# Patient Record
Sex: FEMALE | Race: WHITE | NOT HISPANIC OR LATINO | Employment: UNEMPLOYED | URBAN - METROPOLITAN AREA
[De-identification: names, ages, dates, MRNs, and addresses within clinical notes are randomized per-mention and may not be internally consistent; named-entity substitution may affect disease eponyms.]

---

## 2018-03-05 ENCOUNTER — APPOINTMENT (EMERGENCY)
Dept: RADIOLOGY | Facility: HOSPITAL | Age: 58
DRG: 054 | End: 2018-03-05
Payer: MEDICARE

## 2018-03-05 ENCOUNTER — HOSPITAL ENCOUNTER (INPATIENT)
Facility: HOSPITAL | Age: 58
LOS: 1 days | Discharge: HOME | DRG: 054 | End: 2018-03-06
Attending: EMERGENCY MEDICINE | Admitting: FAMILY MEDICINE
Payer: MEDICARE

## 2018-03-05 DIAGNOSIS — E11.9 LONG-TERM INSULIN USE IN TYPE 2 DIABETES (HCC): ICD-10-CM

## 2018-03-05 DIAGNOSIS — D49.6 NEOPLASM OF BRAIN CAUSING MASS EFFECT ON ADJACENT STRUCTURES (HCC): Primary | ICD-10-CM

## 2018-03-05 DIAGNOSIS — G93.89 FRONTAL MASS OF BRAIN: ICD-10-CM

## 2018-03-05 DIAGNOSIS — Z79.4 LONG-TERM INSULIN USE IN TYPE 2 DIABETES (HCC): ICD-10-CM

## 2018-03-05 PROBLEM — R39.15 URINARY URGENCY: Status: ACTIVE | Noted: 2018-03-05

## 2018-03-05 PROBLEM — G93.5 NEOPLASM OF BRAIN CAUSING MASS EFFECT ON ADJACENT STRUCTURES (HCC): Status: ACTIVE | Noted: 2018-03-05

## 2018-03-05 PROBLEM — I10 BENIGN ESSENTIAL HTN: Status: ACTIVE | Noted: 2018-03-05

## 2018-03-05 PROBLEM — G93.6 VASOGENIC CEREBRAL EDEMA (HCC): Status: ACTIVE | Noted: 2018-03-05

## 2018-03-05 PROBLEM — R53.1 WEAKNESS OF LEFT SIDE OF BODY: Status: ACTIVE | Noted: 2018-03-05

## 2018-03-05 LAB
ALBUMIN SERPL BCP-MCNC: 3.8 G/DL (ref 3.5–5)
ALP SERPL-CCNC: 69 U/L (ref 46–116)
ALT SERPL W P-5'-P-CCNC: 33 U/L (ref 12–78)
ANION GAP SERPL CALCULATED.3IONS-SCNC: 7 MMOL/L (ref 4–13)
APTT PPP: 25 SECONDS (ref 24–33)
AST SERPL W P-5'-P-CCNC: 17 U/L (ref 5–45)
BACTERIA UR QL AUTO: ABNORMAL /HPF
BASOPHILS # BLD AUTO: 0 THOUSANDS/ΜL (ref 0–0.1)
BASOPHILS NFR BLD AUTO: 0 % (ref 0–1)
BILIRUB SERPL-MCNC: 0.3 MG/DL (ref 0.2–1)
BILIRUB UR QL STRIP: NEGATIVE
BUN SERPL-MCNC: 19 MG/DL (ref 5–25)
CALCIUM SERPL-MCNC: 9.3 MG/DL (ref 8.3–10.1)
CHLORIDE SERPL-SCNC: 103 MMOL/L (ref 100–108)
CLARITY UR: CLEAR
CO2 SERPL-SCNC: 30 MMOL/L (ref 21–32)
COLOR UR: YELLOW
CREAT SERPL-MCNC: 0.82 MG/DL (ref 0.6–1.3)
EOSINOPHIL # BLD AUTO: 0.1 THOUSAND/ΜL (ref 0–0.61)
EOSINOPHIL NFR BLD AUTO: 1 % (ref 0–6)
ERYTHROCYTE [DISTWIDTH] IN BLOOD BY AUTOMATED COUNT: 13.6 % (ref 11.6–15.1)
GFR SERPL CREATININE-BSD FRML MDRD: 80 ML/MIN/1.73SQ M
GLUCOSE SERPL-MCNC: 165 MG/DL (ref 65–140)
GLUCOSE SERPL-MCNC: 190 MG/DL (ref 65–140)
GLUCOSE SERPL-MCNC: 195 MG/DL (ref 65–140)
GLUCOSE SERPL-MCNC: 398 MG/DL (ref 65–140)
GLUCOSE UR STRIP-MCNC: ABNORMAL MG/DL
HCT VFR BLD AUTO: 35.3 % (ref 37–47)
HGB BLD-MCNC: 11.7 G/DL (ref 12–16)
HGB UR QL STRIP.AUTO: NEGATIVE
HOLD SPECIMEN: NORMAL
INR PPP: 0.96 (ref 0.86–1.16)
KETONES UR STRIP-MCNC: ABNORMAL MG/DL
LEUKOCYTE ESTERASE UR QL STRIP: ABNORMAL
LYMPHOCYTES # BLD AUTO: 2.1 THOUSANDS/ΜL (ref 0.6–4.47)
LYMPHOCYTES NFR BLD AUTO: 27 % (ref 14–44)
MCH RBC QN AUTO: 29.5 PG (ref 27–31)
MCHC RBC AUTO-ENTMCNC: 33.1 G/DL (ref 31.4–37.4)
MCV RBC AUTO: 89 FL (ref 82–98)
MONOCYTES # BLD AUTO: 0.4 THOUSAND/ΜL (ref 0.17–1.22)
MONOCYTES NFR BLD AUTO: 5 % (ref 4–12)
NEUTROPHILS # BLD AUTO: 5.4 THOUSANDS/ΜL (ref 1.85–7.62)
NEUTS SEG NFR BLD AUTO: 68 % (ref 43–75)
NITRITE UR QL STRIP: NEGATIVE
NON-SQ EPI CELLS URNS QL MICRO: ABNORMAL /HPF
NRBC BLD AUTO-RTO: 0 /100 WBCS
PH UR STRIP.AUTO: 5.5 [PH] (ref 5–9)
PLATELET # BLD AUTO: 221 THOUSANDS/UL (ref 130–400)
PMV BLD AUTO: 7.9 FL (ref 8.9–12.7)
POTASSIUM SERPL-SCNC: 4.1 MMOL/L (ref 3.5–5.3)
PROT SERPL-MCNC: 7 G/DL (ref 6.4–8.2)
PROT UR STRIP-MCNC: NEGATIVE MG/DL
PROTHROMBIN TIME: 10.1 SECONDS (ref 9.4–11.7)
RBC # BLD AUTO: 3.95 MILLION/UL (ref 4.2–5.4)
RBC #/AREA URNS AUTO: ABNORMAL /HPF
SODIUM SERPL-SCNC: 140 MMOL/L (ref 136–145)
SP GR UR STRIP.AUTO: 1.02 (ref 1–1.03)
UROBILINOGEN UR QL STRIP.AUTO: 0.2 E.U./DL
WBC # BLD AUTO: 8 THOUSAND/UL (ref 4.8–10.8)
WBC #/AREA URNS AUTO: ABNORMAL /HPF

## 2018-03-05 PROCEDURE — 85025 COMPLETE CBC W/AUTO DIFF WBC: CPT | Performed by: EMERGENCY MEDICINE

## 2018-03-05 PROCEDURE — 93005 ELECTROCARDIOGRAM TRACING: CPT | Performed by: EMERGENCY MEDICINE

## 2018-03-05 PROCEDURE — 36415 COLL VENOUS BLD VENIPUNCTURE: CPT | Performed by: EMERGENCY MEDICINE

## 2018-03-05 PROCEDURE — 85610 PROTHROMBIN TIME: CPT | Performed by: EMERGENCY MEDICINE

## 2018-03-05 PROCEDURE — 85730 THROMBOPLASTIN TIME PARTIAL: CPT | Performed by: EMERGENCY MEDICINE

## 2018-03-05 PROCEDURE — 70450 CT HEAD/BRAIN W/O DYE: CPT

## 2018-03-05 PROCEDURE — 82948 REAGENT STRIP/BLOOD GLUCOSE: CPT

## 2018-03-05 PROCEDURE — 99285 EMERGENCY DEPT VISIT HI MDM: CPT

## 2018-03-05 PROCEDURE — 99223 1ST HOSP IP/OBS HIGH 75: CPT | Performed by: FAMILY MEDICINE

## 2018-03-05 PROCEDURE — 71045 X-RAY EXAM CHEST 1 VIEW: CPT

## 2018-03-05 PROCEDURE — 80053 COMPREHEN METABOLIC PANEL: CPT | Performed by: EMERGENCY MEDICINE

## 2018-03-05 PROCEDURE — 81001 URINALYSIS AUTO W/SCOPE: CPT | Performed by: FAMILY MEDICINE

## 2018-03-05 PROCEDURE — 87081 CULTURE SCREEN ONLY: CPT | Performed by: FAMILY MEDICINE

## 2018-03-05 RX ORDER — VALACYCLOVIR HYDROCHLORIDE 500 MG/1
500 TABLET, FILM COATED ORAL 2 TIMES DAILY
COMMUNITY
End: 2018-03-12

## 2018-03-05 RX ORDER — DEXAMETHASONE SODIUM PHOSPHATE 10 MG/ML
4 INJECTION, SOLUTION INTRAMUSCULAR; INTRAVENOUS EVERY 6 HOURS SCHEDULED
Status: DISCONTINUED | OUTPATIENT
Start: 2018-03-06 | End: 2018-03-06 | Stop reason: HOSPADM

## 2018-03-05 RX ORDER — ACETAMINOPHEN 325 MG/1
650 TABLET ORAL EVERY 6 HOURS PRN
Status: DISCONTINUED | OUTPATIENT
Start: 2018-03-05 | End: 2018-03-05

## 2018-03-05 RX ORDER — SERTRALINE HYDROCHLORIDE 100 MG/1
100 TABLET, FILM COATED ORAL
Status: DISCONTINUED | OUTPATIENT
Start: 2018-03-05 | End: 2018-03-06 | Stop reason: HOSPADM

## 2018-03-05 RX ORDER — LORAZEPAM 2 MG/ML
1 INJECTION INTRAMUSCULAR EVERY 4 HOURS PRN
Status: DISCONTINUED | OUTPATIENT
Start: 2018-03-05 | End: 2018-03-06 | Stop reason: HOSPADM

## 2018-03-05 RX ORDER — ACETAMINOPHEN 325 MG/1
650 TABLET ORAL EVERY 6 HOURS PRN
Status: DISCONTINUED | OUTPATIENT
Start: 2018-03-05 | End: 2018-03-06 | Stop reason: HOSPADM

## 2018-03-05 RX ORDER — ONDANSETRON 2 MG/ML
4 INJECTION INTRAMUSCULAR; INTRAVENOUS EVERY 6 HOURS PRN
Status: DISCONTINUED | OUTPATIENT
Start: 2018-03-05 | End: 2018-03-06 | Stop reason: HOSPADM

## 2018-03-05 RX ORDER — DEXAMETHASONE SODIUM PHOSPHATE 10 MG/ML
6 INJECTION, SOLUTION INTRAMUSCULAR; INTRAVENOUS ONCE
Status: COMPLETED | OUTPATIENT
Start: 2018-03-05 | End: 2018-03-05

## 2018-03-05 RX ORDER — SERTRALINE HYDROCHLORIDE 100 MG/1
100 TABLET, FILM COATED ORAL
COMMUNITY

## 2018-03-05 RX ORDER — DEXAMETHASONE SODIUM PHOSPHATE 10 MG/ML
6 INJECTION, SOLUTION INTRAMUSCULAR; INTRAVENOUS EVERY 6 HOURS SCHEDULED
Status: DISCONTINUED | OUTPATIENT
Start: 2018-03-05 | End: 2018-03-05

## 2018-03-05 RX ORDER — LISINOPRIL 20 MG/1
20 TABLET ORAL DAILY
COMMUNITY
End: 2020-02-19

## 2018-03-05 RX ORDER — VALACYCLOVIR HYDROCHLORIDE 500 MG/1
500 TABLET, FILM COATED ORAL 2 TIMES DAILY
Status: DISCONTINUED | OUTPATIENT
Start: 2018-03-05 | End: 2018-03-05

## 2018-03-05 RX ORDER — LISINOPRIL 20 MG/1
20 TABLET ORAL DAILY
Status: DISCONTINUED | OUTPATIENT
Start: 2018-03-05 | End: 2018-03-06 | Stop reason: HOSPADM

## 2018-03-05 RX ADMIN — ACETAMINOPHEN 650 MG: 325 TABLET, FILM COATED ORAL at 18:04

## 2018-03-05 RX ADMIN — INSULIN LISPRO 1 UNITS: 100 INJECTION, SOLUTION INTRAVENOUS; SUBCUTANEOUS at 18:04

## 2018-03-05 RX ADMIN — LISINOPRIL 20 MG: 20 TABLET ORAL at 17:39

## 2018-03-05 RX ADMIN — SERTRALINE HYDROCHLORIDE 100 MG: 100 TABLET ORAL at 21:18

## 2018-03-05 RX ADMIN — INSULIN LISPRO 10 UNITS: 100 INJECTION, SOLUTION INTRAVENOUS; SUBCUTANEOUS at 18:13

## 2018-03-05 RX ADMIN — DEXAMETHASONE SODIUM PHOSPHATE 6 MG: 10 INJECTION, SOLUTION INTRAMUSCULAR; INTRAVENOUS at 13:57

## 2018-03-05 RX ADMIN — DEXAMETHASONE SODIUM PHOSPHATE 6 MG: 10 INJECTION, SOLUTION INTRAMUSCULAR; INTRAVENOUS at 18:09

## 2018-03-05 NOTE — PLAN OF CARE
DISCHARGE PLANNING     Discharge to home or other facility with appropriate resources Progressing        Knowledge Deficit     Patient/family/caregiver demonstrates understanding of disease process, treatment plan, medications, and discharge instructions Progressing        PAIN - ADULT     Verbalizes/displays adequate comfort level or baseline comfort level Progressing        Potential for Falls     Patient will remain free of falls Progressing        SAFETY ADULT     Maintain or return to baseline ADL function Progressing     Maintain or return mobility status to optimal level Progressing

## 2018-03-05 NOTE — ED PROVIDER NOTES
History  Chief Complaint   Patient presents with    Extremity Weakness     pt presents to the ed with left sided extremity weakness progressivly over 8 months  pt states that starting this morning she began to have increased left sided weakness      Hx IDDM, SEIZURES/NO MEDS  C/O LT UPPER AND LOWER EXTR WEAKNESS X 8 MONTHS, WORSENING  PT SAW PMD, REFERRED TO NEUROLOGY, NEVER SAW HIM  EXAM: LT WEAKNESS LOWER >UPPER        History provided by:  Patient and spouse  Extremity Weakness   Location:  LEFT UPPER AND LOWER  Severity:  Moderate  Onset quality:  Gradual  Duration:  8 months  Timing:  Constant  Progression:  Worsening      Prior to Admission Medications   Prescriptions Last Dose Informant Patient Reported? Taking? Insulin Aspart (NOVOLOG SC)   Yes Yes   Sig: Inject 10 Units under the skin   lisinopril (ZESTRIL) 20 mg tablet   Yes Yes   Sig: Take 20 mg by mouth daily   metFORMIN (GLUCOPHAGE) 500 mg tablet   Yes Yes   Sig: Take 500 mg by mouth 2 (two) times a day with meals   sertraline (ZOLOFT) 100 mg tablet   Yes Yes   Sig: Take 50 mg by mouth daily at bedtime   sertraline (ZOLOFT) 50 mg tablet   Yes Yes   Sig: Take 50 mg by mouth daily   valACYclovir (VALTREX) 500 mg tablet   Yes Yes   Sig: Take 500 mg by mouth 2 (two) times a day      Facility-Administered Medications: None       Past Medical History:   Diagnosis Date    [facility] [address] [facility]     Diabetes mellitus [facility] [address] )     Seizures ([facility] [address] )        Past Surgical History:   Procedure Laterality Date    HYSTERECTOMY         No family history on file  I have reviewed and agree with the history as documented  Social History   Substance Use Topics    Smoking status: Not on file    Smokeless tobacco: Not on file    Alcohol use Not on file        Review of Systems   Musculoskeletal: Positive for extremity weakness  Neurological: Positive for weakness  All other systems reviewed and are negative        Physical Exam  ED Triage Vitals   Temperature Pulse Respirations Blood Pressure SpO2   03/05/18 1226 03/05/18 1226 03/05/18 1226 03/05/18 1227 03/05/18 1226   98 6 °F (37 °C) 68 18 (!) 189/82 95 %      Temp Source Heart Rate Source Patient Position - Orthostatic VS BP Location FiO2 (%)   03/05/18 1226 03/05/18 1226 -- -- --   Tympanic Monitor         Pain Score       --                  Orthostatic Vital Signs  Vitals:    03/05/18 1226 03/05/18 1227   BP:  (!) 189/82   Pulse: 68        Physical Exam   Constitutional: She is oriented to person, place, and time  She appears well-developed and well-nourished  No distress  HENT:   Head: Normocephalic and atraumatic  Eyes: Conjunctivae and EOM are normal  Pupils are equal, round, and reactive to light  Neck: Normal range of motion  Neck supple  Cardiovascular: Normal rate and regular rhythm  Pulmonary/Chest: Effort normal and breath sounds normal    Abdominal: Soft  She exhibits no distension  There is no tenderness  Musculoskeletal: She exhibits no edema, tenderness or deformity  Neurological: She is alert and oriented to person, place, and time  No cranial nerve deficit or sensory deficit  She exhibits abnormal muscle tone  GCS eye subscore is 4  GCS verbal subscore is 5  GCS motor subscore is 6  LEFT EXTR WEAKNESS LOWER > UPPER   Skin: Skin is warm and dry  No rash noted  She is not diaphoretic  No erythema  Nursing note and vitals reviewed        ED Medications  Medications   dexamethasone (PF) (DECADRON) injection 6 mg (not administered)       Diagnostic Studies  Results Reviewed     Procedure Component Value Units Date/Time    Fingerstick Glucose (POCT) [[id number]]  (Abnormal) Collected:  03/05/18 1216    Lab Status:  Final result Updated:  03/05/18 1309     POC Glucose 190 (H) mg/dl     Comprehensive metabolic panel [[id number]]  (Abnormal) Collected:  03/05/18 1232    Lab Status:  Final result Specimen:  Blood from Arm, Right Updated:  03/05/18 1303     Sodium 140 mmol/L      Potassium 4 1 mmol/L Chloride 103 mmol/L      CO2 30 mmol/L      Anion Gap 7 mmol/L      BUN 19 mg/dL      Creatinine 0 82 mg/dL      Glucose 195 (H) mg/dL      Calcium 9 3 mg/dL      AST 17 U/L      ALT 33 U/L      Alkaline Phosphatase 69 U/L      Total Protein 7 0 g/dL      Albumin 3 8 g/dL      Total Bilirubin 0 30 mg/dL      eGFR 80 ml/min/1 73sq m     Narrative:         National Kidney Disease Education Program recommendations are as follows:  GFR calculation is accurate only with a steady state creatinine  Chronic Kidney disease less than 60 ml/min/1 73 sq  meters  Kidney failure less than 15 ml/min/1 73 sq  meters  APTT [[id number]]  (Normal) Collected:  03/05/18 1232    Lab Status:  Final result Specimen:  Blood from Arm, Right Updated:  03/05/18 1256     PTT 25 seconds     Narrative:          Therapeutic Heparin Range = 60-90 seconds    Protime-INR [[id number]]  (Normal) Collected:  03/05/18 1232    Lab Status:  Final result Specimen:  Blood from Arm, Right Updated:  03/05/18 1256     Protime 10 1 seconds      INR 0 96    CBC and differential [[id number]]  (Abnormal) Collected:  03/05/18 1232    Lab Status:  Final result Specimen:  Blood from Arm, Right Updated:  03/05/18 1248     WBC 8 00 Thousand/uL      RBC 3 95 (L) Million/uL      Hemoglobin 11 7 (L) g/dL      Hematocrit 35 3 (L) %      MCV 89 fL      MCH 29 5 pg      MCHC 33 1 g/dL      RDW 13 6 %      MPV 7 9 (L) fL      Platelets 469 Thousands/uL      nRBC 0 /100 WBCs      Neutrophils Relative 68 %      Lymphocytes Relative 27 %      Monocytes Relative 5 %      Eosinophils Relative 1 %      Basophils Relative 0 %      Neutrophils Absolute 5 40 Thousands/µL      Lymphocytes Absolute 2 10 Thousands/µL      Monocytes Absolute 0 40 Thousand/µL      Eosinophils Absolute 0 10 Thousand/µL      Basophils Absolute 0 00 Thousands/µL                  CT head wo contrast   Final Result by Leslye Hernández MD (03/05 1319)      4 2 x 3 8 x 3 5 cm right frontal lobe mass at the vertex evoking mild vasogenic edema with mass effect upon the falx with secondary midline shift, possible parafalcine meningioma  Enhanced MRI with contrast recommended  4 mm high-density nodule to the right of midline at the level of 4th ventricle likely an extra-axial lesion versus punctate hemorrhage  Further evaluation with MRI is advised               I personally discussed this study with Faey Silverman on 3/5/2018 at 1:17 PM                            Workstation performed: [id number]         XR chest 1 view    (Results Pending)              Procedures  ECG 12 Lead Documentation  Date/Time: 3/5/2018 12:47 PM  Performed by: Carla Sam  Authorized by: Carla Sam     ECG reviewed by me, the ED Provider: yes    Patient location:  ED  Interpretation:     Interpretation: abnormal    Rate:     ECG rate:  52    ECG rate assessment: bradycardic    Rhythm:     Rhythm: sinus bradycardia    QRS:     QRS axis:  Normal    QRS intervals:  Normal  ST segments:     ST segments:  Normal  T waves:     T waves: normal             Phone Contacts  ED Phone Contact    ED Course  ED Course              NIH Stroke Scale    Flowsheet Row Most Recent Value   Level of Consciousness (1a )  0 Filed at: 03/05/2018 1227   LOC Questions (1b )  0 Filed at: 03/05/2018 1227   LOC Commands (1c )  0 Filed at: 03/05/2018 1227   Best Gaze (2 )  0 Filed at: 03/05/2018 1227   Visual (3 )  0 Filed at: 03/05/2018 1227   Facial Palsy (4 )  0 Filed at: 03/05/2018 1227   Motor Arm, Left (5a )  1 Filed at: 03/05/2018 1227   Motor Arm, Right (5b )  0 Filed at: 03/05/2018 1227   Motor Leg, Left (6a )  1 Filed at: 03/05/2018 1227   Motor Leg, Right (6b )  0 Filed at: 03/05/2018 1227   Limb Ataxia (7 )  0 Filed at: 03/05/2018 1227   Sensory (8 )  0 Filed at: 03/05/2018 1227   Best Language (9 )  0 Filed at: 03/05/2018 1227   Dysarthria (10 )  0 Filed at: 03/05/2018 1227   Extinction and Inattention (11 ) (Formerly Neglect)  0 Filed at: 03/05/2018 1227   Total  2 Filed at: 03/05/2018 1227                        [facility]  Number of Diagnoses or Management Options  Diagnosis management comments: D/W DR RIVERA, WILL START DEXAMETHASONE 6 MG Q 6 HRS  CritCare Time    Disposition  Final diagnoses:   Neoplasm of brain causing mass effect on adjacent structures [facility])     Time reflects when diagnosis was documented in both MDM as applicable and the Disposition within this note     Time User Action Codes Description Comment    3/5/2018  1:25 PM Silvano Reed Add [D49 6] Neoplasm of brain causing mass effect on adjacent structures [facility])       ED Disposition     ED Disposition Condition Comment    Admit  Case was discussed with HOSPITALIST and the patient's admission status was agreed to be Admission Status: inpatient status to the service of Dr Stacy Staff   Follow-up Information    None       Patient's Medications   Discharge Prescriptions    No medications on file     No discharge procedures on file      ED Provider  Electronically Signed by           Asia Matos MD  03/05/18 1325       Asia Matos MD  03/05/18 1324

## 2018-03-05 NOTE — H&P
History and Physical - [facility]    Patient Information: Syed Blevins 62 y o  female MRN: [id number]  Unit/Bed#: [address] [address] Encounter: [id number]  Admitting Physician: Jorge Atkinson DO  PCP: No primary care provider on file  Date of Admission:  03/05/18        Hospital Problem List:     Principal Problem:    Frontal mass of brain  Active Problems:    Vasogenic cerebral edema (HCC)    Seizure disorder (HCC)    Long-term insulin use in type 2 diabetes ([facility] [address] )    Benign essential HTN    Urinary urgency      Assessment/Plan:    * Frontal mass of brain   Assessment & Plan    CT head done in the ER showed right frontal lobe mass at the vertex which is causing mild vasogenic edema with mass effect upon the falx with midline shift  This is possibly parafalcine meningioma  A 4 mm nodule was noted to the right of midline at the level of 4th ventricle which could be an extra-axial lesion versus punctate hemorrhage  Admit patient for further management  MRI ordered for for further evaluation   Neurology and Oncology consult  Neurochecks while here        Vasogenic cerebral edema (HCC)   Assessment & Plan    Vasogenic edema with mass effect upon the falx with midline shift was noted  Case was discussed with Neurology by the ER physician and patient started on IV Decadron          Seizure disorder [facility])   Assessment & Plan    Patient discontinued her medications on her own "few years" ago  Place patient on seizure precaution  Neurology consult as noted above  IV Ativan as needed for seizures        Urinary urgency   Assessment & Plan    Check a UA for further evaluation    Patient denies any dysuria, hematuria        Benign essential HTN   Assessment & Plan    Continue lisinopril as she uses at home and monitor blood pressures        Long-term insulin use in type 2 diabetes [facility])   Assessment & Plan    Patient is on NovoLog 10 units t i d  with meals which will be replaced with Humalog while here  Accu-Cheks with sliding scale insulin  Check hemoglobin A1c level in the a m  VTE Prophylaxis: Pharmacologic VTE Prophylaxis contraindicated due to punctate hemorrhage noted on CT scan  / sequential compression device   Code Status: Level 1 - Full Code    Anticipated Length of Stay:  Patient will be admitted on an Inpatient basis with an anticipated length of stay of  atleast 2 midnights  Justification for Hospital Stay: Brain mass    Total Time for Visit, including Counseling / Coordination of Care: 45 minutes  Greater than 50% of this total time spent on direct patient counseling and coordination of care  Chief Complaint:     Extremity Weakness (pt presents to the ed with left sided extremity weakness progressivly over 8 months  pt states that starting this morning she began to have increased left sided weakness )    of Present Illness:    Michele Wolfe is a 62 y o  female who presents with complaints of worsening left-sided upper and lower extremity weakness x8 months  Patient states that her weakness worsened today which prompted her to come to the ER  Patient states that she saw her primary care doctor in December for this and was referred to Neurology  Patient states that she never saw Neurology because she was busy with work  Patient has had difficulty ambulating and has been using a cane  She also has difficulty getting up out of bed  She reports intermittent right-sided headache x1 week  She also reports fevers and chills about 2 weekends ago which have since resolved  Patient does have history of seizure disorder but stopped taking her seizure medications few years ago because she felt that it did not work for her  patient states that her last grand mal seizure was in February of 2017 when she was having cataract surgery    patient states that her last petite mal seizure was today in the ER   patient reports ringing in her ears bilaterally and has had right ear infections in the past   patient reports diarrhea but states that it is intermittent in nature since 2007- 2008 since she was diagnosed with IBS  she denies any visual changes  Patient does have history of uterine cancer and had a total hysterectomy except for a stump of the cervix was left behind as per patient  Patient also states that she had left lower lung "spot" that was noted which was monitored till 2011 and since it remained stable was told that it did not require any further testing  In the ER, CT scan showed right frontal lobe mass    Review of Systems:    Review of Systems   Constitutional: Positive for chills and fever  Negative for appetite change  HENT: Positive for rhinorrhea  Negative for congestion, sore throat and trouble swallowing  Eyes: Negative for photophobia and visual disturbance  Respiratory: Negative for cough, chest tightness and shortness of breath  Cardiovascular: Negative for chest pain and leg swelling  Gastrointestinal: Positive for diarrhea (chronic)  Negative for abdominal pain, blood in stool, nausea and vomiting  Genitourinary: Positive for urgency  Negative for dysuria and hematuria  Musculoskeletal: Positive for back pain (chronic)  Skin: Negative for wound  Neurological: Positive for seizures, weakness and headaches  Negative for syncope and speech difficulty  Hematological: Does not bruise/bleed easily  Psychiatric/Behavioral: Negative for agitation  Past Medical and Surgical History:     Past Medical History:   Diagnosis Date    Cancer ([facility] )     Diabetes mellitus ([facility] )     Seizures ([facility] )        Past Surgical History:   Procedure Laterality Date    HYSTERECTOMY         Meds/Allergies:    PTA meds:   Prior to Admission Medications   Prescriptions Last Dose Informant Patient Reported? Taking?    Insulin Aspart (NOVOLOG SC) 3/4/2018 at Unknown time  Yes Yes   Sig: Inject 10 Units under the skin   lisinopril (ZESTRIL) 20 mg tablet 3/4/2018 at Unknown time Yes Yes   Sig: Take 20 mg by mouth daily   metFORMIN (GLUCOPHAGE) 500 mg tablet 3/4/2018 at Unknown time  Yes Yes   Sig: Take 500 mg by mouth 2 (two) times a day with meals   sertraline (ZOLOFT) 100 mg tablet 3/4/2018 at Unknown time  Yes Yes   Sig: Take 50 mg by mouth daily at bedtime   sertraline (ZOLOFT) 50 mg tablet 3/4/2018 at Unknown time  Yes Yes   Sig: Take 50 mg by mouth daily   valACYclovir (VALTREX) 500 mg tablet 3/4/2018 at Unknown time  Yes Yes   Sig: Take 500 mg by mouth 2 (two) times a day      Facility-Administered Medications: None       Allergies: Allergies   Allergen Reactions    Dilantin [Phenytoin]     Lidocaine     Statins      History:     Marital Status: Single     Substance Use History:   History   Alcohol Use No     History   Smoking Status    Former Smoker    Quit date: 3/5/2000   Smokeless Tobacco    Never Used     History   Drug Use No       Family History:    History reviewed  No pertinent family history  Physical Exam:     Vitals:   Blood Pressure: (!) 181/79 (03/05/18 1741)  Pulse: 87 (03/05/18 1741)  Temperature: 98 7 °F (37 1 °C) (03/05/18 1741)  Temp Source: Tympanic (03/05/18 1741)  Respirations: 18 (03/05/18 1741)  Height: 5' 5" (165 1 cm) (03/05/18 1451)  Weight - Scale: 101 kg (222 lb 14 2 oz) (03/05/18 1451)  SpO2: 95 % (03/05/18 1741)    Physical Exam   Constitutional: She is oriented to person, place, and time  She appears well-developed and well-nourished  No distress  HENT:   Head: Normocephalic and atraumatic  Mouth/Throat: Oropharynx is clear and moist    Eyes: EOM are normal  Right eye exhibits no discharge  Left eye exhibits no discharge  No scleral icterus  Neck: Neck supple  No tracheal deviation present  Cardiovascular: Normal rate and regular rhythm  Pulmonary/Chest: Effort normal and breath sounds normal  No respiratory distress  She has no wheezes  She has no rales  Abdominal: Soft  Bowel sounds are normal  She exhibits no distension  There is no tenderness  Musculoskeletal: She exhibits no edema  Neurological: She is alert and oriented to person, place, and time  No cranial nerve deficit  She exhibits abnormal muscle tone  Left sided weakness noted compared to right  Lower extremity was worse than upper extremity   Skin: Skin is dry  She is not diaphoretic  Psychiatric: She has a normal mood and affect  Lab Results: I have personally reviewed pertinent reports  Results from last 7 days  Lab Units 03/05/18  1232   WBC Thousand/uL 8 00   HEMOGLOBIN g/dL 11 7*   HEMATOCRIT % 35 3*   PLATELETS Thousands/uL 221   NEUTROS PCT % 68   LYMPHS PCT % 27   MONOS PCT % 5   EOS PCT % 1       Results from last 7 days  Lab Units 03/05/18  1232   SODIUM mmol/L 140   POTASSIUM mmol/L 4 1   CHLORIDE mmol/L 103   CO2 mmol/L 30   BUN mg/dL 19   CREATININE mg/dL 0 82   CALCIUM mg/dL 9 3   TOTAL PROTEIN g/dL 7 0   BILIRUBIN TOTAL mg/dL 0 30   ALK PHOS U/L 69   ALT U/L 33   AST U/L 17   GLUCOSE RANDOM mg/dL 195*       Results from last 7 days  Lab Units 03/05/18  1232   INR  0 96       Imaging: I have personally reviewed pertinent reports  Ct Head Wo Contrast    Result Date: 3/5/2018  Narrative: CT BRAIN - WITHOUT CONTRAST INDICATION:  55-year-old female with left-sided weakness  COMPARISON:  None  TECHNIQUE:  CT examination of the brain was performed  In addition to axial images, coronal 2D reformatted images were created and submitted for interpretation  Radiation dose length product (DLP) for this visit:  1084 16 mGy-cm   This examination, like all CT scans performed in the [facility], was performed utilizing techniques to minimize radiation dose exposure, including the use of iterative reconstruction and automated exposure control  IMAGE QUALITY:  Diagnostic   FINDINGS: PARENCHYMA:  Within the right frontal lobe at the vertex, an isodense mass measures 4 2 x 3 8 x 3 5 cm evoking minimal vasogenic edema and mild mass effect upon the falx with 6 mm of left-to-right midline shift  There is no significant mass effect upon the frontal horn of the lateral ventricle and the finding may represent a parafalcine meningioma  Enhanced MRI recommended for confirmation  A 4 mm high-density nodule along the the 4th ventricle to the right of midline may represent a 2nd extra-axial meningioma versus punctate hemorrhage  VENTRICLES AND EXTRA-AXIAL SPACES:  No secondary hydrocephalus  VISUALIZED ORBITS AND PARANASAL SINUSES:  Mucosal thickening of the left maxillary sinus noted  No acute changes  CALVARIUM AND EXTRACRANIAL SOFT TISSUES:  Normal      Impression: 4 2 x 3 8 x 3 5 cm right frontal lobe mass at the vertex evoking mild vasogenic edema with mass effect upon the falx with secondary midline shift, possible parafalcine meningioma  Enhanced MRI with contrast recommended  4 mm high-density nodule to the right of midline at the level of 4th ventricle likely an extra-axial lesion versus punctate hemorrhage  Further evaluation with MRI is advised  I personally discussed this study with WANG GARCIA on 3/5/2018 at 1:17 PM  Workstation performed: [id number]     Xr Chest 1 View    Result Date: 3/5/2018  Narrative: CHEST INDICATION:  40-year-old female with  SOB  COMPARISON:  None EXAM PERFORMED/VIEWS:  XR CHEST 1 VIEW Images: 1 FINDINGS: Cardiomediastinal silhouette appears unremarkable  The lungs are clear  No pneumothorax or pleural effusion  Osseous structures appear within normal limits for patient age  Impression: No acute cardiopulmonary disease  Workstation performed: [id number]       XR chest 1 view   Final Result      No acute cardiopulmonary disease  Workstation performed: [id number]         CT head wo contrast   Final Result      4 2 x 3 8 x 3 5 cm right frontal lobe mass at the vertex evoking mild vasogenic edema with mass effect upon the falx with secondary midline shift, possible parafalcine meningioma  Enhanced MRI with contrast recommended  4 mm high-density nodule to the right of midline at the level of 4th ventricle likely an extra-axial lesion versus punctate hemorrhage  Further evaluation with MRI is advised  I personally discussed this study with WANG GARCIA on 3/5/2018 at 1:17 PM                            Workstation performed: [id number]         MRI inpatient order    (Results Pending)       EKG, Pathology, and Other Studies Reviewed on Admission:   · EKG shows sinus rhythm with no ST elevations    [employer] Records Reviewed: N/A    ** Please Note: Dragon 360 Dictation voice to text software may have been used in the creation of this document   **

## 2018-03-06 ENCOUNTER — APPOINTMENT (INPATIENT)
Dept: RADIOLOGY | Facility: HOSPITAL | Age: 58
DRG: 054 | End: 2018-03-06
Payer: MEDICARE

## 2018-03-06 VITALS
HEIGHT: 65 IN | SYSTOLIC BLOOD PRESSURE: 139 MMHG | OXYGEN SATURATION: 94 % | TEMPERATURE: 97.5 F | WEIGHT: 222.88 LBS | BODY MASS INDEX: 37.13 KG/M2 | RESPIRATION RATE: 18 BRPM | DIASTOLIC BLOOD PRESSURE: 79 MMHG | HEART RATE: 75 BPM

## 2018-03-06 LAB
ANION GAP SERPL CALCULATED.3IONS-SCNC: 11 MMOL/L (ref 4–13)
BUN SERPL-MCNC: 22 MG/DL (ref 5–25)
CALCIUM SERPL-MCNC: 9.5 MG/DL (ref 8.3–10.1)
CHLORIDE SERPL-SCNC: 102 MMOL/L (ref 100–108)
CO2 SERPL-SCNC: 26 MMOL/L (ref 21–32)
CREAT SERPL-MCNC: 0.95 MG/DL (ref 0.6–1.3)
ERYTHROCYTE [DISTWIDTH] IN BLOOD BY AUTOMATED COUNT: 14.2 % (ref 11.6–15.1)
EST. AVERAGE GLUCOSE BLD GHB EST-MCNC: 171 MG/DL
GFR SERPL CREATININE-BSD FRML MDRD: 67 ML/MIN/1.73SQ M
GLUCOSE SERPL-MCNC: 279 MG/DL (ref 65–140)
GLUCOSE SERPL-MCNC: 293 MG/DL (ref 65–140)
GLUCOSE SERPL-MCNC: 359 MG/DL (ref 65–140)
HBA1C MFR BLD: 7.6 % (ref 4.2–6.3)
HCT VFR BLD AUTO: 35.8 % (ref 37–47)
HGB BLD-MCNC: 11.9 G/DL (ref 12–16)
MAGNESIUM SERPL-MCNC: 1.8 MG/DL (ref 1.6–2.6)
MCH RBC QN AUTO: 29.8 PG (ref 27–31)
MCHC RBC AUTO-ENTMCNC: 33.3 G/DL (ref 31.4–37.4)
MCV RBC AUTO: 90 FL (ref 82–98)
PLATELET # BLD AUTO: 226 THOUSANDS/UL (ref 130–400)
PMV BLD AUTO: 8.5 FL (ref 8.9–12.7)
POTASSIUM SERPL-SCNC: 4.4 MMOL/L (ref 3.5–5.3)
RBC # BLD AUTO: 3.99 MILLION/UL (ref 4.2–5.4)
SODIUM SERPL-SCNC: 139 MMOL/L (ref 136–145)
TSH SERPL DL<=0.05 MIU/L-ACNC: 0.57 UIU/ML (ref 0.36–3.74)
WBC # BLD AUTO: 10.2 THOUSAND/UL (ref 4.8–10.8)

## 2018-03-06 PROCEDURE — G8988 SELF CARE GOAL STATUS: HCPCS

## 2018-03-06 PROCEDURE — 99239 HOSP IP/OBS DSCHRG MGMT >30: CPT | Performed by: INTERNAL MEDICINE

## 2018-03-06 PROCEDURE — 97167 OT EVAL HIGH COMPLEX 60 MIN: CPT

## 2018-03-06 PROCEDURE — 99024 POSTOP FOLLOW-UP VISIT: CPT | Performed by: NEUROLOGICAL SURGERY

## 2018-03-06 PROCEDURE — G8979 MOBILITY GOAL STATUS: HCPCS

## 2018-03-06 PROCEDURE — 70498 CT ANGIOGRAPHY NECK: CPT

## 2018-03-06 PROCEDURE — A9585 GADOBUTROL INJECTION: HCPCS | Performed by: PSYCHIATRY & NEUROLOGY

## 2018-03-06 PROCEDURE — 85027 COMPLETE CBC AUTOMATED: CPT | Performed by: FAMILY MEDICINE

## 2018-03-06 PROCEDURE — 82948 REAGENT STRIP/BLOOD GLUCOSE: CPT

## 2018-03-06 PROCEDURE — 83036 HEMOGLOBIN GLYCOSYLATED A1C: CPT | Performed by: FAMILY MEDICINE

## 2018-03-06 PROCEDURE — 80048 BASIC METABOLIC PNL TOTAL CA: CPT | Performed by: FAMILY MEDICINE

## 2018-03-06 PROCEDURE — 97163 PT EVAL HIGH COMPLEX 45 MIN: CPT

## 2018-03-06 PROCEDURE — 99223 1ST HOSP IP/OBS HIGH 75: CPT | Performed by: INTERNAL MEDICINE

## 2018-03-06 PROCEDURE — 83735 ASSAY OF MAGNESIUM: CPT | Performed by: FAMILY MEDICINE

## 2018-03-06 PROCEDURE — G8987 SELF CARE CURRENT STATUS: HCPCS

## 2018-03-06 PROCEDURE — 70496 CT ANGIOGRAPHY HEAD: CPT

## 2018-03-06 PROCEDURE — 70553 MRI BRAIN STEM W/O & W/DYE: CPT

## 2018-03-06 PROCEDURE — 97110 THERAPEUTIC EXERCISES: CPT

## 2018-03-06 PROCEDURE — G8978 MOBILITY CURRENT STATUS: HCPCS

## 2018-03-06 PROCEDURE — 84443 ASSAY THYROID STIM HORMONE: CPT | Performed by: FAMILY MEDICINE

## 2018-03-06 RX ORDER — LEVETIRACETAM 500 MG/1
500 TABLET ORAL EVERY 12 HOURS SCHEDULED
Status: DISCONTINUED | OUTPATIENT
Start: 2018-03-06 | End: 2018-03-06 | Stop reason: HOSPADM

## 2018-03-06 RX ORDER — LEVETIRACETAM 500 MG/1
1000 TABLET, EXTENDED RELEASE ORAL
Qty: 30 TABLET | Refills: 0 | Status: SHIPPED | OUTPATIENT
Start: 2018-03-06 | End: 2018-03-06

## 2018-03-06 RX ORDER — DEXAMETHASONE 2 MG/1
2 TABLET ORAL 3 TIMES DAILY
Qty: 70 TABLET | Refills: 0 | Status: SHIPPED | OUTPATIENT
Start: 2018-03-06 | End: 2018-03-06

## 2018-03-06 RX ORDER — DEXAMETHASONE 2 MG/1
2 TABLET ORAL 3 TIMES DAILY
Qty: 70 TABLET | Refills: 0 | Status: ON HOLD | OUTPATIENT
Start: 2018-03-06 | End: 2018-03-19

## 2018-03-06 RX ORDER — LEVETIRACETAM 500 MG/1
1000 TABLET, EXTENDED RELEASE ORAL
Qty: 30 TABLET | Refills: 0 | Status: SHIPPED | OUTPATIENT
Start: 2018-03-06 | End: 2018-03-12

## 2018-03-06 RX ADMIN — DEXAMETHASONE SODIUM PHOSPHATE 4 MG: 10 INJECTION, SOLUTION INTRAMUSCULAR; INTRAVENOUS at 12:07

## 2018-03-06 RX ADMIN — IOHEXOL 85 ML: 350 INJECTION, SOLUTION INTRAVENOUS at 14:36

## 2018-03-06 RX ADMIN — GADOBUTROL 10 ML: 604.72 INJECTION INTRAVENOUS at 10:03

## 2018-03-06 RX ADMIN — INSULIN LISPRO 10 UNITS: 100 INJECTION, SOLUTION INTRAVENOUS; SUBCUTANEOUS at 13:35

## 2018-03-06 RX ADMIN — INSULIN LISPRO 2 UNITS: 100 INJECTION, SOLUTION INTRAVENOUS; SUBCUTANEOUS at 09:17

## 2018-03-06 RX ADMIN — DEXAMETHASONE SODIUM PHOSPHATE 4 MG: 10 INJECTION, SOLUTION INTRAMUSCULAR; INTRAVENOUS at 06:25

## 2018-03-06 RX ADMIN — DEXAMETHASONE SODIUM PHOSPHATE 4 MG: 10 INJECTION, SOLUTION INTRAMUSCULAR; INTRAVENOUS at 00:20

## 2018-03-06 RX ADMIN — SERTRALINE HYDROCHLORIDE 50 MG: 50 TABLET ORAL at 09:15

## 2018-03-06 RX ADMIN — INSULIN LISPRO 3 UNITS: 100 INJECTION, SOLUTION INTRAVENOUS; SUBCUTANEOUS at 13:32

## 2018-03-06 RX ADMIN — INSULIN LISPRO 10 UNITS: 100 INJECTION, SOLUTION INTRAVENOUS; SUBCUTANEOUS at 09:17

## 2018-03-06 RX ADMIN — LISINOPRIL 20 MG: 20 TABLET ORAL at 09:14

## 2018-03-06 NOTE — CONSULTS
Hematology Oncology Consult Report    Micheal Campos, 1960, [id number]  [address] [address]/[address] [address]-*    Impression and plan:    1  Right frontal lobe mass with vasogenic edema: MRI with contrast revealed a right frontal vertex parafalcine meningioma, producing minimal edema, with extension through the superior saggital sinus  Once patient is medically cleared for discharge, Miesha Bryant will schedule appointment with Neurosurgery for evaluation and management  Neurology is following  2  Hx of Uterine cancer s/p hysterectomy in 2007: Advised patient to return to gyn for surveillance as recommended  3  Preventive screening: Patient counseled in length on appropriate cancer screening including mammogram and repeat colonoscopy  4  Hypertension: Controlled, continue lisinopril  5  Type 2 DM: Continue Humalog  Management per primary team      6   Seizure disorder: Patient is noncompliant with medications secondary to side effects  Neurology will be following  Please do not hesitate to contact the Hematology service if you have any questions or concerns  Thank you for this consult  Chief complaint: Worsening left sided weakness     History of present illness: This is a 61 yo  F with history of uterine cancer s/p hysterectomy "with a stump of cervix left in place" (2007), seizure disorder, type 2 diabetes mellitus, and hypertension who [place] [facility] worsening left upper and lower extremity weakness associated with intermittent headaches and difficulty ambulating  Patient also states she has a lung lesion which she was told did not need any surveillance  Patient's last mammogram was six years ago and colonoscopy was over seven years ago  In the ED, patient was found to have a right frontal lobe mass and enhanced MRI with contrast was recommended  Patient states she feels much better today   She is found sitting in her chair eating breakfast  She denies any headaches, visual disturbances, pre-syncope or syncope  She is tolerating po steroids  Past medical history:   Past Medical History:   Diagnosis Date    Cancer ([facility] [place] [facility] )     Diabetes mellitus ([facility] [place] [facility] )     Seizures ([facility] [place] [facility] )        Past surgical history:   Past Surgical History:   Procedure Laterality Date    HYSTERECTOMY         Allergies:    Allergies   Allergen Reactions    Dilantin [Phenytoin]     Lidocaine     Statins        Home medications:   Prescriptions Prior to Admission   Medication    Insulin Aspart (NOVOLOG SC)    lisinopril (ZESTRIL) 20 mg tablet    metFORMIN (GLUCOPHAGE) 500 mg tablet    sertraline (ZOLOFT) 100 mg tablet    sertraline (ZOLOFT) 50 mg tablet    valACYclovir (VALTREX) 500 mg tablet       Hospital medications:   Current Facility-Administered Medications:     acetaminophen (TYLENOL) tablet 650 mg, 650 mg, Oral, Q6H PRN, Dianelys Revankar, DO, 650 mg at 03/05/18 1804    dexamethasone (PF) (DECADRON) injection 4 mg, 4 mg, Intravenous, Q6H [address], Cari Lyons MD, 4 mg at 03/06/18 0625    insulin lispro (HumaLOG) 100 units/mL subcutaneous injection 1-5 Units, 1-5 Units, Subcutaneous, TID AC, 1 Units at 03/05/18 1804 **AND** Fingerstick Glucose (POCT), , , TID AC, Dianelys Revankar, DO    insulin lispro (HumaLOG) 100 units/mL subcutaneous injection 10 Units, 10 Units, Subcutaneous, TID With Meals, Dianelys Revankar, DO, 10 Units at 03/05/18 1813    lisinopril (ZESTRIL) tablet 20 mg, 20 mg, Oral, Daily, Dianelys Revankar, DO, 20 mg at 03/05/18 1739    LORazepam (ATIVAN) 2 mg/mL injection 1 mg, 1 mg, Intravenous, Q4H PRN, Dianelys Revankar, DO    ondansetron (ZOFRAN) injection 4 mg, 4 mg, Intravenous, Q6H PRN, Dianelys Revankar, DO    sertraline (ZOLOFT) tablet 100 mg, 100 mg, Oral, HS, Dianelys Revankar, DO, 100 mg at 03/05/18 2118    sertraline (ZOLOFT) tablet 50 mg, 50 mg, Oral, Daily, Dianelys Revankar, DO    Social history:   Social History     Social History    Marital status: Single     Spouse name: N/A    Number of children: N/A    Years of education: N/A     Occupational History    Not on file  Social History Main Topics    Smoking status: Former Smoker     Quit date: 3/5/2000    Smokeless tobacco: Never Used    Alcohol use No    Drug use: No    Sexual activity: Yes     Partners: Male     Other Topics Concern    Not on file     Social History Narrative    No narrative on file       Family history: History reviewed  No pertinent family history  Review of systems: No blurred vision or double vision, no tinnitus or trouble hearing, no headaches, dizziness or body aches, no chest pain or pressure, no shortness of breath or dyspnea on exertion, no cough, sputum or hemoptysis, no nausea, vomiting, diarrhea or constipation, no abdominal pain, no blood in the stools, no weight loss, no urinary incontinence, frequency or dribbling, no hematuria, no yellowing of the skin, no problems with excessive bruising or bleeding from the skin, no numbness, tingling, seizures or syncopal episodes    Physical exam  Vitals:    03/05/18 2340   BP: 138/76   Pulse: 72   Resp: 18   Temp: 97 9 °F (36 6 °C)   SpO2: 94%     Constitutional:    Eyes:  PERRL, conjunctiva pink, anicteric   HENT:  Atraumatic, external ears normal, nose normal,   Oropharynx: moist, no pharyngeal exudates, no thrush, pink  Neck: No adenopathy, good range of motion  Respiratory: Clear to auscultation bilaterally    Breasts: Defefrred  Cardiovascular:  Normal rate, normal rhythm, no murmurs, no gallops, no rubs   GI:  Soft, nontender, nondistended, BS (+)  :  No costovertebral angle tenderness  Musculoskeletal: normal ROM  Integument: No petechiae or ecchymoses  Lymphatic:  No lymphadenopathy in the neck, supraclavicular region, infraclavicular region, axilla and groin bilaterally  Extremities:  No edema, pulses 1+  Neurologic:  Alert & oriented x 3, CN 2-12 normal, normal motor function, normal sensory function, no focal deficits noted  Psychiatric:  Speech and behavior appropriate, response time appropriate  Rectal: Deferred    Laboratory test results  Results for Jayden Aguirre (MRN [id number]) as of 3/6/2018 12:24   Ref  Range 3/5/2018 12:32 3/6/2018 06:36   WBC Latest Ref Range: 4 80 - 10 80 Thousand/uL 8 00 10 20   RBC Latest Ref Range: 4 20 - 5 40 Million/uL 3 95 (L) 3 99 (L)   Hemoglobin Latest Ref Range: 12 0 - 16 0 g/dL 11 7 (L) 11 9 (L)   Hematocrit Latest Ref Range: 37 0 - 47 0 % 35 3 (L) 35 8 (L)   MCV Latest Ref Range: 82 - 98 fL 89 90   MCH Latest Ref Range: 27 0 - 31 0 pg 29 5 29 8   MCHC Latest Ref Range: 31 4 - 37 4 g/dL 33 1 33 3   RDW Latest Ref Range: 11 6 - 15 1 % 13 6 14 2   Platelets Latest Ref Range: 130 - 400 Thousands/uL 221 226   MPV Latest Ref Range: 8 9 - 12 7 fL 7 9 (L) 8 5 (L)   nRBC Latest Units: /100 WBCs 0          Radiology reports    Procedure Component Value Units Date/Time   MRI brain w wo contrast [[id number]] Collected: 03/06/18 1114   Order Status: Completed Updated: 03/06/18 1141   Narrative:     MRI BRAIN WITH AND WITHOUT CONTRAST    INDICATION:  Right frontal mass    COMPARISON:  CT performed one day earlier    TECHNIQUE:  Sagittal T1, axial T2, axial FLAIR, axial T1, axial [place], axial diffusion  Sagittal, axial and coronal T1 postcontrast   Axial BRAVO post contrast       IV Contrast:  10 mL of Gadobutrol injection (SINGLE-DOSE)      IMAGE QUALITY:   Diagnostic  FINDINGS:    BRAIN PARENCHYMA:  No hemorrhage or acute ischemia      47 mm in AP oblique by 37 mm in cephalocaudad oblique, by 37 mm in transverse dimension relatively homogeneous extra-axial mass is identified along the frontal, paramedian falx   Very minimal associated edema along the posterior inferior aspect of the   tumor   Tumor displaces the falx to the left anteriorly and appears to infiltrate the superior sagittal sinus  Ena Alu is suggestion of a minimal amount of tumor extending through the falx   Broad-based enhancement is observed, particularly along the   anterolateral aspect of the tumor, extending over the frontal and parietal bones  VENTRICLES: Mild Mass effect, no hydrocephalus  SELLA AND PITUITARY GLAND:  Normal     ORBITS:  Normal     PARANASAL SINUSES:  Trace mucosal disease  VASCULATURE:  Evaluation of the major intracranial vasculature demonstrates appropriate flow voids  CALVARIUM AND SKULL BASE:  Normal     EXTRACRANIAL SOFT TISSUES:  Normal    Impression:       47 mm in greatest linear dimension right frontal vertex  parafalcine meningioma, producing minimal edema   Tumor has extended through the superior sagittal sinus  Workstation performed: [id number]   XR chest 1 view [[id number]] Collected: 03/05/18 1328   Order Status: Completed Updated: 03/05/18 1348   Narrative:     CHEST     INDICATION: 59-year-old female with  SOB  COMPARISON: Alice Kuo PERFORMED/VIEWS:  XR CHEST 1 VIEW  Images: 1    FINDINGS:    Cardiomediastinal silhouette appears unremarkable  The lungs are clear   No pneumothorax or pleural effusion  Osseous structures appear within normal limits for patient age  Impression:       No acute cardiopulmonary disease  Workstation performed: [id number]   CT head wo contrast [[id number]] Collected: 03/05/18 1306   Order Status: Completed Updated: 03/05/18 1320   Narrative:     CT BRAIN - WITHOUT CONTRAST    INDICATION: 59-year-old female with left-sided weakness  COMPARISON:  None      TECHNIQUE:  CT examination of the brain was performed   In addition to axial images, coronal 2D reformatted images were created and submitted for interpretation       Radiation dose length product (DLP) for this visit:  2023 84 mGy-cm    This examination, like all CT scans performed in the [facility], was performed utilizing techniques to minimize radiation dose exposure, including the use of   iterative reconstruction and automated exposure control       IMAGE QUALITY:  Diagnostic  FINDINGS:    PARENCHYMA:  Within the right frontal lobe at the vertex, an isodense mass measures 4 2 x 3 8 x 3 5 cm evoking minimal vasogenic edema and mild mass effect upon the falx with 6 mm of left-to-right midline shift  There is no significant mass effect upon   the frontal horn of the lateral ventricle and the finding may represent a parafalcine meningioma   Enhanced MRI recommended for confirmation   A 4 mm high-density nodule along the the 4th ventricle to the right of midline may represent a 2nd extra-axial   meningioma versus punctate hemorrhage  VENTRICLES AND EXTRA-AXIAL SPACES:  No secondary hydrocephalus  VISUALIZED ORBITS AND PARANASAL SINUSES:  Mucosal thickening of the left maxillary sinus noted   No acute changes  CALVARIUM AND EXTRACRANIAL SOFT TISSUES:  Normal    Impression:       4 2 x 3 8 x 3 5 cm right frontal lobe mass at the vertex evoking mild vasogenic edema with mass effect upon the falx with secondary midline shift, possible parafalcine meningioma   Enhanced MRI with contrast recommended      4 mm high-density nodule to the right of midline at the level of 4th ventricle likely an extra-axial lesion versus punctate hemorrhage   Further evaluation with MRI is advised          I personally discussed this study with WANG GARCIA on 3/5/2018 at 1:17 PM                   Workstation performed: [id number]

## 2018-03-06 NOTE — ASSESSMENT & PLAN NOTE
Vasogenic edema with mass effect upon the falx with midline shift was noted  Discharge on Decadron taper

## 2018-03-06 NOTE — PLAN OF CARE
DISCHARGE PLANNING     Discharge to home or other facility with appropriate resources Completed        DISCHARGE PLANNING - CARE MANAGEMENT     Discharge to post-acute care or home with appropriate resources Completed        Knowledge Deficit     Patient/family/caregiver demonstrates understanding of disease process, treatment plan, medications, and discharge instructions Completed        PAIN - ADULT     Verbalizes/displays adequate comfort level or baseline comfort level Completed        Potential for Falls     Patient will remain free of falls Completed        SAFETY ADULT     Maintain or return to baseline ADL function Completed     Maintain or return mobility status to optimal level Completed

## 2018-03-06 NOTE — ASSESSMENT & PLAN NOTE
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

## 2018-03-06 NOTE — CONSULTS
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  3  H/o seizure disorder    Patient has evidence of enlarging right frontal meningioma that would explain recent headaches and left sided weakness  It may be contributing to focal motor seizure as well  We had placed her on dexamethasone last evening and she is doing better  Will keep her on taper as outpatient  4mg tid for 1 week and then 2mg tid for 1 week  We contacted neurosurgery at [facility] [place] [facility]  I feel that patient is clinically stable to have outpatient neurosurgical evaluation with Dr Analisa Adair  Our  will arrange an appointment with him in one week  For seizure, will place her on Keppra 1000mg XR at night to avoid drowsiness during daytime  EEG won't   Agreed with CTA/CTV and ordered it to r/o venous sinus thrombosis, occlusion  It shows segmental occlusion of superior saggittal sinus and mild to moderate cervical stenosis  Patient can be discharged with neurosurgery f/u in 1 week  Follow up with me in clinic  Discussed plan with patient and primary team            HPI:  Ced Viera is a 63 yo left handed F with PMH of DM II, seizure disorder presents with cc of headache and left sided weakness  Patient has been having left sided weakness for past 8 months and it was progressively getting worse  Over past 3 weeks, she was dragging her left leg  For quite some time, she hasn't been able to type well   She was seen by her PCP but they felt she was starting to have parkinsonian symptoms  She slowly started using cane  Over past 1-2 weeks she felt her weakness was worsening  Yesterday she had left leg uncontrollable shaking movements lasting 30 sec  Yesterday she felt she had left facial palsy and though she may have a stroke which brought her to hospital  She also reports new onset headache in right frontal region, top of head since December  She was asked to see a neurologist but her own neurologist is 3 hours away and hasn't been able to make an appointment  Our CT brain in ED showed right frontal enlarging meningioma with some vasogenic edema  She was placed on dexamethasone after discussing case with ED  Today she says she's feeling a lot better  Denies headache and feels her left side is not as weak as it had been previously  Patient has h/o epilepsy and up until 3 years ago was on trileptal  She said it wasn't helping her seizures but it was making her drowsy so she decided to stop it   She is highly functional and likes to stay active with her job          Past Medical History:   Diagnosis Date    [facility]     [facility]     [facility]ures [facility]        Past Surgical History:   Procedure Laterality Date    HYSTERECTOMY         Allergies   Allergen Reactions    Dilantin [Phenytoin]     Lidocaine     Statins          Current Facility-Administered Medications:     acetaminophen (TYLENOL) tablet 650 mg, 650 mg, Oral, Q6H PRN, Dianelys Wells DO, 650 mg at 03/05/18 1804    dexamethasone (PF) (DECADRON) injection 4 mg, 4 mg, Intravenous, Q6H [facility], Deonte Ga MD, 4 mg at 03/06/18 1207    insulin lispro (HumaLOG) 100 units/mL subcutaneous injection 1-5 Units, 1-5 Units, Subcutaneous, TID AC, 3 Units at 03/06/18 1332 **AND** Fingerstick Glucose (POCT), , , TID AC, Dianelys Wells DO    insulin lispro (HumaLOG) 100 units/mL subcutaneous injection 10 Units, 10 Units, Subcutaneous, TID With Meals, Dianelys Wells DO, 10 Units at 03/06/18 1335   levETIRAcetam (KEPPRA) tablet 500 mg, 500 mg, Oral, Q12H [address], Emily Cruz MD    lisinopril (ZESTRIL) tablet 20 mg, 20 mg, Oral, Daily, Dianelys Revankar, DO, 20 mg at 03/06/18 0914    LORazepam (ATIVAN) 2 mg/mL injection 1 mg, 1 mg, Intravenous, Q4H PRN, Dianelys Revankar, DO    ondansetron (ZOFRAN) injection 4 mg, 4 mg, Intravenous, Q6H PRN, Dianelys Revankar, DO    sertraline (ZOLOFT) tablet 100 mg, 100 mg, Oral, HS, Dianelys Revankar, DO, 100 mg at 03/05/18 2118    sertraline (ZOLOFT) tablet 50 mg, 50 mg, Oral, Daily, Dianelys Revankar, DO, 50 mg at 03/06/18 0915    Social History     Social History    Marital status: Single     Spouse name: N/A    Number of children: N/A    Years of education: N/A     Occupational History    Not on file  Social History Main Topics    Smoking status: Former Smoker     Quit date: 3/5/2000    Smokeless tobacco: Never Used    Alcohol use No    Drug use: No    Sexual activity: Yes     Partners: Male     Other Topics Concern    Not on file     Social History Narrative    No narrative on file       History reviewed  No pertinent family history  Review of systems:  Please see HPI for positive symptoms  No fever, no chills, no weight change  Ocular: No drainage, no blurred vision  HEENT:  No sore throat, earache, or congestion  No neck pain  COR:  No chest pain  No palpitations  Lungs:  no sob, wheezing,  GI:  no  nausea, no vomiting, no diarrhea, no constipation, no anorexia  :  No dysuria, frequency, or urgency  No hematuria  Musculoskeletal:  No joint pain or swelling or edema  Skin:  No rash or itching  Psychiatric:  no anxiety, no depression  Endocrine:  No polyuria or polydipsia  Physical examination:  Vitals:    03/06/18 0914   BP: 139/79   Pulse: 75   Resp: 18   Temp: 97 5 °F (36 4 °C)   SpO2:        GENERAL APPEARANCE:  The patient is alert, oriented  He is in no acute distress  HEENT:  Head is normocephalic  The sinuses are otherwise nontender    Pupils are equal and reactive  NECK:  Supple without lymphadenopathy  HEART:  Regular rate and rhythm  LUNGS:  clear to auscultation  No crackles or wheezes are heard  ABDOMEN:  Soft, nontender, nondistended with good bowel sounds heard  EXTREMITIES:  Without cyanosis, clubbing or edema  Mental status: The patient is alert, attentive, and oriented  Speech is clear and fluent, good repetition, comprehension, and naming  he recalls 3/3 objects at 5 minutes  Cranial nerves:  CN II: Visual fields are full to confrontation  Fundoscopic exam is normal with sharp discs and no vascular changes    Pupils are 3 mm and  reactive to light  CN III, IV, VI: At primary gaze, there is no eye deviation  CN V: Facial sensation is intact to pinprick in all 3 divisions bilaterally  Corneal responses are intact  CN VII: Face is symmetric with normal eye closure and smile  Yesterday she felt there was left facial asymmetry  CN VIII: Hearing is normal to rubbing fingers  CN IX, X: Palate elevates symmetrically  Phonation is normal   CN XI: Head turning and shoulder shrug are intact  CN XII: Tongue is midline with normal movements and no atrophy  Motor: There is no pronator drift of out-stretched arms  Muscle bulk and tone are normal      Muscle exam  Arm Right Left Leg Right Left   Deltoid 5/5 4/5 Iliopsoas 5/5 4-/5   Biceps 5/5 4/5 Quads 5/5 4-/5   Triceps 5/5 4/5 Hamstrings 5/5 4-/5   Wrist Extension 5/5 4/5 Ankle Dorsi Flexion 5/5 4/5   Wrist Flexion 5/5 4/5 Ankle Plantar Flexion 5/5 4/5   Interossei 5/5 4/5 Ankle Eversion 5/5 4/5   APB 5/5 4/5 Ankle Inversion 5/5 4/5       Reflexes   RJ BJ TJ KJ AJ Plantars Mayer's   Right 2+ 3+ 2+ 2+ 2+ Downgoing Not present   Left 2+ 1+ 2+ 2+ 2+ Downgoing Not present     Sensory:  Light touch, pinprick, position sense, and vibration sense are intact in fingers and toes  Coordination:  Rapid alternating movements and fine finger movements are intact   There is no dysmetria on finger-to-nose and heel-knee-shin  There are no abnormal or extraneous movements  Romberg negative  Gait/Stance:  Unsteady due to left leg weakness     Lab Results   Component Value Date    WBC 10 20 03/06/2018    HGB 11 9 (L) 03/06/2018    HCT 35 8 (L) 03/06/2018    MCV 90 03/06/2018     03/06/2018     Lab Results   Component Value Date    HGBA1C 7 6 (H) 03/06/2018     Lab Results   Component Value Date    ALT 33 03/05/2018    AST 17 03/05/2018    ALKPHOS 69 03/05/2018    BILITOT 0 30 03/05/2018     Lab Results   Component Value Date    GLUCOSE 293 (H) 03/06/2018    CALCIUM 9 5 03/06/2018     03/06/2018    K 4 4 03/06/2018    CO2 26 03/06/2018     03/06/2018    BUN 22 03/06/2018    CREATININE 0 95 03/06/2018         Radiology          Review of reports and Independent Interpretation of images or specimens:  Ct Head Wo Contrast    Result Date: 3/5/2018  4 2 x 3 8 x 3 5 cm right frontal lobe mass at the vertex evoking mild vasogenic edema with mass effect upon the falx with secondary midline shift, possible parafalcine meningioma  Enhanced MRI with contrast recommended  4 mm high-density nodule to the right of midline at the level of 4th ventricle likely an extra-axial lesion versus punctate hemorrhage  Further evaluation with MRI is advised  I personally discussed this study with Kirby Barton on 3/5/2018 at 1:17 PM  Workstation performed: [id number]     Mri Brain W Wo Contrast    Result Date: 3/6/2018  47 mm in greatest linear dimension right frontal vertex parafalcine meningioma, producing minimal edema  Tumor has extended through the superior sagittal sinus  Workstation performed: [id number]     Xr Chest 1 View    Result Date: 3/5/2018  No acute cardiopulmonary disease  Workstation performed: [id number]                 Thank you for this consult  Total time of encounter: 70 min   More than 50% of time was spent in counseling and coordination of care of patient       Emely Ema M D   [facility]  [address]  [place]

## 2018-03-06 NOTE — SOCIAL WORK
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  [facility]  Boyfriend is supportive and assists with transportation  Explained role of cm, no d/c needs at this time  CM reviewed d/c planning process including the following: identifying help at home, patient preference for d/c planning needs, and availability of the treatment team to discuss questions or concerns patient and/or family may have regarding understanding of medications and recognizing signs and symptoms once discharged  CM also encouraged patient to follow up with all recommended appointments after discharge  Patient advised of importance for patient and family to participate in managing patient's medical well being

## 2018-03-06 NOTE — NURSING NOTE
Patient left via wheelchair in stable condition accompanied by significant other and PCA  AVS reviewed with patient   Patient stated understanding

## 2018-03-06 NOTE — PHYSICAL THERAPY NOTE
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)   Home Layout One level  ([address] with single steps inside btwn rooms)   [address] [place] [address] chair   Home Equipment Cane  (rollator)   Prior Function   Level of [place] Needs assistance with ADLs and functional mobility; Needs assistance with IADLs  (amb with SPC for last wk;previous without AD)   Lives With Significant other   Receives Help From (Significant other)   ADL Assistance Needs assistance   IADLs Needs assistance   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)   Restrictions/Precautions   Other Precautions Fall Risk   General   Additional Pertinent History Pt adm with weakness of left side  Pt has had left side weakness for last 8 months, but it has got worse recently  Family/Caregiver Present No   Cognition   Overall Cognitive Status WFL   Arousal/Participation Cooperative   Orientation Level Oriented X4   Following Commands Follows all commands and directions without difficulty   RLE Assessment   RLE Assessment WFL  (grossly 4/5)   LLE Assessment   LLE Assessment WFL  (hip 3- to 3/5, knee 2+ to 3-/5, ankle DF 2/5, PF 2+/5)   Bed Mobility   Supine to Sit 5  Supervision   Additional items Increased time required   Transfers   Sit to Stand 5  Supervision   Additional items Verbal cues   Stand to Sit 5  Supervision   Additional items Verbal cues   Ambulation/Elevation   Gait pattern L Foot drag;Improper Weight shift;Decreased foot clearance;Decreased L stance; Short stride; Inconsistent joseph;Narrow YEIMY  (generalized unsteadiness)   Gait Assistance 4  Minimal assist   Additional items Verbal cues; Tactile cues   Assistive Device None   Distance 50 feet   Balance   Static Sitting Fair   Static Standing Fair   Dynamic Standing Fair -   Ambulatory Fair -   Activity Tolerance   Activity Tolerance Patient limited by fatigue  (pt fatigues very easily)   Assessment   Problem List Decreased strength;Decreased range of motion;Decreased endurance; Impaired balance;Decreased mobility; Decreased coordination;Decreased safety awareness;Pain   Assessment Patient seen for Physical Therapy evaluation  Patient admitted with Frontal mass of brain  Comorbidities affecting patient's physical performance include: Seizure, DM, uterine CA s/p hysterectomy, vasogenic cerebral edema, HTN, frontal brain mass  Personal factors affecting patient at time of initial evaluation include: lives in one story house, ambulating with assistive device, stairs to enter home, inability to navigate community distances, inability to navigate level surfaces without external assistance, inability to perform dynamic tasks in community and positive fall history  Prior to admission, patient was independent with functional mobility with cane, independent with functional mobility without assistive device, requiring assist for ADLS, requiring assist for IADLS, living with significant other in a one level home with 20 steps to enter, ambulating household distance, ambulating community distances and works full time  Please find objective findings from Physical Therapy assessment regarding body systems outlined above with impairments and limitations including weakness, decreased ROM, impaired balance, decreased endurance, impaired coordination, gait deviations, pain, decreased activity tolerance, decreased functional mobility tolerance, decreased safety awareness and fall risk  The Barthel Index was used as a functional outcome tool presenting with a score of 55 today indicating marked limitations of functional mobility and ADLS    Patient's clinical presentation is currently unstable/unpredictable as seen in patient's presentation of vital sign response, changing level of pain, increased fall risk, new onset of impairment of functional mobility, decreased endurance and new onset of weakness  Pt would benefit from continued Physical Therapy treatment to address deficits as defined above and maximize level of functional mobility  As demonstrated by objective findings, the assigned level of complexity for this evaluation is high  Goals   Patient Goals Not get any weaker   STG Expiration Date (1-7 days)   Short Term Goal #1 bed mob - I; trans - I   Short Term Goal #2 amb w/wout AD x 100 feet - S; balance w/wout AD - F/F+; up/down steps - stand by assist   LTG Expiration Date (8-14 days)   Long Term Goal #1 amb w/wout AD home and community distances - I; balance - F+/G   Long Term Goal #2 up/down steps - S/I; strength LLE - increase by 1/2 MMT   Plan   Treatment/Interventions ADL retraining;Functional transfer training;LE strengthening/ROM; Elevations; Therapeutic exercise; Endurance training;Patient/family training;Bed mobility;Gait training   PT Frequency 5x/wk   Recommendation   Recommendation Home with family support  (F/U PT - home vs OP)   Equipment Recommended (Pt has a cane)   Barthel Index   Feeding 5   Bathing 0   Grooming Score 5   Dressing Score 5   Bladder Score 10   Bowels Score 10   Toilet Use Score 5   Transfers (Bed/Chair) Score 10   Mobility (Level Surface) Score 0   Stairs Score 5   Barthel Index Score 55     Time [id number]  Time [id number]  Total Time: 10 mins      S:  "I get tired so easily"  O:  Pt trans with S  Pt amb with cane x 50 feet with min A  PT adjusted pt's cane as it was too high for pt   A:  No significant difference with gait deviations with cane vs no AD  Pt may benefit from cont amb with cane with increased fatigue  Pt will cont to benefit from skilled PT services to increase pt's strength, endurance and mobility  P:  Cont per PT POC   DCP: home with family support;F/U PT home vs OP    Suzie nAgel PT

## 2018-03-06 NOTE — ASSESSMENT & PLAN NOTE
Patient can resume NovoLog with meals upon discharge  Resume metformin on March 8, 2018  Hemoglobin A1c 7 6

## 2018-03-06 NOTE — OCCUPATIONAL THERAPY NOTE
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)   Home Layout One level  ([address] with single steps inside btwn rooms)   Bathroom Shower/Tub Tub/shower unit   Bathroom Toilet Standard   Bathroom Equipment Shower chair   Bathroom Accessibility Accessible   Home Equipment (hurrycane, rollator)   Additional Comments Pt has access to all types of equipment    Prior Function   Level of [place] Needs assistance with ADLs and functional mobility; Needs assistance with IADLs  (amb with SPC for last wk;previous without AD)   Lives With Significant other   Receives Help From (Significant other)   ADL Assistance Needs assistance   IADLs Needs assistance   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)   Psychosocial   Psychosocial (WDL) WDL   Length of Time/Family Visitation 2-4 hrs  (Simeon Crow, boyfriend)   Subjective   Subjective "My left side has gotten better, but it's still not good "   ADL   Where Assessed Chair   Eating Assistance 5  Supervision/Setup   Grooming Assistance 4  Minimal Assistance   UB Bathing Assistance 3  Moderate Assistance   LB Bathing Assistance 3  Moderate Assistance   [address] [facility] 4  Minimal [address] 3  Moderate [address]  4  Minimal Assistance   Transfers   Sit to Stand 5  Supervision   Stand to Sit 5  Supervision   Stand pivot 5  Supervision   Functional Mobility   Functional Mobility 5  Supervision   Additional Comments 30 feet   Additional items Rolling walker   Balance   Static Sitting Fair +   Dynamic Sitting Fair   Static Standing Fair   Dynamic Standing Lawrence Lozoya 0635 -   Activity Tolerance   Activity Tolerance Patient limited by fatigue   RUE Assessment   RUE Assessment [facility] LUE Assessment   LUE Assessment X  (AROM shoulder 0-80, elbow to hand WFL)   LUE Strength   LUE Overall Strength (shoulder 3-/5  elbow to hand 3+/5)   Hand Function   Gross Motor Coordination Impaired  (LUE)   Fine Motor Coordination Impaired  (LUE)   Sensation   Light Touch No apparent deficits   Vision-Basic Assessment   Current Vision Wears glasses all the time   Visual History Cataracts; Corrective eye surgery   Patient Visual Report (no complaints)   Cognition   Overall Cognitive Status WFL   Arousal/Participation Alert; Cooperative   Attention Within functional limits   Orientation Level Oriented X4   Memory Within functional limits   Following Commands Follows all commands and directions without difficulty   Assessment   Limitation Decreased ADL status; Decreased UE ROM; Decreased UE strength;Decreased endurance;Decreased fine motor control;Decreased self-care trans;Decreased high-level ADLs  (decreased balance)   Prognosis Good   Assessment Patient evaluated by Occupational Therapy  Patient admitted with Frontal mass of brain  The patients occupational profile, medical and therapy history includes a extensive additional review of physical, cognitive, or psychosocial history related to current functional performance  Comorbidities affecting functional mobility and ADLS include: Seizure, DM, uterine CA s/p hysterectomy, vasogenic cerebral edema, HTN, frontal brain mass  Prior to admission, patient was independent with functional mobility without assistive device, requiring assist for ADLS and requiring assist for IADLS  The evaluation identifies the following performance deficits: weakness, decreased ROM, impaired balance, decreased endurance, decreased coordination, increased fall risk, new onset of impairment of functional mobility, decreased ADLS, decreased IADLS, decreased activity tolerance and decreased strength, that result in activity limitations and/or participation restrictions   This evaluation requires clinical decision making of high complexity, because the patient presents with comorbidites that affect occupational performance and required significant modification of tasks or assistance with consideration of multiple treatment options  The Barthel Index was used as a functional outcome tool presenting with a score of 50, indicating marked limitations of functional mobility and ADLS  Patient will benefit from skilled Occupational Therapy services to address above deficits and facilitate a safe return to prior level of function  Goals   Patient Goals get stronger   STG Time Frame 3-5   Short Term Goal #1 Patient will increase standing tolerance to 5 minutes during functional activity; Patient will increase bed mobility to independent; Patient will increase functional mobility to and from bathroom with rolling walker with supervision to increase performance with ADLS; Patient will tolerate 10 minutes of UE ROM/strengthening to increase general activity tolerance and performance in ADLS/IADLS; Patient will improve functional activity tolerance to 10 minutes of sustained functional tasks to increase participation in basic self-care and decrease assistance level  LTG Time Frame 10-14   Long Term Goal #1 Patient will increase standing tolerance to 10 minutes during functional activity; Patient will increase bed mobility to independent; Patient will increase functional mobility to and from bathroom with hurrycane independently to increase performance with ADLS; Patient will tolerate 15 minutes of UE ROM/strengthening to increase general activity tolerance and performance in ADLS/IADLS; Patient will improve functional activity tolerance to 15 minutes of sustained functional tasks to increase participation in basic self-care and decrease assistance level  Functional Transfer Goals   Pt Will Transfer To Shower With min assist;With assistive device; With good judgment/safety  (LTG - Supervision)   ADL Goals Pt Will Perform Grooming Standing at sink; With stand by assist  (LTG - Independent)   Pt Will Perform Bathing In shower/tub seat; With mod assist  (LTG - Supervision)   Pt Will Perform UE Dressing In chair; With min assist  (LTG - Independent)   Pt Will Perform LE Dressing In chair; With min assist;With adaptive equipment  (LTG - Set up only)   Pt Will Perform Toileting With stand by assist  (LTG - Independent)   Plan   Treatment Interventions ADL retraining;Functional transfer training;UE strengthening/ROM; Endurance training;Patient/family training;Equipment evaluation/education; Neuromuscular reeducation; Compensatory technique education; Energy conservation   OT Frequency 3-5x/wk   Recommendation   OT Discharge Recommendation Outpatient OT   Equipment Recommended Bedside commode  (shower hose, grab bars in bathroom)   Barthel Index   Feeding 5   Bathing 0   Grooming Score 0   Dressing Score 5   Bladder Score 10   Bowels Score 10   Toilet Use Score 5   Transfers (Bed/Chair) Score 10   Mobility (Level Surface) Score 0   Stairs Score 5   Barthel Index Score 50     Patient left OOB in chair with all needs within reach

## 2018-03-06 NOTE — DISCHARGE SUMMARY
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 without any major arterial occlusion  Patient will be discharged on Decadron taper for the next 2 weeks and Keppra for seizure prophylaxis  Follow-up with Neurosurgery as outpatient        Urinary urgency   Assessment & Plan    No evidence of UTI        Seizure disorder [facility])   Assessment & Plan    Patient will be started on Keppra XR 1000 milligram p o  Q h s  Follow-up with Neurology as outpatient        Vasogenic cerebral edema [facility])   Assessment & Plan    Vasogenic edema with mass effect upon the falx with midline shift was noted  Discharge on Decadron taper          Long-term insulin use in type 2 diabetes [facility])   Assessment & Plan    Patient can resume NovoLog with meals upon discharge  Resume metformin on March 8, 2018  Hemoglobin A1c 7 6        Benign essential HTN   Assessment & Plan    Continue lisinopril as she uses at home and monitor blood pressures                Resolved Problems  Date Reviewed: 3/6/2018    None          Consultations During Hospital Stay:  · Dr Ameya Grullon     Outpatient Tests Requested:  · Follow-up with Neurosurgery within 1 week and Neurology in 2 weeks    Complications:  None    Reason for Admission:  Left upper and lower extremity weakness    Hospital Course:      Nasrin Duncan is a 62 y o  female patient who originally presented to the hospital on 3/5/2018 due to left upper and lower extremity weakness for 8 months  Patient had difficulty ambulating and also complaining of intermittent right-sided headache  In the ED patient had CT scan of the brain which showed right frontal lobe mass with some vasogenic edema  Patient was started on IV Decadron and patient was seen in consultation with Neurology  Later patient underwent MRI of the brain which showed left frontal meningioma with minimal vasogenic edema  Patient's films were evaluated by Neurosurgery at [facility] who recommended CTA/CTV  Patient's CT of the head and neck showed 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  Patient remained stable and tolerated physical therapy well  Patient will be discharged home on Decadron taper and Keppra for seizure prophylaxis  An appointment was made for the patient for outpatient Neuro surgery follow-up  Please see above list of diagnoses and related plan for additional information  Condition at Discharge: stable     Discharge Day Visit / Exam:     Subjective:    Vitals: Blood Pressure: 139/79 (03/06/18 0914)  Pulse: 75 (03/06/18 0914)  Temperature: 97 5 °F (36 4 °C) (03/06/18 0914)  Temp Source: Oral (03/06/18 0914)  Respirations: 18 (03/06/18 0914)  Height: 5' 5" (165 1 cm) (03/05/18 1451)  Weight - Scale: 101 kg (222 lb 14 2 oz) (03/05/18 1451)  SpO2: 94 % (03/05/18 2340)  Exam:   Physical Exam   Constitutional: No distress  HENT:   Head: Normocephalic and atraumatic  Nose: Nose normal    Eyes: Conjunctivae and EOM are normal  Pupils are equal, round, and reactive to light  Neck: Normal range of motion  Neck supple  No JVD present  Cardiovascular: Normal rate, regular rhythm and normal heart sounds  Exam reveals no gallop and no friction rub  No murmur heard  Pulmonary/Chest: Effort normal and breath sounds normal  No respiratory distress  She has no wheezes  She has no rales  She exhibits no tenderness  Abdominal: Soft   Bowel sounds are normal  She exhibits no distension  There is no tenderness  There is no rebound and no guarding  Musculoskeletal: She exhibits no edema  Neurological: She is alert  No cranial nerve deficit  Skin: Skin is warm and dry  No rash noted  Psychiatric: She has a normal mood and affect  Discharge instructions/Information to patient and family:   See after visit summary for information provided to patient and family  Provisions for Follow-Up Care:  See after visit summary for information related to follow-up care and any pertinent home health orders  Disposition:     Home    For Discharges to [facility] SNF:   · Not Applicable to this Patient - Not Applicable to this Patient    Planned Readmission: No     Discharge Statement:  I spent 35 minutes discharging the patient  This time was spent on the day of discharge  I had direct contact with the patient on the day of discharge  Greater than 50% of the total time was spent examining patient, answering all patient questions, arranging and discussing plan of care with patient as well as directly providing post-discharge instructions  Additional time then spent on discharge activities  Discharge Medications:  See after visit summary for reconciled discharge medications provided to patient and family        ** Please Note: This note has been constructed using a voice recognition system **

## 2018-03-06 NOTE — PROGRESS NOTES
On-Call Telephone Note    Contacted by Wes Martinez, case management  Bensonza Seip 62 y o  female MRN: [id number]  Unit/Bed#: [address] [place] [address] Encounter: [id number]    Patient presents with right hand weakness now improving  Possibly changes in personality       Clinical exam per report, right hand weakness improving  Able to ambulate  No obvious seizure activity  Imaging personally reviewed  MRI of the brain with and without contrast dated March 6th, 2018  Right parasagittal uniformly enhancing extra-axial lesion with compression of the medial aspect of the right frontal parietal area and local mass effect and brain compression with some underlying edema  Overall most consistent with meningioma  Assessment and Plan  1  Recommend Neurology consult to rule out seizures  2   Patient can be seen this week on an outpatient basis for surgical evaluation  Would recommend CTA/CTV of the brain to check patency of superior sagittal sinus, though I suspect this is occluded  3   If clinical team feels that inpatient neurosurgical evaluation is required, then she can be transferred to [facility] for further evaluation  All his questions were answered to satisfaction    He will convey this to the clinical team

## 2018-03-06 NOTE — ASSESSMENT & PLAN NOTE
Patient will be started on Keppra XR 1000 milligram p o  Q h s    Follow-up with Neurology as outpatient

## 2018-03-07 LAB
ATRIAL RATE: 52 BPM
MRSA NOSE QL CULT: NORMAL
P AXIS: 45 DEGREES
PR INTERVAL: 164 MS
QRS AXIS: 61 DEGREES
QRSD INTERVAL: 84 MS
QT INTERVAL: 426 MS
QTC INTERVAL: 396 MS
T WAVE AXIS: 61 DEGREES
VENTRICULAR RATE: 52 BPM

## 2018-03-07 PROCEDURE — 93010 ELECTROCARDIOGRAM REPORT: CPT | Performed by: INTERNAL MEDICINE

## 2018-03-08 ENCOUNTER — DOCUMENTATION (OUTPATIENT)
Dept: NEUROLOGY | Facility: CLINIC | Age: 58
End: 2018-03-08

## 2018-03-08 NOTE — PROGRESS NOTES
As per our phone conversation I called a verbal RX into [facility] in [place] ([phone]) for Vimpat 50 mg bid for one week #14, then Vimpat 100 mg bid to continue #60 with 1 refill as patient was not tolerating Keppra well, ie   Increased seizures, "mush brain" ,etc

## 2018-03-12 ENCOUNTER — DOCUMENTATION (OUTPATIENT)
Dept: NEUROSURGERY | Facility: CLINIC | Age: 58
End: 2018-03-12

## 2018-03-12 ENCOUNTER — OFFICE VISIT (OUTPATIENT)
Dept: NEUROSURGERY | Facility: CLINIC | Age: 58
End: 2018-03-12
Payer: MEDICARE

## 2018-03-12 ENCOUNTER — TELEPHONE (OUTPATIENT)
Dept: NEUROSURGERY | Facility: CLINIC | Age: 58
End: 2018-03-12

## 2018-03-12 VITALS
WEIGHT: 222 LBS | BODY MASS INDEX: 35.68 KG/M2 | HEART RATE: 78 BPM | SYSTOLIC BLOOD PRESSURE: 133 MMHG | TEMPERATURE: 97.1 F | HEIGHT: 66 IN | DIASTOLIC BLOOD PRESSURE: 65 MMHG

## 2018-03-12 DIAGNOSIS — G93.6 CEREBRAL EDEMA (HCC): ICD-10-CM

## 2018-03-12 DIAGNOSIS — G40.909 SEIZURE DISORDER (HCC): ICD-10-CM

## 2018-03-12 DIAGNOSIS — D32.9 MENINGIOMA (HCC): Primary | ICD-10-CM

## 2018-03-12 DIAGNOSIS — G93.5 BRAIN COMPRESSION (HCC): ICD-10-CM

## 2018-03-12 DIAGNOSIS — R53.1 WEAKNESS OF LEFT SIDE OF BODY: ICD-10-CM

## 2018-03-12 PROCEDURE — 99214 OFFICE O/P EST MOD 30 MIN: CPT | Performed by: NEUROLOGICAL SURGERY

## 2018-03-12 RX ORDER — LACOSAMIDE 50 MG
50 TABLET ORAL 2 TIMES DAILY
Refills: 0 | COMMUNITY
Start: 2018-03-08 | End: 2018-03-19 | Stop reason: HOSPADM

## 2018-03-12 RX ORDER — INSULIN ASPART 100 [IU]/ML
14 INJECTION, SUSPENSION SUBCUTANEOUS 3 TIMES DAILY
Refills: 3 | COMMUNITY
Start: 2018-02-16 | End: 2018-03-19 | Stop reason: HOSPADM

## 2018-03-12 RX ORDER — MELATONIN
1000 DAILY
COMMUNITY

## 2018-03-12 RX ORDER — FERROUS SULFATE 325(65) MG
325 TABLET ORAL DAILY
COMMUNITY

## 2018-03-12 RX ORDER — SITAGLIPTIN 50 MG/1
50 TABLET, FILM COATED ORAL DAILY
Refills: 3 | COMMUNITY
Start: 2017-12-14 | End: 2018-03-19 | Stop reason: HOSPADM

## 2018-03-12 NOTE — TELEPHONE ENCOUNTER
Patient Fern Carias called the nurse today asking if she could get in sooner than her scheduled appt this evening - she said she had 2 falls this morning  Patient denies hitting head but explained she had a headache from the force of the falls  Advised that there was no earlier appt for today due to overbook already  Patient understood and appreciated callback

## 2018-03-12 NOTE — PROGRESS NOTES
Neurosurgery Office Note  Richard Bella 62 y o  female MRN: [id number]      Assessment/Plan      Diagnoses and all orders for this visit:    Meningioma [facility])  -     Case request operating room: IMAGE-GUIDED RIGHT FRONTOPARIETAL CRANIOTOMY FOR TUMOR; Standing  -     Type and screen; Future  -     Prepare RBC; Future  -     Basic metabolic panel; Future  -     CBC and differential; Future  -     APTT; Future  -     Protime-INR; Future  -     HEMOGLOBIN A1C W/ EAG ESTIMATION; Future  -     Case request operating room: IMAGE-GUIDED RIGHT FRONTOPARIETAL CRANIOTOMY FOR TUMOR  -     IR cerebral angiography; Future    Brain compression [facility])  -     Case request operating room: IMAGE-GUIDED RIGHT FRONTOPARIETAL CRANIOTOMY FOR TUMOR; Standing  -     Type and screen; Future  -     Prepare RBC; Future  -     Basic metabolic panel; Future  -     CBC and differential; Future  -     APTT; Future  -     Protime-INR; Future  -     HEMOGLOBIN A1C W/ EAG ESTIMATION; Future  -     Case request operating room: IMAGE-GUIDED RIGHT FRONTOPARIETAL CRANIOTOMY FOR TUMOR    Cerebral edema ([facility] )  -     Case request operating room: IMAGE-GUIDED RIGHT FRONTOPARIETAL CRANIOTOMY FOR TUMOR; Standing  -     Type and screen; Future  -     Prepare RBC; Future  -     Basic metabolic panel; Future  -     CBC and differential; Future  -     APTT; Future  -     Protime-INR; Future  -     HEMOGLOBIN A1C W/ EAG ESTIMATION; Future  -     Case request operating room: IMAGE-GUIDED RIGHT FRONTOPARIETAL CRANIOTOMY FOR TUMOR    Weakness of left side of body    Seizure disorder (HCC)    Other orders  -     NOVOLOG MIX 70/30 FLEXPEN (70-30) 100 UNIT/ML SUPN; Inject 14 Units under the skin 3 (three) times a day  -     VIMPAT 50 MG; Take 50 mg by mouth 2 (two) times a day Should increase to 100mg after a week  -     JANUVIA 50 MG tablet; Take 50 mg by mouth daily  -     LYSINE PO; Take 1 tablet by mouth daily  -     Probiotic Product (PROBIOTIC DAILY PO);  Take 1 capsule by mouth daily  -     cholecalciferol (VITAMIN D3) 1,000 units tablet; Take 1,000 Units by mouth daily  -     ferrous sulfate 325 (65 Fe) mg tablet; Take 325 mg by mouth daily  -     Glucosamine-Chondroitin (MOVE FREE PO); Take 1 tablet by mouth daily  -     Diet NPO; Sips with meds; Standing  -     Height and weight upon arrival; Standing  -     Void on call to OR; Standing  -     Insert peripheral IV; Standing  -     hCG, serum, qualitative; Standing  -     Electrocardiogram, 12-lead; Standing  -     XR chest pa & lateral; Standing  -     ceFAZolin (ANCEF) IVPB (premix) 2,000 mg; Infuse 2,000 mg into a venous catheter once               Discussion:    80-year-old woman with progressive left-sided weakness, to the point of being wheelchair bound presently  Was seen at [address] [place] [address] last week for some of the symptoms  Workup included MRI of the brain demonstrating 6 cm right frontoparietal mass, convexity/falx based consistent with meningioma  Extensive brain compression, and some adjacent cerebral edema  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  There does appear to be right-sided collateral vessels  Discussed with patient options for management including risks benefits alternatives  Recommendation is for resection to remove mass effect, diagnose, and hopefully allow her right-sided weakness to improve  Risks of this approach were reviewed personally in detail and include but not limited to infection, bleeding, seizure, spinal fluid leak, transient or permanent neurologic dysfunction, VTE Jake Marshall etc   Consent obtained for removal   This large mass also appears to have a considerable external carotid supply  My partner, Dr Dwayne Argueta, we will see the patient today as well, and consent and schedule the patient for preoperative embolization to minimize blood loss etc     History of substance abuse, sober for years  Prefers to avoid narcotics and around the time of surgery      Prior to left leg weakness, had no difficulty walking canal stairs, no shortness of breath  Metrics:  Todd 25/30 with visuospatial executive errors,  EQ5D5L 17499, or 0 192, VAS 30; ECOG 2-3, KPS 60      CHIEF COMPLAINT    Chief Complaint   Patient presents with    Follow-up     hospital follow up      Left sided weakness  HISTORY    History of Present Illness     62y o  year old female     25-year-old female who is had multiple months of progressive left leg more than left arm weakness  She has had difficulty walking to the point of using a cane over the last week, has fallen multiple times today  She is in a wheelchair for today's visit  She has had headaches for 3 months, mild in severity  She does have a history of epilepsy since 11years old  She has signed seizures presently  Her last grand mal seizure was about a year ago  She has been on medications for years, recently transitioned to Vimpat  Apparently underwent workup at [facility] and even a 24 hour EEG at [place], including brain scans, but the patient does not recall being told she had a brain lesion  Denies cognitive issues, memory problems  She does have a history of bipolar disorder  She denies speech issues  She denies vision changes  She denies hearing issues  She denies nausea vomiting  REVIEW OF SYSTEMS    Review of Systems   Constitutional: Positive for appetite change, chills, fatigue and fever  Negative for activity change, diaphoresis and unexpected weight change  HENT: Positive for ear pain and sinus pain  Negative for congestion, dental problem, drooling, ear discharge, facial swelling, hearing loss, mouth sores, nosebleeds, postnasal drip, rhinorrhea, sinus pressure, sneezing, sore throat, tinnitus, trouble swallowing and voice change  Eyes: Negative  Respiratory: Positive for shortness of breath  Negative for apnea, cough, choking, chest tightness, wheezing and stridor      Cardiovascular: Positive for palpitations  Negative for chest pain and leg swelling  Gastrointestinal: Positive for constipation (the past week)  Negative for abdominal distention, abdominal pain, anal bleeding, blood in stool, diarrhea, nausea, rectal pain and vomiting  Endocrine: Negative  Genitourinary: Positive for frequency  Negative for decreased urine volume, difficulty urinating, dyspareunia, dysuria, enuresis, flank pain, genital sores, hematuria, menstrual problem, pelvic pain, urgency, vaginal bleeding, vaginal discharge and vaginal pain  Musculoskeletal: Positive for arthralgias (hip pain), gait problem (left side dragging), myalgias, neck pain and neck stiffness  Negative for back pain and joint swelling  Skin: Negative  Allergic/Immunologic: Negative  Neurological: Positive for dizziness, seizures (last night), weakness, light-headedness, numbness and headaches  Negative for tremors, syncope, facial asymmetry and speech difficulty  Hematological: Negative  Psychiatric/Behavioral: Negative            Meds/Allergies     Current Outpatient Prescriptions   Medication Sig Dispense Refill    cholecalciferol (VITAMIN D3) 1,000 units tablet Take 1,000 Units by mouth daily      dexamethasone (DECADRON) 2 mg tablet Take 1 tablet (2 mg total) by mouth 3 (three) times a day 70 tablet 0    ferrous sulfate 325 (65 Fe) mg tablet Take 325 mg by mouth daily      Glucosamine-Chondroitin (MOVE FREE PO) Take 1 tablet by mouth daily      JANUVIA 50 MG tablet Take 50 mg by mouth daily  3    lisinopril (ZESTRIL) 20 mg tablet Take 20 mg by mouth daily      LYSINE PO Take 1 tablet by mouth daily      metFORMIN (GLUCOPHAGE) 500 mg tablet Take 1 tablet (500 mg total) by mouth 2 (two) times a day with meals  0    NOVOLOG MIX 70/30 FLEXPEN (70-30) 100 UNIT/ML SUPN Inject 14 Units under the skin 3 (three) times a day  3    Probiotic Product (PROBIOTIC DAILY PO) Take 1 capsule by mouth daily      sertraline (ZOLOFT) 100 mg tablet Take 50 mg by mouth daily at bedtime        sertraline (ZOLOFT) 50 mg tablet Take 50 mg by mouth daily      VIMPAT 50 MG Take 50 mg by mouth 2 (two) times a day Should increase to 100mg after a week  0    Insulin Aspart (NOVOLOG SC) Inject 10 Units under the skin       No current facility-administered medications for this visit  Allergies   Allergen Reactions    Dilantin [Phenytoin]     Lidocaine     Statins        PAST HISTORY    Past Medical History:   Diagnosis Date    Cancer ([facility] )     Diabetes mellitus ([facility] )     Seizures ([facility] )        Past Surgical History:   Procedure Laterality Date    HYSTERECTOMY         Social History   Substance Use Topics    Smoking status: Former Smoker     Quit date: 3/5/2000    Smokeless tobacco: Never Used    Alcohol use No     History of substance abuse, sober for years  Prefers to avoid narcotics and around the time of surgery  Family History   Problem Relation Age of Onset    Adopted: Yes         Above history personally reviewed  EXAM    Vitals:Blood pressure 133/65, pulse 78, temperature (!) 97 1 °F (36 2 °C), temperature source Tympanic, height 5' 6" (1 676 m), weight 101 kg (222 lb)  ,Body mass index is 35 83 kg/m²  Physical Exam   Constitutional: She is oriented to person, place, and time  She appears well-developed and well-nourished  HENT:   Head: Normocephalic and atraumatic  Eyes: EOM are normal    Neck: Neck supple  Cardiovascular: Normal rate  Pulmonary/Chest: Effort normal    Abdominal: Soft  Neurological: She is alert and oriented to person, place, and time  Skin: Skin is warm and dry  Psychiatric: She has a normal mood and affect  Her speech is normal and behavior is normal        Neurologic Exam     Mental Status   Oriented to person, place, and time     Attention: normal    Speech: speech is normal   Level of consciousness: alert    Cranial Nerves     CN III, IV, VI   Extraocular motions are normal      CN VII   Facial expression full, symmetric  Motor Exam     Strength   Left biceps: 4/5  Left triceps: 4/5  Left iliopsoas: 2/5  Left quadriceps: 4/5    Sensory Exam   Light touch normal      Gait, Coordination, and Reflexes In wheelchair for today's visit  MEDICAL DECISION MAKING    Imaging Studies:     Cta Head And Neck W Wo Contrast    Result Date: 3/6/2018  Narrative: CTA NECK AND BRAIN WITH AND WITHOUT CONTRAST INDICATION: Sudden onset headache query dissection COMPARISON:   CT he and CTA performed one day earlier TECHNIQUE:  Routine CT imaging of the Brain without contrast   Post contrast imaging was performed after administration of iodinated contrast through the neck and brain  Post contrast axial 0 625 mm images timed to opacify the arterial system  3D rendering was performed on an independent workstation  MIP reconstructions performed  Coronal reconstructions were performed of the noncontrast portion of the brain  Radiation dose length product (DLP) for this visit:  1342 13 mGy-cm   This examination, like all CT scans performed in the [facility], was performed utilizing techniques to minimize radiation dose exposure, including the use of iterative reconstruction and automated exposure control  IV Contrast:  85 mL of iohexol (OMNIPAQUE)  IMAGE QUALITY:   Diagnostic FINDINGS: NONCONTRAST BRAIN PARENCHYMA:  No hemorrhage, no acute ischemia  No change in the CT appearance of the brain in the short interval since prior study  Previously detected near 5 cm right parafalcine frontal meningioma unchanged  Tiny hypodense focus next to the foramen evolution to on the right, series 2 image 10 likely represents asymmetric choroid calcification  VENTRICLES AND EXTRA-AXIAL SPACES:  Normal for patient's age  VISUALIZED ORBITS AND PARANASAL SINUSES:  Unremarkable   CALVARIUM AND EXTRACRANIAL SOFT TISSUES:   Normal  CERVICAL VASCULATURE AORTIC ARCH AND GREAT VESSELS:  Normal aortic arch and great vessel origins  Normal visualized subclavian vessels  RIGHT VERTEBRAL ARTERY CERVICAL SEGMENT:  Normal origin  The vessel is normal in caliber throughout the neck  LEFT VERTEBRAL ARTERY CERVICAL SEGMENT:  Arises independently from the arch, normal origin, nondominant vessel  The vessel is normal in caliber throughout the neck  RIGHT EXTRACRANIAL CAROTID SEGMENT:  Normal caliber common carotid artery  Normal bifurcation and cervical internal carotid artery  No stenosis or dissection  LEFT EXTRACRANIAL CAROTID SEGMENT:  Normal caliber common carotid artery  Normal bifurcation and cervical internal carotid artery  No stenosis or dissection  NASCET criteria was used to determine the degree of internal carotid artery diameter stenosis  INTRACRANIAL VASCULATURE INTERNAL CAROTID ARTERIES:  Normal enhancement of the intracranial portions of the internal carotid arteries  Normal ophthalmic artery origins  Normal ICA terminus  ANTERIOR CIRCULATION:  Symmetric A1 segments and anterior cerebral arteries with normal enhancement  Normal anterior communicating artery  MIDDLE CEREBRAL ARTERY CIRCULATION:  M1 segment and middle cerebral artery branches demonstrate normal enhancement bilaterally  DISTAL VERTEBRAL ARTERIES:  Normal distal vertebral arteries  Right AICA/PICA and left Posterior inferior cerebellar artery origins are normal  Normal vertebral basilar junction  BASILAR ARTERY:  Basilar artery is normal in caliber  Normal superior cerebellar arteries  POSTERIOR CEREBRAL ARTERIES: Normal    Normal posterior communicating arteries  DURAL VENOUS SINUSES:  Superior sagittal sinus does appear occluded at the interface with the large meningioma  NON VASCULAR ANATOMY BONY STRUCTURES:  No acute pathology  Advanced generative changes of the cervical spine with prominent anterior osteophytes C3, C4 and C5  Posterior osteophytes within and narrowing the canal to the right at C4    AP dimension of the canal approximately  6 mm   Left posterolateral osteophyte at C5 narrowing the canal to approximately 7 mm  There is cysts segmental ossification of the posterior longitudinal ligament C C6-C7, also producing moderate stenosis of the canal  SOFT TISSUES OF THE NECK:  Unremarkable  THORACIC INLET:  Unremarkable  Impression: No acute intracranial disease  Could patient's headache be related to 5 cm right frontal vertex meningioma? Relatively minimal reactive edema is stable in the 26 hour interval since prior study  No indication of arterial dissection  No large arterial flow restrictive disease within the head or neck  Segmental occlusion of the superior sagittal sinus, at interface with meningioma, this was suspected on recent MR  Marked degenerative change of the cervical spine with at least moderate mid cervical stenosis  Segmental OPLL C6-C7  Workstation performed: [id number]     Ct Head Wo Contrast    Result Date: 3/5/2018  Narrative: CT BRAIN - WITHOUT CONTRAST INDICATION:  40-year-old female with left-sided weakness  COMPARISON:  None  TECHNIQUE:  CT examination of the brain was performed  In addition to axial images, coronal 2D reformatted images were created and submitted for interpretation  Radiation dose length product (DLP) for this visit:  1084 16 mGy-cm   This examination, like all CT scans performed in the [facility], was performed utilizing techniques to minimize radiation dose exposure, including the use of iterative reconstruction and automated exposure control  IMAGE QUALITY:  Diagnostic  FINDINGS: PARENCHYMA:  Within the right frontal lobe at the vertex, an isodense mass measures 4 2 x 3 8 x 3 5 cm evoking minimal vasogenic edema and mild mass effect upon the falx with 6 mm of left-to-right midline shift  There is no significant mass effect upon the frontal horn of the lateral ventricle and the finding may represent a parafalcine meningioma  Enhanced MRI recommended for confirmation    A 4 mm high-density nodule along the the 4th ventricle to the right of midline may represent a 2nd extra-axial meningioma versus punctate hemorrhage  VENTRICLES AND EXTRA-AXIAL SPACES:  No secondary hydrocephalus  VISUALIZED ORBITS AND PARANASAL SINUSES:  Mucosal thickening of the left maxillary sinus noted  No acute changes  CALVARIUM AND EXTRACRANIAL SOFT TISSUES:  Normal      Impression: 4 2 x 3 8 x 3 5 cm right frontal lobe mass at the vertex evoking mild vasogenic edema with mass effect upon the falx with secondary midline shift, possible parafalcine meningioma  Enhanced MRI with contrast recommended  4 mm high-density nodule to the right of midline at the level of 4th ventricle likely an extra-axial lesion versus punctate hemorrhage  Further evaluation with MRI is advised  I personally discussed this study with WANG GARCIA on 3/5/2018 at 1:17 PM  Workstation performed: [id number]     Mri Brain W Wo Contrast    Result Date: 3/6/2018  Narrative: MRI BRAIN WITH AND WITHOUT CONTRAST INDICATION:  Right frontal mass COMPARISON:  CT performed one day earlier TECHNIQUE: Sagittal T1, axial T2, axial FLAIR, axial T1, axial [place], axial diffusion  Sagittal, axial and coronal T1 postcontrast   Axial BRAVO post contrast   IV Contrast:  10 mL of Gadobutrol injection (SINGLE-DOSE)  IMAGE QUALITY:   Diagnostic  FINDINGS: BRAIN PARENCHYMA:  No hemorrhage or acute ischemia  47 mm in AP oblique by 37 mm in cephalocaudad oblique, by 37 mm in transverse dimension relatively homogeneous extra-axial mass is identified along the frontal, paramedian falx  Very minimal associated edema along the posterior inferior aspect of the tumor  Tumor displaces the falx to the left anteriorly and appears to infiltrate the superior sagittal sinus  There is suggestion of a minimal amount of tumor extending through the falx    Broad-based enhancement is observed, particularly along the anterolateral aspect of the tumor, extending over the frontal and parietal bones  VENTRICLES: Mild Mass effect, no hydrocephalus  SELLA AND PITUITARY GLAND:  Normal  ORBITS:  Normal  PARANASAL SINUSES:  Trace mucosal disease  VASCULATURE:  Evaluation of the major intracranial vasculature demonstrates appropriate flow voids  CALVARIUM AND SKULL BASE:  Normal  EXTRACRANIAL SOFT TISSUES:  Normal      Impression: 47 mm in greatest linear dimension right frontal vertex parafalcine meningioma, producing minimal edema  Tumor has extended through the superior sagittal sinus  Workstation performed: [id number]     Xr Chest 1 View    Result Date: 3/5/2018  Narrative: CHEST INDICATION:  80-year-old female with  SOB  COMPARISON:  None EXAM PERFORMED/VIEWS:  XR CHEST 1 VIEW Images: 1 FINDINGS: Cardiomediastinal silhouette appears unremarkable  The lungs are clear  No pneumothorax or pleural effusion  Osseous structures appear within normal limits for patient age  Impression: No acute cardiopulmonary disease  Workstation performed: [id number]       I have personally reviewed pertinent reports     and I have personally reviewed pertinent films in PACS

## 2018-03-12 NOTE — PROGRESS NOTES
Patient seen in clinic today in conjunction with Dr Faibola Keenan  She has a large right parasagittal meningioma with brain compression  She presents with significant leg weakness  Given her progressive symptoms and radiographic findings the plan will be for surgical resection this coming Thursday  However given its vascular nature and dural based lesion we discussed the risks and benefits of possible preoperative embolization  Specifically, we discussed the risks of bleeding, stroke, vessel injury, renal injury, paralysis, seizure, and death  Given her progressive symptoms we will attempt to schedule this for tomorrow  She has signed a consent form  Full history and physical documented on 3/12 by Dr Fabiola Keenan

## 2018-03-13 ENCOUNTER — ANESTHESIA EVENT (OUTPATIENT)
Dept: SURGERY | Facility: HOSPITAL | Age: 58
DRG: 025 | End: 2018-03-13
Payer: MEDICARE

## 2018-03-13 ENCOUNTER — ANESTHESIA (OUTPATIENT)
Dept: SURGERY | Facility: HOSPITAL | Age: 58
DRG: 025 | End: 2018-03-13
Payer: MEDICARE

## 2018-03-13 ENCOUNTER — HOSPITAL ENCOUNTER (INPATIENT)
Dept: RADIOLOGY | Facility: HOSPITAL | Age: 58
LOS: 6 days | Discharge: HOME HEALTH | DRG: 025 | End: 2018-03-19
Attending: NEUROLOGICAL SURGERY | Admitting: NEUROLOGICAL SURGERY
Payer: MEDICARE

## 2018-03-13 DIAGNOSIS — G93.5 BRAIN COMPRESSION (HCC): ICD-10-CM

## 2018-03-13 DIAGNOSIS — R39.15 URINARY URGENCY: ICD-10-CM

## 2018-03-13 DIAGNOSIS — G40.909 SEIZURE DISORDER (HCC): Primary | ICD-10-CM

## 2018-03-13 DIAGNOSIS — G93.6 CEREBRAL EDEMA (HCC): ICD-10-CM

## 2018-03-13 DIAGNOSIS — Z79.4 TYPE 2 DIABETES MELLITUS WITH COMPLICATION, WITH LONG-TERM CURRENT USE OF INSULIN (HCC): ICD-10-CM

## 2018-03-13 DIAGNOSIS — R53.1 WEAKNESS OF LEFT SIDE OF BODY: ICD-10-CM

## 2018-03-13 DIAGNOSIS — D32.9 MENINGIOMA (HCC): ICD-10-CM

## 2018-03-13 DIAGNOSIS — G93.89 FRONTAL MASS OF BRAIN: ICD-10-CM

## 2018-03-13 DIAGNOSIS — G93.6 VASOGENIC CEREBRAL EDEMA (HCC): ICD-10-CM

## 2018-03-13 DIAGNOSIS — E11.8 TYPE 2 DIABETES MELLITUS WITH COMPLICATION, WITH LONG-TERM CURRENT USE OF INSULIN (HCC): ICD-10-CM

## 2018-03-13 DIAGNOSIS — I10 BENIGN ESSENTIAL HTN: ICD-10-CM

## 2018-03-13 LAB
GLUCOSE SERPL-MCNC: 174 MG/DL (ref 65–140)
GLUCOSE SERPL-MCNC: 220 MG/DL (ref 65–140)
GLUCOSE SERPL-MCNC: 307 MG/DL (ref 65–140)
PLATELET # BLD AUTO: 223 THOUSANDS/UL (ref 149–390)
PMV BLD AUTO: 10.2 FL (ref 8.9–12.7)

## 2018-03-13 PROCEDURE — C1769 GUIDE WIRE: HCPCS

## 2018-03-13 PROCEDURE — C1887 CATHETER, GUIDING: HCPCS

## 2018-03-13 PROCEDURE — B319YZZ FLUOROSCOPY OF RIGHT EXTERNAL CAROTID ARTERY USING OTHER CONTRAST: ICD-10-PCS | Performed by: NEUROLOGICAL SURGERY

## 2018-03-13 PROCEDURE — 36217 PLACE CATHETER IN ARTERY: CPT | Performed by: NEUROLOGICAL SURGERY

## 2018-03-13 PROCEDURE — 85347 COAGULATION TIME ACTIVATED: CPT

## 2018-03-13 PROCEDURE — C1760 CLOSURE DEV, VASC: HCPCS

## 2018-03-13 PROCEDURE — 85049 AUTOMATED PLATELET COUNT: CPT | Performed by: NEUROLOGICAL SURGERY

## 2018-03-13 PROCEDURE — 61650 EVASC PRLNG ADMN RX AGNT 1ST: CPT

## 2018-03-13 PROCEDURE — 75894 X-RAYS TRANSCATH THERAPY: CPT

## 2018-03-13 PROCEDURE — 61650 EVASC PRLNG ADMN RX AGNT 1ST: CPT | Performed by: NEUROLOGICAL SURGERY

## 2018-03-13 PROCEDURE — B31NYZZ FLUOROSCOPY OF OTHER UPPER ARTERIES USING OTHER CONTRAST: ICD-10-PCS | Performed by: NEUROLOGICAL SURGERY

## 2018-03-13 PROCEDURE — 82948 REAGENT STRIP/BLOOD GLUCOSE: CPT

## 2018-03-13 PROCEDURE — C1894 INTRO/SHEATH, NON-LASER: HCPCS

## 2018-03-13 PROCEDURE — 75894 X-RAYS TRANSCATH THERAPY: CPT | Performed by: NEUROLOGICAL SURGERY

## 2018-03-13 PROCEDURE — 61624 TCAT PERM OCCLS/EMBOLJ CNS: CPT | Performed by: NEUROLOGICAL SURGERY

## 2018-03-13 PROCEDURE — B313YZZ FLUOROSCOPY OF RIGHT COMMON CAROTID ARTERY USING OTHER CONTRAST: ICD-10-PCS | Performed by: NEUROLOGICAL SURGERY

## 2018-03-13 PROCEDURE — 36224 PLACE CATH CAROTD ART: CPT | Performed by: NEUROLOGICAL SURGERY

## 2018-03-13 PROCEDURE — 36222 PLACE CATH CAROTID/INOM ART: CPT

## 2018-03-13 PROCEDURE — 36223 PLACE CATH CAROTID/INOM ART: CPT

## 2018-03-13 PROCEDURE — 03VG3DZ RESTRICTION OF INTRACRANIAL ARTERY WITH INTRALUMINAL DEVICE, PERCUTANEOUS APPROACH: ICD-10-PCS | Performed by: NEUROLOGICAL SURGERY

## 2018-03-13 PROCEDURE — 75898 FOLLOW-UP ANGIOGRAPHY: CPT | Performed by: NEUROLOGICAL SURGERY

## 2018-03-13 PROCEDURE — B316YZZ FLUOROSCOPY OF RIGHT INTERNAL CAROTID ARTERY USING OTHER CONTRAST: ICD-10-PCS | Performed by: NEUROLOGICAL SURGERY

## 2018-03-13 RX ORDER — LACOSAMIDE 50 MG/1
50 TABLET ORAL EVERY 12 HOURS SCHEDULED
Status: DISCONTINUED | OUTPATIENT
Start: 2018-03-13 | End: 2018-03-17

## 2018-03-13 RX ORDER — HEPARIN SODIUM 5000 [USP'U]/ML
5000 INJECTION, SOLUTION INTRAVENOUS; SUBCUTANEOUS EVERY 8 HOURS SCHEDULED
Status: COMPLETED | OUTPATIENT
Start: 2018-03-14 | End: 2018-03-14

## 2018-03-13 RX ORDER — ROCURONIUM BROMIDE 10 MG/ML
INJECTION, SOLUTION INTRAVENOUS AS NEEDED
Status: DISCONTINUED | OUTPATIENT
Start: 2018-03-13 | End: 2018-03-13 | Stop reason: SURG

## 2018-03-13 RX ORDER — ONDANSETRON 2 MG/ML
4 INJECTION INTRAMUSCULAR; INTRAVENOUS ONCE AS NEEDED
Status: DISCONTINUED | OUTPATIENT
Start: 2018-03-13 | End: 2018-03-13 | Stop reason: HOSPADM

## 2018-03-13 RX ORDER — GLYCOPYRROLATE 0.2 MG/ML
INJECTION INTRAMUSCULAR; INTRAVENOUS AS NEEDED
Status: DISCONTINUED | OUTPATIENT
Start: 2018-03-13 | End: 2018-03-13 | Stop reason: SURG

## 2018-03-13 RX ORDER — HEPARIN SODIUM 1000 [USP'U]/ML
INJECTION, SOLUTION INTRAVENOUS; SUBCUTANEOUS AS NEEDED
Status: DISCONTINUED | OUTPATIENT
Start: 2018-03-13 | End: 2018-03-13 | Stop reason: SURG

## 2018-03-13 RX ORDER — ALBUTEROL SULFATE 2.5 MG/3ML
2.5 SOLUTION RESPIRATORY (INHALATION) ONCE AS NEEDED
Status: DISCONTINUED | OUTPATIENT
Start: 2018-03-13 | End: 2018-03-13 | Stop reason: HOSPADM

## 2018-03-13 RX ORDER — MIDAZOLAM HYDROCHLORIDE 1 MG/ML
INJECTION INTRAMUSCULAR; INTRAVENOUS AS NEEDED
Status: DISCONTINUED | OUTPATIENT
Start: 2018-03-13 | End: 2018-03-13 | Stop reason: SURG

## 2018-03-13 RX ORDER — SODIUM CHLORIDE 9 MG/ML
75 INJECTION, SOLUTION INTRAVENOUS CONTINUOUS
Status: DISCONTINUED | OUTPATIENT
Start: 2018-03-13 | End: 2018-03-17

## 2018-03-13 RX ORDER — MEPERIDINE HYDROCHLORIDE 25 MG/ML
12.5 INJECTION INTRAMUSCULAR; INTRAVENOUS; SUBCUTANEOUS AS NEEDED
Status: DISCONTINUED | OUTPATIENT
Start: 2018-03-13 | End: 2018-03-13 | Stop reason: HOSPADM

## 2018-03-13 RX ORDER — EPHEDRINE SULFATE 50 MG/ML
INJECTION, SOLUTION INTRAVENOUS AS NEEDED
Status: DISCONTINUED | OUTPATIENT
Start: 2018-03-13 | End: 2018-03-13 | Stop reason: SURG

## 2018-03-13 RX ORDER — FENTANYL CITRATE 50 UG/ML
INJECTION, SOLUTION INTRAMUSCULAR; INTRAVENOUS AS NEEDED
Status: DISCONTINUED | OUTPATIENT
Start: 2018-03-13 | End: 2018-03-13 | Stop reason: SURG

## 2018-03-13 RX ORDER — ONDANSETRON 2 MG/ML
4 INJECTION INTRAMUSCULAR; INTRAVENOUS EVERY 6 HOURS PRN
Status: DISCONTINUED | OUTPATIENT
Start: 2018-03-13 | End: 2018-03-19 | Stop reason: HOSPADM

## 2018-03-13 RX ORDER — FENTANYL CITRATE/PF 50 MCG/ML
25 SYRINGE (ML) INJECTION
Status: DISCONTINUED | OUTPATIENT
Start: 2018-03-13 | End: 2018-03-13 | Stop reason: HOSPADM

## 2018-03-13 RX ORDER — PROPOFOL 10 MG/ML
INJECTION, EMULSION INTRAVENOUS AS NEEDED
Status: DISCONTINUED | OUTPATIENT
Start: 2018-03-13 | End: 2018-03-13 | Stop reason: SURG

## 2018-03-13 RX ORDER — SODIUM CHLORIDE AND POTASSIUM CHLORIDE .9; .15 G/100ML; G/100ML
75 SOLUTION INTRAVENOUS CONTINUOUS
Status: DISCONTINUED | OUTPATIENT
Start: 2018-03-13 | End: 2018-03-15

## 2018-03-13 RX ORDER — LABETALOL HYDROCHLORIDE 5 MG/ML
5 INJECTION, SOLUTION INTRAVENOUS
Status: DISCONTINUED | OUTPATIENT
Start: 2018-03-13 | End: 2018-03-13 | Stop reason: HOSPADM

## 2018-03-13 RX ORDER — DEXAMETHASONE SODIUM PHOSPHATE 4 MG/ML
4 INJECTION, SOLUTION INTRA-ARTICULAR; INTRALESIONAL; INTRAMUSCULAR; INTRAVENOUS; SOFT TISSUE EVERY 6 HOURS SCHEDULED
Status: DISCONTINUED | OUTPATIENT
Start: 2018-03-13 | End: 2018-03-19 | Stop reason: HOSPADM

## 2018-03-13 RX ADMIN — SODIUM CHLORIDE AND POTASSIUM CHLORIDE 75 ML/HR: .9; .15 SOLUTION INTRAVENOUS at 17:53

## 2018-03-13 RX ADMIN — LABETALOL 20 MG/4 ML (5 MG/ML) INTRAVENOUS SYRINGE 5 MG: at 16:04

## 2018-03-13 RX ADMIN — ROCURONIUM BROMIDE 50 MG: 10 INJECTION INTRAVENOUS at 13:24

## 2018-03-13 RX ADMIN — DEXAMETHASONE SODIUM PHOSPHATE 4 MG: 4 INJECTION, SOLUTION INTRAMUSCULAR; INTRAVENOUS at 17:53

## 2018-03-13 RX ADMIN — GLYCOPYRROLATE 0.6 MG: 0.2 INJECTION, SOLUTION INTRAMUSCULAR; INTRAVENOUS at 15:27

## 2018-03-13 RX ADMIN — SODIUM CHLORIDE: 0.9 INJECTION, SOLUTION INTRAVENOUS at 12:51

## 2018-03-13 RX ADMIN — INSULIN LISPRO 4 UNITS: 100 INJECTION, SOLUTION INTRAVENOUS; SUBCUTANEOUS at 21:47

## 2018-03-13 RX ADMIN — NEOSTIGMINE METHYLSULFATE 3 MG: 1 INJECTION, SOLUTION INTRAMUSCULAR; INTRAVENOUS; SUBCUTANEOUS at 15:29

## 2018-03-13 RX ADMIN — LABETALOL 20 MG/4 ML (5 MG/ML) INTRAVENOUS SYRINGE 5 MG: at 16:17

## 2018-03-13 RX ADMIN — LACOSAMIDE 50 MG: 50 TABLET, FILM COATED ORAL at 22:00

## 2018-03-13 RX ADMIN — ROCURONIUM BROMIDE 20 MG: 10 INJECTION INTRAVENOUS at 15:07

## 2018-03-13 RX ADMIN — HEPARIN SODIUM 2000 UNITS: 1000 INJECTION INTRAVENOUS; SUBCUTANEOUS at 14:30

## 2018-03-13 RX ADMIN — HEPARIN SODIUM 5000 UNITS: 1000 INJECTION INTRAVENOUS; SUBCUTANEOUS at 13:56

## 2018-03-13 RX ADMIN — FENTANYL CITRATE 100 MCG: 50 INJECTION, SOLUTION INTRAMUSCULAR; INTRAVENOUS at 13:23

## 2018-03-13 RX ADMIN — LABETALOL 20 MG/4 ML (5 MG/ML) INTRAVENOUS SYRINGE 5 MG: at 16:48

## 2018-03-13 RX ADMIN — DEXAMETHASONE SODIUM PHOSPHATE 4 MG: 4 INJECTION, SOLUTION INTRAMUSCULAR; INTRAVENOUS at 23:53

## 2018-03-13 RX ADMIN — MIDAZOLAM HYDROCHLORIDE 2 MG: 1 INJECTION, SOLUTION INTRAMUSCULAR; INTRAVENOUS at 13:14

## 2018-03-13 RX ADMIN — IODIXANOL 114 ML: 320 INJECTION, SOLUTION INTRAVASCULAR at 16:42

## 2018-03-13 RX ADMIN — EPHEDRINE SULFATE 5 MG: 50 INJECTION, SOLUTION INTRAMUSCULAR; INTRAVENOUS; SUBCUTANEOUS at 14:57

## 2018-03-13 RX ADMIN — PROPOFOL 200 MG: 10 INJECTION, EMULSION INTRAVENOUS at 13:23

## 2018-03-13 NOTE — ANESTHESIA PREPROCEDURE EVALUATION
Review of Systems/Medical History  Patient summary reviewed        Cardiovascular  Negative cardio ROS Hypertension ,    Pulmonary  Negative pulmonary ROS        GI/Hepatic  Negative GI/hepatic ROS          Negative  ROS        Endo/Other  Negative endo/other ROS Diabetes well controlled type 2 Insulin,      GYN  Negative gynecology ROS          Hematology  Negative hematology ROS      Musculoskeletal  Negative musculoskeletal ROS        Neurology  Negative neurology ROS Seizures ,     Psychology   Negative psychology ROS              Physical Exam    Airway    Mallampati score: II  TM Distance: >3 FB  Neck ROM: full     Dental   No notable dental hx     Cardiovascular  Comment: Negative ROS,     Pulmonary      Other Findings        Anesthesia Plan  ASA Score- 3     Anesthesia Type- general with ASA Monitors  Additional Monitors: arterial line  Airway Plan: ETT  Comment: Patient seen and examined  History reviewed  Patient to be done under general anesthesia with ETT and routine monitors  Risks discussed with the patient  Consent obtained        Plan Factors-    Induction- intravenous  Postoperative Plan-     Informed Consent- Anesthetic plan and risks discussed with patient  I personally reviewed this patient with the CRNA  Discussed and agreed on the Anesthesia Plan with the CRNA  Mago Jolly

## 2018-03-13 NOTE — PERIOPERATIVE NURSING NOTE
Report received from Guilherme Bowen, [facility]  Assessment wnl  Will continue to monitor

## 2018-03-13 NOTE — ANESTHESIA POSTPROCEDURE EVALUATION
Post-Op Assessment Note      CV Status:  Stable    Mental Status:  Alert and awake    Hydration Status:  Euvolemic    PONV Controlled:  Controlled    Airway Patency:  Patent    Post Op Vitals Reviewed: Yes          Staff: CRNA           BP   156/76   Temp   97 5   Pulse  65   Resp   17   SpO2   100%

## 2018-03-13 NOTE — OP NOTE
OPERATIVE REPORT  PATIENT NAME: Elke Palomares     :  1960  MRN: [id number]   Pt Location: Interventional radiology    SURGERY DATE: 18     Preop Diagnosis:  1  Right Frontal Meningioma  2  Seizures     Postop Diagnosis  1  Right Frontal Meningioma  2  Seizures     Procedure:  Right Common Carotid Arteriogram  Right Internal Carotid Arteriogram  Right External Carotid Arteriogram  Microcatheter angiogram of right middle meningeal artery  Left Common Carotid Arteriogram  Left External Carotid Arteriogram  Microcatheter angiogram of right middle meningeal artery  Injection of spasmolytic agent, verapamil, over ten minutes  PVA embolization of intracranial right frontal meningioma from L and R Middle Meningeal Arteries  Limited Right Femoral Arteriogram     Surgeon:   Albaro Ramírez MD     Specimen(s):  None     Estimated Blood Loss:   None     Drains:  None     Anesthesia Type:   General Anesthesia     Complications:  None     Operative Indications:  Elke Palomares  is a very pleasant 62 y o  female who had a known meningioma and presented with left leg weakness  Imaging showed a large right frontal meninigioma  Given its location and need for surgical resection we were asked to evaluate for possible embolization  After discussing the risks and benefits of the procedure including bleeding, stroke, groin hematoma, and death she elected to go ahead and proceed       Procedure Details:  After obtaining written informed consent, the patient was brought into the operating room and moved to the OR table in supine fashion  The groin was prepped and draped in the usual sterile fashion  General anesthesia was induced  An arterial line and smith catheter were placed  10 cc of intradermal lidocaine was infused into the right femoral area and percutaneous access with a 5-Trinidadian micropuncture kit was obtained into the right femoral artery   A 5-Trinidadian introducer sheath was placed and a 4-Trinidadian glide catheter was then advanced through an 058 navien over a Glidewire  The patient was heparinized with 7000 of IV heparin       The right common carotid artery was then catheterized  AP lateral and oblique images of the right cervical carotid were obtained  The catheter was then advanced and the right internal carotid artery was then catheterized  AP lateral and oblique images of the right intracranial carotid circulation were obtained  The right external carotid artery was catheterized AP and lateral images were then obtained the extracranial carotid circulation      10mg of verapamil was slowly injected over ten minutes for the prophylactic  treatment of catheter induced vasospasm   An Eschalon 10 microcatheter was then advanced into the right middle meningeal artery  AP and lateral microcatheter arteriogram was preformed  Ultimately this catheter was removed and exchanged for a Upson microcatheter, which was readvanced into the right MMA over a Mirage microwire  From here I began embolization of the meningioma from the right middle meningeal using  micron PVA particles  Intra-procedural and post-procedural arteriograms were preformed  Once completed the Marathon catheter was removed  A post-procedural Right common carotid arteriogram was preformed       The catheter was then advanced and the  left external carotid artery was then catheterized  AP lateral and oblique images of the left extracranial carotid circulation were obtained  The Eschalon 10 microcatheter was then advanced into the left middle meningeal artery over a Synchro2 soft wire  A microcatheter angiogram was preformed  Next,  I began embolization of the meningioma from the left middle meningeal using  micron PVA particles  Intra-procedural and post-procedural arteriograms were preformed  Once completed the Eschalon catheter was removed   The Navien was withdrawn to the common carotid and a post-procedural left common carotid arteriogram was preformed       The catheter was then withdrawn from the body and a limited right femoral arteriogram was run  Puncture site was found to be compatible with a Mynx Closure device and this was done successfully  There was appropriate hemostasis  The patient was then awoken from his monitored anesthesia care and found to be in his neurologic baseline  All sponge and needle counts were correct          INTERPRETATION OF ANGIOGRAPHIC FINDINGS:   1  The Right common carotid circulation reveals antegrade flow into the internal and external carotid arteries  There is no hemodynamically significant carotid stenosis as defined by NASCET criteria        2  The Right internal carotid artery circulation reveals antegrade flow into the middle cerebral and anterior cerebral arteries  There is no evidence of cortical supply to the right frontal meningioma  There is no evidence of aneurysm, AVM, or vascular malformation  The capillary and venous phases are unremarkable       3  The right external carotid artery has antegrade flow  There is filling into the normal branches  The right middle meningeal artery has significant tumor blush with a prominent lacramimal branch  4  Preprocedural microcatheter angiogram reveal filling to the distal MMA with tumor blush  The catheter is distal to the lacrimal branch  5  Intraprocedural angiogram reveals progressive embolization of the tumor  Post-procedural angiogram reveals flow into the distal MMA without any residual tumor blush       6  The Left internal carotid artery circulation reveals antegrade flow into the middle cerebral and anterior cerebral arteries  There is no evidence of cortical supply to the right frontal meningioma  There is no evidence of aneurysm, AVM, or vascular malformation  The capillary and venous phases are unremarkable      7  The left external carotid artery has antegrade flow  There is filling into the normal branches   The left middle meningeal artery has significant tumor blush  8   Selective microcatheter arteriogram of the left middle meningeal artery shows significant feeding of the brain tumor  Once again no intracranial connections are visualized      9  Intraprocedural and postprocedural microcatheter arteriogram and guide catheter arteriograms revealed selective embolization of the left middle meningeal artery as well as no further tumor blush      10  Postprocedural common carotid arteriogram reveals antegrade flow into the external intracranial vessels  There is no evidence of stroke or large vessel cut off       11   Limited Right femoral arteriogram reveals normal puncture site anatomy      Impression:  Successful bilateral middle meningeal embolization of a right parasagittal frontal meningioma      Patient Disposition:  PACU      SIGNATURE: Albaro Ramírez MD  DATE: 03/13/18

## 2018-03-14 ENCOUNTER — APPOINTMENT (INPATIENT)
Dept: RADIOLOGY | Facility: HOSPITAL | Age: 58
DRG: 025 | End: 2018-03-14
Attending: NEUROLOGICAL SURGERY
Payer: MEDICARE

## 2018-03-14 ENCOUNTER — ANESTHESIA EVENT (INPATIENT)
Dept: PERIOP | Facility: HOSPITAL | Age: 58
DRG: 025 | End: 2018-03-14
Payer: MEDICARE

## 2018-03-14 PROBLEM — G93.5 BRAIN COMPRESSION (HCC): Status: ACTIVE | Noted: 2018-03-13

## 2018-03-14 PROBLEM — G93.6 CEREBRAL EDEMA (HCC): Status: ACTIVE | Noted: 2018-03-13

## 2018-03-14 PROBLEM — D32.9 MENINGIOMA (HCC): Status: ACTIVE | Noted: 2018-03-13

## 2018-03-14 LAB
ATRIAL RATE: 49 BPM
ERYTHROCYTE [DISTWIDTH] IN BLOOD BY AUTOMATED COUNT: 13.2 % (ref 11.6–15.1)
GLUCOSE SERPL-MCNC: 192 MG/DL (ref 65–140)
GLUCOSE SERPL-MCNC: 213 MG/DL (ref 65–140)
GLUCOSE SERPL-MCNC: 263 MG/DL (ref 65–140)
GLUCOSE SERPL-MCNC: 301 MG/DL (ref 65–140)
GLUCOSE SERPL-MCNC: 314 MG/DL (ref 65–140)
GLUCOSE SERPL-MCNC: 324 MG/DL (ref 65–140)
HCG SERPL QL: ABNORMAL
HCT VFR BLD AUTO: 34.1 % (ref 34.8–46.1)
HGB BLD-MCNC: 11.8 G/DL (ref 11.5–15.4)
KCT BLD-ACNC: 183 SEC (ref 89–137)
MCH RBC QN AUTO: 30 PG (ref 26.8–34.3)
MCHC RBC AUTO-ENTMCNC: 34.6 G/DL (ref 31.4–37.4)
MCV RBC AUTO: 87 FL (ref 82–98)
P AXIS: 67 DEGREES
PLATELET # BLD AUTO: 200 THOUSANDS/UL (ref 149–390)
PMV BLD AUTO: 9.9 FL (ref 8.9–12.7)
PR INTERVAL: 150 MS
QRS AXIS: 16 DEGREES
QRSD INTERVAL: 92 MS
QT INTERVAL: 467 MS
QTC INTERVAL: 422 MS
RBC # BLD AUTO: 3.93 MILLION/UL (ref 3.81–5.12)
SPECIMEN SOURCE: ABNORMAL
T WAVE AXIS: 74 DEGREES
VENTRICULAR RATE: 49 BPM
WBC # BLD AUTO: 13.35 THOUSAND/UL (ref 4.31–10.16)

## 2018-03-14 PROCEDURE — 84703 CHORIONIC GONADOTROPIN ASSAY: CPT | Performed by: NEUROLOGICAL SURGERY

## 2018-03-14 PROCEDURE — 93005 ELECTROCARDIOGRAM TRACING: CPT

## 2018-03-14 PROCEDURE — 93010 ELECTROCARDIOGRAM REPORT: CPT | Performed by: INTERNAL MEDICINE

## 2018-03-14 PROCEDURE — 85027 COMPLETE CBC AUTOMATED: CPT | Performed by: NEUROLOGICAL SURGERY

## 2018-03-14 PROCEDURE — 71046 X-RAY EXAM CHEST 2 VIEWS: CPT

## 2018-03-14 PROCEDURE — 99233 SBSQ HOSP IP/OBS HIGH 50: CPT | Performed by: NEUROLOGICAL SURGERY

## 2018-03-14 PROCEDURE — 82948 REAGENT STRIP/BLOOD GLUCOSE: CPT

## 2018-03-14 PROCEDURE — 99222 1ST HOSP IP/OBS MODERATE 55: CPT | Performed by: INTERNAL MEDICINE

## 2018-03-14 RX ORDER — SERTRALINE HYDROCHLORIDE 100 MG/1
100 TABLET, FILM COATED ORAL
COMMUNITY
End: 2018-03-19 | Stop reason: HOSPADM

## 2018-03-14 RX ORDER — FERROUS SULFATE 325(65) MG
325 TABLET ORAL DAILY
Status: DISCONTINUED | OUTPATIENT
Start: 2018-03-14 | End: 2018-03-19 | Stop reason: HOSPADM

## 2018-03-14 RX ORDER — LISINOPRIL 20 MG/1
20 TABLET ORAL DAILY
Status: DISCONTINUED | OUTPATIENT
Start: 2018-03-14 | End: 2018-03-19 | Stop reason: HOSPADM

## 2018-03-14 RX ADMIN — HEPARIN SODIUM 5000 UNITS: 5000 INJECTION, SOLUTION INTRAVENOUS; SUBCUTANEOUS at 05:37

## 2018-03-14 RX ADMIN — INSULIN LISPRO 5 UNITS: 100 INJECTION, SOLUTION INTRAVENOUS; SUBCUTANEOUS at 11:37

## 2018-03-14 RX ADMIN — SERTRALINE HYDROCHLORIDE 50 MG: 50 TABLET ORAL at 09:26

## 2018-03-14 RX ADMIN — DEXAMETHASONE SODIUM PHOSPHATE 4 MG: 4 INJECTION, SOLUTION INTRAMUSCULAR; INTRAVENOUS at 11:37

## 2018-03-14 RX ADMIN — INSULIN LISPRO 3 UNITS: 100 INJECTION, SOLUTION INTRAVENOUS; SUBCUTANEOUS at 07:57

## 2018-03-14 RX ADMIN — SODIUM CHLORIDE AND POTASSIUM CHLORIDE 75 ML/HR: .9; .15 SOLUTION INTRAVENOUS at 22:17

## 2018-03-14 RX ADMIN — DEXAMETHASONE SODIUM PHOSPHATE 4 MG: 4 INJECTION, SOLUTION INTRAMUSCULAR; INTRAVENOUS at 05:37

## 2018-03-14 RX ADMIN — HEPARIN SODIUM 5000 UNITS: 5000 INJECTION, SOLUTION INTRAVENOUS; SUBCUTANEOUS at 21:15

## 2018-03-14 RX ADMIN — LACOSAMIDE 50 MG: 50 TABLET, FILM COATED ORAL at 09:25

## 2018-03-14 RX ADMIN — LISINOPRIL 20 MG: 20 TABLET ORAL at 13:51

## 2018-03-14 RX ADMIN — SODIUM CHLORIDE AND POTASSIUM CHLORIDE 75 ML/HR: .9; .15 SOLUTION INTRAVENOUS at 06:04

## 2018-03-14 RX ADMIN — SODIUM CHLORIDE 6 UNITS/HR: 9 INJECTION, SOLUTION INTRAVENOUS at 16:07

## 2018-03-14 RX ADMIN — HEPARIN SODIUM 5000 UNITS: 5000 INJECTION, SOLUTION INTRAVENOUS; SUBCUTANEOUS at 13:51

## 2018-03-14 RX ADMIN — FERROUS SULFATE TAB 325 MG (65 MG ELEMENTAL FE) 325 MG: 325 (65 FE) TAB at 13:51

## 2018-03-14 RX ADMIN — DEXAMETHASONE SODIUM PHOSPHATE 4 MG: 4 INJECTION, SOLUTION INTRAMUSCULAR; INTRAVENOUS at 17:55

## 2018-03-14 RX ADMIN — INSULIN LISPRO 14 UNITS: 100 INJECTION, SOLUTION INTRAVENOUS; SUBCUTANEOUS at 13:51

## 2018-03-14 RX ADMIN — LACOSAMIDE 50 MG: 50 TABLET, FILM COATED ORAL at 21:15

## 2018-03-14 NOTE — CONSULTS
Consultation - Ja White 62 y o  female MRN: [id number]    Unit/Bed#: [facility] 516-01 Encounter: [id number]      Assessment/Plan     Assessment:  DM 2 with hyperglycemia, steroid induced hyperglycemia, long term insulin use: For the OR tomorrow for resection  Suggest starting insulin drip for glucose control now since sugars too high and patient NPO  Will continue to follow perioperatively and transition to subQ regimen when appropriate  CC: Diabetes Consult    History of Present Illness     HPI: Ja White is a 62y o  year old female with type 2 diabetes for 10 years  She is on oral agents and insulin at home (metformin, Januvia, Novolog 70/30 10 TID ac)   She denies any polyuria, polydipsia, nocturia and blurry vision  She denies neuropathy , cad, nephropathy, retinaopthy  She denies any hypoglycemia  Being treated and evaluated for meningion S/P embolization with plan for surgery for tomorrow for resection  Inpatient consult to Endocrinology  Consult performed by: David Smith ordered by: Natacha Patino          Review of Systems   Constitutional: Negative for appetite change, chills and fever  HENT: Negative for congestion and trouble swallowing  Eyes: Negative for visual disturbance  Respiratory: Negative for shortness of breath  Cardiovascular: Negative for chest pain, palpitations and leg swelling  Gastrointestinal: Negative for abdominal pain, constipation, diarrhea, nausea and vomiting  Endocrine: Negative for polydipsia and polyuria  Genitourinary: Negative for difficulty urinating and frequency  Musculoskeletal: Negative for arthralgias  Skin: Negative for rash  Neurological: Positive for weakness  Psychiatric/Behavioral: Negative for agitation and confusion         Historical Information   Past Medical History:   Diagnosis Date    Cancer ([facility] )     Diabetes mellitus ([facility] )     Seizures ([facility] )      Past Surgical History:   Procedure Laterality Date    HYSTERECTOMY       Social History   History   Alcohol Use No     History   Drug Use No     History   Smoking Status    Former Smoker    Quit date: 3/5/2000   Smokeless Tobacco    Never Used     Family History:   Family History   Problem Relation Age of Onset    Adopted: Yes       Meds/Allergies   Current Facility-Administered Medications   Medication Dose Route Frequency Provider Last Rate Last Dose    [START ON 3/15/2018] ceFAZolin (ANCEF) IVPB (premix) 2,000 mg  2,000 mg Intravenous Once Kenji Mckeon MD        dexamethasone (DECADRON) injection 4 mg  4 mg Intravenous Q6H Eva Chaudhry MD   4 mg at 03/14/18 1137    ferrous sulfate tablet 325 mg  325 mg Oral Daily Argelia Rodriguez PA-C   325 mg at 03/14/18 1351    heparin (porcine) subcutaneous injection 5,000 Units  5,000 Units Subcutaneous Q8H [facility] Quiana Perales MD   5,000 Units at 03/14/18 1351    insulin lispro (HumaLOG) 100 units/mL subcutaneous injection 14 Units  14 Units Subcutaneous TID With Meals Argelia Rodriguez PA-C   14 Units at 03/14/18 1351    insulin lispro (HumaLOG) 100 units/mL subcutaneous injection 2-12 Units  2-12 Units Subcutaneous TID AC Argelia Rodriguez PA-C        insulin lispro (HumaLOG) 100 units/mL subcutaneous injection 2-12 Units  2-12 Units Subcutaneous HS Argelia Rodriguez PA-C        lacosamide (VIMPAT) tablet 50 mg  50 mg Oral Q12H [facility] Quiana Perales MD   50 mg at 03/14/18 0925    lisinopril (ZESTRIL) tablet 20 mg  20 mg Oral Daily Argelia Rodriguez PA-C   20 mg at 03/14/18 1351    ondansetron (ZOFRAN) injection 4 mg  4 mg Intravenous Q6H PRN Quiana Perales MD        sertraline (ZOLOFT) tablet 50 mg  50 mg Oral Daily Quiana Perales MD   50 mg at 03/14/18 0926    sodium chloride 0 9 % infusion  75 mL/hr Intravenous Continuous Quiana Perales MD        sodium chloride 0 9 % with KCl 20 mEq/L infusion (premix)  75 mL/hr Intravenous Continuous Quiana Perales MD 75 mL/hr at 03/14/18 0800 75 mL/hr at 03/14/18 0800     Allergies Allergen Reactions    Dilantin [Phenytoin]     Lidocaine     Statins        Objective   Vitals: Blood pressure 143/68, pulse 62, temperature 97 7 °F (36 5 °C), temperature source Oral, resp  rate 20, height 5' 6" (1 676 m), weight 101 kg (222 lb), SpO2 97 %  Intake/Output Summary (Last 24 hours) at 03/14/18 1441  Last data filed at 03/14/18 1400   Gross per 24 hour   Intake           4087 5 ml   Output             2985 ml   Net           1102 5 ml     Invasive Devices     Peripheral Intravenous Line            Peripheral IV 03/13/18 Right Hand 1 day    Peripheral IV 03/14/18 Left Antecubital less than 1 day          Arterial Line            Arterial Line 03/13/18 Left Radial 1 day          Drain            Urethral Catheter Temperature probe 1 day                Physical Exam    The history was obtained from the review of the chart, patient  Lab Results:       Lab Results   Component Value Date    WBC 13 35 (H) 03/14/2018    HGB 11 8 03/14/2018    HCT 34 1 (L) 03/14/2018    MCV 87 03/14/2018     03/14/2018     Lab Results   Component Value Date/Time    BUN 22 03/06/2018 06:36 AM     03/06/2018 06:36 AM    K 4 4 03/06/2018 06:36 AM     03/06/2018 06:36 AM    CO2 26 03/06/2018 06:36 AM    CREATININE 0 95 03/06/2018 06:36 AM    AST 17 03/05/2018 12:32 PM    ALT 33 03/05/2018 12:32 PM    ALB 3 8 03/05/2018 12:32 PM     No results for input(s): CHOL, HDL, LDL, TRIG, VLDL in the last 72 hours  No results found for: Jovita Segovia  POC Glucose (mg/dl)   Date Value   03/14/2018 314 (H)   03/14/2018 263 (H)   03/13/2018 307 (H)   03/13/2018 174 (H)   03/13/2018 220 (H)   03/06/2018 359 (H)   03/06/2018 279 (H)   03/05/2018 398 (H)   03/05/2018 165 (H)   03/05/2018 190 (H)       Imaging Studies: I have personally reviewed pertinent reports      XR chest pa & lateral [[id number]] Collected: 03/14/18 1116   Order Status: Completed Updated: 03/14/18 1120   Narrative:     CHEST     INDICATION:   Preoperative exam   3/5/2018  COMPARISON: Trinna Moritz PERFORMED/VIEWS: Shady Garcia CHEST PA & LATERAL      FINDINGS:    Cardiomediastinal silhouette appears unremarkable  The lungs are clear   No pneumothorax or pleural effusion  Osseous structures appear within normal limits for patient age  Impression:       No acute cardiopulmonary disease  Workstation performed: [id number]   IR cerebral angiography / intervention [[id number]] Resulted: 03/13/18 1201   Order Status: Sent Updated: 03/13/18 1653   CTA head and neck w wo contrast [[id number]] Collected: 03/06/18 1524   Order Status: Completed Updated: 03/06/18 1542   Narrative:     CTA NECK AND BRAIN WITH AND WITHOUT CONTRAST    INDICATION: Sudden onset headache query dissection    COMPARISON:   CT he and CTA performed one day earlier    TECHNIQUE:  Routine CT imaging of the Brain without contrast   Post contrast imaging was performed after administration of iodinated contrast through the neck and brain  Post contrast axial 0 625 mm images timed to opacify the arterial system       3D rendering was performed on an independent workstation    MIP reconstructions performed   Coronal reconstructions were performed of the noncontrast portion of the brain       Radiation dose length product (DLP) for this visit:  5181 74 mGy-cm    This examination, like all CT scans performed in [facility], was performed utilizing techniques to minimize radiation dose exposure, including the use of   iterative reconstruction and automated exposure control        IV Contrast:  85 mL of iohexol (OMNIPAQUE)     IMAGE QUALITY:   Diagnostic    FINDINGS:  NONCONTRAST BRAIN    PARENCHYMA:  No hemorrhage, no acute ischemia   No change in the CT appearance of the brain in the short interval since prior study   Previously detected near 5 cm right parafalcine frontal meningioma unchanged   Tiny hypodense focus next to the foramen   evolution to on the right, series 2 image 10 likely represents asymmetric choroid calcification  VENTRICLES AND EXTRA-AXIAL SPACES:  Normal for patient's age  VISUALIZED ORBITS AND PARANASAL SINUSES:  Unremarkable  CALVARIUM AND EXTRACRANIAL SOFT TISSUES:   Normal       CERVICAL VASCULATURE    AORTIC ARCH AND GREAT VESSELS:  Normal aortic arch and great vessel origins  Normal visualized subclavian vessels  RIGHT VERTEBRAL ARTERY CERVICAL SEGMENT:  Normal origin  The vessel is normal in caliber throughout the neck  LEFT VERTEBRAL ARTERY CERVICAL SEGMENT:  Arises independently from the arch, normal origin, nondominant vessel  The vessel is normal in caliber throughout the neck  RIGHT EXTRACRANIAL CAROTID SEGMENT:  Normal caliber common carotid artery   Normal bifurcation and cervical internal carotid artery   No stenosis or dissection  LEFT EXTRACRANIAL CAROTID SEGMENT:  Normal caliber common carotid artery   Normal bifurcation and cervical internal carotid artery   No stenosis or dissection  NASCET criteria was used to determine the degree of internal carotid artery diameter stenosis  INTRACRANIAL VASCULATURE     INTERNAL CAROTID ARTERIES:  Normal enhancement of the intracranial portions of the internal carotid arteries   Normal ophthalmic artery origins   Normal ICA terminus  ANTERIOR CIRCULATION:  Symmetric A1 segments and anterior cerebral arteries with normal enhancement   Normal anterior communicating artery  MIDDLE CEREBRAL ARTERY CIRCULATION:  M1 segment and middle cerebral artery branches demonstrate normal enhancement bilaterally  DISTAL VERTEBRAL ARTERIES:  Normal distal vertebral arteries   Right AICA/PICA and left Posterior inferior cerebellar artery origins are normal  Normal vertebral basilar junction  BASILAR ARTERY:  Basilar artery is normal in caliber   Normal superior cerebellar arteries  POSTERIOR CEREBRAL ARTERIES: Normal    Normal posterior communicating arteries      DURAL VENOUS SINUSES:  Superior sagittal sinus does appear occluded at the interface with the large meningioma  NON VASCULAR ANATOMY    BONY STRUCTURES:  No acute pathology   Advanced generative changes of the cervical spine with prominent anterior osteophytes C3, C4 and C5   Posterior osteophytes within and narrowing the canal to the right at C4   AP dimension of the canal approximately   6 mm   Left posterolateral osteophyte at C5 narrowing the canal to approximately 7 mm   There is cysts segmental ossification of the posterior longitudinal ligament C C6-C7, also producing moderate stenosis of the canal     SOFT TISSUES OF THE NECK:  Unremarkable  THORACIC INLET:  Unremarkable  Impression:       No acute intracranial disease   Could patient's headache be related to 5 cm right frontal vertex meningioma?  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, at interface with meningioma, this was suspected on recent MR  Marked degenerative change of the cervical spine with at least moderate mid cervical stenosis   Segmental OPLL C6-C7  Workstation performed: [id number]   MRI brain w wo contrast [[id number]] Collected: 03/06/18 1114   Order Status: Completed Updated: 03/06/18 1141   Narrative:     MRI BRAIN WITH AND WITHOUT CONTRAST    INDICATION:  Right frontal mass    COMPARISON:  CT performed one day earlier    TECHNIQUE:  Sagittal T1, axial T2, axial FLAIR, axial T1, axial [place], axial diffusion  Sagittal, axial and coronal T1 postcontrast   Axial BRAVO post contrast       IV Contrast:  10 mL of Gadobutrol injection (SINGLE-DOSE)      IMAGE QUALITY:   Diagnostic  FINDINGS:    BRAIN PARENCHYMA:  No hemorrhage or acute ischemia      47 mm in AP oblique by 37 mm in cephalocaudad oblique, by 37 mm in transverse dimension relatively homogeneous extra-axial mass is identified along the frontal, paramedian falx   Very minimal associated edema along the posterior inferior aspect of the   tumor   Tumor displaces the falx to the left anteriorly and appears to infiltrate the superior sagittal sinus  Shelly Marine is suggestion of a minimal amount of tumor extending through the falx   Broad-based enhancement is observed, particularly along the   anterolateral aspect of the tumor, extending over the frontal and parietal bones  VENTRICLES: Mild Mass effect, no hydrocephalus  SELLA AND PITUITARY GLAND:  Normal     ORBITS:  Normal     PARANASAL SINUSES:  Trace mucosal disease  VASCULATURE:  Evaluation of the major intracranial vasculature demonstrates appropriate flow voids  CALVARIUM AND SKULL BASE:  Normal     EXTRACRANIAL SOFT TISSUES:  Normal    Impression:       47 mm in greatest linear dimension right frontal vertex  parafalcine meningioma, producing minimal edema   Tumor has extended through the superior sagittal sinus

## 2018-03-14 NOTE — MEDICAL STUDENT
MEDICAL STUDENT  Inpatient H&P for TRAINING ONLY   Not Part of Legal Medical Record       H&P Exam - Karolina Rock 62 y o  female MRN: [id number]    Unit/Bed#: [facility] 871-72 Encounter: [id number]    Assessment:  - Meningioma   - T2DM    Plan:  The patient is currently NPO  Her sugars have ranged from 174 - 314  Start insulin gtt prior to surgery  History of Present Illness   Karolina Rock is a 62 y o  Female who presents with a known 6cm right frontoparietal mass, convexity/falx based consistent with meningioma  She is scheduled to have surgery tomorrow to remove the mass  Yesterday, she had underwent embolization due to considerable external carotid supply to the meningioma  She is currently being consulted with Endocrinology due to her history of Type 2 Diabetes  She has had diabetes since 2008  Her current home treatment regimen consists of Januvia 50 mg, Metformin 500 mg, and Novolog 10 units before meals TID  Her Januvia and Metformin have been put on hold prior to embolization  In addition, her Novolog had been increased to 14 units after starting Decadron 4mg injection for swelling management of meningioma  Currently, she is being managed inpatient on sliding scale insulin  Her most recent HbA1c is 7 6 (3/6/18)  The patient has had a history of cataracts and astigmatism, both of which were managed with opthalmology  She denies numbness/tingling of her upper and lower extremities  She had noted having increased thirst and urination in the past, but this had improved over time with blood sugar management  She is not aware of her family history because she was adopted  She denies any history of CAD      ROS: General ROS: negative for - chills, fatigue or fever  Ophthalmic ROS: negative for - blurry vision, decreased vision, double vision or loss of vision  Respiratory ROS: no cough, shortness of breath, or wheezing  Cardiovascular ROS: negative for - chest pain, dyspnea on exertion, edema, irregular heartbeat, palpitations or rapid heart rate  Gastrointestinal ROS: no abdominal pain, change in bowel habits, or black or bloody stools  positive for - constipation  Musculoskeletal ROS: negative for - muscle pain or muscular weakness    Historical Information   Past Medical History:   Diagnosis Date    Cancer ([facility] )     Diabetes mellitus ([facility] )     Seizures ([facility] )      Past Surgical History:   Procedure Laterality Date    HYSTERECTOMY       Social History   History   Alcohol Use No     History   Drug Use No     History   Smoking Status    Former Smoker    Quit date: 3/5/2000   Smokeless Tobacco    Never Used     Family History:   Family History   Problem Relation Age of Onset    Adopted: Yes       Meds/Allergies   all medications and allergies reviewed  Allergies   Allergen Reactions    Dilantin [Phenytoin]     Lidocaine     Statins        Objective   First Vitals:   Blood Pressure: 156/77 (03/13/18 1100)  Pulse: 60 (03/13/18 1100)  Temperature: 97 7 °F (36 5 °C) (03/13/18 1100)  Temp Source: Oral (03/13/18 1100)  Respirations: 18 (03/13/18 1100)  Height: 5' 6" (167 6 cm) (03/13/18 1100)  Weight - Scale: 101 kg (222 lb) (03/13/18 1100)  SpO2: 97 % (03/13/18 1100)    Current Vitals:   Blood Pressure: 119/62 (03/14/18 1000)  Pulse: 64 (03/14/18 1300)  Temperature: 97 7 °F (36 5 °C) (03/14/18 0700)  Temp Source: Oral (03/14/18 0700)  Respirations: (!) 26 (03/14/18 1300)  Height: 5' 6" (167 6 cm) (03/13/18 1100)  Weight - Scale: 101 kg (222 lb) (03/13/18 1100)  SpO2: 97 % (03/14/18 1300)      Intake/Output Summary (Last 24 hours) at 03/14/18 1340  Last data filed at 03/14/18 1200   Gross per 24 hour   Intake           3457 5 ml   Output             2660 ml   Net            797 5 ml       Invasive Devices     Peripheral Intravenous Line            Peripheral IV 03/13/18 Right Hand 1 day    Peripheral IV 03/14/18 Left Antecubital less than 1 day          Arterial Line            Arterial Line 03/13/18 Left Radial 1 day Drain            Urethral Catheter Temperature probe 1 day                Physical Exam   Constitutional: She is oriented to person, place, and time  She appears well-developed and well-nourished  No distress  HENT:   Head: Normocephalic and atraumatic  Eyes: Pupils are equal, round, and reactive to light  Neck: Normal range of motion  Neck supple  Cardiovascular: Normal rate, regular rhythm, normal heart sounds and intact distal pulses  Exam reveals no gallop and no friction rub  No murmur heard  Pulmonary/Chest: Effort normal and breath sounds normal  No respiratory distress  She has no wheezes  She has no rales  She exhibits no tenderness  Abdominal: Soft  Bowel sounds are normal  She exhibits no distension and no mass  There is no tenderness  There is no rebound and no guarding  Neurological: She is alert and oriented to person, place, and time  Skin: Skin is warm and dry  She is not diaphoretic  Psychiatric: She has a normal mood and affect  Her behavior is normal  Judgment and thought content normal        Counseling / Coordination of Care: Total floor / unit time spent today 20 minutes

## 2018-03-14 NOTE — PHYSICAL THERAPY NOTE
Physical Therapy Cancellation Note- neurosurgery reports tp will be having surgery tomorrow and can hold off today, pt is OOB in chair    Izabel Vanessa PT

## 2018-03-14 NOTE — PHYSICIAN ADVISOR
This patient is a 62 y o  y/o female who is admitted to the hospital  on 3/13/2018 1034  History of Present Illness includes: The patient had a prior admission from March 5th to March 6 secondary to frontal mass of the brain  The patient was discharged with a Decadron taper as well as Keppra for seizure prophylaxis  The patient presented on this admission for right frontal meningioma  The patient underwent an arteriogram and embolization of an angioma  This occurred on March 13th  Postoperative plan of care includes endocrinology consult, NPO, DVT prophylaxis, intravenous Decadron, intravenous fluids  This patient is appropriate for inpatient admission as her length of stay is expected to be least 2 midnights  Continued hospitalization is necessary to ensure stabilization of her clinical status  This admission is unrelated to the patient's prior admission  This is an elective admission for embolization of meningioma as noted above

## 2018-03-14 NOTE — PROGRESS NOTES
Patient reports fine shaking of upper extremities, which she noticed while holding fork to eat dinner  Otherwise neuro assessment unchanged  Hector Driver PA-C covering for neurosurgery made aware  No new orders at this time  To notify him if symptoms get worse

## 2018-03-14 NOTE — SOCIAL WORK
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 [employer]  She has no C and denies D&A and MH treatment  The patient just purchased AARP as secondary insurance  She has prescription coverage with [employer] and uses [employer], [employer] and [employer], [place]  PCP is Dr Monae Allen [place], [place]    CM reviewed d/c planning process including the following: identifying help at home, patient preference for d/c planning needs, Discharge Lounge, [facility] Meds to Bed program, availability of treatment team to discuss questions or concerns patient and/or family may have regarding understanding medications and recognizing signs and symptoms once discharged  CM also encouraged patient to follow up with all recommended appointments after discharge  Patient advised of importance for patient and family to participate in managing patients medical well being  Discharge checklist discussed with patient and family

## 2018-03-14 NOTE — OCCUPATIONAL THERAPY NOTE
Occupational Therapy         Patient Name: Herminio Higgins  Today's Date: 3/14/2018      OT consult received and chart review completed  Pt  cx'd this am  Spoke with Ruben Roldan from neurosurgery  Pt  Scheduled for OR tomorrow  Will cont  To follow       LUZ Malone, OTR/L

## 2018-03-14 NOTE — CASE MANAGEMENT
THIS IS A MEDICARE READMISSION  [facility] ADMISSION 3/5/18 - 3/6/18  *CASE TASKED TO PA FOR MEDICARE READMISSION REVIEW          Initial Clinical Review  SCHEDULED INPATIENT SURGERY 3/13/18    Age/Sex: 62 y o  female    REPORT  PATIENT NAME:   Mingo Garcia     :  1960  MRN: [id number]   Pt Location: Interventional radiology     SURGERY DATE: 18      Preop Diagnosis:  1  Right Frontal Meningioma  2  Seizures     Postop Diagnosis  1  Right Frontal Meningioma  2  Seizures     Procedure:  Right Common Carotid Arteriogram  Right Internal Carotid Arteriogram  Right External Carotid Arteriogram  Microcatheter angiogram of right middle meningeal artery  Left Common Carotid Arteriogram  Left External Carotid Arteriogram  Microcatheter angiogram of right middle meningeal artery  Injection of spasmolytic agent, verapamil, over ten minutes  PVA embolization of intracranial right frontal meningioma from L and R Middle Meningeal Arteries  Limited Right Femoral Arteriogram        Anesthesia Type: General Anesthesia     Operative Indications:  Yuliet Fortune a very pleasant 62 y  o  female who had a known meningioma and presented with left leg weakness  Imaging showed a large right frontal Dario Corolla its location and need for surgical resection we were asked to evaluate for possible embolization   After discussing the risks and benefits of the procedure including bleeding, stroke, groin hematoma, and death she elected to go ahead and proceed             Admission Orders: Date/Time/Statement:   3/13/18 AT 1559     Orders Placed This Encounter   Procedures    Inpatient Admission     Standing Status:   Standing     Number of Occurrences:   1     Order Specific Question:   Admitting Physician     Answer:   Niles Rivas [[id number]]     Order Specific Question:   Level of Care     Answer:   Level 1 Stepdown [13]     Order Specific Question:   Estimated length of stay     Answer:   More than 2 Midnights Order Specific Question:   Certification     Answer:   I certify that inpatient services are medically necessary for this patient for a duration of greater than two midnights  See H&P and MD Progress Notes for additional information about the patient's course of treatment  Vital Signs: /62   Pulse 64   Temp 97 7 °F (36 5 °C) (Oral)   Resp (!) 26   Ht 5' 6" (1 676 m)   Wt 101 kg (222 lb)   SpO2 97%   BMI 35 83 kg/m²         03/13 0701 03/14 0700 03/14 0701  03/14 1348  Most Recent    Temperature (°F) 96 999  1    97 7 (36 5)    Pulse 5486 6472  64    Respirations 1126 1926  26    Blood Pressure 114/65162/82 119/62138/73  119/62    Arterial Line /50178/72 124/50158/76  140/66    SpO2 (%) 95100 9698  97        Diet:        Diet Orders            Start     Ordered    03/15/18 0000  Diet NPO; Sips with meds  Diet effective midnight     Question Answer Comment   Diet Type NPO    NPO Except: Sips with meds        03/14/18 0709    03/13/18 1727  Diet Ken/CHO Controlled; Consistent Carbohydrate Diet Level 2 (5 carb servings/75 grams CHO/meal)  Diet effective now     Question Answer Comment   Diet Type Ken/CHO Controlled    Ken/CHO Controlled Consistent Carbohydrate Diet Level 2 (5 carb servings/75 grams CHO/meal)    RD to adjust diet per protocol?  Yes        03/13/18 1726          Continuous IV Infusions:  sodium chloride 75 mL/hr    sodium chloride 0 9 % with KCl 20 mEq/L 75 mL/hr Last Rate: 75 mL/hr (03/14/18 0800)       Mobility:  Post Procedure Restrictions    DVT Prophylaxis:  Heparin SQ ; SCD    Scheduled Meds:  Current Facility-Administered Medications:  [START ON 3/15/2018] cefazolin 2,000 mg Intravenous Once Isela Mckeon MD    dexamethasone 4 mg Intravenous Q6H [address] Phylicia Hernandez MD    ferrous sulfate 325 mg Oral Daily Argelia Rodriguez PA-C    heparin (porcine) 5,000 Units Subcutaneous Q8H [address] Phylicia Hernandez MD    insulin lispro 14 Units Subcutaneous TID With Meals Argelia Rodriguez JOSÉ MIGUEL    insulin lispro 2-12 Units Subcutaneous TID AC Argelia Rodriguez PA-C    insulin lispro 2-12 Units Subcutaneous HS Argelia Rodriguez PA-C    lacosamide 50 mg Oral Q12H Kerri Nagy MD    lisinopril 20 mg Oral Daily Argelia Rodriguez PA-C    ondansetron 4 mg Intravenous Q6H PRN Lily Steinberg MD    sertraline 50 mg Oral Daily Lily Steinberg MD    sodium chloride 75 mL/hr Intravenous Continuous Lily Steinberg MD    sodium chloride 0 9 % with KCl 20 mEq/L 75 mL/hr Intravenous Continuous Lily Steinberg MD Last Rate: 75 mL/hr (03/14/18 0800)       PRN Meds:       ondansetron

## 2018-03-14 NOTE — PLAN OF CARE
DISCHARGE PLANNING     Discharge to home or other facility with appropriate resources Progressing        INFECTION - ADULT     Absence or prevention of progression during hospitalization Progressing     Absence of fever/infection during neutropenic period Progressing        Knowledge Deficit     Patient/family/caregiver demonstrates understanding of disease process, treatment plan, medications, and discharge instructions Progressing        NEUROSENSORY - ADULT     Achieves stable or improved neurological status Progressing     Absence of seizures Progressing     Remains free of injury related to seizures activity Progressing     Achieves maximal functionality and self care Progressing        PAIN - ADULT     Verbalizes/displays adequate comfort level or baseline comfort level Progressing        Potential for Falls     Patient will remain free of falls Progressing        Prexisting or High Potential for Compromised Skin Integrity     Skin integrity is maintained or improved Progressing        SAFETY ADULT     Maintain or return to baseline ADL function Progressing     Maintain or return mobility status to optimal level Progressing     Patient will remain free of falls Progressing

## 2018-03-15 ENCOUNTER — ANESTHESIA (INPATIENT)
Dept: PERIOP | Facility: HOSPITAL | Age: 58
DRG: 025 | End: 2018-03-15
Payer: MEDICARE

## 2018-03-15 LAB
ABO GROUP BLD: NORMAL
BASE EXCESS BLDA CALC-SCNC: -2 MMOL/L (ref -2–3)
BASE EXCESS BLDA CALC-SCNC: -3 MMOL/L (ref -2–3)
BLD GP AB SCN SERPL QL: NEGATIVE
CA-I BLD-SCNC: 1.09 MMOL/L (ref 1.12–1.32)
CA-I BLD-SCNC: 1.11 MMOL/L (ref 1.12–1.32)
GLUCOSE SERPL-MCNC: 138 MG/DL (ref 65–140)
GLUCOSE SERPL-MCNC: 139 MG/DL (ref 65–140)
GLUCOSE SERPL-MCNC: 142 MG/DL (ref 65–140)
GLUCOSE SERPL-MCNC: 143 MG/DL (ref 65–140)
GLUCOSE SERPL-MCNC: 145 MG/DL (ref 65–140)
GLUCOSE SERPL-MCNC: 150 MG/DL (ref 65–140)
GLUCOSE SERPL-MCNC: 166 MG/DL (ref 65–140)
GLUCOSE SERPL-MCNC: 178 MG/DL (ref 65–140)
GLUCOSE SERPL-MCNC: 204 MG/DL (ref 65–140)
GLUCOSE SERPL-MCNC: 211 MG/DL (ref 65–140)
HCO3 BLDA-SCNC: 21.1 MMOL/L (ref 24–30)
HCO3 BLDA-SCNC: 21.3 MMOL/L (ref 24–30)
HCT VFR BLD CALC: 31 % (ref 34.8–46.1)
HCT VFR BLD CALC: 31 % (ref 34.8–46.1)
HGB BLDA-MCNC: 10.5 G/DL (ref 11.5–15.4)
HGB BLDA-MCNC: 10.5 G/DL (ref 11.5–15.4)
KCT BLD-ACNC: 195 SEC (ref 89–137)
PCO2 BLD: 22 MMOL/L (ref 21–32)
PCO2 BLD: 22 MMOL/L (ref 21–32)
PCO2 BLD: 30.3 MM HG (ref 42–50)
PCO2 BLD: 35.6 MM HG (ref 42–50)
PH BLD: 7.38 [PH] (ref 7.3–7.4)
PH BLD: 7.46 [PH] (ref 7.3–7.4)
PO2 BLD: 100 MM HG (ref 35–45)
PO2 BLD: 108 MM HG (ref 35–45)
POTASSIUM BLD-SCNC: 3.8 MMOL/L (ref 3.5–5.3)
POTASSIUM BLD-SCNC: 4.2 MMOL/L (ref 3.5–5.3)
RH BLD: NEGATIVE
SAO2 % BLD FROM PO2: 98 % (ref 95–98)
SAO2 % BLD FROM PO2: 99 % (ref 95–98)
SODIUM BLD-SCNC: 135 MMOL/L (ref 136–145)
SODIUM BLD-SCNC: 139 MMOL/L (ref 136–145)
SPECIMEN EXPIRATION DATE: NORMAL
SPECIMEN SOURCE: ABNORMAL

## 2018-03-15 PROCEDURE — 88331 PATH CONSLTJ SURG 1 BLK 1SPC: CPT | Performed by: PATHOLOGY

## 2018-03-15 PROCEDURE — 86901 BLOOD TYPING SEROLOGIC RH(D): CPT | Performed by: PHYSICIAN ASSISTANT

## 2018-03-15 PROCEDURE — 88307 TISSUE EXAM BY PATHOLOGIST: CPT | Performed by: PATHOLOGY

## 2018-03-15 PROCEDURE — 82947 ASSAY GLUCOSE BLOOD QUANT: CPT

## 2018-03-15 PROCEDURE — 84295 ASSAY OF SERUM SODIUM: CPT

## 2018-03-15 PROCEDURE — 00B70ZZ EXCISION OF CEREBRAL HEMISPHERE, OPEN APPROACH: ICD-10-PCS | Performed by: NEUROLOGICAL SURGERY

## 2018-03-15 PROCEDURE — 82803 BLOOD GASES ANY COMBINATION: CPT

## 2018-03-15 PROCEDURE — 82330 ASSAY OF CALCIUM: CPT

## 2018-03-15 PROCEDURE — 86900 BLOOD TYPING SEROLOGIC ABO: CPT | Performed by: PHYSICIAN ASSISTANT

## 2018-03-15 PROCEDURE — C1713 ANCHOR/SCREW BN/BN,TIS/BN: HCPCS | Performed by: NEUROLOGICAL SURGERY

## 2018-03-15 PROCEDURE — 88342 IMHCHEM/IMCYTCHM 1ST ANTB: CPT

## 2018-03-15 PROCEDURE — 88333 PATH CONSLTJ SURG CYTO XM 1: CPT | Performed by: PATHOLOGY

## 2018-03-15 PROCEDURE — 82948 REAGENT STRIP/BLOOD GLUCOSE: CPT

## 2018-03-15 PROCEDURE — 86920 COMPATIBILITY TEST SPIN: CPT

## 2018-03-15 PROCEDURE — 61512 CRNEC TREPH EXC MNGIOMA STTL: CPT | Performed by: NEUROLOGICAL SURGERY

## 2018-03-15 PROCEDURE — 85014 HEMATOCRIT: CPT

## 2018-03-15 PROCEDURE — 84132 ASSAY OF SERUM POTASSIUM: CPT

## 2018-03-15 PROCEDURE — C1781 MESH (IMPLANTABLE): HCPCS | Performed by: NEUROLOGICAL SURGERY

## 2018-03-15 PROCEDURE — 95940 IONM IN OPERATNG ROOM 15 MIN: CPT | Performed by: NEUROLOGICAL SURGERY

## 2018-03-15 PROCEDURE — 61781 SCAN PROC CRANIAL INTRA: CPT | Performed by: NEUROLOGICAL SURGERY

## 2018-03-15 PROCEDURE — 86850 RBC ANTIBODY SCREEN: CPT | Performed by: PHYSICIAN ASSISTANT

## 2018-03-15 PROCEDURE — 99223 1ST HOSP IP/OBS HIGH 75: CPT | Performed by: ANESTHESIOLOGY

## 2018-03-15 DEVICE — IMPLANTABLE DEVICE
Type: IMPLANTABLE DEVICE | Site: CRANIAL | Status: FUNCTIONAL
Brand: THINFLAP SYSTEM

## 2018-03-15 DEVICE — DURA 62106 SUBSTITUTE DUREPAIR 1X3IN NCE
Type: IMPLANTABLE DEVICE | Site: BRAIN | Status: FUNCTIONAL
Brand: DUREPAIR®

## 2018-03-15 RX ORDER — SODIUM CHLORIDE 9 MG/ML
INJECTION, SOLUTION INTRAVENOUS CONTINUOUS PRN
Status: DISCONTINUED | OUTPATIENT
Start: 2018-03-15 | End: 2018-03-15 | Stop reason: SURG

## 2018-03-15 RX ORDER — FENTANYL CITRATE/PF 50 MCG/ML
25 SYRINGE (ML) INJECTION
Status: DISCONTINUED | OUTPATIENT
Start: 2018-03-15 | End: 2018-03-15 | Stop reason: HOSPADM

## 2018-03-15 RX ORDER — PROPOFOL 10 MG/ML
INJECTION, EMULSION INTRAVENOUS CONTINUOUS PRN
Status: DISCONTINUED | OUTPATIENT
Start: 2018-03-15 | End: 2018-03-15 | Stop reason: SURG

## 2018-03-15 RX ORDER — PROPOFOL 10 MG/ML
INJECTION, EMULSION INTRAVENOUS AS NEEDED
Status: DISCONTINUED | OUTPATIENT
Start: 2018-03-15 | End: 2018-03-15 | Stop reason: SURG

## 2018-03-15 RX ORDER — DOCUSATE SODIUM 100 MG/1
100 CAPSULE, LIQUID FILLED ORAL 2 TIMES DAILY
Status: DISCONTINUED | OUTPATIENT
Start: 2018-03-15 | End: 2018-03-19 | Stop reason: HOSPADM

## 2018-03-15 RX ORDER — BISACODYL 10 MG
10 SUPPOSITORY, RECTAL RECTAL DAILY PRN
Status: DISCONTINUED | OUTPATIENT
Start: 2018-03-15 | End: 2018-03-19 | Stop reason: HOSPADM

## 2018-03-15 RX ORDER — ONDANSETRON 2 MG/ML
INJECTION INTRAMUSCULAR; INTRAVENOUS AS NEEDED
Status: DISCONTINUED | OUTPATIENT
Start: 2018-03-15 | End: 2018-03-15 | Stop reason: SURG

## 2018-03-15 RX ORDER — CHLORHEXIDINE GLUCONATE 0.12 MG/ML
15 RINSE ORAL ONCE
Status: COMPLETED | OUTPATIENT
Start: 2018-03-15 | End: 2018-03-15

## 2018-03-15 RX ORDER — OXYCODONE HYDROCHLORIDE 5 MG/1
5 TABLET ORAL EVERY 4 HOURS PRN
Status: DISCONTINUED | OUTPATIENT
Start: 2018-03-15 | End: 2018-03-16

## 2018-03-15 RX ORDER — OXYCODONE HYDROCHLORIDE 10 MG/1
10 TABLET ORAL EVERY 4 HOURS PRN
Status: DISCONTINUED | OUTPATIENT
Start: 2018-03-15 | End: 2018-03-16

## 2018-03-15 RX ORDER — MIDAZOLAM HYDROCHLORIDE 1 MG/ML
INJECTION INTRAMUSCULAR; INTRAVENOUS AS NEEDED
Status: DISCONTINUED | OUTPATIENT
Start: 2018-03-15 | End: 2018-03-15 | Stop reason: SURG

## 2018-03-15 RX ORDER — ACETAMINOPHEN 325 MG/1
650 TABLET ORAL EVERY 4 HOURS PRN
Status: DISCONTINUED | OUTPATIENT
Start: 2018-03-15 | End: 2018-03-19 | Stop reason: HOSPADM

## 2018-03-15 RX ORDER — ALBUMIN, HUMAN INJ 5% 5 %
SOLUTION INTRAVENOUS CONTINUOUS PRN
Status: DISCONTINUED | OUTPATIENT
Start: 2018-03-15 | End: 2018-03-15 | Stop reason: SURG

## 2018-03-15 RX ORDER — HYDRALAZINE HYDROCHLORIDE 20 MG/ML
10 INJECTION INTRAMUSCULAR; INTRAVENOUS ONCE
Status: COMPLETED | OUTPATIENT
Start: 2018-03-15 | End: 2018-03-15

## 2018-03-15 RX ORDER — LABETALOL HYDROCHLORIDE 5 MG/ML
10 INJECTION, SOLUTION INTRAVENOUS EVERY 4 HOURS PRN
Status: DISCONTINUED | OUTPATIENT
Start: 2018-03-15 | End: 2018-03-19 | Stop reason: HOSPADM

## 2018-03-15 RX ORDER — BUPIVACAINE HYDROCHLORIDE AND EPINEPHRINE 5; 5 MG/ML; UG/ML
INJECTION, SOLUTION EPIDURAL; INTRACAUDAL; PERINEURAL AS NEEDED
Status: DISCONTINUED | OUTPATIENT
Start: 2018-03-15 | End: 2018-03-15 | Stop reason: HOSPADM

## 2018-03-15 RX ORDER — FAMOTIDINE 20 MG/1
20 TABLET, FILM COATED ORAL EVERY 12 HOURS SCHEDULED
Status: DISCONTINUED | OUTPATIENT
Start: 2018-03-15 | End: 2018-03-19 | Stop reason: HOSPADM

## 2018-03-15 RX ORDER — FENTANYL CITRATE 50 UG/ML
INJECTION, SOLUTION INTRAMUSCULAR; INTRAVENOUS AS NEEDED
Status: DISCONTINUED | OUTPATIENT
Start: 2018-03-15 | End: 2018-03-15 | Stop reason: SURG

## 2018-03-15 RX ORDER — MAGNESIUM HYDROXIDE 1200 MG/15ML
LIQUID ORAL AS NEEDED
Status: DISCONTINUED | OUTPATIENT
Start: 2018-03-15 | End: 2018-03-15 | Stop reason: HOSPADM

## 2018-03-15 RX ORDER — SUCCINYLCHOLINE/SOD CL,ISO/PF 100 MG/5ML
SYRINGE (ML) INTRAVENOUS AS NEEDED
Status: DISCONTINUED | OUTPATIENT
Start: 2018-03-15 | End: 2018-03-15 | Stop reason: SURG

## 2018-03-15 RX ORDER — SODIUM CHLORIDE 9 MG/ML
50 INJECTION, SOLUTION INTRAVENOUS CONTINUOUS
Status: DISCONTINUED | OUTPATIENT
Start: 2018-03-15 | End: 2018-03-15

## 2018-03-15 RX ORDER — ONDANSETRON 2 MG/ML
4 INJECTION INTRAMUSCULAR; INTRAVENOUS ONCE AS NEEDED
Status: DISCONTINUED | OUTPATIENT
Start: 2018-03-15 | End: 2018-03-15 | Stop reason: HOSPADM

## 2018-03-15 RX ORDER — ACETAMINOPHEN 325 MG/1
975 TABLET ORAL EVERY 8 HOURS SCHEDULED
Status: DISCONTINUED | OUTPATIENT
Start: 2018-03-15 | End: 2018-03-19 | Stop reason: HOSPADM

## 2018-03-15 RX ORDER — FUROSEMIDE 10 MG/ML
INJECTION INTRAMUSCULAR; INTRAVENOUS AS NEEDED
Status: DISCONTINUED | OUTPATIENT
Start: 2018-03-15 | End: 2018-03-15 | Stop reason: SURG

## 2018-03-15 RX ORDER — MANNITOL 250 MG/ML
INJECTION, SOLUTION INTRAVENOUS AS NEEDED
Status: DISCONTINUED | OUTPATIENT
Start: 2018-03-15 | End: 2018-03-15 | Stop reason: SURG

## 2018-03-15 RX ORDER — EPHEDRINE SULFATE 50 MG/ML
INJECTION, SOLUTION INTRAVENOUS AS NEEDED
Status: DISCONTINUED | OUTPATIENT
Start: 2018-03-15 | End: 2018-03-15 | Stop reason: SURG

## 2018-03-15 RX ADMIN — PROPOFOL 100 MG: 10 INJECTION, EMULSION INTRAVENOUS at 07:44

## 2018-03-15 RX ADMIN — SODIUM CHLORIDE: 0.9 INJECTION, SOLUTION INTRAVENOUS at 13:08

## 2018-03-15 RX ADMIN — DEXAMETHASONE SODIUM PHOSPHATE 4 MG: 4 INJECTION, SOLUTION INTRAMUSCULAR; INTRAVENOUS at 17:12

## 2018-03-15 RX ADMIN — PROPOFOL 150 MG: 10 INJECTION, EMULSION INTRAVENOUS at 08:28

## 2018-03-15 RX ADMIN — Medication 0.2 MCG/KG/MIN: at 07:50

## 2018-03-15 RX ADMIN — Medication 0.2 MCG/KG/HR: at 07:50

## 2018-03-15 RX ADMIN — PROPOFOL 100 MG: 10 INJECTION, EMULSION INTRAVENOUS at 08:29

## 2018-03-15 RX ADMIN — HYDRALAZINE HYDROCHLORIDE 10 MG: 20 INJECTION INTRAMUSCULAR; INTRAVENOUS at 14:15

## 2018-03-15 RX ADMIN — FENTANYL CITRATE 25 MCG: 50 INJECTION, SOLUTION INTRAMUSCULAR; INTRAVENOUS at 13:51

## 2018-03-15 RX ADMIN — CEFAZOLIN SODIUM 2000 MG: 2 SOLUTION INTRAVENOUS at 13:46

## 2018-03-15 RX ADMIN — DEXAMETHASONE SODIUM PHOSPHATE 4 MG: 4 INJECTION, SOLUTION INTRAMUSCULAR; INTRAVENOUS at 00:00

## 2018-03-15 RX ADMIN — MIDAZOLAM HYDROCHLORIDE 2 MG: 1 INJECTION INTRAMUSCULAR; INTRAVENOUS at 07:42

## 2018-03-15 RX ADMIN — PROPOFOL 100 MCG/KG/MIN: 10 INJECTION, EMULSION INTRAVENOUS at 07:50

## 2018-03-15 RX ADMIN — SODIUM CHLORIDE: 0.9 INJECTION, SOLUTION INTRAVENOUS at 10:08

## 2018-03-15 RX ADMIN — CEFAZOLIN SODIUM 2000 MG: 2 SOLUTION INTRAVENOUS at 08:40

## 2018-03-15 RX ADMIN — SODIUM CHLORIDE 50 ML/HR: 0.9 INJECTION, SOLUTION INTRAVENOUS at 16:42

## 2018-03-15 RX ADMIN — LACOSAMIDE 50 MG: 50 TABLET, FILM COATED ORAL at 21:37

## 2018-03-15 RX ADMIN — SODIUM CHLORIDE: 0.9 INJECTION, SOLUTION INTRAVENOUS at 07:28

## 2018-03-15 RX ADMIN — ALBUMIN (HUMAN): 12.5 INJECTION, SOLUTION INTRAVENOUS at 10:09

## 2018-03-15 RX ADMIN — LABETALOL 20 MG/4 ML (5 MG/ML) INTRAVENOUS SYRINGE 10 MG: at 17:13

## 2018-03-15 RX ADMIN — ACETAMINOPHEN 650 MG: 325 TABLET, FILM COATED ORAL at 16:35

## 2018-03-15 RX ADMIN — PROPOFOL 50 MG: 10 INJECTION, EMULSION INTRAVENOUS at 07:47

## 2018-03-15 RX ADMIN — SODIUM CHLORIDE: 0.9 INJECTION, SOLUTION INTRAVENOUS at 11:49

## 2018-03-15 RX ADMIN — CHLORHEXIDINE GLUCONATE 15 ML: 1.2 RINSE ORAL at 07:03

## 2018-03-15 RX ADMIN — SODIUM CHLORIDE 1.5 UNITS/HR: 9 INJECTION, SOLUTION INTRAVENOUS at 07:28

## 2018-03-15 RX ADMIN — SODIUM CHLORIDE 200 MG: 9 INJECTION, SOLUTION INTRAVENOUS at 09:52

## 2018-03-15 RX ADMIN — CEFAZOLIN SODIUM 1000 MG: 1 SOLUTION INTRAVENOUS at 21:46

## 2018-03-15 RX ADMIN — FUROSEMIDE 20 MG: 10 INJECTION, SOLUTION INTRAMUSCULAR; INTRAVENOUS at 09:18

## 2018-03-15 RX ADMIN — ONDANSETRON 4 MG: 2 INJECTION INTRAMUSCULAR; INTRAVENOUS at 11:59

## 2018-03-15 RX ADMIN — DEXAMETHASONE SODIUM PHOSPHATE 4 MG: 4 INJECTION, SOLUTION INTRAMUSCULAR; INTRAVENOUS at 06:06

## 2018-03-15 RX ADMIN — DOCUSATE SODIUM 100 MG: 100 CAPSULE, LIQUID FILLED ORAL at 17:13

## 2018-03-15 RX ADMIN — FENTANYL CITRATE 100 MCG: 50 INJECTION, SOLUTION INTRAMUSCULAR; INTRAVENOUS at 07:50

## 2018-03-15 RX ADMIN — Medication 100 MG: at 07:45

## 2018-03-15 RX ADMIN — Medication 10 MG: at 08:35

## 2018-03-15 RX ADMIN — PROPOFOL 100 MG: 10 INJECTION, EMULSION INTRAVENOUS at 13:00

## 2018-03-15 RX ADMIN — ACETAMINOPHEN 975 MG: 325 TABLET, FILM COATED ORAL at 21:36

## 2018-03-15 RX ADMIN — MANNITOL 25 G: 12.5 INJECTION, SOLUTION INTRAVENOUS at 09:21

## 2018-03-15 RX ADMIN — MANNITOL 25 G: 12.5 INJECTION, SOLUTION INTRAVENOUS at 09:20

## 2018-03-15 RX ADMIN — SODIUM CHLORIDE: 0.9 INJECTION, SOLUTION INTRAVENOUS at 07:55

## 2018-03-15 RX ADMIN — PROPOFOL 100 MG: 10 INJECTION, EMULSION INTRAVENOUS at 07:45

## 2018-03-15 RX ADMIN — FAMOTIDINE 20 MG: 20 TABLET, FILM COATED ORAL at 21:37

## 2018-03-15 RX ADMIN — EPHEDRINE SULFATE 5 MG: 50 INJECTION, SOLUTION INTRAVENOUS at 07:59

## 2018-03-15 NOTE — OP NOTE
Operative Note     Pre-op Diagnosis:   Cerebral edema (HCC) [G93 6]  Meningioma ([address] ) [D32 9]  Brain compression ([address] ) [G93 5]    Post-op Dignosis:     Post-Op Diagnosis Codes:     * Cerebral edema ([address] ) [G93 6]     * Meningioma ([address] ) [D32 9]     * Brain compression ([address] ) [G93 5]    Procedure:  Procedure(s):  IMAGE-GUIDED RIGHT FRONTOPARIETAL CRANIOTOMY FOR TUMOR    Surgeon: Surgeon(s) and Role:     * Yue Fernandez MD - Primary     Anesthesia: General    Staff:   Circulator: Geraldo Jacobs RN  Relief Circulator: Sherrian Paget, RN; Brooke Marie RN  Relief Scrub: Brooke Marie RN  Scrub Person: Momo Gomez; Kelton Fritz RN  No qualified Resident was available  Estimated Blood Loss: 150 mL    Specimens:                  Order Name Source Comment Collection Info Order Time   TISSUE EXAM Brain  Collected By: Yue Fernandez MD 3/15/2018 10:24 AM       Drains:   Closed/Suction Drain Right Head Bulb (Active)   Dressing Status Open to air 3/15/2018  1:29 PM   Drainage Appearance Bloody 3/15/2018  1:29 PM   Status To bulb suction 3/15/2018  1:29 PM   Output (mL) 65 mL 3/15/2018  1:29 PM       Urethral Catheter Temperature probe (Active)   Amt returned on insertion(mL) 100 mL 3/13/2018  1:30 PM   Site Assessment Clean;Skin intact 3/15/2018  1:29 PM   Collection Container Standard drainage bag 3/15/2018  1:29 PM   Securement Method Securing device (Describe) 3/15/2018  1:29 PM   Output (mL) 750 mL 3/15/2018  6:09 AM                Findings:    frozen path c/w meningioma  Mass had eroded through convexity dura, and minimally into inner table    Complications:  None    Anesthesia: General Endotracheal Anesthesia/Total IV Anesthetic with stable Neuro monitoring      INDICATIONS    24-year-old woman with progressive left-sided weakness, to the point of being wheelchair bound recently  Was seen at [address] [place] [address] last week for some of the symptoms    Workup included MRI of the brain demonstrating 6 cm right frontoparietal mass, convexity/falx based consistent with meningioma  Extensive brain compression, and some adjacent cerebral edema  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      Patient was seen in the office earlier this week, and options for management including risks benefits alternatives were personally discussed with patient and her   Recommendation is for resection to remove mass effect, diagnose, and hopefully allow her right-sided weakness to improve  Risks of this approach were reviewed personally in detail and include but not limited to infection, bleeding, seizure, spinal fluid leak, transient or permanent neurologic dysfunction, VTE [place] etc   Consent obtained for removal   This large mass also appears to have a considerable external carotid supply  My partner, Dr Ramon Billingsley, we will see the patient today as well, and consent and schedule the patient for preoperative embolization to minimize blood loss etc      History of substance abuse, sober for years  Prefers to avoid narcotics and around the time of surgery      Prior to left leg weakness, had no difficulty walking up stairs, no shortness of breath      Metrics:  [place] 25/30 with visuospatial executive errors,  [id number] [id number], or 0 192, VAS 30; ECOG 2-3, KPS 60    DETAILS OF PROCEDURE    The patient was brought to the OR and successfully induced with general endotracheal anesthesia, and a radial A-line as well as sufficient IV access placed  The patient was supine on the OR table, placed in Joya pins  The head was flexed but kept neutral     Preoperatively, the patient's stereotactic imaging study was uploaded to the Stealth Image guidance workstation  The workstation was used to  identify the superior, inferior, anterior and posterior margins of the delineated target, and thus design the desired craniotomy bone flap as well as the skin incision     The skin incision was on was horseshoe shaped, open face towards the right ear, with the  Left-sided limb  Just left of midline  The Stealth Navigation patient tracker was attached to the [place] head rodríguez and the Stealth Navigation Unit brought to the operative field  The patient's Stealth preoperative imaging was registered to the patient's scalp  A successful registration with acceptable error was completed  The planned incision was marked  The area was minimally clipped  The area was prepped and draped in standard fashion  A timeout was performed  The patient received antibiotics per protocol, as well as 10 mg of dexamethasone, and was dosed as appropriate with anti-seizure medication , specifically IV Vimpat  She was also given 50 g of mannitol and 20 mg of Lasix       The skin was infiltrated with 1% lidocaine with epinephrine  Skin incision was made with a skin scalpel, and dissection carried down with the Bovie cautery  Vivienne clips were applied  Skin flap was reflected  Laterally to the right, and   Held in place with  rubber bands over a rolled Ray-Ingrid  Bur holes were made using the match stick bit on the [employer] [place] drill  These were located Superior posterior flanking the superior sagittal sinus and 1 more lateral on the right    Epidural dissection was performed,   In particular over the superior sagittal sinus,and then the craniotome bit on the [employer] Hamilton drill was used to generate a craniotomy flap  The bone flap was elevated with the Adson dissector without difficulty  There was a small breach in the dura created by the tumor, near the middle of the bone flap, just flanking the superior sagittal sinus  There was minimal bone involvement, and this was drilled out prior to bone flap replacement  Epidural hemostasis was achieved with FloSeal, thrombin soaked Gelfoam, tamponade with cottonoid patties, etc , with the exception over the superior sagittal sinus were Surgicel and dry Gel-Foam was used to tamponade        The dura was opened in a C-shaped fashion, opened based towards the   Sagittal sinus  The mass came into view and was  Extra-axial   Bipolar cautery was used to divide the attachments to the convexity and eventually sinus dura  The dura itself was treated with bipolar cautery  The mass was sampled for frozen pathology and reviewed with tele pathology, was consistent with meningioma  The mass was debulked with the CUSA  Once the mass was debulked, I could then developed the plane between the mass and cerebral cortex, which was held with chondroid patties  Small vessels did bridge the plane but there was no profound obvious brain invasion  These vessels were bipolar coagulated and divided  Was able to come around the mass in its entirety, toward the falx as well as on the underside  Eventually I was able to remove majority of the mass  There was invasion into the superior sagittal sinus which was debulked  A small portion of theanterior and posterior aspects of the mass  That involved the superior sagittal sinus were left in place  The cerebral cortex was lined with Surgicel  There was no active bleeding  The nascent dura was closed with a 4-0 Nurolon suture in an   Running fashion  There was a small breach adjacent to the superior sagittal sinus, and this was closed with an onlay patch of dura repair  Another small piece of dura repair was placed more laterally over a gap in the suture line  The dural closure was sealed with DuraSeal, achieving a water tight closure  The area was copiously irrigated with antibiotic containing irrigation  The bone flap was re affixed with BioMet thin flap titanium plates and screws  Central tack-up was placed  The area was again copiously irrigated with antibiotic containing irrigation  A subgaleal MARY drain was placed and tunneled out through a separate stab incision  The galea was closed with inverted, interrupted 2-0 Vicryl Plus suture   The skin was closed with a running 3-0 Monocryl suture  All instrument counts, sponge counts, and needle counts were correct prior to closure of the skin  The incision was cleansed, and then sealed and dressed with HistoAcryl  The area was blocked with 0 5% Marcaine with epinephrine  The drapes were withdrawn and the area cleansed  The patient was taken out of the Lizella head rodríguez, and there was no bleeding from the pin sites  They were transferred to their ICU bed, awoken from general endotracheal anesthesia, extubated, and taken to the postoperative anesthesia unit in cardio vascularly stable condition        SIGNATURE: Eleonora Barksdale MD, PhD  DATE: 3/15/2018   TIME: 2:43 PM

## 2018-03-15 NOTE — PERIOPERATIVE NURSING NOTE
MD Jeronimo informed that patient states she just experienced " 3 back to back seizures" Seizures unwitnessed by RN at the bedside  Per patient, she knows she is having seizures because the " the right side of her face scrunches up " MD Jeronimo informed that VSS and neuro exam remains the same  No new orders  Patient already received her anti seizure medication today in the Or

## 2018-03-15 NOTE — OCCUPATIONAL THERAPY NOTE
Occupational Therapy         Patient Name: Rosa Acuña  Today's Date: 3/15/2018    OT consult received and chart review completed  Pt  cx'd this pm as off floor in Or  Will cont  To follow       LUZ Schwab, OTR/L

## 2018-03-15 NOTE — INTERIM OP NOTE
IMAGE-GUIDED RIGHT FRONTOPARIETAL CRANIOTOMY FOR TUMOR  Postoperative Note  PATIENT NAME: Cristiano Barrett  : 1960  MRN: [id number]  BE OR ROOM 17    Surgery Date: 3/15/2018    Preop Diagnosis:  Cerebral edema (HCC) [G93 6]  Meningioma ([address] ) [D32 9]  Brain compression ([address] ) [G93 5]    Post-Op Diagnosis Codes:     * Cerebral edema ([address] ) [G93 6]     * Meningioma ([address] ) [D32 9]     * Brain compression ([address] ) [G93 5]    Procedure(s) (LRB):  IMAGE-GUIDED RIGHT FRONTOPARIETAL CRANIOTOMY FOR TUMOR (Right)    Surgeon(s) and Role:     * Kenji Mckeon MD - Primary    Specimens:  ID Type Source Tests Collected by Time Destination   1 : Right fronto-parietal mass Tissue Brain TISSUE EXAM Kenji Mckeon MD 3/15/2018 1024    2 : Right fronto-parietal mass Tissue Brain TISSUE EXAM Kenji Mckeon MD 3/15/2018 1141        Estimated Blood Loss:   150 mL    Anesthesia Type:   General     Findings:    frozen path c/w meningioma   Mass had eroded through convexity dura, and minimally into inner table    Complications:   None    SIGNATURE: Kenji Mckeon MD   DATE: March 15, 2018   TIME: 1:13 PM

## 2018-03-15 NOTE — PERIOPERATIVE NURSING NOTE
MD Jeronimo at bedside assessing patient  Patient c/o right arm pain located between her elbow and shoulder that is alleviated by positioning   Will continue to monitor

## 2018-03-15 NOTE — PHYSICAL THERAPY NOTE
Physical Therapy Cancellation Note        Orders noted and chart reviewed  Pt off unit in OR at this time  Will continue to follow and evaluate as appropriate     Libby Ledbetter, PT,DPT

## 2018-03-15 NOTE — PROGRESS NOTES
Consult- [address] [facility] Saleem 62 y o  female MRN: [id number]  Unit/Bed#: ICU 03 Encounter: [id number]    Reason for Admission / Chief Complaint: Elective Meningioma Resection    History of Present Illness:  Catalina Escobedo is a 62 y o  female with a history of substance abuse, DM 2, cataract, astigmatism who initially presented to [facility] on 03/05 with left upper and lower extremity weakness for 8 months (impairing her ambulation status to the point of using wheelchair) and intermittent right-sided headaches  CT scan of the brain at that time showed a 6cm right frontoparietal mass consistent with meningioma, causing extensive brain compression some adjacent cerebral edema  The large mass also appeared to have considerable external carotid supply  Patient was started on Decadron with outpatient neurology follow-up  Patient was electively schedule for mass resection by Neurosurgery, and a craniotomy with mass resection was performed at [facility] on 03/15   Patient tolerated the procedure well and was transferred to the ICU for perioperative management       Past Medical History:  Past Medical History:   Diagnosis Date    Cancer ([facility] )     Diabetes mellitus ([facility] )     Seizures ([facility] )        Past Surgical History:  Past Surgical History:   Procedure Laterality Date    HYSTERECTOMY         Past Family History:  Family History   Problem Relation Age of Onset    Adopted: Yes       Social History:  History   Smoking Status    Former Smoker    Quit date: 3/5/2000   Smokeless Tobacco    Never Used     History   Alcohol Use No     History   Drug Use No     Marital Status: Single    Medications:  Current Facility-Administered Medications   Medication Dose Route Frequency    [MAR Hold] dexamethasone (DECADRON) injection 4 mg  4 mg Intravenous Q6H [address]    ferrous sulfate tablet 325 mg  325 mg Oral Daily    insulin regular (HumuLIN R,NovoLIN R) 1 Units/mL in sodium chloride 0 9 % 100 mL infusion  0 3-21 Units/hr Intravenous Titrated    [MAR Hold] lacosamide (VIMPAT) tablet 50 mg  50 mg Oral Q12H [address]    lisinopril (ZESTRIL) tablet 20 mg  20 mg Oral Daily    ondansetron (ZOFRAN) injection 4 mg  4 mg Intravenous Q6H PRN    [MAR Hold] sertraline (ZOLOFT) tablet 50 mg  50 mg Oral Daily    sodium chloride 0 9 % infusion  75 mL/hr Intravenous Continuous    sodium chloride 0 9 % infusion  50 mL/hr Intravenous Continuous    sodium chloride 0 9 % with KCl 20 mEq/L infusion (premix)  75 mL/hr Intravenous Continuous     Home medications:  Prior to Admission medications    Medication Sig Start Date End Date Taking?  Authorizing Provider   cholecalciferol (VITAMIN D3) 1,000 units tablet Take 1,000 Units by mouth daily   Yes Historical Provider, MD   dexamethasone (DECADRON) 2 mg tablet Take 1 tablet (2 mg total) by mouth 3 (three) times a day 3/6/18  Yes Ruth Moe MD   ferrous sulfate 325 (65 Fe) mg tablet Take 325 mg by mouth daily   Yes Historical Provider, MD   Insulin Aspart (NOVOLOG SC) Inject 10 Units under the skin   Yes Historical Provider, MD   JANUVIA 50 MG tablet Take 50 mg by mouth daily 12/14/17  Yes Historical Provider, MD   lisinopril (ZESTRIL) 20 mg tablet Take 20 mg by mouth daily   Yes Historical Provider, MD   LYSINE PO Take 1 tablet by mouth daily   Yes Historical Provider, MD   Probiotic Product (PROBIOTIC DAILY PO) Take 1 capsule by mouth daily   Yes Historical Provider, MD   sertraline (ZOLOFT) 100 mg tablet Take 50 mg by mouth daily at bedtime     Yes Historical Provider, MD   sertraline (ZOLOFT) 100 mg tablet Take 100 mg by mouth daily at bedtime   Yes Historical Provider, MD   sertraline (ZOLOFT) 50 mg tablet Take 50 mg by mouth daily   Yes Historical Provider, MD   VIMPAT 50 MG Take 50 mg by mouth 2 (two) times a day Should increase to 100mg after a week 3/8/18  Yes Historical Provider, MD   Glucosamine-Chondroitin (MOVE FREE PO) Take 1 tablet by mouth daily    Historical Provider, MD   metFORMIN (GLUCOPHAGE) 500 mg tablet Take 1 tablet (500 mg total) by mouth 2 (two) times a day with meals 3/6/18   Vitor Hendrix MD   NOVOLOG MIX 70/30 FLEXPEN (70-30) 100 UNIT/ML SUPN Inject 14 Units under the skin 3 (three) times a day 18   Historical Provider, MD     Allergies: Allergies   Allergen Reactions    Dilantin [Phenytoin]     Lidocaine     Statins        ROS:   Review of Systems   Constitutional: Negative for appetite change, chills, diaphoresis, fever and unexpected weight change  HENT: Negative for congestion, ear discharge, ear pain, hearing loss, rhinorrhea, sinus pain, sore throat and trouble swallowing  Eyes: Negative for pain, discharge, redness and visual disturbance  Respiratory: Negative for cough, chest tightness, shortness of breath and wheezing  Cardiovascular: Negative for chest pain, palpitations and leg swelling  Gastrointestinal: Positive for constipation  Negative for abdominal distention, abdominal pain, diarrhea, nausea and vomiting  Genitourinary: Negative for difficulty urinating, dysuria, hematuria and menstrual problem  Musculoskeletal: Negative for myalgias, neck pain and neck stiffness  Skin: Negative for color change, pallor, rash and wound  Neurological: Positive for weakness  Negative for dizziness, light-headedness and headaches          Left sided weakness in LE       Vitals:  Vitals:    03/15/18 1430 03/15/18 1445 03/15/18 1500 03/15/18 1515   BP: 134/70 129/62 132/63 137/64   BP Location:       Pulse: 56 66 70 68   Resp: 21 20 22 20   Temp:  98 °F (36 7 °C)     TempSrc:       SpO2: 99% 99% 98% 98%   Weight:       Height:         Temp:  [97 3 °F (36 3 °C)-98 2 °F (36 8 °C)] 98 °F (36 7 °C)  HR:  [52-72] 68  Resp:  [15-22] 20  BP: ()/(58-83) 137/64  Arterial Line BP: ()/(54-76) 152/58    Temperature:   Temp (24hrs), Av 8 °F (36 6 °C), Min:97 3 °F (36 3 °C), Max:98 2 °F (36 8 °C)    Current Temperature: 98 °F (36 7 °C)    Weights: IBW: 59 3 kg  Body mass index is 35 83 kg/m²  Non-Invasive/Invasive Ventilation Settings:  Respiratory    Lab Data (Last 4 hours)    None         O2/Vent Data (Last 4 hours)    None              Physical Exam   Constitutional: She is oriented to person, place, and time  No distress  HENT:   Head: Normocephalic  Right Ear: External ear normal    Left Ear: External ear normal    Nose: Nose normal    Mouth/Throat: Oropharynx is clear and moist  No oropharyngeal exudate  Craniotomy incision dry intact   Eyes: Conjunctivae and EOM are normal  Pupils are equal, round, and reactive to light  Right eye exhibits no discharge  Left eye exhibits no discharge  No scleral icterus  Neck: Normal range of motion  Neck supple  No JVD present  No thyromegaly present  Cardiovascular: Normal rate, regular rhythm and normal heart sounds  Exam reveals no gallop and no friction rub  No murmur heard  Pulmonary/Chest: Effort normal and breath sounds normal  No respiratory distress  She has no wheezes  She has no rales  Abdominal: Soft  Bowel sounds are normal  She exhibits no distension  There is no tenderness  There is no rebound and no guarding  Musculoskeletal: Normal range of motion  She exhibits no tenderness  No pitting edema noted   Lymphadenopathy:     She has no cervical adenopathy  Neurological: She is alert and oriented to person, place, and time  No cranial nerve deficit  Coordination abnormal    Skin: Skin is warm and dry  She is not diaphoretic  Psychiatric: She has a normal mood and affect   Thought content normal    GCS 15  PERRL 2mm  CN 2-12 intact  Strength:  : 5/5 bilaterally  Ue: 5/5  RLE: 5/5  LLE: 2/5  Sensation grossly intact in all extremities    Labs:    Results from last 7 days  Lab Units 03/14/18  0425 03/13/18  1856   WBC Thousand/uL 13 35*  --    HEMOGLOBIN g/dL 11 8  --    HEMATOCRIT % 34 1*  --    PLATELETS Thousands/uL 200 223      Imaging:   CT head and neck 03/06  IMPRESSION:     No acute intracranial disease  Could patient's headache be related to 5 cm right frontal vertex meningioma? 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, at interface with meningioma, this was suspected on recent MR      Marked degenerative change of the cervical spine with at least moderate mid cervical stenosis  Segmental OPLL C6-C7  EKG:  ECG 12 lead   Order: [id number]   Status:  Final result     Ref Range & Units 3/14/18 0731   Ventricular Rate BPM 49    Atrial Rate BPM 49    CO Interval ms 150    QRSD Interval ms 92    QT Interval ms 467    QTC Interval ms 422    P [place] degrees 67    QRS Axis degrees 16    T Wave Axis degrees 74    Narrative     Marked sinus bradycardia  Abnormal ECG  When compared with ECG of 05-MAR-2018 12:36,  T wave amplitude has increased in Inferior leads  Confirmed by [facility] James KAUR ([id number]) on 3/14/2018 7:54:39 AM                Micro:  MRSA Cx negative as of 03/05/2018  ______________________________________________________________________    Assessment:    Nasrin Duncan 62 y o  Female s/p craniotomy for right frontoparietal lobe mass resection for meningioma  Principal Problem:    Frontal mass of brain  Active Problems:    Cerebral edema (HCC)    Meningioma (HCC)    Brain compression (HCC)  Resolved Problems:    * No resolved hospital problems   *      Plan:    Neuro:   Right frontoparietal meningioma s/p craniotomy and mass resection on 3/15, POD 0  -Neuro checks q2hrs  -Decadron 4mg VI q6h  -sedation/analgesia:  None  -continue Vimpat 50 mg p o  B i d , seizure precautions  -Management per primary team, neurosurgery    Pain control:  -Scheduled tylenol 650 q 4 hours p r n  for mild pain, Dilaudid 0 5 mg IV Q 1 hour for breakthrough pain, oxycodone 10 mg p o  PRN for severe pain, oxycodone 5 mg p o  q 4 hours p r n  for moderate pain  -Narcan PRN for opioid reversal in case of respiratory depression    Depression:  -adequate controlled:  -continue PTA med Zoloft 100 mg q day    CV:   -HTN: Adequately controlled  -Continue PTA lisinopril (ZESTRIL) 20 mg tablet daily  -p r n  meds:  Labetalol 10 mg IV q 4 hours p r n  for systolic blood pressure > 160   Arterial Line BP: 172/66  Arterial Line MAP (mmHg): 98 mmHg  HR:  [52-76] 76  BP: ()/(58-83) 159/77  Arterial Line BP: ()/(54-74) 172/66    Pulm:   -Saturating 98% on 3L oxygen via nasal cannula    GI:   History of IBS:  -continue p o  probiotics supplied by patient, daily  -tolerating p o  Well    Constipation:  -continue Colace 100 mg p o  b i d     :   -no acute concerns    F/E/N:  -IV fluids, normal saline 75 mL/hr  -no acute electrolyte abnormality  -regular diet, p o   -continue vitamin D3 1000 units p o  daily    ID:   -afebrile, no leukocytosis  -no acute concerns    Heme:   History of ERNESTO:  -H&H stable most recent: 11 8 & 34 1  -continue ferrous sulfate 325 mg q day  -no pharmacological DVT prophylaxis at this time due to recent history of craniotomy  -no other acute concerns    Endo:    Insulin dependent diabetes type 2, uncomplicated  -last hemoglobin A1c on 3/6/2018 was 7 6%  -currently on insulin drip, Accu-Cheks q 2 hours, due to hyperglycemia perioperatively, likely secondary (in part) to steroids  -continue current regimen at this time (continue to hold PTA Januvia 50 mg p o  daily, metformin 500 mg b i d  with meals)    Msk/Skin:   -routine repositioning and skin care per unit protocol  -PT/OT consult, out of bed with assistance as tolerated    Disposition:   -require ICU level of care at this time  ______________________________________________________________________    VTE Pharmacologic Prophylaxis :None  VTE Mechanical Prophylaxis: sequential compression device    Invasive lines and devices: Invasive Devices     Peripheral Intravenous Line            Peripheral IV 03/13/18 Right Hand 2 days    Peripheral IV 03/15/18 Right Antecubital less than 1 day          Arterial Line            Arterial Line 03/15/18 Right Radial less than 1 day          Drain            Urethral Catheter Temperature probe 2 days    Closed/Suction Drain Right Head Bulb less than 1 day                Code Status: Prior  POA:    POLST:      Given critical illness, patient length of stay will require greater than two midnights  Portions of the record may have been created with voice recognition software  Occasional wrong word or "sound a like" substitutions may have occurred due to the inherent limitations of voice recognition software  Read the chart carefully and recognize, using context, where substitutions have occurred        Aquilino Parks MD  [facility] PGY-1

## 2018-03-16 ENCOUNTER — APPOINTMENT (INPATIENT)
Dept: RADIOLOGY | Facility: HOSPITAL | Age: 58
DRG: 025 | End: 2018-03-16
Payer: MEDICARE

## 2018-03-16 LAB
ANION GAP SERPL CALCULATED.3IONS-SCNC: 10 MMOL/L (ref 4–13)
APTT PPP: 23 SECONDS (ref 23–35)
BUN SERPL-MCNC: 26 MG/DL (ref 5–25)
CALCIUM SERPL-MCNC: 7.9 MG/DL (ref 8.3–10.1)
CHLORIDE SERPL-SCNC: 106 MMOL/L (ref 100–108)
CO2 SERPL-SCNC: 23 MMOL/L (ref 21–32)
CREAT SERPL-MCNC: 0.98 MG/DL (ref 0.6–1.3)
ERYTHROCYTE [DISTWIDTH] IN BLOOD BY AUTOMATED COUNT: 13.3 % (ref 11.6–15.1)
GFR SERPL CREATININE-BSD FRML MDRD: 64 ML/MIN/1.73SQ M
GLUCOSE SERPL-MCNC: 163 MG/DL (ref 65–140)
GLUCOSE SERPL-MCNC: 174 MG/DL (ref 65–140)
GLUCOSE SERPL-MCNC: 175 MG/DL (ref 65–140)
GLUCOSE SERPL-MCNC: 183 MG/DL (ref 65–140)
GLUCOSE SERPL-MCNC: 186 MG/DL (ref 65–140)
GLUCOSE SERPL-MCNC: 197 MG/DL (ref 65–140)
GLUCOSE SERPL-MCNC: 259 MG/DL (ref 65–140)
GLUCOSE SERPL-MCNC: 284 MG/DL (ref 65–140)
GLUCOSE SERPL-MCNC: 287 MG/DL (ref 65–140)
GLUCOSE SERPL-MCNC: 299 MG/DL (ref 65–140)
GLUCOSE SERPL-MCNC: 333 MG/DL (ref 65–140)
GLUCOSE SERPL-MCNC: 345 MG/DL (ref 65–140)
HCT VFR BLD AUTO: 32.6 % (ref 34.8–46.1)
HGB BLD-MCNC: 11.1 G/DL (ref 11.5–15.4)
INR PPP: 1.06 (ref 0.86–1.16)
MCH RBC QN AUTO: 29.5 PG (ref 26.8–34.3)
MCHC RBC AUTO-ENTMCNC: 34 G/DL (ref 31.4–37.4)
MCV RBC AUTO: 87 FL (ref 82–98)
PLATELET # BLD AUTO: 183 THOUSANDS/UL (ref 149–390)
PMV BLD AUTO: 10.2 FL (ref 8.9–12.7)
POTASSIUM SERPL-SCNC: 3.6 MMOL/L (ref 3.5–5.3)
PROTHROMBIN TIME: 13.8 SECONDS (ref 12.1–14.4)
RBC # BLD AUTO: 3.76 MILLION/UL (ref 3.81–5.12)
SODIUM SERPL-SCNC: 139 MMOL/L (ref 136–145)
WBC # BLD AUTO: 16.06 THOUSAND/UL (ref 4.31–10.16)

## 2018-03-16 PROCEDURE — 80048 BASIC METABOLIC PNL TOTAL CA: CPT | Performed by: NEUROLOGICAL SURGERY

## 2018-03-16 PROCEDURE — G8987 SELF CARE CURRENT STATUS: HCPCS

## 2018-03-16 PROCEDURE — 99233 SBSQ HOSP IP/OBS HIGH 50: CPT | Performed by: ANESTHESIOLOGY

## 2018-03-16 PROCEDURE — G8988 SELF CARE GOAL STATUS: HCPCS

## 2018-03-16 PROCEDURE — 85730 THROMBOPLASTIN TIME PARTIAL: CPT | Performed by: NEUROLOGICAL SURGERY

## 2018-03-16 PROCEDURE — 85610 PROTHROMBIN TIME: CPT | Performed by: NEUROLOGICAL SURGERY

## 2018-03-16 PROCEDURE — 82948 REAGENT STRIP/BLOOD GLUCOSE: CPT

## 2018-03-16 PROCEDURE — 70450 CT HEAD/BRAIN W/O DYE: CPT

## 2018-03-16 PROCEDURE — 97163 PT EVAL HIGH COMPLEX 45 MIN: CPT

## 2018-03-16 PROCEDURE — G8978 MOBILITY CURRENT STATUS: HCPCS

## 2018-03-16 PROCEDURE — 85027 COMPLETE CBC AUTOMATED: CPT | Performed by: NEUROLOGICAL SURGERY

## 2018-03-16 PROCEDURE — 97167 OT EVAL HIGH COMPLEX 60 MIN: CPT

## 2018-03-16 PROCEDURE — G8979 MOBILITY GOAL STATUS: HCPCS

## 2018-03-16 PROCEDURE — 99232 SBSQ HOSP IP/OBS MODERATE 35: CPT | Performed by: INTERNAL MEDICINE

## 2018-03-16 RX ORDER — HYDRALAZINE HYDROCHLORIDE 20 MG/ML
5 INJECTION INTRAMUSCULAR; INTRAVENOUS ONCE
Status: COMPLETED | OUTPATIENT
Start: 2018-03-16 | End: 2018-03-16

## 2018-03-16 RX ORDER — OXYCODONE HYDROCHLORIDE 5 MG/1
2.5 TABLET ORAL EVERY 4 HOURS PRN
Status: DISCONTINUED | OUTPATIENT
Start: 2018-03-16 | End: 2018-03-19 | Stop reason: HOSPADM

## 2018-03-16 RX ORDER — OXYCODONE HYDROCHLORIDE 5 MG/1
5 TABLET ORAL EVERY 4 HOURS PRN
Status: DISCONTINUED | OUTPATIENT
Start: 2018-03-16 | End: 2018-03-19 | Stop reason: HOSPADM

## 2018-03-16 RX ORDER — INSULIN GLARGINE 100 [IU]/ML
40 INJECTION, SOLUTION SUBCUTANEOUS ONCE
Status: COMPLETED | OUTPATIENT
Start: 2018-03-16 | End: 2018-03-16

## 2018-03-16 RX ORDER — INSULIN GLARGINE 100 [IU]/ML
40 INJECTION, SOLUTION SUBCUTANEOUS EVERY MORNING
Status: DISCONTINUED | OUTPATIENT
Start: 2018-03-17 | End: 2018-03-18

## 2018-03-16 RX ADMIN — INSULIN LISPRO 8 UNITS: 100 INJECTION, SOLUTION INTRAVENOUS; SUBCUTANEOUS at 17:47

## 2018-03-16 RX ADMIN — FAMOTIDINE 20 MG: 20 TABLET, FILM COATED ORAL at 21:26

## 2018-03-16 RX ADMIN — FAMOTIDINE 20 MG: 20 TABLET, FILM COATED ORAL at 10:00

## 2018-03-16 RX ADMIN — LABETALOL 20 MG/4 ML (5 MG/ML) INTRAVENOUS SYRINGE 10 MG: at 02:35

## 2018-03-16 RX ADMIN — ACETAMINOPHEN 975 MG: 325 TABLET, FILM COATED ORAL at 13:34

## 2018-03-16 RX ADMIN — INSULIN LISPRO 6 UNITS: 100 INJECTION, SOLUTION INTRAVENOUS; SUBCUTANEOUS at 21:27

## 2018-03-16 RX ADMIN — SERTRALINE HYDROCHLORIDE 50 MG: 50 TABLET ORAL at 10:00

## 2018-03-16 RX ADMIN — INSULIN GLARGINE 40 UNITS: 100 INJECTION, SOLUTION SUBCUTANEOUS at 10:52

## 2018-03-16 RX ADMIN — ACETAMINOPHEN 975 MG: 325 TABLET, FILM COATED ORAL at 05:31

## 2018-03-16 RX ADMIN — LISINOPRIL 20 MG: 20 TABLET ORAL at 10:00

## 2018-03-16 RX ADMIN — HYDRALAZINE HYDROCHLORIDE 5 MG: 20 INJECTION INTRAMUSCULAR; INTRAVENOUS at 05:20

## 2018-03-16 RX ADMIN — DEXAMETHASONE SODIUM PHOSPHATE 4 MG: 4 INJECTION, SOLUTION INTRAMUSCULAR; INTRAVENOUS at 17:50

## 2018-03-16 RX ADMIN — LACOSAMIDE 50 MG: 50 TABLET, FILM COATED ORAL at 21:26

## 2018-03-16 RX ADMIN — DEXAMETHASONE SODIUM PHOSPHATE 4 MG: 4 INJECTION, SOLUTION INTRAMUSCULAR; INTRAVENOUS at 12:15

## 2018-03-16 RX ADMIN — DEXAMETHASONE SODIUM PHOSPHATE 4 MG: 4 INJECTION, SOLUTION INTRAMUSCULAR; INTRAVENOUS at 00:12

## 2018-03-16 RX ADMIN — CEFAZOLIN SODIUM 1000 MG: 1 SOLUTION INTRAVENOUS at 06:22

## 2018-03-16 RX ADMIN — INSULIN LISPRO 14 UNITS: 100 INJECTION, SOLUTION INTRAVENOUS; SUBCUTANEOUS at 17:46

## 2018-03-16 RX ADMIN — DEXAMETHASONE SODIUM PHOSPHATE 4 MG: 4 INJECTION, SOLUTION INTRAMUSCULAR; INTRAVENOUS at 23:57

## 2018-03-16 RX ADMIN — LACOSAMIDE 50 MG: 50 TABLET, FILM COATED ORAL at 10:00

## 2018-03-16 RX ADMIN — INSULIN LISPRO 5 UNITS: 100 INJECTION, SOLUTION INTRAVENOUS; SUBCUTANEOUS at 12:15

## 2018-03-16 RX ADMIN — ACETAMINOPHEN 650 MG: 325 TABLET, FILM COATED ORAL at 19:07

## 2018-03-16 RX ADMIN — FERROUS SULFATE TAB 325 MG (65 MG ELEMENTAL FE) 325 MG: 325 (65 FE) TAB at 12:16

## 2018-03-16 RX ADMIN — ACETAMINOPHEN 975 MG: 325 TABLET, FILM COATED ORAL at 23:56

## 2018-03-16 RX ADMIN — HYDRALAZINE HYDROCHLORIDE 5 MG: 20 INJECTION INTRAMUSCULAR; INTRAVENOUS at 05:46

## 2018-03-16 RX ADMIN — ACETAMINOPHEN 650 MG: 325 TABLET, FILM COATED ORAL at 03:01

## 2018-03-16 RX ADMIN — DEXAMETHASONE SODIUM PHOSPHATE 4 MG: 4 INJECTION, SOLUTION INTRAMUSCULAR; INTRAVENOUS at 05:27

## 2018-03-16 NOTE — PLAN OF CARE
Problem: OCCUPATIONAL THERAPY ADULT  Goal: Performs self-care activities at highest level of function for planned discharge setting  See evaluation for individualized goals  Treatment Interventions: ADL retraining, Functional transfer training, Endurance training, Patient/family training, Equipment evaluation/education, Compensatory technique education, Activityengagement          See flowsheet documentation for full assessment, interventions and recommendations  Limitation: Decreased ADL status, Decreased endurance, Decreased Safe judgement during ADL, Decreased self-care trans, Decreased high-level ADLs  Prognosis: Good  Assessment: Pt is a 61 yo F [facility] [place] w/ frontal mass of brain  Pt underwent resection of R frontal meningioma on 3/13/18  Pt's PMH's significant for HTN, Ca, Diabetes, and seizures  Pt resides in two story apt w/  w/ [address]  Pta pt reports recently requiring assistance for ADLs/IADLs 2* L sided weakness  Currently pt requires Min A for functional mobility, LB dressing/bathing, and toileting  Pt given MOCA and scored 28/30 indicating normal cognition  Pt is limited 2* decreased activity tolerance, decreased insight into deficits, medical status, and decreased ADL status  From an OT standpoint, recommend home w/ family support pending medical stability  OT will continue to see to address the below goals        OT Discharge Recommendation: Home with family support

## 2018-03-16 NOTE — POST OP PROGRESS NOTES
Progress Note - Neurosurgery   Santiago Busch 62 y o  female MRN: [id number]  Unit/Bed#: ICU 03 Encounter: [id number]    Assessment:  1  POD1 s/p right frontoparietal craniotomy for resection of mass, preliminary pathology consistent with meningioma  2  POD2 s/p pre-operative embolization of brain mass  3  History of DM2 with hyperglycemia  4  Seizure disorder  5  Leukocytosis-likely reactive      Plan:  · Neuro exam: GCS15, AAOx3, full strength in all extremities  CN2-12 intact  · Imaging reviewed personally and with attending:  · Post-operative CT head 3/16:  Interval meningioma resection with small amount of blood products in the operative bed likely Surgicel  Subgaleal MARY drain in place  · Postoperative management  · Anticipate and MARY drain discontinuation later today  · Headaches well controlled with Tylenol and oxycodone  · Await physical therapy occupational therapy input  No contraindication to being out of bed with from a neurosurgical standpoint  · SCDs mobilization for DVT prophylaxis  Recommend holding subcutaneous heparin until tomorrow a m  Karrie Nissen · Continue Decadron 4 q 6h, anticipate 48 hour wean if patient tolerates well  · Seizure disorder  · Continue Vimpat, home antiepileptic- patient had 1 episode of facial twitching overnight, this is a typical occurrence for her  No generalized tonic-clonic seizure or postictal state  · Diabetes mellitus 2 with hyperglycemia  · Previously on insulin drip, medical critical care consulted while admitted to ICU  · Recommended transitioning to home insulin regimen with sliding scale coverage  · Continue to hold Januvia and metformin currently  · Appreciate Endocrinology recommendations upon transfer out of ICU  · Weaned Decadron as able as this is likely contributing to hyperglycemia  · POCT 160-345 over 24H  · Neurosurgery to continue to follow his primary team  Clear to transfer to medical surgical unit from a neurosurgery standpoint      Nurse provider rounds completed with Jesus Alberto Hay RN    Subjective/Objective   Chief Complaint: " I feel good! "    Subjective:  Patient reports having 1 episode of right-sided facial twitching consistent with her focal motor seizures last evening  Denies postictal state or generalized tonic-clonic seizure  States she has minimal headache well controlled with oxycodone and Tylenol  Patient denies vision changes, weakness, numbness  States her left-sided upper and lower extremity weakness is significantly improved postop  She denies any dysphagia  Voiding without difficulty, passing flatus  No bowel movement yet  Denies chest pain or shortness of breath  Objective:  Lying in bed, no obvious distress    I/O       03/14 0701 - 03/15 0700 03/15 0701 - 03/16 0700 03/16 0701 - 03/17 0700    P  O  2220 200     I V  (mL/kg) 1873 7 (18 6) 4330 8 (42 9)     IV Piggyback  300     Total Intake(mL/kg) 4093 7 (40 5) 4830 8 (47 8)     Urine (mL/kg/hr) 4050 (1 7) 7400 (3 1)     Drains  230 (0 1)     Stool 0 (0)      Blood  150 (0 1)     Total Output 4050 7780      Net +43 7 -2949  2             Unmeasured Stool Occurrence 1 x            Invasive Devices     Peripheral Intravenous Line            Peripheral IV 03/13/18 Right Hand 2 days    Peripheral IV 03/15/18 Right Antecubital less than 1 day          Arterial Line            Arterial Line 03/15/18 Right Radial less than 1 day          Drain            Urethral Catheter Temperature probe 2 days    Closed/Suction Drain Right Head Bulb less than 1 day                Physical Exam:  Vitals: Blood pressure 162/78, pulse 62, temperature 98 2 °F (36 8 °C), temperature source Probe, resp  rate 20, height 5' 6" (1 676 m), weight 101 kg (222 lb), SpO2 96 %  ,Body mass index is 35 83 kg/m²  General appearance: alert, appears stated age, cooperative and no distress  Head:  Right-sided craniotomy incision well approximated  MARY drain in place with serosanguineous output    Eyes: EOMI, PERRL, acuity intact in all quadrants  Lungs: non labored breathing  Heart: regular heart rate  Neurologic:   Mental status: Alert, oriented x3, thought content appropriate  Cranial nerves: grossly intact (Cranial nerves II-XII)  Sensory: normal to light touch in bilateral upper and lower extremities  No sensory neglect  Motor: moving all extremities without focal weakness  5/5 bilateral upper and lower extremities  Reflexes: 2+ and symmetric   Coordination: finger to nose normal bilaterally, no drift bilaterally      Lab Results:    Results from last 7 days  Lab Units 03/16/18  0524 03/15/18  1108 03/15/18  0946 03/14/18  0425 03/13/18  1856   WBC Thousand/uL 16 06*  --   --  13 35*  --    HEMOGLOBIN g/dL 11 1*  --   --  11 8  --    I STAT HEMOGLOBIN g/dl  --  10 5* 10 5*  --   --    HEMATOCRIT % 32 6*  --   --  34 1*  --    PLATELETS Thousands/uL 183  --   --  200 223       Results from last 7 days  Lab Units 03/16/18  0019 03/15/18  1108 03/15/18  0946   SODIUM mmol/L 139  --   --    POTASSIUM mmol/L 3 6  --   --    CHLORIDE mmol/L 106  --   --    CO2 mmol/L 23  --   --    BUN mg/dL 26*  --   --    CREATININE mg/dL 0 98  --   --    CALCIUM mg/dL 7 9*  --   --    GLUCOSE RANDOM mg/dL 183*  --   --    GLUCOSE, ISTAT mg/dl  --  211* 204*               Results from last 7 days  Lab Units 03/16/18  0524   INR  1 06   PTT seconds 23     Imaging Studies: I have personally reviewed pertinent reports  and I have personally reviewed pertinent films in PACS    EKG, Pathology, and Other Studies: I have personally reviewed pertinent reports        VTE Pharmacologic Prophylaxis: Reason for no pharmacologic prophylaxis POD1 s/p craniotomy    VTE Mechanical Prophylaxis: sequential compression device and foot pump applied

## 2018-03-16 NOTE — WOUND OSTOMY CARE
Progress Note - Wound   Raquel Ch 62 y o  female MRN: [id number]  Unit/Bed#: [facility] 918-01 Encounter: [id number]      Assessment:   Patient seen this morning per nursing request for quick sacro-buttocks skin check  On assessment noted R sacrum with a partial thickness wound likely abrasion with partial skin loss secondary to fall  Per patient she fell at home and landed on a garbage can which then broke  Area has mild blanching erythema measures 1cm x 0 7cm x <0 1cm, 100% pink  Wound will likely resolve without any complication, patient is continent and bowel and bladder with fair mobility  Plan: We will place prophylactic skin care and offloading orders  Vitals: Blood pressure 117/77, pulse 69, temperature 98 2 °F (36 8 °C), temperature source Tympanic, resp  rate 18, height 5' 6" (1 676 m), weight 101 kg (222 lb 0 1 oz), SpO2 97 %  ,Body mass index is 35 83 kg/m²  Wound 03/15/18 Abrasion(s) Sacrum Lower (Active)   Wound Description Clean;Dry; Intact 3/16/2018 12:00 PM   Lian-wound Assessment Clean;Dry; Intact 3/16/2018 12:00 PM   Drainage Amount None 3/16/2018 12:00 PM   Dressing Open to air;Protective barrier 3/16/2018 12:00 PM   Patient Tolerance Tolerated well 3/16/2018 12:00 AM   Dressing Status Open to air 3/16/2018 12:00 PM       Incision 03/13/18 Groin Right (Active)   Incision Description LEIGH 3/16/2018 12:00 PM   Lian-wound Assessment Clean;Dry; Intact 3/16/2018 12:00 PM   Closure MYNX 3/16/2018 12:00 PM   Drainage Amount None 3/16/2018 12:00 PM   Dressing Dry dressing;Gauze 3/16/2018 12:00 PM   Dressing Status Clean;Dry; Intact 3/16/2018 12:00 PM       Incision 03/15/18 Head Right (Active)   Incision Description Clean;Dry; Intact 3/16/2018 12:00 PM   Lian-wound Assessment Clean;Dry; Intact 3/16/2018 12:00 PM   Closure Hystocryl;Sutures 3/16/2018 12:00 PM   Drainage Amount None 3/16/2018 12:00 PM   Dressing Open to air 3/16/2018 12:00 PM   Dressing Status Other (Comment) 3/16/2018  4:00 AM Incision 03/15/18 Head (Active)   Incision Description Clean;Dry; Intact 3/16/2018 12:00 PM   Lian-wound Assessment Clean;Dry; Intact 3/16/2018 12:00 PM   Closure Open to air 3/16/2018 12:00 PM   Drainage Amount None 3/16/2018 12:00 PM   Dressing Open to air 3/16/2018 12:00 PM   Dressing Status Other (Comment) 3/16/2018  4:00 AM       Please call wound care to ext [phone] with questions or concerns      Minor José Miguel RN, BSN, [facility]

## 2018-03-16 NOTE — PLAN OF CARE
Problem: Prexisting or High Potential for Compromised Skin Integrity  Goal: Skin integrity is maintained or improved  INTERVENTIONS:  - Identify patients at risk for skin breakdown  - Assess and monitor skin integrity  - Assess and monitor nutrition and hydration status  - Monitor labs (i e  albumin)  - Assess for incontinence   - Turn and reposition patient  - Assist with mobility/ambulation  - Relieve pressure over bony prominences  - Avoid friction and shearing  - Provide appropriate hygiene as needed including keeping skin clean and dry  - Evaluate need for skin moisturizer/barrier cream  - Collaborate with interdisciplinary team (i e  Nutrition, Rehabilitation, etc )   - Patient/family teaching   Outcome: Progressing      Problem: Potential for Falls  Goal: Patient will remain free of falls  INTERVENTIONS:  - Assess patient frequently for physical needs  -  Identify cognitive and physical deficits and behaviors that affect risk of falls    -  [place] fall precautions as indicated by assessment   - Educate patient/family on patient safety including physical limitations  - Instruct patient to call for assistance with activity based on assessment  - Modify environment to reduce risk of injury  - Consider OT/PT consult to assist with strengthening/mobility    Outcome: Progressing      Problem: PAIN - ADULT  Goal: Verbalizes/displays adequate comfort level or baseline comfort level  Interventions:  - Encourage patient to monitor pain and request assistance  - Assess pain using appropriate pain scale  - Administer analgesics based on type and severity of pain and evaluate response  - Implement non-pharmacological measures as appropriate and evaluate response  - Consider cultural and social influences on pain and pain management  - Notify physician/advanced practitioner if interventions unsuccessful or patient reports new pain   Outcome: Progressing      Problem: INFECTION - ADULT  Goal: Absence or prevention of progression during hospitalization  INTERVENTIONS:  - Assess and monitor for signs and symptoms of infection  - Monitor lab/diagnostic results  - Monitor all insertion sites, i e  indwelling lines, tubes, and drains  - Monitor endotracheal (as able) and nasal secretions for changes in amount and color  - [place] appropriate cooling/warming therapies per order  - Administer medications as ordered  - Instruct and encourage patient and family to use good hand hygiene technique  - Identify and instruct in appropriate isolation precautions for identified infection/condition   Outcome: Progressing    Goal: Absence of fever/infection during neutropenic period  INTERVENTIONS:  - Monitor WBC  - Implement neutropenic guidelines   Outcome: Progressing      Problem: SAFETY ADULT  Goal: Maintain or return to baseline ADL function  INTERVENTIONS:  -  Assess patient's ability to carry out ADLs; assess patient's baseline for ADL function and identify physical deficits which impact ability to perform ADLs (bathing, care of mouth/teeth, toileting, grooming, dressing, etc )  - Assess/evaluate cause of self-care deficits   - Assess range of motion  - Assess patient's mobility; develop plan if impaired  - Assess patient's need for assistive devices and provide as appropriate  - Encourage maximum independence but intervene and supervise when necessary  ¯ Involve family in performance of ADLs  ¯ Assess for home care needs following discharge   ¯ Request OT consult to assist with ADL evaluation and planning for discharge  ¯ Provide patient education as appropriate   Outcome: Progressing    Goal: Maintain or return mobility status to optimal level  INTERVENTIONS:  - Assess patient's baseline mobility status (ambulation, transfers, stairs, etc )    - Identify cognitive and physical deficits and behaviors that affect mobility  - Identify mobility aids required to assist with transfers and/or ambulation (gait belt, sit-to-stand, lift, walker, cane, etc )  - [place] fall precautions as indicated by assessment  - Record patient progress and toleration of activity level on Mobility SBAR; progress patient to next Phase/Stage  - Instruct patient to call for assistance with activity based on assessment  - Request Rehabilitation consult to assist with strengthening/weightbearing, etc    Outcome: Progressing    Goal: Patient will remain free of falls  INTERVENTIONS:  - Assess patient frequently for physical needs  -  Identify cognitive and physical deficits and behaviors that affect risk of falls  -  [place] fall precautions as indicated by assessment   - Educate patient/family on patient safety including physical limitations  - Instruct patient to call for assistance with activity based on assessment  - Modify environment to reduce risk of injury  - Consider OT/PT consult to assist with strengthening/mobility    Outcome: Progressing      Problem: DISCHARGE PLANNING  Goal: Discharge to home or other facility with appropriate resources  INTERVENTIONS:  - Identify barriers to discharge w/patient and caregiver  - Arrange for needed discharge resources and transportation as appropriate  - Identify discharge learning needs (meds, wound care, etc )  - Arrange for interpretive services to assist at discharge as needed  - Refer to Case Management Department for coordinating discharge planning if the patient needs post-hospital services based on physician/advanced practitioner order or complex needs related to functional status, cognitive ability, or social support system   Outcome: Progressing      Problem: Knowledge Deficit  Goal: Patient/family/caregiver demonstrates understanding of disease process, treatment plan, medications, and discharge instructions  Complete learning assessment and assess knowledge base    Interventions:  - Provide teaching at level of understanding  - Provide teaching via preferred learning methods   Outcome: Progressing      Problem: NEUROSENSORY - ADULT  Goal: Achieves stable or improved neurological status  INTERVENTIONS  - Monitor and report changes in neurological status  - Initiate measures to prevent increased intracranial pressure  - Maintain blood pressure and fluid volume within ordered parameters to optimize cerebral perfusion  - Monitor temperature, glucose, and sodium or any other associated labs   Initiate appropriate interventions as ordered  - Monitor for seizure activity   - Administer anti-seizure medications as ordered   Outcome: Progressing    Goal: Absence of seizures  INTERVENTIONS  - Monitor for seizure activity  - Administer anti-seizure medications as ordered  - Monitor neurological status   Outcome: Progressing    Goal: Remains free of injury related to seizures activity  INTERVENTIONS  - Maintain airway, patient safety  and administer oxygen as ordered  - Monitor patient for seizure activity, document and report duration and description of seizure to physician/advanced practitioner  - If seizure occurs,  ensure patient safety during seizure  - Reorient patient post seizure  - Seizure pads on all 4 side rails  - Instruct patient/family to notify RN of any seizure activity including if an aura is experienced  - Instruct patient/family to call for assistance with activity based on nursing assessment  - Administer anti-seizure medications as ordered  - Monitor fetal well being   Outcome: Progressing    Goal: Achieves maximal functionality and self care  INTERVENTIONS  - Monitor swallowing and airway patency with patient fatigue and changes in neurological status  - Encourage and assist patient to increase activity and self care with guidance from rehab services  - Encourage visually impaired, hearing impaired and aphasic patients to use assistive/communication devices   Outcome: Progressing      Problem: Nutrition/Hydration-ADULT  Goal: Nutrient/Hydration intake appropriate for improving, restoring or maintaining nutritional needs  Monitor and assess patient's nutrition/hydration status for malnutrition (ex- brittle hair, bruises, dry skin, pale skin and conjunctiva, muscle wasting, smooth red tongue, and disorientation)  Collaborate with interdisciplinary team and initiate plan and interventions as ordered  Monitor patient's weight and dietary intake as ordered or per policy  Utilize nutrition screening tool and intervene per policy  Determine patient's food preferences and provide high-protein, high-caloric foods as appropriate       INTERVENTIONS:  - Monitor oral intake, urinary output, labs, and treatment plans  - Assess nutrition and hydration status and recommend course of action  - Evaluate amount of meals eaten  - Assist patient with eating if necessary   - Allow adequate time for meals  - Recommend/ encourage appropriate diets, oral nutritional supplements, and vitamin/mineral supplements  - Order, calculate, and assess calorie counts as needed  - Recommend, monitor, and adjust tube feedings and TPN/PPN based on assessed needs  - Assess need for intravenous fluids  - Provide specific nutrition/hydration education as appropriate  - Include patient/family/caregiver in decisions related to nutrition   Outcome: Progressing      Problem: DISCHARGE PLANNING - CARE MANAGEMENT  Goal: Discharge to post-acute care or home with appropriate resources  INTERVENTIONS:  - Conduct assessment to determine patient/family and health care team treatment goals, and need for post-acute services based on payer coverage, community resources, and patient preferences, and barriers to discharge  - Address psychosocial, clinical, and financial barriers to discharge as identified in assessment in conjunction with the patient/family and health care team  - Arrange appropriate level of post-acute services according to patient's   needs and preference and payer coverage in collaboration with the physician and health care team  - Communicate with and update the patient/family, physician, and health care team regarding progress on the discharge plan  - Arrange appropriate transportation to post-acute venues   Outcome: Progressing

## 2018-03-16 NOTE — SOCIAL WORK
CM met pt, introduced self and made aware fo role at d/c  CM discussed with pt d/c plan  CM informed pt that PT and OT's recommendation is home PT and OT  Pt is agreeable for CM to send referral to [facility], referral in [facility]

## 2018-03-16 NOTE — PLAN OF CARE
Problem: PHYSICAL THERAPY ADULT  Goal: Performs mobility at highest level of function for planned discharge setting  See evaluation for individualized goals  Treatment/Interventions: Functional transfer training, LE strengthening/ROM, Elevations, Therapeutic exercise, Endurance training, Equipment eval/education, Bed mobility, Gait training, OT, Spoke to nursing  Equipment Recommended:  (R/O LEAST RESTRICTIVE AD )       See flowsheet documentation for full assessment, interventions and recommendations  Prognosis: Good  Problem List: Decreased strength, Decreased endurance, Impaired balance, Decreased mobility, Decreased coordination  Assessment: PT COMPLETED EVALUATION OF 62YEAR OLD FEMALE AD[facility]ITTED [facility] [place] [facility]18 FOR A PLANNED NEUROSURGERY  CURRENT DIAGNOSES INCLUDE R-FRONTOPARIETAL MENINGIOMA, CEREBRAL EDEMA, AND BRAIN COMPRESSION  UNDERWENT PVA EMBOLIZATION OF INTRACRANIAL R-FRONTAL MENINGIOMA FROM L & R MIDDLE MENINGEAL ARTERIES ON 3/13 AND FRONTOPARIETAL CRANIOTOMY FOR RESECETION OF TUMOR ON 3/15  CURRENT MEDICAL AND PHYSICAL INSTABILITIES INCLUDE MULTIPLE LINES, TELEMETRY MONITORING, FALLS RISK, AND A REGRESSION IN FUNCTIONAL STATUS FROM BASELINE  PMH IS SIGNIFICANT FOR HTN, DIABETES, AND SEIZURES  PRIOR TO THIS ADMISSION, PATIENT RESIDED IN A 2ND FLOOR APARTMENT (21 STEPS W/ RAILING) WITH HER , AND PREVIOUSLY REQUIRED ASSISTANCE WITH ADLS, IADLS, AND MOBILITY (PATIENT REPORTS SHE STARTED REQUIRING ASSISTANCE WHEN THE L-SIDED HEMIPARESIS CAUSED BY THE MENINGIOMA BECAME MORE PRONOUNCED)  CURRENT IMPAIRMENTS INCLUDE L-LE STRENGTH, DECREASED BALANCE, DECREASED ACTIVITY TOLERANCE, AND FALLS RISK  DURING PT EVALUATION, PATIENT REQUIRED SUPERVISION FOR SIT<-->STAND TRANSFERS AND CG TO AMBULATE 60 FT W/ RW  NEXT SESSION, PLAN TO INCREASE AMBULATION DISTANCE   AT THIS POINT IN TIME, PATIENT WOULD BENEFIT FROM D/C HOME W/ FAMILY SUPPORT AND HOME PT ONCE HER ACTIVITY TOLERANCE IS INCREASED AND SHE HAS BEEN CLEARED ON THE STAIRS  PATIENT WOULD ALSO BENEFIT FROM CONTINUED SKILLED INPT PT THIS ADMISSION TO ACHIEVE MAXIMAL FUNCTION AND SAFETY  Barriers to Discharge: None     Recommendation: (S) Home with family support, Home PT     PT - OK to Discharge: (S) No (NEED TO IMPROVE ACTIVITY TOLERANCE & CLEAR ON STAIRS)    See flowsheet documentation for full assessment     Manju Noble, PT

## 2018-03-16 NOTE — PROGRESS NOTES
Progress Note - Critical Care   Tasha Counter 62 y o  female MRN: [id number]  Unit/Bed#: ICU 03 Encounter: [id number]    Assessment:   Principal Problem:    Frontal mass of brain  Active Problems:    Cerebral edema (HCC)    Meningioma (HCC)    Brain compression (HCC)  Resolved Problems:    * No resolved hospital problems  *      Plan:    Neuro:   Right frontoparietal meningioma s/p craniotomy and mass resection on 3/15, POD #1  -continue neuro checks q 2 hours  -continue iv Decadron 4 mg q 6h  -sedation/analgesia:  None  -continue Vimpat 50 mg p  o  b i d , seizure precautions  -management per primary team, Neurosurgery    Pain control:  -continue all:  Scheduled tylenol 650 q 4 hours p r n  for mild pain, Dilaudid 0 5 mg IV Q 1 hour for breakthrough pain, oxycodone 10 mg p o   PRN for severe pain, oxycodone 5 mg p o  q 4 hours p r n  for moderate pain  -continue Narcan PRN for opioid reversal in case of respiratory depression    Depression:  -adequate controlled:  -continue PTA med Zoloft 100 mg q day    CV:   -HTN:  Adequately controlled  -continue PTA lisinopril (ZESTRIL) 20 mg tablet daily  -continue p r n  meds:  Labetalol 10 mg IV q 4 hours p r n  for systolic blood pressure > 160   Arterial Line BP: 154/60  Arterial Line MAP (mmHg): 94 mmHg  HR:  [52-78] 62  BP: (116-166)/(50-79) 162/78  Arterial Line BP: (120-182)/(42-74) 154/60    Pulm:   -saturating % on 3 L O2 via nasal cannula    GI:   History of IBS:  -continue p o  probiotics supplied by patient, daily  -A tolerating p o  well    Constipation:  -continue Colace 100 mg p o  b i d     :   -no acute concerns    F/E/N:  -continue normal saline at 75 mL/hr  -no acute electrolyte concerns  -regular diet, p o   -continue vitamin D3 of 1000 units p o  daily    ID:   -afebrile, leukocytosis WBC 16 06 likely reactive due to steroids  -will continue to monitor    Heme:   History of ERNESTO:  -H&H stable most recent:  11 1 and 32 6  -continue ferrous sulfate 325 mg q day  -no DVT prophylaxis at this time due to recent history of craniotomy    Endo: Insulin dependent diabetes type 2, uncomplicated  -patient currently on 4u/hr insulin drip, blood sugar ranging from 140-300 (did not require correction insulin)  -last hemoglobin A1c on 3/6/2018 was 7 6%  -will consider transitioning to PTA home insulin regimen with sliding scale coverage, continue Accu-Cheks q 2 hours, toes episodes of hyperglycemia likely related to steroids  -continue to hold PTA Januvia 50 mg p o  daily, metformin 500 mg p o  b i d  with meals    Msk/Skin:   Continue:  -routine repositioning and skin care per unit protocol  -PT/OT consult, out of bed with assistance as tolerated    Disposition:   -as per primary team, Neurosurgery      HPI/24hr events:  Michele Wolfe is a 62 y o  female with a history of substance abuse, DM 2, cataract, astigmatism who initially presented to [facility] on 03/05 with left upper and lower extremity weakness for 8 months (impairing her ambulation status to the point of using wheelchair) and intermittent right-sided headaches  CT scan of the brain at that time showed a 6cm right frontoparietal mass consistent with meningioma, causing extensive brain compression some adjacent cerebral edema  The large mass also appeared to have considerable external carotid supply  Patient was started on Decadron with outpatient neurology follow-up  Patient was electively schedule for mass resection by Neurosurgery, and a craniotomy with mass resection was performed at [facility] on 03/15  Patient tolerated the procedure well and was transferred to the ICU for perioperative management  Received hydralazine PRN x2 for elevated SBP    Otherwise no acute overnight events   ______________________________________________________________________  Vitals:    03/16/18 0200 03/16/18 0300 03/16/18 0350 03/16/18 0500   BP: 143/73 154/78  162/78   BP Location:  Right arm     Pulse: 56 56 66 62   Resp: 18 15 21 20   Temp: 98 2 °F (36 8 °C) 98 2 °F (36 8 °C) 98 2 °F (36 8 °C)    TempSrc:   Probe    SpO2: 97% 98% 98% 96%   Weight:       Height:         Arterial Line BP: 154/60  Arterial Line MAP (mmHg): 94 mmHg  Temp:  [97 7 °F (36 5 °C)-99 3 °F (37 4 °C)] 98 2 °F (36 8 °C)  HR:  [52-78] 62  Resp:  [15-23] 20  BP: (116-166)/(50-79) 162/78  Arterial Line BP: (120-182)/(42-74) 154/60     Temperature:   Temp (24hrs), Av 7 °F (37 1 °C), Min:97 7 °F (36 5 °C), Max:99 3 °F (37 4 °C)    Current Temperature: 98 2 °F (36 8 °C)    Physical Exam:  Constitutional:  Laying in bed comfortable, no acute distress   HEENT:  head neuro surgical incision dry intact, ears nose throat benign exam   Eyes:  Anicteric sclera no discharge extraocular movements intact   Neck:  Full range of motion, supple, no JVD   Cardiovascular: RRR, no MRG  Pulmonary/Chest: CTA b/l, mild rales resolved after coughing  Abdominal:  Soft, nontender, positive bowel sounds   Musculoskeletal:  No pitting edema, peripheral pulses intact  Neurological:   GCS 15  PERRL 2 mm  CN 2-12 intact  Strength:    5/5 bilateral  Ue:  5/5 bilateral  RLE:  5/5  LLE:  4/5 on dorsiflexion, 3/5 on plantar flexion  Sensation grossly intact in all extremities    Weights:   IBW: 59 3 kg    Body mass index is 35 83 kg/m²  Weight (last 2 days)     Date/Time    03/15/18 0711    Comment rows:    OBSERV: Pt brought [facility] via stretcher for neuro surgery  AAO x 3  S/O with pt in PHA  SBAR from transfer nurse  Relates she is nervous for surgery  Reinforcement given  Pitt draining clear yellow urine  at 03/15/18 0711               Non-Invasive/Invasive Ventilation Settings:  Respiratory    Lab Data (Last 4 hours)    None         O2/Vent Data (Last 4 hours)    None              Intake and Outputs:  I/O        07 - 03/15 0700 03/15 07 -  0700  07 -  0700    P  O  2220 200     I V  (mL/kg) 1873 7 (18 6) 4330 8 (42 9)     IV Piggyback  300     Total Intake(mL/kg) 4093 7 (40 5) 4830 8 (47 8)     Urine (mL/kg/hr) 4050 (1 7) 7400 (3 1)     Drains  230 (0 1)     Stool 0 (0)      Blood  150 (0 1)     Total Output 4050 7780      Net +43 7 -2949  2             Unmeasured Stool Occurrence 1 x          UOP: 3 1mL/kg/hr  Net minus: ~3L x 24hrs    Nutrition:        Diet Orders            Start     Ordered    03/15/18 1617  Diet Ken/CHO Controlled; Consistent Carbohydrate Diet Level 2 (5 carb servings/75 grams CHO/meal)  Diet effective now     Question Answer Comment   Diet Type Ken/CHO Controlled    Ken/CHO Controlled Consistent Carbohydrate Diet Level 2 (5 carb servings/75 grams CHO/meal)    RD to adjust diet per protocol? Yes        03/15/18 1616        Labs:     Results from last 7 days  Lab Units 03/16/18  0524 03/15/18  1108 03/15/18  0946 03/14/18  0425 03/13/18  1856   WBC Thousand/uL 16 06*  --   --  13 35*  --    HEMOGLOBIN g/dL 11 1*  --   --  11 8  --    I STAT HEMOGLOBIN g/dl  --  10 5* 10 5*  --   --    HEMATOCRIT % 32 6*  --   --  34 1*  --    PLATELETS Thousands/uL 183  --   --  200 223      Results from last 7 days  Lab Units 03/16/18  0019 03/15/18  1108 03/15/18  0946   SODIUM mmol/L 139  --   --    POTASSIUM mmol/L 3 6  --   --    CHLORIDE mmol/L 106  --   --    CO2 mmol/L 23  --   --    BUN mg/dL 26*  --   --    CREATININE mg/dL 0 98  --   --    CALCIUM mg/dL 7 9*  --   --    GLUCOSE RANDOM mg/dL 183*  --   --    GLUCOSE, ISTAT mg/dl  --  211* 204*                Results from last 7 days  Lab Units 03/16/18  0524   INR  1 06   PTT seconds 23         No results found for: TROPONINI  Imaging:   F/u CT head without contrast on 3/16    Micro:  No results found for: Elva Denton, Keo Sampson, SPUTUMCULTGIOVANNA  Allergies:    Allergies   Allergen Reactions    Dilantin [Phenytoin]     Lidocaine     Statins      Medications:   Scheduled Meds:  Current Facility-Administered Medications:  acetaminophen 650 mg Oral Q4H PRN Leotha Annas, MD    acetaminophen 975 mg Oral [facility] Martha Weller MD    bisacodyl 10 mg Rectal Daily PRN Martha Weller MD    dexamethasone 4 mg Intravenous Q6H [address] Megan Keys MD    docusate sodium 100 mg Oral BID Martha Weller MD    famotidine 20 mg Oral Q12H [address] Martha Weller MD    ferrous sulfate 325 mg Oral Daily Argelia Rodriguez PA-C    HYDROmorphone 0 5 mg Intravenous Q1H PRN Martha Weller MD    insulin regular (HumuLIN R,NovoLIN R) infusion 0 3-21 Units/hr Intravenous Titrated Alley Awad DO Last Rate: 4 Units/hr (03/16/18 0445)   labetalol 10 mg Intravenous Q4H PRN Miguel Angel Ruiz PA-C    lacosamide 50 mg Oral Q12H [address] Megan Keys MD    lisinopril 20 mg Oral Daily Argelia Rodriguez PA-C    naloxone 0 04 mg Intravenous Q1MIN PRN Martha Weller MD    ondansetron 4 mg Intravenous Q6H PRN Megan Keys MD    oxyCODONE 10 mg Oral Q4H PRN Martha Weller MD    oxyCODONE 5 mg Oral Q4H PRN Martha Weller MD    sertraline 50 mg Oral Daily Megan Keys MD    sodium chloride 75 mL/hr Intravenous Continuous Megan Keys MD Last Rate: Stopped (03/14/18 1951)     Continuous Infusions:  insulin regular (HumuLIN R,NovoLIN R) infusion 0 3-21 Units/hr Last Rate: 4 Units/hr (03/16/18 0445)   sodium chloride 75 mL/hr Last Rate: Stopped (03/14/18 1951)     PRN Meds:    acetaminophen 650 mg Q4H PRN   bisacodyl 10 mg Daily PRN   HYDROmorphone 0 5 mg Q1H PRN   labetalol 10 mg Q4H PRN   naloxone 0 04 mg Q1MIN PRN   ondansetron 4 mg Q6H PRN   oxyCODONE 10 mg Q4H PRN   oxyCODONE 5 mg Q4H PRN     VTE Pharmacologic Prophylaxis: None  VTE Mechanical Prophylaxis: sequential compression device  Invasive lines and devices:   Invasive Devices     Peripheral Intravenous Line            Peripheral IV 03/13/18 Right Hand 2 days    Peripheral IV 03/15/18 Right Antecubital less than 1 day          Arterial Line            Arterial Line 03/15/18 Right Radial less than 1 day          Drain            Urethral Catheter Temperature probe 2 days    Closed/Suction Drain Right Head Bulb less than 1 day                   Code Status: Prior    Portions of the record may have been created with voice recognition software  Occasional wrong word or "sound a like" substitutions may have occurred due to the inherent limitations of voice recognition software  Read the chart carefully and recognize, using context, where substitutions have occurred      Shannon Maria MD    [facility] PGY-1

## 2018-03-16 NOTE — PROGRESS NOTES
Progress Note - Elke Palomares 62 y o  female MRN: [id number]    Unit/Bed#: [facility] 918-01 Encounter: [id number]      CC: diabetes f/u    Subjective: Elke Palomares is a 62y o  year old female with type 2 diabetes  Feels well  No complaints  No hypoglycemia  Good appetite-was switched from IV insulin infusion to basal and correctional insulin earlier today    Objective:     Vitals: Blood pressure 116/62, pulse 67, temperature 98 4 °F (36 9 °C), temperature source Oral, resp  rate 18, height 5' 6" (1 676 m), weight 101 kg (222 lb 0 1 oz), SpO2 97 %  ,Body mass index is 35 83 kg/m²  Intake/Output Summary (Last 24 hours) at 03/16/18 1540  Last data filed at 03/16/18 1424   Gross per 24 hour   Intake          1192 04 ml   Output             2670 ml   Net         -1477 96 ml       Physical Exam:  General Appearance: awake, appears stated age and cooperative  Head: Normocephalic, without obvious abnormality, atraumatic  Extremities: moves all extremities  Skin: Skin color and temperature normal    Pulm: no labored breathing    Lab, Imaging and other studies: I have personally reviewed pertinent reports  Results from last 7 days  Lab Units 03/16/18  0019   SODIUM mmol/L 139   POTASSIUM mmol/L 3 6   CHLORIDE mmol/L 106   CO2 mmol/L 23   BUN mg/dL 26*   CREATININE mg/dL 0 98   GLUCOSE RANDOM mg/dL 183*   CALCIUM mg/dL 7 9*       POC Glucose (mg/dl)   Date Value   03/16/2018 345 (H)   03/16/2018 299 (H)   03/16/2018 163 (H)   03/16/2018 186 (H)   03/16/2018 197 (H)   03/16/2018 174 (H)   03/16/2018 175 (H)   03/15/2018 259 (H)   03/15/2018 284 (H)   03/15/2018 143 (H)       Assessment:  Type 2 diabetes with hyperglycemia on long-term insulin use  Steroid induced hyperglycemia  Frontal mass of brain/meningioma      Plan:  Sugars are secondary to steroids-for now start Humalog 14 units before meals  Increase the correctional scale    Continue Lantus 40 units in the morning  As her steroid doses decreased she will need a reduction in her insulin  Monitor blood sugars before meals and bedtime adjust accordingly    Frontal mass of brain/meningioma-status post surgical resection-patient currently on IV Decadron-management as per primary team      Portions of the record may have been created with voice recognition software

## 2018-03-16 NOTE — PHYSICAL THERAPY NOTE
PT EVALUATION     03/16/18 1020   Note Type   Note type Eval only   Pain Assessment   Pain Assessment No/denies pain   Pain Score No Pain   Home Living   Type of Home Apartment   Home Layout Two level;Stairs to enter with rails  (20 STEPS TO ENTER W/ RAILING)   Home Equipment Walker;Cane  (REPORTS USING RW & HURRI-CANE PTA WHEN LE-STRENGTH WAS POOR)   Additional Comments PATIENT REPORTS HER & HER  ARE "WORKING ON" GETTING HER A 1ST FLOOR SET-UP AT D/C  Prior Function   Level of [place] Needs assistance with ADLs and functional mobility; Needs assistance with IADLs   Lives With Spouse   Receives Help From Family   ADL Assistance Needs assistance   IADLs Needs assistance   Falls in the last 6 months 1 to 4   Comments PATIENT REPORTS ONLY REQUIRING ASSISTANCE WITH ADLS & IADLS RECENTLY WHEN HER L-SDIED DEFICITS BECAME MORE PRONOUNCED  Restrictions/Precautions   Weight Bearing Precautions Per Order No   Braces or Orthoses (NONE)   Other Precautions Multiple lines;Telemetry; Fall Risk   General   Family/Caregiver Present No   Cognition   Overall Cognitive Status WFL   Arousal/Participation Cooperative   Orientation Level Oriented X4   Memory Within functional limits   Following Commands Follows all commands and directions without difficulty   RUE Assessment   RUE Assessment WFL   LUE Assessment   LUE Assessment WFL   RLE Assessment   RLE Assessment WFL   LLE Assessment   LLE Assessment X   Strength LLE   LLE Overall Strength 4/5   Light Touch   RLE Light Touch Grossly intact   LLE Light Touch Grossly intact   Bed Mobility   Rolling R Unable to assess   Rolling L Unable to assess   Supine to Sit Unable to assess   Sit to Supine Unable to assess   Additional Comments PATIENT OOB IN CHAIR UPON ARRIVAL AND SEATED IN CHAIR AT END OF PT-EVAL    Transfers   Sit to Stand 5  Supervision   Additional items Increased time required;Armrests   Stand to Sit 5  Supervision   Additional items Increased time required;Armrests   Ambulation/Elevation   Gait pattern Excessively slow; Short stride   Gait Assistance 4  Minimal assist  (CG)   Additional items Assist x 1   Assistive Device Rolling walker   Distance 60 FT    Stair Management Assistance Not tested   Balance   Static Sitting Fair +   Static Standing Fair   Ambulatory Fair -   Endurance Deficit   Endurance Deficit Yes   Endurance Deficit Description DECREASED ACTIVITY TOLERANCE    Activity Tolerance   Activity Tolerance Patient limited by fatigue   Medical Staff Made Aware OT JOSE LUIS, [id number]    Nurse Made Aware VERONIQUE STEPHENSON, VERONIQUE DANGELO    Assessment   Prognosis Good   Problem List Decreased strength;Decreased endurance; Impaired balance;Decreased mobility; Decreased coordination   Assessment PT COMPLETED EVALUATION OF 62YEAR OLD FEMALE ADMITTED TO [facility] ON 3/13/18 FOR A PLANNED NEUROSURGERY  CURRENT DIAGNOSES INCLUDE R-FRONTOPARIETAL MENINGIOMA, CEREBRAL EDEMA, AND BRAIN COMPRESSION  UNDERWENT PVA EMBOLIZATION OF INTRACRANIAL R-FRONTAL MENINGIOMA FROM L & R MIDDLE MENINGEAL ARTERIES ON 3/13 AND FRONTOPARIETAL CRANIOTOMY FOR RESECETION OF TUMOR ON 3/15  CURRENT MEDICAL AND PHYSICAL INSTABILITIES INCLUDE MULTIPLE LINES, TELEMETRY MONITORING, FALLS RISK, AND A REGRESSION IN FUNCTIONAL STATUS FROM BASELINE  PMH IS SIGNIFICANT FOR HTN, DIABETES, AND SEIZURES  PRIOR TO THIS ADMISSION, PATIENT RESIDED IN A 2ND FLOOR APARTMENT (21 STEPS W/ RAILING) WITH HER , AND PREVIOUSLY REQUIRED ASSISTANCE WITH ADLS, IADLS, AND MOBILITY (PATIENT REPORTS SHE STARTED REQUIRING ASSISTANCE WHEN THE L-SIDED HEMIPARESIS CAUSED BY THE MENINGIOMA BECAME MORE PRONOUNCED)  CURRENT IMPAIRMENTS INCLUDE L-LE STRENGTH, DECREASED BALANCE, DECREASED ACTIVITY TOLERANCE, AND FALLS RISK  DURING PT EVALUATION, PATIENT REQUIRED SUPERVISION FOR SIT<-->STAND TRANSFERS AND CG TO AMBULATE 60 FT W/ RW  NEXT SESSION, PLAN TO INCREASE AMBULATION DISTANCE   AT THIS POINT IN TIME, PATIENT WOULD BENEFIT FROM D/C HOME W/ FAMILY SUPPORT AND HOME PT ONCE HER ACTIVITY TOLERANCE IS INCREASED AND SHE HAS BEEN CLEARED ON THE STAIRS  PATIENT WOULD ALSO BENEFIT FROM CONTINUED SKILLED INPT PT THIS ADMISSION TO ACHIEVE MAXIMAL FUNCTION AND SAFETY  Barriers to Discharge None   Goals   Patient Goals TO WALK    LTG Expiration Date 03/30/18   Long Term Goal #1 10-14 DAYS: 1) MOD-I FOR BED MOBILITY; 2) MOD-I FOR SIT<-->STAND TRANSFERS; 3) MOD-I TO AMBULATE 300 FT W/ LEAST RESTRICTIVE AD; 4) INCREASE L-LE STRENGTH BY 1/2 GRADE; 5) IMPROVE DYNAMIC BALANCE BY 1/2 GRADE; 6) INCREASE ACTIVITY TOLERANCE TO 1 HOUR; 7) COMPELTE 20 STEPS W/ SUPERVISION    Treatment Day 0   Plan   Treatment/Interventions Functional transfer training;LE strengthening/ROM; Elevations; Therapeutic exercise; Endurance training;Equipment eval/education; Bed mobility;Gait training;OT;Spoke to nursing   PT Frequency 5x/wk; Weekend  (1X/WEEKEND )   Recommendation   Recommendation Home with family support;Home PT   Equipment Recommended (R/O LEAST RESTRICTIVE AD )   PT - OK to Discharge No  (NEED TO IMPROVE ACTIVITY TOLERANCE & CLEAR ON STAIRS)   Barthel Index   Feeding 10   Bathing 0   Grooming Score 5   Dressing Score 5   Bladder Score 10   Bowels Score 10   Toilet Use Score 5   Transfers (Bed/Chair) Score 15   Mobility (Level Surface) Score 0   Stairs Score 0   Barthel Index Score 60     Svitlana Varela, SPT

## 2018-03-16 NOTE — OCCUPATIONAL THERAPY NOTE
[address] Evaluation     Patient Name: Bridgette Chaney  Today's Date: 3/16/2018  Problem List  Patient Active Problem List   Diagnosis    [facility]    Long-term insulin use in type 2 diabetes ([facility] )    Frontal mass of brain    Weakness of left side of body    Vasogenic cerebral edema (HCC)    Benign essential HTN    Urinary urgency    Cerebral edema (HCC)    Meningioma (HCC)    Brain compression [facility])     Past Medical History  Past Medical History:   Diagnosis Date    Cancer [facility]     Diabetes mellitus ([facility] )     Seizures ([facility] [facility] )      Past Surgical History  Past Surgical History:   Procedure Laterality Date    HYSTERECTOMY      AK EXCIS SUPRATENT BRAIN TUMOR Right 3/15/2018    Procedure: IMAGE-GUIDED RIGHT FRONTOPARIETAL CRANIOTOMY FOR TUMOR;  Surgeon: Shantelle Rucker MD;  [facility];  Service: Neurosurgery      03/16/18 1030   Note Type   Note type Eval/Treat   Restrictions/Precautions   Weight Bearing Precautions Per Order No   Other Precautions Multiple lines;Telemetry; Fall Risk;Pain   Pain Assessment   Pain Assessment No/denies pain   Pain Score No Pain   Home Living   Type of Home Apartment   Home Layout Two level  (20 BRAYDEN, may be able to stay on first floor)   Bathroom Shower/Tub Tub/shower unit   Bathroom Toilet Standard   Home Equipment Walker;Cane  (hurri-cane)   Prior Function   Level of [place] Needs assistance with ADLs and functional mobility; Needs assistance with IADLs   Lives With Spouse   Receives Help From Family   ADL Assistance Needs assistance   IADLs Needs assistance   Falls in the last 6 months 1 to 4   Vocational Retired   Comments pt reports she only needs help when LE weakness becomes worse   Lifestyle   Autonomy pta pt reports she has been needing A for ADLs/IADLS 2* L sided weakness   Reciprocal Relationships supportive    Service to Others no longer works   [address] enjoys being active   Psychosocial   Psychosocial ([address]) WDL   Subjective   Subjective "I feel great"   ADL   Where Assessed Chair   Eating Assistance 5  Supervision/Setup   Grooming Assistance 5  Supervision/Setup   UB Bathing Assistance 5  Supervision/Setup   LB Bathing Assistance 4  Minimal Assistance   UB Dressing Assistance 5  Supervision/Setup   LB Dressing Assistance 4  [address]  4  Minimal Assistance   Bed Mobility   Additional Comments pt oob in chair upon arrival   Transfers   Sit to Stand 4  Minimal assistance   Additional items Assist x 1; Increased time required   Stand to Sit 4  Minimal assistance   Additional items Assist x 1   Additional Comments CG   Functional Mobility   Functional Mobility 4  Minimal assistance   Additional Comments CG   Additional items Rolling walker   Balance   Static Sitting Fair +   Dynamic Sitting Fair   Static Standing Fair   Dynamic Standing Fair -   Ambulatory Fair -   Activity Tolerance   Activity Tolerance Patient limited by fatigue   Nurse Made Aware okay to see per rn   RUE Assessment   RUE Assessment WFL   LUE Assessment   LUE Assessment X   LUE Strength   LUE Overall Strength (4/5)   Hand Function   Gross Motor Coordination Functional   Fine Motor Coordination Functional   Vision-Basic Assessment   Current Vision Wears glasses only for reading   Visual History (has "lenses in her eyes")   Cognition   Overall Cognitive Status [facility]   Arousal/Participation Alert; Responsive; Cooperative   Attention Within functional limits   Orientation Level Oriented X4   Memory Within functional limits   Following Commands Follows all commands and directions without difficulty   Cognition Assessment Tools MOCA   Score 28   Assessment   Limitation Decreased ADL status; Decreased endurance;Decreased Safe judgement during ADL;Decreased self-care trans;Decreased high-level ADLs   Prognosis Good   Assessment Pt is a 63 yo F admitted to [facility] w/ frontal mass of brain   Pt underwent resection of R frontal meningioma on 3/13/18  Pt's PMH's significant for HTN, Ca, Diabetes, and seizures  Pt resides in two story apt w/  w/ 20 BRAYDEN  Pta pt reports recently requiring assistance for ADLs/IADLs 2* L sided weakness  Currently pt requires Min A for functional mobility, LB dressing/bathing, and toileting  Pt given MOCA and scored 28/30 indicating normal cognition  Pt is limited 2* decreased activity tolerance, decreased insight into deficits, medical status, and decreased ADL status  From an OT standpoint, recommend home w/ family support pending medical stability  OT will continue to see to address the below goals  Goals   LTG Time Frame 7-10   Plan   Treatment Interventions ADL retraining;Functional transfer training; Endurance training;Patient/family training;Equipment evaluation/education; Compensatory technique education; Activityengagement   Goal Expiration Date 03/26/18   OT Frequency 3-5x/wk   Recommendation   OT Discharge Recommendation Home with family support   Barthel Index   Feeding 10   Bathing 0   Grooming Score 5   Dressing Score 5   Bladder Score 10   Bowels Score 10   Toilet Use Score 5   Transfers (Bed/Chair) Score 10   Mobility (Level Surface) Score 0   Stairs Score 0   Barthel Index Score 55   Modified [place] Scale   Modified [place] Scale 3     Goals:    1 ) Pt will participate in pt education w/ G attn and carryover during functional activities  2 ) Pt will be Mod I for functional mobility w/ use of AD    3 ) Pt will increase activity tolerance to 40 minutes to participate in full ADLs  4 ) Pt will be I for dressing, bathing, grooming, and toileting   5 ) Pt will I identify and utilize 2 coping strategies to promote positive affect and overall well-being      Johnna Gómez, OTS

## 2018-03-17 ENCOUNTER — APPOINTMENT (INPATIENT)
Dept: RADIOLOGY | Facility: HOSPITAL | Age: 58
DRG: 025 | End: 2018-03-17
Payer: MEDICARE

## 2018-03-17 LAB
ALBUMIN SERPL BCP-MCNC: 3.8 G/DL (ref 3.5–5)
ALP SERPL-CCNC: 71 U/L (ref 46–116)
ALT SERPL W P-5'-P-CCNC: 55 U/L (ref 12–78)
ANION GAP SERPL CALCULATED.3IONS-SCNC: 16 MMOL/L (ref 4–13)
ANION GAP SERPL CALCULATED.3IONS-SCNC: 9 MMOL/L (ref 4–13)
APTT PPP: 18 SECONDS (ref 23–35)
AST SERPL W P-5'-P-CCNC: 23 U/L (ref 5–45)
BILIRUB SERPL-MCNC: 0.29 MG/DL (ref 0.2–1)
BUN SERPL-MCNC: 30 MG/DL (ref 5–25)
BUN SERPL-MCNC: 35 MG/DL (ref 5–25)
CALCIUM SERPL-MCNC: 8.8 MG/DL (ref 8.3–10.1)
CALCIUM SERPL-MCNC: 9.1 MG/DL (ref 8.3–10.1)
CHLORIDE SERPL-SCNC: 100 MMOL/L (ref 100–108)
CHLORIDE SERPL-SCNC: 101 MMOL/L (ref 100–108)
CO2 SERPL-SCNC: 18 MMOL/L (ref 21–32)
CO2 SERPL-SCNC: 26 MMOL/L (ref 21–32)
CREAT SERPL-MCNC: 1.01 MG/DL (ref 0.6–1.3)
CREAT SERPL-MCNC: 1.4 MG/DL (ref 0.6–1.3)
ERYTHROCYTE [DISTWIDTH] IN BLOOD BY AUTOMATED COUNT: 13.3 % (ref 11.6–15.1)
ERYTHROCYTE [DISTWIDTH] IN BLOOD BY AUTOMATED COUNT: 13.5 % (ref 11.6–15.1)
GFR SERPL CREATININE-BSD FRML MDRD: 42 ML/MIN/1.73SQ M
GFR SERPL CREATININE-BSD FRML MDRD: 62 ML/MIN/1.73SQ M
GLUCOSE SERPL-MCNC: 133 MG/DL (ref 65–140)
GLUCOSE SERPL-MCNC: 165 MG/DL (ref 65–140)
GLUCOSE SERPL-MCNC: 185 MG/DL (ref 65–140)
GLUCOSE SERPL-MCNC: 226 MG/DL (ref 65–140)
GLUCOSE SERPL-MCNC: 227 MG/DL (ref 65–140)
GLUCOSE SERPL-MCNC: 269 MG/DL (ref 65–140)
HCT VFR BLD AUTO: 36.7 % (ref 34.8–46.1)
HCT VFR BLD AUTO: 37.6 % (ref 34.8–46.1)
HGB BLD-MCNC: 12.4 G/DL (ref 11.5–15.4)
HGB BLD-MCNC: 12.5 G/DL (ref 11.5–15.4)
INR PPP: 0.95 (ref 0.86–1.16)
MAGNESIUM SERPL-MCNC: 2.5 MG/DL (ref 1.6–2.6)
MCH RBC QN AUTO: 29.5 PG (ref 26.8–34.3)
MCH RBC QN AUTO: 30.2 PG (ref 26.8–34.3)
MCHC RBC AUTO-ENTMCNC: 33.2 G/DL (ref 31.4–37.4)
MCHC RBC AUTO-ENTMCNC: 33.8 G/DL (ref 31.4–37.4)
MCV RBC AUTO: 89 FL (ref 82–98)
MCV RBC AUTO: 89 FL (ref 82–98)
PHOSPHATE SERPL-MCNC: 3.8 MG/DL (ref 2.7–4.5)
PLATELET # BLD AUTO: 255 THOUSANDS/UL (ref 149–390)
PLATELET # BLD AUTO: 281 THOUSANDS/UL (ref 149–390)
PMV BLD AUTO: 10.5 FL (ref 8.9–12.7)
PMV BLD AUTO: 10.7 FL (ref 8.9–12.7)
POTASSIUM SERPL-SCNC: 4 MMOL/L (ref 3.5–5.3)
POTASSIUM SERPL-SCNC: 4.1 MMOL/L (ref 3.5–5.3)
PROLACTIN SERPL-MCNC: 10.4 NG/ML
PROT SERPL-MCNC: 7.7 G/DL (ref 6.4–8.2)
PROTHROMBIN TIME: 12.7 SECONDS (ref 12.1–14.4)
RBC # BLD AUTO: 4.11 MILLION/UL (ref 3.81–5.12)
RBC # BLD AUTO: 4.24 MILLION/UL (ref 3.81–5.12)
SODIUM SERPL-SCNC: 134 MMOL/L (ref 136–145)
SODIUM SERPL-SCNC: 136 MMOL/L (ref 136–145)
WBC # BLD AUTO: 19.55 THOUSAND/UL (ref 4.31–10.16)
WBC # BLD AUTO: 23.73 THOUSAND/UL (ref 4.31–10.16)

## 2018-03-17 PROCEDURE — 99291 CRITICAL CARE FIRST HOUR: CPT | Performed by: ANESTHESIOLOGY

## 2018-03-17 PROCEDURE — 70450 CT HEAD/BRAIN W/O DYE: CPT

## 2018-03-17 PROCEDURE — 82948 REAGENT STRIP/BLOOD GLUCOSE: CPT

## 2018-03-17 PROCEDURE — 84146 ASSAY OF PROLACTIN: CPT | Performed by: INTERNAL MEDICINE

## 2018-03-17 PROCEDURE — 85027 COMPLETE CBC AUTOMATED: CPT | Performed by: PHYSICIAN ASSISTANT

## 2018-03-17 PROCEDURE — 80053 COMPREHEN METABOLIC PANEL: CPT | Performed by: INTERNAL MEDICINE

## 2018-03-17 PROCEDURE — 85730 THROMBOPLASTIN TIME PARTIAL: CPT | Performed by: NEUROLOGICAL SURGERY

## 2018-03-17 PROCEDURE — 97110 THERAPEUTIC EXERCISES: CPT

## 2018-03-17 PROCEDURE — 83735 ASSAY OF MAGNESIUM: CPT | Performed by: PHYSICIAN ASSISTANT

## 2018-03-17 PROCEDURE — 85027 COMPLETE CBC AUTOMATED: CPT | Performed by: INTERNAL MEDICINE

## 2018-03-17 PROCEDURE — 99232 SBSQ HOSP IP/OBS MODERATE 35: CPT | Performed by: INTERNAL MEDICINE

## 2018-03-17 PROCEDURE — 97535 SELF CARE MNGMENT TRAINING: CPT | Performed by: STUDENT IN AN ORGANIZED HEALTH CARE EDUCATION/TRAINING PROGRAM

## 2018-03-17 PROCEDURE — 80048 BASIC METABOLIC PNL TOTAL CA: CPT | Performed by: PHYSICIAN ASSISTANT

## 2018-03-17 PROCEDURE — 97116 GAIT TRAINING THERAPY: CPT

## 2018-03-17 PROCEDURE — 85610 PROTHROMBIN TIME: CPT | Performed by: INTERNAL MEDICINE

## 2018-03-17 PROCEDURE — 84100 ASSAY OF PHOSPHORUS: CPT | Performed by: PHYSICIAN ASSISTANT

## 2018-03-17 PROCEDURE — 99223 1ST HOSP IP/OBS HIGH 75: CPT | Performed by: PSYCHIATRY & NEUROLOGY

## 2018-03-17 PROCEDURE — 97530 THERAPEUTIC ACTIVITIES: CPT | Performed by: STUDENT IN AN ORGANIZED HEALTH CARE EDUCATION/TRAINING PROGRAM

## 2018-03-17 RX ORDER — LEVETIRACETAM 750 MG/1
750 TABLET ORAL EVERY 12 HOURS SCHEDULED
Status: DISCONTINUED | OUTPATIENT
Start: 2018-03-17 | End: 2018-03-18

## 2018-03-17 RX ORDER — HEPARIN SODIUM 5000 [USP'U]/ML
5000 INJECTION, SOLUTION INTRAVENOUS; SUBCUTANEOUS EVERY 8 HOURS SCHEDULED
Status: DISCONTINUED | OUTPATIENT
Start: 2018-03-17 | End: 2018-03-19 | Stop reason: HOSPADM

## 2018-03-17 RX ORDER — LORAZEPAM 2 MG/ML
1 INJECTION INTRAMUSCULAR EVERY 6 HOURS PRN
Status: DISCONTINUED | OUTPATIENT
Start: 2018-03-17 | End: 2018-03-19 | Stop reason: HOSPADM

## 2018-03-17 RX ORDER — LACOSAMIDE 50 MG/1
100 TABLET ORAL EVERY 12 HOURS SCHEDULED
Status: DISCONTINUED | OUTPATIENT
Start: 2018-03-17 | End: 2018-03-19 | Stop reason: HOSPADM

## 2018-03-17 RX ORDER — LEVETIRACETAM 750 MG/1
750 TABLET ORAL EVERY 12 HOURS SCHEDULED
Status: DISCONTINUED | OUTPATIENT
Start: 2018-03-17 | End: 2018-03-17

## 2018-03-17 RX ADMIN — FAMOTIDINE 20 MG: 20 TABLET, FILM COATED ORAL at 21:22

## 2018-03-17 RX ADMIN — INSULIN LISPRO 2 UNITS: 100 INJECTION, SOLUTION INTRAVENOUS; SUBCUTANEOUS at 18:01

## 2018-03-17 RX ADMIN — LISINOPRIL 20 MG: 20 TABLET ORAL at 08:57

## 2018-03-17 RX ADMIN — INSULIN GLARGINE 40 UNITS: 100 INJECTION, SOLUTION SUBCUTANEOUS at 08:58

## 2018-03-17 RX ADMIN — DEXAMETHASONE SODIUM PHOSPHATE 4 MG: 4 INJECTION, SOLUTION INTRAMUSCULAR; INTRAVENOUS at 23:49

## 2018-03-17 RX ADMIN — ACETAMINOPHEN 650 MG: 325 TABLET, FILM COATED ORAL at 12:40

## 2018-03-17 RX ADMIN — DEXAMETHASONE SODIUM PHOSPHATE 4 MG: 4 INJECTION, SOLUTION INTRAMUSCULAR; INTRAVENOUS at 12:41

## 2018-03-17 RX ADMIN — LEVETIRACETAM 750 MG: 750 TABLET ORAL at 21:22

## 2018-03-17 RX ADMIN — INSULIN LISPRO 6 UNITS: 100 INJECTION, SOLUTION INTRAVENOUS; SUBCUTANEOUS at 12:43

## 2018-03-17 RX ADMIN — ACETAMINOPHEN 975 MG: 325 TABLET, FILM COATED ORAL at 06:05

## 2018-03-17 RX ADMIN — DEXAMETHASONE SODIUM PHOSPHATE 4 MG: 4 INJECTION, SOLUTION INTRAMUSCULAR; INTRAVENOUS at 18:00

## 2018-03-17 RX ADMIN — LACOSAMIDE 100 MG: 50 TABLET, FILM COATED ORAL at 21:22

## 2018-03-17 RX ADMIN — FERROUS SULFATE TAB 325 MG (65 MG ELEMENTAL FE) 325 MG: 325 (65 FE) TAB at 08:57

## 2018-03-17 RX ADMIN — INSULIN LISPRO 4 UNITS: 100 INJECTION, SOLUTION INTRAVENOUS; SUBCUTANEOUS at 08:58

## 2018-03-17 RX ADMIN — HEPARIN SODIUM 5000 UNITS: 5000 INJECTION, SOLUTION INTRAVENOUS; SUBCUTANEOUS at 09:17

## 2018-03-17 RX ADMIN — LACOSAMIDE 50 MG: 50 TABLET, FILM COATED ORAL at 08:57

## 2018-03-17 RX ADMIN — HEPARIN SODIUM 5000 UNITS: 5000 INJECTION, SOLUTION INTRAVENOUS; SUBCUTANEOUS at 21:23

## 2018-03-17 RX ADMIN — ACETAMINOPHEN 650 MG: 325 TABLET, FILM COATED ORAL at 18:00

## 2018-03-17 RX ADMIN — INSULIN LISPRO 14 UNITS: 100 INJECTION, SOLUTION INTRAVENOUS; SUBCUTANEOUS at 08:59

## 2018-03-17 RX ADMIN — DEXAMETHASONE SODIUM PHOSPHATE 4 MG: 4 INJECTION, SOLUTION INTRAMUSCULAR; INTRAVENOUS at 06:05

## 2018-03-17 RX ADMIN — SERTRALINE HYDROCHLORIDE 50 MG: 50 TABLET ORAL at 08:57

## 2018-03-17 RX ADMIN — FAMOTIDINE 20 MG: 20 TABLET, FILM COATED ORAL at 08:58

## 2018-03-17 RX ADMIN — ACETAMINOPHEN 975 MG: 325 TABLET, FILM COATED ORAL at 21:22

## 2018-03-17 NOTE — RAPID RESPONSE
Progress Note - Rapid Response   Syed Blevins 62 y o  female MRN: [id number]    Time Called ( Time): 15 53  Room#: 918  Arrival Time ( Time): 15 59  Event End Time ( Time): 16 30  MEWS score at time of Rapid Response:unknown  Primary reason for call: Other seizure  Interventions:  Airway/Breathing:  No Intervention  Circulation: N/A  Other Treatments: N/A       Assessment:   1  Patient is 49-year-old female with POD 3 for right frontoparietal craniotomy for resection of the brain mass most likely meningioma and POD 4 for preoperative embolization of the brain mass, diabetes mellitus and seizure disorder  Rapid response was called for a seizure  Patient suffered a seizure while she was in the bathroom, fortunately PCA present and helped the patient to lay down on the floor  Seizure lasted approximately 3-4 minutes  After the seizure patient alert, awake without any postictal confusion  On physical exam patient noted to have very mild weakness in her left upper and lower extremity  Otherwise physical exam normal  Per documentation patient has a minimal left peripheral neuropathy which is patient's baseline  Plan:   · Prolactin, CMP, INR, CBC ordered  · - stat CT without contrast of head - ordered  · Spoke personally to Neurology - they will follow up on CT head and see patient today  · Spoke personally to on call PA from neurosurgery / primary team - Riki Maldonado  · Family - significant other informed       HPI/Chief Complaint (Background/Situation): Syed Blevins is a 62y o  year old female who presents with right frontoparietal brain mass  Patient is 49-year-old female with postop day 3 for right frontoparietal craniotomy for resection of the brain mass most likely meningioma and postop day 4 for preoperative embolization of the brain mass, diabetes mellitus, seizure disorder  Rapid response was called for a seizure    Patient suffered a seizure while she was in the bathroom, fortunately PCA present and helped the patient to lay down on the floor  Seizure lasted approximately 3-4 minutes  After the seizure pressure patient alert, awake  On physical exam patient noted to have mild weakness in her left upper and lower extremity    Otherwise physical exam normal       Historical Information   Past Medical History:   Diagnosis Date    Cancer [facility] [address] )     Diabetes mellitus ([facility] [address] )     [facility]      Past Surgical History:   Procedure Laterality Date    HYSTERECTOMY      DC EXCIS SUPRATENT BRAIN TUMOR Right 3/15/2018    Procedure: IMAGE-GUIDED RIGHT FRONTOPARIETAL CRANIOTOMY FOR TUMOR;  Surgeon: Carina East MD;  [facility];  Service: Neurosurgery     Social History   History   Alcohol Use No     History   Drug Use No     History   Smoking Status    Former Smoker    Quit date: 3/5/2000   Smokeless Tobacco    Never Used     Family History: non-contributory    Meds/Allergies     Current Facility-Administered Medications:  acetaminophen 650 mg Oral Q4H PRN Carina East MD   acetaminophen 975 mg Oral [facility] Carina East MD   bisacodyl 10 mg Rectal Daily PRN Carina East MD   dexamethasone 4 mg Intravenous Q6H [address] Yassine Jefferson MD   docusate sodium 100 mg Oral BID Carina East MD   famotidine 20 mg Oral Q12H [address] Carina East MD   ferrous sulfate 325 mg Oral Daily Argelia Rodriguez PA-C   heparin (porcine) 5,000 Units Subcutaneous Q8H [address] Argelia Rodriguez PA-C   insulin glargine 40 Units Subcutaneous QAM Kimber Broderick PA-C   insulin lispro 16 Units Subcutaneous TID With Meals Gaetano Klinefelter, MD   insulin lispro 2-12 Units Subcutaneous TID [facility] Gaetano Klinefelter, MD   insulin lispro 2-12 Units Subcutaneous HS Gaetano Klinefelter, MD   labetalol 10 mg Intravenous Q4H PRN Adolfo Morillo PA-C   lacosamide 50 mg Oral Q12H [address] Yassine Jefferson MD   lisinopril 20 mg Oral Daily Argelia Rodriguez PA-C   LORazepam 1 mg Intravenous Q6H PRN Marjorie Hatfield MD   ondansetron 4 mg Intravenous Q6H PRN Rome Garces Senia Oh MD   oxyCODONE 2 5 mg Oral Q4H PRN Adolfo Morillo, JOSÉ MIGUEL   oxyCODONE 5 mg Oral Q4H PRN Lino Broderick PA-C   sertraline 50 mg Oral Daily Yassine Jefferson MD            Allergies   Allergen Reactions    Dilantin [Phenytoin]     Lidocaine     Statins        ROS: negative, patient alert, oriented, denies any pain  Intake/Output Summary (Last 24 hours) at 03/17/18 1711  Last data filed at 03/17/18 1315   Gross per 24 hour   Intake              940 ml   Output              382 ml   Net              558 ml       Respiratory    Lab Data (Last 4 hours)    None         O2/Vent Data (Last 4 hours)    None              Invasive Devices     Peripheral Intravenous Line            Peripheral IV 03/15/18 Right Antecubital 2 days    Peripheral IV 03/17/18 Left Antecubital less than 1 day                DIAGNOSTIC DATA:    Lab: I have personally reviewed pertinent lab results  CBC:     Results from last 7 days  Lab Units 03/17/18  0510   WBC Thousand/uL 19 55*   HEMOGLOBIN g/dL 12 5   HEMATOCRIT % 37 6   PLATELETS Thousands/uL 255     CMP:     Results from last 7 days  Lab Units 03/17/18  0510 03/16/18  0019 03/15/18  1108   SODIUM mmol/L 136 139  --    POTASSIUM mmol/L 4 0 3 6  --    CHLORIDE mmol/L 101 106  --    CO2 mmol/L 26 23  --    BUN mg/dL 30* 26*  --    CREATININE mg/dL 1 01 0 98  --    CALCIUM mg/dL 9 1 7 9*  --    GLUCOSE RANDOM mg/dL 227* 183*  --    GLUCOSE, ISTAT mg/dl  --   --  211*     PT/INR:   No results found for: PT, INR,   Magnesium: No results found for: MAG,   Phosphorous:   Lab Results   Component Value Date    PHOS 3 8 03/17/2018       Microbiology:  No results found for: Kirsty Regan, SPUTUMCULTUR      OUTCOME:   Stayed in room   Family notified of transfer: yes  Family member contacted: significant other  Code Status: Level 1 - Full Code  Critical Care Time: Total Critical Care time spent 35 minutes excluding procedures, teaching and family updates

## 2018-03-17 NOTE — PROGRESS NOTES
Progress Note - Princess Hernandez 62 y o  female MRN: [id number]    Unit/Bed#: PPHP 918-01 Encounter: [id number]      CC: diabetes f/u    Subjective: Princess Hernandez is a 62y o  year old female with type 2 diabetes  Feels well  No complaints  No hypoglycemia  Good appetite , no nausea vomiting    Objective:     Vitals: Blood pressure (!) 172/82, pulse 65, temperature 98 1 °F (36 7 °C), resp  rate 20, height 5' 6" (1 676 m), weight 101 kg (222 lb 10 6 oz), SpO2 98 %  ,Body mass index is 35 94 kg/m²  Intake/Output Summary (Last 24 hours) at 03/17/18 1148  Last data filed at 03/17/18 0900   Gross per 24 hour   Intake              820 ml   Output              752 ml   Net               68 ml       Physical Exam:  General Appearance: awake, appears stated age and cooperative  Head: Normocephalic, without obvious abnormality, atraumatic  Extremities: moves all extremities  Skin: Skin color and temperature normal    Pulm: no labored breathing    Lab, Imaging and other studies: I have personally reviewed pertinent reports  Results from last 7 days  Lab Units 03/17/18  0510   SODIUM mmol/L 136   POTASSIUM mmol/L 4 0   CHLORIDE mmol/L 101   CO2 mmol/L 26   BUN mg/dL 30*   CREATININE mg/dL 1 01   GLUCOSE RANDOM mg/dL 227*   CALCIUM mg/dL 9 1       POC Glucose (mg/dl)   Date Value   03/17/2018 226 (H)   03/16/2018 287 (H)   03/16/2018 333 (H)   03/16/2018 345 (H)   03/16/2018 299 (H)   03/16/2018 163 (H)   03/16/2018 186 (H)   03/16/2018 197 (H)   03/16/2018 174 (H)   03/16/2018 175 (H)       Assessment:  Type 2 diabetes with hyperglycemia on long-term stay insulin use  Steroid induced hyperglycemia  Frontal mass of brain/meningioma    Plan:  Sugars above goal-increase Humalog to 16 units before meals    Decadron will be decreased tomorrow will reassess in the next 24 hours to see if she needs any reduction in her insulin dose  Continue to monitor blood sugars before meals and bedtime adjust accordingly    Frontal mass of brain/meningioma-status post surgical resection-management as per primary team      Portions of the record may have been created with voice recognition software

## 2018-03-17 NOTE — RAPID RESPONSE
Progress Note - Rapid Response Airway Note  Bridgette Chaney 62 y o  female MRN: [id number]      Arrived to find patient on the ground in care of nursing staff  Had witnessed seizure while standing  Focal seizure on L side  Lowered to ground  O2 via NC  Currently awake speaking in full sentences in NAD  Recent Neurosurgery, on NSG service  Patient currently has LUE and LLE weakness, no bulbar symptoms on exam  No midline C-spine tenderness  She did not fall, there for CSpine is cleared  Airway stable  Will obtain stat CTH  Patient left in care of SOD and Critical Care, rapid response team      No acute airway intervention  Marty Brown MD  PGY-3 EM Resident

## 2018-03-17 NOTE — OCCUPATIONAL THERAPY NOTE
03/17/18 2343   Restrictions/Precautions   Weight Bearing Precautions Per Order No   Other Precautions Fall Risk   Pain Assessment   Pain Assessment No/denies pain   Pain Score No Pain   ADL   Where Assessed Chair   UB Dressing Assistance 7  Independent   UB Dressing Comments don dress   LB Dressing Assistance 7  Independent   LB Dressing Comments pants and socks   Transfers   Sit to Stand 7  Independent   Stand to Sit 7  Independent   Stand pivot 7  Independent   Functional Mobility   Functional Mobility 6  Modified independent   Additional items Rolling walker   Cognition   Overall Cognitive Status WFL   Arousal/Participation Alert   Attention Within functional limits   Orientation Level Oriented X4   Memory Within functional limits   Following Commands Follows all commands and directions without difficulty   Activity Tolerance   Activity Tolerance Patient tolerated treatment well   Assessment   Assessment Pt participates in OT session with focus on dressing, functional mobility, and activity tolerance to increase I for d/c  Pt I dressing to don dress, pants, and socks while seated/standing from bedside chair  Pt states she already finished her grooming/bathing prior to session and she completed it on her own  Pt engaged in functional mobility with RW into hallway with no need for rest break  Pt able to ambulate around her room without RW and grab clothes and objects out of her closet I  Pt to be cleared from her OT goals after conferring with OT Celina Richey  Pt will continue to benefit from activity tolerance  Plan   Treatment Interventions ADL retraining;Functional transfer training; Endurance training; Activityengagement   Goal Expiration Date 03/26/18   Treatment Day 1   OT Frequency 3-5x/wk   Recommendation   OT Discharge Recommendation Home with family support

## 2018-03-17 NOTE — CONSULTS
Consultation - Neurology   Kaylie Vazquez 62 y o  female MRN: [id number]  Unit/Bed#: [facility] 918-01 Encounter: [id number]      Assessment/Plan   Assessment/Plan:  28-year-old female status post resection of right frontal/parasagittal meningioma  1) focal motor seizure today likely due to post surgical blood products and subsequent cortical irritation  - will increase Vimpat from 50 mg to 100 mg b i d   - will also start Keppra 750 mg b i d   - no clear need for EEG as is unlikely to alter management  - can titrate above-mentioned AEDs based on any recurrent clinical seizure activity  - will follow-up in a marion [facility] 2) epilepsy  - patient follows with Dr Jorge Alberto Lomeli, who was planning to increase patient's Vimpat next week  History of Present Illness     Reason for Consult / Principal Problem:  Seizure  Hx and PE limited by:  Not applicable  HPI: Kaylie Vazquez is a 62 y o  right handed female who seen at [facility] early in the month progressive left-sided weakness  MRI brain demonstrated 6 cm right frontoparietal mass, likely meningioma, with associated mass effect and vasogenic edema  On 03/13 2018 patient underwent successful  P [facility] embolization of middle meningeal arteries supplying above-mentioned mass  On 03/15/2018 patient underwent right frontal craniotomy and mass resection, intraoperative pathology consistent with meningioma  On 03/17/2018 patient was rapid response following focal motor seizure involving the left arm and leg  Seizure did not generalize  Patient retained consciousness during event  The event lasted between 2 and 3 minutes as per staff  Followed by a flaccid paralysis of the left arm and leg which slowly improved over the next 20 minutes  Currently patient has subtle drift on the left arm and leg, sensation is intact, DTRs are diminished in the left versus right arm and leg  There is no clonus, and toes are mute bilaterally            Inpatient consult to Neurology  Consult performed by: Tessa Laboy  Consult ordered by: Patrice Reyes          Review of Systems   Constitutional: Negative for chills and fever  HENT: Negative for facial swelling and hearing loss  Eyes: Negative for pain and discharge  Respiratory: Negative for cough, choking and shortness of breath  Cardiovascular: Negative for chest pain  Gastrointestinal: Negative for constipation, diarrhea, nausea and vomiting  Endocrine: Negative for cold intolerance and heat intolerance  Genitourinary: Negative for difficulty urinating, frequency and urgency  Skin: Negative for color change and rash  Neurological: Positive for weakness and headaches  Negative for dizziness, facial asymmetry, light-headedness and numbness  All other systems reviewed and are negative        Historical Information   Past Medical History:   Diagnosis Date    Cancer [facility] [address] [facility]     Diabetes mellitus [facility] [address] [facility]     Seizures ([facility] [address] )      Past Surgical History:   Procedure Laterality Date    HYSTERECTOMY      WI EXCIS SUPRATENT BRAIN TUMOR Right 3/15/2018    Procedure: IMAGE-GUIDED RIGHT FRONTOPARIETAL CRANIOTOMY FOR TUMOR;  Surgeon: Diane Guillen MD;  Location: [facility];  Service: Neurosurgery     Social History   History   Alcohol Use No     History   Drug Use No     History   Smoking Status    Former Smoker    Quit date: 3/5/2000   Smokeless Tobacco    Never Used     Family History: non-contributory    Meds/Allergies   current meds:   Current Facility-Administered Medications   Medication Dose Route Frequency    acetaminophen (TYLENOL) tablet 650 mg  650 mg Oral Q4H PRN    acetaminophen (TYLENOL) tablet 975 mg  975 mg Oral Q8H [address]    bisacodyl (DULCOLAX) rectal suppository 10 mg  10 mg Rectal Daily PRN    dexamethasone (DECADRON) injection 4 mg  4 mg Intravenous Q6H [address]    docusate sodium (COLACE) capsule 100 mg  100 mg Oral BID    famotidine (PEPCID) tablet 20 mg  20 mg Oral Q12H [address]    ferrous sulfate tablet 325 mg  325 mg Oral Daily    heparin (porcine) subcutaneous injection 5,000 Units  5,000 Units Subcutaneous Q8H [facility]    insulin glargine (LANTUS) subcutaneous injection 40 Units  40 Units Subcutaneous QAM    insulin lispro (HumaLOG) 100 units/mL subcutaneous injection 16 Units  16 Units Subcutaneous TID With Meals    insulin lispro (HumaLOG) 100 units/mL subcutaneous injection 2-12 Units  2-12 Units Subcutaneous TID AC    insulin lispro (HumaLOG) 100 units/mL subcutaneous injection 2-12 Units  2-12 Units Subcutaneous HS    labetalol (NORMODYNE) injection 10 mg  10 mg Intravenous Q4H PRN    lacosamide (VIMPAT) tablet 50 mg  50 mg Oral Q12H [facility]    lisinopril (ZESTRIL) tablet 20 mg  20 mg Oral Daily    LORazepam (ATIVAN) 2 mg/mL injection 1 mg  1 mg Intravenous Q6H PRN    ondansetron (ZOFRAN) injection 4 mg  4 mg Intravenous Q6H PRN    oxyCODONE (ROXICODONE) IR tablet 2 5 mg  2 5 mg Oral Q4H PRN    oxyCODONE (ROXICODONE) IR tablet 5 mg  5 mg Oral Q4H PRN    sertraline (ZOLOFT) tablet 50 mg  50 mg Oral Daily       Allergies   Allergen Reactions    Dilantin [Phenytoin]     Lidocaine     Statins        Objective   Vitals:Blood pressure 166/78, pulse 83, temperature (!) 97 4 °F (36 3 °C), temperature source Oral, resp  rate 17, height 5' 6" (1 676 m), weight 101 kg (222 lb 10 6 oz), SpO2 100 %  ,Body mass index is 35 94 kg/m²  Intake/Output Summary (Last 24 hours) at 03/17/18 6669  Last data filed at 03/17/18 1315   Gross per 24 hour   Intake              940 ml   Output              382 ml   Net              558 ml       Invasive Devices: Invasive Devices     Peripheral Intravenous Line            Peripheral IV 03/15/18 Right Antecubital 2 days    Peripheral IV 03/17/18 Left Antecubital less than 1 day                Physical Exam   Constitutional: Vital signs are normal  She appears well-developed and well-nourished  Afebrile  HENT:   Head: Normocephalic and atraumatic     Cardiovascular: Normal rate, regular rhythm and normal heart sounds  No murmur heard  Pulmonary/Chest: Effort normal and breath sounds normal      Neurologic Exam     Mental Status   - Oriented to person, place, and date  - level of consciousness alert  - follows commands appropriately  - Attention normal  - Fund of knowledge appropriate    - No evidence of Aphasia  Cranial Nerves   -Visual Fields full, PERRLA, EOMI  -Fundoscopic exam attempted, unable to visualize disks  -Face is symmetric with respect to motor and sensory   -Audition intact and symmetric    -Tongue, palate, uvula midline   -Shoulder shrug intact and symmetric  Motor Exam - 5/5 strength in the right arm and leg  - very mild drift in the left arm and leg  - normal bulk throughout  - normal tone throughout     Sensory Exam   Sensation intact to touch, pin, temp, vib, in all four extremities  Gait, Coordination, and Reflexes - DTR's 1+ in the left arm and leg, 2+ in the right arm and leg   - Toes neutral bilaterally  - No Gross ataxia per finger to nose    - gait defered  Lab Results: I have personally reviewed pertinent reports  Imaging Studies: I have personally reviewed pertinent films in PACS  EKG, Pathology, and Other Studies: I have personally reviewed pertinent reports      VTE Prophylaxis: Sequential compression device (Venodyne)

## 2018-03-17 NOTE — PLAN OF CARE
Problem: OCCUPATIONAL THERAPY ADULT  Goal: Performs self-care activities at highest level of function for planned discharge setting  See evaluation for individualized goals  Treatment Interventions: ADL retraining, Functional transfer training, Endurance training, Patient/family training, Equipment evaluation/education, Compensatory technique education, Activityengagement          See flowsheet documentation for full assessment, interventions and recommendations  Outcome: Progressing  Limitation: Decreased ADL status, Decreased endurance, Decreased Safe judgement during ADL, Decreased self-care trans, Decreased high-level ADLs  Prognosis: Good  Assessment: Pt participates in OT session with focus on dressing, functional mobility, and activity tolerance to increase I for d/c  Pt I dressing to don dress, pants, and socks while seated/standing from bedside chair  Pt states she already finished her grooming/bathing prior to session and she completed it on her own  Pt engaged in functional mobility with RW into hallway with no need for rest break  Pt able to ambulate around her room without RW and grab clothes and objects out of her closet I  Pt to be cleared from her OT goals after conferring with OT Obdulia Benitez  Pt will continue to benefit from activity tolerance       OT Discharge Recommendation: Home with family support

## 2018-03-17 NOTE — PHYSICAL THERAPY NOTE
PT Progress Note     03/17/18 1225   Pain Assessment   Pain Assessment No/denies pain   Restrictions/Precautions   Weight Bearing Precautions Per Order No   Braces or Orthoses (NONE)   General   Chart Reviewed Yes   Family/Caregiver Present No   Cognition   Overall Cognitive Status WFL   Attention Within functional limits   Orientation Level Oriented X4   Memory Within functional limits   Following Commands Follows all commands and directions without difficulty   Subjective   Subjective pt in chair reports amb and feeling better   Transfers   Sit to Stand 6  Modified independent   Stand to Sit 6  Modified independent   Stand pivot 6  Modified independent   Additional items (RW)   Ambulation/Elevation   Gait pattern WNL   Gait Assistance 6  Modified independent   Assistive Device Rolling walker   Distance 400 ft   Stair Management Assistance 6  Modified independent   Additional items Verbal cues   Stair Management Technique Alternating pattern; Two rails   Number of Stairs 20   Balance   Dynamic Sitting Normal  (forward reach)   Static Standing Good  (RW)   Dynamic Standing Fair +  (RW)   Endurance Deficit   Endurance Deficit Yes   Endurance Deficit Description fatigue   Activity Tolerance   Activity Tolerance Patient limited by fatigue   Nurse Made Aware yes   Exercises   Balance Training (alternate high step marches w RW, chair sit-stand, heel rais)   Assessment   Prognosis Good   Problem List Decreased strength;Decreased endurance; Impaired balance;Decreased mobility; Decreased coordination   Assessment pt progressed w significantly w gait stability able to amb short room dist indep wo AD, mod indep w  ft - gait stable no LOB noted on level surface, on turns and around environ obstacles; also mod indep up/down 20 steps w rail support step to pattern; appropriate for d/c home w fam support f/u outpt PT to address bal dysfunction and amb wo AD; no further acute skilled PT services indicated d/c PT   Goals Patient Goals go home   Recommendation   Recommendation Outpatient PT; Home with family support   Equipment Recommended (pt has walker and cane)   PT - OK to Discharge Yes

## 2018-03-17 NOTE — POST OP PROGRESS NOTES
Progress Note - Neurosurgery   Darrell Rogers 62 y o  female MRN: [id number]  Unit/Bed#: [facility] 918-01 Encounter: [id number]    Assessment:  1  POD2 s/p right frontoparietal craniotomy for resection of mass, preliminary pathology consistent with meningioma  2  POD3 s/p pre-operative embolization of brain mass  3  History of DM2 with hyperglycemia  4  Seizure disorder  5  Leukocytosis-likely reactive    Plan:  · Neuro exam: GCS15, AAOx3, full strength in all extremities  CN2-12 intact  · Imaging reviewed personally and with attending:  · Post-operative CT head 3/16:  Interval meningioma resection with small amount of blood products in the operative bed likely Surgicel  Subgaleal MARY drain in place  · Postoperative management  · MARY drain discontinued today  · Headaches well controlled with Tylenol and oxycodone  · PT and OT recommending discharge home with family support and home PT   · Subcutaneous heparin, SCDs, and mobilization for DVT prophylaxis  · Continue Decadron 4 q 6h, anticipate 48 hour as follows  Decadron 4 Q 8 starting tomorrow x48 hours, then 2 Q 6, then 2 Q 8, then 2 Q 12, then 2 Q 24  · Seizure disorder  · Continue Vimpat, home antiepileptic- patient had 1 episode of facial twitching overnight 3/16, this is a typical occurrence for her  No generalized tonic-clonic seizure or postictal state  · Diabetes mellitus 2 with hyperglycemia- Endocrinology following  · Previously on insulin drip  · transitioning to home insulin regimen with sliding scale coverage  · Continue to hold Januvia and metformin currently  · Weaned Decadron as able as this is likely contributing to hyperglycemia and reactive leukocytosis  · POCT 163-333 over 24H  · Neurosurgery to continue to follow his primary team  Clear to transfer to medical surgical unit from a neurosurgery standpoint      Nurse provider rounds completed with Demarcus Jean RN    Subjective/Objective   Chief Complaint: " I just went for a long walk! "    Subjective: Patient reports feeling well  Just went for a long walk with outpatient therapy  States that minimal frontal headaches are well controlled with Tylenol  Not requiring oxycodone  Denies vision changes, dizziness, nausea  Tolerating diet well  Voiding without difficulty  Had a bowel movement this morning  States she had 1 episode of right facial twitching last evening, this is a normal occurrence for her  No generalized tonic/clonic seizure  Patient is excited that she did not have episodes facial twitching/focal motor seizure exacerbated by sneezing which was an occurrence for in the past   Patient looks forward to being discharged home following control of blood glucose  Objective:  Sitting up in chair, no obvious distress  I/O       03/15 0701 - 03/16 0700 03/16 0701 - 03/17 0700 03/17 0701 - 03/18 0700    P  O  200 740     I V  (mL/kg) 4338 1 (43) 14 (0 1)     IV Piggyback 350      Total Intake(mL/kg) 4888 1 (48 4) 754 (7 5)     Urine (mL/kg/hr) 7700 (3 2) 900 (0 4)     Drains 245 (0 1) 52 (0)     Stool  0 (0)     Blood 150 (0 1)      Total Output 8095 952      Net -3206 9 -198             Unmeasured Urine Occurrence  5 x     Unmeasured Stool Occurrence  1 x           Invasive Devices     Peripheral Intravenous Line            Peripheral IV 03/15/18 Right Antecubital 2 days          Drain            Closed/Suction Drain Right Head Bulb 1 day                Physical Exam:  Vitals: Blood pressure (!) 172/82, pulse 65, temperature 98 1 °F (36 7 °C), resp  rate 20, height 5' 6" (1 676 m), weight 101 kg (222 lb 10 6 oz), SpO2 98 %  ,Body mass index is 35 94 kg/m²  General appearance: alert, appears stated age, cooperative and no distress  Head:  Right frontoparietal incision well approximated with Monocryl in histocryl  Right parietal MARY drain discontinued  Monocryl suture tied down    Eyes: EOMI, PERRL, acuity intact in all quadrants  Abdomen:  Nondistended nontender  Lungs: non labored breathing  Heart: regular heart rate  Neurologic:   Mental status: Alert, oriented, thought content appropriate, speech without dysarthria, repetition intact  Cranial nerves: grossly intact (Cranial nerves II-XII)  Sensory: normal to light touch bilateral upper and lower extremities with exception to minimal left peripheral neuropathy which is patient's baseline  Motor: moving all extremities without focal weakness 5/5 bilateral upper and lower extremities throughout  Reflexes: 2+ and symmetric bilateral upper and lower extremities without clonus  Coordination: finger to nose normal bilaterally, no drift bilaterally      Lab Results:    Results from last 7 days  Lab Units 03/17/18  0510 03/16/18  0524 03/15/18  1108  03/14/18  0425   WBC Thousand/uL 19 55* 16 06*  --   --  13 35*   HEMOGLOBIN g/dL 12 5 11 1*  --   --  11 8   I STAT HEMOGLOBIN g/dl  --   --  10 5*  < >  --    HEMATOCRIT % 37 6 32 6*  --   --  34 1*   PLATELETS Thousands/uL 255 183  --   --  200   < > = values in this interval not displayed  Results from last 7 days  Lab Units 03/17/18  0510 03/16/18  0019 03/15/18  1108   SODIUM mmol/L 136 139  --    POTASSIUM mmol/L 4 0 3 6  --    CHLORIDE mmol/L 101 106  --    CO2 mmol/L 26 23  --    BUN mg/dL 30* 26*  --    CREATININE mg/dL 1 01 0 98  --    CALCIUM mg/dL 9 1 7 9*  --    GLUCOSE RANDOM mg/dL 227* 183*  --    GLUCOSE, ISTAT mg/dl  --   --  211*       Results from last 7 days  Lab Units 03/17/18  0510   MAGNESIUM mg/dL 2 5       Results from last 7 days  Lab Units 03/17/18  0510   PHOSPHORUS mg/dL 3 8       Results from last 7 days  Lab Units 03/16/18  0524   INR  1 06   PTT seconds 23     Imaging Studies: I have personally reviewed pertinent reports  and I have personally reviewed pertinent films in PACS    EKG, Pathology, and Other Studies: I have personally reviewed pertinent reports        VTE Pharmacologic Prophylaxis: Heparin    VTE Mechanical Prophylaxis: sequential compression device and foot pump applied

## 2018-03-17 NOTE — PLAN OF CARE
DISCHARGE PLANNING     Discharge to home or other facility with appropriate resources Progressing        DISCHARGE PLANNING - CARE MANAGEMENT     Discharge to post-acute care or home with appropriate resources Progressing        INFECTION - ADULT     Absence or prevention of progression during hospitalization Progressing     Absence of fever/infection during neutropenic period Progressing        Knowledge Deficit     Patient/family/caregiver demonstrates understanding of disease process, treatment plan, medications, and discharge instructions Progressing        NEUROSENSORY - ADULT     Achieves stable or improved neurological status Progressing     Absence of seizures Progressing     Remains free of injury related to seizures activity Progressing     Achieves maximal functionality and self care Progressing        Nutrition/Hydration-ADULT     Nutrient/Hydration intake appropriate for improving, restoring or maintaining nutritional needs Progressing        PAIN - ADULT     Verbalizes/displays adequate comfort level or baseline comfort level Progressing        Potential for Falls     Patient will remain free of falls Progressing        Prexisting or High Potential for Compromised Skin Integrity     Skin integrity is maintained or improved Progressing        SAFETY ADULT     Maintain or return to baseline ADL function Progressing     Maintain or return mobility status to optimal level Progressing     Patient will remain free of falls Progressing

## 2018-03-18 LAB
ABO GROUP BLD BPU: NORMAL
ABO GROUP BLD BPU: NORMAL
ANION GAP SERPL CALCULATED.3IONS-SCNC: 11 MMOL/L (ref 4–13)
BASOPHILS # BLD AUTO: 0.01 THOUSANDS/ΜL (ref 0–0.1)
BASOPHILS NFR BLD AUTO: 0 % (ref 0–1)
BPU ID: NORMAL
BPU ID: NORMAL
BUN SERPL-MCNC: 35 MG/DL (ref 5–25)
CALCIUM SERPL-MCNC: 8.9 MG/DL (ref 8.3–10.1)
CHLORIDE SERPL-SCNC: 102 MMOL/L (ref 100–108)
CO2 SERPL-SCNC: 22 MMOL/L (ref 21–32)
CREAT SERPL-MCNC: 1.08 MG/DL (ref 0.6–1.3)
CROSSMATCH: NORMAL
CROSSMATCH: NORMAL
EOSINOPHIL # BLD AUTO: 0 THOUSAND/ΜL (ref 0–0.61)
EOSINOPHIL NFR BLD AUTO: 0 % (ref 0–6)
ERYTHROCYTE [DISTWIDTH] IN BLOOD BY AUTOMATED COUNT: 13.3 % (ref 11.6–15.1)
GFR SERPL CREATININE-BSD FRML MDRD: 57 ML/MIN/1.73SQ M
GLUCOSE SERPL-MCNC: 127 MG/DL (ref 65–140)
GLUCOSE SERPL-MCNC: 174 MG/DL (ref 65–140)
GLUCOSE SERPL-MCNC: 211 MG/DL (ref 65–140)
GLUCOSE SERPL-MCNC: 215 MG/DL (ref 65–140)
GLUCOSE SERPL-MCNC: 336 MG/DL (ref 65–140)
HCT VFR BLD AUTO: 33.9 % (ref 34.8–46.1)
HGB BLD-MCNC: 11.7 G/DL (ref 11.5–15.4)
LYMPHOCYTES # BLD AUTO: 2.16 THOUSANDS/ΜL (ref 0.6–4.47)
LYMPHOCYTES NFR BLD AUTO: 11 % (ref 14–44)
MCH RBC QN AUTO: 30.1 PG (ref 26.8–34.3)
MCHC RBC AUTO-ENTMCNC: 34.5 G/DL (ref 31.4–37.4)
MCV RBC AUTO: 87 FL (ref 82–98)
MONOCYTES # BLD AUTO: 0.72 THOUSAND/ΜL (ref 0.17–1.22)
MONOCYTES NFR BLD AUTO: 4 % (ref 4–12)
NEUTROPHILS # BLD AUTO: 15.89 THOUSANDS/ΜL (ref 1.85–7.62)
NEUTS SEG NFR BLD AUTO: 85 % (ref 43–75)
NRBC BLD AUTO-RTO: 0 /100 WBCS
PLATELET # BLD AUTO: 251 THOUSANDS/UL (ref 149–390)
PMV BLD AUTO: 10.8 FL (ref 8.9–12.7)
POTASSIUM SERPL-SCNC: 4.2 MMOL/L (ref 3.5–5.3)
RBC # BLD AUTO: 3.89 MILLION/UL (ref 3.81–5.12)
SODIUM SERPL-SCNC: 135 MMOL/L (ref 136–145)
UNIT DISPENSE STATUS: NORMAL
UNIT DISPENSE STATUS: NORMAL
UNIT PRODUCT CODE: NORMAL
UNIT PRODUCT CODE: NORMAL
UNIT RH: NORMAL
UNIT RH: NORMAL
WBC # BLD AUTO: 18.89 THOUSAND/UL (ref 4.31–10.16)

## 2018-03-18 PROCEDURE — 85025 COMPLETE CBC W/AUTO DIFF WBC: CPT | Performed by: PHYSICIAN ASSISTANT

## 2018-03-18 PROCEDURE — 80048 BASIC METABOLIC PNL TOTAL CA: CPT | Performed by: PHYSICIAN ASSISTANT

## 2018-03-18 PROCEDURE — 99233 SBSQ HOSP IP/OBS HIGH 50: CPT | Performed by: PSYCHIATRY & NEUROLOGY

## 2018-03-18 PROCEDURE — 82948 REAGENT STRIP/BLOOD GLUCOSE: CPT

## 2018-03-18 RX ORDER — INSULIN GLARGINE 100 [IU]/ML
35 INJECTION, SOLUTION SUBCUTANEOUS EVERY MORNING
Status: DISCONTINUED | OUTPATIENT
Start: 2018-03-19 | End: 2018-03-18

## 2018-03-18 RX ORDER — OXCARBAZEPINE 300 MG/1
300 TABLET, FILM COATED ORAL EVERY 12 HOURS SCHEDULED
Status: DISCONTINUED | OUTPATIENT
Start: 2018-03-18 | End: 2018-03-19 | Stop reason: HOSPADM

## 2018-03-18 RX ORDER — INSULIN GLARGINE 100 [IU]/ML
30 INJECTION, SOLUTION SUBCUTANEOUS EVERY MORNING
Status: DISCONTINUED | OUTPATIENT
Start: 2018-03-19 | End: 2018-03-19 | Stop reason: HOSPADM

## 2018-03-18 RX ADMIN — OXCARBAZEPINE 300 MG: 300 TABLET ORAL at 20:34

## 2018-03-18 RX ADMIN — ACETAMINOPHEN 650 MG: 325 TABLET, FILM COATED ORAL at 05:27

## 2018-03-18 RX ADMIN — INSULIN LISPRO 2 UNITS: 100 INJECTION, SOLUTION INTRAVENOUS; SUBCUTANEOUS at 08:15

## 2018-03-18 RX ADMIN — HEPARIN SODIUM 5000 UNITS: 5000 INJECTION, SOLUTION INTRAVENOUS; SUBCUTANEOUS at 21:59

## 2018-03-18 RX ADMIN — OXCARBAZEPINE 300 MG: 300 TABLET ORAL at 13:29

## 2018-03-18 RX ADMIN — LEVETIRACETAM 750 MG: 750 TABLET ORAL at 08:13

## 2018-03-18 RX ADMIN — LISINOPRIL 20 MG: 20 TABLET ORAL at 08:13

## 2018-03-18 RX ADMIN — SERTRALINE HYDROCHLORIDE 50 MG: 50 TABLET ORAL at 08:13

## 2018-03-18 RX ADMIN — HEPARIN SODIUM 5000 UNITS: 5000 INJECTION, SOLUTION INTRAVENOUS; SUBCUTANEOUS at 13:29

## 2018-03-18 RX ADMIN — HEPARIN SODIUM 5000 UNITS: 5000 INJECTION, SOLUTION INTRAVENOUS; SUBCUTANEOUS at 05:27

## 2018-03-18 RX ADMIN — DEXAMETHASONE SODIUM PHOSPHATE 4 MG: 4 INJECTION, SOLUTION INTRAMUSCULAR; INTRAVENOUS at 11:52

## 2018-03-18 RX ADMIN — DEXAMETHASONE SODIUM PHOSPHATE 4 MG: 4 INJECTION, SOLUTION INTRAMUSCULAR; INTRAVENOUS at 05:27

## 2018-03-18 RX ADMIN — INSULIN GLARGINE 40 UNITS: 100 INJECTION, SOLUTION SUBCUTANEOUS at 08:20

## 2018-03-18 RX ADMIN — INSULIN LISPRO 8 UNITS: 100 INJECTION, SOLUTION INTRAVENOUS; SUBCUTANEOUS at 11:53

## 2018-03-18 RX ADMIN — FAMOTIDINE 20 MG: 20 TABLET, FILM COATED ORAL at 08:13

## 2018-03-18 RX ADMIN — FERROUS SULFATE TAB 325 MG (65 MG ELEMENTAL FE) 325 MG: 325 (65 FE) TAB at 08:13

## 2018-03-18 RX ADMIN — LACOSAMIDE 100 MG: 50 TABLET, FILM COATED ORAL at 08:14

## 2018-03-18 RX ADMIN — INSULIN LISPRO 4 UNITS: 100 INJECTION, SOLUTION INTRAVENOUS; SUBCUTANEOUS at 22:00

## 2018-03-18 RX ADMIN — DEXAMETHASONE SODIUM PHOSPHATE 4 MG: 4 INJECTION, SOLUTION INTRAMUSCULAR; INTRAVENOUS at 17:42

## 2018-03-18 RX ADMIN — LACOSAMIDE 100 MG: 50 TABLET, FILM COATED ORAL at 20:34

## 2018-03-18 RX ADMIN — FAMOTIDINE 20 MG: 20 TABLET, FILM COATED ORAL at 20:34

## 2018-03-18 NOTE — POST OP PROGRESS NOTES
Progress Note - Neurosurgery   Марина Moser 62 y o  female MRN: [id number]  Unit/Bed#: [facility] 918-01 Encounter: [id number]    Assessment:  1  POD3 s/p right frontoparietal craniotomy for resection of mass, preliminary pathology consistent with meningioma  2  POD3 s/p pre-operative embolization of brain mass  3  History of DM2 with hyperglycemia  4  Seizure disorder  5  Leukocytosis-likely reactive  6  YING    Plan:  · Neuro exam: GCS15, AAOx3, full strength in all extremities  CN2-12 intact  · Imaging reviewed personally and with attending:  · Post-operative CT head 3/16:  Interval meningioma resection with small amount of blood products in the operative bed likely Surgicel  Subgaleal MARY drain in place  · CT head 3/17 following rapid response:  Stability of postoperative changes associated with craniotomy for mass resection  Stable residual blood products resection bed  · Postoperative management  · MARY drain discontinued   · Headaches well controlled with Tylenol and oxycodone  · PT and OT recommending discharge home with family support and home PT   · Subcutaneous heparin, SCDs, and mobilization for DVT prophylaxis  · Continue Decadron 4 q 6h, anticipate 48 hour as follows  Decadron 4 Q 8 starting tomorrow x48 hours, then 2 Q 6, then 2 Q 8, then 2 Q 12, then 2 Q 24  · Seizure disorder  · Rapid response called on patient last evening secondary to focal motor seizure causing patient to require gentle lowering to the 4  CT head was obtained thereafter and should stability  Neurology consulted and recommended increasing Vimpat from 50 mg b i d  to 100 mg b i d  in addition to 300mg trileptal BID  Previous adverse effects to keppra including hyperglycemia, diarrhea, and altered mental status  Continue to monitor  · Diabetes mellitus 2 with hyperglycemia- Endocrinology following  · Previously on insulin drip  · transitioning to home insulin regimen with sliding scale coverage    · Continue to hold Januvia and metformin currently  · Wean Decadron as able as this is likely contributing to hyperglycemia and reactive leukocytosis  · POCT 133-336 over 24H (improved)  · YING  · Creatinine increased from 1 01-1 4, BUN increased from 30-35  Repeat BMP today  Consider gentle hydration if persistently elevated  · Neurosurgery to continue to follow his primary team    · Discharge pending control of blood glucose and stability on anti-epileptic regimen     Subjective/Objective   Chief Complaint: " I feel better now "    Subjective:  Patient describes event of focal motor seizure last evening  States that she went into the room, she was trying her hands had a "thunderbolt travel down her left arm, subsequently she developed uncontrollable shaking activity of her left arm  She quickly used the call bell for assistance  She was lowered to the ground by an assistant  This shaking activity lasted 3-4 minutes, she remained conscious duration of event  She denies hitting her head, was protected by staff  After  event she was taken back to the chair  She subsequently felt very weak in her left arm which resolved over time  Patient slept well last evening without recurrence of focal motor seizure  Currently, patient feels back to her baseline, denies weakness in the left side  Tolerated diet well  Requests to be ambulate to the bathroom without assistance  Denies headache, vision changes, weakness, numbness, nausea, vomiting  Continues to void without difficulty    Objective:  Sitting up in chair, no obvious distress    I/O       03/16 0701 - 03/17 0700 03/17 0701 - 03/18 0700 03/18 0701 - 03/19 0700    P  O  740 1200     I V  (mL/kg) 14 (0 1)      IV Piggyback       Total Intake(mL/kg) 754 (7 5) 1200 (11 9)     Urine (mL/kg/hr) 900 (0 4) 250 (0 1)     Drains 52 (0)      Stool 0 (0)      Blood       Total Output 952 250      Net -198 +950             Unmeasured Urine Occurrence 5 x 5 x     Unmeasured Stool Occurrence 1 x Invasive Devices     Peripheral Intravenous Line            Peripheral IV 03/15/18 Right Antecubital 3 days    Peripheral IV 03/17/18 Left Antecubital less than 1 day                Physical Exam:  Vitals: Blood pressure 123/71, pulse 56, temperature 97 6 °F (36 4 °C), temperature source Oral, resp  rate 18, height 5' 6" (1 676 m), weight 101 kg (222 lb 10 6 oz), SpO2 95 %  ,Body mass index is 35 94 kg/m²  General appearance: alert, appears stated age, cooperative and no distress  Head:  Incision clean dry and intact  Eyes: EOMI, PERRL, acuity intact in all fields  Neck:  Full range of motion  Lungs: non labored breathing  Heart: regular heart rate  Neurologic:   Mental status: Alert, oriented x3, thought content appropriate  Cranial nerves: grossly intact (Cranial nerves II-XII)  Sensory: normal to light touch in bilateral upper and lower extremities  Motor: moving all extremities without focal weakness    5/5 bilateral upper and lower extremities  Reflexes: 2+ and symmetric  Coordination: finger to nose normal bilaterally, no drift bilaterally      Lab Results:    Results from last 7 days  Lab Units 03/17/18  1656 03/17/18  0510 03/16/18  0524   WBC Thousand/uL 23 73* 19 55* 16 06*   HEMOGLOBIN g/dL 12 4 12 5 11 1*   HEMATOCRIT % 36 7 37 6 32 6*   PLATELETS Thousands/uL 281 255 183       Results from last 7 days  Lab Units 03/17/18  1656 03/17/18  0510 03/16/18  0019   SODIUM mmol/L 134* 136 139   POTASSIUM mmol/L 4 1 4 0 3 6   CHLORIDE mmol/L 100 101 106   CO2 mmol/L 18* 26 23   BUN mg/dL 35* 30* 26*   CREATININE mg/dL 1 40* 1 01 0 98   CALCIUM mg/dL 8 8 9 1 7 9*   TOTAL PROTEIN g/dL 7 7  --   --    BILIRUBIN TOTAL mg/dL 0 29  --   --    ALK PHOS U/L 71  --   --    ALT U/L 55  --   --    AST U/L 23  --   --    GLUCOSE RANDOM mg/dL 165* 227* 183*       Results from last 7 days  Lab Units 03/17/18  0510   MAGNESIUM mg/dL 2 5       Results from last 7 days  Lab Units 03/17/18  0510   PHOSPHORUS mg/dL 3 8 Results from last 7 days  Lab Units 03/17/18  1656 03/16/18  0524   INR  0 95 1 06   PTT seconds 18* 23       Imaging Studies: I have personally reviewed pertinent reports  and I have personally reviewed pertinent films in PACS    EKG, Pathology, and Other Studies: I have personally reviewed pertinent reports        VTE Pharmacologic Prophylaxis: Heparin    VTE Mechanical Prophylaxis: sequential compression device and foot pump applied

## 2018-03-18 NOTE — PLAN OF CARE
Problem: Prexisting or High Potential for Compromised Skin Integrity  Goal: Skin integrity is maintained or improved  INTERVENTIONS:  - Identify patients at risk for skin breakdown  - Assess and monitor skin integrity  - Assess and monitor nutrition and hydration status  - Monitor labs (i e  albumin)  - Assess for incontinence   - Turn and reposition patient  - Assist with mobility/ambulation  - Relieve pressure over bony prominences  - Avoid friction and shearing  - Provide appropriate hygiene as needed including keeping skin clean and dry  - Evaluate need for skin moisturizer/barrier cream  - Collaborate with interdisciplinary team (i e  Nutrition, Rehabilitation, etc )   - Patient/family teaching   Outcome: Progressing      Problem: Potential for Falls  Goal: Patient will remain free of falls  INTERVENTIONS:  - Assess patient frequently for physical needs  -  Identify cognitive and physical deficits and behaviors that affect risk of falls    -  [place] fall precautions as indicated by assessment   - Educate patient/family on patient safety including physical limitations  - Instruct patient to call for assistance with activity based on assessment  - Modify environment to reduce risk of injury  - Consider OT/PT consult to assist with strengthening/mobility    Outcome: Progressing      Problem: PAIN - ADULT  Goal: Verbalizes/displays adequate comfort level or baseline comfort level  Interventions:  - Encourage patient to monitor pain and request assistance  - Assess pain using appropriate pain scale  - Administer analgesics based on type and severity of pain and evaluate response  - Implement non-pharmacological measures as appropriate and evaluate response  - Consider cultural and social influences on pain and pain management  - Notify physician/advanced practitioner if interventions unsuccessful or patient reports new pain   Outcome: Progressing      Problem: INFECTION - ADULT  Goal: Absence or prevention of progression during hospitalization  INTERVENTIONS:  - Assess and monitor for signs and symptoms of infection  - Monitor lab/diagnostic results  - Monitor all insertion sites, i e  indwelling lines, tubes, and drains  - Monitor endotracheal (as able) and nasal secretions for changes in amount and color  - [place] appropriate cooling/warming therapies per order  - Administer medications as ordered  - Instruct and encourage patient and family to use good hand hygiene technique  - Identify and instruct in appropriate isolation precautions for identified infection/condition   Outcome: Progressing    Goal: Absence of fever/infection during neutropenic period  INTERVENTIONS:  - Monitor WBC  - Implement neutropenic guidelines   Outcome: Progressing      Problem: SAFETY ADULT  Goal: Maintain or return to baseline ADL function  INTERVENTIONS:  -  Assess patient's ability to carry out ADLs; assess patient's baseline for ADL function and identify physical deficits which impact ability to perform ADLs (bathing, care of mouth/teeth, toileting, grooming, dressing, etc )  - Assess/evaluate cause of self-care deficits   - Assess range of motion  - Assess patient's mobility; develop plan if impaired  - Assess patient's need for assistive devices and provide as appropriate  - Encourage maximum independence but intervene and supervise when necessary  ¯ Involve family in performance of ADLs  ¯ Assess for home care needs following discharge   ¯ Request OT consult to assist with ADL evaluation and planning for discharge  ¯ Provide patient education as appropriate   Outcome: Progressing    Goal: Maintain or return mobility status to optimal level  INTERVENTIONS:  - Assess patient's baseline mobility status (ambulation, transfers, stairs, etc )    - Identify cognitive and physical deficits and behaviors that affect mobility  - Identify mobility aids required to assist with transfers and/or ambulation (gait belt, sit-to-stand, lift, walker, cane, etc )  - [place] fall precautions as indicated by assessment  - Record patient progress and toleration of activity level on Mobility SBAR; progress patient to next Phase/Stage  - Instruct patient to call for assistance with activity based on assessment  - Request Rehabilitation consult to assist with strengthening/weightbearing, etc    Outcome: Progressing    Goal: Patient will remain free of falls  INTERVENTIONS:  - Assess patient frequently for physical needs  -  Identify cognitive and physical deficits and behaviors that affect risk of falls  -  [place] fall precautions as indicated by assessment   - Educate patient/family on patient safety including physical limitations  - Instruct patient to call for assistance with activity based on assessment  - Modify environment to reduce risk of injury  - Consider OT/PT consult to assist with strengthening/mobility    Outcome: Progressing      Problem: DISCHARGE PLANNING  Goal: Discharge to home or other facility with appropriate resources  INTERVENTIONS:  - Identify barriers to discharge w/patient and caregiver  - Arrange for needed discharge resources and transportation as appropriate  - Identify discharge learning needs (meds, wound care, etc )  - Arrange for interpretive services to assist at discharge as needed  - Refer to Case Management Department for coordinating discharge planning if the patient needs post-hospital services based on physician/advanced practitioner order or complex needs related to functional status, cognitive ability, or social support system   Outcome: Progressing      Problem: Knowledge Deficit  Goal: Patient/family/caregiver demonstrates understanding of disease process, treatment plan, medications, and discharge instructions  Complete learning assessment and assess knowledge base    Interventions:  - Provide teaching at level of understanding  - Provide teaching via preferred learning methods   Outcome: Progressing      Problem: NEUROSENSORY - ADULT  Goal: Achieves stable or improved neurological status  INTERVENTIONS  - Monitor and report changes in neurological status  - Initiate measures to prevent increased intracranial pressure  - Maintain blood pressure and fluid volume within ordered parameters to optimize cerebral perfusion  - Monitor temperature, glucose, and sodium or any other associated labs   Initiate appropriate interventions as ordered  - Monitor for seizure activity   - Administer anti-seizure medications as ordered   Outcome: Progressing    Goal: Absence of seizures  INTERVENTIONS  - Monitor for seizure activity  - Administer anti-seizure medications as ordered  - Monitor neurological status   Outcome: Progressing    Goal: Remains free of injury related to seizures activity  INTERVENTIONS  - Maintain airway, patient safety  and administer oxygen as ordered  - Monitor patient for seizure activity, document and report duration and description of seizure to physician/advanced practitioner  - If seizure occurs,  ensure patient safety during seizure  - Reorient patient post seizure  - Seizure pads on all 4 side rails  - Instruct patient/family to notify RN of any seizure activity including if an aura is experienced  - Instruct patient/family to call for assistance with activity based on nursing assessment  - Administer anti-seizure medications as ordered  - Monitor fetal well being   Outcome: Progressing    Goal: Achieves maximal functionality and self care  INTERVENTIONS  - Monitor swallowing and airway patency with patient fatigue and changes in neurological status  - Encourage and assist patient to increase activity and self care with guidance from rehab services  - Encourage visually impaired, hearing impaired and aphasic patients to use assistive/communication devices   Outcome: Progressing      Problem: Nutrition/Hydration-ADULT  Goal: Nutrient/Hydration intake appropriate for improving, restoring or maintaining nutritional needs  Monitor and assess patient's nutrition/hydration status for malnutrition (ex- brittle hair, bruises, dry skin, pale skin and conjunctiva, muscle wasting, smooth red tongue, and disorientation)  Collaborate with interdisciplinary team and initiate plan and interventions as ordered  Monitor patient's weight and dietary intake as ordered or per policy  Utilize nutrition screening tool and intervene per policy  Determine patient's food preferences and provide high-protein, high-caloric foods as appropriate       INTERVENTIONS:  - Monitor oral intake, urinary output, labs, and treatment plans  - Assess nutrition and hydration status and recommend course of action  - Evaluate amount of meals eaten  - Assist patient with eating if necessary   - Allow adequate time for meals  - Recommend/ encourage appropriate diets, oral nutritional supplements, and vitamin/mineral supplements  - Order, calculate, and assess calorie counts as needed  - Recommend, monitor, and adjust tube feedings and TPN/PPN based on assessed needs  - Assess need for intravenous fluids  - Provide specific nutrition/hydration education as appropriate  - Include patient/family/caregiver in decisions related to nutrition   Outcome: Progressing      Problem: DISCHARGE PLANNING - CARE MANAGEMENT  Goal: Discharge to post-acute care or home with appropriate resources  INTERVENTIONS:  - Conduct assessment to determine patient/family and health care team treatment goals, and need for post-acute services based on payer coverage, community resources, and patient preferences, and barriers to discharge  - Address psychosocial, clinical, and financial barriers to discharge as identified in assessment in conjunction with the patient/family and health care team  - Arrange appropriate level of post-acute services according to patient's   needs and preference and payer coverage in collaboration with the physician and health care team  - Communicate with and update the patient/family, physician, and health care team regarding progress on the discharge plan  - Arrange appropriate transportation to post-acute venues   Outcome: Progressing

## 2018-03-18 NOTE — PROGRESS NOTES
Spoke with karla with neuro sx, plan is for pt to stay over night and possible d/c tomorrow, continue to monitor pt

## 2018-03-19 VITALS
DIASTOLIC BLOOD PRESSURE: 67 MMHG | HEART RATE: 58 BPM | WEIGHT: 222.66 LBS | HEIGHT: 66 IN | SYSTOLIC BLOOD PRESSURE: 137 MMHG | BODY MASS INDEX: 35.79 KG/M2 | TEMPERATURE: 97.4 F | OXYGEN SATURATION: 100 % | RESPIRATION RATE: 18 BRPM

## 2018-03-19 DIAGNOSIS — G93.6 CEREBRAL EDEMA (HCC): ICD-10-CM

## 2018-03-19 LAB
ANION GAP SERPL CALCULATED.3IONS-SCNC: 8 MMOL/L (ref 4–13)
BASOPHILS # BLD AUTO: 0.01 THOUSANDS/ΜL (ref 0–0.1)
BASOPHILS NFR BLD AUTO: 0 % (ref 0–1)
BUN SERPL-MCNC: 42 MG/DL (ref 5–25)
CALCIUM SERPL-MCNC: 9.2 MG/DL (ref 8.3–10.1)
CHLORIDE SERPL-SCNC: 99 MMOL/L (ref 100–108)
CO2 SERPL-SCNC: 26 MMOL/L (ref 21–32)
CREAT SERPL-MCNC: 1.04 MG/DL (ref 0.6–1.3)
EOSINOPHIL # BLD AUTO: 0 THOUSAND/ΜL (ref 0–0.61)
EOSINOPHIL NFR BLD AUTO: 0 % (ref 0–6)
ERYTHROCYTE [DISTWIDTH] IN BLOOD BY AUTOMATED COUNT: 13.3 % (ref 11.6–15.1)
GFR SERPL CREATININE-BSD FRML MDRD: 60 ML/MIN/1.73SQ M
GLUCOSE SERPL-MCNC: 171 MG/DL (ref 65–140)
GLUCOSE SERPL-MCNC: 209 MG/DL (ref 65–140)
GLUCOSE SERPL-MCNC: 265 MG/DL (ref 65–140)
HCT VFR BLD AUTO: 36.7 % (ref 34.8–46.1)
HGB BLD-MCNC: 12.3 G/DL (ref 11.5–15.4)
LYMPHOCYTES # BLD AUTO: 2.94 THOUSANDS/ΜL (ref 0.6–4.47)
LYMPHOCYTES NFR BLD AUTO: 15 % (ref 14–44)
MCH RBC QN AUTO: 29.4 PG (ref 26.8–34.3)
MCHC RBC AUTO-ENTMCNC: 33.5 G/DL (ref 31.4–37.4)
MCV RBC AUTO: 88 FL (ref 82–98)
MONOCYTES # BLD AUTO: 1.07 THOUSAND/ΜL (ref 0.17–1.22)
MONOCYTES NFR BLD AUTO: 5 % (ref 4–12)
NEUTROPHILS # BLD AUTO: 15.83 THOUSANDS/ΜL (ref 1.85–7.62)
NEUTS SEG NFR BLD AUTO: 80 % (ref 43–75)
NRBC BLD AUTO-RTO: 0 /100 WBCS
PLATELET # BLD AUTO: 298 THOUSANDS/UL (ref 149–390)
PMV BLD AUTO: 10.8 FL (ref 8.9–12.7)
POTASSIUM SERPL-SCNC: 4.2 MMOL/L (ref 3.5–5.3)
RBC # BLD AUTO: 4.18 MILLION/UL (ref 3.81–5.12)
SODIUM SERPL-SCNC: 133 MMOL/L (ref 136–145)
WBC # BLD AUTO: 20.05 THOUSAND/UL (ref 4.31–10.16)

## 2018-03-19 PROCEDURE — 85025 COMPLETE CBC W/AUTO DIFF WBC: CPT | Performed by: PHYSICIAN ASSISTANT

## 2018-03-19 PROCEDURE — 99024 POSTOP FOLLOW-UP VISIT: CPT | Performed by: PHYSICIAN ASSISTANT

## 2018-03-19 PROCEDURE — 80048 BASIC METABOLIC PNL TOTAL CA: CPT | Performed by: PHYSICIAN ASSISTANT

## 2018-03-19 PROCEDURE — 82948 REAGENT STRIP/BLOOD GLUCOSE: CPT

## 2018-03-19 RX ORDER — OXCARBAZEPINE 300 MG/1
300 TABLET, FILM COATED ORAL EVERY 12 HOURS SCHEDULED
Qty: 30 TABLET | Refills: 0 | Status: SHIPPED | OUTPATIENT
Start: 2018-03-19 | End: 2018-03-28 | Stop reason: ALTCHOICE

## 2018-03-19 RX ORDER — LACOSAMIDE 100 MG/1
100 TABLET ORAL EVERY 12 HOURS SCHEDULED
Qty: 14 TABLET | Refills: 0 | Status: SHIPPED | OUTPATIENT
Start: 2018-03-19 | End: 2018-03-28

## 2018-03-19 RX ORDER — LACOSAMIDE 150 MG/1
150 TABLET ORAL EVERY 12 HOURS SCHEDULED
Qty: 30 TABLET | Refills: 0 | Status: SHIPPED | OUTPATIENT
Start: 2018-03-19 | End: 2018-03-19

## 2018-03-19 RX ORDER — DOCUSATE SODIUM 100 MG/1
100 CAPSULE, LIQUID FILLED ORAL 2 TIMES DAILY
Qty: 10 CAPSULE | Refills: 0 | Status: SHIPPED | OUTPATIENT
Start: 2018-03-19 | End: 2018-04-25

## 2018-03-19 RX ORDER — INSULIN GLARGINE 100 [IU]/ML
30 INJECTION, SOLUTION SUBCUTANEOUS EVERY MORNING
Qty: 10 ML | Refills: 0 | Status: SHIPPED | OUTPATIENT
Start: 2018-03-20 | End: 2018-03-19 | Stop reason: HOSPADM

## 2018-03-19 RX ORDER — FAMOTIDINE 20 MG/1
TABLET, FILM COATED ORAL
Qty: 184 TABLET | Refills: 0 | OUTPATIENT
Start: 2018-03-19

## 2018-03-19 RX ORDER — LACOSAMIDE 150 MG/1
150 TABLET ORAL EVERY 12 HOURS SCHEDULED
Qty: 30 TABLET | Refills: 0 | Status: SHIPPED | OUTPATIENT
Start: 2018-03-19 | End: 2018-03-29 | Stop reason: SDUPTHER

## 2018-03-19 RX ORDER — FAMOTIDINE 20 MG/1
TABLET, FILM COATED ORAL
Qty: 45 TABLET | Refills: 0 | Status: SHIPPED | OUTPATIENT
Start: 2018-03-19 | End: 2018-04-25

## 2018-03-19 RX ORDER — ACETAMINOPHEN 325 MG/1
650 TABLET ORAL EVERY 6 HOURS PRN
Qty: 30 TABLET | Refills: 0 | Status: SHIPPED | OUTPATIENT
Start: 2018-03-19 | End: 2018-04-25

## 2018-03-19 RX ORDER — DEXAMETHASONE 2 MG/1
TABLET ORAL
Qty: 40 TABLET | Refills: 0 | Status: SHIPPED | OUTPATIENT
Start: 2018-03-19 | End: 2018-04-25 | Stop reason: ALTCHOICE

## 2018-03-19 RX ORDER — FAMOTIDINE 20 MG/1
TABLET, FILM COATED ORAL
Qty: 45 TABLET | Refills: 0 | Status: SHIPPED | OUTPATIENT
Start: 2018-03-19 | End: 2018-03-19

## 2018-03-19 RX ADMIN — INSULIN LISPRO 6 UNITS: 100 INJECTION, SOLUTION INTRAVENOUS; SUBCUTANEOUS at 12:37

## 2018-03-19 RX ADMIN — HEPARIN SODIUM 5000 UNITS: 5000 INJECTION, SOLUTION INTRAVENOUS; SUBCUTANEOUS at 06:20

## 2018-03-19 RX ADMIN — LISINOPRIL 20 MG: 20 TABLET ORAL at 08:47

## 2018-03-19 RX ADMIN — DEXAMETHASONE SODIUM PHOSPHATE 4 MG: 4 INJECTION, SOLUTION INTRAMUSCULAR; INTRAVENOUS at 12:39

## 2018-03-19 RX ADMIN — LACOSAMIDE 100 MG: 50 TABLET, FILM COATED ORAL at 08:59

## 2018-03-19 RX ADMIN — INSULIN GLARGINE 30 UNITS: 100 INJECTION, SOLUTION SUBCUTANEOUS at 08:48

## 2018-03-19 RX ADMIN — OXCARBAZEPINE 300 MG: 300 TABLET ORAL at 08:48

## 2018-03-19 RX ADMIN — SERTRALINE HYDROCHLORIDE 50 MG: 50 TABLET ORAL at 08:48

## 2018-03-19 RX ADMIN — FERROUS SULFATE TAB 325 MG (65 MG ELEMENTAL FE) 325 MG: 325 (65 FE) TAB at 08:47

## 2018-03-19 RX ADMIN — DEXAMETHASONE SODIUM PHOSPHATE 4 MG: 4 INJECTION, SOLUTION INTRAMUSCULAR; INTRAVENOUS at 06:20

## 2018-03-19 RX ADMIN — INSULIN LISPRO 2 UNITS: 100 INJECTION, SOLUTION INTRAVENOUS; SUBCUTANEOUS at 08:51

## 2018-03-19 RX ADMIN — DEXAMETHASONE SODIUM PHOSPHATE 4 MG: 4 INJECTION, SOLUTION INTRAMUSCULAR; INTRAVENOUS at 01:00

## 2018-03-19 NOTE — DISCHARGE INSTRUCTIONS
Neurosurgery Discharge Recommendations   Monitor incision daily  Keep incision clean and dry   May shower and wash hair 72 hours after surgery   Avoid rubbing the incision, but gently massage hair   Do not use a hair dryer, and avoid hair products such as mousse, oils, and gels  Do not brush your hair away from the incision since this will put strain on the suture line   Do not dye or perm hair until cleared by MD Forrest Sutures/Staples will be removed 10 days after surgery   Please remove dressing within 1-2 days after surgery   May walk as tolerated- a few short walks daily   Avoid heavy lifting, pushing or pulling over 10lbs for several weeks   No bending  No running  No athletic activities during this period   Do not drive until cleared by MD/PA   Coumadin, ASA, Plavix may be restarted once cleared by MD Forrest Recovery takes time  Please allow yourself time to heal    Follow-up as scheduled for a 2 week post-operative visit for an incision check and final pathology  ** Please notify the office if incision becomes red, swollen, tender, or has increased drainage, and temp>[id number]  Return to the ER if you experience increased headache, drowsiness, weakness, nausea/vomiting, or seizures  **    Decadron taper  Take 2 tablets by mouth every 8 hours for 2 days, then take 1 tablet by mouth every 6 hours for 2 days, then take 1 tablet by mouth every 8 hours for 2 days, then take 1 tablet by mouth every 12 hours for 2 days, then take 1 tablet by mouth once a day for 2 days  Call if symptoms change while stepping down dose or frequency  Seizure medications  Take trileptal 300mg twice daily for 1 week (until 3/26)  Take Vimpat 100mg twice daily for 1 week, after one week (on 3/26) increase dose to 150mg twice daily  On 3/27 increase vimpat dose to 150mg twice daily    Please call your neurologist to follow-up on this plan     Angiogram  you underwent a diagnostic cerebral angiogram under the care of Dr Jason Parks for embolization of mass  ? The following instructions will help you care for yourself, or be cared for upon your return home today  These are guidelines for your care right after your surgery only  ? Notify Your Doctor or Nurse if you have any of the following:  ? SYMPTOMS OF WOUND INFECTION--   Increased pain in or around the incision   Swelling around the incision  Any drainage from the incision  Incision separates or opens up  Warmth in the tissues around the incision  Redness or tenderness on the skin near the incision   Fever (temperature greater than 101 degrees F)   ? NEUROLOGICAL CHANGES--  Change in alertness  Increased sleepiness   Nausea and vomiting   New onset of numbness or weakness in arms or legs   New problems with your bowels or bladder  New or worse problems with balance or walking  Seizures, new or worsening  ? UNRELIEVED HEADACHE PAIN--  New or increased pain unrelieved with pain medications   Pain associated with nausea and vomiting   Pain associated with other symptoms  ? QUESTIONS OR PROBLEMS--  Any questions or problems that you are unsure about  Wound Care:  Keep Incision Clean and Dry   You may shower daily, but do not soak incision  Pat dry after showering  No tub baths, soaking, swimming for 1 week after angiogram    You do not need to cover the incision  Mild to moderate bruising and tenderness to the site is expected and may last up to 1-2 weeks after your procedure  ?  A closure device was placed at the catheter insertion site  This is MRI compatible  Remove the dressing 24 hours after your procedure  If your groin site is bleeding, apply firm pressure for 10 minutes  Reinforce dressing rather than removing and checking frequently  If continues to bleed through the dressing after 1 hour, contact your neurosurgeon's office  Anticipatory Education:  ?   PAIN MED W/ Acetaminophen (Tylenol)  --IF a prescription for pain medicine has been sent home with you:  --Narcotic pain medication may cause constipation  Be sure to take stool softeners or laxatives while you are on narcotic pain medication  --Do not drive after taking prescription pain medicine  ?  If this medicine is too strong, or no longer necessary, or we did NOT recommend/prescribe oral narcotics, you may take:   - Tylenol Extra-strength/Acetaminophen, 2 tablets every 4-6 hours as needed for mild pain  DO NOT TAKE MORE THAN 4000MG PER DAY from combined sources  NOTE: Remember to eat when taking pain medicines in order to avoid nausea  Watch for constipation  Eat plenty of fruits, vegetables, juices, and drink 6-8 glasses of water each day  Constipation: Stay active and drink at least 6-8 cups of fluid each day to prevent constipation  If you need a laxative or stool softener follow the package directions or consult with your local pharmacists if you have questions  ? After anesthesia, rest for 24 hours  Do not drive, drink alcohol beverages or make any important decisions during this time  General anesthesia may cause sore throat, jaw discomfort or muscle aches  These symptoms can last for one or two days  Activity: Please follow these instructions:  Advance your activity as you can tolerate  You may do light house work; nothing strenuous   You may walk all you want  You may go up and down the steps  Use the railing for support  Do not do excessive bending, straining or heavy lifting for 48 hours after your procedure  Do not drive or return to work until you are instructed   It is normal for your energy level and sleep patterns to change after surgery  Get extra sleep at night and take naps during the day to help you feel less tired  Take rest periods during the day  Complete recovery may take several weeks  ?  You may resume driving after 41-96 hours recovery  You may return to work after 48 hours of recovery     ?  Diet:  Your doctor has recommended that you follow these diet instructions at home  Refer to the patient education materials you received during your hospital stay  If you would like more nutrition counseling, ask your doctor about making an appointment with an outpatient dietitian  Resume your home diet  ? Medications:  Please resume your home medications as instructed  ? Home Supplies and Equipment:  none  Additional Contacts:  ? CONTACTS FOR NEUROSURGERY: You may call your neurosurgeons office if you have questions between 8:30 am and 4:30 pm  You may request to speak to the nurse practitioner who is available Monday through Friday  ?  For off hours or the weekend you may call your neurosurgeon's office to leave a message  Lacosamide (By mouth)   Lacosamide (da-LQR-ln-mide)  Treats partial seizures  Brand Name(s): Vimpat   There may be other brand names for this medicine  When This Medicine Should Not Be Used: This medicine is not right for everyone  Do not use it if you had an allergic reaction to lacosamide  How to Use This Medicine:   Liquid, Tablet  · Take your medicine as directed  Your dose may need to be changed several times to find what works best for you  · Oral liquid: Measure the oral liquid medicine with a marked measuring spoon, oral syringe, or medicine cup  · This medicine should come with a Medication Guide  Ask your pharmacist for a copy if you do not have one  · Missed dose: Take a dose as soon as you remember  If it is almost time for your next dose, wait until then and take a regular dose  Do not take extra medicine to make up for a missed dose  · Store the medicine in a closed container at room temperature, away from heat, moisture, and direct light  Do not freeze the oral liquid  Throw away any unused medicine 7 weeks after you open the bottle    Drugs and Foods to Avoid:   Ask your doctor or pharmacist before using any other medicine, including over-the-counter medicines, vitamins, and herbal products  · Some medicines can affect how lacosamide works  Tell your doctor if you are using blood pressure medicine  Warnings While Using This Medicine:   · Tell your doctor if you are pregnant or breastfeeding, or if you have kidney disease, liver disease, diabetic neuropathy, or any heart problems  Tell your doctor if you have phenylketonuria (PKU) or a history of depression or mental health problems  · This medicine may cause the following problems:   ¨ Changes in mood or behavior  ¨ Changes in heart rhythm (such as [place] prolongation)  ¨ Serious allergic reactions that may involve multiple organs, such as your liver or kidneys  · This medicine may make you dizzy or drowsy, or cause double vision  Do not drive or do anything else that could be dangerous until you know how this medicine affects you  · This medicine can be habit-forming  Do not use more than your prescribed dose  Call your doctor if you think your medicine is not working  · Do not stop using this medicine suddenly  Your doctor will need to slowly decrease your dose before you stop it completely  Your seizures may return or occur more often if you stop this medicine suddenly  · Your doctor will check your progress and the effects of this medicine at regular visits  Keep all appointments  · Keep all medicine out of the reach of children  Never share your medicine with anyone    Possible Side Effects While Using This Medicine:   Call your doctor right away if you notice any of these side effects:  · Allergic reaction: Itching or hives, swelling in your face or hands, swelling or tingling in your mouth or throat, chest tightness, trouble breathing  · Bloody or cloudy urine, rapid weight gain, swelling of your face, feet, or lower legs  · Dark urine or pale stools, nausea, vomiting, loss of appetite, stomach pain, yellow skin or eyes  · Depression, unusual changes in mood or behavior, thoughts of hurting yourself  · Fast, slow, pounding, or uneven heartbeat  · Fever, rash, swollen, painful, or tender lymph glands in your neck, armpit, or groin  · Lightheadedness or dizziness  · Problems with walking or balance, sleepiness, blurred or double vision  · Trouble breathing, fainting  If you notice these less serious side effects, talk with your doctor:   · Diarrhea  · Headache  · Tiredness or weakness  If you notice other side effects that you think are caused by this medicine, tell your doctor  Call your doctor for medical advice about side effects  You may report side effects to FDA at 3-190-FDA-3993  © 2017 [address] [employer] Information is for End User's use only and may not be sold, redistributed or otherwise used for commercial purposes  The above information is an  only  It is not intended as medical advice for individual conditions or treatments  Talk to your doctor, nurse or pharmacist before following any medical regimen to see if it is safe and effective for you  Oxcarbazepine (By mouth)   Oxcarbazepine (ze-rji-MMD-e-peen)  Treats seizures  Brand Name(s): Oxtellar XR, Trileptal   There may be other brand names for this medicine  When This Medicine Should Not Be Used: This medicine is not right for everyone  Do not use it if you had an allergic reaction to oxcarbazepine  How to Use This Medicine:   Liquid, Tablet, Long Acting Tablet  · Take your medicine as directed  Your dose may need to be changed several times to find what works best for you  · This medicine should come with a Medication Guide  Ask your pharmacist for a copy if you do not have one  · Swallow the extended-release tablet whole  Do not crush, break, or chew it  · Oral liquid: Measure the oral liquid medicine with a marked measuring spoon, oral syringe, or medicine cup  Shake well just before you measure a dose   Swallow the medicine directly, or mix it in a glass with a small amount of water and drink all of the mixture right away   · Food:   ¨ Take the extended-release tablet on an empty stomach at least 1 hour before or 2 hours after a meal   ¨ You may take the oral liquid or regular tablet with or without food  · Missed dose: Take a dose as soon as you remember  If it is almost time for your next dose, wait until then and take a regular dose  Do not take extra medicine to make up for a missed dose  · Store the medicine in a closed container at room temperature, away from heat, moisture, and direct light  Store the oral liquid in the original container and use within 7 weeks after you first open the bottle  Throw away any unused liquid  Drugs and Foods to Avoid:   Ask your doctor or pharmacist before using any other medicine, including over-the-counter medicines, vitamins, and herbal products  · Some medicines and foods can affect how this drug works  Tell your doctor if you are also using any of the following:  ¨ Calcium channel blocker medicine, including felodipine or verapamil  ¨ Other medicine to control seizures  · Tell your doctor if you use anything else that makes you sleepy  Some examples are allergy medicine, narcotic pain medicine, and alcohol  Warnings While Using This Medicine:   · Tell your doctor if you are pregnant or breastfeeding, or if you have kidney disease or liver disease  Also tell your doctor if you are allergic to carbamazepine or tartrazine  · This medicine may cause the following problems:  ¨ Drug reaction with eosinophilia and systemic symptoms (DRESS), which may damage organs such as the liver, kidney, or heart  ¨ Low sodium levels in the blood  ¨ Serious skin or allergic reactions  ¨ Decreased levels of blood cells, which may cause bleeding problems or increase your risk for infection  · This medicine may cause depression, thoughts of suicide, or changes in mood or behavior  Tell your doctor if you have a history of depression or mental health problems  · Do not stop using this medicine suddenly  Your doctor will need to slowly decrease your dose before you stop it completely  The seizures might become more frequent if you suddenly stop using this medicine  · Birth control that uses hormones may not work as well while you are using this medicine  Use a different type of birth control if you need to  Talk with your doctor if you have questions  · This medicine may make you dizzy or drowsy  Do not drive or do anything else that could be dangerous until you know how this medicine affects you  · Your doctor will do lab tests at regular visits to check on the effects of this medicine  Keep all appointments  · Keep all medicine out of the reach of children  Never share your medicine with anyone  Possible Side Effects While Using This Medicine:   Call your doctor right away if you notice any of these side effects:  · Allergic reaction: Itching or hives, swelling in your face or hands, swelling or tingling in your mouth or throat, chest tightness, trouble breathing  · Blistering, peeling, red skin rash  · Confusion, weakness, muscle twitching  · Fever, skin rash, or swollen glands in your armpits, neck, or groin  · Unusual thoughts or behaviors, thoughts of hurting yourself, or feeling depressed, irritable, nervous, restless  · Unusual bleeding, bruising, or weakness  If you notice these less serious side effects, talk with your doctor:   · Dizziness, headache  · Nausea, vomiting, stomach pain  · Trouble concentrating or speaking, tiredness, clumsiness, trouble walking  · Vision changes, double vision  If you notice other side effects that you think are caused by this medicine, tell your doctor  Call your doctor for medical advice about side effects  You may report side effects to FDA at 0-199-FDA-6729  © 2017 [address] [employer] Information is for End User's use only and may not be sold, redistributed or otherwise used for commercial purposes  The above information is an  only   It is not intended as medical advice for individual conditions or treatments  Talk to your doctor, nurse or pharmacist before following any medical regimen to see if it is safe and effective for you  Laxative, Stool Softeners (By mouth)   Treats constipation by helping you have a bowel movement  Brand Name(s): Col-Rite, Colace, Colace Clear, DSS, Diocto, Diocto Liquid, Doc-Q-Lace, Docuprene, Docusil, Dok, Dulcolax, Fleet Sof-Lax, [facility] Docusate Calcium, [facility] Stool Softener, [facility] Stool Softner   There may be other brand names for this medicine  When This Medicine Should Not Be Used: You should not use this medicine if you have severe stomach pain, nausea, or vomiting  Stool softeners should not be used if you have severe stomach pain and do not know the cause  How to Use This Medicine:   Capsule, Tablet, Liquid, Liquid Filled Capsule  · Your doctor will tell you how much medicine to use  Do not use more than directed  · Follow the instructions on the medicine label if you are using this medicine without a prescription  · Drink 6 to 8 glasses of water daily while using any laxative  · To make the oral liquid taste better, you may mix it with one-half glass of milk or fruit juice  · Measure the oral liquid medicine with a marked measuring spoon, oral syringe, medicine cup, or medicine dropper  If a dose is missed:   · Use the missed dose as soon as possible  · If you do not remember the missed dose until the next day, skip the missed dose and go back to your regular dosing schedule  · You should not use two doses at the same time  How to Store and Dispose of This Medicine:   · Store the medicine in a tightly closed container at room temperature, away from heat and moisture  Do not store liquid-filled capsules in the refrigerator  · Keep all medicine out of the reach of children    Drugs and Foods to Avoid:   Ask your doctor or pharmacist before using any other medicine, including over-the-counter medicines, vitamins, and herbal products  · You should not use mineral oil while you are using a stool softener  · You should not use a stool softener within 2 hours before or after taking any other medicines  Laxatives can keep other medicines from working correctly  Warnings While Using This Medicine:   · If you are pregnant or breastfeeding, talk to your doctor before taking this medicine  · Do not give laxatives to children under 10years old unless you talk to your doctor  · You should not use this laxative for longer than 1 week unless approved by your doctor  Laxatives may be habit-forming and can harm your bowels if you use them too long  · Stool softeners usually work in 1 to 2 days, but for some people, results can take as long as 3 to 5 days  Possible Side Effects While Using This Medicine: If you notice these less serious side effects, talk with your doctor:  · Nausea  · Sore throat  · Skin rash  If you notice other side effects that you think are caused by this medicine, tell your doctor  Call your doctor for medical advice about side effects  You may report side effects to FDA at 0-965-FDA-7123  © 2017 [facility] Information is for End User's use only and may not be sold, redistributed or otherwise used for commercial purposes  The above information is an  only  It is not intended as medical advice for individual conditions or treatments  Talk to your doctor, nurse or pharmacist before following any medical regimen to see if it is safe and effective for you  Insulin Lispro (By injection)   Insulin Lispro, Recombinant (IN-orellana-campbell LIS-pro, ree-KOM-bi-anetat)  Treats diabetes  Brand Name(s): HumaLOG, HumaLOG KwikPen, HumaLOG Pen, Lispro-PFC   There may be other brand names for this medicine  When This Medicine Should Not Be Used: This medicine is not right for everyone   Do not use it if you had an allergic none reaction to insulin lispro  How to Use This Medicine:   Injectable  · IV: A nurse or other health professional may give you this medicine into a vein if you are in the hospital   · Your healthcare provider will work with you to personalize your dose and treatment based on your insulin needs and lifestyle  You will be taught how to give yourself the injections  Make sure you understand all instructions  Ask the doctor, nurse, or pharmacist if you have questions  · Always double-check both the concentration (strength) of your insulin and your dose  Concentration and dose are not the same  The dose is how many units of insulin you will use  The concentration tells how many units of insulin are in each milliliter (mL), such as 100 units/mL (U-100), but this does not mean you will use 100 units at a time  · Read and follow the patient instructions that come with this medicine  Talk to your doctor or pharmacist if you have any questions  · You will be shown the body areas where this shot can be given  Use a different body area each time you give yourself a shot  Keep track of where you give each shot to make sure you rotate body areas  · Always check the label before use, to make sure you have the correct type of insulin  Do not change the brand, type, or concentration unless your doctor tells you to  If you use a pump or other device, make sure the insulin is made for that device  · Use this medicine 15 minutes before a meal or right after you eat  · Vial: Use only syringes that are made for insulin injections  Use a new syringe and needle each time you give yourself an injection  · Cartridge or KwikPen: Use a new needle each time you give yourself an injection  · Insulin pump:   ¨ Keep the pump and pump equipment away from heat and direct light  Heat may increase the temperature of the insulin, and prevent it from working as it should  ¨ Use insulin lispro by itself   Do not mix it with other insulins  ¨ Change the insulin solution in the pump reservoir at least every 7 days  Change the infusion set and infusion site at least every 3 days  ¨ Tell your doctor right away if your insulin pump breaks or leaks  Your blood sugar levels may change rapidly  You may need to give yourself injections until your pump is fixed  · Do not mix different types of insulin, unless your doctor tells you to  If you are told to mix lispro with a longer-acting insulin, draw up insulin lispro into the syringe first  Then draw up the longer-acting insulin and inject it right away  · Do not transfer Humalog® U-200 from the KwikPen to a syringe for use  · The insulin solution should look clear and colorless  Do not use it if it is cloudy or clumpy  · Keep all medicine away from heat and direct light  · Unopened medicine: Store unused vials, pens, or cartridges in the refrigerator  Do not freeze  This medicine may be stored at room temperature for up to 28 days  Throw the medicine away after the expiration date has passed  · Opened medicine:   ¨ Vials: Keep in the refrigerator or at room temperature for up to 28 days  ¨ Cartridge or KwikPen: Do not refrigerate the cartridge or pen that you are currently using  Store it at room temperature in a cool place for up to 28 days  · Throw away used needles in a hard, closed container that the needles cannot poke through  Keep this container away from children and pets  Drugs and Foods to Avoid:   Ask your doctor or pharmacist before using any other medicine, including over-the-counter medicines, vitamins, and herbal products  · Some medicines can change the amount of insulin you need to use and make it harder for you to control your diabetes  Make sure your doctor knows about all other medicines that you are using  · Limit how much alcohol you drink    Warnings While Using This Medicine:   · Tell your doctor if you are pregnant or breastfeeding, or if you have kidney disease, liver disease, heart disease, or heart failure  · This medicine may cause the following problems:  ¨ Low blood sugar or low potassium levels in the blood  ¨ Fluid retention or heart failure (when used together with a thiazolidinedione [TZD] medicine)  · Never share insulin pens, cartridges, or needles with anyone  Sharing these can pass hepatitis viruses, HIV, and other illnesses from one person to another  · Keep all medicine out of the reach of children  Never share your medicine with anyone  Possible Side Effects While Using This Medicine:   Call your doctor right away if you notice any of these side effects:  · Allergic reaction: Itching or hives, swelling in your face or hands, swelling or tingling in your mouth or throat, chest tightness, trouble breathing  · Dry mouth, increased thirst, muscle cramps, nausea or vomiting, uneven heartbeat  · Rapid weight gain, swelling in your hands, ankles, or feet, trouble breathing, tiredness  · Shaking, trembling, sweating, fast or pounding heartbeat, lightheadedness, hunger, confusion  If you notice these less serious side effects, talk with your doctor:   · Redness, itching, swelling, or any skin changes where the shot is given  If you notice other side effects that you think are caused by this medicine, tell your doctor  Call your doctor for medical advice about side effects  You may report side effects to FDA at 6-511-FDA-3837  © 2017 [facility] Information is for End User's use only and may not be sold, redistributed or otherwise used for commercial purposes  The above information is an  only  It is not intended as medical advice for individual conditions or treatments  Talk to your doctor, nurse or pharmacist before following any medical regimen to see if it is safe and effective for you  Insulin Glargine (By injection)   Insulin Glargine, Recombinant (IN-orellana-campbell GLAR-jeen, ree-KOM-joe-emile)  Treats diabetes     Brand Name(s): Basaglar, Lantus, Lantus SoloStar, Toujeo   There may be other brand names for this medicine  When This Medicine Should Not Be Used: This medicine is not right for everyone  Do not use it if you had an allergic reaction to insulin glargine  How to Use This Medicine:   Injectable  · Your healthcare provider will work with you to personalize your dose and treatment based on your insulin needs and lifestyle  You will be taught how to give yourself the injections  Make sure you understand all instructions  Ask the doctor, nurse, or pharmacist if you have questions  · If you use insulin once a day, it is best to use it at about the same time every day  · Always double-check both the concentration (strength) of your insulin and your dose  Concentration and dose are not the same  The dose is how many units of insulin you will use  The concentration tells how many units of insulin are in each milliliter (mL), such as 100 units/mL (U-100), but this does not mean you will use 100 units at a time  · Read and follow the patient instructions that come with this medicine  Talk to your doctor or pharmacist if you have any questions  · This medicine should look clear before you use it  Do not shake the vial  Do not mix this medicine with any other insulin or with water  · You will be shown the body areas where this shot can be given  Use a different body area each time you give yourself a shot  Keep track of where you give each shot to make sure you rotate body areas  · Use a new needle and syringe each time you inject your medicine  If you use a syringe, use only the kind that is made for insulin injections  Some insulin must be given with a specific type of syringe or needle  Ask your pharmacist if you are not sure which one to use  · Always check the label before use, to make sure you have the correct type of insulin  Do not change the brand, type, or concentration unless your doctor tells you to   If you use a pump or other device, make sure the insulin is made for that device  · Unopened medicine: Store the vials, cartridges, and SoloStar® pens in the refrigerator  Protect from light  Do not freeze  · Opened medicine:   ¨ Vials: Store in the refrigerator or at room temperature in a cool place, away from sunlight and heat  Use within 28 days  ¨ Cartridge or [employer]: Store at room temperature, away from direct heat and light  Do not refrigerate  Throw away any opened cartridge or Lantus® SoloStar® pen after 28 days  Throw away any opened [employer] after 42 days  · Throw away used needles in a hard, closed container that the needles cannot poke through  Keep this container away from children and pets  Drugs and Foods to Avoid:   Ask your doctor or pharmacist before using any other medicine, including over-the-counter medicines, vitamins, and herbal products  · Some medicines can change the amount of insulin you need to use and make it harder for you to control your diabetes  Tell your doctor about all other medicines that you are using  · Do not drink alcohol while you are using this medicine  Warnings While Using This Medicine:   · Tell your doctor if you are pregnant or breastfeeding, or if you have kidney disease, liver disease, heart disease, or heart failure  · This medicine may cause the following problems:  ¨ Low blood sugar or low potassium levels in the blood  ¨ Fluid retention or heart failure (when used with thiazolidinedione [TZD] medicine)  · Never share insulin pens, needles, or cartridges with anyone  Sharing these can pass hepatitis viruses, HIV, or other illnesses from one person to another  · Keep all medicine out of the reach of children  Never share your medicine with anyone    Possible Side Effects While Using This Medicine:   Call your doctor right away if you notice any of these side effects:  · Allergic reaction: Itching or hives, swelling in your face or hands, swelling or tingling in your mouth or throat, chest tightness, trouble breathing  · Dry mouth, increased thirst, muscle cramps, nausea or vomiting, uneven heartbeat  · Rapid weight gain, swelling in your hands, ankles, or feet, trouble breathing, tiredness  · Shaking, trembling, sweating, fast or pounding heartbeat, lightheadedness, hunger, confusion  If you notice these less serious side effects, talk with your doctor:   · Redness, pain, itching, swelling, or any skin changes where the shot was given  If you notice other side effects that you think are caused by this medicine, tell your doctor  Call your doctor for medical advice about side effects  You may report side effects to FDA at 9-018-FDA-7413  © 2017 [address] Information is for End User's use only and may not be sold, redistributed or otherwise used for commercial purposes  The above information is an  only  It is not intended as medical advice for individual conditions or treatments  Talk to your doctor, nurse or pharmacist before following any medical regimen to see if it is safe and effective for you  Acetaminophen (By mouth)   Acetaminophen (a-cnjd-j-MIN-oh-fen)  Treats minor aches and pain and reduces fever  Brand Name(s): 8 Hour Pain Relief, Acetaminophen Children's, Acetaminophen Infant's, Arthritis Pain Formula, Arthritis Pain Relief, Cetafen, Cetafen Extra, Children's Acetaminophen, Children's Dye Free Pain and Fever, Children's Mapap, Children's Pain & Fever, Children's Pain Relief, Children's Pain Reliever, Children's Q-PAP, Children's Tylenol   There may be other brand names for this medicine  When This Medicine Should Not Be Used: This medicine is not right for everyone  Do not use it if you had an allergic reaction to acetaminophen    How to Use This Medicine:   Capsule, Liquid Filled Capsule, Liquid, Powder, Tablet, Chewable Tablet, Dissolving Tablet, Fizzy Tablet, Long Acting Tablet  · Your doctor will tell you how much medicine to use  Do not use more than directed  · If you are taking this medicine without the advice of your doctor, carefully read and follow the Drug Facts label and dosing instructions on the medicine package  Ask your doctor or pharmacist if you are not sure how to use this medicine  · Do not take this medicine for more than 10 days in a row, unless directed by your doctor  · The chewable tablet should be chewed or crushed before you swallow it  · Oral liquids: Measure the oral liquid medicine with a marked measuring spoon, oral syringe, or medicine cup  Do not use a spoon, syringe, or cup that came with a different medicine  · Oral liquid (with syringe): ¨ Shake the bottle well before each use  ¨ Remove the cap  Attach the syringe to the flow restrictor  Turn the bottle upside down  ¨ Pull back the syringe plunger until it is filled with the correct dose  Ask your doctor or pharmacist if you are not sure how much medicine to use  ¨ Slowly give the medicine into your child's mouth  Point the syringe so the medicine goes toward the inner cheek  · Oral liquid (with dropper): ¨ Shake the bottle well before each use  ¨ Remove the cap  Insert the dropper into the bottle  Withdraw the correct dose  Ask your doctor or pharmacist if you are not sure how much medicine to use  ¨ Slowly give the medicine into your child's mouth  Point the dropper so the medicine goes toward the inner cheek  · Extended-release tablet: Swallow the extended-release tablet whole  Do not crush, break, or chew it  Take this medicine with a full glass of water  · Use only the brand of medicine your doctor prescribed  Other brands may not work the same way  · Missed dose: Take a dose as soon as you remember  If it is almost time for your next dose, wait until then and take a regular dose  Do not take extra medicine to make up for a missed dose    · Store the medicine in a closed container at room temperature, away from heat, moisture, and direct light  Drugs and Foods to Avoid:   Ask your doctor or pharmacist before using any other medicine, including over-the-counter medicines, vitamins, and herbal products  · Some medicines and foods can affect how acetaminophen works  Tell your doctor if you are taking a blood thinner, such as warfarin  · Do not drink alcohol while you are using this medicine  Acetaminophen can damage your liver, and alcohol can increase this risk  Do not take acetaminophen without asking your doctor if you have 3 or more drinks of alcohol every day  Warnings While Using This Medicine:   · Tell your doctor if you are pregnant or breastfeeding, or if you have kidney or liver disease  Tell your doctor if you have phenylketonuria (PKU)  Some brands of acetaminophen contain aspartame, which can make PKU worse  · This medicine contains acetaminophen  Read the labels of all other medicines you are using to see if they also contain acetaminophen, or ask your doctor or pharmacist  Jerilyn Garza not use more than 4 grams (4,000 milligrams) total of acetaminophen in one day  For Tylenol® Extra Strength, it is not safe to take more than 3 grams (3,000 milligrams) in 1 day  · Call your doctor if your symptoms do not improve or if they get worse  · Tell any doctor or dentist who treats you that you are using this medicine  This medicine may affect certain medical test results  · Keep all medicine out of the reach of children  Never share your medicine with anyone    Possible Side Effects While Using This Medicine:   Call your doctor right away if you notice any of these side effects:  · Allergic reaction: Itching or hives, swelling in your face or hands, swelling or tingling in your mouth or throat, chest tightness, trouble breathing  · Bloody or black, tarry stools  · Dark urine or pale stools, nausea, vomiting, loss of appetite, severe stomach pain, yellow skin or eyes  · Fever or a sore throat that lasts longer than 3 days, or pain that lasts longer than 5 days  · Lightheadedness, fainting, sweating, or weakness  · Unusual bleeding or bruising  · Vomiting blood or material that looks like coffee grounds  If you notice other side effects that you think are caused by this medicine, tell your doctor  Call your doctor for medical advice about side effects  You may report side effects to FDA at 3-614-FDA-1256  © 2017 [address] Information is for End User's use only and may not be sold, redistributed or otherwise used for commercial purposes  The above information is an  only  It is not intended as medical advice for individual conditions or treatments  Talk to your doctor, nurse or pharmacist before following any medical regimen to see if it is safe and effective for you  Dexamethasone (By mouth)   Dexamethasone (dex-a-METH-a-sone)  Treats symptoms of several diseases and conditions  This medicine is a corticosteroid  Brand Name(s): Cushings Syndrome Diagnostic, DexPak, DexPak 10 Day TaperPak, DexPak 13 Day TaperPak, DexPak 6 Day TaperPak, Dexamethasone Intensol, ZonaCort   There may be other brand names for this medicine  When This Medicine Should Not Be Used: This medicine is not right for everyone  Do not use it if you had an allergic reaction to dexamethasone, or if you have a fungal infection  How to Use This Medicine:   Liquid, Tablet  · Take your medicine as directed  Your dose may need to be changed several times to find what works best for you  · It is best to take this medicine with food or milk  · Oral liquid: Measure the oral liquid medicine with a marked measuring spoon, oral syringe, or medicine cup  · Missed dose:   ¨ If your schedule is one dose every other day and you cannot use the missed dose until late in the day, wait until the next morning to use your medicine  Then skip a day and go back to your regular schedule    ¨ If your schedule is one dose every day, use the missed dose as soon as you can  Then go back to your regular schedule  ¨ If your schedule is more than one dose every day, use the missed dose as soon as you can  If it is almost time for your next dose, use two doses at that time  Then go back to your regular schedule  · Store the medicine in a closed container at room temperature, away from heat, moisture, and direct light  Drugs and Foods to Avoid:   Ask your doctor or pharmacist before using any other medicine, including over-the-counter medicines, vitamins, and herbal products  · Some foods and medicines can affect how dexamethasone works  Tell your doctor if you are using any of the following:  ¨ Aminoglutethimide, amphotericin B, carbamazepine, cholestyramine, cyclosporine, digoxin, indinavir, indomethacin, ketoconazole, phenobarbital, phenytoin, rifampin, or thalidomide  ¨ Antibiotic (including azithromycin, clarithromycin, erythromycin)  ¨ Birth control pills  ¨ Blood thinner (including warfarin)  ¨ Diuretic (water pill)  ¨ Insulin and other diabetes medicine  ¨ NSAID pain or arthritis medicine (including aspirin, celecoxib, diclofenac, naproxen)  ¨ Potassium supplement  · Do not drink alcohol while you are using this medicine  · This medicine may interfere with vaccines  Ask your doctor before you get a flu shot or any other vaccines  Warnings While Using This Medicine:   · Tell your doctor if you are pregnant or breastfeeding, or if you have kidney problems, liver disease, diabetes, glaucoma, herpes infection of the eye, allergies, myasthenia gravis, osteoporosis, stomach or bowel problems, or thyroid problems  Tell your doctor if you have congestive heart failure, high blood pressure, or a recent heart attack  Tell your doctor if you have any type of infection or a history of depression or mental problems    · This medicine may cause the following problems:  ¨ High blood pressure, retaining water, changes in salt or potassium levels in your body  ¨ Cataracts or glaucoma (with long-term use)  ¨ Bone loss (with long-term use)  ¨ Mood or behavior changes  ¨ Slow growth in children (with long-term use)  · Do not stop using this medicine suddenly  Your doctor will need to slowly decrease your dose before you stop it completely  · This medicine could cause you to get infections more easily  Tell your doctor right away if you are exposed to chicken pox, measles, or any other serious infection  Tell your doctor if you had a serious infection in the past, such as tuberculosis  · Tell your doctor about any extra stress or anxiety in your life  Your dose might need to be changed for a short time  · Make sure any doctor or dentist who treats you knows that you are using this medicine  · Your doctor will check your progress and the effects of this medicine at regular visits  Keep all appointments  · Keep all medicine out of the reach of children  Never share your medicine with anyone    Possible Side Effects While Using This Medicine:   Call your doctor right away if you notice any of these side effects:  · Allergic reaction: Itching or hives, swelling in your face or hands, swelling or tingling in your mouth or throat, chest tightness, trouble breathing  · Changes in vision, trouble seeing, eye pain  · Dark freckles, skin changes, coldness, weakness, tiredness, weight loss  · Depression, unusual thoughts, feelings, or behaviors, trouble sleeping  · Fast or slow, pounding heartbeat  · Fever, chills, cough, sore throat, body aches  · Rapid weight gain, swelling in your hands, ankles, or feet  · Severe stomach pain, nausea, vomiting, or red or black stools  · Trouble breathing  · Trouble urinating  · Worsened joint pain, swelling, or stiffness  If you notice these less serious side effects, talk with your doctor:   · Round, puffy face  · Weight gain around your neck, upper back, breast, face, or waist  If you notice other side effects that you think are caused by this medicine, tell your doctor  Call your doctor for medical advice about side effects  You may report side effects to FDA at 5-070-FDA-2797  © 2017 [address] Information is for End User's use only and may not be sold, redistributed or otherwise used for commercial purposes  The above information is an  only  It is not intended as medical advice for individual conditions or treatments  Talk to your doctor, nurse or pharmacist before following any medical regimen to see if it is safe and effective for you  Famotidine (By mouth)   Famotidine (byk-SY-kh-kristine)  Treats ulcers, gastroesophageal reflux disease (GERD), and conditions that cause the stomach to produce too much stomach acid  Also treats heartburn caused by acid indigestion  Brand Name(s): Acid Controller, Acid Reducer, [facility] Acid Reducer, Good Sense Acid Reducer, Heartburn Relief, Leader Acid Reducer, Pepcid, Pepcid AC, Quality Choice Acid Controller, [employer] Acid Reducer, [employer] Famotidine Acid Reducer, TopCare Acid Reducer   There may be other brand names for this medicine  When This Medicine Should Not Be Used: You should not use this medicine if you have had an allergic reaction to famotidine or to similar medicines such as ranitidine (Zantac®), cimetidine (Tagamet®), or nizatidine (Axid®)  How to Use This Medicine:   Tablet, Chewable Tablet, Dissolving Tablet, Liquid  · Your doctor will tell you how much medicine to use  Do not use more than directed  · Follow the instructions on the medicine label if you are using this medicine without a prescription  · The chewable tablet must be chewed completely before you swallow it  · If you are using the disintegrating tablet, make sure your hands are dry before you handle the tablet  Do not open the blister pack that contains the tablet until you are ready to take it   Remove the tablet from the blister pack by peeling back the foil, then taking the tablet out  Do not push the tablet through the foil  Place the tablet in your mouth  It should melt within 2 minutes  Swallow after the tablet has melted  · Shake the oral liquid medicine for 5 to 10 seconds before each use  Measure the medicine with a marked measuring spoon or medicine cup  If a dose is missed:   · Take a dose as soon as you remember  If it is almost time for your next dose, wait until then and take a regular dose  Do not take extra medicine to make up for a missed dose  How to Store and Dispose of This Medicine:   · Store the medicine in a closed container at room temperature, away from heat, moisture, and direct light  Do not freeze the oral liquid  · Ask your pharmacist, doctor, or health caregiver about the best way to dispose of any outdated medicine or medicine no longer needed  Throw away any unused oral liquid that is more than 3month old  · Keep all medicine out of the reach of children  Never share your medicine with anyone  Drugs and Foods to Avoid:      Ask your doctor or pharmacist before using any other medicine, including over-the-counter medicines, vitamins, and herbal products  Warnings While Using This Medicine:   · Make sure your doctor knows if you are pregnant or breastfeeding, or if you have kidney disease or liver disease  · This medicine might contain phenylalanine (aspartame)  This is only a concern if you have a disorder called phenylketonuria (a problem with amino acids)  If you have this condition, talk to your doctor before using this medicine  · Call your doctor if your symptoms do not improve or if they get worse  Possible Side Effects While Using This Medicine:   Call your doctor right away if you notice any of these side effects:  · Allergic reaction: Itching or hives, swelling in your face or hands, swelling or tingling in your mouth or throat, chest tightness, trouble breathing  · Blistering, peeling, or red skin rash    · Dark-colored urine or pale stools  · Fast, pounding, or uneven heartbeat  · Fever, chills, cough, sore throat, and body aches  · Seizures  · Unusual bleeding, bruising, or weakness  · Yellowing of your skin or the whites of your eyes  If you notice these less serious side effects, talk with your doctor:   · Constipation, diarrhea, or upset stomach  · Headache or dizziness  · Nausea or vomiting  If you notice other side effects that you think are caused by this medicine, tell your doctor  Call your doctor for medical advice about side effects  You may report side effects to FDA at 4-833-FDA-0206  © 2017 [address] Information is for End User's use only and may not be sold, redistributed or otherwise used for commercial purposes  The above information is an  only  It is not intended as medical advice for individual conditions or treatments  Talk to your doctor, nurse or pharmacist before following any medical regimen to see if it is safe and effective for you

## 2018-03-19 NOTE — POST OP PROGRESS NOTES
Progress Note - Neurosurgery   Karely Reis 62 y o  female MRN: [id number]  Unit/Bed#: [facility] 918-01 Encounter: [id number]    Assessment:  1  POD5 s/p right frontoparietal craniotomy for resection of mass, preliminary pathology consistent with meningioma  2  POD6 s/p pre-operative embolization of brain mass  3  History of DM2 with hyperglycemia  4  Seizure disorder  5  Leukocytosis-likely reactive  6  YING    Plan:  · Neuro exam: GCS15, AAOx3, full strength in all extremities  CN2-12 intact  · Imaging reviewed personally and with attending:  · Post-operative CT head 3/16:  Interval meningioma resection with small amount of blood products in the operative bed likely Surgicel  Subgaleal MARY drain in place  · CT head 3/17 following rapid response:  Stability of postoperative changes associated with craniotomy for mass resection  Stable residual blood products resection bed  · Postoperative management  · MARY drain discontinued   · Headaches well controlled with Tylenol  · PT and OT recommending discharge home with family support and home PT   · Subcutaneous heparin, SCDs, and mobilization for DVT prophylaxis  · Continue Decadron 4 q 6h, anticipate 48 hour as follows  Decadron 4 Q 8 starting tomorrow x48 hours, then 2 Q 6, then 2 Q 8, then 2 Q 12, then 2 Q 24  · Seizure disorder  · Rapid response called on patient last evening secondary to focal motor seizure causing patient to require gentle lowering to the 4  CT head was obtained thereafter and should stability  Neurology consulted and recommended increasing Vimpat from 50 mg b i d  to 100 mg b i d  in addition to 300mg trileptal BID  Previous adverse effects to keppra including hyperglycemia, diarrhea, and altered mental status  Continue to monitor    · Should follow-up with Dr Andreina North- neurologist  Per inpatient neurology recommendations increase vimpat to 150mg BID in 1 week at that time d/c trileptal  · Diabetes mellitus 2 with hyperglycemia- Endocrinology following  · Discharged home on lantus 30units Qam, novolog 10units TID with meals  · Hold metformin and januvia at current  · Follow-up with endocrinologist within 1 week  · Wean Decadron as able as this is likely contributing to hyperglycemia and reactive leukocytosis  · Neurosurgery to continue to follow as primary team    · Discharge home today with family support and close follow-up with neurosurgery, endocrinology, and neurology    Subjective/Objective   Chief Complaint: " I am ready to go "    Subjective: patient continues to deny headache, vision changes, dizziness, nausea, difficulty tolerating diet or voiding  She has been having normal BM  Denies focal motor seizures overnight  She is anxious to go home because she feels well  States that left sided weakness has completely resolved  Objective: lying in bed, in NAD    I/O       03/17 0701 - 03/18 0700 03/18 0701 - 03/19 0700 03/19 0701 - 03/20 0700    P  O  1200 1360 360    I V  (mL/kg)       Total Intake(mL/kg) 1200 (11 9) 1360 (13 5) 360 (3 6)    Urine (mL/kg/hr) 250 (0 1)      Drains       Stool       Total Output 250        Net +950 +1360 +360           Unmeasured Urine Occurrence 5 x 5 x 1 x          Invasive Devices     Peripheral Intravenous Line            Peripheral IV 03/15/18 Right Antecubital 4 days    Peripheral IV 03/17/18 Left Antecubital 1 day                Physical Exam:  Vitals: Blood pressure 137/67, pulse 58, temperature (!) 97 4 °F (36 3 °C), temperature source Oral, resp  rate 18, height 5' 6" (1 676 m), weight 101 kg (222 lb 10 6 oz), SpO2 100 %  ,Body mass index is 35 94 kg/m²       General appearance: alert, appears stated age, cooperative and no distress  Head: incision CDI, no drainage  Eyes: EOMI, PERRL, acuity intact in all fields  Lungs: non labored breathing  Heart: regular heart rate  Neurologic:   Mental status: Alert, oriented, thought content appropriate  Cranial nerves: grossly intact (Cranial nerves II-XII)  Sensory: normal to LT in UE and LE without sensory neglect  Motor: moving all extremities without focal weakness  5/5 in b/l UE and LE  Reflexes: 2+ and symmetric in b/l patellars  No clonus  Coordination: finger to nose normal bilaterally, no drift bilaterally      Lab Results:    Results from last 7 days  Lab Units 03/18/18  1355 03/17/18  1656 03/17/18  0510   WBC Thousand/uL 18 89* 23 73* 19 55*   HEMOGLOBIN g/dL 11 7 12 4 12 5   HEMATOCRIT % 33 9* 36 7 37 6   PLATELETS Thousands/uL 251 281 255   NEUTROS PCT % 85*  --   --    MONOS PCT % 4  --   --        Results from last 7 days  Lab Units 03/18/18  1355 03/17/18  1656 03/17/18  0510   SODIUM mmol/L 135* 134* 136   POTASSIUM mmol/L 4 2 4 1 4 0   CHLORIDE mmol/L 102 100 101   CO2 mmol/L 22 18* 26   BUN mg/dL 35* 35* 30*   CREATININE mg/dL 1 08 1 40* 1 01   CALCIUM mg/dL 8 9 8 8 9 1   TOTAL PROTEIN g/dL  --  7 7  --    BILIRUBIN TOTAL mg/dL  --  0 29  --    ALK PHOS U/L  --  71  --    ALT U/L  --  55  --    AST U/L  --  23  --    GLUCOSE RANDOM mg/dL 211* 165* 227*       Results from last 7 days  Lab Units 03/17/18  0510   MAGNESIUM mg/dL 2 5       Results from last 7 days  Lab Units 03/17/18  0510   PHOSPHORUS mg/dL 3 8       Results from last 7 days  Lab Units 03/17/18  1656 03/16/18  0524   INR  0 95 1 06   PTT seconds 18* 23     Imaging Studies: I have personally reviewed pertinent reports  and I have personally reviewed pertinent films in PACS    EKG, Pathology, and Other Studies: I have personally reviewed pertinent reports        VTE Pharmacologic Prophylaxis: Heparin    VTE Mechanical Prophylaxis: sequential compression device and foot pump applied

## 2018-03-19 NOTE — MEDICAL STUDENT
MEDICAL STUDENT  Inpatient Progress Note for TRAINING ONLY  Not Part of Legal Medical Record       Progress Note - Alonza Seip 62 y o  female MRN: [id number]    [facility] Encounter: [id number]      Assessment:  Diabetes follow up  Plan:  Patient is expected to be discharged today  Her blood sugars ranged from 171 to 265 in the past 24 hours  She had received her basal lantus this morning but did not receive 16 units of mealtime humalog, only receiving 4 units of insulin this morning  Continue to monitor blood glucose outpatient as she is weaning off from Decadron  Patient may resume outpatient home medications Rhett Siad, Metformin, Novolog TID) once discharged if appropriate  Follow with endocrinology outpatient  Subjective: Alonza Seip is a 62 y o  Female with type 2 diabetes mellitus  She currently feels well this morning  She has no current complaints at this time  She has a good appetite and is eating consistently  On Saturday she experienced a seizure that lasted for 4 - 5 minutes  The patient has not experienced any neurological issues since then  She denies headaches, fevers, chills, nausea, and vomiting  She wishes to be put on an insulin basal bolus regimen outpatient  Objective:     Vitals: Blood pressure 137/67, pulse 58, temperature (!) 97 4 °F (36 3 °C), temperature source Oral, resp  rate 18, height 5' 6" (1 676 m), weight 101 kg (222 lb 10 6 oz), SpO2 100 %  ,Body mass index is 35 94 kg/m²  Intake/Output Summary (Last 24 hours) at 03/19/18 1226  Last data filed at 03/19/18 0900   Gross per 24 hour   Intake             1720 ml   Output                0 ml   Net             1720 ml       Physical Exam   Constitutional: She is oriented to person, place, and time  She appears well-developed and well-nourished  No distress  Cardiovascular: Normal rate, regular rhythm, normal heart sounds and intact distal pulses  Exam reveals no gallop and no friction rub      No murmur heard   Pulmonary/Chest: Effort normal and breath sounds normal  No respiratory distress  She has no wheezes  She has no rales  She exhibits no tenderness  Neurological: She is alert and oriented to person, place, and time  Skin: She is not diaphoretic  Psychiatric: She has a normal mood and affect   Her behavior is normal  Judgment and thought content normal        Invasive Devices     Peripheral Intravenous Line            Peripheral IV 03/15/18 Right Antecubital 4 days    Peripheral IV 03/17/18 Left Antecubital 1 day

## 2018-03-19 NOTE — PLAN OF CARE
DISCHARGE PLANNING     Discharge to home or other facility with appropriate resources Adequate for Discharge        DISCHARGE PLANNING - CARE MANAGEMENT     Discharge to post-acute care or home with appropriate resources Adequate for Discharge        INFECTION - ADULT     Absence or prevention of progression during hospitalization Adequate for Discharge     Absence of fever/infection during neutropenic period Adequate for Discharge        Knowledge Deficit     Patient/family/caregiver demonstrates understanding of disease process, treatment plan, medications, and discharge instructions Adequate for Discharge        NEUROSENSORY - ADULT     Achieves stable or improved neurological status Adequate for Discharge     Absence of seizures Adequate for Discharge     Remains free of injury related to seizures activity Adequate for Discharge     Achieves maximal functionality and self care Adequate for Discharge        Nutrition/Hydration-ADULT     Nutrient/Hydration intake appropriate for improving, restoring or maintaining nutritional needs Adequate for Discharge        PAIN - ADULT     Verbalizes/displays adequate comfort level or baseline comfort level Adequate for Discharge        Potential for Falls     Patient will remain free of falls Adequate for Discharge        Prexisting or High Potential for Compromised Skin Integrity     Skin integrity is maintained or improved Adequate for Discharge        SAFETY ADULT     Maintain or return to baseline ADL function Adequate for Discharge     Maintain or return mobility status to optimal level Adequate for Discharge     Patient will remain free of falls Adequate for Discharge

## 2018-03-20 ENCOUNTER — TELEPHONE (OUTPATIENT)
Dept: NEUROSURGERY | Facility: CLINIC | Age: 58
End: 2018-03-20

## 2018-03-20 ENCOUNTER — DOCUMENTATION (OUTPATIENT)
Dept: NEUROLOGY | Facility: CLINIC | Age: 58
End: 2018-03-20

## 2018-03-20 NOTE — PROGRESS NOTES
[facility] called and stated she had her brain tumor removed (status post frontal craniotomy for meningioma removal), however, she had a seizure Saturday and again yesterday  Dr Yo Moreno added Trileptal 300 mg once a day to her regimen besides the Vimpat 100 mg bid  I have her on a cancellation list for Thursday  I offered her a 3:00 pm appointment but she is not feeling up to coming in  Was there anything she should do in the meantime? PHONE#[phone]

## 2018-03-20 NOTE — PROGRESS NOTES
Called her  Discussed her recent seizures  Will increase trileptal 300mg bid and vimpat to 150mg bid

## 2018-03-20 NOTE — TELEPHONE ENCOUNTER
Spoke with pt on telephone after hospital d/c 3/19/2018 s/p IMAGE-GUIDED RIGHT FRONTOPARIETAL CRANIOTOMY FOR TUMOR (Right Head) - (moulding) and bilateral middle meningeal embolization of a right parasagittal frontal meningioma (marlin)  She reports that she had what she believes is a seizure last night when she was getting in to the shower  She says that her hands began to shake uncontrollably as they had done before when in the hospital and she was told it was a focal seizure, when she noticed this she laid down and it seemed to pass  Directed the patient to contact her neurologist to evaluate seizure activity which she stated she was in the process of doing  In regards to her surgical sites she is doing well overall and denies any incisional issues at either site or fevers  She denies any incisional pain at this time  Angio/embolization site is well healed and has no drainage, bruising, redness or swelling  Went over incision care with patient advised showering is ok however no creams or ointments to the site, she had no further questions at this time  Verified date/time/location of her upcoming POV 3/28/2018 and advised her to call the office with any further questions or concerns, or if any incisional issues or fevers would arise

## 2018-03-21 NOTE — TELEPHONE ENCOUNTER
Thanks for the update  This patient's 'seizures' are a little odd - both hands shaking cannot be a seizure, so I expect it's something else

## 2018-03-22 NOTE — DISCHARGE SUMMARY
Discharge Summary - Neurosurgery   Santiago Busch 62 y o  female MRN: [id number]  Unit/Bed#: [facility] 918-01 Encounter: [id number]    Admission Date:   Admission Orders     Ordered        03/13/18 1559  Inpatient Admission  Once                Discharge Date: 3/19/2018    Admitting Diagnosis: Meningioma ([facility] [address] ) [D32 9]  Seizure disorder ([facility] [address] ) [S60 685]    Discharge Diagnosis: Meningioma ([facility] [address] ) [D32 9]  Seizure disorder ([facility] [address] ) [N52 891]    Resolved Problems  Date Reviewed: 3/19/2018    None          Attending: Dr Dru Novak MD    Consulting Physician(s): Dr Karley Pryor DO (neurology)  Dr Vasquez Thompson MD (endocrinology)  Dr José Miguel Parker MD (critical care)    Procedures Performed:   3/13 Cerebral arteriogram and embolization of right frontal meningioma       Pathology: pending brain tissue    Hospital Course: Santiago Busch presented to [facility] for planned pre-operative embolization of right frontal meningioma on 3/12  Patient previously presented to outpatient neurosurgery clinic for evaluation of progressive weakness and seizures  Previously patient underwent work-up including MRI of the brain which revealed large frontal brain mass most suspicious for meningioma  Patient was consented to pre-operative embolization followed by resection and was admitted as planned for the following day  On 3/13 Nathalia underwent the cerebral arteriogram and embolization without complications  Post-operative groin checks and distal pulses remained within normal limits  She was monitored in a stepdown bed overnight  Patient had one focal motor seizure on the right side of her face which was less severe than previous seizures  This did not generalize  Due to elevated blood glucose the endocrinology team was consulted for assistance  On 3/15 patient underwent craniotomy for right frontal mass resection without complications  She was admitted to the surgical ICU for close neurologic monitoring   Patient had another focal motor seizure overnight, but no generalized seizures  Otherwise, neurologic exam remained stable  Previous left sided weakness had significantly improved  patient was maintained on her pre-operative anti-epileptic  Patient was evaluated by PT/OT who cleared for d/c home with home PT when medically stable  On POD1 post-operative CT head was completed and revealed expected post-operative changes  Patient was transferred to a [facility] bed  On POD2 patient remained neurologically stable  She was started on prophylactic subcutaneous heparin  She remained inpatient to continue to correct hyperglycemia which was likely reactive secondary to procedure and decadron  On evening of POD2- patent underwent focal motor seizure of her left arm causing her to require assistance being lowered to the ground  The seizure did not generalize  A rapid response was called for which the emergency medicine, internal medicine, and critical care team responded to  Patient returned to neurologic baseline  She did not lose consciousness  Neurology consultation was requested to assist with adjustment of anti-epileptic regimen  Post-seizure CT head showed stability  On POD3 patient was at neurologic baseline  No further seizures were noted  She continued to require adjustment of insulin regimen for persistent hyperglycemia  On POD4 patient was discharged home with family support and home PT  Red flag symptoms, post-op expectations, restrictions, and follow-up was reviewed with patient prior to discharge  Patient was cleared by neurology and endocrinology team prior to discharge  Patient was instructed to call for neurology and endocrinology for appointments within 1-2 weeks  She has a follow-up with neurosurgery 2 weeks post-operatively for pathology review  Condition at Discharge: good     Discharge instructions/Information to patient and family:   See after visit summary for information provided to patient and family        Provisions for Follow-Up Care:  See after visit summary for information related to follow-up care and any pertinent home health orders  Disposition: Home    Planned Readmission: No    Discharge Statement   I spent 30 minutes discharging the patient  This time was spent on the day of discharge  I had direct contact with the patient on the day of discharge  Additional documentation is required if more than 30 minutes were spent on discharge  Discharge Medications:  See after visit summary for reconciled discharge medications provided to patient and family

## 2018-03-23 ENCOUNTER — DOCUMENTATION (OUTPATIENT)
Dept: NEUROLOGY | Facility: CLINIC | Age: 58
End: 2018-03-23

## 2018-03-23 DIAGNOSIS — G40.909 SEIZURE DISORDER (HCC): Primary | ICD-10-CM

## 2018-03-23 RX ORDER — CLONAZEPAM 0.5 MG/1
0.5 TABLET, ORALLY DISINTEGRATING ORAL 2 TIMES DAILY
Qty: 60 TABLET | Refills: 0 | Status: SHIPPED | OUTPATIENT
Start: 2018-03-23 | End: 2018-03-28

## 2018-03-23 NOTE — PROGRESS NOTES
As discussed trileptal was started by Dr Analisa Adair after surgery  If she is having more seizures, she needs to go to [facility] for extended EEG and management  I will call her when I'm done with EMGs

## 2018-03-23 NOTE — PROGRESS NOTES
Verbal RX for Klonopin   5 mg bid #60 0 refills called to [facility] [place] ([phone])

## 2018-03-23 NOTE — PROGRESS NOTES
The patient called and stated that she is having increased seizures, "mush brain", and falling episodes since increasing Trileptal per your instructions earlier in the week  She states that she told the doctors over in [place] that she jha not tolerate Trileptal well  She will reduce back down to 150 mg bid and taper further per your instructions next week  I offered her a prescription for Klonopin but once I advised the side effects, she did not prefer to take the medication  I was able to schedule her for an appointment to be seen on Thursday, 3/29 and will call her with any earlier openings

## 2018-03-23 NOTE — PROGRESS NOTES
Spoke to patient in detail  She feels her petit mal seizures have increased since she started taking trileptal and says it was like that when she was tried on it years ago  I have asked her to stop trileptal  Ideally we taper it but it hasn't been that long she's on it so will d/c it and add klonopin for the time being  Please call it in to her pharmacy  Will place order  Thanks

## 2018-03-28 ENCOUNTER — OFFICE VISIT (OUTPATIENT)
Dept: NEUROSURGERY | Facility: CLINIC | Age: 58
End: 2018-03-28

## 2018-03-28 VITALS
RESPIRATION RATE: 16 BRPM | HEART RATE: 79 BPM | HEIGHT: 66 IN | BODY MASS INDEX: 34.23 KG/M2 | SYSTOLIC BLOOD PRESSURE: 125 MMHG | DIASTOLIC BLOOD PRESSURE: 64 MMHG | WEIGHT: 213 LBS | TEMPERATURE: 98.8 F

## 2018-03-28 DIAGNOSIS — G93.6 CEREBRAL EDEMA (HCC): ICD-10-CM

## 2018-03-28 DIAGNOSIS — G93.5 BRAIN COMPRESSION (HCC): ICD-10-CM

## 2018-03-28 DIAGNOSIS — E11.8 TYPE 2 DIABETES MELLITUS WITH COMPLICATION, WITH LONG-TERM CURRENT USE OF INSULIN (HCC): ICD-10-CM

## 2018-03-28 DIAGNOSIS — D32.9 MENINGIOMA (HCC): Primary | ICD-10-CM

## 2018-03-28 DIAGNOSIS — G93.6 VASOGENIC CEREBRAL EDEMA (HCC): ICD-10-CM

## 2018-03-28 DIAGNOSIS — G40.909 SEIZURE DISORDER (HCC): ICD-10-CM

## 2018-03-28 DIAGNOSIS — Z79.4 TYPE 2 DIABETES MELLITUS WITH COMPLICATION, WITH LONG-TERM CURRENT USE OF INSULIN (HCC): ICD-10-CM

## 2018-03-28 DIAGNOSIS — I10 BENIGN ESSENTIAL HTN: ICD-10-CM

## 2018-03-28 PROBLEM — R53.1 WEAKNESS OF LEFT SIDE OF BODY: Status: RESOLVED | Noted: 2018-03-05 | Resolved: 2018-03-28

## 2018-03-28 PROBLEM — G93.89 FRONTAL MASS OF BRAIN: Status: RESOLVED | Noted: 2018-03-05 | Resolved: 2018-03-28

## 2018-03-28 PROCEDURE — 99024 POSTOP FOLLOW-UP VISIT: CPT | Performed by: NEUROLOGICAL SURGERY

## 2018-03-28 NOTE — PROGRESS NOTES
Neurosurgery Office Note  Elke Lobe 62 y o  female MRN: [id number]      Assessment/Plan      Diagnoses and all orders for this visit:    Meningioma [facility])    Cerebral edema ([facility] )    Brain compression (HCC)    Seizure disorder ([facility] )    Vasogenic cerebral edema (HCC)    Type 2 diabetes mellitus with complication, with long-term current use of insulin ([facility] )  -     Ambulatory referral to [facility]; Future    Benign essential HTN  -     Ambulatory referral to [facility]; Future    Other orders  -     metFORMIN (GLUCOPHAGE) 500 mg tablet; Take 500 mg by mouth 2 (two) times a day with meals  -     sitaGLIPtin (JANUVIA) 50 mg tablet; Take 50 mg by mouth daily        Discussion:    2 week POV for resection of right parafalcine craniotomy for meningioma  WHO grade I meningioma  Although I question whether this may be more in line with grade 2  There is likely some slight residual of the sagittal sinus  Follows with Dr Evelyne Schirmer (has an appt for tomorrow) who is adjusting her AEDs  Blood sugars have been better as she has weaned on her steroids  She will complete her taper day after tomorrow  She has yet to connect with endocrinology (referred on discharge, but apparently they have not returned her calls), but as she has weaned, this may not be urgent  In fact, the patient would like to connect with a PCP in the [place] area, so I will make a referral      Will plan for surveillance imaging at 3 months postop  Will see her back at that point at [facility] to discuss potential adjuvant IMRT, SRT, SRS, etc, as well as for MOCA/metrics  Will see her back at 6 weeks postop to assess her progress, as well as order the 3 month postop MRI  Case was discussed at [facility] today with Dr Hina Quiles  Metrics: [id number] [id number] or 0 816, VAS 67  KPS 70, ECOG 1-2       CHIEF COMPLAINT    Chief Complaint   Patient presents with    Post-op     2 week POV and Path Review       HISTORY    History of Present Illness     62 y o  year old female     HPI    See discussion    REVIEW OF SYSTEMS    Review of Systems   Constitutional: Positive for appetite change (steroids increased appetite, but its resolving, watching blood sugar) and fatigue (mild)  HENT: Positive for tinnitus (not new, bilateral, constant) and voice change (with steroids, resolving)  Eyes: Negative  Lens implants   Respiratory: Positive for shortness of breath (on exertion)  Cardiovascular: Negative  Gastrointestinal: Positive for diarrhea (occasional)  Endocrine: Negative  Genitourinary: Negative  Musculoskeletal: Negative  Skin: Positive for wound (surgical incision and injection bruises, wound on right buttocks (prior to hosp adm) healing)  Allergic/Immunologic: Negative  Neurological: Positive for seizures, weakness (only with exertion), numbness (top of head) and headaches (very mild less than 2/10, frontal parietal, occasional, tylenol relieves, pressure or discomfort)  Whole left side greatly improved since surgery, balance is improved, no longer drifting or falling   Hematological: Negative  Psychiatric/Behavioral: Positive for agitation (when fatigued) and decreased concentration (when fatigued)  The patient is nervous/anxious (when fatigued)            Meds/Allergies     Current Outpatient Prescriptions   Medication Sig Dispense Refill    acetaminophen (TYLENOL) 325 mg tablet Take 2 tablets (650 mg total) by mouth every 6 (six) hours as needed for mild pain 30 tablet 0    cholecalciferol (VITAMIN D3) 1,000 units tablet Take 1,000 Units by mouth daily      dexamethasone (DECADRON) 2 mg tablet Take 2tabs PO Q8H x48H, then 1 tab PO Q6H x48H, then 1 tab PO Q8H x48H, then 1 tab PO Q12H x48H, then 1 tab PO QD x48H 40 tablet 0    docusate sodium (COLACE) 100 mg capsule Take 1 capsule (100 mg total) by mouth 2 (two) times a day (Patient taking differently: Take 100 mg by mouth as needed  ) 10 capsule 0    famotidine (PEPCID) 20 mg tablet Take 1 tablet PO Q12H while on steroids (Patient taking differently: daily Take 1 tablet PO Q12H while on steroids ) 45 tablet 0    ferrous sulfate 325 (65 Fe) mg tablet Take 325 mg by mouth daily      Glucosamine-Chondroitin (MOVE FREE PO) Take 1 tablet by mouth daily      insulin aspart protamine-insulin aspart (NovoLOG 70/30) 100 Units/mL SUPN Inject 10 Units under the skin 3 (three) times a day with meals 5 pen 0    Insulin Pen Needle 29G X 12 7MM MISC Use as directed with novolog flexpen to administer insulin with meals and use with lantus solostar pen to administer insulin in the morning 100 each 0    lacosamide (VIMPAT) 150 mg tablet Take 1 tablet (150 mg total) by mouth every 12 (twelve) hours for 30 doses 30 tablet 0    lisinopril (ZESTRIL) 20 mg tablet Take 20 mg by mouth daily      LYSINE PO Take 1 tablet by mouth daily      metFORMIN (GLUCOPHAGE) 500 mg tablet Take 500 mg by mouth 2 (two) times a day with meals      Probiotic Product (PROBIOTIC DAILY PO) Take 1 capsule by mouth daily      sertraline (ZOLOFT) 100 mg tablet Take 50 mg by mouth daily at bedtime        sertraline (ZOLOFT) 50 mg tablet Take 50 mg by mouth daily      sitaGLIPtin (JANUVIA) 50 mg tablet Take 50 mg by mouth daily       No current facility-administered medications for this visit          Allergies   Allergen Reactions    Dilantin [Phenytoin]     Lidocaine     Statins        PAST HISTORY    Past Medical History:   Diagnosis Date    B[facility]     Cancer ([facility]     Depression     Diabetes mellitus ([facility] )     Seizures ([facility] )        Past Surgical History:   Procedure Laterality Date    CHOLECYSTECTOMY  2004    HYSTERECTOMY      MO EXCIS [address] TUMOR Right 3/15/2018    Procedure: IMAGE-GUIDED RIGHT FRONTOPARIETAL CRANIOTOMY FOR TUMOR;  Surgeon: Diane Guillen MD;  [facility];  Service: Neurosurgery       Social History   Substance Use Topics    Smoking status: Former Smoker Quit date: 3/5/2000    Smokeless tobacco: Never Used    Alcohol use No       Family History   Problem Relation Age of Onset    Adopted: Yes         Above history personally reviewed  EXAM    Vitals:Blood pressure 125/64, pulse 79, temperature 98 8 °F (37 1 °C), temperature source Tympanic, resp  rate 16, height 5' 6" (1 676 m), weight 96 6 kg (213 lb)  ,Body mass index is 34 38 kg/m²  Physical Exam   HENT:   Scalp incision healing well  HIstoacryl and suture in place  Neurologic Exam     Mental Status   Level of consciousness: alert    Cranial Nerves     CN VII   Facial expression full, symmetric  Motor Exam   Right arm pronator drift: absent  Left arm pronator drift: absent    Sensory Exam   Left arm proprioception: normal (FTN WNL)    Gait, Coordination, and Reflexes     Gait  Gait: (HurriCane)           MEDICAL DECISION MAKING    Imaging Studies:     Final pathology report faxed from [facility] reviewed, "WHO grade I meningioma, with Ki-67 maximally 6%, slightly higher than typical for grade I " Typically >4% would be grade II, so we will keep this in mind

## 2018-03-29 ENCOUNTER — OFFICE VISIT (OUTPATIENT)
Dept: NEUROLOGY | Facility: CLINIC | Age: 58
End: 2018-03-29
Payer: MEDICARE

## 2018-03-29 VITALS
WEIGHT: 210 LBS | HEIGHT: 66 IN | SYSTOLIC BLOOD PRESSURE: 108 MMHG | HEART RATE: 80 BPM | BODY MASS INDEX: 33.75 KG/M2 | DIASTOLIC BLOOD PRESSURE: 64 MMHG

## 2018-03-29 DIAGNOSIS — D32.9 MENINGIOMA (HCC): Primary | ICD-10-CM

## 2018-03-29 DIAGNOSIS — G40.909 SEIZURE DISORDER (HCC): ICD-10-CM

## 2018-03-29 DIAGNOSIS — Z98.890 STATUS POST SURGERY: ICD-10-CM

## 2018-03-29 PROCEDURE — 99214 OFFICE O/P EST MOD 30 MIN: CPT | Performed by: PSYCHIATRY & NEUROLOGY

## 2018-03-29 RX ORDER — LACOSAMIDE 150 MG/1
TABLET ORAL
Qty: 60 TABLET | Refills: 3 | Status: SHIPPED | OUTPATIENT
Start: 2018-03-29 | End: 2018-09-28 | Stop reason: SDUPTHER

## 2018-03-29 NOTE — PROGRESS NOTES
Return NeuroOutpatient Note        Karely Reis  [id number]  62 y o   1960       Neurologic Problem (Tumor removed-Increased Seizure Activity )        History obtained from: Patient     HPI/Subjective: Karely Reis is a 61 yo left handed F with PMH of DM II, seizure disorder, headaches, right parafalcine meningioma presents as follow up  She was first evaluated on 3/6/18 with cc of  left sided weakness for past 8 months and it was progressively getting worse  3 weeks prior to hospital admission, she was dragging her left foot  She was getting partial focal motor seizures as well  Her CT/MRI brain showed enlarging right frontal lobe meningioma  She was started on Vimpat  She was referred to see Dr Amie Morgan within a week and had surgery on 3/15  She tolerated well however a day later she did have a seizure  She was placed on trileptal by neurosurgery  During phone conversations, she had mentioned her staring spells and partial seizures were more frequent  She was feeling lethargic, couldn't function, was incoherent  We tapered her off of trileptal and increased Vimpat to 150mg bid  Today she says she's doing very well  She doesn't have any side effects of medications or partial seizures  She feels very cognitively intact  She can now walk independently but uses cane for steps  She denies headaches anymore  Her tumor pathology reveal Who grade I to II lesion  She will have MRI brain in 10 weeks and then it will be decided whether she will need radiation       Past Medical History:   Diagnosis Date    [facility]     Cancer ([facility] )     Depression     Diabetes mellitus ([facility] )     Seizures ([facility] )      Social History     Social History    Marital status: Significant Other     Spouse name: Lieutenant Lynn Number of children: 4    Years of education: Some college     Occupational History    disabled      [employer]     Social History Main Topics    Smoking status: Former Smoker     Quit date: 3/5/2000    Smokeless tobacco: Never Used    Alcohol use No    Drug use: No    Sexual activity: Yes     Partners: Male     Other Topics Concern    Not on file     Social History Narrative    Lives at home with significant other, oMnika Onofre     Family History   Problem Relation Age of Onset    Adopted:  Yes     Allergies   Allergen Reactions    Dilantin [Phenytoin]     Lidocaine     Statins      Current Outpatient Prescriptions on File Prior to Visit   Medication Sig Dispense Refill    acetaminophen (TYLENOL) 325 mg tablet Take 2 tablets (650 mg total) by mouth every 6 (six) hours as needed for mild pain 30 tablet 0    cholecalciferol (VITAMIN D3) 1,000 units tablet Take 1,000 Units by mouth daily      dexamethasone (DECADRON) 2 mg tablet Take 2tabs PO Q8H x48H, then 1 tab PO Q6H x48H, then 1 tab PO Q8H x48H, then 1 tab PO Q12H x48H, then 1 tab PO QD x48H 40 tablet 0    docusate sodium (COLACE) 100 mg capsule Take 1 capsule (100 mg total) by mouth 2 (two) times a day (Patient taking differently: Take 100 mg by mouth as needed  ) 10 capsule 0    famotidine (PEPCID) 20 mg tablet Take 1 tablet PO Q12H while on steroids (Patient taking differently: daily Take 1 tablet PO Q12H while on steroids ) 45 tablet 0    ferrous sulfate 325 (65 Fe) mg tablet Take 325 mg by mouth daily      Glucosamine-Chondroitin (MOVE FREE PO) Take 1 tablet by mouth daily      insulin aspart protamine-insulin aspart (NovoLOG 70/30) 100 Units/mL SUPN Inject 10 Units under the skin 3 (three) times a day with meals 5 pen 0    Insulin Pen Needle 29G X 12 7MM MISC Use as directed with novolog flexpen to administer insulin with meals and use with lantus solostar pen to administer insulin in the morning 100 each 0    lisinopril (ZESTRIL) 20 mg tablet Take 20 mg by mouth daily      Lysine 1000 MG TABS Take 1 tablet by mouth daily      metFORMIN (GLUCOPHAGE) 500 mg tablet Take 500 mg by mouth 2 (two) times a day with meals      Probiotic Product (PROBIOTIC DAILY PO) Take 1 capsule by mouth daily      sertraline (ZOLOFT) 100 mg tablet Take 100 mg by mouth daily at bedtime        sertraline (ZOLOFT) 50 mg tablet Take 50 mg by mouth daily      sitaGLIPtin (JANUVIA) 50 mg tablet Take 50 mg by mouth daily      [DISCONTINUED] lacosamide (VIMPAT) 150 mg tablet Take 1 tablet (150 mg total) by mouth every 12 (twelve) hours for 30 doses 30 tablet 0     No current facility-administered medications on file prior to visit  Review of Systems   Refer to positive review of systems in HPI     Constitutional- No fever  Eyes- No visual change  ENT- Hearing normal  CV- No chest pain  Resp- No Shortness of breath  GI- No diarrhea  - Bladder normal  MS- No Arthritis   Skin- No rash  Psych- No depression  Endo- No DM  Heme- No nodes    Vitals:    03/29/18 1513   BP: 108/64   BP Location: Left arm   Patient Position: Sitting   Pulse: 80   Weight: 95 3 kg (210 lb)   Height: 5' 6" (1 676 m)       PHYSICAL EXAM:  Appearance: No Acute Distress  Ophthalmoscopic: Disc Flat, Normal fundus  Mental status:  Orientation: Awake, Alert, and Orientedx3  Memory: Registation 3/3 Recall 3/3  Attention: normal  Knowledge: good  Language: No aphasia  Speech: No dysarthria  Cranial Nerves:  2 No Visual Defect on Confrontation, Pupils round, equal, reactive to light  3,4,6 Extraocular Movements Intact, no nystagmus  5 Facial Sensation Intact  7 No facial asymmetry  8 Intact hearing  9,10 Palate symmetric, normal gag  11 Good shoulder shrug  12 Tongue Midline  Gait: Stable without cane   Coordination: No ataxia with finger to nose testing, and heel to shin  Sensory: Intact, Symmetric to pinprick, light touch, vibration, and joint position  Muscle Tone: Normal              Muscle exam:  Arm Right Left Leg Right Left   Deltoid 5/5 5/5 Iliopsoas 5/5 4++/5   Biceps 5/5 5/5 Quads 5/5 5/5   Triceps 5/5 5/5 Hamstrings 5/5 5/5   Wrist Extension 5/5 5/5 Ankle Dorsi Flexion 5/5 4+/5   Wrist Flexion 5/5 5/5 Ankle Plantar Flexion 5/5 5/5   Interossei 5/5 5/5 Ankle Eversion 5/5 5/5   APB 5/5 5/5 Ankle Inversion 5/5 5/5       Reflexes   RJ BJ TJ KJ AJ Plantars Mayer's   Right 2+ 2+ 2+ 2+ 2+ Downgoing Not present   Left 2+ 2+ 2+ 2+ 2+ Downgoing Not present     Personal review of  Labs:                  Diagnoses and all orders for this visit:          1  Meningioma ([address] )     2  Status post surgery     3  Seizure disorder (HCC)  lacosamide (VIMPAT) 150 mg tablet         Clinically, patient is doing very well  Her strength has returned to almost her normal baseline  She doesn't have headaches anymore  She's tolerating vimpat well  Will resume 150mg bid  Discussed importance of being compliant with AED  Follow up with neurosurgery   Will reevaluate in 3 months  Counseling Documentation:  The patient and/or patient's family were  counseled regarding diagnostic results  Instructions for management,risk factor reductions,prognosis of disease were discussed  Patient and family were educated regarding impressions,risks and benefits of treatment options,importance of compliance with treatment        Total time of encounter:  30 min  More than 50% of the time was used in counseling and/or coordination of care  Extent of counseling and/or coordination of care        MD Felicitas Dyson [facility]  [facility] [address]  [phone]

## 2018-03-30 ENCOUNTER — TELEPHONE (OUTPATIENT)
Dept: NEUROSURGERY | Facility: CLINIC | Age: 58
End: 2018-03-30

## 2018-03-30 NOTE — TELEPHONE ENCOUNTER
----- Message from Kenji Mckeon MD sent at 3/29/2018  6:56 PM EDT -----  Umm Connolly,    This Patient was asking for a PCP in the [place] area  I placed a generic order to [facility], and Joanna Miguel gave her info on PCPs  I have since heard back from a few people who suggested either Dr Eli Johnson, or Dr Yen Suazo  Can you relay that to the patient please, along with contact info for those offices if she didn't get it with what Joanna Part gave her      Thanks

## 2018-03-30 NOTE — TELEPHONE ENCOUNTER
Spoke with Easton Burden in regards to establishing a PCP  Provided Dr Andrez Hernandez and Dr Archana Ortega office contact information  Per patient's request emailed the information via secure work email  Encouraged patient to contact the office if she may have any troubles establishing with a PCP  Patient was appreciative for the call and assistance

## 2018-04-25 ENCOUNTER — OFFICE VISIT (OUTPATIENT)
Dept: NEUROSURGERY | Facility: CLINIC | Age: 58
End: 2018-04-25

## 2018-04-25 VITALS
BODY MASS INDEX: 35.23 KG/M2 | WEIGHT: 219.2 LBS | RESPIRATION RATE: 18 BRPM | SYSTOLIC BLOOD PRESSURE: 122 MMHG | HEIGHT: 66 IN | DIASTOLIC BLOOD PRESSURE: 68 MMHG | TEMPERATURE: 98.2 F | HEART RATE: 85 BPM

## 2018-04-25 DIAGNOSIS — G93.5 BRAIN COMPRESSION (HCC): ICD-10-CM

## 2018-04-25 DIAGNOSIS — G93.6 CEREBRAL EDEMA (HCC): ICD-10-CM

## 2018-04-25 DIAGNOSIS — D32.9 MENINGIOMA (HCC): Primary | ICD-10-CM

## 2018-04-25 PROCEDURE — 99024 POSTOP FOLLOW-UP VISIT: CPT | Performed by: NEUROLOGICAL SURGERY

## 2018-04-25 NOTE — PROGRESS NOTES
Neurosurgery Office Note  Gladys Roland 62 y o  female MRN: [id number]      Assessment/Plan      Diagnoses and all orders for this visit:    Meningioma [facility])  -     MRI brain w wo contrast; Future    Cerebral edema ([address] )  -     MRI brain w wo contrast; Future    Brain compression ([address] )  -     MRI brain w wo contrast; Future        Discussion:    6 week POV for resection of right parafalcine craniotomy for meningioma      WHO grade I meningioma  Although I question whether this may be more in line with grade 2  There is likely some slight residual of the sagittal sinus      Follows with Dr Rex Hickey who is adjusting her AEDs  On Vimpat     Will plan for surveillance imaging at 3 months postop - I have ordered that MRI  Mentions bilateral hand tremors that last for a few hours each morning  Will reach out to Dr Rex Hickey for her thoughts on that  Familial tremor vs early PD vs ?     Metrics: EQ5D5L today 00795 or 0 846, VAS 50; KPS 70-80, ECOG 1  At 2 week postop: 24701 or 0 816, VAS 67  KPS 70, ECOG 1-2  CHIEF COMPLAINT    Chief Complaint   Patient presents with    Post-op     6 week POV       HISTORY    History of Present Illness     62y o  year old female     HPI    See discussion    REVIEW OF SYSTEMS    Review of Systems   Constitutional: Positive for fatigue (moderate, improving)  HENT: Positive for rhinorrhea (green mucous, resolving) and tinnitus (bilateral, high pitch, constant, not new)  Eyes: Negative  Respiratory: Negative  Cardiovascular: Negative  Gastrointestinal: Negative  Endocrine: Negative  Genitourinary: Negative  Musculoskeletal: Positive for arthralgias (right hand)  Skin: Negative  Surgical incision healing well   Allergic/Immunologic: Negative  Neurological: Positive for tremors (on the am, bilateral arms and hands), seizures (petit mal, mild, not new, at night) and headaches (rarely and mild, much improved)          Balance still unsteady, not using cane or walker but uses objects to steady herself when her eyes are closed and while walking down steps as precaution  Worried about hitting her head (seizures)   Hematological: Negative  Psychiatric/Behavioral: The patient is nervous/anxious (mild)  Overall mood is really improved         Meds/Allergies     Current Outpatient Prescriptions   Medication Sig Dispense Refill    cholecalciferol (VITAMIN D3) 1,000 units tablet Take 1,000 Units by mouth daily      ferrous sulfate 325 (65 Fe) mg tablet Take 325 mg by mouth daily      Glucosamine-Chondroitin (MOVE FREE PO) Take 1 tablet by mouth daily      insulin aspart protamine-insulin aspart (NovoLOG 70/30) 100 Units/mL SUPN Inject 10 Units under the skin 3 (three) times a day with meals 5 pen 0    Insulin Pen Needle 29G X 12 7MM MISC Use as directed with novolog flexpen to administer insulin with meals and use with lantus solostar pen to administer insulin in the morning 100 each 0    lacosamide (VIMPAT) 150 mg tablet Take 1 tab twice daily 60 tablet 3    lisinopril (ZESTRIL) 20 mg tablet Take 20 mg by mouth daily      metFORMIN (GLUCOPHAGE) 1000 MG tablet Take 1,000 mg by mouth 2 (two) times a day        Probiotic Product (PROBIOTIC DAILY PO) Take 1 capsule by mouth daily      sertraline (ZOLOFT) 100 mg tablet Take 100 mg by mouth daily at bedtime        sertraline (ZOLOFT) 50 mg tablet Take 50 mg by mouth daily      sitaGLIPtin (JANUVIA) 50 mg tablet Take 50 mg by mouth daily       No current facility-administered medications for this visit          Allergies   Allergen Reactions    Dilantin [Phenytoin]     Lidocaine     Statins        PAST HISTORY    Past Medical History:   Diagnosis Date    [facility]     Cancer ([facility] )     Depression     Diabetes mellitus ([facility] )     Seizures ([facility] )        Past Surgical History:   Procedure Laterality Date    CHOLECYSTECTOMY  2004    HYSTERECTOMY      IMPLANTABLE CONTACT LENS IMPLANTATION      ME EXCIS SUPRATENT BRAIN TUMOR Right 3/15/2018    Procedure: IMAGE-GUIDED RIGHT FRONTOPARIETAL CRANIOTOMY FOR TUMOR;  Surgeon: Radames Cushing, MD;  Location: [facility];  Service: Neurosurgery       Social History   Substance Use Topics    Smoking status: Former Smoker     Quit date: 3/5/2000    Smokeless tobacco: Never Used    Alcohol use No       Family History   Problem Relation Age of Onset    Adopted: Yes         Above history personally reviewed  EXAM    Vitals:Blood pressure 122/68, pulse 85, temperature 98 2 °F (36 8 °C), temperature source Tympanic, resp  rate 18, height 5' 6" (1 676 m), weight 99 4 kg (219 lb 3 2 oz)  ,Body mass index is 35 38 kg/m²  Physical Exam   Constitutional: She is oriented to person, place, and time  She appears well-developed and well-nourished  HENT:   Head: Normocephalic  Incision well healed  Histoarcyl has been removed   Eyes: No scleral icterus  Neck: Neck supple  Cardiovascular: Normal rate  Pulmonary/Chest: Effort normal    Abdominal: Soft  Neurological: She is alert and oriented to person, place, and time  She has normal strength  GCS eye subscore is 4  GCS verbal subscore is 5  GCS motor subscore is 6  Skin: Skin is warm and dry  Psychiatric: She has a normal mood and affect  Her speech is normal and behavior is normal        Neurologic Exam     Mental Status   Oriented to person, place, and time  Attention: normal    Speech: speech is normal   Level of consciousness: alert    Cranial Nerves     CN VII   Facial expression full, symmetric  Motor Exam   Muscle bulk: normal  Overall muscle tone: normal  Right arm pronator drift: absent  Left arm pronator drift: absent    Strength   Strength 5/5 throughout   Moves all extremities, grossly normal     Gait, Coordination, and Reflexes     Gait  Gait: (no aids)    Tremor   Resting tremor: absent  Intention tremor: absent  Action tremor: absent

## 2018-05-15 ENCOUNTER — TELEPHONE (OUTPATIENT)
Dept: NEUROSURGERY | Facility: CLINIC | Age: 58
End: 2018-05-15

## 2018-05-15 NOTE — TELEPHONE ENCOUNTER
Patient called reporting pressure like sensations that started last week  When these events occur, the patient reports hearing loss bilaterally  The patient reports a total of 3 episodes  She denies any strenuous movements prior to the events and she denies any incisional issues  Discussed this with Dr Shelley Guerrier and Dr Shelley Guerrier recommended for the patient to follow up via telephone call with the office in one to two days to report how she is doing and if she experienced any more events  Dr Shelley Guerrier said no acute scans/imaging is required at this moment  The patient said she will follow up with the office  Provided my direct line  Patient was appreciative for the help

## 2018-05-21 ENCOUNTER — TELEPHONE (OUTPATIENT)
Dept: NEUROSURGERY | Facility: CLINIC | Age: 58
End: 2018-05-21

## 2018-05-21 NOTE — TELEPHONE ENCOUNTER
Patient called reporting sharp pains on the right side of her head and it is warm to touch per patient  Patient reports the pain is intermittent  Patient denies any incisional issues such as drainage, swelling, redness, or dehiscence  Patient reports she feels like she has a fever  Inquired if patient had a thermometer, patient declined and said she will get one later today  Advised the patient to do so and to check her temperature daily  Discussed this with Dr Shara Spatz and he recommended for the patient to come to the office for a nurse visit to check vital signs and to assess incision  Offered the patient an appointment for today on 5/21/18, however patient declined  Appointment made for tomorrow on 5/22/18  Dr Shara Spatz is aware

## 2018-05-22 ENCOUNTER — CLINICAL SUPPORT (OUTPATIENT)
Dept: NEUROSURGERY | Facility: CLINIC | Age: 58
End: 2018-05-22

## 2018-05-22 VITALS
BODY MASS INDEX: 35.02 KG/M2 | TEMPERATURE: 98.2 F | WEIGHT: 217 LBS | DIASTOLIC BLOOD PRESSURE: 72 MMHG | SYSTOLIC BLOOD PRESSURE: 126 MMHG

## 2018-05-22 DIAGNOSIS — Z98.890 POST-OPERATIVE STATE: Primary | ICD-10-CM

## 2018-05-22 PROCEDURE — 99024 POSTOP FOLLOW-UP VISIT: CPT

## 2018-05-22 NOTE — PROGRESS NOTES
Post-Op Visit-Neurosurgery    Vianney Benavides 62 y o  female MRN: [id number]    Chief Complaint  Patient presents post: IMAGE-GUIDED RIGHT FRONTOPARIETAL CRANIOTOMY FOR TUMOR ON 3/15/18    History of Present Illness  Patient presents for incision recheck  Patient called the office yesterday on 5/21/18 reporting sharp pain on the right side of her head and how it is warm to touch  The sharp pain is intermittent and she rates it as moderate to severe pain  Patient denies any swelling, drainage, or dehiscence of incision  The patient has been checking her temperature at home and she has been afebrile  Patient also reports pressure like sensations that occur in her head with hearing loss  The hearing loss only occurred during these episodes  Patient reports the episodes only lasted for a few seconds and they were intermittent  She has not experienced one since last week on 5/15/18  Assessment  Wound Exam: Mild erythema  Incision is well approximated  No drainage, swelling, or dehiscence  Discussion/Summary  Ed Bridgette VALDEZ created the plan of care and evaluated the patient  Ed determined no immediate testing or intervention is needed at this time  Ed did offer the patient pain medication, however patient declined  She reports she is in recovery for drug abuse and does not want to risk it  Advised patient to continue taking tylenol  Continue with the follow-up MRI on 6/6/18  Patient is to call the office if her signs and symptoms may worsen  Patient agreed and understood

## 2018-06-04 ENCOUNTER — TRANSCRIBE ORDERS (OUTPATIENT)
Dept: LAB | Facility: CLINIC | Age: 58
End: 2018-06-04

## 2018-06-04 ENCOUNTER — APPOINTMENT (OUTPATIENT)
Dept: LAB | Facility: CLINIC | Age: 58
End: 2018-06-04
Payer: MEDICARE

## 2018-06-04 DIAGNOSIS — D32.9 MENINGIOMA (HCC): ICD-10-CM

## 2018-06-04 DIAGNOSIS — G93.6 CEREBRAL EDEMA (HCC): ICD-10-CM

## 2018-06-04 DIAGNOSIS — G93.5 BRAIN COMPRESSION (HCC): ICD-10-CM

## 2018-06-04 LAB
CREAT SERPL-MCNC: 0.92 MG/DL (ref 0.6–1.3)
GFR SERPL CREATININE-BSD FRML MDRD: 69 ML/MIN/1.73SQ M

## 2018-06-04 PROCEDURE — 36415 COLL VENOUS BLD VENIPUNCTURE: CPT

## 2018-06-04 PROCEDURE — 82565 ASSAY OF CREATININE: CPT

## 2018-06-06 ENCOUNTER — HOSPITAL ENCOUNTER (OUTPATIENT)
Dept: MRI IMAGING | Facility: HOSPITAL | Age: 58
Discharge: HOME | End: 2018-06-06
Attending: NEUROLOGICAL SURGERY
Payer: MEDICARE

## 2018-06-06 DIAGNOSIS — G93.5 BRAIN COMPRESSION (HCC): ICD-10-CM

## 2018-06-06 DIAGNOSIS — G93.6 CEREBRAL EDEMA (HCC): ICD-10-CM

## 2018-06-06 DIAGNOSIS — D32.9 MENINGIOMA (HCC): ICD-10-CM

## 2018-06-06 PROCEDURE — A9585 GADOBUTROL INJECTION: HCPCS | Performed by: NEUROLOGICAL SURGERY

## 2018-06-06 PROCEDURE — 70553 MRI BRAIN STEM W/O & W/DYE: CPT

## 2018-06-06 RX ADMIN — GADOBUTROL 9 ML: 604.72 INJECTION INTRAVENOUS at 14:59

## 2018-06-13 ENCOUNTER — OFFICE VISIT (OUTPATIENT)
Dept: NEUROSURGERY | Facility: CLINIC | Age: 58
End: 2018-06-13

## 2018-06-13 VITALS
RESPIRATION RATE: 16 BRPM | BODY MASS INDEX: 35.45 KG/M2 | WEIGHT: 220.6 LBS | DIASTOLIC BLOOD PRESSURE: 76 MMHG | SYSTOLIC BLOOD PRESSURE: 148 MMHG | TEMPERATURE: 98 F | HEART RATE: 60 BPM | HEIGHT: 66 IN

## 2018-06-13 DIAGNOSIS — D32.9 MENINGIOMA (HCC): Primary | ICD-10-CM

## 2018-06-13 DIAGNOSIS — G93.6 CEREBRAL EDEMA (HCC): ICD-10-CM

## 2018-06-13 PROBLEM — G93.5 BRAIN COMPRESSION (HCC): Status: RESOLVED | Noted: 2018-03-13 | Resolved: 2018-06-13

## 2018-06-13 PROCEDURE — 99024 POSTOP FOLLOW-UP VISIT: CPT | Performed by: NEUROLOGICAL SURGERY

## 2018-08-29 ENCOUNTER — TRANSCRIBE ORDERS (OUTPATIENT)
Dept: LAB | Facility: CLINIC | Age: 58
End: 2018-08-29

## 2018-08-29 ENCOUNTER — APPOINTMENT (OUTPATIENT)
Dept: LAB | Facility: CLINIC | Age: 58
End: 2018-08-29
Payer: MEDICARE

## 2018-08-29 DIAGNOSIS — G93.6 CEREBRAL EDEMA (HCC): ICD-10-CM

## 2018-08-29 DIAGNOSIS — D32.9 MENINGIOMA (HCC): ICD-10-CM

## 2018-08-29 LAB
CREAT SERPL-MCNC: 1.02 MG/DL (ref 0.6–1.3)
GFR SERPL CREATININE-BSD FRML MDRD: 61 ML/MIN/1.73SQ M

## 2018-08-29 PROCEDURE — 36415 COLL VENOUS BLD VENIPUNCTURE: CPT

## 2018-08-29 PROCEDURE — 82565 ASSAY OF CREATININE: CPT

## 2018-09-04 ENCOUNTER — HOSPITAL ENCOUNTER (OUTPATIENT)
Dept: MRI IMAGING | Facility: HOSPITAL | Age: 58
Discharge: HOME | End: 2018-09-04
Attending: NEUROLOGICAL SURGERY
Payer: MEDICARE

## 2018-09-04 DIAGNOSIS — D32.9 MENINGIOMA (HCC): ICD-10-CM

## 2018-09-04 DIAGNOSIS — G93.6 CEREBRAL EDEMA (HCC): ICD-10-CM

## 2018-09-04 PROCEDURE — A9585 GADOBUTROL INJECTION: HCPCS | Performed by: NEUROLOGICAL SURGERY

## 2018-09-04 PROCEDURE — 70553 MRI BRAIN STEM W/O & W/DYE: CPT

## 2018-09-04 RX ADMIN — GADOBUTROL 9 ML: 604.72 INJECTION INTRAVENOUS at 17:10

## 2018-09-12 ENCOUNTER — OFFICE VISIT (OUTPATIENT)
Dept: NEUROSURGERY | Facility: CLINIC | Age: 58
End: 2018-09-12
Payer: MEDICARE

## 2018-09-12 ENCOUNTER — TRANSCRIBE ORDERS (OUTPATIENT)
Dept: NEUROSURGERY | Facility: CLINIC | Age: 58
End: 2018-09-12

## 2018-09-12 VITALS
DIASTOLIC BLOOD PRESSURE: 58 MMHG | WEIGHT: 216.4 LBS | RESPIRATION RATE: 16 BRPM | SYSTOLIC BLOOD PRESSURE: 114 MMHG | TEMPERATURE: 97.9 F | BODY MASS INDEX: 34.78 KG/M2 | HEART RATE: 62 BPM | HEIGHT: 66 IN

## 2018-09-12 DIAGNOSIS — D32.9 MENINGIOMA (HCC): Primary | ICD-10-CM

## 2018-09-12 DIAGNOSIS — D32.9 BENIGN NEOPLASM OF MENINGES (HCC): Primary | ICD-10-CM

## 2018-09-12 PROCEDURE — 99214 OFFICE O/P EST MOD 30 MIN: CPT | Performed by: NEUROLOGICAL SURGERY

## 2018-09-12 NOTE — LETTER
September 12, 2018     Vero Mckeon, [address] [facility]    Patient: Gladys Roland   YOB: 1960   Date of Visit: 9/12/2018       José Miguel Can:    Saw this mutual patient follow-up today, thigh to be interested  If you have questions, please do not hesitate to call me  I look forward to following your patient along with you  Sincerely,        Leno Knapp MD        CC: No Recipients  Leno Knapp MD  9/12/2018  4:41 PM  Sign at close encounter  Neurosurgery Office Note  Gladys Roland 62 y o  female MRN: [id number]      Assessment/Plan      Diagnoses and all orders for this visit:    Meningioma [facility])  -     MRI brain with and without contrast; Future          Discussion:    51-year-old now 6 months status post resection of her 6 cm right parafalcine mass  She was treated with embolization and resection in 03/2018  She presented initially to [address] [place] [address] with left upper and left lower extremity weakness of the months duration  She was seen there by Dr Rex Hickey of Neurology, who initiated her workup  She was discharged in seen in the office  She underwent embolization on 3/13/18 with Dr Brant Mora, and surgery on 03/15  She tolerated that well  She has had an excellent neurologic recovery  Her left-sided strength is essentially full  She is using no ambulatory aids  She does have some headache at the surgical site, uses p r n  Advil  She mentions right hand swelling, which I cannot equate with her pathology  She also mentions seizures,  and has followed with Dr Rex Hickey for that issue  Her new brain imaging shows no clear recurrence or residual although there is extensive postoperative change  I suggest repeat scan in 6 months, and she has agreed  I ordered that study  We will also call to Dr Manjit Roberts office to help reestablish followup to manage her AED, headaches, and right hand sensory changes      [place] 27/30, unchanged from 3 months postop, improved from 25/30 preop  [id number] 76998 or 0 861, improved from last visit  ECOG 1, KPS 80, also improved  CHIEF COMPLAINT    Chief Complaint   Patient presents with    Follow-up     3 month f/u with new MRI brain       HISTORY    History of Present Illness     62y o  year old female     HPI    See Discussion    REVIEW OF SYSTEMS    Review of Systems   Constitutional: Positive for activity change (increased) and fatigue (unchanged)  HENT: Positive for tinnitus (BL, not new, constant)  Eyes: Negative  Respiratory: Negative  Cardiovascular: Negative  Gastrointestinal: Negative  Endocrine: Negative  Genitourinary: Negative  Musculoskeletal: Positive for back pain, joint swelling (right hand fingers), neck pain and neck stiffness  Skin: Negative  Allergic/Immunologic: Negative  Neurological: Positive for seizures (right side facial), speech difficulty (trouble word finding), numbness (right hand and thumb N/T) and headaches (increasing to every other day or so, left frontal, robert, achy, reieved by [facility])  Pain at edge of incision site, comes and goes   Hematological: Negative  Psychiatric/Behavioral: Positive for agitation (occasional) and dysphoric mood (occasional)           Meds/Allergies     Current Outpatient Prescriptions   Medication Sig Dispense Refill    BD PEN NEEDLE BON U/F 32G X 4 MM MISC TEST TID UTD  2    cholecalciferol (VITAMIN D3) 1,000 units tablet Take 1,000 Units by mouth daily      ferrous sulfate 325 (65 Fe) mg tablet Take 325 mg by mouth daily      Glucosamine-Chondroitin (MOVE FREE PO) Take 1 tablet by mouth daily      insulin aspart protamine-insulin aspart (NovoLOG 70/30) 100 Units/mL SUPN Inject 10 Units under the skin 3 (three) times a day with meals 5 pen 0    Insulin Pen Needle 29G X 12 7MM MISC Use as directed with novolog flexpen to administer insulin with meals and use with lantus solostar pen to administer insulin in the morning 100 each 0    lacosamide (VIMPAT) 150 mg tablet Take 1 tab twice daily 60 tablet 3    lisinopril (ZESTRIL) 20 mg tablet Take 20 mg by mouth daily      lisinopril-hydrochlorothiazide (PRINZIDE,ZESTORETIC) 20-12 5 MG per tablet TK 1 T PO QD  3    metFORMIN (GLUCOPHAGE) 1000 MG tablet Take 1,000 mg by mouth 2 (two) times a day        sertraline (ZOLOFT) 100 mg tablet Take 100 mg by mouth daily at bedtime        sitaGLIPtin (JANUVIA) 50 mg tablet Take 50 mg by mouth daily      Probiotic Product (PROBIOTIC DAILY PO) Take 1 capsule by mouth daily       No current facility-administered medications for this visit  Allergies   Allergen Reactions    Dilantin [Phenytoin]     Lidocaine     Statins        PAST HISTORY    Past Medical History:   Diagnosis Date    Bipolar depression ([facility] )     Cancer ([place] [address] )     Depression     Diabetes mellitus ([place] [address] )     PTSD (post-traumatic stress disorder) 2002    Seizures ([place] [address] )        Past Surgical History:   Procedure Laterality Date    CHOLECYSTECTOMY  2004    HYSTERECTOMY      IMPLANTABLE CONTACT LENS IMPLANTATION      [place] EXCIS [address] TUMOR Right 3/15/2018    Procedure: IMAGE-GUIDED RIGHT FRONTOPARIETAL CRANIOTOMY FOR TUMOR;  Surgeon: Leno Knapp MD;  [facility];  Service: Neurosurgery       Social History   Substance Use Topics    Smoking status: Former Smoker     Quit date: 3/5/2000    Smokeless tobacco: Never Used    Alcohol use No       Family History   Problem Relation Age of Onset    Adopted: Yes         Above history personally reviewed  EXAM    Vitals:Blood pressure 114/58, pulse 62, temperature 97 9 °F (36 6 °C), temperature source Tympanic, resp  rate 16, height 5' 5 5" (1 664 m), weight 98 2 kg (216 lb 6 4 oz)  ,Body mass index is 35 46 kg/m²  Physical Exam   Constitutional: She is oriented to person, place, and time  She appears well-developed and well-nourished  HENT:   Head: Normocephalic     Craniotomy incision well healed, no alopecia   Eyes: No scleral icterus  Neck: Neck supple  Cardiovascular: Normal rate  Pulmonary/Chest: Effort normal    Abdominal: Soft  Musculoskeletal:   Mild bilateral hand swelling, symmetric  Neurological: She is alert and oriented to person, place, and time  She has normal strength  GCS eye subscore is 4  GCS verbal subscore is 5  GCS motor subscore is 6  Skin: Skin is warm and dry  Psychiatric: She has a normal mood and affect  Her speech is normal and behavior is normal        Neurologic Exam     Mental Status   Oriented to person, place, and time  Attention: normal    Speech: speech is normal   Level of consciousness: alert    Cranial Nerves     CN VII   Facial expression full, symmetric  Motor Exam   Muscle bulk: normal  Overall muscle tone: normal    Strength   Strength 5/5 throughout  Moves all extremities, grossly normal     Gait, Coordination, and Reflexes     Tremor   Resting tremor: absent  Intention tremor: absent  Action tremor: absent  No aids  MEDICAL DECISION MAKING    Imaging Studies:     Mri Brain W Wo Contrast    Result Date: 9/5/2018  Narrative: MRI BRAIN WITH AND WITHOUT CONTRAST INDICATION: D32 9: Benign neoplasm of meninges, unspecified G93 6: Cerebral edema  Status post meningioma resection in March 2018  COMPARISON:  None  TECHNIQUE: Sagittal T1, axial T2, axial FLAIR, axial T1, axial [place], axial diffusion  Sagittal, axial and coronal T1 postcontrast   Axial BRAVO post contrast   IV Contrast:  9 mL of gadobutrol injection (MULTI-DOSE)  IMAGE QUALITY:   Diagnostic  FINDINGS: BRAIN PARENCHYMA: There is no restricted diffusion  Decreasing T2 prolongation is noted on FLAIR imaging in the right parasagittal posterior frontal lobe at the site of the prior resection cavity  There is minimal gradient susceptibility artifact within the surgical cavity consistent with postoperative blood products    The original surgical cavity is slightly smaller on postcontrast imaging  Much of the resection bed is fluid-filled  There is a small amount of residual enhancement within the original surgical cavity which likely represents scar tissue  There is however focal additional nodular enhancement above the superior sagittal sinus, immediately subjacent to the craniotomy defect, within the epidural space which is indenting the superior margin of the superior sagittal sinus  This 1 cm region of enhancement is best appreciated on image 38 of the coronal postcontrast T1 series  The amount of fluid seen in the epidural space has decreased  This residual enhancement may be postoperative however, short-term follow-up is recommended to exclude recurrence  VENTRICLES:  Normal  SELLA AND PITUITARY GLAND:  Normal  ORBITS:  There is a somewhat flattened appearance to the posterior scleral margin bilaterally  No focal outpouching of the sclerae identified  PARANASAL SINUSES:  Mild mucosal thickening in the left maxillary sinus  VASCULATURE:  Evaluation of the major intracranial vasculature demonstrates appropriate flow voids  CALVARIUM AND SKULL BASE:  Right parietal craniotomy EXTRACRANIAL SOFT TISSUES:  Normal      Impression: Evolving postoperative changes of prior right posterior frontal parafalcine meningioma resection  The original surgical cavity is smaller  There is a small amount of enhancing tissue within the epidural space subjacent to the craniotomy, and superior to the sagittal sinus may represent additional postoperative enhancement however, short-term follow-up is recommended to assess for recurrent disease  Please note that the dural venous sinuses not well defined on this examination  MR venography could be utilized if there is a clinical indication to assess the dural venous sinuses  Workstation performed: [id number]       I have personally reviewed pertinent reports  and I have personally reviewed pertinent films in PACS with a Radiologist  at [facility]

## 2018-09-12 NOTE — PROGRESS NOTES
Neurosurgery Office Note  Catherine Cartagena 62 y o  female MRN: [id number]      Assessment/Plan      Diagnoses and all orders for this visit:    Meningioma [facility])  -     MRI brain with and without contrast; Future          Discussion:    59-year-old now 6 months status post resection of her 6 cm right parafalcine mass  She was treated with embolization and resection in 03/2018  She presented initially to [facility] with left upper and left lower extremity weakness of the months duration  She was seen there by Dr Claudia Farnsworth of Neurology, who initiated her workup  She was discharged in seen in the office  She underwent embolization on 3/13/18 with Dr Candy Mendoza, and surgery on 03/15  She tolerated that well  She has had an excellent neurologic recovery  Her left-sided strength is essentially full  She is using no ambulatory aids  She does have some headache at the surgical site, uses p r n  Advil  She mentions right hand swelling, which I cannot equate with her pathology  She also mentions seizures,  and has followed with Dr Claudia Farnsworth for that issue  Her new brain imaging shows no clear recurrence or residual although there is extensive postoperative change  I suggest repeat scan in 6 months, and she has agreed  I ordered that study  We will also call to Dr Jaiden Antunez office to help reestablish followup to manage her AED, headaches, and right hand sensory changes  [place] 27/30, unchanged from 3 months postop, improved from 25/30 preop  EQ5D5L 95079 or 0 861, improved from last visit  ECOG 1, KPS 80, also improved  CHIEF COMPLAINT    Chief Complaint   Patient presents with    Follow-up     3 month f/u with new MRI brain       HISTORY    History of Present Illness     62y o  year old female     HPI    See Discussion    REVIEW OF SYSTEMS    Review of Systems   Constitutional: Positive for activity change (increased) and fatigue (unchanged)  HENT: Positive for tinnitus (BL, not new, constant)  Eyes: Negative  Respiratory: Negative  Cardiovascular: Negative  Gastrointestinal: Negative  Endocrine: Negative  Genitourinary: Negative  Musculoskeletal: Positive for back pain, joint swelling (right hand fingers), neck pain and neck stiffness  Skin: Negative  Allergic/Immunologic: Negative  Neurological: Positive for seizures (right side facial), speech difficulty (trouble word finding), numbness (right hand and thumb N/T) and headaches (increasing to every other day or so, left frontal, miild, achy, reieved by [employer])  Pain at edge of incision site, comes and goes   Hematological: Negative  Psychiatric/Behavioral: Positive for agitation (occasional) and dysphoric mood (occasional)           Meds/Allergies     Current Outpatient Prescriptions   Medication Sig Dispense Refill    BD PEN NEEDLE BON U/F 32G X 4 MM MISC TEST TID UTD  2    cholecalciferol (VITAMIN D3) 1,000 units tablet Take 1,000 Units by mouth daily      ferrous sulfate 325 (65 Fe) mg tablet Take 325 mg by mouth daily      Glucosamine-Chondroitin (MOVE FREE PO) Take 1 tablet by mouth daily      insulin aspart protamine-insulin aspart (NovoLOG 70/30) 100 Units/mL SUPN Inject 10 Units under the skin 3 (three) times a day with meals 5 pen 0    Insulin Pen Needle 29G X 12 7MM MISC Use as directed with novolog flexpen to administer insulin with meals and use with lantus solostar pen to administer insulin in the morning 100 each 0    lacosamide (VIMPAT) 150 mg tablet Take 1 tab twice daily 60 tablet 3    lisinopril (ZESTRIL) 20 mg tablet Take 20 mg by mouth daily      lisinopril-hydrochlorothiazide (PRINZIDE,ZESTORETIC) 20-12 5 MG per tablet TK 1 T PO QD  3    metFORMIN (GLUCOPHAGE) 1000 MG tablet Take 1,000 mg by mouth 2 (two) times a day        sertraline (ZOLOFT) 100 mg tablet Take 100 mg by mouth daily at bedtime        sitaGLIPtin (JANUVIA) 50 mg tablet Take 50 mg by mouth daily      Probiotic Product (PROBIOTIC DAILY PO) Take 1 capsule by mouth daily       No current facility-administered medications for this visit  Allergies   Allergen Reactions    Dilantin [Phenytoin]     Lidocaine     Statins        PAST HISTORY    Past Medical History:   Diagnosis Date    Bipolar depression ([facility] )     Cancer ([place] [address] )     Depression     Diabetes mellitus ([facility] )     PTSD (post-traumatic stress disorder) 2002    Seizures ([place] [address] )        Past Surgical History:   Procedure Laterality Date    CHOLECYSTECTOMY  2004    HYSTERECTOMY      IMPLANTABLE CONTACT LENS IMPLANTATION      [place] EXCIS [address] TUMOR Right 3/15/2018    Procedure: IMAGE-GUIDED RIGHT FRONTOPARIETAL CRANIOTOMY FOR TUMOR;  Surgeon: Olinda Wood MD;  [facility];  Service: Neurosurgery       Social History   Substance Use Topics    Smoking status: Former Smoker     Quit date: 3/5/2000    Smokeless tobacco: Never Used    Alcohol use No       Family History   Problem Relation Age of Onset    Adopted: Yes         Above history personally reviewed  EXAM    Vitals:Blood pressure 114/58, pulse 62, temperature 97 9 °F (36 6 °C), temperature source Tympanic, resp  rate 16, height 5' 5 5" (1 664 m), weight 98 2 kg (216 lb 6 4 oz)  ,Body mass index is 35 46 kg/m²  Physical Exam   Constitutional: She is oriented to person, place, and time  She appears well-developed and well-nourished  HENT:   Head: Normocephalic  Craniotomy incision well healed, no alopecia   Eyes: No scleral icterus  Neck: Neck supple  Cardiovascular: Normal rate  Pulmonary/Chest: Effort normal    Abdominal: Soft  Musculoskeletal:   Mild bilateral hand swelling, symmetric  Neurological: She is alert and oriented to person, place, and time  She has normal strength  GCS eye subscore is 4  GCS verbal subscore is 5  GCS motor subscore is 6  Skin: Skin is warm and dry  Psychiatric: She has a normal mood and affect   Her speech is normal and behavior is normal        Neurologic Exam     Mental Status   Oriented to person, place, and time  Attention: normal    Speech: speech is normal   Level of consciousness: alert    Cranial Nerves     CN VII   Facial expression full, symmetric  Motor Exam   Muscle bulk: normal  Overall muscle tone: normal    Strength   Strength 5/5 throughout  Moves all extremities, grossly normal     Gait, Coordination, and Reflexes     Tremor   Resting tremor: absent  Intention tremor: absent  Action tremor: absent  No aids  MEDICAL DECISION MAKING    Imaging Studies:     Mri Brain W Wo Contrast    Result Date: 9/5/2018  Narrative: MRI BRAIN WITH AND WITHOUT CONTRAST INDICATION: D32 9: Benign neoplasm of meninges, unspecified G93 6: Cerebral edema  Status post meningioma resection in March 2018  COMPARISON:  None  TECHNIQUE: Sagittal T1, axial T2, axial FLAIR, axial T1, axial [place], axial diffusion  Sagittal, axial and coronal T1 postcontrast   Axial BRAVO post contrast   IV Contrast:  9 mL of gadobutrol injection (MULTI-DOSE)  IMAGE QUALITY:   Diagnostic  FINDINGS: BRAIN PARENCHYMA: There is no restricted diffusion  Decreasing T2 prolongation is noted on FLAIR imaging in the right parasagittal posterior frontal lobe at the site of the prior resection cavity  There is minimal gradient susceptibility artifact within the surgical cavity consistent with postoperative blood products  The original surgical cavity is slightly smaller on postcontrast imaging  Much of the resection bed is fluid-filled  There is a small amount of residual enhancement within the original surgical cavity which likely represents scar tissue  There is however focal additional nodular enhancement above the superior sagittal sinus, immediately subjacent to the craniotomy defect, within the epidural space which is indenting the superior margin of the superior sagittal sinus   This 1 cm region of enhancement is best appreciated on image 38 of the coronal postcontrast T1 series  The amount of fluid seen in the epidural space has decreased  This residual enhancement may be postoperative however, short-term follow-up is recommended to exclude recurrence  VENTRICLES:  Normal  SELLA AND PITUITARY GLAND:  Normal  ORBITS:  There is a somewhat flattened appearance to the posterior scleral margin bilaterally  No focal outpouching of the sclerae identified  PARANASAL SINUSES:  Mild mucosal thickening in the left maxillary sinus  VASCULATURE:  Evaluation of the major intracranial vasculature demonstrates appropriate flow voids  CALVARIUM AND SKULL BASE:  Right parietal craniotomy EXTRACRANIAL SOFT TISSUES:  Normal      Impression: Evolving postoperative changes of prior right posterior frontal parafalcine meningioma resection  The original surgical cavity is smaller  There is a small amount of enhancing tissue within the epidural space subjacent to the craniotomy, and superior to the sagittal sinus may represent additional postoperative enhancement however, short-term follow-up is recommended to assess for recurrent disease  Please note that the dural venous sinuses not well defined on this examination  MR venography could be utilized if there is a clinical indication to assess the dural venous sinuses  Workstation performed: [id number]       I have personally reviewed pertinent reports  and I have personally reviewed pertinent films in PACS with a Radiologist  at [facility]

## 2018-09-28 DIAGNOSIS — G40.909 SEIZURE DISORDER (HCC): ICD-10-CM

## 2018-09-28 RX ORDER — LACOSAMIDE 150 MG/1
TABLET, FILM COATED ORAL
Qty: 60 TABLET | Refills: 0 | Status: SHIPPED | OUTPATIENT
Start: 2018-09-28 | End: 2018-11-07 | Stop reason: SDUPTHER

## 2018-10-01 ENCOUNTER — OFFICE VISIT (OUTPATIENT)
Dept: NEUROLOGY | Facility: CLINIC | Age: 58
End: 2018-10-01
Payer: MEDICARE

## 2018-10-01 VITALS
HEIGHT: 66 IN | BODY MASS INDEX: 34.07 KG/M2 | DIASTOLIC BLOOD PRESSURE: 78 MMHG | WEIGHT: 212 LBS | HEART RATE: 63 BPM | SYSTOLIC BLOOD PRESSURE: 128 MMHG

## 2018-10-01 DIAGNOSIS — Z98.890 HISTORY OF RESECTION OF MENINGIOMA: ICD-10-CM

## 2018-10-01 DIAGNOSIS — G40.919 INTRACTABLE SEIZURE DISORDER (HCC): Primary | ICD-10-CM

## 2018-10-01 DIAGNOSIS — Z86.03 HISTORY OF RESECTION OF MENINGIOMA: ICD-10-CM

## 2018-10-01 PROCEDURE — 99214 OFFICE O/P EST MOD 30 MIN: CPT | Performed by: PSYCHIATRY & NEUROLOGY

## 2018-10-01 RX ORDER — VALACYCLOVIR HYDROCHLORIDE 500 MG/1
TABLET, FILM COATED ORAL
Refills: 2 | COMMUNITY
Start: 2018-08-09

## 2018-10-01 NOTE — PROGRESS NOTES
Return NeuroOutpatient Note        Raquel Ch  [id number]  62 y o   1960       Meningioma        History obtained from: Patient     HPI/Subjective: Raquel Ch is a 61 yo left handed F with PMH of DM II, seizure disorder, headaches, right parafalcine meningioma presents as follow up  She was first evaluated on 3/6/18 with cc of  left sided weakness for past 8 months and it was progressively getting worse  3 weeks prior to hospital admission, she was dragging her left foot  She was getting partial focal motor seizures as well  Her CT/MRI brain showed enlarging right frontal lobe meningioma  She was started on Vimpat  She was referred to see Dr Shiv Gutierrez within a week from evaluation and had surgery on 3/15  She was treated with embolization and resection  After surgery she had one breakthrough seizure and was placed on trileptal by NS  We later tapered her off of trileptal and switched to vimpat  [address] had also made her somnolent  She says she still gets one episode of her right side of face grimacing for 5-10 seconds and it resolves  It doesn't evolve into any other part of body  She doesn't loose consciousness  She was getting surveillance MRI brain every 3 months  There is still small area of enhancement likely post op  Patient has been suffering from seizures for over 50 years  She has history of intractable seizures  On vimpat, she has felt the best  Now they are not as long or intense  She doesn't get auras  Her seizures do not generalize anymore  She's now independent with walking  Her tumor pathology reveal Who grade I to II lesion       Past Medical History:   Diagnosis Date    Bipolar depression ([facility] )     Cancer ([place] [address] )     Depression     Diabetes mellitus ([facility] )     PTSD (post-traumatic stress disorder) 2002    Seizures ([facility] )      Social History     Social History    Marital status: Significant Other     Spouse name: Coreen Hall Number of children: 4    Years of education: Some college Occupational History    disabled      [employer]     Social History Main Topics    Smoking status: Former Smoker     Quit date: 3/5/2000    Smokeless tobacco: Never Used    Alcohol use No    Drug use: No    Sexual activity: Yes     Partners: Male     Other Topics Concern    Not on file     Social History Narrative    Lives at home with significant other, Kayleen Mihaela     Family History   Problem Relation Age of Onset    Adopted: Yes     Allergies   Allergen Reactions    Dilantin [Phenytoin]     Lidocaine     Statins      Current Outpatient Prescriptions on File Prior to Visit   Medication Sig Dispense Refill    BD PEN NEEDLE BON U/F 32G X 4 MM MISC TEST TID UTD  2    cholecalciferol (VITAMIN D3) 1,000 units tablet Take 1,000 Units by mouth daily      ferrous sulfate 325 (65 Fe) mg tablet Take 325 mg by mouth daily      Glucosamine-Chondroitin (MOVE FREE PO) Take 1 tablet by mouth daily      insulin aspart protamine-insulin aspart (NovoLOG 70/30) 100 Units/mL SUPN Inject 10 Units under the skin 3 (three) times a day with meals 5 pen 0    Insulin Pen Needle 29G X 12 7MM MISC Use as directed with novolog flexpen to administer insulin with meals and use with lantus solostar pen to administer insulin in the morning 100 each 0    lisinopril-hydrochlorothiazide (PRINZIDE,ZESTORETIC) 20-12 5 MG per tablet TK 1 T PO QD  3    metFORMIN (GLUCOPHAGE) 1000 MG tablet Take 1,000 mg by mouth 2 (two) times a day        Probiotic Product (PROBIOTIC DAILY PO) Take 1 capsule by mouth daily      sertraline (ZOLOFT) 100 mg tablet Take 100 mg by mouth daily at bedtime        sitaGLIPtin (JANUVIA) 50 mg tablet Take 50 mg by mouth daily      VIMPAT 150 MG tablet TAKE 1 TABLET BY MOUTH TWICE DAILY 60 tablet 0    lisinopril (ZESTRIL) 20 mg tablet Take 20 mg by mouth daily       No current facility-administered medications on file prior to visit            Review of Systems   Refer to positive review of systems in HPI  Constitutional- No fever  Eyes- No visual change  ENT- Hearing normal  CV- No chest pain  Resp- No Shortness of breath  GI- No diarrhea  - Bladder normal  MS- No Arthritis   Skin- No rash  Psych- No depression  Endo- No DM  Heme- No nodes    Vitals:    10/01/18 1321   BP: 128/78   BP Location: Right arm   Patient Position: Sitting   Pulse: 63   Weight: 96 2 kg (212 lb)   Height: 5' 5 5" (1 664 m)       PHYSICAL EXAM:  Appearance: No Acute Distress  Ophthalmoscopic: Disc Flat, Normal fundus  Mental status:  Orientation: Awake, Alert, and Orientedx3  Memory: Registation 3/3 Recall 3/3  Attention: normal  Knowledge: good  Language: No aphasia  Speech: No dysarthria  Cranial Nerves:  2 No Visual Defect on Confrontation, Pupils round, equal, reactive to light  3,4,6 Extraocular Movements Intact, no nystagmus  5 Facial Sensation Intact  7 No facial asymmetry  8 Intact hearing  9,10 Palate symmetric, normal gag  11 Good shoulder shrug  12 Tongue Midline  Gait: Stable without cane   Coordination: No ataxia with finger to nose testing, and heel to shin  Sensory: Intact, Symmetric to pinprick, light touch, vibration, and joint position  Muscle Tone: Normal              Muscle exam:  Arm Right Left Leg Right Left   Deltoid 5/5 5/5 Iliopsoas 5/5 4++/5   Biceps 5/5 5/5 Quads 5/5 5/5   Triceps 5/5 5/5 Hamstrings 5/5 5/5   Wrist Extension 5/5 5/5 Ankle Dorsi Flexion 5/5 4+/5   Wrist Flexion 5/5 5/5 Ankle Plantar Flexion 5/5 5/5   Interossei 5/5 5/5 Ankle Eversion 5/5 5/5   APB 5/5 5/5 Ankle Inversion 5/5 5/5       Reflexes   RJ BJ TJ KJ AJ Plantars Mayer's   Right 2+ 2+ 2+ 2+ 2+ Downgoing Not present   Left 2+ 2+ 2+ 2+ 2+ Downgoing Not present     Personal review of  Labs:                  Diagnoses and all orders for this visit:          1  Intractable seizure disorder ([address] )     2  History of resection of meningioma           Clinically, patient is doing very well   Her strength has returned to almost her normal baseline  She doesn't get headaches anymore  She's tolerating vimpat well  Will resume 150mg bid  EEG is considered but is unlikely to  and since she is doing better compared to how she had been in 40+ years so will hold off  Discussed importance of being compliant with AED  Resume follow up with neurosurgery  Counseling Documentation:  The patient and/or patient's family were  counseled regarding diagnostic results  Instructions for management,risk factor reductions,prognosis of disease were discussed  Patient and family were educated regarding impressions,risks and benefits of treatment options,importance of compliance with treatment        Total time of encounter:  35 min  More than 50% of the time was used in counseling and/or coordination of care  Extent of counseling and/or coordination of care        Sheryl Meyer MD  [facility]  [address]  [address]  [phone]

## 2018-10-24 NOTE — CASE MANAGEMENT
Initial Clinical Review    Admission: Date/Time/Statement: 3/5/18 @ 1326     Orders Placed This Encounter   Procedures    Inpatient Admission (expected length of stay for this patient is greater than two midnights)     Standing Status:   Standing     Number of Occurrences:   1     Order Specific Question:   Admitting Physician     Answer:   Veronique Pritchard [[id number]]     Order Specific Question:   Level of Care     Answer:   Med Surg [[id number]]     Order Specific Question:   Estimated length of stay     Answer:   Not Applicable   ED: Date/Time/Mode of Arrival:   ED Arrival Information     Expected Arrival Acuity Means of Arrival Escorted By Service Admission Type    - 3/5/2018 12:16 Urgent [facility] Urgent    Arrival Complaint    stroke like symptoms      Chief Complaint:   Chief Complaint   Patient presents with    Extremity Weakness     pt presents to the ed with left sided extremity weakness progressivly over 8 months  pt states that starting this morning she began to have increased left sided weakness    History of Illness:   Hx IDDM, SEIZURES/NO MEDS  C/O LT UPPER AND LOWER EXTR WEAKNESS X 8 MONTHS, WORSENING  PT SAW PMD, REFERRED TO NEUROLOGY, NEVER SAW HIM  EXAM: LT WEAKNESS LOWER >UPPER  ED Vital Signs:   ED Triage Vitals   Temperature Pulse Respirations Blood Pressure SpO2   03/05/18 1226 03/05/18 1226 03/05/18 1226 03/05/18 1227 03/05/18 1226   98 6 °F (37 °C) 68 18 (!) 189/82 95 %      Temp Source Heart Rate Source Patient Position - Orthostatic VS BP Location FiO2 (%)   03/05/18 1226 03/05/18 1226 03/05/18 1413 03/05/18 1413 --   Tympanic Monitor Lying Left arm       Pain Score       03/05/18 1544       5        Wt Readings from Last 1 Encounters:   03/05/18 101 kg (222 lb 14 2 oz)   Vital Signs (abnormal):    Abnormal Labs/Diagnostic Test Results:   HGB 11 7 GLUC 195  U/A+GLUC, KETONES, LEUK   CT HEAD=4 2 x 3 8 x 3 5 cm right frontal lobe mass at the vertex evoking mild vasogenic edema with mass effect upon the falx with secondary midline shift, possible parafalcine meningioma  Enhanced MRI with contrast recommended  4 mm high-density nodule to the right of midline at the level of 4th ventricle likely an extra-axial lesion versus punctate hemorrhage  Further evaluation with MRI is advised  CXR= No acute cardiopulmonary disease  MRI BRAIN= 47 mm in greatest linear dimension right frontal vertex  parafalcine meningioma, producing minimal edema  Tumor has extended through the superior sagittal sinus  ED Treatment:   Medication Administration from 03/05/2018 1216 to 03/05/2018 1439       Date/Time Order Dose Route Action Action by Comments     03/05/2018 3247 dexamethasone (PF) (DECADRON) injection 6 mg 6 mg Intravenous Given Marly Walsh RN       Past Medical/Surgical History: Active Ambulatory Problems     Diagnosis Date Noted    Weakness of left side of body 03/05/2018     Resolved Ambulatory Problems     Diagnosis Date Noted    No Resolved Ambulatory Problems     Past Medical History:   Diagnosis Date    Cancer ([address] )     Diabetes mellitus ([address] )     Seizures ([address] )    Admitting Diagnosis: Neoplasm of brain causing mass effect on adjacent structures ([address] ) [D49 6]  Lower extremity weakness [R29 898]  Age/Sex: 62 y o  female  Assessment/Plan:   Frontal mass of brain   Assessment & Plan     CT head done in the ER showed right frontal lobe mass at the vertex which is causing mild vasogenic edema with mass effect upon the falx with midline shift    This is possibly parafalcine meningioma    A 4 mm nodule was noted to the right of midline at the level of 4th ventricle which could be an extra-axial lesion versus punctate hemorrhage  Admit patient for further management  MRI ordered for for further evaluation   Neurology and Oncology consult  Neurochecks while here       Vasogenic cerebral edema ([address] )   Assessment & Plan     Vasogenic edema with mass effect upon the falx with midline shift was noted  Case was discussed with Neurology by the ER physician and patient started on IV Decadron       Seizure disorder [facility])   Assessment & Plan     Patient discontinued her medications on her own "few years" ago  Place patient on seizure precaution  Neurology consult as noted above  IV Ativan as needed for seizures       Urinary urgency   Assessment & Plan     Check a UA for further evaluation  Patient denies any dysuria, hematuria       Benign essential HTN   Assessment & Plan     Continue lisinopril as she uses at home and monitor blood pressures       Long-term insulin use in type 2 diabetes [facility])   Assessment & Plan     Patient is on NovoLog 10 units t i d  with meals which will be replaced with Humalog while here  Accu-Cheks with sliding scale insulin  Check hemoglobin A1c level in the a m     Admission Orders:  MED SURG  PT/OT EVAL & TX  SEIZURE PRECAUTIONS  ACCUCHECKS WITH COVERAGE SCALE  VENODYNES  NEURO CHECKS Q4H  CONSULT NEUROLOGY  CONSULT HEMATOLOGY  Scheduled Meds:   Current Facility-Administered Medications:  acetaminophen 650 mg Oral Q6H PRN Dianelys Revankar, DO   dexamethasone 4 mg Intravenous Q6H [address] Bret Jane MD   insulin lispro 1-5 Units Subcutaneous TID AC Dianelys Revankar, DO   insulin lispro 10 Units Subcutaneous TID With Meals Dianelys Revankar, DO   lisinopril 20 mg Oral Daily Dianelys Revankar, DO   LORazepam 1 mg Intravenous Q4H PRN Dianelys Revankar, DO   ondansetron 4 mg Intravenous Q6H PRN Dianelys Revankar, DO   sertraline 100 mg Oral HS Dianelys Revankar, DO   sertraline 50 mg Oral Daily Dianelys Revankar, DO     Continuous Infusions:    PRN Meds:   acetaminophen    LORazepam    ondansetron Statement Selected

## 2018-11-07 DIAGNOSIS — G40.909 SEIZURE DISORDER (HCC): ICD-10-CM

## 2018-11-07 RX ORDER — LACOSAMIDE 150 MG/1
TABLET, FILM COATED ORAL
Qty: 60 TABLET | Refills: 4 | Status: SHIPPED | OUTPATIENT
Start: 2018-11-07 | End: 2019-01-03 | Stop reason: SDUPTHER

## 2019-01-03 ENCOUNTER — TELEPHONE (OUTPATIENT)
Dept: NEUROLOGY | Facility: CLINIC | Age: 59
End: 2019-01-03

## 2019-01-03 DIAGNOSIS — G40.909 SEIZURE DISORDER (HCC): ICD-10-CM

## 2019-01-03 RX ORDER — LACOSAMIDE 200 MG/1
200 TABLET ORAL 2 TIMES DAILY
Qty: 60 TABLET | Refills: 1 | Status: SHIPPED | OUTPATIENT
Start: 2019-01-03 | End: 2020-10-21 | Stop reason: ALTCHOICE

## 2019-01-03 NOTE — TELEPHONE ENCOUNTER
THE PATIENT STOPPED IN AND IS HAVING RECURRENT SEIZURES FOR THE PAST ONE WEEK  SHE HAD A MENINGIOMA REMOVED IN [facility] 2018 AND HAD BEEN STABLE UNTIL NOW  SHE HAS AN APPOINTMENT TO SEE YOU ON 1/31  IS THERE ANYTHING THAT CAN BE DONE IN THE MEANTIME  I HAVE PLACED HER ON A CANCELLATION LIST TO GET IN ASAP

## 2019-01-07 ENCOUNTER — OFFICE VISIT (OUTPATIENT)
Dept: NEUROLOGY | Facility: CLINIC | Age: 59
End: 2019-01-07
Payer: MEDICARE

## 2019-01-07 VITALS
WEIGHT: 220 LBS | DIASTOLIC BLOOD PRESSURE: 60 MMHG | SYSTOLIC BLOOD PRESSURE: 124 MMHG | HEIGHT: 66 IN | RESPIRATION RATE: 18 BRPM | BODY MASS INDEX: 35.36 KG/M2 | HEART RATE: 72 BPM

## 2019-01-07 DIAGNOSIS — G51.4 FACIAL TWITCHING: ICD-10-CM

## 2019-01-07 DIAGNOSIS — D32.9 MENINGIOMA (HCC): ICD-10-CM

## 2019-01-07 DIAGNOSIS — G40.019 PARTIAL IDIOPATHIC EPILEPSY WITH SEIZURES OF LOCALIZED ONSET, INTRACTABLE, WITHOUT STATUS EPILEPTICUS (HCC): Primary | ICD-10-CM

## 2019-01-07 PROCEDURE — 99215 OFFICE O/P EST HI 40 MIN: CPT | Performed by: PSYCHIATRY & NEUROLOGY

## 2019-01-07 RX ORDER — EZETIMIBE 10 MG/1
TABLET ORAL
Refills: 3 | COMMUNITY
Start: 2018-12-12

## 2019-01-07 NOTE — PATIENT INSTRUCTIONS
1  Continue vimpat 200 mg twice daily  2  Let us know if seizures worsen  3  Have lab work done (prior to morning Vimpat dose or in the late afternoon)  4  Schedule EEGs  5  Keep a log of your seizures and bring it to your next visit
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
## 2019-01-07 NOTE — PROGRESS NOTES
Assessment/Plan:      There are no diagnoses linked to this encounter  Subjective:     Patient ID: Migdalia Abbasi is a 62 y o  female  HPI    Review of Systems   Constitutional: Negative  HENT: Negative  Eyes: Negative  Respiratory: Negative  Cardiovascular: Negative  Gastrointestinal: Negative  Endocrine: Negative  Genitourinary: Positive for frequency and urgency  Skin: Negative  Allergic/Immunologic: Negative  Neurological: Positive for seizures and headaches  Hematological: Negative  Psychiatric/Behavioral: Positive for agitation and confusion           Objective:     Physical Exam

## 2019-01-09 ENCOUNTER — DOCUMENTATION (OUTPATIENT)
Dept: NEUROLOGY | Facility: CLINIC | Age: 59
End: 2019-01-09

## 2019-01-09 NOTE — PROGRESS NOTES
I called the patient today as she was experiencing side effects from increasing her Vimpat to 200 mg bid (mental fog, can't type, gait instability,etc)  Advised to decrease dosage to 150-200 mg for the next 1-2 weeks until her body adjusts the medication as per your instructions  She has an appointment to see you on January 31

## 2019-02-20 ENCOUNTER — TRANSCRIBE ORDERS (OUTPATIENT)
Dept: LAB | Facility: CLINIC | Age: 59
End: 2019-02-20

## 2019-02-20 ENCOUNTER — APPOINTMENT (OUTPATIENT)
Dept: LAB | Facility: CLINIC | Age: 59
End: 2019-02-20
Payer: MEDICARE

## 2019-02-20 DIAGNOSIS — D32.9 BENIGN NEOPLASM OF MENINGES (HCC): ICD-10-CM

## 2019-02-20 LAB
BUN SERPL-MCNC: 22 MG/DL (ref 5–25)
CREAT SERPL-MCNC: 1 MG/DL (ref 0.6–1.3)
GFR SERPL CREATININE-BSD FRML MDRD: 62 ML/MIN/1.73SQ M

## 2019-02-20 PROCEDURE — 82565 ASSAY OF CREATININE: CPT

## 2019-02-20 PROCEDURE — 84520 ASSAY OF UREA NITROGEN: CPT | Performed by: NEUROLOGICAL SURGERY

## 2019-02-20 PROCEDURE — 36415 COLL VENOUS BLD VENIPUNCTURE: CPT | Performed by: NEUROLOGICAL SURGERY

## 2019-02-26 ENCOUNTER — TELEPHONE (OUTPATIENT)
Dept: NEUROLOGY | Facility: CLINIC | Age: 59
End: 2019-02-26

## 2019-02-26 NOTE — TELEPHONE ENCOUNTER
I called and spoke to pt and she states she has cancelled all EEGs and follow ups because she states she cannot afford any of it  She states her medical bills are mounting and she is going to be homeless soon if she keeps paying for all of these visits and tests  I offered to see if social work can look into resources to help her and she declined stating we did this before and there is nothing  She states she needs to be able to pay her bills and eat and cannot if she continues to see us and having testing  She declined to schedule and thanked me for calling her and hung up before I could ask about sz frequency

## 2019-03-05 ENCOUNTER — HOSPITAL ENCOUNTER (OUTPATIENT)
Dept: MRI IMAGING | Facility: HOSPITAL | Age: 59
Discharge: HOME | End: 2019-03-05
Attending: NEUROLOGICAL SURGERY
Payer: MEDICARE

## 2019-03-05 DIAGNOSIS — D32.9 MENINGIOMA (HCC): ICD-10-CM

## 2019-03-05 PROCEDURE — 70553 MRI BRAIN STEM W/O & W/DYE: CPT

## 2019-03-05 PROCEDURE — A9585 GADOBUTROL INJECTION: HCPCS | Performed by: NEUROLOGICAL SURGERY

## 2019-03-05 RX ADMIN — GADOBUTROL 9 ML: 604.72 INJECTION INTRAVENOUS at 15:53

## 2019-03-13 ENCOUNTER — OFFICE VISIT (OUTPATIENT)
Dept: NEUROSURGERY | Facility: CLINIC | Age: 59
End: 2019-03-13
Payer: MEDICARE

## 2019-03-13 VITALS
TEMPERATURE: 97.7 F | HEIGHT: 66 IN | SYSTOLIC BLOOD PRESSURE: 140 MMHG | BODY MASS INDEX: 34.49 KG/M2 | HEART RATE: 67 BPM | RESPIRATION RATE: 18 BRPM | DIASTOLIC BLOOD PRESSURE: 75 MMHG | WEIGHT: 214.6 LBS

## 2019-03-13 DIAGNOSIS — D32.9 MENINGIOMA (HCC): Primary | ICD-10-CM

## 2019-03-13 PROBLEM — G93.6 VASOGENIC CEREBRAL EDEMA (HCC): Status: RESOLVED | Noted: 2018-03-05 | Resolved: 2019-03-13

## 2019-03-13 PROBLEM — G93.6 CEREBRAL EDEMA (HCC): Status: RESOLVED | Noted: 2018-03-13 | Resolved: 2019-03-13

## 2019-03-13 PROCEDURE — 99214 OFFICE O/P EST MOD 30 MIN: CPT | Performed by: NEUROLOGICAL SURGERY

## 2019-03-13 RX ORDER — MULTIVIT WITH MINERALS/LUTEIN
1000 TABLET ORAL DAILY
COMMUNITY

## 2019-03-13 NOTE — PROGRESS NOTES
Neurosurgery Office Note  Alonza Seip 62 y o  female MRN: [id number]      Assessment/Plan      Diagnoses and all orders for this visit:    Meningioma [facility])  -     MRI brain w wo contrast; Future         Discussion:    51-year-old now 12 months status post resection of her 6 cm right parafalcine meningioma, WHO grade 1, although Ki 67 index was 6% in places  She was treated with embolization and resection in 03/2018      She presented initially to [address] [place] [address] with left upper and left lower extremity weakness of the months duration  She was seen there by Dr Joanna Thomas of Neurology, who initiated her workup  She was discharged in seen in the office  She underwent embolization on 3/13/18 with Dr Rachel Vera, and surgery on 03/15  She tolerated that well  She has had an excellent neurologic recovery  Her left-sided strength is essentially full  She is using no ambulatory aids  she states she is back to using her left hand to snap, can whistle, even was able to run recently  She does have some pain and warmth over the incision site every other day, mild, 1/10  incisions well healed  She in the past has mentioned seizures,  and had followed with Dr Joanna Thomas for that issue  Today she mentioned with *bilateral* leg shaking last week, similar to his she had prior to her initial presentation, self-limited, did not repeat      Her new brain imaging shows no clear recurrence or residual although there is extensive postoperative change  I suggest repeat scan in 6-12 months, and she has agreed  I ordered that study      Ritchie 29/30, was 27/30 at 6 months postop, unchanged from 3 months postop, improved from 25/30 preop  [id number] or 0 861, VA S almost  80  ECOG 1, KPS 80           CHIEF COMPLAINT    Chief Complaint   Patient presents with    Follow-up     6 month follow up with new MRI Brain       HISTORY    History of Present Illness     62y o  year old female     HPI    See Discussion    REVIEW OF SYSTEMS    Review of Systems   Constitutional: Positive for activity change (increased) and fatigue (unchanged)  HENT: Positive for nosebleeds and tinnitus (BL, not new, constant)  Eyes: Negative  Respiratory: Negative  Cardiovascular: Negative  Gastrointestinal: Negative  Endocrine: Negative  Genitourinary: Negative  Musculoskeletal: Positive for arthralgias (hands feet ankles), back pain, joint swelling (right hand fingers), neck pain and neck stiffness  Skin: Negative  Allergic/Immunologic: Negative  Neurological: Positive for seizures (right side facial unchanged, 1 episode B/L feet shaking), numbness (left arm and hand, right hand  N/T) and headaches (every other day or so, more on right side, mild, uses aleve)  Negative for speech difficulty (trouble word finding, resolved)  Pain at edge of incision site, comes and goes   Hematological: Negative  Psychiatric/Behavioral: Positive for agitation (occasional)           Meds/Allergies     Current Outpatient Medications   Medication Sig Dispense Refill    Ascorbic Acid (VITAMIN C) 1000 MG tablet Take 1,000 mg by mouth daily      cholecalciferol (VITAMIN D3) 1,000 units tablet Take 1,000 Units by mouth daily      ezetimibe (ZETIA) 10 mg tablet TK 1 T PO QD  3    ferrous sulfate 325 (65 Fe) mg tablet Take 325 mg by mouth 2 (two) times a day with meals       insulin aspart protamine-insulin aspart (NovoLOG 70/30) 100 Units/mL SUPN Inject 10 Units under the skin 3 (three) times a day with meals 5 pen 0    lisinopril (ZESTRIL) 20 mg tablet Take 20 mg by mouth daily      lisinopril-hydrochlorothiazide (PRINZIDE,ZESTORETIC) 20-12 5 MG per tablet TK 1 T PO QD  3    metFORMIN (GLUCOPHAGE) 1000 MG tablet Take 1,000 mg by mouth 2 (two) times a day        sertraline (ZOLOFT) 100 mg tablet Take 100 mg by mouth daily at bedtime        sitaGLIPtin (JANUVIA) 50 mg tablet Take 50 mg by mouth daily      valACYclovir (VALTREX) 500 mg tablet TK 1 T PO QD  2    BD PEN NEEDLE BON U/F 32G X 4 MM MISC TEST TID UTD  2    Glucosamine-Chondroitin (MOVE FREE PO) Take 1 tablet by mouth daily      Insulin Pen Needle 29G X 12 7MM MISC Use as directed with novolog flexpen to administer insulin with meals and use with lantus solostar pen to administer insulin in the morning 100 each 0    lacosamide (VIMPAT) 200 mg tablet Take 1 tablet (200 mg total) by mouth 2 (two) times a day (Patient not taking: Reported on 3/13/2019) 60 tablet 1    Probiotic Product (PROBIOTIC DAILY PO) Take 1 capsule by mouth daily       No current facility-administered medications for this visit  Allergies   Allergen Reactions    Dilantin [Phenytoin]     Lidocaine     Statins        PAST HISTORY    Past Medical History:   Diagnosis Date    Bipolar depression ([facility] )     Cancer ([facility] )     Depression     Diabetes mellitus ([facility] )     PTSD (post-traumatic stress disorder)     Seizures ([facility] )        Past Surgical History:   Procedure Laterality Date    CHOLECYSTECTOMY      HYSTERECTOMY      IMPLANTABLE CONTACT LENS IMPLANTATION      MA EXCIS [address] TUMOR Right 3/15/2018    Procedure: IMAGE-GUIDED RIGHT FRONTOPARIETAL CRANIOTOMY FOR TUMOR;  Surgeon: Kisha Winchester MD;  [facility];  Service: Neurosurgery       Social History     Tobacco Use    Smoking status: Former Smoker     Last attempt to quit: 3/5/2000     Years since quittin 0    Smokeless tobacco: Never Used   Substance Use Topics    Alcohol use: No    Drug use: No       Family History   Adopted: Yes         Above history personally reviewed  EXAM    Vitals:Blood pressure 140/75, pulse 67, temperature 97 7 °F (36 5 °C), temperature source Tympanic, resp  rate 18, height 5' 5 5" (1 664 m), weight 97 3 kg (214 lb 9 6 oz)  ,Body mass index is 35 17 kg/m²  Physical Exam   Constitutional: She is oriented to person, place, and time   She appears well-developed and well-nourished  HENT:   Head: Normocephalic  Right U shaped craniotomy incision well healed   Eyes: No scleral icterus  Neck: Neck supple  Cardiovascular: Normal rate  Pulmonary/Chest: Effort normal    Abdominal: Soft  Neurological: She is alert and oriented to person, place, and time  GCS eye subscore is 4  GCS verbal subscore is 5  GCS motor subscore is 6  Skin: Skin is warm and dry  Psychiatric: She has a normal mood and affect  Her speech is normal and behavior is normal    Vitals reviewed  Neurologic Exam     Mental Status   Oriented to person, place, and time  Attention: normal    Speech: speech is normal   Level of consciousness: alert    Cranial Nerves     CN VII   Facial expression full, symmetric  Motor Exam   Muscle bulk: normal  Overall muscle tone: normal  Moves all extremities, grossly normal     Gait, Coordination, and Reflexes     Tremor   Resting tremor: absent  Intention tremor: absent  Action tremor: absent  No aids  MEDICAL DECISION MAKING    Imaging Studies:     Mri Brain With And Without Contrast    Result Date: 3/6/2019  Narrative: MRI BRAIN WITH AND WITHOUT CONTRAST INDICATION: D32 9: Benign neoplasm of meninges, unspecified  COMPARISON:  9/4/2018, postoperative  Preop exam dated 3/6/2018  TECHNIQUE: Sagittal T1, axial T2, axial FLAIR, axial T1, axial [place], axial diffusion  Sagittal, axial T1 postcontrast   Axial bravo postcontrast with coronal reconstructions  IV Contrast:  9 mL of gadobutrol injection (MULTI-DOSE)  IMAGE QUALITY:   Diagnostic  FINDINGS: BRAIN PARENCHYMA:  Stable encephalomalacia within the right posterior frontal vertex, series 9 image 20 and 21 corresponding to site of meningioma resection  Linear enhancement best seen on sagittal T1 postcontrast, series 10 image 14 appears thinner than the most recent examination suggesting resolving scar  Stable appearance of the dura underlying the craniotomy   The remainder of the cerebral hemispheres, brainstem and cerebellum are unchanged  Normal corpus callosum and hypothalamus  Diffusion imaging is unremarkable  VENTRICLES:  Normal  SELLA AND PITUITARY GLAND:  Normal  ORBITS:  Normal  PARANASAL SINUSES:  Mild mucosal thickening of the inferior maxillary sinuses  VASCULATURE:  Evaluation of the major intracranial vasculature demonstrates appropriate flow voids  CALVARIUM AND SKULL BASE:  Stable craniotomy site  EXTRACRANIAL SOFT TISSUES:  Normal      Impression: The linear enhancement seen within the surgical bed in the right frontal vertex is improving compared to the prior examination  Otherwise stable encephalomalacia and postoperative change  The improvement suggests sequela of scarring  Continued follow-up is recommended to ensure stability  Workstation performed: [id number]       I have personally reviewed pertinent reports     and I have personally reviewed pertinent films in PACS

## 2020-01-21 ENCOUNTER — DOCUMENTATION (OUTPATIENT)
Dept: NEUROSURGERY | Facility: CLINIC | Age: 60
End: 2020-01-21

## 2020-01-21 DIAGNOSIS — Z01.812 ENCOUNTER FOR PREPROCEDURAL LABORATORY EXAMINATION: Primary | ICD-10-CM

## 2020-01-21 NOTE — PROGRESS NOTES
Received a call from Jacobo Caban requesting pre contrast study labs to be placed  She has upcoming MRI  Placed orders as needed  Contacted patient to advise

## 2020-01-28 ENCOUNTER — APPOINTMENT (OUTPATIENT)
Dept: LAB | Facility: CLINIC | Age: 60
End: 2020-01-28
Payer: MEDICARE

## 2020-01-28 ENCOUNTER — TRANSCRIBE ORDERS (OUTPATIENT)
Dept: LAB | Facility: CLINIC | Age: 60
End: 2020-01-28

## 2020-01-28 DIAGNOSIS — Z01.812 ENCOUNTER FOR PREPROCEDURAL LABORATORY EXAMINATION: ICD-10-CM

## 2020-01-28 LAB
BUN SERPL-MCNC: 25 MG/DL (ref 5–25)
CREAT SERPL-MCNC: 1.09 MG/DL (ref 0.6–1.3)
GFR SERPL CREATININE-BSD FRML MDRD: 56 ML/MIN/1.73SQ M

## 2020-01-28 PROCEDURE — 84520 ASSAY OF UREA NITROGEN: CPT

## 2020-01-28 PROCEDURE — 82565 ASSAY OF CREATININE: CPT

## 2020-01-28 PROCEDURE — 36415 COLL VENOUS BLD VENIPUNCTURE: CPT

## 2020-02-10 ENCOUNTER — HOSPITAL ENCOUNTER (OUTPATIENT)
Dept: MRI IMAGING | Facility: HOSPITAL | Age: 60
Discharge: HOME | End: 2020-02-10
Attending: NEUROLOGICAL SURGERY
Payer: MEDICARE

## 2020-02-10 DIAGNOSIS — D32.9 MENINGIOMA (HCC): ICD-10-CM

## 2020-02-10 PROCEDURE — A9585 GADOBUTROL INJECTION: HCPCS | Performed by: NEUROLOGICAL SURGERY

## 2020-02-10 PROCEDURE — 70553 MRI BRAIN STEM W/O & W/DYE: CPT

## 2020-02-10 RX ADMIN — GADOBUTROL 9 ML: 604.72 INJECTION INTRAVENOUS at 14:34

## 2020-02-19 ENCOUNTER — OFFICE VISIT (OUTPATIENT)
Dept: NEUROSURGERY | Facility: CLINIC | Age: 60
End: 2020-02-19
Payer: MEDICARE

## 2020-02-19 VITALS
SYSTOLIC BLOOD PRESSURE: 131 MMHG | RESPIRATION RATE: 18 BRPM | TEMPERATURE: 98.1 F | WEIGHT: 197.2 LBS | HEART RATE: 64 BPM | DIASTOLIC BLOOD PRESSURE: 90 MMHG | BODY MASS INDEX: 31.69 KG/M2 | HEIGHT: 66 IN

## 2020-02-19 DIAGNOSIS — Z98.890 POST-OPERATIVE STATE: ICD-10-CM

## 2020-02-19 DIAGNOSIS — D32.9 MENINGIOMA (HCC): Primary | ICD-10-CM

## 2020-02-19 PROCEDURE — 99214 OFFICE O/P EST MOD 30 MIN: CPT | Performed by: NEUROLOGICAL SURGERY

## 2020-02-19 NOTE — PROGRESS NOTES
Neurosurgery Office Note  Marvis Boast 61 y o  female MRN: [id number]      Assessment/Plan      Diagnoses and all orders for this visit:    Meningioma [facility])  -     MRI brain with and without contrast; Future        Discussion:    75-year-old now 2 years status post resection of her 6 cm right parafalcine meningioma, WHO grade 1, although Ki 67 index was 6% in places  She was treated with embolization and resection in 03/2018      She presented initially to [address] [place] [address] with left upper and left lower extremity weakness of the months duration  She was seen there by Dr Irene Glover of Neurology, who initiated her workup  She was discharged & seen in the office  She underwent embolization on 3/13/18 with Dr Mars Jordan, and surgery on 03/15  She tolerated that well  She has had an excellent neurologic recovery  She is full strength on her left-side  She is using no ambulatory aids  She does have some "warmth" over the incision site every other day, mild, 1/10  Incision well healed  She in the past has mentioned seizures,  and had followed with Dr Irene Glover for that issue  This involved *bilateral* leg shaking despite remaining conscious  Interestingly, the patient states she has weaned herself off her Vimpat, and seems to have had no ill effects       Her new brain imaging shows no clear recurrence or residual although there is extensive postoperative change  I suggest repeat scan in 6-12 months (per [facility] guidelines), and she has agreed  I ordered that study        CHIEF COMPLAINT    Chief Complaint   Patient presents with    Follow-up     11 month follow up with new MRI        HISTORY    History of Present Illness     61y o  year old female     HPI    See Discussion    REVIEW OF SYSTEMS    Review of Systems   Constitutional: Positive for activity change (increased) and fatigue (unchanged  )  HENT: Positive for tinnitus (BL, not new, constant)  Eyes: Negative  Respiratory: Negative      Cardiovascular: Negative  Gastrointestinal: Negative  Endocrine: Negative  Genitourinary: Negative  Musculoskeletal: Positive for back pain (occasional)  Skin: Negative  Allergic/Immunologic: Negative  Neurological: Positive for seizures (right side face), speech difficulty (harder to speak as articulately as previous to surgery, some trouble word finding) and numbness (B/L hands  N/T, occasional)  Negative for headaches  Discomfort top of head that comes and goes and that area feels warmer than everything else  Occasional    Hematological: Negative  Psychiatric/Behavioral: Positive for agitation (patience is improving)          Some forgetfulness         Meds/Allergies     Current Outpatient Medications   Medication Sig Dispense Refill    Ascorbic Acid (VITAMIN C) 1000 MG tablet Take 1,000 mg by mouth daily      cholecalciferol (VITAMIN D3) 1,000 units tablet Take 1,000 Units by mouth daily      CHROMIUM PO Take by mouth 2 (two) times a day      ezetimibe (ZETIA) 10 mg tablet TK 1 T PO QD  3    ferrous sulfate 325 (65 Fe) mg tablet Take 325 mg by mouth daily       insulin aspart protamine-insulin aspart (NovoLOG 70/30) 100 Units/mL SUPN Inject 10 Units under the skin 3 (three) times a day with meals 5 pen 0    lisinopril-hydrochlorothiazide (PRINZIDE,ZESTORETIC) 20-12 5 MG per tablet TK 1 T PO QD  3    metFORMIN (GLUCOPHAGE) 1000 MG tablet Take 1,000 mg by mouth 2 (two) times a day        Probiotic Product (PROBIOTIC DAILY PO) Take 1 capsule by mouth daily      sertraline (ZOLOFT) 100 mg tablet Take 100 mg by mouth daily at bedtime        sitaGLIPtin (JANUVIA) 50 mg tablet Take 50 mg by mouth daily      valACYclovir (VALTREX) 500 mg tablet TK 1 T PO QD  2    BD PEN NEEDLE BON U/F 32G X 4 MM MISC TEST TID UTD  2    Insulin Pen Needle 29G X 12 7MM MISC Use as directed with novolog flexpen to administer insulin with meals and use with lantus solostar pen to administer insulin in the morning 100 each 0    lacosamide (VIMPAT) 200 mg tablet Take 1 tablet (200 mg total) by mouth 2 (two) times a day (Patient not taking: Reported on 3/13/2019) 60 tablet 1     No current facility-administered medications for this visit  Allergies   Allergen Reactions    Dilantin [Phenytoin]     Lidocaine     Statins        PAST HISTORY    Past Medical History:   Diagnosis Date    [facility] )     Cancer ([facility] )     Depression     Diabetes mellitus ([facility] )     PTSD (post-traumatic stress disorder) 2002    Seizures ([place] [address] )        Past Surgical History:   Procedure Laterality Date    CHOLECYSTECTOMY      HYSTERECTOMY      IMPLANTABLE CONTACT LENS IMPLANTATION      MA EXCIS [address] TUMOR Right 3/15/2018    Procedure: IMAGE-GUIDED RIGHT FRONTOPARIETAL CRANIOTOMY FOR TUMOR;  Surgeon: Jayy Weiner MD;  [facility];  Service: Neurosurgery       Social History     Tobacco Use    Smoking status: Former Smoker     Last attempt to quit: 3/5/2000     Years since quittin 9    Smokeless tobacco: Never Used   Substance Use Topics    Alcohol use: No    Drug use: No       Family History   Adopted: Yes         The following portions of the patient's history were reviewed in this encounter and updated as appropriate: Past medical, surgical, family, and social history, as well as medications, allergies, and review of systems  EXAM    Vitals:Blood pressure 131/90, pulse 64, temperature 98 1 °F (36 7 °C), temperature source Tympanic, resp  rate 18, height 5' 5 5" (1 664 m), weight 89 4 kg (197 lb 3 2 oz)  ,Body mass index is 32 32 kg/m²  Physical Exam   Constitutional: She is oriented to person, place, and time  She appears well-developed and well-nourished  HENT:   Head: Normocephalic  Eyes: No scleral icterus  Neck: Neck supple  Cardiovascular: Normal rate  Pulmonary/Chest: Effort normal    Abdominal: Soft     Neurological: She is alert and oriented to person, place, and time  GCS eye subscore is 4  GCS verbal subscore is 5  GCS motor subscore is 6  Skin: Skin is warm and dry  Psychiatric: She has a normal mood and affect  Her speech is normal and behavior is normal    Vitals reviewed  Neurologic Exam     Mental Status   Oriented to person, place, and time  Attention: normal    Speech: speech is normal   Level of consciousness: alert    Cranial Nerves     CN VII   Facial expression full, symmetric  Motor Exam   Muscle bulk: normal  Overall muscle tone: normal  Moves all extremities, grossly normal     Gait, Coordination, and Reflexes     Tremor   Resting tremor: absent  Intention tremor: absent  Action tremor: absent  No aids  MEDICAL DECISION MAKING    Imaging Studies:     Mri Brain W Wo Contrast    Result Date: 2/11/2020  Narrative: MRI BRAIN WITH AND WITHOUT CONTRAST INDICATION: D32 9: Benign neoplasm of meninges, unspecified  COMPARISON:  MRI of the brain from March 5, 2019  TECHNIQUE: Sagittal T1, axial T2, axial FLAIR, axial T1, axial [place], axial diffusion  Sagittal, axial T1 postcontrast   Axial bravo postcontrast with coronal reconstructions  IV Contrast:  9 mL of gadobutrol injection (MULTI-DOSE)  IMAGE QUALITY:   Diagnostic  FINDINGS: BRAIN PARENCHYMA:  Focal gliosis surrounding the right parasagittal frontal surgical cavity status post resection of the right parafalcine meningioma  There are no white matter changes in the cerebral hemispheres  No nodular enhancement in the operative bed to suggest recurrent neoplastic disease  VENTRICLES:  Normal for the patient's age  SELLA AND PITUITARY GLAND:  Normal  ORBITS:  Normal  PARANASAL SINUSES:  Normal  VASCULATURE:  Focal severe stenosis of the mid superior sagittal sinus adjacent to the surgical bed  The superior sagittal sinus is of normal caliber proximal and distal to this severely narrowed segment  Findings are unchanged when compared to the prior study   CALVARIUM AND SKULL BASE:  Stable appearance of the parietal craniotomy  EXTRACRANIAL SOFT TISSUES:  Scarring within the right frontal parietal scalp  Impression: Stable postoperative changes of right parafalcine meningioma resection with residual gliosis in the adjacent right parasagittal frontal lobe  Stable focal narrowing of the adjacent superior sagittal sinus adjacent to the surgical cavity  Workstation performed: [id number]       I have personally reviewed pertinent reports     and I have personally reviewed pertinent films in PACS

## 2020-09-28 ENCOUNTER — TELEPHONE (OUTPATIENT)
Dept: NEUROSURGERY | Facility: CLINIC | Age: 60
End: 2020-09-28

## 2020-09-28 NOTE — TELEPHONE ENCOUNTER
Received a call from Srinivasan Vazquez with some concerns about her recently developing symptoms  She states that over the last month she has noticed a representation of her presurgical symptoms, however all symptoms are occurring on the right side rather than the previous laterality of the left  Right sided weakness and numbness in fingers, right arm shaking when mousing, also stumbling and drifting to the right increasingly for the last week  Denies facial droop, headaches, visual changes, confusion/disoriented  Review of OV progress note indicate fup in 6-12 months with repeat MRI  She was originally scheduled for 2/2021 for imaging and [facility] visit  Assisted her to reschedule to sooner date  MRI 10/2/2020 2:00pm and [facility] visit 10/21/2020 1:00pm      She was appreciative of the assistance

## 2020-10-01 ENCOUNTER — APPOINTMENT (OUTPATIENT)
Dept: LAB | Facility: HOSPITAL | Age: 60
End: 2020-10-01
Attending: NEUROLOGICAL SURGERY
Payer: MEDICARE

## 2020-10-01 ENCOUNTER — TRANSCRIBE ORDERS (OUTPATIENT)
Dept: ADMINISTRATIVE | Facility: HOSPITAL | Age: 60
End: 2020-10-01

## 2020-10-01 DIAGNOSIS — D32.9 MENINGIOMA (HCC): ICD-10-CM

## 2020-10-01 DIAGNOSIS — E78.2 MIXED HYPERLIPIDEMIA: ICD-10-CM

## 2020-10-01 DIAGNOSIS — I11.9 MALIGNANT HYPERTENSIVE HEART DISEASE WITHOUT HEART FAILURE: ICD-10-CM

## 2020-10-01 DIAGNOSIS — E50.9 AVITAMINOSIS A: ICD-10-CM

## 2020-10-01 DIAGNOSIS — G40.109 KOJEVNIKOV'S EPILEPSY (HCC): ICD-10-CM

## 2020-10-01 DIAGNOSIS — E08.00 DIABETES MELLITUS DUE TO UNDERLYING CONDITION WITH HYPEROSMOLARITY WITHOUT COMA, WITHOUT LONG-TERM CURRENT USE OF INSULIN (HCC): Primary | ICD-10-CM

## 2020-10-01 DIAGNOSIS — D32.9 BENIGN NEOPLASM OF MENINGES (HCC): Primary | ICD-10-CM

## 2020-10-01 DIAGNOSIS — E08.00 DIABETES MELLITUS DUE TO UNDERLYING CONDITION WITH HYPEROSMOLARITY WITHOUT COMA, WITHOUT LONG-TERM CURRENT USE OF INSULIN (HCC): ICD-10-CM

## 2020-10-01 DIAGNOSIS — D32.9 BENIGN NEOPLASM OF MENINGES (HCC): ICD-10-CM

## 2020-10-01 LAB
ALBUMIN SERPL BCP-MCNC: 4 G/DL (ref 3.5–5)
ALP SERPL-CCNC: 62 U/L (ref 46–116)
ALT SERPL W P-5'-P-CCNC: 23 U/L (ref 12–78)
ANION GAP SERPL CALCULATED.3IONS-SCNC: 7 MMOL/L (ref 4–13)
AST SERPL W P-5'-P-CCNC: 17 U/L (ref 5–45)
BACTERIA UR QL AUTO: ABNORMAL /HPF
BASOPHILS # BLD AUTO: 0.02 THOUSANDS/ΜL (ref 0–0.1)
BASOPHILS NFR BLD AUTO: 0 % (ref 0–1)
BILIRUB SERPL-MCNC: 0.5 MG/DL (ref 0.2–1)
BILIRUB UR QL STRIP: NEGATIVE
BUN SERPL-MCNC: 23 MG/DL (ref 5–25)
CALCIUM SERPL-MCNC: 9 MG/DL (ref 8.3–10.1)
CHLORIDE SERPL-SCNC: 103 MMOL/L (ref 100–108)
CHOLEST SERPL-MCNC: 147 MG/DL (ref 50–200)
CLARITY UR: CLEAR
CO2 SERPL-SCNC: 28 MMOL/L (ref 21–32)
COLOR UR: YELLOW
CREAT SERPL-MCNC: 1.23 MG/DL (ref 0.6–1.3)
EOSINOPHIL # BLD AUTO: 0.11 THOUSAND/ΜL (ref 0–0.61)
EOSINOPHIL NFR BLD AUTO: 2 % (ref 0–6)
ERYTHROCYTE [DISTWIDTH] IN BLOOD BY AUTOMATED COUNT: 12.4 % (ref 11.6–15.1)
EST. AVERAGE GLUCOSE BLD GHB EST-MCNC: 117 MG/DL
GFR SERPL CREATININE-BSD FRML MDRD: 48 ML/MIN/1.73SQ M
GLUCOSE P FAST SERPL-MCNC: 82 MG/DL (ref 65–99)
GLUCOSE UR STRIP-MCNC: NEGATIVE MG/DL
HBA1C MFR BLD: 5.7 %
HCT VFR BLD AUTO: 33.3 % (ref 34.8–46.1)
HDLC SERPL-MCNC: 48 MG/DL
HGB BLD-MCNC: 10.8 G/DL (ref 11.5–15.4)
HGB UR QL STRIP.AUTO: NEGATIVE
IMM GRANULOCYTES # BLD AUTO: 0.01 THOUSAND/UL (ref 0–0.2)
IMM GRANULOCYTES NFR BLD AUTO: 0 % (ref 0–2)
KETONES UR STRIP-MCNC: NEGATIVE MG/DL
LDLC SERPL CALC-MCNC: 82 MG/DL (ref 0–100)
LEUKOCYTE ESTERASE UR QL STRIP: ABNORMAL
LYMPHOCYTES # BLD AUTO: 1.6 THOUSANDS/ΜL (ref 0.6–4.47)
LYMPHOCYTES NFR BLD AUTO: 22 % (ref 14–44)
MCH RBC QN AUTO: 30.9 PG (ref 26.8–34.3)
MCHC RBC AUTO-ENTMCNC: 32.4 G/DL (ref 31.4–37.4)
MCV RBC AUTO: 95 FL (ref 82–98)
MONOCYTES # BLD AUTO: 0.41 THOUSAND/ΜL (ref 0.17–1.22)
MONOCYTES NFR BLD AUTO: 6 % (ref 4–12)
NEUTROPHILS # BLD AUTO: 5.15 THOUSANDS/ΜL (ref 1.85–7.62)
NEUTS SEG NFR BLD AUTO: 70 % (ref 43–75)
NITRITE UR QL STRIP: NEGATIVE
NON-SQ EPI CELLS URNS QL MICRO: ABNORMAL /HPF
NONHDLC SERPL-MCNC: 99 MG/DL
NRBC BLD AUTO-RTO: 0 /100 WBCS
PH UR STRIP.AUTO: 5.5 [PH]
PLATELET # BLD AUTO: 202 THOUSANDS/UL (ref 149–390)
PMV BLD AUTO: 10.8 FL (ref 8.9–12.7)
POTASSIUM SERPL-SCNC: 4.8 MMOL/L (ref 3.5–5.3)
PROT SERPL-MCNC: 6.8 G/DL (ref 6.4–8.2)
PROT UR STRIP-MCNC: NEGATIVE MG/DL
RBC # BLD AUTO: 3.5 MILLION/UL (ref 3.81–5.12)
RBC #/AREA URNS AUTO: ABNORMAL /HPF
SODIUM SERPL-SCNC: 138 MMOL/L (ref 136–145)
SP GR UR STRIP.AUTO: 1.02 (ref 1–1.03)
TRIGL SERPL-MCNC: 83 MG/DL
UROBILINOGEN UR QL STRIP.AUTO: 0.2 E.U./DL
WBC # BLD AUTO: 7.3 THOUSAND/UL (ref 4.31–10.16)
WBC #/AREA URNS AUTO: ABNORMAL /HPF

## 2020-10-01 PROCEDURE — 80061 LIPID PANEL: CPT

## 2020-10-01 PROCEDURE — 81001 URINALYSIS AUTO W/SCOPE: CPT | Performed by: INTERNAL MEDICINE

## 2020-10-01 PROCEDURE — 85025 COMPLETE CBC W/AUTO DIFF WBC: CPT

## 2020-10-01 PROCEDURE — 80053 COMPREHEN METABOLIC PANEL: CPT

## 2020-10-01 PROCEDURE — 83036 HEMOGLOBIN GLYCOSYLATED A1C: CPT | Performed by: INTERNAL MEDICINE

## 2020-10-01 PROCEDURE — 36415 COLL VENOUS BLD VENIPUNCTURE: CPT

## 2020-10-02 ENCOUNTER — HOSPITAL ENCOUNTER (OUTPATIENT)
Dept: RADIOLOGY | Facility: HOSPITAL | Age: 60
Discharge: HOME | End: 2020-10-02
Attending: NEUROLOGICAL SURGERY
Payer: MEDICARE

## 2020-10-02 DIAGNOSIS — D32.9 MENINGIOMA (HCC): ICD-10-CM

## 2020-10-02 DIAGNOSIS — Z98.890 POST-OPERATIVE STATE: ICD-10-CM

## 2020-10-02 PROCEDURE — 70553 MRI BRAIN STEM W/O & W/DYE: CPT

## 2020-10-02 PROCEDURE — A9585 GADOBUTROL INJECTION: HCPCS | Performed by: NEUROLOGICAL SURGERY

## 2020-10-02 RX ADMIN — GADOBUTROL 9 ML: 604.72 INJECTION INTRAVENOUS at 14:40

## 2020-10-21 ENCOUNTER — OFFICE VISIT (OUTPATIENT)
Dept: NEUROSURGERY | Facility: CLINIC | Age: 60
End: 2020-10-21
Payer: MEDICARE

## 2020-10-21 VITALS
BODY MASS INDEX: 33.01 KG/M2 | DIASTOLIC BLOOD PRESSURE: 78 MMHG | HEART RATE: 66 BPM | HEIGHT: 66 IN | WEIGHT: 205.4 LBS | RESPIRATION RATE: 16 BRPM | SYSTOLIC BLOOD PRESSURE: 123 MMHG | TEMPERATURE: 97.6 F

## 2020-10-21 DIAGNOSIS — D32.9 MENINGIOMA (HCC): Primary | ICD-10-CM

## 2020-10-21 DIAGNOSIS — Z98.890 S/P CRANIOTOMY: ICD-10-CM

## 2020-10-21 PROCEDURE — 99214 OFFICE O/P EST MOD 30 MIN: CPT | Performed by: NEUROLOGICAL SURGERY

## 2021-07-23 NOTE — PROGRESS NOTES
Neurosurgery Office Note  Christina Pineda 62 y o  female MRN: [id number]      Assessment/Plan      Diagnoses and all orders for this visit:    Meningioma ([address] )  -     BUN  -     Creatinine, serum; Future  -     MRI brain w wo contrast; Future    Cerebral edema (HCC)  -     BUN  -     Creatinine, serum; Future  -     MRI brain w wo contrast; Future          Discussion:    61 yo s/p embolization and then resection of her 6 cm right parafalcine mass  Presented with left hemiparesis and cognition and personality change  Presented to [facility] with LUE and LLE weakness of 8 months duration  Seen by Dr Saadia Jesus, diagnosing the above  Was using a walker preop  Patient was discharged and seen  seen at in clinic by myself and Dr Carolina Sanchez on 3/12/18, and patient was admitted 3/13/18 for embolization by Dr Carolina Sanchez, and underwent surgery on 3/15  She tolerated that well, and has had a good neurologic recovery since  No longer using any ambulatory aids  Mentions 2/10 discomfort over surgical site, but no collection on follow-up MRI  No definitive recurrence on MRI - areas mentioned by radiology are the surgical cavity as well as the extradural space created by the bone removal done 2/2 invasion/hyperostosis  There is likely some residual in the remnant of the sagittal sinus, but none obvious  Pathology WHO I  I have suggested surveillance, with a repeat MRI brain in 3 months, and she agrees  I have ordered the study  Edwards 27/30, improved from 25/30 preop  [id number] or 0 778, VAS 80, ECOG 1, KPS 70-80  The patient will follow-up with Dr Saadia Jesus for her AED management  She did wean off of steroids without problem           CHIEF COMPLAINT    Chief Complaint   Patient presents with    Post-op     3 month POV;  6 week f/u with new MRI Brain       HISTORY    History of Present Illness     62y o  year old female     HPI    See Discussion    REVIEW OF SYSTEMS    Review of Systems   Constitutional: Positive for appetite change (decreased since it is hot out) and fever (occasional)  HENT: Positive for congestion (seasonal) and tinnitus (constant , not new, unchanged)  Warmth on top right head at incision site   Eyes: Negative  Cardiovascular: Positive for palpitations  Gastrointestinal: Positive for diarrhea (occasional)  Improving   Endocrine: Negative  Genitourinary: Negative  Dribbles with seizures   Musculoskeletal: Positive for arthralgias (right hand aches since surgery)  Right hand difficulties since surgery   Skin: Negative  Allergic/Immunologic: Positive for environmental allergies  Neurological: Positive for tremors (mostly gone), seizures (focal, daily), numbness (right thumb) and headaches (daily, comes and goes, location varies, 3-4/10, relieved by ASA)  Hematological: Negative  Psychiatric/Behavioral: Positive for decreased concentration (takes longer to answer questions) and sleep disturbance          Slower to think and speak         Meds/Allergies     Current Outpatient Prescriptions   Medication Sig Dispense Refill    cholecalciferol (VITAMIN D3) 1,000 units tablet Take 1,000 Units by mouth daily      ferrous sulfate 325 (65 Fe) mg tablet Take 325 mg by mouth daily      Glucosamine-Chondroitin (MOVE FREE PO) Take 1 tablet by mouth daily      insulin aspart protamine-insulin aspart (NovoLOG 70/30) 100 Units/mL SUPN Inject 10 Units under the skin 3 (three) times a day with meals 5 pen 0    Insulin Pen Needle 29G X 12 7MM MISC Use as directed with novolog flexpen to administer insulin with meals and use with lantus solostar pen to administer insulin in the morning 100 each 0    lacosamide (VIMPAT) 150 mg tablet Take 1 tab twice daily 60 tablet 3    lisinopril (ZESTRIL) 20 mg tablet Take 20 mg by mouth daily      metFORMIN (GLUCOPHAGE) 1000 MG tablet Take 1,000 mg by mouth 2 (two) times a day        sertraline (ZOLOFT) 100 mg tablet Take 100 mg by mouth daily at bedtime        sitaGLIPtin (JANUVIA) 50 mg tablet Take 50 mg by mouth daily      Probiotic Product (PROBIOTIC DAILY PO) Take 1 capsule by mouth daily       No current facility-administered medications for this visit  Allergies   Allergen Reactions    Dilantin [Phenytoin]     Lidocaine     Statins        PAST HISTORY    Past Medical History:   Diagnosis Date    [facility]     Cancer [facility]     Depression     Diabetes mellitus ([facility] )     Seizures ([facility] )        Past Surgical History:   Procedure Laterality Date    CHOLECYSTECTOMY  2004    HYSTERECTOMY      IMPLANTABLE CONTACT LENS IMPLANTATION      [place] EXCIS [address] TUMOR Right 3/15/2018    Procedure: IMAGE-GUIDED RIGHT FRONTOPARIETAL CRANIOTOMY FOR TUMOR;  Surgeon: Ila Espinosa MD;  [facility];  Service: Neurosurgery       Social History   Substance Use Topics    Smoking status: Former Smoker     Quit date: 3/5/2000    Smokeless tobacco: Never Used    Alcohol use No       Family History   Problem Relation Age of Onset    Adopted: Yes         Above history personally reviewed  EXAM    Vitals:Blood pressure 148/76, pulse 60, temperature 98 °F (36 7 °C), temperature source Tympanic, resp  rate 16, height 5' 5 5" (1 664 m), weight 100 kg (220 lb 9 6 oz)  ,Body mass index is 36 15 kg/m²  Physical Exam   Constitutional: She is oriented to person, place, and time  She appears well-developed and well-nourished  HENT:   Head: Normocephalic  Eyes: No scleral icterus  Neck: Neck supple  Cardiovascular: Normal rate  Pulmonary/Chest: Effort normal    Abdominal: Soft  Neurological: She is alert and oriented to person, place, and time  She has normal strength  GCS eye subscore is 4  GCS verbal subscore is 5  GCS motor subscore is 6  Skin: Skin is warm and dry  Psychiatric: She has a normal mood and affect   Her speech is normal and behavior is normal        Neurologic Exam     Mental Status   Oriented to person, place, and time  Attention: normal    Speech: speech is normal   Level of consciousness: alert    Cranial Nerves     CN VII   Facial expression full, symmetric  Motor Exam   Muscle bulk: normal  Overall muscle tone: normal    Strength   Strength 5/5 throughout  Moves all extremities, grossly normal     Gait, Coordination, and Reflexes     Tremor   Resting tremor: absent  Intention tremor: absent  Action tremor: absent        MEDICAL DECISION MAKING    Imaging Studies:     Mri Brain W Wo Contrast    Result Date: 6/7/2018  Narrative: MRI BRAIN WITH AND WITHOUT CONTRAST INDICATION: Follow-up meningioma status post resection  COMPARISON:  Prior MRI March 6, 2018 TECHNIQUE: Sagittal T1, axial T2, axial FLAIR, axial T1, axial [place], axial diffusion  Sagittal, axial T1 postcontrast   Axial bravo postcontrast with coronal reconstructions  IV Contrast:  9 mL of gadobutrol injection (MULTI-DOSE)  IMAGE QUALITY:   Diagnostic  FINDINGS: BRAIN PARENCHYMA:  Patient status post right frontoparietal vertex craniotomy and resection of meningioma  There is dural thickening identified on the right frontoparietal dura extending along the anterior falx eccentric to the right  CSF intensity noted potentially splitting the dura or superficial to the dura measuring 2 8 x 1 0 x 1 3 cm  Deep to the dura, there is a peripherally enhancing mass measuring approximately 1 8 x 1 3 x 1 2 cm, potentially residual meningioma  Near resolution of vasogenic edema  Overall, significant debulking of the meningioma has been noted  No additional abnormalities are noted  VENTRICLES:  Normal  SELLA AND PITUITARY GLAND:  Normal  ORBITS:  Normal  PARANASAL SINUSES:  Normal  VASCULATURE:  Evaluation of the major intracranial vasculature demonstrates appropriate flow voids  CALVARIUM AND SKULL BASE:  Normal  EXTRACRANIAL SOFT TISSUES:  Normal      Impression: Postoperative changes of right frontal vertex craniotomy and debulking of meningioma    Possible small residual meningioma measuring 1 8 x 1 3 x 1 2 cm with minimal surrounding FLAIR abnormality  Dural thickening again noted deep to the craniotomy site  Continued follow-up recommended    Workstation performed: [id number]       I have personally reviewed pertinent reports     and I have personally reviewed pertinent films in PACS Yes

## 2021-10-13 DIAGNOSIS — Z01.818 PRE-PROCEDURAL EXAMINATION: Primary | ICD-10-CM

## 2021-10-18 ENCOUNTER — APPOINTMENT (OUTPATIENT)
Dept: LAB | Facility: HOSPITAL | Age: 61
End: 2021-10-18
Attending: NEUROLOGICAL SURGERY
Payer: MEDICARE

## 2021-10-18 DIAGNOSIS — Z01.818 PRE-PROCEDURAL EXAMINATION: ICD-10-CM

## 2021-10-18 LAB
BUN SERPL-MCNC: 27 MG/DL (ref 5–25)
CREAT SERPL-MCNC: 1.09 MG/DL (ref 0.6–1.3)
GFR SERPL CREATININE-BSD FRML MDRD: 55 ML/MIN/1.73SQ M

## 2021-10-18 PROCEDURE — 36415 COLL VENOUS BLD VENIPUNCTURE: CPT

## 2021-10-18 PROCEDURE — 84520 ASSAY OF UREA NITROGEN: CPT

## 2021-10-18 PROCEDURE — 82565 ASSAY OF CREATININE: CPT

## 2021-10-21 ENCOUNTER — HOSPITAL ENCOUNTER (OUTPATIENT)
Dept: MRI IMAGING | Facility: HOSPITAL | Age: 61
Discharge: HOME | End: 2021-10-21
Attending: NEUROLOGICAL SURGERY
Payer: MEDICARE

## 2021-10-21 DIAGNOSIS — Z98.890 S/P CRANIOTOMY: ICD-10-CM

## 2021-10-21 DIAGNOSIS — D32.9 MENINGIOMA (HCC): ICD-10-CM

## 2021-10-21 PROCEDURE — G1004 CDSM NDSC: HCPCS

## 2021-10-21 PROCEDURE — A9585 GADOBUTROL INJECTION: HCPCS | Performed by: NEUROLOGICAL SURGERY

## 2021-10-21 PROCEDURE — 70553 MRI BRAIN STEM W/O & W/DYE: CPT

## 2021-10-21 RX ADMIN — GADOBUTROL 9 ML: 604.72 INJECTION INTRAVENOUS at 14:34

## 2021-10-27 ENCOUNTER — TELEMEDICINE (OUTPATIENT)
Dept: NEUROSURGERY | Facility: CLINIC | Age: 61
End: 2021-10-27
Payer: MEDICARE

## 2021-10-27 DIAGNOSIS — Z98.890 S/P CRANIOTOMY: ICD-10-CM

## 2021-10-27 DIAGNOSIS — D32.9 MENINGIOMA (HCC): Primary | ICD-10-CM

## 2021-10-27 PROCEDURE — 99213 OFFICE O/P EST LOW 20 MIN: CPT | Performed by: NEUROLOGICAL SURGERY

## 2021-10-27 RX ORDER — ZINC GLUCONATE 50 MG
TABLET ORAL DAILY
COMMUNITY

## 2021-10-27 RX ORDER — MULTIVITAMIN WITH IRON
TABLET ORAL DAILY
COMMUNITY

## 2021-11-01 ENCOUNTER — TELEPHONE (OUTPATIENT)
Dept: NEUROSURGERY | Facility: CLINIC | Age: 61
End: 2021-11-01

## 2021-11-04 ENCOUNTER — TELEPHONE (OUTPATIENT)
Dept: RADIATION ONCOLOGY | Facility: CLINIC | Age: 61
End: 2021-11-04

## 2023-01-04 ENCOUNTER — TELEPHONE (OUTPATIENT)
Dept: NEUROSURGERY | Facility: CLINIC | Age: 63
End: 2023-01-04

## 2023-01-04 DIAGNOSIS — Z98.890 S/P CRANIOTOMY: ICD-10-CM

## 2023-01-04 DIAGNOSIS — Z01.818 PRE-PROCEDURAL EXAMINATION: Primary | ICD-10-CM

## 2023-01-04 DIAGNOSIS — D32.9 MENINGIOMA (HCC): ICD-10-CM

## 2023-01-04 NOTE — TELEPHONE ENCOUNTER
Noted MRI already rescheduled to 2/7/2023  No additional assistance is perceived to be needed  No callback attempted at this time

## 2023-01-04 NOTE — TELEPHONE ENCOUNTER
Received a call from [facility] [place] [employer] [facility] requesting assistance with scheduling her follow-up with Dr Shavonne Weinberg for 18 month fup with MRI  Noted this was the plan documented in her chart during their last encounter  Her previous follow-up has always been with Dr Shavonne Weinberg solo and she would like to keep this trend  Attempted to assist with scheduling the MRI however when I provided the date she stated she could not do this day  Suggested she try to call central scheduling on her own so she can discuss with them the dates that work for her  She stated frustration with this suggestion and began yelling  Requested that we speak later today regarding this as she seemed very upset  Nathalia disconnected the call  Will attempt to reach her later today

## 2023-02-24 ENCOUNTER — NURSE TRIAGE (OUTPATIENT)
Dept: NEUROSURGERY | Facility: CLINIC | Age: 63
End: 2023-02-24

## 2023-02-24 NOTE — TELEPHONE ENCOUNTER
Patient called requesting a call back  Patient did not have a good experience during her last phone call with our office  Because of this patient cancelled her MRI and her follow up appt  Patient reports she "had a fall / blacked out, but does not recall hitting my [her] head" approximately 2 weeks ago  I asked her if she went to an ER, she did not she went home, reports she still has bruises from her fall  I offered to assist the patient with rescheduling her MRI and follow up appt, patient was appreciative  I placed the patient on a conference call with central scheduling, they were able to schedule her an MRI at [address] [place] [address] for Monday, 2/27/2023 at 1245 pm, with an arrival time of 1230 pm   Patient is aware to have her pre-MRI labs completed tomorrow morning, she will go to [address] [place] [address] early tomorrow morning  Then scheduled her a follow with Kathia Costello and Dr Adelina Murrieta for 3/10/2023 at 80 AM (unable to make the earlier appt offer on 3/1/2023 at [phone])  I apologized for the prior experience she had with our office, she appreciated my apology and my assistance in rescheduling her appointments

## 2023-02-25 ENCOUNTER — APPOINTMENT (OUTPATIENT)
Dept: LAB | Facility: CLINIC | Age: 63
End: 2023-02-25

## 2023-02-25 DIAGNOSIS — D32.9 MENINGIOMA (HCC): ICD-10-CM

## 2023-02-25 DIAGNOSIS — Z01.818 PRE-PROCEDURAL EXAMINATION: ICD-10-CM

## 2023-02-25 LAB
BUN SERPL-MCNC: 28 MG/DL (ref 5–25)
CREAT SERPL-MCNC: 1.08 MG/DL (ref 0.6–1.3)
GFR SERPL CREATININE-BSD FRML MDRD: 55 ML/MIN/1.73SQ M

## 2023-02-27 ENCOUNTER — HOSPITAL ENCOUNTER (OUTPATIENT)
Dept: RADIOLOGY | Facility: HOSPITAL | Age: 63
Discharge: HOME | End: 2023-02-27
Attending: NEUROLOGICAL SURGERY

## 2023-02-27 DIAGNOSIS — D32.9 MENINGIOMA (HCC): ICD-10-CM

## 2023-02-27 DIAGNOSIS — Z98.890 S/P CRANIOTOMY: ICD-10-CM

## 2023-02-27 RX ADMIN — GADOBUTROL 9 ML: 604.72 INJECTION INTRAVENOUS at 13:09

## 2023-03-10 ENCOUNTER — OFFICE VISIT (OUTPATIENT)
Dept: NEUROSURGERY | Facility: CLINIC | Age: 63
End: 2023-03-10

## 2023-03-10 VITALS
HEIGHT: 65 IN | TEMPERATURE: 98.2 F | BODY MASS INDEX: 31.99 KG/M2 | DIASTOLIC BLOOD PRESSURE: 78 MMHG | SYSTOLIC BLOOD PRESSURE: 152 MMHG | WEIGHT: 192 LBS | HEART RATE: 85 BPM | OXYGEN SATURATION: 98 % | RESPIRATION RATE: 18 BRPM

## 2023-03-10 DIAGNOSIS — R26.81 GAIT INSTABILITY: Primary | ICD-10-CM

## 2023-03-10 DIAGNOSIS — Z98.890 STATUS POST RESECTION OF MENINGIOMA: ICD-10-CM

## 2023-03-10 DIAGNOSIS — Z86.018 STATUS POST RESECTION OF MENINGIOMA: ICD-10-CM

## 2023-03-10 NOTE — PROGRESS NOTES
Neurosurgery Office Note  Mat Juan 58 y o  female MRN: [id number]      Assessment/Plan      Patient is a 58 yrs old pleasant woman with PMHx of diabetes mellitus type 2, hypertension, and status post right parafalcine meningioma resection on 3/2018  She is here today for annual follow-up with brain MRI with and without contrast   Images demonstrates stable postop changes without  sign of recurrence , acute or chronic intracranial hemorrhage  Patient reports  doing fine until she fell down about a month ago, and since then  She started experiencing continuous diffuse headache, headache is exacerbated with sound and light  No seizures, weakness in the extremities  She also developed nasal congestion and postnasal drip-patient thinks it is sinusitis  This occurred a month ago following fall  No fever, chills, or  Rigors  She did not see her PCP, takes  Excedrin which helped in relieving her headache temporarily  Patient also c/o gait issues  Exam-A&OX3  PERRL, EOMI 3 mm conj bilat  SF Izabela  Finger-to-nose test normal and without drift bilaterally  Strength is 5/5 and sensation to light intact throughout   DTR 2+ without clonus bilaterally  Gait instability on heel-to-toe walking noted  Craniotomy wound unremarkable  Hx, PEx, and images reviewed with the patient and her   Mx plan discussed  Dr Vicki Sharma reviewed the images  Given stability of previous 5 yrs  scans, recommend follow up in 2 yrs with Surveillance MRI brain, Ambulatory referral to PT  All questions and concerns were answered to patient's satisfaction  Patient  and  expressed her understandings and agreed with the plan  Plan:  1  Ambulatory referral to PT  2  F/U with NSx in 2 yrs with MRI brain to survey any tumor recurrence  3  Call with questions or concerns      I have spent a total time of 45  minutes on 03/10/23 in caring for this patient including Diagnostic results, Prognosis, Risks and benefits of tx options, Instructions for management, Patient and family education, Importance of tx compliance, Risk factor reductions, Impressions, Counseling / Coordination of care, Documenting in the medical record, Reviewing / ordering tests, medicine, procedures   and Obtaining or reviewing history    C/C: " Annual follow up for meningioma resection"    History of Present Illness     All patient's medical histories were reviewed and updated as appropriate: Allergies, current medication list, past medical history, past surgical history, family history, social history, and current medical lists    Patient is here today for annual follow-up status post meningioma resection 5 years ago  She reports that she was doing fine until a month ago when she fell down and started having severe headache  Patient reports history of hard to fall but denies hitting her head  She started experiencing nasal congestion and postnasal drip  Patient also reported gait issues, speech issues with word finding sometimes confusion  Denies history of seizures, nausea, vomiting, blurry vision or diplopia  Denies bowel/bladder dysfunction  No fever, chills, rigors, cough or chest pain  REVIEW OF SYSTEMS  Review of systems reviewed and updated  Review of Systems   Constitutional: Negative  HENT: Positive for ear pain (right ear pain)  Eyes: Negative  Respiratory: Negative  Cardiovascular: Negative  Gastrointestinal: Negative  Endocrine: Negative  Genitourinary: Negative  Musculoskeletal: Negative  Skin: Negative  Allergic/Immunologic: Negative  Neurological: Positive for speech difficulty (trouble finding words she want to say) and headaches ( s/p trip/fall over box 4wks ago)  1 YR F/U  Meningioma- Brain MRI done 2/27/23   Hematological: Negative  Psychiatric/Behavioral: Positive for confusion and decreased concentration  All other systems reviewed and are negative  ROS obtained by MA  Reviewed   See HPI      Meds/Allergies     Current Outpatient Medications   Medication Sig Dispense Refill   • Ascorbic Acid (VITAMIN C) 1000 MG tablet Take 1,000 mg by mouth daily     • BD PEN NEEDLE BON U/F 32G X 4 MM MISC TEST TID UTD  2   • cholecalciferol (VITAMIN D3) 1,000 units tablet Take 1,000 Units by mouth daily     • CHROMIUM PO Take by mouth 2 (two) times a day     • ezetimibe (ZETIA) 10 mg tablet TK 1 T PO QD (Patient not taking: Reported on 10/27/2021)  3   • ferrous sulfate 325 (65 Fe) mg tablet Take 325 mg by mouth daily      • insulin aspart protamine-insulin aspart (NovoLOG 70/30) 100 Units/mL SUPN Inject 10 Units under the skin 3 (three) times a day with meals (Patient taking differently: Inject 10 Units under the skin 2 (two) times a day with meals ) 5 pen 0   • Insulin Pen Needle 29G X 12 7MM MISC Use as directed with novolog flexpen to administer insulin with meals and use with lantus solostar pen to administer insulin in the morning 100 each 0   • L-LYSINE PO Take 500 mg by mouth daily     • lisinopril-hydrochlorothiazide (PRINZIDE,ZESTORETIC) 20-12 5 MG per tablet TK 1 T PO QD (Patient not taking: Reported on 10/27/2021)  3   • Magnesium 250 MG TABS Take by mouth daily     • metFORMIN (GLUCOPHAGE) 1000 MG tablet Take 1,000 mg by mouth 2 (two) times a day       • NON FORMULARY 3 (three) times a day Bon Silver - 3 tablespoons a day     • Probiotic Product (PROBIOTIC DAILY PO) Take 1 capsule by mouth daily     • sertraline (ZOLOFT) 100 mg tablet Take 100 mg by mouth daily at bedtime       • sitaGLIPtin (JANUVIA) 50 mg tablet Take 50 mg by mouth daily (Patient not taking: Reported on 10/27/2021)     • valACYclovir (VALTREX) 500 mg tablet TK 1 T PO QD  2   • Zinc 50 MG TABS Take by mouth daily       No current facility-administered medications for this visit         Allergies   Allergen Reactions   • Dilantin [Phenytoin]    • Lidocaine    • Statins        PAST HISTORY    Past Medical History:   Diagnosis Date • Bipolar depression ([facility] )    • Cancer ([facility] )    • Depression    • Diabetes mellitus ([place] [address] )    • PTSD (post-traumatic stress disorder)    • Seizures ([facility] )        Past Surgical History:   Procedure Laterality Date   • CHOLECYSTECTOMY     • HYSTERECTOMY     • IMPLANTABLE CONTACT LENS IMPLANTATION     • [place] EXCIS [address] TUMOR Right 3/15/2018    Procedure: IMAGE-GUIDED RIGHT FRONTOPARIETAL CRANIOTOMY FOR TUMOR;  Surgeon: Carlos Powell MD;  [facility];  Service: Neurosurgery       Social History     Tobacco Use   • Smoking status: Former     Types: Cigarettes     Quit date: 3/5/2000     Years since quittin 0   • Smokeless tobacco: Never   Vaping Use   • Vaping Use: Never used   Substance Use Topics   • Alcohol use: No   • Drug use: No       Family History   Adopted: Yes         Above history personally reviewed  EXAM    Vitals: There were no vitals taken for this visit  ,There is no height or weight on file to calculate BMI  Physical Exam  Constitutional:       Appearance: She is obese  HENT:      Head: Normocephalic and atraumatic  Eyes:      Extraocular Movements: Extraocular movements intact  Pupils: Pupils are equal, round, and reactive to light  Cardiovascular:      Rate and Rhythm: Normal rate  Pulses: Normal pulses  Pulmonary:      Effort: Pulmonary effort is normal    Musculoskeletal:      Cervical back: Normal range of motion  Neurological:      General: No focal deficit present  Mental Status: She is alert and oriented to person, place, and time  GCS: GCS eye subscore is 4  GCS verbal subscore is 5  GCS motor subscore is 6  Cranial Nerves: Cranial nerves 2-12 are intact  Sensory: Sensation is intact  Motor: Motor function is intact  Coordination: Finger-Nose-Finger Test normal       Gait: Gait abnormal       Deep Tendon Reflexes: Reflexes are normal and symmetric        Reflex Scores:       Tricep reflexes are 2+ on the right side and 2+ on the left side  Bicep reflexes are 2+ on the right side and 2+ on the left side  Brachioradialis reflexes are 2+ on the right side and 2+ on the left side  Patellar reflexes are 2+ on the right side and 2+ on the left side  Achilles reflexes are 2+ on the right side and 2+ on the left side  Psychiatric:         Speech: Speech normal          Neurologic Exam     Mental Status   Oriented to person, place, and time  Speech: speech is normal   Level of consciousness: alert    Cranial Nerves   Cranial nerves II through XII intact  CN III, IV, VI   Pupils are equal, round, and reactive to light  Right pupil: Size: 2 mm  Shape: regular  Reactivity: brisk  Left pupil: Size: 2 mm  Shape: regular  Reactivity: brisk  Nystagmus: none     CN XI   CN XI normal      Motor Exam   Muscle bulk: normal  Overall muscle tone: normal  Right arm tone: normal  Left arm tone: normal  Right arm pronator drift: absent  Left arm pronator drift: absent  Right leg tone: normal  Left leg tone: normal    Sensory Exam   Light touch normal      Gait, Coordination, and Reflexes     Coordination   Finger to nose coordination: normal    Reflexes   Right brachioradialis: 2+  Left brachioradialis: 2+  Right biceps: 2+  Left biceps: 2+  Right triceps: 2+  Left triceps: 2+  Right patellar: 2+  Left patellar: 2+  Right achilles: 2+  Left achilles: 2+  Right : 2+  Left : 2+  Right ankle clonus: absent  Left pendular knee jerk: absent        MEDICAL DECISION MAKING    Imaging Studies:     MRI brain w wo contrast    Result Date: 3/1/2023  Narrative: MRI BRAIN WITH AND WITHOUT CONTRAST INDICATION: D32 9: Benign neoplasm of meninges, unspecified Z98 890: Other specified postprocedural states  COMPARISON:  10/21/2021 is the most recent of multiple prior examinations  TECHNIQUE: Multiplanar, multisequence imaging of the brain was performed before and after gadolinium administration   IV Contrast:  9 mL of Gadobutrol injection (SINGLE-DOSE)  IMAGE QUALITY:   Diagnostic  FINDINGS: BRAIN PARENCHYMA:  Patient is status post frontal parietal vertex craniotomy at and to the right of midline  There is a resection cavity present within the parafalcine posterior frontal vertex corresponding to site of previous meningioma resection  The  resection cavity is grossly unchanged in size within internal septation seen superiorly  No nodular enhancement is seen to suggest residual or recurrent disease  Stable mild enhancement of the dura underlying the craniotomy site  Stable mild FLAIR signal change seen within the brain peripheral to the resection site  The remainder of the brain parenchyma is stable and unchanged  No other white matter abnormalities are identified  No acute or chronic hemorrhage identified  Stable appearance of the brainstem and cerebellum  Postcontrast imaging within the remainder of the brain parenchyma is unremarkable  VENTRICLES:  Normal for the patient's age  SELLA AND PITUITARY GLAND:  Normal  ORBITS:  Normal  PARANASAL SINUSES:  Normal  VASCULATURE:  Evaluation of the major intracranial vasculature demonstrates appropriate flow voids  CALVARIUM AND SKULL BASE:  Normal  EXTRACRANIAL SOFT TISSUES:  Normal      Impression: Stable examination status post parafalcine vertex meningioma resection  No residual or recurrent tumor identified   Workstation performed: [id number]       I have personally reviewed pertinent reports   , I have personally reviewed pertinent films in PACS and I have personally reviewed pertinent films in PACS with a Radiologist

## 2023-07-16 ENCOUNTER — HOSPITAL ENCOUNTER (EMERGENCY)
Facility: HOSPITAL | Age: 63
Discharge: HOME | End: 2023-07-16
Attending: EMERGENCY MEDICINE
Payer: MEDICARE

## 2023-07-16 VITALS
TEMPERATURE: 98 F | DIASTOLIC BLOOD PRESSURE: 78 MMHG | BODY MASS INDEX: 31.28 KG/M2 | OXYGEN SATURATION: 97 % | HEART RATE: 79 BPM | RESPIRATION RATE: 18 BRPM | WEIGHT: 188 LBS | SYSTOLIC BLOOD PRESSURE: 184 MMHG

## 2023-07-16 DIAGNOSIS — L03.011 PARONYCHIA OF FINGER OF RIGHT HAND: Primary | ICD-10-CM

## 2023-07-16 PROCEDURE — 87147 CULTURE TYPE IMMUNOLOGIC: CPT | Performed by: EMERGENCY MEDICINE

## 2023-07-16 PROCEDURE — 87205 SMEAR GRAM STAIN: CPT | Performed by: EMERGENCY MEDICINE

## 2023-07-16 PROCEDURE — 87186 SC STD MICRODIL/AGAR DIL: CPT | Performed by: EMERGENCY MEDICINE

## 2023-07-16 PROCEDURE — 99283 EMERGENCY DEPT VISIT LOW MDM: CPT

## 2023-07-16 PROCEDURE — 87070 CULTURE OTHR SPECIMN AEROBIC: CPT | Performed by: EMERGENCY MEDICINE

## 2023-07-16 PROCEDURE — 87077 CULTURE AEROBIC IDENTIFY: CPT | Performed by: EMERGENCY MEDICINE

## 2023-07-16 RX ORDER — CEPHALEXIN 500 MG/1
500 CAPSULE ORAL EVERY 6 HOURS SCHEDULED
Qty: 20 CAPSULE | Refills: 0 | Status: SHIPPED | OUTPATIENT
Start: 2023-07-16 | End: 2023-07-21

## 2023-07-16 RX ORDER — CEPHALEXIN 250 MG/1
500 CAPSULE ORAL ONCE
Status: COMPLETED | OUTPATIENT
Start: 2023-07-16 | End: 2023-07-16

## 2023-07-16 RX ADMIN — CEPHALEXIN 500 MG: 250 CAPSULE ORAL at 18:10

## 2023-07-16 NOTE — ED PROVIDER NOTES
History  Chief Complaint   Patient presents with   • Hand Swelling     Patient complaint of right hand , 4th digit swelling since Friday " must be spider bite"     57 yo F felt sharp stab of pain on right ring finger after sticking her hand into a pile of clothes. Since then the area around the nail became swollen and tender. Now the tip of the finger is also swollen and tender. Drained a little fluid from the area with a pin. No red streaks or other signs of infection. Prior to Admission Medications   Prescriptions Last Dose Informant Patient Reported? Taking?    Ascorbic Acid (VITAMIN C) 1000 MG tablet  Self Yes No   Sig: Take 1,000 mg by mouth daily   BD PEN NEEDLE BON U/F 32G X 4 MM MISC  Self Yes No   Sig: TEST TID UTD   Patient not taking: Reported on 3/10/2023   CHROMIUM PO  Self Yes No   Sig: Take by mouth 2 (two) times a day   Insulin Pen Needle 29G X 12.7MM MISC  Self No No   Sig: Use as directed with novolog flexpen to administer insulin with meals and use with lantus solostar pen to administer insulin in the morning   Patient not taking: Reported on 3/10/2023   L-LYSINE PO  Self Yes No   Sig: Take 500 mg by mouth daily   Magnesium 250 MG TABS  Self Yes No   Sig: Take by mouth daily   NON FORMULARY  Self Yes No   Sig: 3 (three) times a day Bon Silver - 3 tablespoons a day   Probiotic Product (PROBIOTIC DAILY PO)  Self Yes No   Sig: Take 1 capsule by mouth daily   Zinc 50 MG TABS  Self Yes No   Sig: Take by mouth daily   cholecalciferol (VITAMIN D3) 1,000 units tablet  Self Yes No   Sig: Take 1,000 Units by mouth daily   ezetimibe (ZETIA) 10 mg tablet  Self Yes No   Sig: TK 1 T PO QD   Patient not taking: Reported on 3/10/2023   ferrous sulfate 325 (65 Fe) mg tablet  Self Yes No   Sig: Take 325 mg by mouth daily    insulin aspart protamine-insulin aspart (NovoLOG 70/30) 100 Units/mL SUPN  Self No No   Sig: Inject 10 Units under the skin 3 (three) times a day with meals   Patient not taking: Reported on 3/10/2023   lisinopril-hydrochlorothiazide (PRINZIDE,ZESTORETIC) 20-12.5 MG per tablet  Self Yes No   Sig: TK 1 T PO QD   Patient not taking: Reported on 3/10/2023   metFORMIN (GLUCOPHAGE) 1000 MG tablet  Self Yes No   Sig: Take 1,000 mg by mouth 2 (two) times a day     Patient not taking: Reported on 3/10/2023   sertraline (ZOLOFT) 100 mg tablet  Self Yes No   Sig: Take 100 mg by mouth daily at bedtime     sitaGLIPtin (JANUVIA) 50 mg tablet  Self Yes No   Sig: Take 50 mg by mouth daily   Patient not taking: Reported on 3/10/2023   valACYclovir (VALTREX) 500 mg tablet  Self Yes No   Sig: TK 1 T PO QD   Patient not taking: Reported on 3/10/2023      Facility-Administered Medications: None       Past Medical History:   Diagnosis Date   • Bipolar depression ([address])    • Cancer ([address])    • Depression    • Diabetes mellitus ([address])    • PTSD (post-traumatic stress disorder)    • Seizures ([address])        Past Surgical History:   Procedure Laterality Date   • CHOLECYSTECTOMY     • HYSTERECTOMY     • IMPLANTABLE CONTACT LENS IMPLANTATION     • [place] CRANIEC TREPHINE BONE FLP BRAIN TUMOR SUPRTENTOR Right 3/15/2018    Procedure: IMAGE-GUIDED RIGHT FRONTOPARIETAL CRANIOTOMY FOR TUMOR;  Surgeon: Nora Rivers MD;  Location: [facility];  Service: Neurosurgery       Family History   Adopted: Yes     I have reviewed and agree with the history as documented. E-Cigarette/Vaping   • E-Cigarette Use Never User      E-Cigarette/Vaping Substances   • Nicotine No    • THC No    • CBD No    • Flavoring No    • Other No    • Unknown No      Social History     Tobacco Use   • Smoking status: Former     Types: Cigarettes     Quit date: 3/5/2000     Years since quittin.3   • Smokeless tobacco: Never   Vaping Use   • Vaping Use: Never used   Substance Use Topics   • Alcohol use: No   • Drug use: No       Review of Systems   Constitutional: Negative for fever. Gastrointestinal: Negative for nausea.    Neurological: Negative for weakness and numbness. Physical Exam  Physical Exam  Vitals and nursing note reviewed. Constitutional:       General: She is not in acute distress. Appearance: She is well-developed. She is not ill-appearing or diaphoretic. HENT:      Head: Normocephalic and atraumatic. Eyes:      Conjunctiva/sclera: Conjunctivae normal.      Pupils: Pupils are equal, round, and reactive to light. Cardiovascular:      Rate and Rhythm: Normal rate and regular rhythm. Pulses: Normal pulses. Heart sounds: No murmur heard. Pulmonary:      Effort: Pulmonary effort is normal. No respiratory distress. Abdominal:      Palpations: Abdomen is soft. Musculoskeletal:         General: Swelling present. Normal range of motion. Cervical back: Normal range of motion and neck supple. Comments: Paronychia with fluctuance of the radial aspect right ring finger. Mild edema, erythema of radial aspect of finger pad. Full ROM. No FB. No lymphangitis or edema. Skin:     General: Skin is warm and dry. Capillary Refill: Capillary refill takes less than 2 seconds. Findings: Erythema present. No rash. Neurological:      General: No focal deficit present. Mental Status: She is alert and oriented to person, place, and time. Mental status is at baseline. Cranial Nerves: No cranial nerve deficit. Coordination: Coordination normal.      Deep Tendon Reflexes: Reflexes are normal and symmetric.    Psychiatric:         Mood and Affect: Mood normal.         Behavior: Behavior normal.         Vital Signs  ED Triage Vitals [07/16/23 1727]   Temperature Pulse Respirations Blood Pressure SpO2   98 °F (36.7 °C) 79 18 (!) 184/78 97 %      Temp src Heart Rate Source Patient Position - Orthostatic VS BP Location FiO2 (%)   -- -- -- -- --      Pain Score       --           Vitals:    07/16/23 1727   BP: (!) 184/78   Pulse: 79         Visual Acuity      ED Medications  Medications   cephalexin (KEFLEX) capsule 500 mg (500 mg Oral Given 7/16/23 1810)       Diagnostic Studies  Results Reviewed     Procedure Component Value Units Date/Time    Wound culture and Gram stain [[id number]]  (Abnormal) Collected: 07/16/23 1815    Lab Status: Preliminary result Specimen: Wound from Finger, Right Updated: 07/17/23 1403     Gram Stain Result No polys seen      1+ Gram positive cocci in pairs and chains                 No orders to display              Procedures  Incision and drain    Date/Time: 7/16/2023 5:53 PM    Performed by: Judie Trivedi DO  Authorized by: Judie Trivedi DO  Universal Protocol:  Consent: Verbal consent obtained. Risks and benefits: risks, benefits and alternatives were discussed  Consent given by: patient  Time out: Immediately prior to procedure a "time out" was called to verify the correct patient, procedure, equipment, support staff and site/side marked as required. Patient understanding: patient states understanding of the procedure being performed  Patient consent: the patient's understanding of the procedure matches consent given  Procedure consent: procedure consent matches procedure scheduled  Required items: required blood products, implants, devices, and special equipment available  Patient identity confirmed: verbally with patient      Patient location:  ED  Location:     Indications for incision and drainage: Paronychia. Size:  0.5    Location:  Upper extremity    Upper extremity location:  R ring finger  Pre-procedure details:     Skin preparation:  Chloraprep  Anesthesia (see MAR for exact dosages): Anesthesia method:  Topical application  Procedure details:     Complexity:  Simple    Needle aspiration: no      Incision types:  Single straight    Scalpel blade:  15    Approach:  Open    Incision depth:  Subcutaneous    Drainage:  Purulent    Drainage amount: Moderate    Wound treatment:  Wound left open  Post-procedure details:     Patient tolerance of procedure:   Tolerated well, no immediate complications             ED Course                               SBIRT 22yo+    Flowsheet Row Most Recent Value   Initial Alcohol Screen: US AUDIT-C     1. How often do you have a drink containing alcohol? 0 Filed at: 07/16/2023 1729   2. How many drinks containing alcohol do you have on a typical day you are drinking? 0 Filed at: 07/16/2023 1729   3a. Male UNDER 65: How often do you have five or more drinks on one occasion? 0 Filed at: 07/16/2023 1729   3b. FEMALE Any Age, or MALE 65+: How often do you have 4 or more drinks on one occassion? 0 Filed at: 07/16/2023 1729   Audit-C Score 0 Filed at: 07/16/2023 1729   SELENE: How many times in the past year have you. .. Used an illegal drug or used a prescription medication for non-medical reasons? Never Filed at: 07/16/2023 1729                    Medical Decision Making  Paronychia of right ring finger incised and drained of moderate amount of purulent debris. Culture and GS sent to lab. Keflex started. Paronychia of finger of right hand: acute illness or injury  Amount and/or Complexity of Data Reviewed  Labs: ordered. Risk  Prescription drug management. Disposition  Final diagnoses:   Paronychia of finger of right hand     Time reflects when diagnosis was documented in both MDM as applicable and the Disposition within this note     Time User Action Codes Description Comment    7/16/2023  6:07 PM Terri Powells Add [L03.011] Paronychia of finger of right hand       ED Disposition     ED Disposition   Discharge    Condition   Stable    Date/Time   Sun Jul 16, 2023  6:07 PM    Comment   Jose Eduardo Cottrell discharge to home/self care.                Follow-up Information     Follow up With Specialties Details Why Contact Info    [facility]  Call  As needed, to find a local -[phone]            Discharge Medication List as of 7/16/2023  6:10 PM      START taking these medications    Details   cephalexin (KEFLEX) 500 mg capsule Take 1 capsule (500 mg total) by mouth every 6 (six) hours for 5 days, Starting Sun 7/16/2023, Until Fri 7/21/2023, Normal         CONTINUE these medications which have NOT CHANGED    Details   Ascorbic Acid (VITAMIN C) 1000 MG tablet Take 1,000 mg by mouth daily, Historical Med      !! BD PEN NEEDLE BON U/F 32G X 4 MM MISC TEST TID UTD, Historical Med      cholecalciferol (VITAMIN D3) 1,000 units tablet Take 1,000 Units by mouth daily, Historical Med      CHROMIUM PO Take by mouth 2 (two) times a day, Historical Med      ezetimibe (ZETIA) 10 mg tablet TK 1 T PO QD, Historical Med      ferrous sulfate 325 (65 Fe) mg tablet Take 325 mg by mouth daily , Historical Med      insulin aspart protamine-insulin aspart (NovoLOG 70/30) 100 Units/mL SUPN Inject 10 Units under the skin 3 (three) times a day with meals, Starting Mon 3/19/2018, Print      !! Insulin Pen Needle 29G X 12.7MM MISC Use as directed with novolog flexpen to administer insulin with meals and use with lantus solostar pen to administer insulin in the morning, Print      L-LYSINE PO Take 500 mg by mouth daily, Historical Med      lisinopril-hydrochlorothiazide (PRINZIDE,ZESTORETIC) 20-12.5 MG per tablet TK 1 T PO QD, Historical Med      Magnesium 250 MG TABS Take by mouth daily, Historical Med      metFORMIN (GLUCOPHAGE) 1000 MG tablet Take 1,000 mg by mouth 2 (two) times a day  , Historical Med      NON FORMULARY 3 (three) times a day Bon Silver - 3 tablespoons a day, Historical Med      Probiotic Product (PROBIOTIC DAILY PO) Take 1 capsule by mouth daily, Historical Med      sertraline (ZOLOFT) 100 mg tablet Take 100 mg by mouth daily at bedtime  , Historical Med      sitaGLIPtin (JANUVIA) 50 mg tablet Take 50 mg by mouth daily, Historical Med      valACYclovir (VALTREX) 500 mg tablet TK 1 T PO QD, Historical Med      Zinc 50 MG TABS Take by mouth daily, Historical Med       !! - Potential duplicate medications found. Please discuss with provider.           No discharge procedures on file.     PDMP Review     None          ED Provider  Electronically Signed by           Amanda Prince DO  07/17/23 4913

## 2023-07-16 NOTE — DISCHARGE INSTRUCTIONS
Take the antibiotic as prescribed. You can start this tomorrow. Wash finger with soap and water at least daily. You can soak in warm water for 15 minutes several times a day to help the drainage to continue. We will call you if antibiotic needs to be changed to cover the organism that we culture. Patient follow-up with your doctor or return if problem arises.

## 2023-07-20 LAB
BACTERIA WND AEROBE CULT: ABNORMAL
BACTERIA WND AEROBE CULT: ABNORMAL
GRAM STN SPEC: ABNORMAL
GRAM STN SPEC: ABNORMAL

## 2024-01-02 ENCOUNTER — HOSPITAL ENCOUNTER (INPATIENT)
Facility: HOSPITAL | Age: 64
LOS: 1 days | Discharge: HOME | DRG: 101 | End: 2024-01-04
Attending: EMERGENCY MEDICINE | Admitting: INTERNAL MEDICINE
Payer: MEDICARE

## 2024-01-02 ENCOUNTER — APPOINTMENT (EMERGENCY)
Dept: CT IMAGING | Facility: HOSPITAL | Age: 64
DRG: 101 | End: 2024-01-02
Payer: MEDICARE

## 2024-01-02 DIAGNOSIS — Z79.4 TYPE 2 DIABETES MELLITUS WITH COMPLICATION, WITH LONG-TERM CURRENT USE OF INSULIN (HCC): ICD-10-CM

## 2024-01-02 DIAGNOSIS — G40.909 SEIZURE DISORDER (HCC): Primary | ICD-10-CM

## 2024-01-02 DIAGNOSIS — E11.8 TYPE 2 DIABETES MELLITUS WITH COMPLICATION, WITH LONG-TERM CURRENT USE OF INSULIN (HCC): ICD-10-CM

## 2024-01-02 DIAGNOSIS — I10 BENIGN ESSENTIAL HTN: ICD-10-CM

## 2024-01-02 LAB
ALBUMIN SERPL BCP-MCNC: 3.5 G/DL (ref 3.5–5)
ALP SERPL-CCNC: 135 U/L (ref 34–104)
ALT SERPL W P-5'-P-CCNC: 23 U/L (ref 7–52)
ANION GAP SERPL CALCULATED.3IONS-SCNC: 10 MMOL/L
AST SERPL W P-5'-P-CCNC: 30 U/L (ref 13–39)
BASE EX.OXY STD BLDV CALC-SCNC: 92.7 % (ref 60–80)
BASE EXCESS BLDV CALC-SCNC: -1.5 MMOL/L
BASOPHILS # BLD AUTO: 0.01 THOUSANDS/ÂΜL (ref 0–0.1)
BASOPHILS NFR BLD AUTO: 0 % (ref 0–1)
BETA-HYDROXYBUTYRATE: 0.2 MMOL/L
BILIRUB SERPL-MCNC: 0.57 MG/DL (ref 0.2–1)
BUN SERPL-MCNC: 39 MG/DL (ref 5–25)
CALCIUM SERPL-MCNC: 8.9 MG/DL (ref 8.4–10.2)
CHLORIDE SERPL-SCNC: 91 MMOL/L (ref 96–108)
CO2 SERPL-SCNC: 23 MMOL/L (ref 21–32)
CREAT SERPL-MCNC: 1.54 MG/DL (ref 0.6–1.3)
EOSINOPHIL # BLD AUTO: 0.06 THOUSAND/ÂΜL (ref 0–0.61)
EOSINOPHIL NFR BLD AUTO: 1 % (ref 0–6)
ERYTHROCYTE [DISTWIDTH] IN BLOOD BY AUTOMATED COUNT: 12 % (ref 11.6–15.1)
GFR SERPL CREATININE-BSD FRML MDRD: 35 ML/MIN/1.73SQ M
GLUCOSE SERPL-MCNC: 707 MG/DL (ref 65–140)
GLUCOSE SERPL-MCNC: >500 MG/DL (ref 65–140)
HCO3 BLDV-SCNC: 21.5 MMOL/L (ref 24–30)
HCT VFR BLD AUTO: 35 % (ref 34.8–46.1)
HGB BLD-MCNC: 12.5 G/DL (ref 11.5–15.4)
IMM GRANULOCYTES # BLD AUTO: 0.03 THOUSAND/UL (ref 0–0.2)
IMM GRANULOCYTES NFR BLD AUTO: 1 % (ref 0–2)
LACTATE SERPL-SCNC: 1.8 MMOL/L (ref 0.5–2)
LYMPHOCYTES # BLD AUTO: 1.1 THOUSANDS/ÂΜL (ref 0.6–4.47)
LYMPHOCYTES NFR BLD AUTO: 18 % (ref 14–44)
MCH RBC QN AUTO: 29.3 PG (ref 26.8–34.3)
MCHC RBC AUTO-ENTMCNC: 35.7 G/DL (ref 31.4–37.4)
MCV RBC AUTO: 82 FL (ref 82–98)
MONOCYTES # BLD AUTO: 0.74 THOUSAND/ÂΜL (ref 0.17–1.22)
MONOCYTES NFR BLD AUTO: 12 % (ref 4–12)
NEUTROPHILS # BLD AUTO: 4.36 THOUSANDS/ÂΜL (ref 1.85–7.62)
NEUTS SEG NFR BLD AUTO: 68 % (ref 43–75)
NRBC BLD AUTO-RTO: 0 /100 WBCS
O2 CT BLDV-SCNC: 18.1 ML/DL
PCO2 BLDV: 31.6 MM HG (ref 42–50)
PH BLDV: 7.45 [PH] (ref 7.3–7.4)
PLATELET # BLD AUTO: 117 THOUSANDS/UL (ref 149–390)
PMV BLD AUTO: 12.3 FL (ref 8.9–12.7)
PO2 BLDV: 67.7 MM HG (ref 35–45)
POTASSIUM SERPL-SCNC: 4 MMOL/L (ref 3.5–5.3)
PROT SERPL-MCNC: 6.7 G/DL (ref 6.4–8.4)
RBC # BLD AUTO: 4.26 MILLION/UL (ref 3.81–5.12)
SODIUM SERPL-SCNC: 124 MMOL/L (ref 135–147)
WBC # BLD AUTO: 6.3 THOUSAND/UL (ref 4.31–10.16)

## 2024-01-02 PROCEDURE — 36415 COLL VENOUS BLD VENIPUNCTURE: CPT | Performed by: EMERGENCY MEDICINE

## 2024-01-02 PROCEDURE — 96361 HYDRATE IV INFUSION ADD-ON: CPT

## 2024-01-02 PROCEDURE — 85025 COMPLETE CBC W/AUTO DIFF WBC: CPT | Performed by: EMERGENCY MEDICINE

## 2024-01-02 PROCEDURE — 82948 REAGENT STRIP/BLOOD GLUCOSE: CPT

## 2024-01-02 PROCEDURE — 93005 ELECTROCARDIOGRAM TRACING: CPT

## 2024-01-02 PROCEDURE — 70450 CT HEAD/BRAIN W/O DYE: CPT

## 2024-01-02 PROCEDURE — 96375 TX/PRO/DX INJ NEW DRUG ADDON: CPT

## 2024-01-02 PROCEDURE — 83605 ASSAY OF LACTIC ACID: CPT | Performed by: EMERGENCY MEDICINE

## 2024-01-02 PROCEDURE — 99285 EMERGENCY DEPT VISIT HI MDM: CPT

## 2024-01-02 PROCEDURE — 82010 KETONE BODYS QUAN: CPT | Performed by: EMERGENCY MEDICINE

## 2024-01-02 PROCEDURE — 80053 COMPREHEN METABOLIC PANEL: CPT | Performed by: EMERGENCY MEDICINE

## 2024-01-02 PROCEDURE — 82805 BLOOD GASES W/O2 SATURATION: CPT | Performed by: EMERGENCY MEDICINE

## 2024-01-02 PROCEDURE — 96374 THER/PROPH/DIAG INJ IV PUSH: CPT

## 2024-01-02 RX ORDER — LORAZEPAM 2 MG/ML
2 INJECTION INTRAMUSCULAR ONCE
Status: COMPLETED | OUTPATIENT
Start: 2024-01-02 | End: 2024-01-02

## 2024-01-02 RX ORDER — LORAZEPAM 2 MG/ML
INJECTION INTRAMUSCULAR
Status: COMPLETED
Start: 2024-01-02 | End: 2024-01-02

## 2024-01-02 RX ADMIN — SODIUM CHLORIDE 1000 ML: 0.9 INJECTION, SOLUTION INTRAVENOUS at 23:42

## 2024-01-02 RX ADMIN — INSULIN HUMAN 5 UNITS: 100 INJECTION, SOLUTION PARENTERAL at 23:41

## 2024-01-02 RX ADMIN — LORAZEPAM 2 MG: 2 INJECTION INTRAMUSCULAR; INTRAVENOUS at 22:33

## 2024-01-02 RX ADMIN — LORAZEPAM 2 MG: 2 INJECTION INTRAMUSCULAR at 22:33

## 2024-01-03 PROBLEM — E87.1 HYPONATREMIA: Status: ACTIVE | Noted: 2024-01-03

## 2024-01-03 LAB
ANION GAP SERPL CALCULATED.3IONS-SCNC: 7 MMOL/L
BASOPHILS # BLD AUTO: 0.01 THOUSANDS/ÂΜL (ref 0–0.1)
BASOPHILS NFR BLD AUTO: 0 % (ref 0–1)
BUN SERPL-MCNC: 32 MG/DL (ref 5–25)
CALCIUM SERPL-MCNC: 8.5 MG/DL (ref 8.4–10.2)
CHLORIDE SERPL-SCNC: 101 MMOL/L (ref 96–108)
CO2 SERPL-SCNC: 25 MMOL/L (ref 21–32)
CREAT SERPL-MCNC: 1.24 MG/DL (ref 0.6–1.3)
EOSINOPHIL # BLD AUTO: 0.06 THOUSAND/ÂΜL (ref 0–0.61)
EOSINOPHIL NFR BLD AUTO: 1 % (ref 0–6)
ERYTHROCYTE [DISTWIDTH] IN BLOOD BY AUTOMATED COUNT: 11.9 % (ref 11.6–15.1)
EST. AVERAGE GLUCOSE BLD GHB EST-MCNC: 338 MG/DL
GFR SERPL CREATININE-BSD FRML MDRD: 46 ML/MIN/1.73SQ M
GLUCOSE P FAST SERPL-MCNC: 406 MG/DL (ref 65–99)
GLUCOSE SERPL-MCNC: 292 MG/DL (ref 65–140)
GLUCOSE SERPL-MCNC: 350 MG/DL (ref 65–140)
GLUCOSE SERPL-MCNC: 364 MG/DL (ref 65–140)
GLUCOSE SERPL-MCNC: 400 MG/DL (ref 65–140)
GLUCOSE SERPL-MCNC: 406 MG/DL (ref 65–140)
GLUCOSE SERPL-MCNC: 476 MG/DL (ref 65–140)
HBA1C MFR BLD: 13.4 %
HCT VFR BLD AUTO: 32.1 % (ref 34.8–46.1)
HGB BLD-MCNC: 11.3 G/DL (ref 11.5–15.4)
IMM GRANULOCYTES # BLD AUTO: 0.05 THOUSAND/UL (ref 0–0.2)
IMM GRANULOCYTES NFR BLD AUTO: 1 % (ref 0–2)
LYMPHOCYTES # BLD AUTO: 1.1 THOUSANDS/ÂΜL (ref 0.6–4.47)
LYMPHOCYTES NFR BLD AUTO: 17 % (ref 14–44)
MCH RBC QN AUTO: 29.5 PG (ref 26.8–34.3)
MCHC RBC AUTO-ENTMCNC: 35.2 G/DL (ref 31.4–37.4)
MCV RBC AUTO: 84 FL (ref 82–98)
MONOCYTES # BLD AUTO: 0.7 THOUSAND/ÂΜL (ref 0.17–1.22)
MONOCYTES NFR BLD AUTO: 11 % (ref 4–12)
NEUTROPHILS # BLD AUTO: 4.53 THOUSANDS/ÂΜL (ref 1.85–7.62)
NEUTS SEG NFR BLD AUTO: 70 % (ref 43–75)
NRBC BLD AUTO-RTO: 0 /100 WBCS
PLATELET # BLD AUTO: 112 THOUSANDS/UL (ref 149–390)
PMV BLD AUTO: 11.7 FL (ref 8.9–12.7)
POTASSIUM SERPL-SCNC: 3.5 MMOL/L (ref 3.5–5.3)
RBC # BLD AUTO: 3.83 MILLION/UL (ref 3.81–5.12)
SODIUM SERPL-SCNC: 133 MMOL/L (ref 135–147)
TSH SERPL DL<=0.05 MIU/L-ACNC: 2.08 UIU/ML (ref 0.45–4.5)
WBC # BLD AUTO: 6.45 THOUSAND/UL (ref 4.31–10.16)

## 2024-01-03 PROCEDURE — 82948 REAGENT STRIP/BLOOD GLUCOSE: CPT

## 2024-01-03 PROCEDURE — 99285 EMERGENCY DEPT VISIT HI MDM: CPT | Performed by: STUDENT IN AN ORGANIZED HEALTH CARE EDUCATION/TRAINING PROGRAM

## 2024-01-03 PROCEDURE — 99223 1ST HOSP IP/OBS HIGH 75: CPT | Performed by: INTERNAL MEDICINE

## 2024-01-03 PROCEDURE — 83036 HEMOGLOBIN GLYCOSYLATED A1C: CPT | Performed by: INTERNAL MEDICINE

## 2024-01-03 PROCEDURE — 85025 COMPLETE CBC W/AUTO DIFF WBC: CPT

## 2024-01-03 PROCEDURE — C9254 INJECTION, LACOSAMIDE: HCPCS

## 2024-01-03 PROCEDURE — 84443 ASSAY THYROID STIM HORMONE: CPT

## 2024-01-03 PROCEDURE — 36415 COLL VENOUS BLD VENIPUNCTURE: CPT

## 2024-01-03 PROCEDURE — 80048 BASIC METABOLIC PNL TOTAL CA: CPT

## 2024-01-03 RX ORDER — LORAZEPAM 2 MG/ML
2 INJECTION INTRAMUSCULAR ONCE AS NEEDED
Status: DISCONTINUED | OUTPATIENT
Start: 2024-01-03 | End: 2024-01-04 | Stop reason: HOSPADM

## 2024-01-03 RX ORDER — INSULIN GLARGINE 100 [IU]/ML
10 INJECTION, SOLUTION SUBCUTANEOUS EVERY MORNING
Status: DISCONTINUED | OUTPATIENT
Start: 2024-01-03 | End: 2024-01-03

## 2024-01-03 RX ORDER — LACOSAMIDE 100 MG/1
200 TABLET ORAL EVERY 12 HOURS SCHEDULED
Status: DISCONTINUED | OUTPATIENT
Start: 2024-01-03 | End: 2024-01-03

## 2024-01-03 RX ORDER — INSULIN ASPART 100 [IU]/ML
10 INJECTION, SUSPENSION SUBCUTANEOUS
Status: DISCONTINUED | OUTPATIENT
Start: 2024-01-03 | End: 2024-01-04

## 2024-01-03 RX ORDER — INSULIN LISPRO 100 [IU]/ML
1-6 INJECTION, SOLUTION INTRAVENOUS; SUBCUTANEOUS
Status: DISCONTINUED | OUTPATIENT
Start: 2024-01-03 | End: 2024-01-04 | Stop reason: HOSPADM

## 2024-01-03 RX ORDER — SERTRALINE HYDROCHLORIDE 100 MG/1
100 TABLET, FILM COATED ORAL
Status: DISCONTINUED | OUTPATIENT
Start: 2024-01-03 | End: 2024-01-04 | Stop reason: HOSPADM

## 2024-01-03 RX ORDER — FERROUS SULFATE 325(65) MG
325 TABLET ORAL DAILY
Status: DISCONTINUED | OUTPATIENT
Start: 2024-01-03 | End: 2024-01-04 | Stop reason: HOSPADM

## 2024-01-03 RX ORDER — ACETAMINOPHEN 325 MG/1
650 TABLET ORAL EVERY 6 HOURS PRN
Status: DISCONTINUED | OUTPATIENT
Start: 2024-01-03 | End: 2024-01-04 | Stop reason: HOSPADM

## 2024-01-03 RX ORDER — ENOXAPARIN SODIUM 100 MG/ML
40 INJECTION SUBCUTANEOUS DAILY
Status: DISCONTINUED | OUTPATIENT
Start: 2024-01-03 | End: 2024-01-04 | Stop reason: HOSPADM

## 2024-01-03 RX ORDER — LISINOPRIL 10 MG/1
10 TABLET ORAL DAILY
Status: DISCONTINUED | OUTPATIENT
Start: 2024-01-03 | End: 2024-01-04 | Stop reason: HOSPADM

## 2024-01-03 RX ADMIN — INSULIN GLARGINE 10 UNITS: 100 INJECTION, SOLUTION SUBCUTANEOUS at 08:55

## 2024-01-03 RX ADMIN — LISINOPRIL 10 MG: 10 TABLET ORAL at 13:11

## 2024-01-03 RX ADMIN — INSULIN LISPRO 6 UNITS: 100 INJECTION, SOLUTION INTRAVENOUS; SUBCUTANEOUS at 01:56

## 2024-01-03 RX ADMIN — INSULIN LISPRO 4 UNITS: 100 INJECTION, SOLUTION INTRAVENOUS; SUBCUTANEOUS at 11:46

## 2024-01-03 RX ADMIN — FERROUS SULFATE TAB 325 MG (65 MG ELEMENTAL FE) 325 MG: 325 (65 FE) TAB at 08:55

## 2024-01-03 RX ADMIN — LACOSAMIDE 300 MG: 10 INJECTION INTRAVENOUS at 00:49

## 2024-01-03 RX ADMIN — INSULIN LISPRO 6 UNITS: 100 INJECTION, SOLUTION INTRAVENOUS; SUBCUTANEOUS at 06:16

## 2024-01-03 RX ADMIN — INSULIN LISPRO 6 UNITS: 100 INJECTION, SOLUTION INTRAVENOUS; SUBCUTANEOUS at 21:14

## 2024-01-03 RX ADMIN — INSULIN LISPRO 6 UNITS: 100 INJECTION, SOLUTION INTRAVENOUS; SUBCUTANEOUS at 18:09

## 2024-01-03 RX ADMIN — INSULIN ASPART 10 UNITS: 100 INJECTION, SUSPENSION SUBCUTANEOUS at 18:09

## 2024-01-03 RX ADMIN — LACOSAMIDE 150 MG: 100 TABLET, FILM COATED ORAL at 21:14

## 2024-01-03 RX ADMIN — ENOXAPARIN SODIUM 40 MG: 40 INJECTION SUBCUTANEOUS at 08:55

## 2024-01-03 NOTE — ASSESSMENT & PLAN NOTE
BP stable on admission  Per chart review has not been taking outpatient lisinopril-HCTZ  Monitor BP per unit protocol

## 2024-01-03 NOTE — H&P
[facility]  H&P  Name: Nathalia Monge 63 y.o. female I MRN: [id number]  Unit/Bed#: FT 02 I Date of Admission: 1/2/2024   Date of Service: 1/3/2024 I Hospital Day: 0      Assessment/Plan   * Partial idiopathic epilepsy with seizures of localized onset, intractable, without status epilepticus (HCC)  Assessment & Plan  Patient presents to the ED for four separate tonic-clonic seizures today. Patient has a known history of partial complex seizures and reports that she typically has one seizure per day. Per chart review, patient was seen by a Neurologist for anti-epileptic medications but never followed through and started on the medication. Per ED provider she had one additional tonic clonic seizure in the ED and was given IV ativan for this. She currently denies any fevers/chills, dizziness, weakness, numbness/tingling, or other acute symptom/complaint at this time.    /76 (BP Location: Right arm)   Pulse 83   Temp 98.5 °F (36.9 °C) (Oral)   Resp 22   SpO2 95%     CT head: No acute intracranial hemorrhage, midline shift, or mass effect. Encephalomalacia at the right frontal vertex at the location of prior meningioma resection   Non focal neuro exam  AAO x 4  Spoke to ED, Neurology recommending to start on Vimpat 200mg BID  Seizure precautions  PRN IV ativan 2mg for seizure >1 minute  Check TSH  Elevated glucose, see treatment below  No evidence of infection at this time  Telemetry monitoring  Consult to Neurology, recommendations are appreciated    Type 2 diabetes mellitus with hyperglycemia, with long-term current use of insulin (HCC)  Assessment & Plan    Lab Results   Component Value Date    HGBA1C 5.7 (H) 10/01/2020     Glucose 707 on ED presentation  Not currently in DKA  Given 5U Humalog and 1L IVF bolus in the ED  Repeat   To be given additional SSI dose and 1L IVF bolus on admission  Diabetic diet  Hypoglycemia protocol    Hyponatremia  Assessment & Plan  Sodium 124 on  admission  Glucose corrected to 134  Treat hyperglycemia as above  Daily BMP    Benign essential HTN  Assessment & Plan  BP stable on admission  Per chart review has not been taking outpatient lisinopril-HCTZ  Monitor BP per unit protocol         VTE Pharmacologic Prophylaxis: VTE Score: 3 Moderate Risk (Score 3-4) - Pharmacological DVT Prophylaxis Ordered: enoxaparin (Lovenox).  Code Status: Level 1 - Full Code   Discussion with family: Updated  () at bedside.    Anticipated Length of Stay: Patient will be admitted on an observation basis with an anticipated length of stay of less than 2 midnights secondary to seizure.      Chief Complaint: Seizure    History of Present Illness:  Nathalia Monge is a 63 y.o. female with a PMH of hypertension, seizures, diabetes. Patient presents to the ED for four separate tonic-clonic seizures today. Patient has a known history of partial complex seizures and reports that she typically has one seizure per day. Per chart review, patient was seen by a Neurologist for anti-epileptic medications but never followed through and started on the medication. She denies any fevers/chills, dizziness, weakness, numbness/tingling. Patient requiring medical admission for multiple breakthrough seizures and Neurology consult. All patient questions answered to the best of my ability.     Review of Systems:  Review of Systems   Constitutional:  Negative for chills and fever.   HENT:  Negative for ear pain and sore throat.    Eyes:  Negative for pain and visual disturbance.   Respiratory:  Negative for cough and shortness of breath.    Cardiovascular:  Negative for chest pain and palpitations.   Gastrointestinal:  Negative for abdominal pain and vomiting.   Genitourinary:  Negative for dysuria and hematuria.   Musculoskeletal:  Negative for arthralgias and back pain.   Skin:  Negative for color change and rash.   Neurological:  Positive for seizures. Negative for syncope.   All other  systems reviewed and are negative.      Past Medical and Surgical History:   Past Medical History:   Diagnosis Date    Bipolar depression (HCC)     Cancer (HCC)     Depression     Diabetes mellitus (HCC)     PTSD (post-traumatic stress disorder) 2002    Seizures (HCC)        Past Surgical History:   Procedure Laterality Date    CHOLECYSTECTOMY  2004    HYSTERECTOMY      IMPLANTABLE CONTACT LENS IMPLANTATION      [place] CRANIEC TREPHINE BONE FLP BRAIN TUMOR SUPRTENTOR Right 3/15/2018    Procedure: IMAGE-GUIDED RIGHT FRONTOPARIETAL CRANIOTOMY FOR TUMOR;  Surgeon: Arsh Jeronimo MD;  Location: [facility];  Service: Neurosurgery       Meds/Allergies:  Prior to Admission medications    Medication Sig Start Date End Date Taking? Authorizing Provider   Ascorbic Acid (VITAMIN C) 1000 MG tablet Take 1,000 mg by mouth daily    Historical Provider, MD   BD PEN NEEDLE BON U/F 32G X 4 MM MISC TEST TID UTD  Patient not taking: Reported on 3/10/2023 5/29/18   Historical Provider, MD   cholecalciferol (VITAMIN D3) 1,000 units tablet Take 1,000 Units by mouth daily    Historical Provider, MD   CHROMIUM PO Take by mouth 2 (two) times a day    Historical Provider, MD   ezetimibe (ZETIA) 10 mg tablet TK 1 T PO QD  Patient not taking: Reported on 3/10/2023 12/12/18   Historical Provider, MD   ferrous sulfate 325 (65 Fe) mg tablet Take 325 mg by mouth daily     Historical Provider, MD   insulin aspart protamine-insulin aspart (NovoLOG 70/30) 100 Units/mL SUPN Inject 10 Units under the skin 3 (three) times a day with meals  Patient not taking: Reported on 3/10/2023 3/19/18   Argelia Veronica PA-C   Insulin Pen Needle 29G X 12.7MM MISC Use as directed with novolog flexpen to administer insulin with meals and use with lantus solostar pen to administer insulin in the morning  Patient not taking: Reported on 3/10/2023 3/19/18   Argelia Veronica PA-C   L-LYSINE PO Take 500 mg by mouth daily    Historical Provider, MD    lisinopril-hydrochlorothiazide (PRINZIDE,ZESTORETIC) 20-12.5 MG per tablet TK 1 T PO QD  Patient not taking: Reported on 3/10/2023 6/1/18   Historical Provider, MD   Magnesium 250 MG TABS Take by mouth daily    Historical Provider, MD   metFORMIN (GLUCOPHAGE) 1000 MG tablet Take 1,000 mg by mouth 2 (two) times a day    Patient not taking: Reported on 3/10/2023    Historical Provider, MD   NON FORMULARY 3 (three) times a day Yolis Silver - 3 tablespoons a day    Historical Provider, MD   Probiotic Product (PROBIOTIC DAILY PO) Take 1 capsule by mouth daily    Historical Provider, MD   sertraline (ZOLOFT) 100 mg tablet Take 100 mg by mouth daily at bedtime      Historical Provider, MD   sitaGLIPtin (JANUVIA) 50 mg tablet Take 50 mg by mouth daily  Patient not taking: Reported on 3/10/2023    Historical Provider, MD   valACYclovir (VALTREX) 500 mg tablet TK 1 T PO QD  Patient not taking: Reported on 3/10/2023 8/9/18   Historical Provider, MD   Zinc 50 MG TABS Take by mouth daily    Historical Provider, MD     I have reviewed home medications using recent Epic encounter.    Allergies:   Allergies   Allergen Reactions    Dilantin [Phenytoin]     Lidocaine     Statins        Social History:  Marital Status: Significant Other   Occupation: NA  Patient Pre-hospital Living Situation: Home  Patient Pre-hospital Level of Mobility: walks  Patient Pre-hospital Diet Restrictions: Diabetic  Substance Use History:   Social History     Substance and Sexual Activity   Alcohol Use No     Social History     Tobacco Use   Smoking Status Former    Current packs/day: 0.00    Types: Cigarettes    Quit date: 3/5/2000    Years since quittin.8   Smokeless Tobacco Never     Social History     Substance and Sexual Activity   Drug Use No       Family History:  Family History   Adopted: Yes       Physical Exam:     Vitals:   Blood Pressure: 123/66 (24 0130)  Pulse: 79 (24 0130)  Temperature: 98.5 °F (36.9 °C) (24  2222)  Temp Source: Oral (01/03/24 0000)  Respirations: 18 (01/03/24 0130)  SpO2: 96 % (01/03/24 0130)    Physical Exam  Vitals and nursing note reviewed.   Constitutional:       General: She is not in acute distress.     Appearance: She is well-developed.   HENT:      Head: Normocephalic and atraumatic.   Eyes:      Conjunctiva/sclera: Conjunctivae normal.   Cardiovascular:      Rate and Rhythm: Normal rate and regular rhythm.      Heart sounds: No murmur heard.  Pulmonary:      Effort: Pulmonary effort is normal. No respiratory distress.      Breath sounds: Normal breath sounds.   Abdominal:      Palpations: Abdomen is soft.      Tenderness: There is no abdominal tenderness.   Musculoskeletal:         General: No swelling.      Cervical back: Neck supple.   Skin:     General: Skin is warm and dry.      Capillary Refill: Capillary refill takes less than 2 seconds.   Neurological:      Mental Status: She is alert. Mental status is at baseline.   Psychiatric:         Mood and Affect: Mood normal.          Additional Data:     Lab Results:  Results from last 7 days   Lab Units 01/02/24  2225   WBC Thousand/uL 6.30   HEMOGLOBIN g/dL 12.5   HEMATOCRIT % 35.0   PLATELETS Thousands/uL 117*   NEUTROS PCT % 68   LYMPHS PCT % 18   MONOS PCT % 12   EOS PCT % 1     Results from last 7 days   Lab Units 01/02/24  2225   SODIUM mmol/L 124*   POTASSIUM mmol/L 4.0   CHLORIDE mmol/L 91*   CO2 mmol/L 23   BUN mg/dL 39*   CREATININE mg/dL 1.54*   ANION GAP mmol/L 10   CALCIUM mg/dL 8.9   ALBUMIN g/dL 3.5   TOTAL BILIRUBIN mg/dL 0.57   ALK PHOS U/L 135*   ALT U/L 23   AST U/L 30   GLUCOSE RANDOM mg/dL 707*         Results from last 7 days   Lab Units 01/03/24  0134 01/02/24  2221   POC GLUCOSE mg/dl 476* >500*         Results from last 7 days   Lab Units 01/02/24  2225   LACTIC ACID mmol/L 1.8       Lines/Drains:  Invasive Devices       Peripheral Intravenous Line  Duration             Peripheral IV 01/02/24 Left Antecubital <1 day               Drain  Duration             External Urinary Catheter <1 day                        Imaging: Reviewed radiology reports from this admission including: CT head  CT head without contrast   Final Result by Praful Monk MD (01/03 0016)      No acute intracranial hemorrhage, midline shift, or mass effect. Encephalomalacia at the right frontal vertex at the location of prior meningioma resection.                  Workstation performed: [id number]             EKG and Other Studies Reviewed on Admission:   EKG: NSR. HR 82.    ** Please Note: This note has been constructed using a voice recognition system. **

## 2024-01-03 NOTE — CONSULTS
"Consultation - Neurology   Nathalia Monge 63 y.o. female MRN: [id number]  Unit/Bed#: FT 02 Encounter: [id number]      Assessment/Plan     * Partial idiopathic epilepsy with seizures of localized onset, intractable, without status epilepticus (HCC)  Assessment & Plan  63 y.o. female with 50+ year history of intractable focal epilepsy due to unknown cause manifest as focal aware motor seizures involving the right face as well as distant GTC seizures (self discontinued AEDs in 2019), right frontal parafalcine meningioma s/p embolization/resection in 2018, bipolar depression, PTSD, and DM2 (not compliant with insulin) who presented to [facility] on 1/2/2023 due to 4 reported seizures.    First seizure-like event on 12/31 described as full body shaking, but no loss of consciousness.  The next 2 seizures (1/1 and 1/2) described as left hand clenching, left arm becoming rigid, and then left arm shaking x1 minute.  No other limbs were shaking during those events and no LOC.  She had a 4th \"tonic clonic\" seizure in the ED s/p ativan 2 mg and Vimpat 300 mg.    Upon arrival to the ED, vitals stable.    CT head noted encephalomalacia at the right frontal vertex at the location of prior meningioma resection, but no acute intracranial abnormalities.    Labs revealed glucose 707, sodium 124, and respiratory alkalosis.     Etiology for left arm shaking possibly breakthrough focal motor seizure in the setting of medication non-compliance, R frontal encephalomalacia, hyperglycemia, and hyponatremia.  The full body shaking may have been due to metabolic disturbances.  However, cannot fully rule out non-epileptic event. (Patient states she never loses consciousness with her GTC seizures.  Of note, patient has been under increased stress.)    Plan:  - Will defer MRI brain at this time due to normal neuro exam and prior MRI brain w/wo contrast on 2/27/2023 which was stable.  - Routine EEG pending  If patient has further seizure like activity, " low threshold to transfer to [facility] for vEEG monitoring  - Patient received Vimpat 300 mg once  - Start Vimpat 150 mg BID  Patient stopped taking AEDs back in 2019  - Administer Ativan prn seizure-like activity    - Continue telemetry  - Continue seizure precautions   - Patient does not have an active 's license  - Discussed seizure precautions  - Medical management and supportive care per primary team.  Correction of any metabolic or infectious disturbances.  Glucose >700 on arrival; management per primary team  Slowly correct sodium  - Frequent neuro checks.  Continue to monitor and notify Neurology with any changes.    Type 2 diabetes mellitus with hyperglycemia, with long-term current use of insulin ([facility], located within [facility])  Assessment & Plan  - Glucose 707 on arrival  - Patient has not been compliant with insulin or other diabetic medications  - Insulin management per primary team          Nathalia Monge will need follow up in 6-8 weeks with epilepsy attending. She will not require outpatient neurological testing.    History of Present Illness     Reason for Consult / Principal Problem: seizure activity   Hx and PE limited by: N/A  HPI: Nathalia Monge is a 63 y.o. 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    History obtained per patient, patient's , and chart review.  On Sunday, 12/31, patient woke up at 1 AM to use the restroom.  She felt cold and then her whole body began shaking.  No loss of consciousness during the shaking episode and she was able to speak to her , however speech was stuttering.  Episode lasted for 1 minute.  She slept the whole day.    On Monday, 1/1, she was in the  "bathroom around 9-10 pm washing up when her whole body froze, her left hand clenched, the left arm became rigid, and then the left arm began shaking.  No loss of consciousness.  Shaking lasted for 1 minute.  No tongue bite or urinary incontinence, however she was fatigued.    On Tuesday, , at 9:30 pm, patient was getting ready for bed in the bathroom when her  heard her yell out for help.  She was sitting on the bathtub edge with left arm shaking.  No other shaking noted.  Patient presented to the ED after this event.    Upon arrival to the ED, vitals stable.  CT head noted encephalomalacia at the right frontal vertex at the location of prior meningioma resection, but no acute intracranial abnormalities.  Labs revealed glucose 707, sodium 124, and respiratory alkalosis.  Patient reportedly had another tonic-clonic seizure in the ED, so was given Ativan 2 mg and started on Vimpat 300 mg BID.    On exam, patient is lying comfortably in bed in no acute distress.  She states that she has \" seizures per day\" described as right facial twitching.  She began having seizures in childhood that consisted of right facial twitching and rare GTC.  However after the meningioma was discovered in 2018, she began having left arm rigidity followed by shaking.  She never loses consciousness.  She stopped taking her AEDs in 2019, stating \"they were not helping.\"  She has tried several AEDs without improvement in seizures including Keppra, Dilantin, Depakote, and Vimpat.    She has been under increased stress recently.  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  Recently, her cat was sick and almost .  Denies any alcohol or illicit drug use.  She does have a history of narcotic abuse, but has been clean for several years.  She is not compliant with her insulin/other medications, only taking vitamins.    Prior neuro history:  Patient previously saw Dr. Villaseñor on 2019 in the " outpatient epilepsy clinic.  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  At that time, she had recently developed left facial twitching which prompted an increase in Vimpat dose to 200 mg BID.  These new events had not been confirmed as seizure.  Typically, she has right facial motor seizures, improved after the meningioma surgery.  It was recommended that patient get EEG, but it does not appear that this was ever completed.  She did not follow-up with a neurologist after this visit in 2019.  She is no longer taking her AEDs.    Inpatient consult to Neurology  Consult performed by: Faye Tompkins PA-C  Consult ordered by: Kaiden Syed PA-C          Review of Systems   Neurological:  Positive for seizures.   All other systems reviewed and are negative.      Historical Information   Past Medical History:   Diagnosis Date    Bipolar depression (HCC)     Cancer (HCC)     Depression     Diabetes mellitus (HCC)     PTSD (post-traumatic stress disorder) 2002    Seizures (HCC)      Past Surgical History:   Procedure Laterality Date    CHOLECYSTECTOMY  2004    HYSTERECTOMY      IMPLANTABLE CONTACT LENS IMPLANTATION      ME CRANIEC TREPHINE BONE FLP BRAIN TUMOR SUPRTENTOR Right 3/15/2018    Procedure: IMAGE-GUIDED RIGHT FRONTOPARIETAL CRANIOTOMY FOR TUMOR;  Surgeon: Arsh Jeronimo MD;  Location: [facility];  Service: Neurosurgery     Social History   Social History     Substance and Sexual Activity   Alcohol Use No     Social History     Substance and Sexual Activity   Drug Use No     E-Cigarette/Vaping    E-Cigarette Use Never User      E-Cigarette/Vaping Substances    Nicotine No     THC No     CBD No     Flavoring No     Other No     Unknown No      Social History     Tobacco Use   Smoking Status Former    Current packs/day: 0.00    Types: Cigarettes    Quit  date: 3/5/2000    Years since quittin.8   Smokeless Tobacco Never     Family History:   Family History   Adopted: Yes       Review of previous medical records was completed. Reviewed prior notes, labs, CT head, prior MRI brain    Meds/Allergies   all current active meds have been reviewed, current meds:   Current Facility-Administered Medications   Medication Dose Route Frequency    acetaminophen (TYLENOL) tablet 650 mg  650 mg Oral Q6H PRN    enoxaparin (LOVENOX) subcutaneous injection 40 mg  40 mg Subcutaneous Daily    ferrous sulfate tablet 325 mg  325 mg Oral Daily    insulin glargine (LANTUS) subcutaneous injection 10 Units 0.1 mL  10 Units Subcutaneous QAM    insulin lispro (HumaLOG) 100 units/mL subcutaneous injection 1-6 Units  1-6 Units Subcutaneous TID AC    insulin lispro (HumaLOG) 100 units/mL subcutaneous injection 1-6 Units  1-6 Units Subcutaneous HS    lacosamide (VIMPAT) tablet 200 mg  200 mg Oral Q12H DANTE    LORazepam (ATIVAN) injection 2 mg  2 mg Intravenous Once PRN    sertraline (ZOLOFT) tablet 100 mg  100 mg Oral HS   , and PTA meds:   Prior to Admission Medications   Prescriptions Last Dose Informant Patient Reported? Taking?   Ascorbic Acid (VITAMIN C) 1000 MG tablet  Self Yes No   Sig: Take 1,000 mg by mouth daily   BD PEN NEEDLE BON U/F 32G X 4 MM MISC  Self Yes No   Sig: TEST TID UTD   Patient not taking: Reported on 3/10/2023   CHROMIUM PO  Self Yes No   Sig: Take by mouth 2 (two) times a day   Insulin Pen Needle 29G X 12.7MM MISC  Self No No   Sig: Use as directed with novolog flexpen to administer insulin with meals and use with lantus solostar pen to administer insulin in the morning   Patient not taking: Reported on 3/10/2023   L-LYSINE PO  Self Yes No   Sig: Take 500 mg by mouth daily   Magnesium 250 MG TABS  Self Yes No   Sig: Take by mouth daily   NON FORMULARY  Self Yes No   Sig: 3 (three) times a day Bon Silver - 3 tablespoons a day   Probiotic Product (PROBIOTIC DAILY PO)   Self Yes No   Sig: Take 1 capsule by mouth daily   Zinc 50 MG TABS  Self Yes No   Sig: Take by mouth daily   cholecalciferol (VITAMIN D3) 1,000 units tablet  Self Yes No   Sig: Take 1,000 Units by mouth daily   ezetimibe (ZETIA) 10 mg tablet  Self Yes No   Sig: TK 1 T PO QD   Patient not taking: Reported on 3/10/2023   ferrous sulfate 325 (65 Fe) mg tablet  Self Yes No   Sig: Take 325 mg by mouth daily    insulin aspart protamine-insulin aspart (NovoLOG 70/30) 100 Units/mL SUPN  Self No No   Sig: Inject 10 Units under the skin 3 (three) times a day with meals   Patient not taking: Reported on 3/10/2023   lisinopril-hydrochlorothiazide (PRINZIDE,ZESTORETIC) 20-12.5 MG per tablet  Self Yes No   Sig: TK 1 T PO QD   Patient not taking: Reported on 3/10/2023   metFORMIN (GLUCOPHAGE) 1000 MG tablet  Self Yes No   Sig: Take 1,000 mg by mouth 2 (two) times a day     Patient not taking: Reported on 3/10/2023   sertraline (ZOLOFT) 100 mg tablet  Self Yes No   Sig: Take 100 mg by mouth daily at bedtime     sitaGLIPtin (JANUVIA) 50 mg tablet  Self Yes No   Sig: Take 50 mg by mouth daily   Patient not taking: Reported on 3/10/2023   valACYclovir (VALTREX) 500 mg tablet  Self Yes No   Sig: TK 1 T PO QD   Patient not taking: Reported on 3/10/2023      Facility-Administered Medications: None       Allergies   Allergen Reactions    Dilantin [Phenytoin]     Lidocaine     Statins        Objective   Vitals:Blood pressure 161/81, pulse 79, temperature 98.5 °F (36.9 °C), temperature source Oral, resp. rate 16, SpO2 99%.,There is no height or weight on file to calculate BMI.    Intake/Output Summary (Last 24 hours) at 1/3/2024 0852  Last data filed at 1/3/2024 0158  Gross per 24 hour   Intake 100 ml   Output --   Net 100 ml       Invasive Devices:   Invasive Devices       Peripheral Intravenous Line  Duration             Peripheral IV 01/02/24 Left Antecubital <1 day              Drain  Duration             External Urinary Catheter <1  day                    Physical Exam  Vitals and nursing note reviewed.   Constitutional:       Appearance: Normal appearance.      Comments: Patient lying comfortably in bed in no acute distress   HENT:      Head: Normocephalic and atraumatic.      Mouth/Throat:      Mouth: Mucous membranes are moist.      Pharynx: Oropharynx is clear.      Comments: No tongue trauma  Eyes:      Extraocular Movements: Extraocular movements intact.      Conjunctiva/sclera: Conjunctivae normal.      Pupils: Pupils are equal, round, and reactive to light.   Pulmonary:      Effort: Pulmonary effort is normal.   Musculoskeletal:         General: Normal range of motion.   Skin:     General: Skin is warm and dry.   Neurological:      General: No focal deficit present.      Mental Status: She is alert and oriented to person, place, and time.      Deep Tendon Reflexes:      Reflex Scores:       Bicep reflexes are 2+ on the right side and 2+ on the left side.       Brachioradialis reflexes are 2+ on the right side and 2+ on the left side.       Patellar reflexes are 2+ on the right side and 3+ (cross adductor reflex) on the left side.     Comments: See full neuro exam below       Neurologic Exam     Mental Status   Oriented to person, place, and time.   Patient awake and alert.  Oriented to person, place, month, and year.  No dysarthria or aphasia.  Able to follow simple midline and appendicular commands.     Cranial Nerves     CN III, IV, VI   Pupils are equal, round, and reactive to light.  Pupils 3 mm, round, reactive to light bilaterally.  EOMs intact without nystagmus.  No visual field deficits.  Facial sensation to light touch intact throughout.  Facial expressions full and symmetric.  Tongue midline.  Hearing grossly intact.     Motor Exam   Muscle bulk: normal  Overall muscle tone: normal  Right arm pronator drift: absent  Left arm pronator drift: absent  5/5 strength in bilateral upper and lower extremities.     Sensory Exam  "  Sensation to light touch, temperature, and vibration intact throughout.     Gait, Coordination, and Reflexes     Gait  Gait: (deferred for patient's safety)    Reflexes   Right brachioradialis: 2+  Left brachioradialis: 2+  Right biceps: 2+  Left biceps: 2+  Right patellar: 2+  Left patellar: 3+ (cross adductor reflex)  No ataxia with finger to nose or heel to shin bilaterally  No resting or action tremor  No ankle clonus bilaterally       Lab Results: I have personally reviewed pertinent reports.  , CBC:   Results from last 7 days   Lab Units 01/03/24  0420 01/02/24  2225   WBC Thousand/uL 6.45 6.30   RBC Million/uL 3.83 4.26   HEMOGLOBIN g/dL 11.3* 12.5   HEMATOCRIT % 32.1* 35.0   MCV fL 84 82   PLATELETS Thousands/uL 112* 117*   , BMP/CMP:   Results from last 7 days   Lab Units 01/03/24  0420 01/02/24  2225   SODIUM mmol/L 133* 124*   POTASSIUM mmol/L 3.5 4.0   CHLORIDE mmol/L 101 91*   CO2 mmol/L 25 23   BUN mg/dL 32* 39*   CREATININE mg/dL 1.24 1.54*   CALCIUM mg/dL 8.5 8.9   AST U/L  --  30   ALT U/L  --  23   ALK PHOS U/L  --  135*   EGFR ml/min/1.73sq m 46 35   , Vitamin B12:   , HgBA1C:   , TSH:   Results from last 7 days   Lab Units 01/03/24  0420   TSH 3RD GENERATON uIU/mL 2.083   , Coagulation:   , Lipid Profile:   , Ammonia:   , Urinalysis:       Invalid input(s): \"URIBILINOGEN\", Drug Screen:   , Medication Drug Levels:       Invalid input(s): \"CARBAMAZEPINE\", \"LACOSAMIDE\", \"OXCARBAZEPINE\"  Imaging Studies: I have personally reviewed pertinent reports.   and I have personally reviewed pertinent films in PACS  EKG, Pathology, and Other Studies: I have personally reviewed pertinent reports.   and I have personally reviewed pertinent films in PACS  VTE Prophylaxis: Sequential compression device (Venodyne)  and Enoxaparin (Lovenox)    Code Status: Level 1 - Full Code  Advance Directive and Living Will:      Power of :    POLST:          "

## 2024-01-03 NOTE — ASSESSMENT & PLAN NOTE
Patient presents to the ED for four separate tonic-clonic seizures today. Patient has a known history of partial complex seizures and reports that she typically has one seizure per day. Per chart review, patient was seen by a Neurologist for anti-epileptic medications but never followed through and started on the medication. Per ED provider she had one additional tonic clonic seizure in the ED and was given IV ativan for this. She currently denies any fevers/chills, dizziness, weakness, numbness/tingling, or other acute symptom/complaint at this time.    /76 (BP Location: Right arm)   Pulse 83   Temp 98.5 °F (36.9 °C) (Oral)   Resp 22   SpO2 95%     CT head: No acute intracranial hemorrhage, midline shift, or mass effect. Encephalomalacia at the right frontal vertex at the location of prior meningioma resection   Non focal neuro exam  AAO x 4  Spoke to ED, Neurology recommending to start on Vimpat 200mg BID  Seizure precautions  PRN IV ativan 2mg for seizure >1 minute  Check TSH  Elevated glucose, see treatment below  No evidence of infection at this time  Telemetry monitoring  Consult to Neurology, recommendations are appreciated

## 2024-01-03 NOTE — ASSESSMENT & PLAN NOTE
- Glucose 707 on arrival  - Patient has not been compliant with insulin or other diabetic medications  - Insulin management per primary team

## 2024-01-03 NOTE — ASSESSMENT & PLAN NOTE
"63 y.o. female with 50+ year history of intractable focal epilepsy due to unknown cause manifest as focal aware motor seizures involving the right face as well as distant GTC seizures (self discontinued AEDs in 2019), right frontal parafalcine meningioma s/p embolization/resection in 2018, bipolar depression, PTSD, and DM2 (not compliant with insulin) who presented to [facility] on 1/2/2023 due to 4 reported seizures.    First seizure-like event on 12/31 described as full body shaking, but no loss of consciousness.  The next 2 seizures (1/1 and 1/2) described as left hand clenching, left arm becoming rigid, and then left arm shaking x1 minute.  No other limbs were shaking during those events and no LOC.  She had a 4th \"tonic clonic\" seizure in the ED s/p ativan 2 mg and Vimpat 300 mg.    Upon arrival to the ED, vitals stable.    CT head noted encephalomalacia at the right frontal vertex at the location of prior meningioma resection, but no acute intracranial abnormalities.    Labs revealed glucose 707, sodium 124, and respiratory alkalosis.     Etiology for left arm shaking possibly breakthrough focal motor seizure in the setting of medication non-compliance, R frontal encephalomalacia, hyperglycemia, and hyponatremia.  The full body shaking may have been due to metabolic disturbances.  However, cannot fully rule out non-epileptic event. (Patient states she never loses consciousness with her GTC seizures.  Of note, patient has been under increased stress.)    Plan:  - Will defer MRI brain at this time due to normal neuro exam and prior MRI brain w/wo contrast on 2/27/2023 which was stable.  - Routine EEG pending  If patient has further seizure like activity, low threshold to transfer to [facility] for vEEG monitoring  - Patient received Vimpat 300 mg once  - Start Vimpat 150 mg BID  Patient stopped taking AEDs back in 2019  - Administer Ativan prn seizure-like activity    - Continue telemetry  - Continue seizure precautions "   - Patient does not have an active 's license  - Discussed seizure precautions  - Medical management and supportive care per primary team.  Correction of any metabolic or infectious disturbances.  Glucose >700 on arrival; management per primary team  Slowly correct sodium  - Frequent neuro checks.  Continue to monitor and notify Neurology with any changes.

## 2024-01-03 NOTE — ASSESSMENT & PLAN NOTE
Lab Results   Component Value Date    HGBA1C 5.7 (H) 10/01/2020     Glucose 707 on ED presentation  Not currently in DKA  Given 5U Humalog and 1L IVF bolus in the ED  Repeat   To be given additional SSI dose and 1L IVF bolus on admission  Diabetic diet  Hypoglycemia protocol

## 2024-01-04 ENCOUNTER — APPOINTMENT (INPATIENT)
Dept: NEUROLOGY | Facility: HOSPITAL | Age: 64
DRG: 101 | End: 2024-01-04
Attending: INTERNAL MEDICINE
Payer: MEDICARE

## 2024-01-04 VITALS
TEMPERATURE: 98.3 F | DIASTOLIC BLOOD PRESSURE: 87 MMHG | WEIGHT: 181.44 LBS | RESPIRATION RATE: 17 BRPM | BODY MASS INDEX: 30.19 KG/M2 | OXYGEN SATURATION: 97 % | SYSTOLIC BLOOD PRESSURE: 163 MMHG | HEART RATE: 88 BPM

## 2024-01-04 PROBLEM — G40.909 SEIZURE DISORDER (HCC): Status: ACTIVE | Noted: 2024-01-04

## 2024-01-04 LAB
ANION GAP SERPL CALCULATED.3IONS-SCNC: 8 MMOL/L
BUN SERPL-MCNC: 20 MG/DL (ref 5–25)
CALCIUM SERPL-MCNC: 9.2 MG/DL (ref 8.4–10.2)
CHLORIDE SERPL-SCNC: 102 MMOL/L (ref 96–108)
CO2 SERPL-SCNC: 25 MMOL/L (ref 21–32)
CREAT SERPL-MCNC: 1.01 MG/DL (ref 0.6–1.3)
ERYTHROCYTE [DISTWIDTH] IN BLOOD BY AUTOMATED COUNT: 12 % (ref 11.6–15.1)
GFR SERPL CREATININE-BSD FRML MDRD: 59 ML/MIN/1.73SQ M
GLUCOSE SERPL-MCNC: 211 MG/DL (ref 65–140)
GLUCOSE SERPL-MCNC: 218 MG/DL (ref 65–140)
GLUCOSE SERPL-MCNC: 293 MG/DL (ref 65–140)
GLUCOSE SERPL-MCNC: 306 MG/DL (ref 65–140)
HCT VFR BLD AUTO: 32.3 % (ref 34.8–46.1)
HGB BLD-MCNC: 11.5 G/DL (ref 11.5–15.4)
MAGNESIUM SERPL-MCNC: 1.7 MG/DL (ref 1.9–2.7)
MCH RBC QN AUTO: 29.7 PG (ref 26.8–34.3)
MCHC RBC AUTO-ENTMCNC: 35.6 G/DL (ref 31.4–37.4)
MCV RBC AUTO: 84 FL (ref 82–98)
PLATELET # BLD AUTO: 161 THOUSANDS/UL (ref 149–390)
PMV BLD AUTO: 10.8 FL (ref 8.9–12.7)
POTASSIUM SERPL-SCNC: 3.2 MMOL/L (ref 3.5–5.3)
RBC # BLD AUTO: 3.87 MILLION/UL (ref 3.81–5.12)
SODIUM SERPL-SCNC: 135 MMOL/L (ref 135–147)
WBC # BLD AUTO: 9.94 THOUSAND/UL (ref 4.31–10.16)

## 2024-01-04 PROCEDURE — 99239 HOSP IP/OBS DSCHRG MGMT >30: CPT | Performed by: INTERNAL MEDICINE

## 2024-01-04 PROCEDURE — 85027 COMPLETE CBC AUTOMATED: CPT | Performed by: INTERNAL MEDICINE

## 2024-01-04 PROCEDURE — 80048 BASIC METABOLIC PNL TOTAL CA: CPT | Performed by: INTERNAL MEDICINE

## 2024-01-04 PROCEDURE — 95816 EEG AWAKE AND DROWSY: CPT

## 2024-01-04 PROCEDURE — 83735 ASSAY OF MAGNESIUM: CPT | Performed by: INTERNAL MEDICINE

## 2024-01-04 PROCEDURE — 82948 REAGENT STRIP/BLOOD GLUCOSE: CPT

## 2024-01-04 PROCEDURE — 95816 EEG AWAKE AND DROWSY: CPT | Performed by: PSYCHIATRY & NEUROLOGY

## 2024-01-04 RX ORDER — GLUCOSAMINE HCL/CHONDROITIN SU 500-400 MG
CAPSULE ORAL
Qty: 200 EACH | Refills: 0 | Status: SHIPPED | OUTPATIENT
Start: 2024-01-04

## 2024-01-04 RX ORDER — LISINOPRIL 20 MG/1
20 TABLET ORAL DAILY
Qty: 30 TABLET | Refills: 0 | Status: SHIPPED | OUTPATIENT
Start: 2024-01-05

## 2024-01-04 RX ORDER — BLOOD-GLUCOSE METER
KIT MISCELLANEOUS
Qty: 1 KIT | Refills: 0 | Status: SHIPPED | OUTPATIENT
Start: 2024-01-04

## 2024-01-04 RX ORDER — INSULIN ASPART 100 [IU]/ML
15 INJECTION, SUSPENSION SUBCUTANEOUS
Status: DISCONTINUED | OUTPATIENT
Start: 2024-01-04 | End: 2024-01-04 | Stop reason: HOSPADM

## 2024-01-04 RX ORDER — LANCETS 33 GAUGE
EACH MISCELLANEOUS
Qty: 200 EACH | Refills: 0 | Status: SHIPPED | OUTPATIENT
Start: 2024-01-04

## 2024-01-04 RX ORDER — BLOOD SUGAR DIAGNOSTIC
STRIP MISCELLANEOUS
Qty: 200 EACH | Refills: 0 | Status: SHIPPED | OUTPATIENT
Start: 2024-01-04

## 2024-01-04 RX ORDER — LACOSAMIDE 150 MG/1
150 TABLET ORAL EVERY 12 HOURS SCHEDULED
Qty: 30 TABLET | Refills: 0 | Status: SHIPPED | OUTPATIENT
Start: 2024-01-04 | End: 2024-01-05 | Stop reason: SDUPTHER

## 2024-01-04 RX ORDER — PEN NEEDLE, DIABETIC 32GX 5/32"
NEEDLE, DISPOSABLE MISCELLANEOUS
Qty: 100 EACH | Refills: 0 | Status: SHIPPED | OUTPATIENT
Start: 2024-01-04

## 2024-01-04 RX ADMIN — LISINOPRIL 10 MG: 10 TABLET ORAL at 08:56

## 2024-01-04 RX ADMIN — INSULIN ASPART 10 UNITS: 100 INJECTION, SUSPENSION SUBCUTANEOUS at 08:56

## 2024-01-04 RX ADMIN — INSULIN LISPRO 2 UNITS: 100 INJECTION, SOLUTION INTRAVENOUS; SUBCUTANEOUS at 08:56

## 2024-01-04 RX ADMIN — INSULIN LISPRO 4 UNITS: 100 INJECTION, SOLUTION INTRAVENOUS; SUBCUTANEOUS at 12:26

## 2024-01-04 RX ADMIN — FERROUS SULFATE TAB 325 MG (65 MG ELEMENTAL FE) 325 MG: 325 (65 FE) TAB at 08:56

## 2024-01-04 RX ADMIN — LACOSAMIDE 150 MG: 100 TABLET, FILM COATED ORAL at 08:56

## 2024-01-04 RX ADMIN — ENOXAPARIN SODIUM 40 MG: 40 INJECTION SUBCUTANEOUS at 08:56

## 2024-01-04 NOTE — ASSESSMENT & PLAN NOTE
Known history of partial seizures; noncompliant with any antiepileptics as an outpatient  Presents after multiple seizures  EEG negative  Seen by neurology: Recommend continuing Vimpat 150 mg twice daily on discharge.  As far as follow-up, patient states she will be following up with her previous neurologist after discharge.

## 2024-01-04 NOTE — DISCHARGE SUMMARY
[facility]  Discharge- Nathalia Monge 1960, 63 y.o. female MRN: [id number]  Unit/Bed#: -02 Encounter: [id number]  Primary Care Provider: No primary care provider on file.   Date and time admitted to hospital: 1/2/2024 10:15 PM    * Partial idiopathic epilepsy with seizures of localized onset, intractable, without status epilepticus (HCC)  Assessment & Plan  Known history of partial seizures; noncompliant with any antiepileptics as an outpatient  Presents after multiple seizures  EEG negative  Seen by neurology: Recommend continuing Vimpat 150 mg twice daily on discharge.  As far as follow-up, patient states she will be following up with her previous neurologist after discharge.    Type 2 diabetes mellitus with hyperglycemia, with long-term current use of insulin (HCC)  Assessment & Plan  Patient with known history of diabetes, noncompliant with meds as an outpatient   Presented with blood glucose greater than 700, and hemoglobin A1c of 13.4%    Patient was restarted on NovoLog 70/30 dosing; 15 units twice daily  Also provided prescriptions for pen and diabetic testing supplies on discharge  Will need close outpatient follow-up and further titration of meds  Patient does not have a PCP though stated that she will be following up with local clinic near her house.    Hyponatremia  Assessment & Plan  Pseudohyponatremia in the setting of hyperglycemia; resolved    Benign essential HTN  Assessment & Plan  Noncompliant with prior lisinopril/HCTZ  Restarted on lisinopril 20 mg daily; prescription provided on discharge      Medical Problems       Resolved Problems  Date Reviewed: 1/4/2024   None       Discharging Physician / Practitioner: Steven Bray DO  PCP: No primary care provider on file.  Admission Date:   Admission Orders (From admission, onward)       Ordered        01/03/24 1311  Inpatient Admission  Once            01/03/24 0028  Place in Observation  Once                  Spouse, Joey, picked up: prescription.   Identity was verified: Yes.             Discharge Date: 01/04/24    Consultations During Hospital Stay:  Neurology    Procedures Performed:   Routine EEG    Significant Findings / Test Results:   Negative EEG  Hemoglobin A1c 13.4%    Incidental Findings:   none     Test Results Pending at Discharge (will require follow up):   none     Outpatient Tests Requested:  none    Complications:  none    Reason for Admission: seizure    Hospital Course:   Nathalia Monge is a 63 y.o. female patient who originally presented to the hospital on 1/2/2024 for experiencing multiple seizures at home.  She does have a known history of partial complex seizures but has not been on any antiepileptic medications.  Is consulted in the emergency room and she was started on Vimpat a CT of the head was obtained which showed no acute abnormalities but did show encephalomalacia of the right frontal vertex at the location of prior meningioma resection.  She was seen by neurology inpatient and had no further episodes of seizures while hospitalized.  A routine EEG was negative.  Per neurology recommendation she will continue on Vimpat 150 mg twice daily on discharge.  Patient has elected to contact her prior neurosurgeon for neurology recommendations as an outpatient.  Deferred referral to our neurology group.  Otherwise she was treated for uncontrolled hyperglycemia.  Again secondary to noncompliance.  Hemoglobin A1c was found to be 13.4%.  She was restarted on NovoLog 70/30 for which she had been on prior.  She will continue on 15 units twice daily on discharge, prescription for her insulin and diabetic test supplies were provided on discharge.  It was explained multiple times that her medication will need to be titrated as an outpatient for optimization.  She does not have a PCP and stated that she would like to follow-up with the walk-in clinic near her house.  Otherwise patient was feeling well at the time of discharge.  All questions and concerns were addressed.  She was eager  to return home.    Please see above list of diagnoses and related plan for additional information.     Condition at Discharge: good    Discharge Day Visit / Exam:   Subjective: Patient seen and examined on the day of discharge.  She is feeling well today.  No new complaints.  No events overnight.  Vitals: Blood Pressure: 163/87 (01/04/24 1114)  Pulse: 88 (01/04/24 1114)  Temperature: 98.3 °F (36.8 °C) (01/04/24 1114)  Temp Source: Oral (01/04/24 0256)  Respirations: 17 (01/03/24 1946)  Weight - Scale: 82.3 kg (181 lb 7 oz) (01/03/24 1702)  SpO2: 97 % (01/04/24 1114)    PHYSICAL EXAM:    Vitals signs reviewed  Constitutional   Awake and cooperative. NAD.   Head/Neck   Normocephalic. Atraumatic.   HEENT   No scleral icterus. EOMI.   Heart   Regular rate and rhythm. No murmers.   Lungs   Clear to auscultation bilaterally. Respirations unlaboured.   Abdomen   Soft. Nontender. Nondistended.    Skin   Skin color normal. No rashes.   Extremities   No deformities. No peripheral edema.   Neuro   Alert and oriented. No new deficits.   Psych   Mood stable. Affect normal.       Discussion with Family: Updated  () at bedside.    Discharge instructions/Information to patient and family:   See after visit summary for information provided to patient and family.      Provisions for Follow-Up Care:  See after visit summary for information related to follow-up care and any pertinent home health orders.      Mobility at time of Discharge:   Basic Mobility Inpatient Raw Score: 23  JH-HLM Goal: 7: Walk 25 feet or more  JH-HLM Achieved: 7: Walk 25 feet or more  HLM Goal achieved. Continue to encourage appropriate mobility.     Disposition:   Home    Planned Readmission: no     Discharge Statement:  I spent 40 minutes discharging the patient. This time was spent on the day of discharge. I had direct contact with the patient on the day of discharge. Greater than 50% of the total time was spent examining patient,  answering all patient questions, arranging and discussing plan of care with patient as well as directly providing post-discharge instructions.  Additional time then spent on discharge activities.    Discharge Medications:  See after visit summary for reconciled discharge medications provided to patient and/or family.      **Please Note: This note may have been constructed using a voice recognition system**

## 2024-01-04 NOTE — ASSESSMENT & PLAN NOTE
Noncompliant with prior lisinopril/HCTZ  Restarted on lisinopril 20 mg daily; prescription provided on discharge

## 2024-01-04 NOTE — ASSESSMENT & PLAN NOTE
Patient with known history of diabetes, noncompliant with meds as an outpatient   Presented with blood glucose greater than 700, and hemoglobin A1c of 13.4%    Patient was restarted on NovoLog 70/30 dosing; 15 units twice daily  Also provided prescriptions for pen and diabetic testing supplies on discharge  Will need close outpatient follow-up and further titration of meds  Patient does not have a PCP though stated that she will be following up with local clinic near her house.

## 2024-01-04 NOTE — PLAN OF CARE
Problem: PAIN - ADULT  Goal: Verbalizes/displays adequate comfort level or baseline comfort level  Description: Interventions:  - Encourage patient to monitor pain and request assistance  - Assess pain using appropriate pain scale  - Administer analgesics based on type and severity of pain and evaluate response  - Implement non-pharmacological measures as appropriate and evaluate response  - Consider cultural and social influences on pain and pain management  - Notify physician/advanced practitioner if interventions unsuccessful or patient reports new pain  1/4/2024 1548 by Boris Aguilar RN  Outcome: Adequate for Discharge  1/4/2024 0750 by Boris Aguilar RN  Outcome: Progressing     Problem: INFECTION - ADULT  Goal: Absence or prevention of progression during hospitalization  Description: INTERVENTIONS:  - Assess and monitor for signs and symptoms of infection  - Monitor lab/diagnostic results  - Monitor all insertion sites, i.e. indwelling lines, tubes, and drains  - Monitor endotracheal if appropriate and nasal secretions for changes in amount and color  - Percy appropriate cooling/warming therapies per order  - Administer medications as ordered  - Instruct and encourage patient and family to use good hand hygiene technique  - Identify and instruct in appropriate isolation precautions for identified infection/condition  1/4/2024 1548 by Boris Aguilar RN  Outcome: Adequate for Discharge  1/4/2024 0750 by Boris Aguilar RN  Outcome: Progressing     Problem: SAFETY ADULT  Goal: Patient will remain free of falls  Description: INTERVENTIONS:  - Educate patient/family on patient safety including physical limitations  - Instruct patient to call for assistance with activity   - Consult OT/PT to assist with strengthening/mobility   - Keep Call bell within reach  - Keep bed low and locked with side rails adjusted as appropriate  - Keep care items and personal belongings within reach  - Initiate and maintain comfort  rounds  - Make Fall Risk Sign visible to staff  - Apply yellow socks and bracelet for high fall risk patients  - Consider moving patient to room near nurses station  1/4/2024 1548 by Boris Aguilar RN  Outcome: Adequate for Discharge  1/4/2024 0750 by Boris Aguilar RN  Outcome: Progressing  Goal: Maintain or return to baseline ADL function  Description: INTERVENTIONS:  -  Assess patient's ability to carry out ADLs; assess patient's baseline for ADL function and identify physical deficits which impact ability to perform ADLs (bathing, care of mouth/teeth, toileting, grooming, dressing, etc.)  - Assess/evaluate cause of self-care deficits   - Assess range of motion  - Assess patient's mobility; develop plan if impaired  - Assess patient's need for assistive devices and provide as appropriate  - Encourage maximum independence but intervene and supervise when necessary  - Involve family in performance of ADLs  - Assess for home care needs following discharge   - Consider OT consult to assist with ADL evaluation and planning for discharge  - Provide patient education as appropriate  1/4/2024 1548 by Boris Aguilar RN  Outcome: Adequate for Discharge  1/4/2024 0750 by Boris Aguilar RN  Outcome: Progressing  Goal: Maintains/Returns to pre admission functional level  Description: INTERVENTIONS:  - Perform AM-PAC 6 Click Basic Mobility/ Daily Activity assessment daily.  - Set and communicate daily mobility goal to care team and patient/family/caregiver.   - Collaborate with rehabilitation services on mobility goals if consulted  - Out of bed for toileting  - Record patient progress and toleration of activity level   1/4/2024 1548 by Boris Aguilar RN  Outcome: Adequate for Discharge  1/4/2024 0750 by Boris Aguilar RN  Outcome: Progressing     Problem: DISCHARGE PLANNING  Goal: Discharge to home or other facility with appropriate resources  Description: INTERVENTIONS:  - Identify barriers to discharge w/patient and  caregiver  - Arrange for needed discharge resources and transportation as appropriate  - Identify discharge learning needs (meds, wound care, etc.)  - Arrange for interpretive services to assist at discharge as needed  - Refer to Case Management Department for coordinating discharge planning if the patient needs post-hospital services based on physician/advanced practitioner order or complex needs related to functional status, cognitive ability, or social support system  1/4/2024 1548 by Boris Aguilar RN  Outcome: Adequate for Discharge  1/4/2024 0750 by Boris Aguilar RN  Outcome: Progressing     Problem: Knowledge Deficit  Goal: Patient/family/caregiver demonstrates understanding of disease process, treatment plan, medications, and discharge instructions  Description: Complete learning assessment and assess knowledge base.  Interventions:  - Provide teaching at level of understanding  - Provide teaching via preferred learning methods  1/4/2024 1548 by Boris Aguilar RN  Outcome: Adequate for Discharge  1/4/2024 0750 by Boris Aguilar RN  Outcome: Progressing

## 2024-01-04 NOTE — PLAN OF CARE
Problem: PAIN - ADULT  Goal: Verbalizes/displays adequate comfort level or baseline comfort level  Description: Interventions:  - Encourage patient to monitor pain and request assistance  - Assess pain using appropriate pain scale  - Administer analgesics based on type and severity of pain and evaluate response  - Implement non-pharmacological measures as appropriate and evaluate response  - Consider cultural and social influences on pain and pain management  - Notify physician/advanced practitioner if interventions unsuccessful or patient reports new pain  1/4/2024 0750 by Boris Aguilar RN  Outcome: Progressing  1/3/2024 1841 by Boris Aguilar RN  Outcome: Progressing

## 2024-01-04 NOTE — CASE MANAGEMENT
Case Management Assessment & Discharge Planning Note    Patient name Nathalia Delgado /-02 MRN [id number]  : 1960 Date 2024       Current Admission Date: 2024  Current Admission Diagnosis:Partial idiopathic epilepsy with seizures of localized onset, intractable, without status epilepticus (HCC)   Patient Active Problem List    Diagnosis Date Noted    Seizure disorder (HCC) 2024    Hyponatremia 2024    S/P craniotomy 10/21/2020    Meningioma (HCC) 2018    Benign essential HTN 2018    Urinary urgency 2018    Partial idiopathic epilepsy with seizures of localized onset, intractable, without status epilepticus (HCC)     Type 2 diabetes mellitus with hyperglycemia, with long-term current use of insulin (HCC)       LOS (days): 1  Geometric Mean LOS (GMLOS) (days): 2.7  Days to GMLOS:1.7     OBJECTIVE:    Risk of Unplanned Readmission Score: 12.93         Current admission status: Inpatient       Preferred Pharmacy:   [facility] [address] [place], [place] - [address] [facility] [address]  Phone: [phone] Fax: [phone]    Primary Care Provider: No primary care provider on file.    Primary Insurance: MEDICARE  Secondary Insurance:     ASSESSMENT:  Active Health Care Proxies    There are no active Health Care Proxies on file.                 Readmission Root Cause  30 Day Readmission: No    Patient Information  Admitted from:: Home  Mental Status: Alert  During Assessment patient was accompanied by: Spouse  Assessment information provided by:: Patient, Spouse  Primary Caregiver: Self  Support Systems: Spouse/significant other  County of Residence: [facility] do you live in?: [place], but they live in [place] and move around region frequently  Home entry access options. Select all that apply.: Stairs  Number of steps to enter home.: 3  Do the steps have railings?: Yes  Type of Current  Residence: [facility]  Living Arrangements: Lives w/ Spouse/significant other  Is patient a ?: No    Activities of Daily Living Prior to Admission  Functional Status: Independent  Completes ADLs independently?: Yes  Ambulates independently?: Yes  Does patient use assisted devices?: No  Does patient currently own DME?: No  Does patient have a history of Outpatient Therapy (PT/OT)?: No  Does the patient have a history of Short-Term Rehab?: No  Does patient have a history of HHC?: No  Does patient currently have HHC?: No         Patient Information Continued  Income Source: SSI/SSD  Does patient have prescription coverage?: Yes  Does patient receive dialysis treatments?: No  Does patient have a history of substance abuse?: No  Does patient have a history of Mental Health Diagnosis?: No    PHQ 2/9 Screening   Reviewed PHQ 2/9 Depression Screening Score?: No    Means of Transportation  Means of Transport to Appts:: Family transport      Housing Stability: Low Risk  (1/4/2024)    Housing Stability Vital Sign     Unable to Pay for Housing in the Last Year: No     Number of Places Lived in the Last Year: 1     Unstable Housing in the Last Year: No   Food Insecurity: No Food Insecurity (1/4/2024)    Hunger Vital Sign     Worried About Running Out of Food in the Last Year: Never true     Ran Out of Food in the Last Year: Never true   Transportation Needs: No Transportation Needs (1/4/2024)    PRAPARE - Transportation     Lack of Transportation (Medical): No     Lack of Transportation (Non-Medical): No   Utilities: Not At Risk (1/4/2024)    AHC Utilities     Threatened with loss of utilities: No       DISCHARGE DETAILS:    Discharge planning discussed with:: Patient and spouse at the bedside  Freedom of Choice: Yes  Comments - Freedom of Choice: FOC maintained in discussion regarding discharge planning. Patient is alert oriented and competent to make decisions. Introduced self and role, explained role of CM in  arranging services such as DME, STR, HHC, and providing community resource information. Discussed current living situation and needs at discharge. 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 CM offered HHC, STR, DME, PCP, OP CM, community resources for food or housing. Patient declined community resources- states run the non profit that provides those resources and is ok. Accepted the GoodRX and [facility] discount cards, and the  PCP list to afford her medications and establish with a primary care provider. Does not use DME.  CM contacted family/caregiver?: Yes  Were Treatment Team discharge recommendations reviewed with patient/caregiver?: Yes  Did patient/caregiver verbalize understanding of patient care needs?: Yes  Were patient/caregiver advised of the risks associated with not following Treatment Team discharge recommendations?: Yes    Contacts  Patient Contacts: Marty Bowden  Relationship to Patient:: Family  Contact Method: In Person  Phone Number: [phone]  Reason/Outcome: Continuity of Care, Emergency Contact, Discharge Planning    Requested Home Health Care         Is the patient interested in HHC at discharge?: No    DME Referral Provided  Referral made for DME?: No    Other Referral/Resources/Interventions Provided:  Financial Resources Provided: Prescription Discount Card  Interventions: PCP  Referral Comments: Gave [facility] discount card and GoodRx discount card, and  [facility]. Patient to be discharged today. CM will continue to follow for needs.    Would you like to participate in our [facility] service program?  : No - Declined    Treatment Team Recommendation: Home  Discharge Destination Plan:: Home  Transport at Discharge : Family                       Accompanied by:  Family member

## 2024-01-05 ENCOUNTER — TELEPHONE (OUTPATIENT)
Dept: NEUROLOGY | Facility: CLINIC | Age: 64
End: 2024-01-05

## 2024-01-05 ENCOUNTER — TELEPHONE (OUTPATIENT)
Dept: NEUROSURGERY | Facility: CLINIC | Age: 64
End: 2024-01-05

## 2024-01-05 ENCOUNTER — APPOINTMENT (INPATIENT)
Dept: NEUROLOGY | Facility: HOSPITAL | Age: 64
DRG: 101 | End: 2024-01-05
Attending: INTERNAL MEDICINE
Payer: MEDICARE

## 2024-01-05 DIAGNOSIS — G40.909 SEIZURE DISORDER (HCC): ICD-10-CM

## 2024-01-05 RX ORDER — LACOSAMIDE 150 MG/1
150 TABLET ORAL EVERY 12 HOURS SCHEDULED
Qty: 60 TABLET | Refills: 2 | Status: SHIPPED | OUTPATIENT
Start: 2024-01-05 | End: 2024-01-06 | Stop reason: SDUPTHER

## 2024-01-05 NOTE — TELEPHONE ENCOUNTER
HFU/SLMO/Partial idiopathic epilepsy with seizures of localized onset, intractable, without status epilepticus (HCC) / DC date 1/4/23 to home         Neurology Consult Note:Nathalia Monge will need follow up in 6-8 weeks with epilepsy attending. She will not require outpatient neurological testing.          HFU/2-21-24/Kasi/[place]/9 am

## 2024-01-05 NOTE — TELEPHONE ENCOUNTER
Goodrx cost for lacosamide is $22-35, not $300. $300 is likely the goodrx price at [facility]. There are some older medicines that might be more likely to cause side effects that could cost a bit less ($10-20), but not that much less.

## 2024-01-05 NOTE — TELEPHONE ENCOUNTER
MSW phoned patient at [phone]. MSW sent patient Good Rx discount card for [facility] for Lacosamide 150mg 60 tablets (estimated cost is $32-39). Patient is agreeable to this cost. Patient confirmed receipt of the card. Patient requesting script for Lacosamide to be sent to [facility] in [place]. Dr. Villaseñor, can you please send script?

## 2024-01-05 NOTE — TELEPHONE ENCOUNTER
Patient called to report they were seen in the ED on 1/2/23 due to multiple seizures and released on 1/4/24     Patient was scheduled for HFU in 6 weeks per neurology note    Patient stating that they fear more seizures because they do not have medicare part D to cover medications    Patient was a former patient with neurology over 3 years ago    Patient asking if their are samples they can get util their upcoming appointment or if there is a program that can help them pay for the seizure medication     Please assist     Christopher MCCORMICK # [phone]       Thank you!

## 2024-01-05 NOTE — CASE MANAGEMENT
Case Management Progress Note    Patient name Cl Monge  Location /-02 MRN [id number]  : 1960 Date 2024       LOS (days): 1  Geometric Mean LOS (GMLOS) (days): 2.7  Days to GMLOS:1.6        OBJECTIVE:        Current admission status: Inpatient  Preferred Pharmacy:   [id number] [place], [place] - [address] [facility]  [address]  [facility] [place] [address]  Phone: [phone] Fax: [phone]    [id number] [place], [place] - [address]   [facility] [address]  Phone: [phone] Fax: [phone]    Primary Care Provider: No primary care provider on file.    Primary Insurance: MEDICARE  Secondary Insurance:     PROGRESS NOTE:  Received phone call from patient, very upset about the cost of her medication ($300 with GoodRX) and that her neurology appointment isn't until February and they won't help her in the interim. Charge RN came to CM to state she got in touch with Neuro NP and are working on a solution currently. Confirmed patient's phone number and stated will call back with updates.   Belem RO and CM found discount card online indicating that [facility] in [place] was the least expensive at $34.50. [facility] who stated they needed the script electronically sent to them in order to run the savings card and determine a cost.   Called back [facility] after script was sent, and after entering in script and savings card information confirmed the cost for lacosamide 150mg PO BID x 60 tabs would be $34.50.     Called patient's listed number and left voicemail explaining information. Sent email to patient at [email or web address] with copies of SingleCare card and a note about her upcoming neurology appointment.

## 2024-01-05 NOTE — TELEPHONE ENCOUNTER
"MSW phoned patient at [phone]. MSW advised that our office does not offer samples as we do not have a license to dispense. MSW advised that the [employer] PAP unfortunately is only available for patient who have been grandfathered into the program (as there is now a generic available), but that we could give her a Good Rx discount card to get the medications cheaper. Patient stated that the hospital already did that and that it will still cost her $300 which she cannot afford.    MSW inquired why patient does not have a Medicare Part D plan. Patient stated that she had a change of address and her [employer] plan did not get converted. Patient stated that she is not eligible for a Medicare Part D plan until October.     MSW advised that this writer would want the outpatient epileptologist to review her hospital notes to confirm if she should remain on the Vimpat/Lacosamide or if he recommended something else.     Patient stated that she is so worked up from having been in the hospital for a few days, that she doesn't have any more vein access to go donate blood. Patient began shouting at this writer that she \"has no food in the house\" and that she \"pays my f*cking bills on time\" that all she can go do \"is stand on a street corner\". MSW asking patient to stop shouting and patient screamed \"I cannot, I cannot\" and hung up on this writer.     MSW discussed with Chelsea, [facility] , who agrees with this writer's recommendation to have Dr. Villaseñor confirm if Vimpat/Lacosamide is appropriate or what medication patient should be taking.       "

## 2024-01-06 RX ORDER — LACOSAMIDE 150 MG/1
150 TABLET ORAL EVERY 12 HOURS SCHEDULED
Qty: 60 TABLET | Refills: 2 | Status: SHIPPED | OUTPATIENT
Start: 2024-01-06 | End: 2024-04-05

## 2024-01-07 LAB
ATRIAL RATE: 82 BPM
P AXIS: 73 DEGREES
PR INTERVAL: 156 MS
QRS AXIS: 76 DEGREES
QRSD INTERVAL: 96 MS
QT INTERVAL: 374 MS
QTC INTERVAL: 436 MS
T WAVE AXIS: 69 DEGREES
VENTRICULAR RATE: 82 BPM

## 2024-01-08 ENCOUNTER — APPOINTMENT (EMERGENCY)
Dept: CT IMAGING | Facility: HOSPITAL | Age: 64
DRG: 871 | End: 2024-01-08
Payer: MEDICARE

## 2024-01-08 ENCOUNTER — HOSPITAL ENCOUNTER (INPATIENT)
Facility: HOSPITAL | Age: 64
LOS: 3 days | Discharge: HOME | DRG: 871 | End: 2024-01-11
Attending: EMERGENCY MEDICINE | Admitting: INTERNAL MEDICINE
Payer: MEDICARE

## 2024-01-08 ENCOUNTER — APPOINTMENT (EMERGENCY)
Dept: RADIOLOGY | Facility: HOSPITAL | Age: 64
DRG: 871 | End: 2024-01-08
Payer: MEDICARE

## 2024-01-08 DIAGNOSIS — R56.9 SEIZURE (HCC): ICD-10-CM
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
## 2024-01-08 DIAGNOSIS — E11.65 HYPERGLYCEMIA DUE TO DIABETES MELLITUS (HCC): ICD-10-CM

## 2024-01-08 DIAGNOSIS — J18.9 HAP (HOSPITAL-ACQUIRED PNEUMONIA): ICD-10-CM

## 2024-01-08 DIAGNOSIS — Z79.4 TYPE 2 DIABETES MELLITUS WITH HYPERGLYCEMIA, WITH LONG-TERM CURRENT USE OF INSULIN (HCC): ICD-10-CM

## 2024-01-08 DIAGNOSIS — E87.6 HYPOKALEMIA: ICD-10-CM

## 2024-01-08 DIAGNOSIS — A41.9 SEPSIS (HCC): ICD-10-CM

## 2024-01-08 DIAGNOSIS — R50.9 FEVER: ICD-10-CM

## 2024-01-08 DIAGNOSIS — N15.1 RENAL ABSCESS, RIGHT: ICD-10-CM

## 2024-01-08 DIAGNOSIS — R91.1 RIGHT UPPER LOBE PULMONARY NODULE: ICD-10-CM

## 2024-01-08 DIAGNOSIS — D32.9 MENINGIOMA (HCC): ICD-10-CM

## 2024-01-08 DIAGNOSIS — R82.81 PYURIA: ICD-10-CM

## 2024-01-08 DIAGNOSIS — G40.019 PARTIAL IDIOPATHIC EPILEPSY WITH SEIZURES OF LOCALIZED ONSET, INTRACTABLE, WITHOUT STATUS EPILEPTICUS (HCC): Primary | ICD-10-CM

## 2024-01-08 DIAGNOSIS — N12 PYELONEPHRITIS OF RIGHT KIDNEY: ICD-10-CM

## 2024-01-08 DIAGNOSIS — Z98.890 S/P CRANIOTOMY: ICD-10-CM

## 2024-01-08 DIAGNOSIS — E11.65 TYPE 2 DIABETES MELLITUS WITH HYPERGLYCEMIA, WITH LONG-TERM CURRENT USE OF INSULIN (HCC): ICD-10-CM

## 2024-01-08 DIAGNOSIS — E83.42 HYPOMAGNESEMIA: ICD-10-CM

## 2024-01-08 PROBLEM — I16.0 HYPERTENSIVE URGENCY: Status: ACTIVE | Noted: 2018-03-05

## 2024-01-08 LAB
2HR DELTA HS TROPONIN: 7 NG/L
4HR DELTA HS TROPONIN: 13 NG/L
ALBUMIN SERPL BCP-MCNC: 3.8 G/DL (ref 3.5–5)
ALP SERPL-CCNC: 124 U/L (ref 34–104)
ALT SERPL W P-5'-P-CCNC: 27 U/L (ref 7–52)
ANION GAP SERPL CALCULATED.3IONS-SCNC: 11 MMOL/L
APTT PPP: 30 SECONDS (ref 23–37)
AST SERPL W P-5'-P-CCNC: 23 U/L (ref 13–39)
BACTERIA UR QL AUTO: ABNORMAL /HPF
BASE EX.OXY STD BLDV CALC-SCNC: 66.5 % (ref 60–80)
BASE EXCESS BLDV CALC-SCNC: 1.6 MMOL/L
BASOPHILS # BLD AUTO: 0.04 THOUSANDS/ÂΜL (ref 0–0.1)
BASOPHILS NFR BLD AUTO: 0 % (ref 0–1)
BETA-HYDROXYBUTYRATE: 0.1 MMOL/L
BILIRUB SERPL-MCNC: 0.48 MG/DL (ref 0.2–1)
BILIRUB UR QL STRIP: NEGATIVE
BUN SERPL-MCNC: 21 MG/DL (ref 5–25)
CALCIUM SERPL-MCNC: 9 MG/DL (ref 8.4–10.2)
CARDIAC TROPONIN I PNL SERPL HS: 18 NG/L
CARDIAC TROPONIN I PNL SERPL HS: 25 NG/L
CARDIAC TROPONIN I PNL SERPL HS: 31 NG/L
CHLORIDE SERPL-SCNC: 99 MMOL/L (ref 96–108)
CK SERPL-CCNC: 25 U/L (ref 26–192)
CLARITY UR: CLEAR
CO2 SERPL-SCNC: 26 MMOL/L (ref 21–32)
COLOR UR: COLORLESS
CREAT SERPL-MCNC: 1 MG/DL (ref 0.6–1.3)
EOSINOPHIL # BLD AUTO: 0.08 THOUSAND/ÂΜL (ref 0–0.61)
EOSINOPHIL NFR BLD AUTO: 1 % (ref 0–6)
ERYTHROCYTE [DISTWIDTH] IN BLOOD BY AUTOMATED COUNT: 12.2 % (ref 11.6–15.1)
GFR SERPL CREATININE-BSD FRML MDRD: 60 ML/MIN/1.73SQ M
GLUCOSE SERPL-MCNC: 305 MG/DL (ref 65–140)
GLUCOSE SERPL-MCNC: 317 MG/DL (ref 65–140)
GLUCOSE SERPL-MCNC: 380 MG/DL (ref 65–140)
GLUCOSE SERPL-MCNC: 405 MG/DL (ref 65–140)
GLUCOSE UR STRIP-MCNC: ABNORMAL MG/DL
HCO3 BLDV-SCNC: 26 MMOL/L (ref 24–30)
HCT VFR BLD AUTO: 33.8 % (ref 34.8–46.1)
HGB BLD-MCNC: 11.3 G/DL (ref 11.5–15.4)
HGB UR QL STRIP.AUTO: ABNORMAL
IMM GRANULOCYTES # BLD AUTO: 0.19 THOUSAND/UL (ref 0–0.2)
IMM GRANULOCYTES NFR BLD AUTO: 2 % (ref 0–2)
INR PPP: 0.94 (ref 0.84–1.19)
KETONES UR STRIP-MCNC: NEGATIVE MG/DL
LACTATE SERPL-SCNC: 1.7 MMOL/L (ref 0.5–2)
LEUKOCYTE ESTERASE UR QL STRIP: ABNORMAL
LYMPHOCYTES # BLD AUTO: 1.13 THOUSANDS/ÂΜL (ref 0.6–4.47)
LYMPHOCYTES NFR BLD AUTO: 11 % (ref 14–44)
MAGNESIUM SERPL-MCNC: 1.6 MG/DL (ref 1.9–2.7)
MCH RBC QN AUTO: 29 PG (ref 26.8–34.3)
MCHC RBC AUTO-ENTMCNC: 33.4 G/DL (ref 31.4–37.4)
MCV RBC AUTO: 87 FL (ref 82–98)
MONOCYTES # BLD AUTO: 0.12 THOUSAND/ÂΜL (ref 0.17–1.22)
MONOCYTES NFR BLD AUTO: 1 % (ref 4–12)
NEUTROPHILS # BLD AUTO: 8.81 THOUSANDS/ÂΜL (ref 1.85–7.62)
NEUTS SEG NFR BLD AUTO: 85 % (ref 43–75)
NITRITE UR QL STRIP: NEGATIVE
NON-SQ EPI CELLS URNS QL MICRO: ABNORMAL /HPF
NRBC BLD AUTO-RTO: 0 /100 WBCS
O2 CT BLDV-SCNC: 10.8 ML/DL
PCO2 BLDV: 40 MM HG (ref 42–50)
PH BLDV: 7.43 [PH] (ref 7.3–7.4)
PH UR STRIP.AUTO: 6 [PH]
PLATELET # BLD AUTO: 216 THOUSANDS/UL (ref 149–390)
PLATELET # BLD AUTO: 240 THOUSANDS/UL (ref 149–390)
PMV BLD AUTO: 10.7 FL (ref 8.9–12.7)
PMV BLD AUTO: 10.9 FL (ref 8.9–12.7)
PO2 BLDV: 34 MM HG (ref 35–45)
POTASSIUM SERPL-SCNC: 3.1 MMOL/L (ref 3.5–5.3)
PROCALCITONIN SERPL-MCNC: 1.58 NG/ML
PROT SERPL-MCNC: 7.1 G/DL (ref 6.4–8.4)
PROT UR STRIP-MCNC: ABNORMAL MG/DL
PROTHROMBIN TIME: 13.1 SECONDS (ref 11.6–14.5)
RBC # BLD AUTO: 3.9 MILLION/UL (ref 3.81–5.12)
RBC #/AREA URNS AUTO: ABNORMAL /HPF
SODIUM SERPL-SCNC: 136 MMOL/L (ref 135–147)
SP GR UR STRIP.AUTO: 1.02 (ref 1–1.03)
TSH SERPL DL<=0.05 MIU/L-ACNC: 1.86 UIU/ML (ref 0.45–4.5)
UROBILINOGEN UR STRIP-ACNC: <2 MG/DL
WBC # BLD AUTO: 10.37 THOUSAND/UL (ref 4.31–10.16)
WBC #/AREA URNS AUTO: ABNORMAL /HPF

## 2024-01-08 PROCEDURE — 85049 AUTOMATED PLATELET COUNT: CPT

## 2024-01-08 PROCEDURE — 71045 X-RAY EXAM CHEST 1 VIEW: CPT

## 2024-01-08 PROCEDURE — 84443 ASSAY THYROID STIM HORMONE: CPT | Performed by: PHYSICIAN ASSISTANT

## 2024-01-08 PROCEDURE — 87086 URINE CULTURE/COLONY COUNT: CPT | Performed by: PHYSICIAN ASSISTANT

## 2024-01-08 PROCEDURE — 82550 ASSAY OF CK (CPK): CPT | Performed by: PHYSICIAN ASSISTANT

## 2024-01-08 PROCEDURE — G1004 CDSM NDSC: HCPCS

## 2024-01-08 PROCEDURE — 85025 COMPLETE CBC W/AUTO DIFF WBC: CPT | Performed by: PHYSICIAN ASSISTANT

## 2024-01-08 PROCEDURE — 96361 HYDRATE IV INFUSION ADD-ON: CPT

## 2024-01-08 PROCEDURE — 99223 1ST HOSP IP/OBS HIGH 75: CPT | Performed by: PSYCHIATRY & NEUROLOGY

## 2024-01-08 PROCEDURE — 85610 PROTHROMBIN TIME: CPT | Performed by: PHYSICIAN ASSISTANT

## 2024-01-08 PROCEDURE — 36415 COLL VENOUS BLD VENIPUNCTURE: CPT | Performed by: PHYSICIAN ASSISTANT

## 2024-01-08 PROCEDURE — 84484 ASSAY OF TROPONIN QUANT: CPT | Performed by: PHYSICIAN ASSISTANT

## 2024-01-08 PROCEDURE — 85730 THROMBOPLASTIN TIME PARTIAL: CPT | Performed by: PHYSICIAN ASSISTANT

## 2024-01-08 PROCEDURE — 99223 1ST HOSP IP/OBS HIGH 75: CPT | Performed by: UROLOGY

## 2024-01-08 PROCEDURE — 96368 THER/DIAG CONCURRENT INF: CPT

## 2024-01-08 PROCEDURE — 96365 THER/PROPH/DIAG IV INF INIT: CPT

## 2024-01-08 PROCEDURE — 99285 EMERGENCY DEPT VISIT HI MDM: CPT | Performed by: PHYSICIAN ASSISTANT

## 2024-01-08 PROCEDURE — 81001 URINALYSIS AUTO W/SCOPE: CPT | Performed by: PHYSICIAN ASSISTANT

## 2024-01-08 PROCEDURE — 74177 CT ABD & PELVIS W/CONTRAST: CPT

## 2024-01-08 PROCEDURE — 80053 COMPREHEN METABOLIC PANEL: CPT | Performed by: PHYSICIAN ASSISTANT

## 2024-01-08 PROCEDURE — 80235 DRUG ASSAY LACOSAMIDE: CPT | Performed by: PHYSICIAN ASSISTANT

## 2024-01-08 PROCEDURE — 82010 KETONE BODYS QUAN: CPT | Performed by: PHYSICIAN ASSISTANT

## 2024-01-08 PROCEDURE — 83735 ASSAY OF MAGNESIUM: CPT | Performed by: PHYSICIAN ASSISTANT

## 2024-01-08 PROCEDURE — 71260 CT THORAX DX C+: CPT

## 2024-01-08 PROCEDURE — 70450 CT HEAD/BRAIN W/O DYE: CPT

## 2024-01-08 PROCEDURE — 99223 1ST HOSP IP/OBS HIGH 75: CPT | Performed by: INTERNAL MEDICINE

## 2024-01-08 PROCEDURE — 96375 TX/PRO/DX INJ NEW DRUG ADDON: CPT

## 2024-01-08 PROCEDURE — 82948 REAGENT STRIP/BLOOD GLUCOSE: CPT

## 2024-01-08 PROCEDURE — 87040 BLOOD CULTURE FOR BACTERIA: CPT | Performed by: PHYSICIAN ASSISTANT

## 2024-01-08 PROCEDURE — 83605 ASSAY OF LACTIC ACID: CPT | Performed by: PHYSICIAN ASSISTANT

## 2024-01-08 PROCEDURE — 99285 EMERGENCY DEPT VISIT HI MDM: CPT

## 2024-01-08 PROCEDURE — 87186 SC STD MICRODIL/AGAR DIL: CPT | Performed by: PHYSICIAN ASSISTANT

## 2024-01-08 PROCEDURE — 84145 PROCALCITONIN (PCT): CPT | Performed by: PHYSICIAN ASSISTANT

## 2024-01-08 PROCEDURE — 82805 BLOOD GASES W/O2 SATURATION: CPT | Performed by: PHYSICIAN ASSISTANT

## 2024-01-08 PROCEDURE — 93005 ELECTROCARDIOGRAM TRACING: CPT

## 2024-01-08 PROCEDURE — 87154 CUL TYP ID BLD PTHGN 6+ TRGT: CPT | Performed by: PHYSICIAN ASSISTANT

## 2024-01-08 PROCEDURE — 87077 CULTURE AEROBIC IDENTIFY: CPT | Performed by: PHYSICIAN ASSISTANT

## 2024-01-08 RX ORDER — SODIUM CHLORIDE 9 MG/ML
100 INJECTION, SOLUTION INTRAVENOUS CONTINUOUS
Status: CANCELLED | OUTPATIENT
Start: 2024-01-08 | End: 2024-01-09

## 2024-01-08 RX ORDER — LISINOPRIL 20 MG/1
20 TABLET ORAL DAILY
Status: DISCONTINUED | OUTPATIENT
Start: 2024-01-08 | End: 2024-01-11 | Stop reason: HOSPADM

## 2024-01-08 RX ORDER — HEPARIN SODIUM 5000 [USP'U]/ML
5000 INJECTION, SOLUTION INTRAVENOUS; SUBCUTANEOUS EVERY 8 HOURS SCHEDULED
Status: DISCONTINUED | OUTPATIENT
Start: 2024-01-08 | End: 2024-01-11 | Stop reason: HOSPADM

## 2024-01-08 RX ORDER — LISINOPRIL 20 MG/1
20 TABLET ORAL DAILY
Status: CANCELLED | OUTPATIENT
Start: 2024-01-09

## 2024-01-08 RX ORDER — INSULIN LISPRO 100 [IU]/ML
1-6 INJECTION, SOLUTION INTRAVENOUS; SUBCUTANEOUS
Status: CANCELLED | OUTPATIENT
Start: 2024-01-09

## 2024-01-08 RX ORDER — ACETAMINOPHEN 325 MG/1
650 TABLET ORAL EVERY 6 HOURS PRN
Status: CANCELLED | OUTPATIENT
Start: 2024-01-08

## 2024-01-08 RX ORDER — MAGNESIUM SULFATE HEPTAHYDRATE 40 MG/ML
2 INJECTION, SOLUTION INTRAVENOUS ONCE
Status: COMPLETED | OUTPATIENT
Start: 2024-01-08 | End: 2024-01-08

## 2024-01-08 RX ORDER — MELATONIN
1000 DAILY
Status: CANCELLED | OUTPATIENT
Start: 2024-01-09

## 2024-01-08 RX ORDER — INSULIN LISPRO 100 [IU]/ML
5 INJECTION, SOLUTION INTRAVENOUS; SUBCUTANEOUS
Status: CANCELLED | OUTPATIENT
Start: 2024-01-09 | Stop reason: DRUGHIGH

## 2024-01-08 RX ORDER — INSULIN LISPRO 100 [IU]/ML
1-6 INJECTION, SOLUTION INTRAVENOUS; SUBCUTANEOUS
Status: DISCONTINUED | OUTPATIENT
Start: 2024-01-08 | End: 2024-01-11 | Stop reason: HOSPADM

## 2024-01-08 RX ORDER — ACETAMINOPHEN 325 MG/1
650 TABLET ORAL EVERY 6 HOURS PRN
Status: DISCONTINUED | OUTPATIENT
Start: 2024-01-08 | End: 2024-01-11 | Stop reason: HOSPADM

## 2024-01-08 RX ORDER — ASCORBIC ACID 500 MG
1000 TABLET ORAL DAILY
Status: CANCELLED | OUTPATIENT
Start: 2024-01-09

## 2024-01-08 RX ORDER — SODIUM CHLORIDE 9 MG/ML
100 INJECTION, SOLUTION INTRAVENOUS CONTINUOUS
Status: DISCONTINUED | OUTPATIENT
Start: 2024-01-08 | End: 2024-01-09

## 2024-01-08 RX ORDER — FERROUS SULFATE 325(65) MG
325 TABLET ORAL DAILY
Status: DISCONTINUED | OUTPATIENT
Start: 2024-01-08 | End: 2024-01-11 | Stop reason: HOSPADM

## 2024-01-08 RX ORDER — ENALAPRILAT 1.25 MG/ML
0.62 INJECTION INTRAVENOUS ONCE
Status: CANCELLED | OUTPATIENT
Start: 2024-01-08 | End: 2024-01-08

## 2024-01-08 RX ORDER — INSULIN GLARGINE 100 [IU]/ML
16 INJECTION, SOLUTION SUBCUTANEOUS
Status: CANCELLED | OUTPATIENT
Start: 2024-01-08 | Stop reason: DRUGHIGH

## 2024-01-08 RX ORDER — HYDRALAZINE HYDROCHLORIDE 20 MG/ML
5 INJECTION INTRAMUSCULAR; INTRAVENOUS EVERY 6 HOURS PRN
Status: CANCELLED | OUTPATIENT
Start: 2024-01-08

## 2024-01-08 RX ORDER — ACETAMINOPHEN 325 MG/1
650 TABLET ORAL ONCE
Status: COMPLETED | OUTPATIENT
Start: 2024-01-08 | End: 2024-01-08

## 2024-01-08 RX ORDER — HYDRALAZINE HYDROCHLORIDE 20 MG/ML
5 INJECTION INTRAMUSCULAR; INTRAVENOUS EVERY 6 HOURS PRN
Status: DISCONTINUED | OUTPATIENT
Start: 2024-01-08 | End: 2024-01-11 | Stop reason: HOSPADM

## 2024-01-08 RX ORDER — HEPARIN SODIUM 5000 [USP'U]/ML
5000 INJECTION, SOLUTION INTRAVENOUS; SUBCUTANEOUS EVERY 8 HOURS SCHEDULED
Status: CANCELLED | OUTPATIENT
Start: 2024-01-08

## 2024-01-08 RX ORDER — FERROUS SULFATE 325(65) MG
325 TABLET ORAL DAILY
Status: CANCELLED | OUTPATIENT
Start: 2024-01-09

## 2024-01-08 RX ORDER — ASCORBIC ACID 500 MG
1000 TABLET ORAL DAILY
Status: DISCONTINUED | OUTPATIENT
Start: 2024-01-08 | End: 2024-01-11 | Stop reason: HOSPADM

## 2024-01-08 RX ORDER — INSULIN GLARGINE 100 [IU]/ML
16 INJECTION, SOLUTION SUBCUTANEOUS
Status: DISCONTINUED | OUTPATIENT
Start: 2024-01-08 | End: 2024-01-10 | Stop reason: DRUGHIGH

## 2024-01-08 RX ORDER — POTASSIUM CHLORIDE 20 MEQ/1
40 TABLET, EXTENDED RELEASE ORAL ONCE
Status: COMPLETED | OUTPATIENT
Start: 2024-01-08 | End: 2024-01-08

## 2024-01-08 RX ORDER — MELATONIN
1000 DAILY
Status: DISCONTINUED | OUTPATIENT
Start: 2024-01-08 | End: 2024-01-11 | Stop reason: HOSPADM

## 2024-01-08 RX ORDER — INSULIN LISPRO 100 [IU]/ML
5 INJECTION, SOLUTION INTRAVENOUS; SUBCUTANEOUS
Status: DISCONTINUED | OUTPATIENT
Start: 2024-01-08 | End: 2024-01-10 | Stop reason: DRUGHIGH

## 2024-01-08 RX ORDER — IBUPROFEN 400 MG/1
400 TABLET ORAL ONCE
Status: COMPLETED | OUTPATIENT
Start: 2024-01-08 | End: 2024-01-08

## 2024-01-08 RX ADMIN — OXYCODONE HYDROCHLORIDE AND ACETAMINOPHEN 1000 MG: 500 TABLET ORAL at 12:36

## 2024-01-08 RX ADMIN — SODIUM CHLORIDE 1000 ML: 0.9 INJECTION, SOLUTION INTRAVENOUS at 02:28

## 2024-01-08 RX ADMIN — HYDRALAZINE HYDROCHLORIDE 5 MG: 20 INJECTION, SOLUTION INTRAMUSCULAR; INTRAVENOUS at 11:25

## 2024-01-08 RX ADMIN — IBUPROFEN 400 MG: 400 TABLET, FILM COATED ORAL at 04:49

## 2024-01-08 RX ADMIN — SODIUM CHLORIDE 100 ML/HR: 0.9 INJECTION, SOLUTION INTRAVENOUS at 16:45

## 2024-01-08 RX ADMIN — VANCOMYCIN HYDROCHLORIDE 1500 MG: 1 INJECTION, POWDER, LYOPHILIZED, FOR SOLUTION INTRAVENOUS at 18:35

## 2024-01-08 RX ADMIN — POTASSIUM CHLORIDE 40 MEQ: 1500 TABLET, EXTENDED RELEASE ORAL at 03:28

## 2024-01-08 RX ADMIN — INSULIN GLARGINE 16 UNITS: 100 INJECTION, SOLUTION SUBCUTANEOUS at 21:12

## 2024-01-08 RX ADMIN — SODIUM CHLORIDE 1000 ML: 0.9 INJECTION, SOLUTION INTRAVENOUS at 04:49

## 2024-01-08 RX ADMIN — CEFTRIAXONE SODIUM 1000 MG: 10 INJECTION, POWDER, FOR SOLUTION INTRAVENOUS at 03:27

## 2024-01-08 RX ADMIN — MAGNESIUM SULFATE HEPTAHYDRATE 2 G: 40 INJECTION, SOLUTION INTRAVENOUS at 03:27

## 2024-01-08 RX ADMIN — HEPARIN SODIUM 5000 UNITS: 5000 INJECTION INTRAVENOUS; SUBCUTANEOUS at 21:12

## 2024-01-08 RX ADMIN — LACOSAMIDE 150 MG: 100 TABLET, FILM COATED ORAL at 12:37

## 2024-01-08 RX ADMIN — VANCOMYCIN HYDROCHLORIDE 1750 MG: 1 INJECTION, POWDER, LYOPHILIZED, FOR SOLUTION INTRAVENOUS at 07:06

## 2024-01-08 RX ADMIN — HEPARIN SODIUM 5000 UNITS: 5000 INJECTION INTRAVENOUS; SUBCUTANEOUS at 13:00

## 2024-01-08 RX ADMIN — CEFEPIME 2000 MG: 2 INJECTION, POWDER, FOR SOLUTION INTRAVENOUS at 12:33

## 2024-01-08 RX ADMIN — INSULIN LISPRO 4 UNITS: 100 INJECTION, SOLUTION INTRAVENOUS; SUBCUTANEOUS at 18:37

## 2024-01-08 RX ADMIN — Medication 1000 UNITS: at 13:28

## 2024-01-08 RX ADMIN — LACOSAMIDE 150 MG: 100 TABLET, FILM COATED ORAL at 21:15

## 2024-01-08 RX ADMIN — INSULIN LISPRO 5 UNITS: 100 INJECTION, SOLUTION INTRAVENOUS; SUBCUTANEOUS at 12:53

## 2024-01-08 RX ADMIN — LISINOPRIL 20 MG: 20 TABLET ORAL at 12:34

## 2024-01-08 RX ADMIN — HYDRALAZINE HYDROCHLORIDE 5 MG: 20 INJECTION, SOLUTION INTRAMUSCULAR; INTRAVENOUS at 13:27

## 2024-01-08 RX ADMIN — SODIUM CHLORIDE 100 ML/HR: 0.9 INJECTION, SOLUTION INTRAVENOUS at 13:18

## 2024-01-08 RX ADMIN — ACETAMINOPHEN 650 MG: 325 TABLET, FILM COATED ORAL at 02:27

## 2024-01-08 RX ADMIN — IOHEXOL 100 ML: 350 INJECTION, SOLUTION INTRAVENOUS at 04:43

## 2024-01-08 RX ADMIN — ACETAMINOPHEN 650 MG: 325 TABLET, FILM COATED ORAL at 21:12

## 2024-01-08 RX ADMIN — ACETAMINOPHEN 650 MG: 325 TABLET, FILM COATED ORAL at 12:51

## 2024-01-08 RX ADMIN — FERROUS SULFATE TAB 325 MG (65 MG ELEMENTAL FE) 325 MG: 325 (65 FE) TAB at 12:38

## 2024-01-08 RX ADMIN — INSULIN LISPRO 5 UNITS: 100 INJECTION, SOLUTION INTRAVENOUS; SUBCUTANEOUS at 12:54

## 2024-01-08 NOTE — ASSESSMENT & PLAN NOTE
Patient presenting with left upper extremity partial seizure occurring last night, also endorses 5 grand mal seizures within the past week  Home regimen Vimpat 150 mg twice daily, reports compliance  Remote history of meningioma s/p resection in 2018, followed by neurology outpatient  Denies tongue biting, urinary or fecal incontinence    Plan:  Consult placed to neurology, will reach out regarding Keppra loading  Continue Vimpat 150 mg twice daily  Gentle fluid hydration with  ml/ per hour  Treat sepsis as below  PT OT consulted  Case management consult

## 2024-01-08 NOTE — ASSESSMENT & PLAN NOTE
102F, tachycardia 100s  UA showing pyuria  Patient endorsing urgency and frequency, denies dysuria ongoing for the past 2 months  Potential source being possible right-sided pyelonephritis with perinephric abscess  On exam, unable to appreciate CVA tenderness due to patient having bilateral back pain  Given ceftriaxone and vancomycin in the ED also bolused 2 L NS    Plan:  Start cefepime 2 g every 12 hours  Continue vancomycin, pharmacy consult placed  Gentle IV fluid hydration with  mill per hour  Urology consulted, they stated no need for IR drainage at this time  Follow-up blood cultures and urine culture, if blood cultures positive consider ID consult

## 2024-01-08 NOTE — NURSING NOTE
"Upon admission the patient said she felt as though she had pooped on way up from the emergency room. I personally removed the patients underwear and there was no stool present. Upon entering the room at 1840 to hang the antibiotic the patient said that we left her in stool since admission to the unit. I told the patient that there was no stool on her on admission. When entering the room this time there was a small amount of stool on the pad. I went to go clean up the patient and she yelled \"get out of my room\" and refused to let me clean her up.  I told the primary nurse and PCA. Patient is resting in bed  is in the room trying to convince the patient to let us clean her up.   "

## 2024-01-08 NOTE — ED NOTES
Left message for Patient's PMD, as per Patient request, to inform them that Patient will not be at appointment today as she is admitted into the hospital. Patient made aware. Comfort and safety measures maintained.      Margie Alonzo RN  01/08/24 1148
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
## 2024-01-08 NOTE — CASE MANAGEMENT
Case Management Assessment    Patient name Nathalia Monge  Location ED-42/ED-42 MRN [id number]  : 1960 Date 2024       Current Admission Date: 2024  Current Admission Diagnosis:Partial idiopathic epilepsy with seizures of localized onset, intractable, without status epilepticus (HCC)   Patient Active Problem List    Diagnosis Date Noted    Sepsis without acute organ dysfunction (HCC) 2024    Seizure disorder ([facility]) 2024    Hyponatremia 2024    S/P craniotomy 10/21/2020    Meningioma ([facility]) 2018    Hypertensive urgency 2018    Urinary urgency 2018    Partial idiopathic epilepsy with seizures of localized onset, intractable, without status epilepticus ([facility])     Type 2 diabetes mellitus with hyperglycemia, with long-term current use of insulin ([facility])       LOS (days): 0  Geometric Mean LOS (GMLOS) (days): 5.1  Days to GMLOS:4.8     OBJECTIVE:  PATIENT READMITTED TO HOSPITAL  Risk of Unplanned Readmission Score: 11.64         Current admission status: Inpatient       Preferred Pharmacy:   [id number] [place], [place] - [address]  [address] [facility] [address]  Phone: [phone] Fax: [phone]    [id number]  [place] - [address]  [address] [address]  Phone: [phone] Fax: [phone]    [facility] [place]  [address] [facility].  [facility] [address]  [place] [address]  Phone: [phone] Fax: [phone]    Primary Care Provider: No primary care provider on file.    Primary Insurance: MEDICARE  Secondary Insurance:     ASSESSMENT:  Active Health Care Proxies    There are no active Health Care Proxies on file.                 Readmission Root Cause  30 Day Readmission: Yes  Who directed you to return to the hospital?: Family  Did you understand whom to contact if you had questions or problems?: Yes  Did you get your prescriptions before you  left the hospital?: Yes  Were you able to get your prescriptions filled when you left the hospital?: Yes  Did you take your medications as prescribed?: Yes  Were you able to get to your follow-up appointments?: No  Reason:: Readmitted prior to appointment  During previous admission, was a post-acute recommendation made?: No    Patient Information  Admitted from:: Home  Mental Status: Alert  During Assessment patient was accompanied by: Spouse  Assessment information provided by:: Patient  Primary Caregiver: Self  Support Systems: Self, Spouse/significant other  County of Residence: [place]  [place]  Home entry access options. Select all that apply.: Stairs  Number of steps to enter home.: 3  Do the steps have railings?: No  Type of Current Residence: Trailer Home  Living Arrangements: Lives w/ Spouse/significant other    Activities of Daily Living Prior to Admission  Functional Status: Independent  Completes ADLs independently?: Yes  Ambulates independently?: Yes  Does patient use assisted devices?: No  Does patient currently own DME?: No  Does patient have a history of Outpatient Therapy (PT/OT)?: No  Does the patient have a history of Short-Term Rehab?: No  Does patient have a history of HHC?: No  Does patient currently have HHC?: No         Patient Information Continued  Income Source: Pension/skilled nursing  Does patient have prescription coverage?: Yes  Does patient receive dialysis treatments?: No  Does patient have a history of substance abuse?: No  Does patient have a history of Mental Health Diagnosis?: No    PHQ 2/9 Screening   Reviewed PHQ 2/9 Depression Screening Score?: No    Means of Transportation  Means of Transport to Appts:: Family transport      Housing Stability: Low Risk  (1/8/2024)    Housing Stability Vital Sign     Unable to Pay for Housing in the Last Year: No     Number of Places Lived in the Last Year: 1     Unstable Housing in the Last Year: No   Food  Insecurity: No Food Insecurity (1/8/2024)    Hunger Vital Sign     Worried About Running Out of Food in the Last Year: Never true     Ran Out of Food in the Last Year: Never true   Transportation Needs: No Transportation Needs (1/8/2024)    PRAPARE - Transportation     Lack of Transportation (Medical): No     Lack of Transportation (Non-Medical): No   Utilities: Not At Risk (1/8/2024)    [facility] Utilities     Threatened with loss of utilities: No      Patient and spouse at bedside, relates they live [place]. Patient relates she has epilepsy but is independent with ADLs.     Denies any CM needs at the moment.  CM team to follow up.

## 2024-01-08 NOTE — DISCHARGE INSTRUCTIONS
Reading Physician Reading Date Result Priority   Princess Sandoval MD  [phone] 1/8/2024 STAT     Narrative & Impression  CT CHEST, ABDOMEN AND PELVIS WITH IV CONTRAST     INDICATION: Sepsis  sepsis.     COMPARISON: None.     TECHNIQUE: CT examination of the chest, abdomen and pelvis was performed. Multiplanar 2D reformatted images were created from the source data.     This examination, like all CT scans performed in the [facility], was performed utilizing techniques to minimize radiation dose exposure, including the use of iterative reconstruction and automated exposure control. Radiation dose length   product (DLP) for this visit:  704 mGy-cm     IV Contrast: 100 mL of iohexol (OMNIPAQUE)  Enteric Contrast: Enteric contrast was administered.     FINDINGS:     CHEST     LUNGS: Tracheobronchial tree is unremarkable. There is mild groundglass and consolidative opacities in bilateral lower lobes primarily involving dependent portions.     There is 4 mm right upper lobe nodule (series 303 image 51)     PLEURA: Unremarkable.     HEART/GREAT VESSELS: Heart is unremarkable for patient's age. No thoracic aortic aneurysm.     MEDIASTINUM AND AUDRA: Unremarkable.     CHEST WALL AND LOWER NECK: Unremarkable.     ABDOMEN     LIVER/BILIARY TREE: Unremarkable.     GALLBLADDER: Surgically absent     SPLEEN: Unremarkable.     PANCREAS: Unremarkable.     ADRENAL GLANDS: Unremarkable.     KIDNEYS/URETERS: There is a hypoenhancing focus involving the midpole of the right kidney with associated 2.6 x 2.9 cm complex cystic lesion (series 301 image 152). There is mild adjacent perinephric fat stranding.     The left kidney is unremarkable.  STOMACH AND BOWEL: Unremarkable.     APPENDIX: A normal appendix was visualized.     ABDOMINOPELVIC CAVITY: No ascites.  No pneumoperitoneum.  No lymphadenopathy.     VESSELS: Unremarkable for patient's age.     PELVIS     REPRODUCTIVE ORGANS: Unremarkable for patient's age.      URINARY BLADDER: Unremarkable.     ABDOMINAL WALL/INGUINAL REGIONS: Unremarkable.     OSSEOUS STRUCTURES: There is disc height loss, endplate sclerosis and mild irregularity at level T11-12. No acute fracture or destructive osseous lesion.     IMPRESSION:     1.  2.9 cm masslike hypoenhancing focus involving midpole of the right kidney with mild perinephric fat stranding. Given clinical presentation this may indicate acute pyelonephritis with intrarenal abscess versus malignancy. Recommend urology   consultation.  2.  Mild groundglass and consolidative opacities involving primarily dependent lower lobe of the lungs. Aspiration or infection are not excluded in the right clinical setting.  3.  Disc height loss, endplate sclerosis and mild irregularity at level of T11-12, most likely degenerative in nature versus sequela of remote discitis/osteomyelitis. There is no paraspinal soft tissue inflammation to suggest infection. May consider MRI   without and with contrast if there is high clinical concern for infection.  4.  4 mm right upper lobe nodule. Based on current Fleischner Society 2017 Guidelines on incidental pulmonary nodule, no routine follow-up is needed if the patient is low risk.  If the patient is high risk for lung cancer, optional follow-up chest CT at   12 months can be considered.  For patients with history of malignancy, 3 months follow-up chest CT is recommended.                 The study was marked in EPIC for immediate notification.           This study was marked in EPIC for notification and follow-up.                 Workstation performed: [id number]
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
## 2024-01-08 NOTE — ASSESSMENT & PLAN NOTE
Home regimen lisinopril 20 mg daily, unable to take it since yesterday but does report compliance   systolic    Plan:  Continue lisinopril 20 mg daily  Hydralazine 5 mg IV every 6 hours as needed for SBP greater than 180

## 2024-01-08 NOTE — H&P
[facility]  H&P  Name: Nathalia Monge 63 y.o. female I MRN: [id number]  Unit/Bed#: ED-42 I Date of Admission: 1/8/2024   Date of Service: 1/8/2024 I Hospital Day: 0      Assessment/Plan   * Partial idiopathic epilepsy with seizures of localized onset, intractable, without status epilepticus (HCC)  Assessment & Plan  Patient presenting with left upper extremity partial seizure occurring last night, also endorses 5 grand mal seizures within the past week  Home regimen Vimpat 150 mg twice daily, reports compliance  Remote history of meningioma s/p resection in 2018, followed by neurology outpatient  Denies tongue biting, urinary or fecal incontinence    Plan:  Consult placed to neurology, will reach out regarding Keppra loading  Continue Vimpat 150 mg twice daily  Gentle fluid hydration with  ml/ per hour  Treat sepsis as below  PT OT consulted  Case management consult    Sepsis without acute organ dysfunction (HCC)  Assessment & Plan  102F, tachycardia 100s  UA showing pyuria  Patient endorsing urgency and frequency, denies dysuria ongoing for the past 2 months  Potential source being possible right-sided pyelonephritis with perinephric abscess  On exam, unable to appreciate CVA tenderness due to patient having bilateral back pain  Given ceftriaxone and vancomycin in the ED also bolused 2 L NS    Plan:  Start cefepime 2 g every 12 hours  Continue vancomycin, pharmacy consult placed  Gentle IV fluid hydration with  mill per hour  Urology consulted, they stated no need for IR drainage at this time  Follow-up blood cultures and urine culture, if blood cultures positive consider ID consult    Meningioma (HCC)  Assessment & Plan  Status postresection in 2018, follows with neurology for serial MRIs    Benign essential HTN  Assessment & Plan  Home regimen lisinopril 20 mg daily, unable to take it since yesterday but does report compliance   systolic    Plan:  Continue lisinopril 20 mg  daily  Hydralazine 5 mg IV every 6 hours as needed for SBP greater than 180    Type 2 diabetes mellitus with hyperglycemia, with long-term current use of insulin ([facility])  Assessment & Plan  Lab Results   Component Value Date    HGBA1C 13.4 (H) 01/03/2024       Recent Labs     01/08/24  0210   POCGLU 380*       Blood Sugar Average: Last 72 hrs:  (P) 380  Last A1c 13, placed recently on NovoLog 70/30 regimen  Patient unable to take her insulin because she cannot afford it, currently on no diabetic meds  No PCP    Plan:  Start lispro 5 units 3 times daily with meals  Start Lantus 16 units at bedtime  Follow-up fingersticks, adjust regimen as needed  Diabetic diet  Nutrition consult  Establish care with PCP on discharge           VTE Pharmacologic Prophylaxis: VTE Score: 7 High Risk (Score >/= 5) - Pharmacological DVT Prophylaxis Ordered: heparin. Sequential Compression Devices Ordered.  Code Status: Level 3 - DNAR and DNI confirmed with the patient  Discussion with family: Updated  () at bedside.    Anticipated Length of Stay: Patient will be admitted on an inpatient basis with an anticipated length of stay of greater than 2 midnights secondary to seizures, sepsis.    Chief Complaint: Seizures    History of Present Illness:  Nathalia Monge is a 63 y.o. female with a PMH of hypertension, type 2 diabetes, meningioma status postresection in 2018, partial idiopathic epilepsy with seizures on Vimpat who presents with acute onset of left upper extremity partial seizure that occurred last night.  Patient and her  at the bedside described the patient having left fist clenching and left upper extremity seizure, similar to her previous episodes.  They also endorse her having 5 grand mal seizures within the last week which they described as generalized shaking.  On this episode, patient denies any urinary or fecal incontinence, or tongue biting.  They do endorse that she had unintelligible speech but no  confusion during and after the episode.  Patient endorses compliance with her Vimpat 150 mg twice daily which was started on her last admission last week at [facility] where she had presented with seizures at that time as well.    In the ED, patient is found to have fever of 102F, tachycardia, blood sugars in the 400s and blood pressure in the 200s systolic.  She states that she has not been able to take her insulin because she could not afford it.  She does endorse taking her lisinopril 20 mg daily but she has not taken it since yesterday.    Also, her UA is positive for pyuria.  Patient endorses urgency and frequency ongoing for the past month.  She denies any dysuria, fevers, or chills at home.    Review of Systems:  Review of Systems   Constitutional:  Positive for chills and fever. Negative for diaphoresis and fatigue.   HENT:  Negative for ear pain, rhinorrhea, sneezing and sore throat.    Eyes:  Negative for pain and visual disturbance.   Respiratory:  Negative for cough, shortness of breath and wheezing.    Cardiovascular:  Negative for chest pain, palpitations and leg swelling.   Gastrointestinal:  Negative for abdominal pain, diarrhea, nausea and vomiting.   Genitourinary:  Positive for frequency and urgency. Negative for dysuria and hematuria.   Musculoskeletal:  Positive for back pain. Negative for arthralgias and myalgias.   Skin:  Negative for color change and rash.   Neurological:  Positive for seizures and speech difficulty. Negative for dizziness, syncope, facial asymmetry, weakness, light-headedness, numbness and headaches.   Psychiatric/Behavioral:  Negative for confusion.    All other systems reviewed and are negative.      Past Medical and Surgical History:   Past Medical History:   Diagnosis Date    Bipolar depression (HCC)     Cancer (HCC)     Depression     Diabetes mellitus (HCC)     Hypertension     PTSD (post-traumatic stress disorder) 2002    Seizures (HCC)        Past Surgical  History:   Procedure Laterality Date    CHOLECYSTECTOMY  2004    HYSTERECTOMY      IMPLANTABLE CONTACT LENS IMPLANTATION      MI CRANIEC TREPHINE BONE FLP BRAIN TUMOR SUPRTENTOR Right 3/15/2018    Procedure: IMAGE-GUIDED RIGHT FRONTOPARIETAL CRANIOTOMY FOR TUMOR;  Surgeon: Arsh Jeronimo MD;  Location: [facility];  Service: Neurosurgery       Meds/Allergies:  Prior to Admission medications    Medication Sig Start Date End Date Taking? Authorizing Provider   Ascorbic Acid (VITAMIN C) 1000 MG tablet Take 1,000 mg by mouth daily   Yes Historical Provider, MD   cholecalciferol (VITAMIN D3) 1,000 units tablet Take 1,000 Units by mouth daily   Yes Historical Provider, MD   CHROMIUM PO Take by mouth 2 (two) times a day   Yes Historical Provider, MD   ferrous sulfate 325 (65 Fe) mg tablet Take 325 mg by mouth daily    Yes Historical Provider, MD   L-LYSINE PO Take 500 mg by mouth daily   Yes Historical Provider, MD   lacosamide (VIMPAT) 150 mg tablet Take 1 tablet (150 mg total) by mouth every 12 (twelve) hours 1/6/24 4/5/24 Yes Shashank Villaseñor MD   lisinopril (ZESTRIL) 20 mg tablet Take 1 tablet (20 mg total) by mouth daily 1/5/24  Yes Steven Bray, DO   NON FORMULARY 3 (three) times a day Yolis Silver - 3 tablespoons a day   Yes Historical Provider, MD   Probiotic Product (PROBIOTIC DAILY PO) Take 1 capsule by mouth daily   Yes Historical Provider, MD   Zinc 50 MG TABS Take by mouth daily   Yes Historical Provider, MD   Alcohol Swabs 70 % PADS May substitute brand based on insurance coverage. Check glucose ACHS.  Patient not taking: Reported on 1/8/2024 1/4/24   Steven Bray, DO   BD PEN NEEDLE YOLIS U/F 32G X 4 MM MISC TEST TID UTD  Patient not taking: Reported on 3/10/2023 5/29/18   Historical Provider, MD   Blood Glucose Monitoring Suppl (OneTouch Verio Reflect) w/Device KIT May substitute brand based on insurance coverage. Check glucose ACHS.  Patient not taking: Reported on 1/8/2024 1/4/24    Steven Bray, DO   glucose blood (OneTouch Verio) test strip May substitute brand based on insurance coverage. Check glucose ACHS.  Patient not taking: Reported on 1/8/2024 1/4/24   Steven Bray DO   insulin aspart protamine-insulin aspart (NovoLOG 70/30) 100 Units/mL injection pen Inject 15 Units under the skin 2 (two) times a day with meals  Patient not taking: Reported on 1/8/2024 1/4/24 2/3/24  Steven Bray DO   Insulin Pen Needle (BD Pen Needle Yolis 2nd Gen) 32G X 4 MM MISC For use with insulin pen. Pharmacy may dispense brand covered by insurance.  Patient not taking: Reported on 1/8/2024 1/4/24   Steven Bray DO   Insulin Pen Needle 29G X 12.7MM MISC Use as directed with novolog flexpen to administer insulin with meals and use with lantus solostar pen to administer insulin in the morning  Patient not taking: Reported on 3/10/2023 3/19/18   Argelia Veronica PA-C   Magnesium 250 MG TABS Take by mouth daily  Patient not taking: Reported on 1/8/2024    Historical Provider, MD Epps Delica Lancets 33G MISC May substitute brand based on insurance coverage. Check glucose ACHS.  Patient not taking: Reported on 1/8/2024 1/4/24   Steven Bray DO   sertraline (ZOLOFT) 100 mg tablet Take 100 mg by mouth daily at bedtime    Patient not taking: Reported on 1/8/2024    Historical Provider, MD     I have reviewed home medications with patient personally.    Allergies:   Allergies   Allergen Reactions    Dilantin [Phenytoin]     Lidocaine     Statins        Social History:  Marital Status: Significant Other   Occupation:   Patient Pre-hospital Living Situation: Home  Patient Pre-hospital Level of Mobility: walks  Patient Pre-hospital Diet Restrictions:   Substance Use History:   Social History     Substance and Sexual Activity   Alcohol Use Never     Social History     Tobacco Use   Smoking Status Former    Current packs/day: 0.00    Types: Cigarettes    Quit date:  3/5/2000    Years since quittin.8   Smokeless Tobacco Never     Social History     Substance and Sexual Activity   Drug Use No       Family History:  Family History   Adopted: Yes       Physical Exam:     Vitals:   Blood Pressure: (!) 213/89 (Patient ambulated to bathroom.) (24 1200)  Pulse: 100 (24 1200)  Temperature: 98.2 °F (36.8 °C) (2430)  Temp Source: Oral (24)  Respirations: 18 (24 1200)  SpO2: 95 % (24 1200)    Physical Exam  Vitals and nursing note reviewed.   Constitutional:       General: She is not in acute distress.     Appearance: Normal appearance. She is well-developed. She is not ill-appearing.   HENT:      Head: Normocephalic and atraumatic.      Mouth/Throat:      Mouth: Mucous membranes are dry.      Pharynx: Oropharynx is clear.   Eyes:      General: No scleral icterus.     Extraocular Movements: Extraocular movements intact.      Conjunctiva/sclera: Conjunctivae normal.      Pupils: Pupils are equal, round, and reactive to light.   Cardiovascular:      Rate and Rhythm: Regular rhythm. Tachycardia present.      Pulses: Normal pulses.      Heart sounds: Normal heart sounds. No murmur heard.     No friction rub. No gallop.   Pulmonary:      Effort: Pulmonary effort is normal. No respiratory distress.      Breath sounds: Normal breath sounds. No wheezing or rales.   Abdominal:      General: There is no distension.      Palpations: Abdomen is soft.      Tenderness: There is no abdominal tenderness. There is no guarding.   Musculoskeletal:         General: No swelling.      Cervical back: Neck supple.      Right lower leg: No edema.      Left lower leg: No edema.   Skin:     General: Skin is warm and dry.      Capillary Refill: Capillary refill takes less than 2 seconds.   Neurological:      Mental Status: She is alert and oriented to person, place, and time. Mental status is at baseline.      Sensory: No sensory deficit.      Motor: No weakness.    Psychiatric:         Mood and Affect: Mood normal.         Behavior: Behavior normal.          Additional Data:     Lab Results:  Results from last 7 days   Lab Units 01/08/24  0228   WBC Thousand/uL 10.37*   HEMOGLOBIN g/dL 11.3*   HEMATOCRIT % 33.8*   PLATELETS Thousands/uL 240   NEUTROS PCT % 85*   LYMPHS PCT % 11*   MONOS PCT % 1*   EOS PCT % 1     Results from last 7 days   Lab Units 01/08/24  0228   SODIUM mmol/L 136   POTASSIUM mmol/L 3.1*   CHLORIDE mmol/L 99   CO2 mmol/L 26   BUN mg/dL 21   CREATININE mg/dL 1.00   ANION GAP mmol/L 11   CALCIUM mg/dL 9.0   ALBUMIN g/dL 3.8   TOTAL BILIRUBIN mg/dL 0.48   ALK PHOS U/L 124*   ALT U/L 27   AST U/L 23   GLUCOSE RANDOM mg/dL 405*     Results from last 7 days   Lab Units 01/08/24  0228   INR  0.94     Results from last 7 days   Lab Units 01/08/24  0210 01/04/24  1100 01/04/24  0722 01/04/24  0254 01/03/24  2102 01/03/24  1654 01/03/24  1104 01/03/24  0609 01/03/24  0134 01/02/24  2221   POC GLUCOSE mg/dl 380* 306* 218* 211* 400* 364* 292* 350* 476* >500*     Results from last 7 days   Lab Units 01/03/24  0420   HEMOGLOBIN A1C % 13.4*     Results from last 7 days   Lab Units 01/08/24  0228 01/02/24  2225   LACTIC ACID mmol/L 1.7 1.8   PROCALCITONIN ng/ml 1.58*  --        Lines/Drains:  Invasive Devices       Peripheral Intravenous Line  Duration             Peripheral IV 01/08/24 Left;Ventral (anterior) Forearm <1 day    Peripheral IV 01/08/24 Right;Ventral (anterior) Forearm <1 day                        Imaging: Reviewed radiology reports from this admission including: abdominal/pelvic CT  CT chest abdomen pelvis w contrast   ED Interpretation by Blaise Gage PA-C (01/08 0621)   Reading Physician Reading Date Result Priority  Princess Sandoval MD  [phone] 1/8/2024 STAT    Narrative & Impression  CT CHEST, ABDOMEN AND PELVIS WITH IV CONTRAST     INDICATION: Sepsis  sepsis.     COMPARISON: None.     TECHNIQUE: CT examination of the chest, abdomen and pelvis was  performed. Multiplanar 2D reformatted images were created from the source data.     This examination, like all CT scans performed in the [facility], was performed utilizing techniques to minimize radiation dose exposure, including the use of iterative reconstruction and automated exposure control. Radiation dose length   product (DLP) for this visit:  704 mGy-cm     IV Contrast: 100 mL of iohexol (OMNIPAQUE)  Enteric Contrast: Enteric contrast was administered.     FINDINGS:     CHEST     LUNGS: Tracheobronchial tree is unremarkable. There is mild groundglass and consolidative opacities in bilateral lower lobes primarily involving dependent portions.     There is 4 mm right upper lobe nod   ule (series 303 image 51)     PLEURA: Unremarkable.     HEART/GREAT VESSELS: Heart is unremarkable for patient's age. No thoracic aortic aneurysm.     MEDIASTINUM AND AUDRA: Unremarkable.     CHEST WALL AND LOWER NECK: Unremarkable.     ABDOMEN     LIVER/BILIARY TREE: Unremarkable.     GALLBLADDER: Surgically absent     SPLEEN: Unremarkable.     PANCREAS: Unremarkable.     ADRENAL GLANDS: Unremarkable.     KIDNEYS/URETERS: There is a hypoenhancing focus involving the midpole of the right kidney with associated 2.6 x 2.9 cm complex cystic lesion (series 301 image 152). There is mild adjacent perinephric fat stranding.     The left kidney is unremarkable.  STOMACH AND BOWEL: Unremarkable.     APPENDIX: A normal appendix was visualized.     ABDOMINOPELVIC CAVITY: No ascites.  No pneumoperitoneum.  No lymphadenopathy.     VESSELS: Unremarkable for patient's age.     PELVIS     REPRODUCTIVE ORGANS: Unremarkable for patient's age.     URINARY BLADDER: Unremarkable.     ABDOMINAL WALL/INGUINA   L REGIONS: Unremarkable.     OSSEOUS STRUCTURES: There is disc height loss, endplate sclerosis and mild irregularity at level T11-12. No acute fracture or destructive osseous lesion.     IMPRESSION:     1.  2.9 cm masslike  hypoenhancing focus involving midpole of the right kidney with mild perinephric fat stranding. Given clinical presentation this may indicate acute pyelonephritis with intrarenal abscess versus malignancy. Recommend urology   consultation.  2.  Mild groundglass and consolidative opacities involving primarily dependent lower lobe of the lungs. Aspiration or infection are not excluded in the right clinical setting.  3.  Disc height loss, endplate sclerosis and mild irregularity at level of T11-12, most likely degenerative in nature versus sequela of remote discitis/osteomyelitis. There is no paraspinal soft tissue inflammation to suggest infection. May consider MRI   without and with contrast if there is high clinical concern for infection.  4.  4 mm right upper lobe n   odule. Based on current Fleischner Society 2017 Guidelines on incidental pulmonary nodule, no routine follow-up is needed if the patient is low risk.  If the patient is high risk for lung cancer, optional follow-up chest CT at   12 months can be considered.  For patients with history of malignancy, 3 months follow-up chest CT is recommended.                 The study was marked in EPIC for immediate notification.           This study was marked in EPIC for notification and follow-up.                 Workstation performed: [id number]           Final Result by Princess Sandoval MD (01/08 0618)      1.  2.9 cm masslike hypoenhancing focus involving midpole of the right kidney with mild perinephric fat stranding. Given clinical presentation this may indicate acute pyelonephritis with intrarenal abscess versus malignancy. Recommend urology    consultation.   2.  Mild groundglass and consolidative opacities involving primarily dependent lower lobe of the lungs. Aspiration or infection are not excluded in the right clinical setting.   3.  Disc height loss, endplate sclerosis and mild irregularity at level of T11-12, most likely degenerative in nature versus sequela  of remote discitis/osteomyelitis. There is no paraspinal soft tissue inflammation to suggest infection. May consider MRI    without and with contrast if there is high clinical concern for infection.   4.  4 mm right upper lobe nodule. Based on current Fleischner Society 2017 Guidelines on incidental pulmonary nodule, no routine follow-up is needed if the patient is low risk.  If the patient is high risk for lung cancer, optional follow-up chest CT at    12 months can be considered.  For patients with history of malignancy, 3 months follow-up chest CT is recommended.                  The study was marked in EPIC for immediate notification.            This study was marked in EPIC for notification and follow-up.                  Workstation performed: [id number]         XR chest 1 view portable   Final Result by Alan Hogue MD (01/08 0910)      No acute cardiopulmonary disease.                  Workstation performed: [id number]         CT head without contrast   ED Interpretation by Blaise Gage PA-C (01/08 0412)   Reading Physician Reading Date Result Priority  Leroy Jones MD  [phone] 1/8/2024     Narrative & Impression  CT BRAIN - WITHOUT CONTRAST     INDICATION:   recent seizure; one hour ago prior to current presentation; hx of seuzure; hypertensive.     COMPARISON: 1/2/2024.     TECHNIQUE:  CT examination of the brain was performed.  Multiplanar 2D reformatted images were created from the source data.     Radiation dose length product (DLP) for this visit:  946 mGy-cm .  This examination, like all CT scans performed in the [facility], was performed utilizing techniques to minimize radiation dose exposure, including the use of iterative   reconstruction and automated exposure control.     IMAGE QUALITY:  Diagnostic.     FINDINGS:     PARENCHYMA: Encephalomalacia in the right frontal vertex region again noted likely related to prior surgery. There is no acute intracranial  hemorrhage, extra-axial fluid collection, space-occupying mass, mass effect, or midline shift not   ed. The   gray-white differentiation is preserved and unremarkable.     VENTRICLES AND EXTRA-AXIAL SPACES:  Normal for the patient's age.     VISUALIZED ORBITS: Normal visualized orbits.     PARANASAL SINUSES: Minimal mucoperiosteal thickening in the bilateral ethmoid and inferior maxillary sinuses. No air-fluid levels in the paranasal sinuses or mastoid air cells.     CALVARIUM AND EXTRACRANIAL SOFT TISSUES: Right frontal craniotomy defect noted. No acute depressed calvarial fracture. Bilateral temporomandibular joints are intact and unremarkable. The scalp soft tissues are grossly unremarkable.     IMPRESSION:     Stable findings without acute intracranial abnormality.                 Workstation performed: [id number]        Final Result by Leroy Jones MD (01/08 0408)      Stable findings without acute intracranial abnormality.                  Workstation performed: [id number]             EKG and Other Studies Reviewed on Admission:   EKG: Sinus Tachycardia. .    ** Please Note: This note has been constructed using a voice recognition system. **

## 2024-01-08 NOTE — CONSULTS
Consult - Urology   Nathalia Monge 1960, 63 y.o. female MRN: [id number]    Unit/Bed#: ED-42 Encounter: [id number]        Assessment & Plan:  Pyelonephritis  Sepsis  - CT showing 2.9 cm masslike hypoenhancing focus involving midpole of the right kidney with mild perinephric fat stranding. Given clinical presentation this may indicate acute pyelonephritis with intrarenal abscess versus malignancy.   - Given clinical picture- CT scan findings likely pyelonephritis w/ developing abscess  - Temp 101.1F, continue to trend  - Mild leukocytosis 10  - UA moderate leukocytes, nitrite negative. No bacteria seen. Positive for pyuria.   - No flank or CVA tenderness. Dysuria noted x 3 weeks  - Continue antibiosis. F/u cultures  - Trend temp, hemodynamics  - No indication for IR drainage. Small area of hypo enhancing focus, amendable for medical therapy.   - If no improvement in 48-72 hrs would consider reimaging   - Urology will continue to follow     Subjective:   Ingrid is a 63-year-old female with past medical history hypertension, DM 2, meningioma status postresection, partial idiopathic epilepsy with seizures who presented to the ED with acute onset partial seizure last night.  In the ED patient with noted fever 102, tachycardic, hypertensive.  She reports for the past 3 weeks she has had dysuria, sharp stabbing pains with urination.  She denies any flank pain.  No hematuria.  She does not report fevers at home. Patient lives out of motor home, cannot afford to go to doctor, has been dealing with dysuria at home.  In the ED CT scan was done showing a 2.9 cm masslike hypoenhancing focus in the midpole of right kidney, indicating acute pyelonephritis with intrarenal abscess versus malignancy.  Urology was consulted due to CT findings.    Review of Systems   Constitutional:  Positive for fever. Negative for chills.   Respiratory:  Negative for cough and shortness of breath.    Cardiovascular:  Negative for chest pain and  palpitations.   Gastrointestinal:  Negative for abdominal pain and vomiting.   Genitourinary:  Negative for dysuria and hematuria.   Musculoskeletal:  Positive for back pain. Negative for arthralgias.   Skin:  Negative for color change and rash.   Neurological:  Positive for seizures. Negative for syncope.   All other systems reviewed and are negative.      Objective:  Vitals: Blood pressure (!) 198/91, pulse 94, temperature (!) 100.7 °F (38.2 °C), temperature source Oral, resp. rate 18, SpO2 95%.,There is no height or weight on file to calculate BMI.    Physical Exam  Vitals reviewed.   Constitutional:       General: She is not in acute distress.     Appearance: Normal appearance. She is normal weight. She is ill-appearing. She is not toxic-appearing or diaphoretic.   HENT:      Head: Normocephalic and atraumatic.      Right Ear: External ear normal.      Left Ear: External ear normal.      Nose: Nose normal.      Mouth/Throat:      Mouth: Mucous membranes are moist.   Eyes:      General: No scleral icterus.     Conjunctiva/sclera: Conjunctivae normal.   Cardiovascular:      Rate and Rhythm: Normal rate.      Pulses: Normal pulses.   Pulmonary:      Effort: Pulmonary effort is normal.   Abdominal:      General: Abdomen is flat. There is no distension.      Palpations: Abdomen is soft.      Tenderness: There is no abdominal tenderness. There is no right CVA tenderness, left CVA tenderness or guarding.   Musculoskeletal:         General: Normal range of motion.      Cervical back: Normal range of motion.   Skin:     General: Skin is warm.      Findings: No rash.   Neurological:      General: No focal deficit present.      Mental Status: She is alert and oriented to person, place, and time. Mental status is at baseline.   Psychiatric:         Mood and Affect: Mood normal.         Behavior: Behavior normal.         Thought Content: Thought content normal.         Judgment: Judgment normal.         Imaging:  CT CHEST,  ABDOMEN AND PELVIS WITH IV CONTRAST      INDICATION: Sepsis   sepsis.      COMPARISON: None.      TECHNIQUE: CT examination of the chest, abdomen and pelvis was performed. Multiplanar 2D reformatted images were created from the source data.      This examination, like all CT scans performed in the [facility], was performed utilizing techniques to minimize radiation dose exposure, including the use of iterative reconstruction and automated exposure control. Radiation dose length   product (DLP) for this visit:  704 mGy-cm      IV Contrast: 100 mL of iohexol (OMNIPAQUE)   Enteric Contrast: Enteric contrast was administered.      FINDINGS:      CHEST      LUNGS: Tracheobronchial tree is unremarkable. There is mild groundglass and consolidative opacities in bilateral lower lobes primarily involving dependent portions.      There is 4 mm right upper lobe nod   ule (series 303 image 51)      PLEURA: Unremarkable.      HEART/GREAT VESSELS: Heart is unremarkable for patient's age. No thoracic aortic aneurysm.      MEDIASTINUM AND AUDRA: Unremarkable.      CHEST WALL AND LOWER NECK: Unremarkable.      ABDOMEN      LIVER/BILIARY TREE: Unremarkable.      GALLBLADDER: Surgically absent      SPLEEN: Unremarkable.      PANCREAS: Unremarkable.      ADRENAL GLANDS: Unremarkable.      KIDNEYS/URETERS: There is a hypoenhancing focus involving the midpole of the right kidney with associated 2.6 x 2.9 cm complex cystic lesion (series 301 image 152). There is mild adjacent perinephric fat stranding.      The left kidney is unremarkable.   STOMACH AND BOWEL: Unremarkable.      APPENDIX: A normal appendix was visualized.      ABDOMINOPELVIC CAVITY: No ascites.  No pneumoperitoneum.  No lymphadenopathy.      VESSELS: Unremarkable for patient's age.      PELVIS      REPRODUCTIVE ORGANS: Unremarkable for patient's age.      URINARY BLADDER: Unremarkable.      ABDOMINAL WALL/INGUINA   L REGIONS: Unremarkable.      OSSEOUS  STRUCTURES: There is disc height loss, endplate sclerosis and mild irregularity at level T11-12. No acute fracture or destructive osseous lesion.      IMPRESSION:      1. 2.9 cm masslike hypoenhancing focus involving midpole of the right kidney with mild perinephric fat stranding. Given clinical presentation this may indicate acute pyelonephritis with intrarenal abscess versus malignancy. Recommend urology   consultation.   2. Mild groundglass and consolidative opacities involving primarily dependent lower lobe of the lungs. Aspiration or infection are not excluded in the right clinical setting.   3.  Disc height loss, endplate sclerosis and mild irregularity at level of T11-12, most likely degenerative in nature versus sequela of remote discitis/osteomyelitis. There is no paraspinal soft tissue inflammation to suggest infection. May consider MRI   without and with contrast if there is high clinical concern for infection.   4.  4 mm right upper lobe n   odule. Based on current Fleischner Society 2017 Guidelines on incidental pulmonary nodule, no routine follow-up is needed if the patient is low risk.  If the patient is high risk for lung cancer, optional follow-up chest CT at   12 months can be considered.  For patients with history of malignancy, 3 months follow-up chest CT is recommended.                  The study was marked in EPIC for immediate notification.     Labs:  Recent Labs     01/08/24 0228   WBC 10.37*     Recent Labs     01/08/24 0228   HGB 11.3*       Recent Labs     01/08/24 0228   CREATININE 1.00       History:  Social History     Socioeconomic History    Marital status: Significant Other     Spouse name: Marty    Number of children: 4    Years of education: Some college    Highest education level: None   Occupational History    Occupation: disabled     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   Tobacco Use    Smoking status: Former     Current packs/day: 0.00     Types: Cigarettes     Quit  date: 3/5/2000     Years since quittin.8    Smokeless tobacco: Never   Vaping Use    Vaping status: Never Used   Substance and Sexual Activity    Alcohol use: Never    Drug use: No    Sexual activity: Yes     Partners: Male   Other Topics Concern    None   Social History Narrative    Lives at home with significant otherMarty     Social Determinants of Health     Financial Resource Strain: Not on file   Food Insecurity: No Food Insecurity (2024)    Hunger Vital Sign     Worried About Running Out of Food in the Last Year: Never true     Ran Out of Food in the Last Year: Never true   Transportation Needs: No Transportation Needs (2024)    PRAPARE - Transportation     Lack of Transportation (Medical): No     Lack of Transportation (Non-Medical): No   Physical Activity: Not on file   Stress: Not on file   Social Connections: Not on file   Intimate Partner Violence: Not on file   Housing Stability: Low Risk  (2024)    Housing Stability Vital Sign     Unable to Pay for Housing in the Last Year: No     Number of Places Lived in the Last Year: 1     Unstable Housing in the Last Year: No       Past Medical History:   Diagnosis Date    Bipolar depression (HCC)     Cancer (HCC)     Depression     Diabetes mellitus (HCC)     Hypertension     PTSD (post-traumatic stress disorder) 2002    Seizures (HCC)      Past Surgical History:   Procedure Laterality Date    CHOLECYSTECTOMY  2004    HYSTERECTOMY      IMPLANTABLE CONTACT LENS IMPLANTATION      MT CRANIEC TREPHINE BONE FLP BRAIN TUMOR SUPRTENTOR Right 3/15/2018    Procedure: IMAGE-GUIDED RIGHT FRONTOPARIETAL CRANIOTOMY FOR TUMOR;  Surgeon: Arsh Jeronimo MD;  Location: [facility];  Service: Neurosurgery     Family History   Adopted: Yes       Sylvia Soto PA-C  Date: 2024 Time: 2:27 PM

## 2024-01-08 NOTE — ASSESSMENT & PLAN NOTE
Lab Results   Component Value Date    HGBA1C 13.4 (H) 01/03/2024       Recent Labs     01/08/24  0210   POCGLU 380*       Blood Sugar Average: Last 72 hrs:  (P) 380  Last A1c 13, placed recently on NovoLog 70/30 regimen  Patient unable to take her insulin because she cannot afford it, currently on no diabetic meds  No PCP    Plan:  Start lispro 5 units 3 times daily with meals  Start Lantus 16 units at bedtime  Follow-up fingersticks, adjust regimen as needed  Diabetic diet  Nutrition consult  Establish care with PCP on discharge

## 2024-01-08 NOTE — TELEPHONE ENCOUNTER
MSW planned to contact patient today to see if she was able to  Lacosamide at [facility]. MSW reviewed chart. Patient is hospitalized following more seizures, fever. MSW will follow.

## 2024-01-09 ENCOUNTER — APPOINTMENT (INPATIENT)
Dept: NEUROLOGY | Facility: HOSPITAL | Age: 64
DRG: 871 | End: 2024-01-09
Payer: MEDICARE

## 2024-01-09 PROBLEM — R56.9 SEIZURE-LIKE ACTIVITY (HCC): Status: ACTIVE | Noted: 2024-01-04

## 2024-01-09 LAB
ANION GAP SERPL CALCULATED.3IONS-SCNC: 7 MMOL/L
ANION GAP SERPL CALCULATED.3IONS-SCNC: 9 MMOL/L
ATRIAL RATE: 103 BPM
BASOPHILS # BLD AUTO: 0.04 THOUSANDS/ÂΜL (ref 0–0.1)
BASOPHILS NFR BLD AUTO: 0 % (ref 0–1)
BUN SERPL-MCNC: 11 MG/DL (ref 5–25)
BUN SERPL-MCNC: 16 MG/DL (ref 5–25)
CALCIUM SERPL-MCNC: 8.6 MG/DL (ref 8.4–10.2)
CALCIUM SERPL-MCNC: 9 MG/DL (ref 8.4–10.2)
CHLORIDE SERPL-SCNC: 103 MMOL/L (ref 96–108)
CHLORIDE SERPL-SCNC: 104 MMOL/L (ref 96–108)
CO2 SERPL-SCNC: 25 MMOL/L (ref 21–32)
CO2 SERPL-SCNC: 26 MMOL/L (ref 21–32)
CREAT SERPL-MCNC: 0.93 MG/DL (ref 0.6–1.3)
CREAT SERPL-MCNC: 1.07 MG/DL (ref 0.6–1.3)
EOSINOPHIL # BLD AUTO: 0.02 THOUSAND/ÂΜL (ref 0–0.61)
EOSINOPHIL NFR BLD AUTO: 0 % (ref 0–6)
ERYTHROCYTE [DISTWIDTH] IN BLOOD BY AUTOMATED COUNT: 12.2 % (ref 11.6–15.1)
GFR SERPL CREATININE-BSD FRML MDRD: 55 ML/MIN/1.73SQ M
GFR SERPL CREATININE-BSD FRML MDRD: 65 ML/MIN/1.73SQ M
GLUCOSE SERPL-MCNC: 123 MG/DL (ref 65–140)
GLUCOSE SERPL-MCNC: 173 MG/DL (ref 65–140)
GLUCOSE SERPL-MCNC: 194 MG/DL (ref 65–140)
GLUCOSE SERPL-MCNC: 199 MG/DL (ref 65–140)
GLUCOSE SERPL-MCNC: 246 MG/DL (ref 65–140)
HCT VFR BLD AUTO: 29.9 % (ref 34.8–46.1)
HGB BLD-MCNC: 10.1 G/DL (ref 11.5–15.4)
IMM GRANULOCYTES # BLD AUTO: 0.18 THOUSAND/UL (ref 0–0.2)
IMM GRANULOCYTES NFR BLD AUTO: 1 % (ref 0–2)
LYMPHOCYTES # BLD AUTO: 1.64 THOUSANDS/ÂΜL (ref 0.6–4.47)
LYMPHOCYTES NFR BLD AUTO: 10 % (ref 14–44)
MAGNESIUM SERPL-MCNC: 1.6 MG/DL (ref 1.9–2.7)
MCH RBC QN AUTO: 28.9 PG (ref 26.8–34.3)
MCHC RBC AUTO-ENTMCNC: 33.8 G/DL (ref 31.4–37.4)
MCV RBC AUTO: 86 FL (ref 82–98)
MONOCYTES # BLD AUTO: 0.77 THOUSAND/ÂΜL (ref 0.17–1.22)
MONOCYTES NFR BLD AUTO: 5 % (ref 4–12)
NEUTROPHILS # BLD AUTO: 13.72 THOUSANDS/ÂΜL (ref 1.85–7.62)
NEUTS SEG NFR BLD AUTO: 84 % (ref 43–75)
NRBC BLD AUTO-RTO: 0 /100 WBCS
P AXIS: 66 DEGREES
PLATELET # BLD AUTO: 225 THOUSANDS/UL (ref 149–390)
PMV BLD AUTO: 10.7 FL (ref 8.9–12.7)
POTASSIUM SERPL-SCNC: 2.7 MMOL/L (ref 3.5–5.3)
POTASSIUM SERPL-SCNC: 3 MMOL/L (ref 3.5–5.3)
PR INTERVAL: 204 MS
PROCALCITONIN SERPL-MCNC: 2.63 NG/ML
QRS AXIS: 66 DEGREES
QRSD INTERVAL: 90 MS
QT INTERVAL: 326 MS
QTC INTERVAL: 427 MS
RBC # BLD AUTO: 3.49 MILLION/UL (ref 3.81–5.12)
SODIUM SERPL-SCNC: 136 MMOL/L (ref 135–147)
SODIUM SERPL-SCNC: 138 MMOL/L (ref 135–147)
T WAVE AXIS: 30 DEGREES
VANCOMYCIN SERPL-MCNC: 10.2 UG/ML (ref 10–20)
VENTRICULAR RATE: 103 BPM
WBC # BLD AUTO: 16.37 THOUSAND/UL (ref 4.31–10.16)

## 2024-01-09 PROCEDURE — 80048 BASIC METABOLIC PNL TOTAL CA: CPT

## 2024-01-09 PROCEDURE — 87493 C DIFF AMPLIFIED PROBE: CPT

## 2024-01-09 PROCEDURE — 87505 NFCT AGENT DETECTION GI: CPT

## 2024-01-09 PROCEDURE — 99232 SBSQ HOSP IP/OBS MODERATE 35: CPT | Performed by: UROLOGY

## 2024-01-09 PROCEDURE — 85025 COMPLETE CBC W/AUTO DIFF WBC: CPT

## 2024-01-09 PROCEDURE — 83735 ASSAY OF MAGNESIUM: CPT

## 2024-01-09 PROCEDURE — 82948 REAGENT STRIP/BLOOD GLUCOSE: CPT

## 2024-01-09 PROCEDURE — 84145 PROCALCITONIN (PCT): CPT

## 2024-01-09 PROCEDURE — 99232 SBSQ HOSP IP/OBS MODERATE 35: CPT | Performed by: PSYCHIATRY & NEUROLOGY

## 2024-01-09 PROCEDURE — 97530 THERAPEUTIC ACTIVITIES: CPT

## 2024-01-09 PROCEDURE — 97163 PT EVAL HIGH COMPLEX 45 MIN: CPT

## 2024-01-09 PROCEDURE — 80202 ASSAY OF VANCOMYCIN: CPT

## 2024-01-09 PROCEDURE — 99233 SBSQ HOSP IP/OBS HIGH 50: CPT | Performed by: INTERNAL MEDICINE

## 2024-01-09 RX ORDER — INSULIN ASPART 100 [IU]/ML
15 INJECTION, SUSPENSION SUBCUTANEOUS
Qty: 9 ML | Refills: 0 | Status: SHIPPED | OUTPATIENT
Start: 2024-01-09 | End: 2024-02-08

## 2024-01-09 RX ORDER — POTASSIUM CHLORIDE 14.9 MG/ML
20 INJECTION INTRAVENOUS ONCE
Status: COMPLETED | OUTPATIENT
Start: 2024-01-09 | End: 2024-01-09

## 2024-01-09 RX ORDER — LORAZEPAM 2 MG/ML
2 INJECTION INTRAMUSCULAR AS NEEDED
Status: DISCONTINUED | OUTPATIENT
Start: 2024-01-09 | End: 2024-01-11 | Stop reason: HOSPADM

## 2024-01-09 RX ORDER — POTASSIUM CHLORIDE 20 MEQ/1
20 TABLET, EXTENDED RELEASE ORAL ONCE
Status: COMPLETED | OUTPATIENT
Start: 2024-01-09 | End: 2024-01-09

## 2024-01-09 RX ORDER — VANCOMYCIN HYDROCHLORIDE 1 G/200ML
1000 INJECTION, SOLUTION INTRAVENOUS EVERY 12 HOURS
Status: DISCONTINUED | OUTPATIENT
Start: 2024-01-09 | End: 2024-01-09

## 2024-01-09 RX ORDER — LACOSAMIDE 100 MG/1
100 TABLET ORAL EVERY 12 HOURS SCHEDULED
Status: DISCONTINUED | OUTPATIENT
Start: 2024-01-09 | End: 2024-01-09

## 2024-01-09 RX ADMIN — POTASSIUM CHLORIDE 20 MEQ: 14.9 INJECTION, SOLUTION INTRAVENOUS at 14:49

## 2024-01-09 RX ADMIN — LISINOPRIL 20 MG: 20 TABLET ORAL at 09:19

## 2024-01-09 RX ADMIN — INSULIN LISPRO 2 UNITS: 100 INJECTION, SOLUTION INTRAVENOUS; SUBCUTANEOUS at 17:51

## 2024-01-09 RX ADMIN — POTASSIUM CHLORIDE 20 MEQ: 1500 TABLET, EXTENDED RELEASE ORAL at 13:25

## 2024-01-09 RX ADMIN — CEFEPIME 2000 MG: 2 INJECTION, POWDER, FOR SOLUTION INTRAVENOUS at 12:33

## 2024-01-09 RX ADMIN — INSULIN LISPRO 5 UNITS: 100 INJECTION, SOLUTION INTRAVENOUS; SUBCUTANEOUS at 09:20

## 2024-01-09 RX ADMIN — SODIUM CHLORIDE 100 ML/HR: 0.9 INJECTION, SOLUTION INTRAVENOUS at 04:23

## 2024-01-09 RX ADMIN — Medication 1000 UNITS: at 09:19

## 2024-01-09 RX ADMIN — INSULIN LISPRO 3 UNITS: 100 INJECTION, SOLUTION INTRAVENOUS; SUBCUTANEOUS at 09:18

## 2024-01-09 RX ADMIN — INSULIN GLARGINE 16 UNITS: 100 INJECTION, SOLUTION SUBCUTANEOUS at 23:25

## 2024-01-09 RX ADMIN — VANCOMYCIN HYDROCHLORIDE 1000 MG: 1 INJECTION, SOLUTION INTRAVENOUS at 13:24

## 2024-01-09 RX ADMIN — INSULIN LISPRO 5 UNITS: 100 INJECTION, SOLUTION INTRAVENOUS; SUBCUTANEOUS at 12:35

## 2024-01-09 RX ADMIN — OXYCODONE HYDROCHLORIDE AND ACETAMINOPHEN 1000 MG: 500 TABLET ORAL at 09:19

## 2024-01-09 RX ADMIN — HEPARIN SODIUM 5000 UNITS: 5000 INJECTION INTRAVENOUS; SUBCUTANEOUS at 23:26

## 2024-01-09 RX ADMIN — INSULIN LISPRO 2 UNITS: 100 INJECTION, SOLUTION INTRAVENOUS; SUBCUTANEOUS at 12:34

## 2024-01-09 RX ADMIN — HEPARIN SODIUM 5000 UNITS: 5000 INJECTION INTRAVENOUS; SUBCUTANEOUS at 13:25

## 2024-01-09 RX ADMIN — HEPARIN SODIUM 5000 UNITS: 5000 INJECTION INTRAVENOUS; SUBCUTANEOUS at 05:25

## 2024-01-09 RX ADMIN — INSULIN LISPRO 5 UNITS: 100 INJECTION, SOLUTION INTRAVENOUS; SUBCUTANEOUS at 17:51

## 2024-01-09 RX ADMIN — CEFEPIME 2000 MG: 2 INJECTION, POWDER, FOR SOLUTION INTRAVENOUS at 01:08

## 2024-01-09 RX ADMIN — FERROUS SULFATE TAB 325 MG (65 MG ELEMENTAL FE) 325 MG: 325 (65 FE) TAB at 09:20

## 2024-01-09 NOTE — PROGRESS NOTES
[facility]  Progress Note  Name: Nathalia Monge I  MRN: [id number]  Unit/Bed#: S -01 I Date of Admission: 1/8/2024   Date of Service: 1/9/2024 I Hospital Day: 1    Assessment/Plan   * Partial idiopathic epilepsy with seizures of localized onset, intractable, without status epilepticus (HCC)  Assessment & Plan  Patient presenting with left upper extremity partial seizure occurring last night, also endorses 5 grand mal seizures within the past week  Home regimen Vimpat 150 mg twice daily, reports compliance  Remote history of meningioma s/p resection in 2018, followed by neurology outpatient  Denies tongue biting, urinary or fecal incontinence.  Neurology was consulted.  Vimpat was discontinued initially and transfer order placed for [place] for video EEG to rule out nonepileptic spells versus epileptic seizures.  Due to ongoing medical issues, as well as patient's wish, transfer canceled to [place].    Plan:  Neurology following.  Await recommendations regarding antiseizure medication.  Patient is taking diet and fluids orally.  Discontinue IV fluids.   Folow up Lacosamide level.  PT OT consulted  Case management consulted as patient has difficulty obtaining medications due to affordability.    Sepsis without acute organ dysfunction (HCC)  Assessment & Plan  102F, tachycardia 100s  UA showing pyuria  Patient endorsing urgency and frequency, denies dysuria ongoing for the past 2 months  Potential source being possible right-sided pyelonephritis with perinephric abscess  On exam, unable to appreciate CVA tenderness due to patient having bilateral back pain  Given ceftriaxone and vancomycin in the ED also bolused 2 L NS.  Blood cultures growing Klebsiella.  Patient continues to be febrile.    Plan:  Continue cefepime and vancomycin day 2.    Awaiting blood culture sensitivities to de-escalate antibiotics.  Urology consulted, they stated no need for IR drainage at this time    Type 2  diabetes mellitus with hyperglycemia, with long-term current use of insulin (HCC)  Assessment & Plan  Lab Results   Component Value Date    HGBA1C 13.4 (H) 01/03/2024       Recent Labs     01/08/24  1214 01/08/24  1641 01/09/24  0719 01/09/24  1120   POCGLU 317* 305* 246* 194*       Blood Sugar Average: Last 72 hrs:  (P) 288.4  Last A1c 13, placed recently on NovoLog 70/30 regimen  Patient unable to take her insulin because she cannot afford it, currently on no diabetic meds  No PCP  Glucose in a.m. 246 today and minimum 173.    Plan:  Start lispro 5 units 3 times daily with meals  Start Lantus 16 units at bedtime  Follow-up fingersticks, adjust regimen as needed  Diabetic diet  Nutrition consult  Establish care with PCP on discharge  Case management consult for insulin.    Hypertensive urgency  Assessment & Plan  Home regimen lisinopril 20 mg daily, unable to take it since yesterday but does report compliance   systolic.  She required 2 doses of IV hydralazine yesterday.  Blood pressure improved today.      Plan:  Continue lisinopril 20 mg daily  Hydralazine 5 mg IV every 6 hours as needed for SBP greater than 180.    Meningioma (HCC)  Assessment & Plan  Status postresection in 2018, follows with neurology for serial MRIs             VTE Pharmacologic Prophylaxis: VTE Score: 7 High Risk (Score >/= 5) - Pharmacological DVT Prophylaxis Ordered: heparin. Sequential Compression Devices Ordered.    Mobility:   Basic Mobility Inpatient Raw Score: 20  JH-HLM Goal: 6: Walk 10 steps or more  JH-HLM Achieved: 4: Move to chair/commode  HLM Goal achieved. Continue to encourage appropriate mobility.    Patient Centered Rounds: I performed bedside rounds with nursing staff today.  Discussions with Specialists or Other Care Team Provider: Dr. Gan    Education and Discussions with Family / Patient: Updated  () at bedside.    Current Length of Stay: 1 day(s)  Current Patient Status: Inpatient   Discharge  Plan: Anticipate discharge in 24-48 hrs to home.    Code Status: Level 3 - DNAR and DNI    Subjective:   Patient was seen at bedside today.  She was awake and alert.  Initially she was very frustrated but later on calm down.  States that she is having chills and feels febrile.  She is very worried about renal abscess and does not want to be transferred to [place] for seizure workup.  States that she does not want to take Vimpat since it makes her drowsy.  She has had no episodes of epileptic spells since admission.    Objective:     Vitals:   Temp (24hrs), Av.8 °F (37.7 °C), Min:98.3 °F (36.8 °C), Max:101.3 °F (38.5 °C)    Temp:  [98.3 °F (36.8 °C)-101.3 °F (38.5 °C)] 99.9 °F (37.7 °C)  HR:  [] 100  Resp:  [18] 18  BP: (147-178)/(62-80) 161/71  SpO2:  [90 %-96 %] 90 %  There is no height or weight on file to calculate BMI.     Input and Output Summary (last 24 hours):     Intake/Output Summary (Last 24 hours) at 2024 1452  Last data filed at 2024 0716  Gross per 24 hour   Intake 480 ml   Output --   Net 480 ml     Review of Systems   Constitutional:  Positive for chills and fever.   HENT:  Negative for ear pain and sore throat.    Eyes:  Negative for pain and visual disturbance.   Respiratory:  Negative for cough and shortness of breath.    Cardiovascular:  Negative for chest pain and palpitations.   Gastrointestinal:  Negative for abdominal pain and vomiting.   Genitourinary:  Negative for dysuria and hematuria.   Musculoskeletal:  Positive for back pain. Negative for arthralgias.   Skin:  Negative for color change and rash.   Neurological:  Negative for seizures and syncope.   All other systems reviewed and are negative.        Physical Exam:   Physical Exam  Vitals and nursing note reviewed.   Constitutional:       General: She is not in acute distress.     Appearance: She is well-developed.   HENT:      Head: Normocephalic and atraumatic.   Eyes:      Conjunctiva/sclera: Conjunctivae normal.    Cardiovascular:      Rate and Rhythm: Normal rate and regular rhythm.      Heart sounds: No murmur heard.  Pulmonary:      Effort: Pulmonary effort is normal. No respiratory distress.      Breath sounds: Normal breath sounds.   Abdominal:      Palpations: Abdomen is soft.      Tenderness: There is no abdominal tenderness.   Musculoskeletal:         General: No swelling.      Cervical back: Neck supple.   Skin:     General: Skin is warm and dry.      Capillary Refill: Capillary refill takes less than 2 seconds.   Neurological:      Mental Status: She is alert.   Psychiatric:         Mood and Affect: Mood normal.          Additional Data:     Labs:  Results from last 7 days   Lab Units 01/09/24  1130   WBC Thousand/uL 16.37*   HEMOGLOBIN g/dL 10.1*   HEMATOCRIT % 29.9*   PLATELETS Thousands/uL 225   NEUTROS PCT % 84*   LYMPHS PCT % 10*   MONOS PCT % 5   EOS PCT % 0     Results from last 7 days   Lab Units 01/09/24  1130 01/08/24  0228   SODIUM mmol/L 136 136   POTASSIUM mmol/L 2.7* 3.1*   CHLORIDE mmol/L 103 99   CO2 mmol/L 26 26   BUN mg/dL 11 21   CREATININE mg/dL 0.93 1.00   ANION GAP mmol/L 7 11   CALCIUM mg/dL 8.6 9.0   ALBUMIN g/dL  --  3.8   TOTAL BILIRUBIN mg/dL  --  0.48   ALK PHOS U/L  --  124*   ALT U/L  --  27   AST U/L  --  23   GLUCOSE RANDOM mg/dL 173* 405*     Results from last 7 days   Lab Units 01/08/24  0228   INR  0.94     Results from last 7 days   Lab Units 01/09/24  1120 01/09/24  0719 01/08/24  1641 01/08/24  1214 01/08/24  0210 01/04/24  1100 01/04/24  0722 01/04/24  0254 01/03/24  2102 01/03/24  1654 01/03/24  1104 01/03/24  0609   POC GLUCOSE mg/dl 194* 246* 305* 317* 380* 306* 218* 211* 400* 364* 292* 350*     Results from last 7 days   Lab Units 01/03/24  0420   HEMOGLOBIN A1C % 13.4*     Results from last 7 days   Lab Units 01/09/24  1130 01/08/24  0228 01/02/24  2225   LACTIC ACID mmol/L  --  1.7 1.8   PROCALCITONIN ng/ml 2.63* 1.58*  --        Lines/Drains:  Invasive Devices        Peripheral Intravenous Line  Duration             Peripheral IV 01/08/24 Left;Ventral (anterior) Forearm 1 day    Peripheral IV 01/08/24 Right;Ventral (anterior) Forearm 1 day                          Imaging: Reviewed radiology reports from this admission including: chest CT scan and abdominal/pelvic CT    Recent Cultures (last 7 days):   Results from last 7 days   Lab Units 01/08/24  0311 01/08/24  0243 01/08/24  0228   BLOOD CULTURE  No Growth at 24 hrs.  --   --    GRAM STAIN RESULT   --  Gram negative rods*  --    URINE CULTURE   --   --  Culture too young- will reincubate       Last 24 Hours Medication List:   Current Facility-Administered Medications   Medication Dose Route Frequency Provider Last Rate    acetaminophen  650 mg Oral Q6H PRN Albert De Guzman MD      vitamin C  1,000 mg Oral Daily Albert De Guzman MD      cefepime  2,000 mg Intravenous Q12H Albert De Guzman MD 2,000 mg (01/09/24 1233)    cholecalciferol  1,000 Units Oral Daily Albert De Guzman MD      ferrous sulfate  325 mg Oral Daily Albert De Guzman MD      heparin (porcine)  5,000 Units Subcutaneous Q8H DANTE Albert De Guzman MD      hydrALAZINE  5 mg Intravenous Q6H PRN Albert De Guzman MD      insulin glargine  16 Units Subcutaneous HS Albert De Guzman MD      insulin lispro  1-6 Units Subcutaneous TID AC Albert De Guzman MD      insulin lispro  5 Units Subcutaneous TID With Meals Albert De Guzman MD      lisinopril  20 mg Oral Daily Albert De Guzman MD      LORazepam  2 mg Intravenous PRN Janelle Max MD      potassium chloride  20 mEq Intravenous Once Piper Montaño MD 20 mEq (01/09/24 1449)    vancomycin  1,000 mg Intravenous Q12H Awilda Gan MD 1,000 mg (01/09/24 1324)        Today, Patient Was Seen By: Piper Montaño MD    **Please Note: This note may have been constructed using a voice recognition system.**

## 2024-01-09 NOTE — ASSESSMENT & PLAN NOTE
Home regimen lisinopril 20 mg daily, unable to take it since yesterday but does report compliance   systolic.  She required 2 doses of IV hydralazine yesterday.  Blood pressure improved today.      Plan:  Continue lisinopril 20 mg daily  Hydralazine 5 mg IV every 6 hours as needed for SBP greater than 180.

## 2024-01-09 NOTE — PROGRESS NOTES
Nathalia Monge is a 63 y.o. female who is currently ordered Vancomycin IV with management by the Pharmacy Consult service.  Relevant clinical data and objective / subjective history reviewed.  Vancomycin Assessment:  Indication and Goal AUC/Trough: Bacteremia (goal -600, trough >10)  Clinical Status: stable  Micro:     Renal Function:  SCr: 0.93 mg/dL  CrCl: 65.5 mL/min  Renal replacement: Not on dialysis  Days of Therapy: 2  Current Dose: 1500 mg IV q24h  Vancomycin Plan:  New Dosin mg IV q12h   Estimated AUC: 497 mcg*hr/mL  Estimated Trough: 16 mcg/mL  Next Level:  at 0600  Renal Function Monitoring: Daily BMP and UOP  Pharmacy will continue to follow closely for s/sx of nephrotoxicity, infusion reactions and appropriateness of therapy.  BMP and CBC will be ordered per protocol. We will continue to follow the patient’s culture results and clinical progress daily.    Doc Simeon, Pharmacist

## 2024-01-09 NOTE — ASSESSMENT & PLAN NOTE
Lab Results   Component Value Date    HGBA1C 13.4 (H) 01/03/2024       Recent Labs     01/08/24  1214 01/08/24  1641 01/09/24  0719 01/09/24  1120   POCGLU 317* 305* 246* 194*       Blood Sugar Average: Last 72 hrs:  (P) 288.4  Last A1c 13, placed recently on NovoLog 70/30 regimen  Patient unable to take her insulin because she cannot afford it, currently on no diabetic meds  No PCP  Glucose in a.m. 246 today and minimum 173.    Plan:  Start lispro 5 units 3 times daily with meals  Start Lantus 16 units at bedtime  Follow-up fingersticks, adjust regimen as needed  Diabetic diet  Nutrition consult  Establish care with PCP on discharge  Case management consult for insulin.

## 2024-01-09 NOTE — PROGRESS NOTES
Progress Note - Urology  Nathalia Monge 1960, 63 y.o. female MRN: [id number]    Unit/Bed#: S -01 Encounter: [id number]      Assessment & Plan:  Pyelonephritis  Sepsis  -  CT showing 2.9 cm masslike hypoenhancing focus involving midpole of the right kidney with mild perinephric fat stranding.   - VSS. 24 T max 101.3F  - WBC 16, yesterday 10  - Urine cx pending.   - Blood cx pending  - Will continue to monitor temp, hemodynamics. If no improvement in 48 hours, would reimage to assess for drainable collection   - Urology will continue to follow     Subjective:   HPI: Seen at bedside. Reports dysuria has improved. No flank pain    Review of Systems   Constitutional:  Positive for fever. Negative for chills.   Respiratory:  Negative for cough and shortness of breath.    Cardiovascular:  Negative for chest pain and palpitations.   Gastrointestinal:  Negative for vomiting.   Genitourinary:  Negative for dysuria and hematuria.   Musculoskeletal:  Positive for back pain. Negative for arthralgias.   Skin:  Negative for color change and rash.   All other systems reviewed and are negative.      Objective:  Vitals: Blood pressure 161/71, pulse 100, temperature 99.9 °F (37.7 °C), resp. rate 18, SpO2 90%.,There is no height or weight on file to calculate BMI.    Physical Exam  Vitals reviewed.   Constitutional:       General: She is not in acute distress.     Appearance: Normal appearance. She is normal weight. She is not ill-appearing, toxic-appearing or diaphoretic.   HENT:      Head: Normocephalic and atraumatic.      Right Ear: External ear normal.      Left Ear: External ear normal.      Nose: Nose normal.      Mouth/Throat:      Mouth: Mucous membranes are moist.   Eyes:      General: No scleral icterus.     Conjunctiva/sclera: Conjunctivae normal.   Cardiovascular:      Rate and Rhythm: Normal rate.      Pulses: Normal pulses.   Pulmonary:      Effort: Pulmonary effort is normal.   Abdominal:      General: Abdomen is  flat. There is no distension.      Palpations: Abdomen is soft.      Tenderness: There is no abdominal tenderness. There is no guarding.   Musculoskeletal:         General: Normal range of motion.      Cervical back: Normal range of motion.   Skin:     General: Skin is warm.      Findings: No rash.   Neurological:      General: No focal deficit present.      Mental Status: She is alert and oriented to person, place, and time. Mental status is at baseline.   Psychiatric:         Mood and Affect: Mood normal.         Behavior: Behavior normal.         Thought Content: Thought content normal.         Judgment: Judgment normal.             Labs:  Recent Labs     24  1130   WBC 10.37* 16.37*       Recent Labs     248 24  1130   HGB 11.3* 10.1*     Recent Labs     24  1130   HCT 33.8* 29.9*     Recent Labs     24  1130   CREATININE 1.00 0.93         History:    Past Medical History:   Diagnosis Date    Bipolar depression (HCC)     Cancer (HCC)     Depression     Diabetes mellitus (HCC)     Hypertension     PTSD (post-traumatic stress disorder) 2002    Seizures (HCC)      Social History     Socioeconomic History    Marital status: Significant Other     Spouse name: Marty    Number of children: 4    Years of education: Some college    Highest education level: None   Occupational History    Occupation: disabled     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   Tobacco Use    Smoking status: Former     Current packs/day: 0.00     Types: Cigarettes     Quit date: 3/5/2000     Years since quittin.8    Smokeless tobacco: Never   Vaping Use    Vaping status: Never Used   Substance and Sexual Activity    Alcohol use: Never    Drug use: No    Sexual activity: Yes     Partners: Male   Other Topics Concern    None   Social History Narrative    Lives at home with significant otherMarty     Social Determinants of Health     Financial Resource Strain:  Not on file   Food Insecurity: No Food Insecurity (1/8/2024)    Hunger Vital Sign     Worried About Running Out of Food in the Last Year: Never true     Ran Out of Food in the Last Year: Never true   Transportation Needs: No Transportation Needs (1/8/2024)    PRAPARE - Transportation     Lack of Transportation (Medical): No     Lack of Transportation (Non-Medical): No   Physical Activity: Not on file   Stress: Not on file   Social Connections: Not on file   Intimate Partner Violence: Not on file   Housing Stability: Low Risk  (1/8/2024)    Housing Stability Vital Sign     Unable to Pay for Housing in the Last Year: No     Number of Places Lived in the Last Year: 1     Unstable Housing in the Last Year: No     Past Surgical History:   Procedure Laterality Date    CHOLECYSTECTOMY  2004    HYSTERECTOMY      IMPLANTABLE CONTACT LENS IMPLANTATION      ND CRANIEC TREPHINE BONE FLP BRAIN TUMOR SUPRTENTOR Right 3/15/2018    Procedure: IMAGE-GUIDED RIGHT FRONTOPARIETAL CRANIOTOMY FOR TUMOR;  Surgeon: Arsh Jeronimo MD;  Location: [facility];  Service: Neurosurgery     Family History   Adopted: Yes       Sylvia Soto PA-C  Date: 1/9/2024 Time: 12:57 PM

## 2024-01-09 NOTE — ASSESSMENT & PLAN NOTE
102F, tachycardia 100s  UA showing pyuria  Patient endorsing urgency and frequency, denies dysuria ongoing for the past 2 months  Potential source being possible right-sided pyelonephritis with perinephric abscess  On exam, unable to appreciate CVA tenderness due to patient having bilateral back pain  Given ceftriaxone and vancomycin in the ED also bolused 2 L NS.  Blood cultures growing Klebsiella.  Patient continues to be febrile.    Plan:  Continue cefepime and vancomycin day 2.    Awaiting blood culture sensitivities to de-escalate antibiotics.  Urology consulted, they stated no need for IR drainage at this time

## 2024-01-09 NOTE — PLAN OF CARE
Problem: PHYSICAL THERAPY ADULT  Goal: Performs mobility at highest level of function for planned discharge setting.  See evaluation for individualized goals.  Description: Treatment/Interventions: Functional transfer training, LE strengthening/ROM, Elevations, Therapeutic exercise, Endurance training, Patient/family training, Equipment eval/education, Bed mobility, Gait training, Spoke to nursing  Equipment Recommended: Walker (TBD; recommend RW at this time, but pt currently declines recommendation)       See flowsheet documentation for full assessment, interventions and recommendations.  1/9/2024 1344 by Marilee Peterson, PT  Note: Prognosis: Fair  Problem List: Decreased strength, Impaired balance, Decreased endurance, Decreased mobility, Impaired judgement, Decreased safety awareness, Decreased skin integrity  Assessment: Pt is a 62 yo female admitted to [facility] 2* hypokalemia, sepsis, HTN urgency, uncontrolled DM2, x5 grand mal seizures x1 week period PTA, idiopathic epilepsy. Pt lives with  in camper/mobile home, 1 level, (+)BRAYDEN, no use of DME PTA, reports being (I) with mobility, ADLs and IADLs PTA. Per pt, pts  willing and able to A pt upon DC as needed daily. Pt currently is not at functional mobility baseline, needs A for mobility, ataxic and unsteady gait and movement pattern, currently on contact precautions to r/o CDiff, impulsive at times, multiple lines, masimo monitoring, dec safety awareness. Pt demonstrates minimal deficits during functional mobility and gait including dec endurance, dec BLE strength, dec balance, dec safety awareness, impulsive, and needs S for bed mobility and TT, min Ax1 for GT with use of IV pole. Pt would cont to benefit from skilled inpt PT services to maximize functional independence and to dec caregiver burden upon being DC from the hospital.  Barriers to Discharge: Inaccessible home environment ((+)BRAYDEN)     Rehab Resource Intensity Level, PT: III (Minimum  Resource Intensity)    See flowsheet documentation for full assessment.     1/9/2024 1343 by Marilee Peterson, PT  Note: Prognosis: Fair  Problem List: Decreased strength, Impaired balance, Decreased endurance, Decreased mobility, Impaired judgement, Decreased safety awareness, Decreased skin integrity  Assessment: Pt is a 64 yo female admitted to [facility] 2* hypokalemia, sepsis, HTN urgency, uncontrolled DM2, x5 grand mal seizures x1 week period PTA, idiopathic epilepsy. Pt lives with  in camper/mobile home, 1 level, (+)BRAYDEN, no use of DME PTA, reports being (I) with mobility, ADLs and IADLs PTA. Per pt, pts  willing and able to A pt upon DC as needed daily. Pt currently is not at functional mobility baseline, needs A for mobility, ataxic and unsteady gait and movement pattern, currently on contact precautions to r/o CDiff, impulsive at times, multiple lines, masimo monitoring, dec safety awareness. Pt demonstrates minimal deficits during functional mobility and gait including dec endurance, dec BLE strength, dec balance, dec safety awareness, impulsive, and needs S for bed mobility and TT, min Ax1 for GT with use of IV pole. Pt would cont to benefit from skilled inpt PT services to maximize functional independence and to dec caregiver burden upon being DC from the hospital.  Barriers to Discharge: Inaccessible home environment ((+)BRAYDEN)     Rehab Resource Intensity Level, PT: III (Minimum Resource Intensity)    See flowsheet documentation for full assessment.

## 2024-01-09 NOTE — ASSESSMENT & PLAN NOTE
Patient presenting with left upper extremity partial seizure occurring last night, also endorses 5 grand mal seizures within the past week  Home regimen Vimpat 150 mg twice daily, reports compliance  Remote history of meningioma s/p resection in 2018, followed by neurology outpatient  Denies tongue biting, urinary or fecal incontinence.  Neurology was consulted.  Vimpat was discontinued initially and transfer order placed for [place] for video EEG to rule out nonepileptic spells versus epileptic seizures.  Due to ongoing medical issues, as well as patient's wish, transfer canceled to [place].    Plan:  Neurology following.  Await recommendations regarding antiseizure medication.  Patient is taking diet and fluids orally.  Discontinue IV fluids.   Folow up Lacosamide level.  PT OT consulted  Case management consulted as patient has difficulty obtaining medications due to affordability.
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
## 2024-01-10 ENCOUNTER — TELEPHONE (OUTPATIENT)
Age: 64
End: 2024-01-10

## 2024-01-10 PROBLEM — N15.1 RENAL ABSCESS, RIGHT: Status: ACTIVE | Noted: 2024-01-10

## 2024-01-10 LAB
ANION GAP SERPL CALCULATED.3IONS-SCNC: 9 MMOL/L
BACTERIA UR CULT: NORMAL
BUN SERPL-MCNC: 22 MG/DL (ref 5–25)
C COLI+JEJUNI TUF STL QL NAA+PROBE: NORMAL
C DIFF TOX A+B STL QL IA: NEGATIVE
C DIFF TOX GENS STL QL NAA+PROBE: POSITIVE
CALCIUM SERPL-MCNC: 9.1 MG/DL (ref 8.4–10.2)
CHLORIDE SERPL-SCNC: 102 MMOL/L (ref 96–108)
CO2 SERPL-SCNC: 27 MMOL/L (ref 21–32)
CREAT SERPL-MCNC: 1.15 MG/DL (ref 0.6–1.3)
EC STX1+STX2 GENES STL QL NAA+PROBE: NORMAL
ERYTHROCYTE [DISTWIDTH] IN BLOOD BY AUTOMATED COUNT: 12.3 % (ref 11.6–15.1)
GFR SERPL CREATININE-BSD FRML MDRD: 50 ML/MIN/1.73SQ M
GLUCOSE SERPL-MCNC: 130 MG/DL (ref 65–140)
GLUCOSE SERPL-MCNC: 234 MG/DL (ref 65–140)
GLUCOSE SERPL-MCNC: 290 MG/DL (ref 65–140)
GLUCOSE SERPL-MCNC: 313 MG/DL (ref 65–140)
HCT VFR BLD AUTO: 31.5 % (ref 34.8–46.1)
HGB BLD-MCNC: 10.6 G/DL (ref 11.5–15.4)
LACOSAMIDE SERPL-MCNC: 11.8 UG/ML (ref 5–10)
MCH RBC QN AUTO: 29.6 PG (ref 26.8–34.3)
MCHC RBC AUTO-ENTMCNC: 33.7 G/DL (ref 31.4–37.4)
MCV RBC AUTO: 88 FL (ref 82–98)
PLATELET # BLD AUTO: 260 THOUSANDS/UL (ref 149–390)
PMV BLD AUTO: 11 FL (ref 8.9–12.7)
POTASSIUM SERPL-SCNC: 3.4 MMOL/L (ref 3.5–5.3)
RBC # BLD AUTO: 3.58 MILLION/UL (ref 3.81–5.12)
SALMONELLA SP SPAO STL QL NAA+PROBE: NORMAL
SHIGELLA SP+EIEC IPAH STL QL NAA+PROBE: NORMAL
SODIUM SERPL-SCNC: 138 MMOL/L (ref 135–147)
WBC # BLD AUTO: 10.61 THOUSAND/UL (ref 4.31–10.16)

## 2024-01-10 PROCEDURE — 82948 REAGENT STRIP/BLOOD GLUCOSE: CPT

## 2024-01-10 PROCEDURE — 85027 COMPLETE CBC AUTOMATED: CPT

## 2024-01-10 PROCEDURE — 99232 SBSQ HOSP IP/OBS MODERATE 35: CPT | Performed by: INTERNAL MEDICINE

## 2024-01-10 PROCEDURE — 80048 BASIC METABOLIC PNL TOTAL CA: CPT

## 2024-01-10 PROCEDURE — 99232 SBSQ HOSP IP/OBS MODERATE 35: CPT | Performed by: UROLOGY

## 2024-01-10 RX ORDER — LANCETS 33 GAUGE
EACH MISCELLANEOUS
Qty: 100 EACH | Refills: 0 | Status: SHIPPED | OUTPATIENT
Start: 2024-01-10

## 2024-01-10 RX ORDER — VANCOMYCIN HYDROCHLORIDE 125 MG/1
125 CAPSULE ORAL EVERY 6 HOURS SCHEDULED
Status: CANCELLED | OUTPATIENT
Start: 2024-01-10 | End: 2024-01-20

## 2024-01-10 RX ORDER — POTASSIUM CHLORIDE 20 MEQ/1
20 TABLET, EXTENDED RELEASE ORAL ONCE
Status: COMPLETED | OUTPATIENT
Start: 2024-01-10 | End: 2024-01-10

## 2024-01-10 RX ORDER — POTASSIUM CHLORIDE 20 MEQ/1
40 TABLET, EXTENDED RELEASE ORAL ONCE
Status: COMPLETED | OUTPATIENT
Start: 2024-01-10 | End: 2024-01-10

## 2024-01-10 RX ORDER — INSULIN HUMAN 100 [IU]/ML
15 INJECTION, SUSPENSION SUBCUTANEOUS
Qty: 3 ML | Refills: 0 | Status: SHIPPED | OUTPATIENT
Start: 2024-01-10

## 2024-01-10 RX ORDER — BLOOD SUGAR DIAGNOSTIC
STRIP MISCELLANEOUS
Qty: 100 EACH | Refills: 0 | Status: SHIPPED | OUTPATIENT
Start: 2024-01-10

## 2024-01-10 RX ORDER — GLUCOSAMINE HCL/CHONDROITIN SU 500-400 MG
CAPSULE ORAL
Qty: 100 EACH | Refills: 0 | Status: SHIPPED | OUTPATIENT
Start: 2024-01-10 | End: 2024-01-15

## 2024-01-10 RX ORDER — INSULIN ASPART 100 [IU]/ML
15 INJECTION, SUSPENSION SUBCUTANEOUS
Status: DISCONTINUED | OUTPATIENT
Start: 2024-01-10 | End: 2024-01-11 | Stop reason: HOSPADM

## 2024-01-10 RX ORDER — BLOOD-GLUCOSE METER
KIT MISCELLANEOUS
Qty: 1 KIT | Refills: 0 | Status: SHIPPED | OUTPATIENT
Start: 2024-01-10

## 2024-01-10 RX ORDER — SYRINGE-NEEDLE,INSULIN,0.5 ML 27GX1/2"
SYRINGE, EMPTY DISPOSABLE MISCELLANEOUS
Qty: 100 EACH | Refills: 0 | Status: SHIPPED | OUTPATIENT
Start: 2024-01-10

## 2024-01-10 RX ADMIN — OXYCODONE HYDROCHLORIDE AND ACETAMINOPHEN 1000 MG: 500 TABLET ORAL at 08:00

## 2024-01-10 RX ADMIN — POTASSIUM CHLORIDE 40 MEQ: 1500 TABLET, EXTENDED RELEASE ORAL at 18:39

## 2024-01-10 RX ADMIN — HEPARIN SODIUM 5000 UNITS: 5000 INJECTION INTRAVENOUS; SUBCUTANEOUS at 05:16

## 2024-01-10 RX ADMIN — POTASSIUM CHLORIDE 40 MEQ: 1500 TABLET, EXTENDED RELEASE ORAL at 10:09

## 2024-01-10 RX ADMIN — CEFEPIME 2000 MG: 2 INJECTION, POWDER, FOR SOLUTION INTRAVENOUS at 23:52

## 2024-01-10 RX ADMIN — FERROUS SULFATE TAB 325 MG (65 MG ELEMENTAL FE) 325 MG: 325 (65 FE) TAB at 08:00

## 2024-01-10 RX ADMIN — HEPARIN SODIUM 5000 UNITS: 5000 INJECTION INTRAVENOUS; SUBCUTANEOUS at 21:04

## 2024-01-10 RX ADMIN — HEPARIN SODIUM 5000 UNITS: 5000 INJECTION INTRAVENOUS; SUBCUTANEOUS at 13:45

## 2024-01-10 RX ADMIN — INSULIN LISPRO 5 UNITS: 100 INJECTION, SOLUTION INTRAVENOUS; SUBCUTANEOUS at 18:07

## 2024-01-10 RX ADMIN — INSULIN LISPRO 3 UNITS: 100 INJECTION, SOLUTION INTRAVENOUS; SUBCUTANEOUS at 12:17

## 2024-01-10 RX ADMIN — CEFEPIME 2000 MG: 2 INJECTION, POWDER, FOR SOLUTION INTRAVENOUS at 12:13

## 2024-01-10 RX ADMIN — INSULIN ASPART 15 UNITS: 100 INJECTION, SUSPENSION SUBCUTANEOUS at 18:16

## 2024-01-10 RX ADMIN — POTASSIUM CHLORIDE 20 MEQ: 1500 TABLET, EXTENDED RELEASE ORAL at 21:04

## 2024-01-10 RX ADMIN — LISINOPRIL 20 MG: 20 TABLET ORAL at 08:00

## 2024-01-10 RX ADMIN — Medication 1000 UNITS: at 08:00

## 2024-01-10 RX ADMIN — CEFEPIME 2000 MG: 2 INJECTION, POWDER, FOR SOLUTION INTRAVENOUS at 00:37

## 2024-01-10 NOTE — PROGRESS NOTES
Vancomycin IV Pharmacy-to-Dose Consultation     Vancomycin has been discontinued.  Pharmacy will sign off.  Please contact or re-consult with questions.    Doc Simeon, Pharmacist

## 2024-01-10 NOTE — ASSESSMENT & PLAN NOTE
102F, tachycardia 100s  UA showing pyuria  Patient endorsing urgency and frequency, denies dysuria ongoing for the past 2 months  Potential source being possible right-sided pyelonephritis with perinephric abscess  On exam, unable to appreciate CVA tenderness due to patient having bilateral back pain  Given ceftriaxone and vancomycin in the ED also bolused 2 L NS.  Blood cultures growing Klebsiella.  Patient afebrile since 24 hours.    Plan:  Continue cefepime day 3.  Discontinue vancomycin.     Awaiting blood culture sensitivities to de-escalate antibiotics.

## 2024-01-10 NOTE — PROGRESS NOTES
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  Neurology was consulted.  Vimpat was discontinued initially and transfer order placed for [facility] for video EEG to rule out nonepileptic spells versus epileptic seizures.  Due to ongoing medical issues, as well as patient's wish, transfer canceled to [facility].  Lacosamide level is 11.8. Higher than normal.    Plan:  Will keep off of AEDs. Neurology recommend outpatient ambulatory EEG monitoring.  Outpatient neurology follow-up.  Patient is ambulatory without assist, no longer require PT OT.     * Sepsis without acute organ dysfunction (HCC)  Assessment & Plan  102F, tachycardia 100s  UA showing pyuria  Patient endorsing urgency and frequency, denies dysuria ongoing for the past 2 months  Potential source being possible right-sided pyelonephritis with perinephric abscess  On exam, unable to appreciate CVA tenderness due to patient having bilateral back pain  Given ceftriaxone and vancomycin in the ED also bolused 2 L NS.  Blood cultures growing Klebsiella.  Patient afebrile since 24 hours.    Plan:  Continue cefepime day 3.  Discontinue vancomycin.     Awaiting blood culture sensitivities to de-escalate antibiotics.    Type 2 diabetes mellitus with hyperglycemia, with long-term current use of insulin ([facility])  Assessment & Plan  Lab Results    Component Value Date    HGBA1C 13.4 (H) 01/03/2024       Recent Labs     01/09/24  1120 01/09/24  1715 01/10/24  0755 01/10/24  1058   POCGLU 194* 199* 130 234*       Blood Sugar Average: Last 72 hrs:  (P) 250.625  Last A1c 13, placed recently on NovoLog 70/30 regimen  Patient unable to take her insulin because she cannot afford it, currently on no diabetic meds  No PCP  Glucose controlled    Plan:  Start lispro 5 units 3 times daily with meals  Start Lantus 16 units at bedtime  Follow-up fingersticks, adjust regimen as needed  Diabetic diet  Nutrition consult  Establish care with PCP on discharge  Case management consult for Novolog upon dc.    Hypertensive urgency  Assessment & Plan  Home regimen lisinopril 20 mg daily, unable to take it since yesterday but does report compliance   systolic.  She required 2 doses of IV hydralazine yesterday.  Blood pressure improved today.      Plan:  Continue lisinopril 20 mg daily  Hydralazine 5 mg IV every 6 hours as needed for SBP greater than 180.    Renal abscess, right  Assessment & Plan  CT abdomen this admission shows 2.9 cm masslike hypoenhancing focus involving midpole of the right kidney with mild perinephric fat stranding. Given clinical presentation this may indicate acute pyelonephritis with intrarenal abscess versus malignancy.     Plan:  Urology consulted, they stated no need for IR drainage at this time.  If WBC count is normal and patient is afebrile, she can follow-up outpatient with repeat imaging in 4 to 6 weeks.    Meningioma (HCC)  Assessment & Plan  Status postresection in 2018, follows with neurology for serial MRIs           VTE Pharmacologic Prophylaxis: VTE Score: 7 High Risk (Score >/= 5) - Pharmacological DVT Prophylaxis Ordered: heparin. Sequential Compression Devices Ordered.    Mobility:   Basic Mobility Inpatient Raw Score: 20  JH-HLM Goal: 6: Walk 10 steps or more  JH-HLM Achieved: 6: Walk 10 steps or more  HLM Goal achieved. Continue to  encourage appropriate mobility.    Patient Centered Rounds: I performed bedside rounds with nursing staff today.  Discussions with Specialists or Other Care Team Provider: Dr. Gan    Education and Discussions with Family / Patient: Updated  () at bedside.    Current Length of Stay: 2 day(s)  Current Patient Status: Inpatient   Discharge Plan: Anticipate discharge tomorrow to home.    Code Status: Level 3 - DNAR and DNI    Subjective:   Patient was seen at bedside today.  She is feeling better today and is in a pleasant mood.  She did have 4 episodes of diarrhea.  No diarrhea since morning, her appetite is good and she is able to tolerate oral intake.  No seizure activity noted.    Objective:     Vitals:   Temp (24hrs), Av.4 °F (36.9 °C), Min:98 °F (36.7 °C), Max:98.7 °F (37.1 °C)    Temp:  [98 °F (36.7 °C)-98.7 °F (37.1 °C)] 98 °F (36.7 °C)  HR:  [84-95] 84  Resp:  [16-18] 16  BP: (138-144)/(58-68) 138/68  SpO2:  [94 %] 94 %  There is no height or weight on file to calculate BMI.     Input and Output Summary (last 24 hours):   No intake or output data in the 24 hours ending 01/10/24 1130    Review of Systems   Gastrointestinal:  Positive for diarrhea.   All other systems reviewed and are negative.      Physical Exam:   Physical Exam  Vitals and nursing note reviewed.   Constitutional:       General: She is not in acute distress.     Appearance: She is well-developed.   HENT:      Head: Normocephalic and atraumatic.   Eyes:      Conjunctiva/sclera: Conjunctivae normal.   Cardiovascular:      Rate and Rhythm: Normal rate and regular rhythm.      Heart sounds: No murmur heard.  Pulmonary:      Effort: Pulmonary effort is normal. No respiratory distress.      Breath sounds: Normal breath sounds.   Abdominal:      Palpations: Abdomen is soft.      Tenderness: There is no abdominal tenderness.   Musculoskeletal:         General: No swelling.      Cervical back: Neck supple.   Skin:     General:  Skin is warm and dry.      Capillary Refill: Capillary refill takes less than 2 seconds.   Neurological:      Mental Status: She is alert.   Psychiatric:         Mood and Affect: Mood normal.          Additional Data:     Labs:  Results from last 7 days   Lab Units 01/09/24  1130   WBC Thousand/uL 16.37*   HEMOGLOBIN g/dL 10.1*   HEMATOCRIT % 29.9*   PLATELETS Thousands/uL 225   NEUTROS PCT % 84*   LYMPHS PCT % 10*   MONOS PCT % 5   EOS PCT % 0     Results from last 7 days   Lab Units 01/09/24  2206 01/09/24  1130 01/08/24  0228   SODIUM mmol/L 138   < > 136   POTASSIUM mmol/L 3.0*   < > 3.1*   CHLORIDE mmol/L 104   < > 99   CO2 mmol/L 25   < > 26   BUN mg/dL 16   < > 21   CREATININE mg/dL 1.07   < > 1.00   ANION GAP mmol/L 9   < > 11   CALCIUM mg/dL 9.0   < > 9.0   ALBUMIN g/dL  --   --  3.8   TOTAL BILIRUBIN mg/dL  --   --  0.48   ALK PHOS U/L  --   --  124*   ALT U/L  --   --  27   AST U/L  --   --  23   GLUCOSE RANDOM mg/dL 123   < > 405*    < > = values in this interval not displayed.     Results from last 7 days   Lab Units 01/08/24 0228   INR  0.94     Results from last 7 days   Lab Units 01/10/24  1058 01/10/24  0755 01/09/24  1715 01/09/24  1120 01/09/24  0719 01/08/24  1641 01/08/24  1214 01/08/24  0210 01/04/24  1100 01/04/24  0722 01/04/24  0254 01/03/24  2102   POC GLUCOSE mg/dl 234* 130 199* 194* 246* 305* 317* 380* 306* 218* 211* 400*         Results from last 7 days   Lab Units 01/09/24  1130 01/08/24 0228   LACTIC ACID mmol/L  --  1.7   PROCALCITONIN ng/ml 2.63* 1.58*       Lines/Drains:  Invasive Devices       Peripheral Intravenous Line  Duration             Peripheral IV 01/10/24 Left;Ventral (anterior) Forearm <1 day                          Imaging: No pertinent imaging reviewed.    Recent Cultures (last 7 days):   Results from last 7 days   Lab Units 01/08/24  0311 01/08/24  0243 01/08/24  0228   BLOOD CULTURE  No Growth at 48 hrs. Klebsiella-Enterobacter  group*  --    GRAM STAIN RESULT    --  Gram negative rods*  --    URINE CULTURE   --   --  Culture too young- will reincubate       Last 24 Hours Medication List:   Current Facility-Administered Medications   Medication Dose Route Frequency Provider Last Rate    acetaminophen  650 mg Oral Q6H PRN Albert De Guzman MD      vitamin C  1,000 mg Oral Daily Albert De Guzman MD      cefepime  2,000 mg Intravenous Q12H Albert De Guzman MD 2,000 mg (01/10/24 0037)    cholecalciferol  1,000 Units Oral Daily Albert De Guzman MD      ferrous sulfate  325 mg Oral Daily Albert De Guzman MD      heparin (porcine)  5,000 Units Subcutaneous Q8H [facility] Albert De Guzman MD      hydrALAZINE  5 mg Intravenous Q6H PRN Albert De Guzman MD      insulin aspart protamine-insulin aspart  15 Units Subcutaneous BID AC May Crystal Soto MD      insulin lispro  1-6 Units Subcutaneous TID AC Albert De Guzman MD      lisinopril  20 mg Oral Daily Albert De Guzman MD      LORazepam  2 mg Intravenous PRN Janelle Max MD          Today, Patient Was Seen By: Piper Montaño MD    **Please Note: This note may have been constructed using a voice recognition system.**

## 2024-01-10 NOTE — PROGRESS NOTES
Progress Note - Urology  Nathalia Monge 1960, 63 y.o. female MRN: [id number]    Unit/Bed#: S -01 Encounter: [id number]      Assessment & Plan:  Pyelonephritis  Sepsis  -  CT showing 2.9 cm masslike hypoenhancing focus involving midpole of the right kidney with mild perinephric fat stranding.  - VSS, has remained afebrile  - 1/2 blood cx positive klebsiella  - Urine cx pending  - Labs pending today-difficult stick  - Temperature trend improving.   - Can repeat US as outpatient to ensure resolution of hypoenhancing focus, developing abscess  - Urology will continue to follow     Subjective:   HPI: Seen at bedside.  No flank pain. No dysuria.     Review of Systems   Constitutional:  Negative for chills and fever.   Respiratory:  Negative for cough and shortness of breath.    Cardiovascular:  Negative for chest pain and palpitations.   Gastrointestinal:  Negative for abdominal pain and vomiting.   Genitourinary:  Negative for dysuria and hematuria.   Musculoskeletal:  Negative for arthralgias and back pain.   Skin:  Negative for color change and rash.   Neurological:  Negative for seizures and syncope.   All other systems reviewed and are negative.      Objective:    Vitals: Blood pressure 138/68, pulse 84, temperature 98 °F (36.7 °C), resp. rate 16, SpO2 94%.,There is no height or weight on file to calculate BMI.    Physical Exam  Vitals reviewed.   Constitutional:       General: She is not in acute distress.     Appearance: Normal appearance. She is normal weight. She is not toxic-appearing or diaphoretic.   HENT:      Head: Normocephalic and atraumatic.      Right Ear: External ear normal.      Left Ear: External ear normal.      Nose: Nose normal.      Mouth/Throat:      Mouth: Mucous membranes are moist.   Eyes:      General: No scleral icterus.     Conjunctiva/sclera: Conjunctivae normal.   Cardiovascular:      Rate and Rhythm: Normal rate.      Pulses: Normal pulses.   Pulmonary:      Effort: Pulmonary  effort is normal.   Abdominal:      General: Abdomen is flat.      Palpations: Abdomen is soft.   Musculoskeletal:         General: Normal range of motion.      Cervical back: Normal range of motion.   Skin:     General: Skin is warm.      Findings: No rash.   Neurological:      General: No focal deficit present.      Mental Status: She is oriented to person, place, and time. Mental status is at baseline.   Psychiatric:         Mood and Affect: Mood normal.         Behavior: Behavior normal.         Thought Content: Thought content normal.         Judgment: Judgment normal.             Labs:  Recent Labs     248 24  1130   WBC 10.37* 16.37*       Recent Labs     248 24  1130   HGB 11.3* 10.1*     Recent Labs     24  1130   HCT 33.8* 29.9*     Recent Labs     248 24  1130 24  2206   CREATININE 1.00 0.93 1.07       History:    Past Medical History:   Diagnosis Date    Bipolar depression (HCC)     Cancer (HCC)     Depression     Diabetes mellitus (HCC)     Hypertension     PTSD (post-traumatic stress disorder) 2002    Seizures (HCC)      Social History     Socioeconomic History    Marital status: Significant Other     Spouse name: Marty    Number of children: 4    Years of education: Some college    Highest education level: None   Occupational History    Occupation: disabled     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   Tobacco Use    Smoking status: Former     Current packs/day: 0.00     Types: Cigarettes     Quit date: 3/5/2000     Years since quittin.8    Smokeless tobacco: Never   Vaping Use    Vaping status: Never Used   Substance and Sexual Activity    Alcohol use: Never    Drug use: No    Sexual activity: Yes     Partners: Male   Other Topics Concern    None   Social History Narrative    Lives at home with significant otherMarty     Social Determinants of Health     Financial Resource Strain: Not on file   Food Insecurity: No  Food Insecurity (1/8/2024)    Hunger Vital Sign     Worried About Running Out of Food in the Last Year: Never true     Ran Out of Food in the Last Year: Never true   Transportation Needs: No Transportation Needs (1/8/2024)    PRAPARE - Transportation     Lack of Transportation (Medical): No     Lack of Transportation (Non-Medical): No   Physical Activity: Not on file   Stress: Not on file   Social Connections: Not on file   Intimate Partner Violence: Not on file   Housing Stability: Low Risk  (1/8/2024)    Housing Stability Vital Sign     Unable to Pay for Housing in the Last Year: No     Number of Places Lived in the Last Year: 1     Unstable Housing in the Last Year: No     Past Surgical History:   Procedure Laterality Date    CHOLECYSTECTOMY  2004    HYSTERECTOMY      IMPLANTABLE CONTACT LENS IMPLANTATION      WY CRANIEC TREPHINE BONE FLP BRAIN TUMOR SUPRTENTOR Right 3/15/2018    Procedure: IMAGE-GUIDED RIGHT FRONTOPARIETAL CRANIOTOMY FOR TUMOR;  Surgeon: Arsh Jeronimo MD;  Location: [facility];  Service: Neurosurgery     Family History   Adopted: Yes       Sylvia Soto PA-C  Date: 1/10/2024 Time: 10:51 AM

## 2024-01-10 NOTE — ASSESSMENT & PLAN NOTE
Lab Results   Component Value Date    HGBA1C 13.4 (H) 01/03/2024       Recent Labs     01/09/24  1120 01/09/24  1715 01/10/24  0755 01/10/24  1058   POCGLU 194* 199* 130 234*       Blood Sugar Average: Last 72 hrs:  (P) 250.625  Last A1c 13, placed recently on NovoLog 70/30 regimen  Patient unable to take her insulin because she cannot afford it, currently on no diabetic meds  No PCP  Glucose controlled    Plan:  Start lispro 5 units 3 times daily with meals  Start Lantus 16 units at bedtime  Follow-up fingersticks, adjust regimen as needed  Diabetic diet  Nutrition consult  Establish care with PCP on discharge  Case management consult for Novolog upon dc.

## 2024-01-10 NOTE — ASSESSMENT & PLAN NOTE
CT abdomen this admission shows 2.9 cm masslike hypoenhancing focus involving midpole of the right kidney with mild perinephric fat stranding. Given clinical presentation this may indicate acute pyelonephritis with intrarenal abscess versus malignancy.     Plan:  Urology consulted, they stated no need for IR drainage at this time.  If WBC count is normal and patient is afebrile, she can follow-up outpatient with repeat imaging in 4 to 6 weeks.

## 2024-01-10 NOTE — TELEPHONE ENCOUNTER
[facility] pharmacy called to verify medications just sent in. Advised patient is in hospital currently admitted, gave phone number to hospital   
Routine  care and anticipatory guidance

## 2024-01-10 NOTE — OCCUPATIONAL THERAPY NOTE
"  Occupational Therapy Screen      Patient Name: Nathalia Monge  Today's Date: 1/10/2024     01/10/24 1421   OT Last Visit   OT Visit Date 01/10/24   Note Type   Note type Screen   Additional Comments OT consult received and chart reviewed. Made contact with patient and spouse. Pt reports feeling \"100% better and back to normal\". She denies interest in OT services at this time as she reports being at her baseline with mobility and ADL tasks. Spoke with Joann PIPER who reports pt to be ambulating ad carmen in the room w/ spouse present. Will d/c pt from IPOT caseload. Please reconsult with any acute changes.     Morenita Rios MS OTR/L   [place] Licensure# [id number]         "

## 2024-01-10 NOTE — ASSESSMENT & PLAN NOTE
Patient presenting with left upper extremity partial seizure occurring last night, also endorses 5 grand mal seizures within the past week  Home regimen Vimpat 150 mg twice daily, reports compliance  Remote history of meningioma s/p resection in 2018, followed by neurology outpatient  Denies tongue biting, urinary or fecal incontinence.  Neurology was consulted.  Vimpat was discontinued initially and transfer order placed for [place] [facility] for video EEG to rule out nonepileptic spells versus epileptic seizures.  Due to ongoing medical issues, as well as patient's wish, transfer canceled to [facility].  Lacosamide level is 11.8. Higher than normal.    Plan:  Will keep off of AEDs. Neurology recommend outpatient ambulatory EEG monitoring.  Outpatient neurology follow-up.  Patient is ambulatory without assist, no longer require PT OT.

## 2024-01-10 NOTE — CASE MANAGEMENT
Case Management Discharge Planning Note    Patient name Nathalia LAMAR /S -01 MRN [id number]  : 1960 Date 1/10/2024       Current Admission Date: 2024  Current Admission Diagnosis:Sepsis without acute organ dysfunction (HCC)   Patient Active Problem List    Diagnosis Date Noted    Renal abscess, right 01/10/2024    Sepsis without acute organ dysfunction (HCC) 2024    Seizure-like activity (HCC) 2024    Hyponatremia 2024    S/P craniotomy 10/21/2020    Meningioma ([facility]) 2018    Hypertensive urgency 2018    Urinary urgency 2018    Partial idiopathic epilepsy with seizures of localized onset, intractable, without status epilepticus ([facility])     Type 2 diabetes mellitus with hyperglycemia, with long-term current use of insulin ([facility])       LOS (days): 2  Geometric Mean LOS (GMLOS) (days): 5.1  Days to GMLOS:2.7     OBJECTIVE:  Risk of Unplanned Readmission Score: 16.26         Current admission status: Inpatient   Preferred Pharmacy:   [id number] [place] - [address]  [address] [address]  Phone: [phone] Fax: [phone]    [id number]  [place] - [address]  [address]  [facility] [place] [address]  Phone: [phone] Fax: [phone]    [facility] [place] - [address]  [facility] [address] [address]  Phone: [phone] Fax: [phone]    Primary Care Provider: No primary care provider on file.    Primary Insurance: MEDICARE  Secondary Insurance:     DISCHARGE DETAILS:    Discharge planning discussed with:: Patient  Freedom of Choice: Yes  Comments - Freedom of Choice: Reviewed medication coverage/cost  CM contacted family/caregiver?: No- see comments  Were Treatment Team discharge recommendations reviewed with patient/caregiver?: Yes  Did patient/caregiver verbalize understanding of patient care needs?:  Yes  Were patient/caregiver advised of the risks associated with not following Treatment Team discharge recommendations?: Yes    Contacts  Patient Contacts: Patient  Relationship to Patient:: Other (Comment)  Contact Method: In Person  Reason/Outcome: Discharge Planning, Continuity of Care              Other Referral/Resources/Interventions Provided:  Interventions: Prescription Price Check       CM followed up with [facility] to verify coverage and cost of new insulin medications.    Pharmacy reported that patient does not have insurance and cost would be $83.    CM spoke with patient who confirmed that she does not currently have Prescription coverage.  She has applied for everything that she needs to but is still waiting for coverage to go through.  She has stated that she has been using [facility] as needed.    CM reviewed with patient that it was recommended she use [facility] to get her insulin filled as they typically have the lowest cost for uninsured diabetes medications.      Patient verbalized understanding.    CM also provided update to [facility] provider.    CM department will continue to follow to assist with discharge coordination.

## 2024-01-11 ENCOUNTER — TELEPHONE (OUTPATIENT)
Dept: OTHER | Facility: HOSPITAL | Age: 64
End: 2024-01-11

## 2024-01-11 VITALS
SYSTOLIC BLOOD PRESSURE: 144 MMHG | DIASTOLIC BLOOD PRESSURE: 76 MMHG | OXYGEN SATURATION: 92 % | TEMPERATURE: 98.1 F | HEART RATE: 84 BPM | RESPIRATION RATE: 18 BRPM

## 2024-01-11 PROBLEM — E66.9 OBESITY (BMI 30.0-34.9): Status: ACTIVE | Noted: 2024-01-11

## 2024-01-11 PROBLEM — E66.811 OBESITY (BMI 30.0-34.9): Status: ACTIVE | Noted: 2024-01-11

## 2024-01-11 LAB
ANION GAP SERPL CALCULATED.3IONS-SCNC: 10 MMOL/L
BACTERIA BLD CULT: ABNORMAL
BUN SERPL-MCNC: 20 MG/DL (ref 5–25)
CALCIUM SERPL-MCNC: 9.1 MG/DL (ref 8.4–10.2)
CHLORIDE SERPL-SCNC: 107 MMOL/L (ref 96–108)
CO2 SERPL-SCNC: 24 MMOL/L (ref 21–32)
CREAT SERPL-MCNC: 0.92 MG/DL (ref 0.6–1.3)
ERYTHROCYTE [DISTWIDTH] IN BLOOD BY AUTOMATED COUNT: 12.5 % (ref 11.6–15.1)
GFR SERPL CREATININE-BSD FRML MDRD: 66 ML/MIN/1.73SQ M
GLUCOSE SERPL-MCNC: 106 MG/DL (ref 65–140)
GLUCOSE SERPL-MCNC: 125 MG/DL (ref 65–140)
GLUCOSE SERPL-MCNC: 186 MG/DL (ref 65–140)
GRAM STN SPEC: ABNORMAL
HCT VFR BLD AUTO: 30.5 % (ref 34.8–46.1)
HGB BLD-MCNC: 10.1 G/DL (ref 11.5–15.4)
KLEBSIELLA SP DNA BLD POS QL NAA+NON-PRB: DETECTED
MCH RBC QN AUTO: 29.6 PG (ref 26.8–34.3)
MCHC RBC AUTO-ENTMCNC: 33.1 G/DL (ref 31.4–37.4)
MCV RBC AUTO: 89 FL (ref 82–98)
PLATELET # BLD AUTO: 277 THOUSANDS/UL (ref 149–390)
PMV BLD AUTO: 11.5 FL (ref 8.9–12.7)
POTASSIUM SERPL-SCNC: 3.7 MMOL/L (ref 3.5–5.3)
RBC # BLD AUTO: 3.41 MILLION/UL (ref 3.81–5.12)
SODIUM SERPL-SCNC: 141 MMOL/L (ref 135–147)
WBC # BLD AUTO: 10.31 THOUSAND/UL (ref 4.31–10.16)

## 2024-01-11 PROCEDURE — 99232 SBSQ HOSP IP/OBS MODERATE 35: CPT | Performed by: UROLOGY

## 2024-01-11 PROCEDURE — 80048 BASIC METABOLIC PNL TOTAL CA: CPT

## 2024-01-11 PROCEDURE — 82948 REAGENT STRIP/BLOOD GLUCOSE: CPT

## 2024-01-11 PROCEDURE — 85027 COMPLETE CBC AUTOMATED: CPT

## 2024-01-11 PROCEDURE — 99239 HOSP IP/OBS DSCHRG MGMT >30: CPT | Performed by: INTERNAL MEDICINE

## 2024-01-11 RX ORDER — CEFADROXIL 500 MG/1
500 CAPSULE ORAL EVERY 12 HOURS SCHEDULED
Status: CANCELLED | OUTPATIENT
Start: 2024-01-11

## 2024-01-11 RX ORDER — CEFADROXIL 500 MG/1
500 CAPSULE ORAL EVERY 12 HOURS SCHEDULED
Qty: 60 CAPSULE | Refills: 0 | Status: SHIPPED | OUTPATIENT
Start: 2024-01-11 | End: 2024-02-10

## 2024-01-11 RX ORDER — CEFAZOLIN SODIUM 2 G/50ML
2000 SOLUTION INTRAVENOUS EVERY 8 HOURS
Status: DISCONTINUED | OUTPATIENT
Start: 2024-01-11 | End: 2024-01-11 | Stop reason: HOSPADM

## 2024-01-11 RX ADMIN — FERROUS SULFATE TAB 325 MG (65 MG ELEMENTAL FE) 325 MG: 325 (65 FE) TAB at 09:16

## 2024-01-11 RX ADMIN — INSULIN ASPART 15 UNITS: 100 INJECTION, SUSPENSION SUBCUTANEOUS at 09:15

## 2024-01-11 RX ADMIN — LISINOPRIL 20 MG: 20 TABLET ORAL at 09:16

## 2024-01-11 RX ADMIN — INSULIN LISPRO 1 UNITS: 100 INJECTION, SOLUTION INTRAVENOUS; SUBCUTANEOUS at 12:05

## 2024-01-11 RX ADMIN — OXYCODONE HYDROCHLORIDE AND ACETAMINOPHEN 1000 MG: 500 TABLET ORAL at 09:16

## 2024-01-11 RX ADMIN — CEFAZOLIN SODIUM 2000 MG: 2 SOLUTION INTRAVENOUS at 11:56

## 2024-01-11 RX ADMIN — Medication 1000 UNITS: at 09:16

## 2024-01-11 NOTE — ASSESSMENT & PLAN NOTE
Patient presenting with left upper extremity partial seizure occurring last night, also endorses 5 grand mal seizures within the past week  Home regimen Vimpat 150 mg twice daily, reports compliance  Remote history of meningioma s/p resection in 2018, followed by neurology outpatient  Denies tongue biting, urinary or fecal incontinence.  Neurology was consulted.  Vimpat was discontinued initially and transfer order placed for [facility] for video EEG to rule out nonepileptic spells versus epileptic seizures.  Due to ongoing medical issues, as well as patient's wish, transfer canceled to [facility] [place].  Lacosamide level is 11.8. Higher than normal.    Plan:  Will keep off of AEDs.  Outpatient neurology follow-up.   Outpatient 72 hour EEG, patient refused inpatient EEG.  Patient is ambulatory without assist, no longer require PT OT.

## 2024-01-11 NOTE — TELEPHONE ENCOUNTER
Nathalia Monge is a 63 is a female seen in urologic consultation due to CT findings 2.9 cm masslike hypoenhancing focus involving midpole of the right kidney with mild perinephric fat stranding. Treated for pyelonephritis.  Repeat imaging ordered for 1 month  - Please schedule f/u after imaging complete.

## 2024-01-11 NOTE — ASSESSMENT & PLAN NOTE
Lab Results   Component Value Date    HGBA1C 13.4 (H) 01/03/2024       Recent Labs     01/10/24  0755 01/10/24  1058 01/10/24  1609 01/11/24  0723   POCGLU 130 234* 313* 125         Blood Sugar Average: Last 72 hrs:  (P) 244.3  Last A1c 13, placed recently on NovoLog 70/30 regimen  Patient unable to take her insulin because she cannot afford it, currently on no diabetic meds  No PCP  Glucose controlled    Plan:  Start lispro 5 units 3 times daily with meals  Start Lantus 16 units at bedtime  Follow-up fingersticks, adjust regimen as needed  Diabetic diet  Nutrition consult  Establish care with PCP on discharge  Discussed with patient about insulin price and she states that she will be able to go to [facility] pharmacy.

## 2024-01-11 NOTE — ASSESSMENT & PLAN NOTE
102F, tachycardia 100s  UA showing pyuria  Patient endorsing urgency and frequency, denies dysuria ongoing for the past 2 months  Potential source being possible right-sided pyelonephritis with perinephric abscess  On exam, unable to appreciate CVA tenderness due to patient having bilateral back pain  Given ceftriaxone and vancomycin in the ED also bolused 2 L NS.  Blood cultures growing Klebsiella.  Patient afebrile since 24 hours.  Leukocytosis downtrending.  Blood culture sensitivities show susceptibiliy to all meds except ampicillin.     Plan:  IV cefepime day 4.    She was switched to cefazolin and given 1 dose.  Infectious disease on board, patient can be discharged on Duricef and continue taking resolution on imaging.    Outpatient follow-up with ID in 3 - 4 weeks.    Repeat imaging in 4-6 weeks US right kidney.

## 2024-01-11 NOTE — PROGRESS NOTES
Progress Note - Urology  Nathalia Monge 1960, 63 y.o. female MRN: [id number]    Unit/Bed#: S -01 Encounter: [id number]      Assessment & Plan:  Pyelonephritis  Sepsis  -  CT showing 2.9 cm masslike hypoenhancing focus involving midpole of the right kidney with mild perinephric fat stranding.  - Clinically improving  - Mild leukocytosis  - Blood cultures Klebsiella  - Urine culture mixed contaminants  - Vital signs stable, afebrile.  - Outpatient follow-up with repeat imaging.  - Urology will sign off. Please do not hesitate to reach out with questions or concerns.       Subjective:   HPI: Seen at bedside.  Upset, wants to be discharged.    Review of Systems   Constitutional:  Negative for chills and fever.   Respiratory:  Negative for cough and shortness of breath.    Cardiovascular:  Negative for chest pain and palpitations.   Gastrointestinal:  Negative for abdominal pain and vomiting.   Genitourinary:  Negative for dysuria and hematuria.   Musculoskeletal:  Negative for arthralgias and back pain.   Skin:  Negative for color change and rash.   Neurological:  Negative for seizures and syncope.   All other systems reviewed and are negative.      Objective:    Vitals: Blood pressure 144/76, pulse 84, temperature 98.1 °F (36.7 °C), temperature source Oral, resp. rate 18, SpO2 92%.,There is no height or weight on file to calculate BMI.    Physical Exam  Vitals reviewed.   Constitutional:       General: She is not in acute distress.     Appearance: Normal appearance. She is normal weight. She is not ill-appearing, toxic-appearing or diaphoretic.   HENT:      Head: Normocephalic and atraumatic.      Right Ear: External ear normal.      Left Ear: External ear normal.      Nose: Nose normal.      Mouth/Throat:      Mouth: Mucous membranes are moist.   Eyes:      General: No scleral icterus.     Conjunctiva/sclera: Conjunctivae normal.   Cardiovascular:      Rate and Rhythm: Normal rate.      Pulses: Normal pulses.    Pulmonary:      Effort: Pulmonary effort is normal.   Abdominal:      General: Abdomen is flat. There is no distension.      Palpations: Abdomen is soft.      Tenderness: There is no abdominal tenderness. There is no guarding.   Musculoskeletal:         General: Normal range of motion.      Cervical back: Normal range of motion.   Skin:     General: Skin is warm.      Findings: No rash.   Neurological:      General: No focal deficit present.      Mental Status: She is alert and oriented to person, place, and time. Mental status is at baseline.           Labs:  Recent Labs     24  1130 01/10/24  1543 24  0506   WBC 16.37* 10.61* 10.31*       Recent Labs     24  1130 01/10/24  1543 24  0506   HGB 10.1* 10.6* 10.1*     Recent Labs     24  1130 01/10/24  1543 24  0506   HCT 29.9* 31.5* 30.5*     Recent Labs     24  1130 24  2206 01/10/24  1543 24  0506   CREATININE 0.93 1.07 1.15 0.92         History:    Past Medical History:   Diagnosis Date    Bipolar depression (HCC)     Cancer (HCC)     Depression     Diabetes mellitus (HCC)     Hypertension     PTSD (post-traumatic stress disorder) 2002    Seizures (HCC)      Social History     Socioeconomic History    Marital status: Significant Other     Spouse name: Marty    Number of children: 4    Years of education: Some college    Highest education level: None   Occupational History    Occupation: disabled     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   Tobacco Use    Smoking status: Former     Current packs/day: 0.00     Types: Cigarettes     Quit date: 3/5/2000     Years since quittin.8    Smokeless tobacco: Never   Vaping Use    Vaping status: Never Used   Substance and Sexual Activity    Alcohol use: Never    Drug use: No    Sexual activity: Yes     Partners: Male   Other Topics Concern    None   Social History Narrative    Lives at home with significant other, Marty     Social Determinants of Health      Financial Resource Strain: Not on file   Food Insecurity: No Food Insecurity (1/8/2024)    Hunger Vital Sign     Worried About Running Out of Food in the Last Year: Never true     Ran Out of Food in the Last Year: Never true   Transportation Needs: No Transportation Needs (1/8/2024)    PRAPARE - Transportation     Lack of Transportation (Medical): No     Lack of Transportation (Non-Medical): No   Physical Activity: Not on file   Stress: Not on file   Social Connections: Not on file   Intimate Partner Violence: Not on file   Housing Stability: Low Risk  (1/8/2024)    Housing Stability Vital Sign     Unable to Pay for Housing in the Last Year: No     Number of Places Lived in the Last Year: 1     Unstable Housing in the Last Year: No     Past Surgical History:   Procedure Laterality Date    CHOLECYSTECTOMY  2004    HYSTERECTOMY      IMPLANTABLE CONTACT LENS IMPLANTATION      [place] CRANIEC TREPHINE BONE FLP BRAIN TUMOR SUPRTENTOR Right 3/15/2018    Procedure: IMAGE-GUIDED RIGHT FRONTOPARIETAL CRANIOTOMY FOR TUMOR;  Surgeon: Arsh Jeronimo MD;  Location: [facility];  Service: Neurosurgery     Family History   Adopted: Yes       Sylvia Soto PA-C  Date: 1/11/2024 Time: 1:55 PM

## 2024-01-11 NOTE — DISCHARGE INSTR - AVS FIRST PAGE
Dear Nathalia Monge,     It was our pleasure to care for you here at [facility].  It is our hope that we were always able to exceed the expected standards for your care during your stay.  You were hospitalized due to focal seizure.  You were cared for on the 4th floor by Piper Montaño MD under the service of Awilda Gan MD with the [facility] who covers for your primary care physician (PCP), No primary care provider on file., while you were hospitalized.  If you have any questions or concerns related to this hospitalization, you may contact us at .  For follow up as well as any medication refills, we recommend that you follow up with your primary care physician.  A registered nurse will reach out to you by phone within a few days after your discharge to answer any additional questions that you may have after going home.  However, at this time we provide for you here, the most important instructions / recommendations at discharge:     Notable Medication Adjustments -   START taking Doxil 500 mg capsule by mouth every 12 hours starting tonight 8 pm till your doctor says to stop.  You are being given a 30-day supply.  START taking injection NovoLog 15 units under your skin 2 times daily with meals.  STOP taking lacosamide 150 mg.  Testing Required after Discharge -   Please follow-up with your primary care physician to have a CBC in 1 week.  ** Please contact your PCP to request testing orders for any of the testing recommended here **  Important follow up information -   Please follow-up with your primary care physician in 1 week.  Please follow-up with infectious disease in 3 weeks.  Other Instructions -   If you have a multiple episodes of vomiting or diarrhea, please call your primary care doctor.  If you have a fever associated with vomiting or diarrhea please present to the emergency department.  Please review this entire after visit  summary as additional general instructions including medication list, appointments, activity, diet, any pertinent wound care, and other additional recommendations from your care team that may be provided for you.      Sincerely,     Piper Montaño MD

## 2024-01-11 NOTE — DISCHARGE SUMMARY
[facility]  Discharge- Nathalia Monge 1960, 63 y.o. female MRN: [id number]  Unit/Bed#: S -01 Encounter: [id number]  Primary Care Provider: No primary care provider on file.   Date and time admitted to hospital: 1/8/2024  2:05 AM    Partial idiopathic epilepsy with seizures of localized onset, intractable, without status epilepticus (HCC)  Assessment & Plan  Patient presenting with left upper extremity partial seizure occurring last night, also endorses 5 grand mal seizures within the past week  Home regimen Vimpat 150 mg twice daily, reports compliance  Remote history of meningioma s/p resection in 2018, followed by neurology outpatient  Denies tongue biting, urinary or fecal incontinence.  Neurology was consulted.  Vimpat was discontinued initially and transfer order placed for [place] for video EEG to rule out nonepileptic spells versus epileptic seizures.  Due to ongoing medical issues, as well as patient's wish, transfer canceled to [place].  Lacosamide level is 11.8. Higher than normal.    Plan:  Will keep off of AEDs.  Outpatient neurology follow-up.   Outpatient 72 hour EEG, patient refused inpatient EEG.  Patient is ambulatory without assist, no longer require PT OT.     * Sepsis without acute organ dysfunction (HCC)  Assessment & Plan  102F, tachycardia 100s  UA showing pyuria  Patient endorsing urgency and frequency, denies dysuria ongoing for the past 2 months  Potential source being possible right-sided pyelonephritis with perinephric abscess  On exam, unable to appreciate CVA tenderness due to patient having bilateral back pain  Given ceftriaxone and vancomycin in the ED also bolused 2 L NS.  Blood cultures growing Klebsiella.  Patient afebrile since 24 hours.  Leukocytosis downtrending.  Blood culture sensitivities show susceptibiliy to all meds except ampicillin.     Plan:  IV cefepime day 4.    She was switched to cefazolin and given 1 dose.  Infectious disease  on board, patient can be discharged on Duricef and continue taking resolution on imaging.    Outpatient follow-up with ID in 3 - 4 weeks.    Repeat imaging in 4-6 weeks US right kidney.       Type 2 diabetes mellitus with hyperglycemia, with long-term current use of insulin ([facility])  Assessment & Plan  Lab Results   Component Value Date    HGBA1C 13.4 (H) 01/03/2024       Recent Labs     01/10/24  0755 01/10/24  1058 01/10/24  1609 01/11/24  0723   POCGLU 130 234* 313* 125         Blood Sugar Average: Last 72 hrs:  (P) 244.3  Last A1c 13, placed recently on NovoLog 70/30 regimen  Patient unable to take her insulin because she cannot afford it, currently on no diabetic meds  No PCP  Glucose controlled    Plan:  Start lispro 5 units 3 times daily with meals  Start Lantus 16 units at bedtime  Follow-up fingersticks, adjust regimen as needed  Diabetic diet  Nutrition consult  Establish care with PCP on discharge  Discussed with patient about insulin price and she states that she will be able to go to [facility] pharmacy.    Hypertensive urgency  Assessment & Plan  Home regimen lisinopril 20 mg daily, unable to take it since yesterday but does report compliance   systolic.  She required 2 doses of IV hydralazine yesterday.  Blood pressure improved today.      Plan:  Continue lisinopril 20 mg daily  Hydralazine 5 mg IV every 6 hours as needed for SBP greater than 180.    Obesity (BMI 30.0-34.9)  Assessment & Plan        Renal abscess, right  Assessment & Plan  CT abdomen this admission shows 2.9 cm masslike hypoenhancing focus involving midpole of the right kidney with mild perinephric fat stranding. Given clinical presentation this may indicate acute pyelonephritis with intrarenal abscess versus malignancy.     Plan:  Urology consulted, they stated no need for IR drainage at this time.  If WBC count is normal and patient is afebrile, she can follow-up outpatient with repeat imaging in 4 to 6 weeks.    Meningioma  ([facility])  Assessment & Plan  Status postresection in 2018, follows with neurology for serial MRIs      Medical Problems       Resolved Problems  Date Reviewed: 1/4/2024   None       Discharging Resident: Piper Montaño MD  Discharging Attending: Awilda Gan MD  PCP: No primary care provider on file.  Admission Date:   Admission Orders (From admission, onward)       Ordered        01/08/24 0708  INPATIENT ADMISSION  Once                          Discharge Date: 01/11/24    Consultations During Hospital Stay:  Neurology  Urology    Procedures Performed:   CT chest abdomen pelvis    Significant Findings / Test Results:   1.  2.9 cm masslike hypoenhancing focus involving midpole of the right kidney with mild perinephric fat stranding. Given clinical presentation this may indicate acute pyelonephritis with intrarenal abscess versus malignancy. Recommend urology   consultation.     2. Mild groundglass and consolidative opacities involving primarily dependent lower lobe of the lungs. Aspiration or infection are not excluded in the right clinical setting.     3.  Disc height loss, endplate sclerosis and mild irregularity at level of T11-12, most likely degenerative in nature versus sequela of remote discitis/osteomyelitis. There is no paraspinal soft tissue inflammation to suggest infection. May consider MRI   without and with contrast if there is high clinical concern for infection.     4.  4 mm right upper lobe nodule. Based on current Fleischner Society 2017 Guidelines on incidental pulmonary nodule, no routine follow-up is needed if the patient is low risk.  If the patient is high risk for lung cancer, optional follow-up chest CT at 12 months can be considered.  For patients with history of malignancy, 3 months follow-up chest CT is recommended.     Incidental Findings:   None    Test Results Pending at Discharge (will require follow up):  None     Outpatient Tests Requested:  Repeat ultrasound as outpatient to  ensure resolution of hypoenhancing focus in 4 to 6 weeks.    Complications: None    Reason for Admission: Left-sided focal seizure    Hospital Course:   Nathalia Monge is a 63 y.o. female patient who originally presented to the hospital on 1/8/2024 due to left upper extremity partial seizure.  She also had 5 grand mal seizures within the last week.  Stated that she was compliant with Vimpat.  On presentation she was hypertensive, severely hyperglycemic since she did not take insulin as she cannot afford it, hyponatremic and UA was positive for pyuria.  Neurology was consulted and they recommended video EEG, but patient declined transfer to [place].  Vimpat was discontinued and she will follow-up neurology outpatient for 72-hour EEG.  CT imaging showed 2.9 cm hypoenhancing focus in the right kidney, possible abscess patient was admitted.  Blood cultures grow Klebsiella and patient was treated with cefepime for 3 days. She also had diarrhea 4 episodes on day 2, and C. difficile toxin was positive but EIA was negative, and since patient had no history of severe C. difficile infection and with vancomycin was not indicated at this time.  Her diarrhea improved on day 3.  Patient will be discharged on oral Duricef to be taken until resolution of imaging findings.  She will follow-up with infectious disease and neurology outpatient. She will have repeat imaging of right kidney 4 weeks to see resolution.  Patient is stable at discharge today.    Please see above list of diagnoses and related plan for additional information.     Condition at Discharge: stable    Discharge Day Visit / Exam:   Subjective: Patient was seen at bedside today.  She is doing well.  She states that she had to loose bowel movements yesterday.  She had 1 slightly formed bowel movement today.  No episodes of seizures or seizure-like activity during the duration of her stay.  Vitals: Blood Pressure: 144/76 (01/11/24 0727)  Pulse: 84 (01/11/24  0727)  Temperature: 98.1 °F (36.7 °C) (01/11/24 0727)  Temp Source: Oral (01/11/24 0727)  Respirations: 18 (01/11/24 0727)  SpO2: 92 % (01/11/24 0727)  Exam:   Physical Exam  Vitals and nursing note reviewed.   Constitutional:       General: She is not in acute distress.     Appearance: She is well-developed.   HENT:      Head: Normocephalic and atraumatic.   Eyes:      Conjunctiva/sclera: Conjunctivae normal.   Cardiovascular:      Rate and Rhythm: Normal rate and regular rhythm.      Heart sounds: No murmur heard.  Pulmonary:      Effort: Pulmonary effort is normal. No respiratory distress.      Breath sounds: Normal breath sounds.   Abdominal:      Palpations: Abdomen is soft.      Tenderness: There is no abdominal tenderness.   Musculoskeletal:         General: No swelling.      Cervical back: Neck supple.   Skin:     General: Skin is warm and dry.      Capillary Refill: Capillary refill takes less than 2 seconds.   Neurological:      Mental Status: She is alert.   Psychiatric:         Mood and Affect: Mood normal.          Discussion with Family: Updated  () at bedside.    Discharge instructions/Information to patient and family:   See after visit summary for information provided to patient and family.      Provisions for Follow-Up Care:  See after visit summary for information related to follow-up care and any pertinent home health orders.      Mobility at time of Discharge:   Basic Mobility Inpatient Raw Score: 20  JH-HLM Goal: 6: Walk 10 steps or more  JH-HLM Achieved: 6: Walk 10 steps or more  HLM Goal achieved. Continue to encourage appropriate mobility.     Disposition:   Home    Planned Readmission: None    Discharge Medications:  See after visit summary for reconciled discharge medications provided to patient and/or family.      **Please Note: This note may have been constructed using a voice recognition system**

## 2024-01-11 NOTE — CONSULTS
Consultation - Infectious Disease   Nathalia Saleem 63 y.o. female MRN: [id number]  Unit/Bed#: S -01 Encounter: [id number]      IMPRESSION & RECOMMENDATIONS:   Impression/Recommendations:  1.  Sepsis, POA.  Fever, tachycardia.  Secondary to Klebsiella bacteremia in the setting of pyelonephritis.  Clinically improved with resolution of fevers.  Patient is hemodynamically stable, nontoxic in appearance.    -Antibiotic plan as below  -Follow temperatures and hemodynamics    2.  Klebsiella pneumoniae bacteremia.  Secondary to pyelonephritis.  No other clear explanation.    -Antibiotic plan as below    3.  Right-sided pyelonephritis complicated by probable intrarenal abscess.  CT showed 2.9 cm focus which may represent abscess.  Urology input noted with no recommendation for drainage at this time.    -Continue IV cefazolin while inpatient  -On discharge, transition to oral Duricef to continue until radiologic resolution of abscess.  -Recommend repeat CT abdomen in 3 to 4 weeks  -Check CBC with differential, creatinine every other week while on antibiotic  -Outpatient ID follow-up after CT is performed    3.  Intermittent focal seizures with possible breakthrough seizures.  Neurology input noted with suspicion for nonepileptic etiology for patient's presenting symptomatology.  Symptoms have improved.  Outpatient neurology follow-up.    4.  C. difficile colonization.  Positive PCR/negative EIA in the setting of transient loose bowel movements, which has improved off treatment.  Patient does not have known history of active C. difficile in the past 1 year.  No indication for prophylaxis at this time.    5.  Poorly controlled diabetes mellitus type 2, with long-term insulin use and hyperglycemia.  Risk factor for development of severe infection.      Antibiotics:  Antibiotics 4  Cefazolin 1    I discussed above plan with patient and  at bedside in detail, and with primary service who agrees with transition to oral  antibiotic.  Thank you for this consultation.  We will follow along with you.      HISTORY OF PRESENT ILLNESS:  Reason for Consult: Pyelonephritis/renal abscess    HPI: Nathalia Monge is a 63 y.o. female with diabetes mellitus type 2, prior meningioma resection, seizure disorder, recent hospitalization from 1/2 to 1/4 at [facility] due to suspected breakthrough seizures who presented to ER on 1/8 after suspected witnessed seizure.  Had fever of 102 on presentation.  Also complained of urinary frequency, urgency.  Abdominal CT was concerning for right-sided pyelonephritis with possibility of intrarenal abscess.  Patient was initially on broad-spectrum antibiotics.  Blood cultures isolated Klebsiella pneumoniae and antibiotic has since been narrowed to cefazolin.  Patient is feeling much better with resolution of fevers.  Denies active urinary symptoms or flank pain.  She did have transient episode of diarrhea for which C. difficile was checked and PCR was positive but EIA was negative.    REVIEW OF SYSTEMS:  A complete system-based review of systems is otherwise negative.    PAST MEDICAL HISTORY:  Past Medical History:   Diagnosis Date    Bipolar depression (HCC)     Cancer (HCC)     Depression     Diabetes mellitus (HCC)     Hypertension     PTSD (post-traumatic stress disorder) 2002    Seizures (HCC)      Past Surgical History:   Procedure Laterality Date    CHOLECYSTECTOMY  2004    HYSTERECTOMY      IMPLANTABLE CONTACT LENS IMPLANTATION      [place] CRANIEC TREPHINE BONE FLP BRAIN TUMOR SUPRTENTOR Right 3/15/2018    Procedure: IMAGE-GUIDED RIGHT FRONTOPARIETAL CRANIOTOMY FOR TUMOR;  Surgeon: Arsh Jeronimo MD;  Location: [facility];  Service: Neurosurgery       FAMILY HISTORY:  Non-contributory    SOCIAL HISTORY:  Social History     Substance and Sexual Activity   Alcohol Use Never     Social History     Substance and Sexual Activity   Drug Use No     Social History     Tobacco Use   Smoking Status Former    Current  packs/day: 0.00    Types: Cigarettes    Quit date: 3/5/2000    Years since quittin.8   Smokeless Tobacco Never       ALLERGIES:  Allergies   Allergen Reactions    Dilantin [Phenytoin]     Lidocaine     Statins        MEDICATIONS:  All current active medications have been reviewed.    PHYSICAL EXAM:  Vitals:  Temp:  [97.9 °F (36.6 °C)-98.1 °F (36.7 °C)] 98.1 °F (36.7 °C)  HR:  [84] 84  Resp:  [18] 18  BP: (144-156)/(76-78) 144/76  SpO2:  [92 %] 92 %  Temp (24hrs), Av °F (36.7 °C), Min:97.9 °F (36.6 °C), Max:98.1 °F (36.7 °C)  Current: Temperature: 98.1 °F (36.7 °C)     Physical Exam:  General:  Well-nourished, well-developed, in no acute distress  Eyes:  Conjunctive clear with no hemorrhages or effusions  Oropharynx:  No ulcers, no lesions  Neck:  Supple, trachea midline  Lungs:  Clear to auscultation bilaterally, no accessory muscle use  Cardiac:  Regular rate and rhythm, no murmurs  Abdomen: Nondistended with no rigidity or guarding  Extremities:  No peripheral cyanosis, clubbing, or edema  Skin:  No rashes, no ulcers  Neurological:  Moves all four extremities spontaneously    LABS, IMAGING, & OTHER STUDIES:  Lab Results:  I have personally reviewed pertinent labs.  Results from last 7 days   Lab Units 24  0506 01/10/24  1543 24  2206 24  1130 24  0228   POTASSIUM mmol/L 3.7 3.4* 3.0*   < > 3.1*   CHLORIDE mmol/L 107 102 104   < > 99   CO2 mmol/L 24 27 25   < > 26   BUN mg/dL 20 22 16   < > 21   CREATININE mg/dL 0.92 1.15 1.07   < > 1.00   EGFR ml/min/1.73sq m 66 50 55   < > 60   CALCIUM mg/dL 9.1 9.1 9.0   < > 9.0   AST U/L  --   --   --   --  23   ALT U/L  --   --   --   --  27   ALK PHOS U/L  --   --   --   --  124*    < > = values in this interval not displayed.     Results from last 7 days   Lab Units 24  0506 01/10/24  1543 24  1130   WBC Thousand/uL 10.31* 10.61* 16.37*   HEMOGLOBIN g/dL 10.1* 10.6* 10.1*   PLATELETS Thousands/uL 277 260 225     Results from last  7 days   Lab Units 01/09/24  1357 01/08/24  0311 01/08/24  0243 01/08/24  0228   BLOOD CULTURE   --  No Growth at 72 hrs. Klebsiella pneumoniae*  --    GRAM STAIN RESULT   --   --  Gram negative rods*  --    URINE CULTURE   --   --   --  >100,000 cfu/ml   C DIFF TOXIN B BY PCR  Positive*  --   --   --        Imaging Studies:   I have personally reviewed pertinent imaging study reports and images in PACS.    CT abdomen/pelvis shows 2.9 cm masslike hypoenhancing focus involving mid pole of right kidney with mild perinephric stranding.  Mild groundglass and consolidative opacities involving primarily dependent lower lobes of lungs.    EKG, Pathology, and Other Studies:   I have personally reviewed pertinent reports.

## 2024-01-12 ENCOUNTER — TELEPHONE (OUTPATIENT)
Dept: INFECTIOUS DISEASES | Facility: CLINIC | Age: 64
End: 2024-01-12

## 2024-01-12 DIAGNOSIS — N15.1 RENAL ABSCESS, RIGHT: Primary | ICD-10-CM

## 2024-01-12 NOTE — TELEPHONE ENCOUNTER
Called and spoke with patient today.     Went over antidotic plan, biweekly labs and follow up appointment. Also reminded patient she need a follow up CT done 1/29/24. Patient states she will call and schedule CT.     Informed patient if there are any further questions or concerns to call the office.     Patient verbalizes understanding at this time.

## 2024-01-12 NOTE — PROGRESS NOTES
OPAT NOTE     Supervising/Discharge physician: [place]      Diagnosis: Right sided pyelonephritis w/ probable intrarenal abscess     Drug: Duricef     Dose/Frequency: [id number]    Labs/Frequency: CBCD Cre every other week    End Date: CT resolution     Imaging: CT abd w/contrast due 1/29    Infusion/VNA contact: N/A    Next appointment: 2/5        MA assigned: Lynn

## 2024-01-13 LAB — BACTERIA BLD CULT: NORMAL

## 2024-01-15 ENCOUNTER — OFFICE VISIT (OUTPATIENT)
Dept: FAMILY MEDICINE CLINIC | Facility: CLINIC | Age: 64
End: 2024-01-15
Payer: MEDICARE

## 2024-01-15 VITALS
DIASTOLIC BLOOD PRESSURE: 84 MMHG | SYSTOLIC BLOOD PRESSURE: 130 MMHG | WEIGHT: 179 LBS | HEART RATE: 75 BPM | TEMPERATURE: 95.8 F | OXYGEN SATURATION: 99 % | BODY MASS INDEX: 29.79 KG/M2

## 2024-01-15 DIAGNOSIS — Z00.00 MEDICARE ANNUAL WELLNESS VISIT, SUBSEQUENT: Primary | ICD-10-CM

## 2024-01-15 DIAGNOSIS — I16.0 HYPERTENSIVE URGENCY: ICD-10-CM

## 2024-01-15 DIAGNOSIS — N15.1 RENAL ABSCESS, RIGHT: ICD-10-CM

## 2024-01-15 DIAGNOSIS — G40.019 PARTIAL IDIOPATHIC EPILEPSY WITH SEIZURES OF LOCALIZED ONSET, INTRACTABLE, WITHOUT STATUS EPILEPTICUS (HCC): ICD-10-CM

## 2024-01-15 DIAGNOSIS — Z79.4 TYPE 2 DIABETES MELLITUS WITH HYPERGLYCEMIA, WITH LONG-TERM CURRENT USE OF INSULIN (HCC): ICD-10-CM

## 2024-01-15 DIAGNOSIS — E11.65 TYPE 2 DIABETES MELLITUS WITH HYPERGLYCEMIA, WITH LONG-TERM CURRENT USE OF INSULIN (HCC): ICD-10-CM

## 2024-01-15 PROCEDURE — G0439 PPPS, SUBSEQ VISIT: HCPCS

## 2024-01-15 NOTE — PATIENT INSTRUCTIONS
Medicare Preventive Visit Patient Instructions  Thank you for completing your Welcome to Medicare Visit or Medicare Annual Wellness Visit today. Your next wellness visit will be due in one year (1/15/2025).  The screening/preventive services that you may require over the next 5-10 years are detailed below. Some tests may not apply to you based off risk factors and/or age. Screening tests ordered at today's visit but not completed yet may show as past due. Also, please note that scanned in results may not display below.  Preventive Screenings:  Service Recommendations Previous Testing/Comments   Colorectal Cancer Screening  * Colonoscopy    * Fecal Occult Blood Test (FOBT)/Fecal Immunochemical Test (FIT)  * Fecal DNA/Cologuard Test  * Flexible Sigmoidoscopy Age: 45-75 years old   Colonoscopy: every 10 years (may be performed more frequently if at higher risk)  OR  FOBT/FIT: every 1 year  OR  Cologuard: every 3 years  OR  Sigmoidoscopy: every 5 years  Screening may be recommended earlier than age 45 if at higher risk for colorectal cancer. Also, an individualized decision between you and your healthcare provider will decide whether screening between the ages of 76-85 would be appropriate. Colonoscopy: Not on file  FOBT/FIT: Not on file  Cologuard: Not on file  Sigmoidoscopy: Not on file          Breast Cancer Screening Age: 40+ years old  Frequency: every 1-2 years  Not required if history of left and right mastectomy Mammogram: Not on file        Cervical Cancer Screening Between the ages of 21-29, pap smear recommended once every 3 years.   Between the ages of 30-65, can perform pap smear with HPV co-testing every 5 years.   Recommendations may differ for women with a history of total hysterectomy, cervical cancer, or abnormal pap smears in past. Pap Smear: Not on file        Hepatitis C Screening Once for adults born between 1945 and 1965  More frequently in patients at high risk for Hepatitis C Hep C Antibody: Not  on file        Diabetes Screening 1-2 times per year if you're at risk for diabetes or have pre-diabetes Fasting glucose: 406 mg/dL (1/3/2024)  A1C: 13.4 % (1/3/2024)      Cholesterol Screening Once every 5 years if you don't have a lipid disorder. May order more often based on risk factors. Lipid panel: 10/01/2020          Other Preventive Screenings Covered by Medicare:  Abdominal Aortic Aneurysm (AAA) Screening: covered once if your at risk. You're considered to be at risk if you have a family history of AAA.  Lung Cancer Screening: covers low dose CT scan once per year if you meet all of the following conditions: (1) Age 55-77; (2) No signs or symptoms of lung cancer; (3) Current smoker or have quit smoking within the last 15 years; (4) You have a tobacco smoking history of at least 20 pack years (packs per day multiplied by number of years you smoked); (5) You get a written order from a healthcare provider.  Glaucoma Screening: covered annually if you're considered high risk: (1) You have diabetes OR (2) Family history of glaucoma OR (3)  aged 50 and older OR (4)  American aged 65 and older  Osteoporosis Screening: covered every 2 years if you meet one of the following conditions: (1) You're estrogen deficient and at risk for osteoporosis based off medical history and other findings; (2) Have a vertebral abnormality; (3) On glucocorticoid therapy for more than 3 months; (4) Have primary hyperparathyroidism; (5) On osteoporosis medications and need to assess response to drug therapy.   Last bone density test (DXA Scan): Not on file.  HIV Screening: covered annually if you're between the age of 15-65. Also covered annually if you are younger than 15 and older than 65 with risk factors for HIV infection. For pregnant patients, it is covered up to 3 times per pregnancy.    Immunizations:  Immunization Recommendations   Influenza Vaccine Annual influenza vaccination during flu season is  recommended for all persons aged >= 6 months who do not have contraindications   Pneumococcal Vaccine   * Pneumococcal conjugate vaccine = PCV13 (Prevnar 13), PCV15 (Vaxneuvance), PCV20 (Prevnar 20)  * Pneumococcal polysaccharide vaccine = PPSV23 (Pneumovax) Adults 19-65 yo with certain risk factors or if 65+ yo  If never received any pneumonia vaccine: recommend Prevnar 20 (PCV20)  Give PCV20 if previously received 1 dose of PCV13 or PPSV23   Hepatitis B Vaccine 3 dose series if at intermediate or high risk (ex: diabetes, end stage renal disease, liver disease)   Respiratory syncytial virus (RSV) Vaccine - COVERED BY MEDICARE PART D  * RSVPreF3 (Arexvy) CDC recommends that adults 60 years of age and older may receive a single dose of RSV vaccine using shared clinical decision-making (SCDM)   Tetanus (Td) Vaccine - COST NOT COVERED BY MEDICARE PART B Following completion of primary series, a booster dose should be given every 10 years to maintain immunity against tetanus. Td may also be given as tetanus wound prophylaxis.   Tdap Vaccine - COST NOT COVERED BY MEDICARE PART B Recommended at least once for all adults. For pregnant patients, recommended with each pregnancy.   Shingles Vaccine (Shingrix) - COST NOT COVERED BY MEDICARE PART B  2 shot series recommended in those 19 years and older who have or will have weakened immune systems or those 50 years and older     Health Maintenance Due:      Topic Date Due   • Hepatitis C Screening  Never done   • HIV Screening  Never done   • Breast Cancer Screening: Mammogram  Never done   • Colorectal Cancer Screening  Never done     Immunizations Due:      Topic Date Due   • COVID-19 Vaccine (1) Never done   • Pneumococcal Vaccine: Pediatrics (0 to 5 Years) and At-Risk Patients (6 to 64 Years) (1 - PCV) Never done   • Influenza Vaccine (1) Never done     Advance Directives   What are advance directives?  Advance directives are legal documents that state your wishes and plans  for medical care. These plans are made ahead of time in case you lose your ability to make decisions for yourself. Advance directives can apply to any medical decision, such as the treatments you want, and if you want to donate organs.   What are the types of advance directives?  There are many types of advance directives, and each state has rules about how to use them. You may choose a combination of any of the following:  Living will:  This is a written record of the treatment you want. You can also choose which treatments you do not want, which to limit, and which to stop at a certain time. This includes surgery, medicine, IV fluid, and tube feedings.   Durable power of  for healthcare (DPAHC):  This is a written record that states who you want to make healthcare choices for you when you are unable to make them for yourself. This person, called a proxy, is usually a family member or a friend. You may choose more than 1 proxy.  Do not resuscitate (DNR) order:  A DNR order is used in case your heart stops beating or you stop breathing. It is a request not to have certain forms of treatment, such as CPR. A DNR order may be included in other types of advance directives.  Medical directive:  This covers the care that you want if you are in a coma, near death, or unable to make decisions for yourself. You can list the treatments you want for each condition. Treatment may include pain medicine, surgery, blood transfusions, dialysis, IV or tube feedings, and a ventilator (breathing machine).  Values history:  This document has questions about your views, beliefs, and how you feel and think about life. This information can help others choose the care that you would choose.  Why are advance directives important?  An advance directive helps you control your care. Although spoken wishes may be used, it is better to have your wishes written down. Spoken wishes can be misunderstood, or not followed. Treatments may be  given even if you do not want them. An advance directive may make it easier for your family to make difficult choices about your care.   Weight Management   Why it is important to manage your weight:  Being overweight increases your risk of health conditions such as heart disease, high blood pressure, type 2 diabetes, and certain types of cancer. It can also increase your risk for osteoarthritis, sleep apnea, and other respiratory problems. Aim for a slow, steady weight loss. Even a small amount of weight loss can lower your risk of health problems.  How to lose weight safely:  A safe and healthy way to lose weight is to eat fewer calories and get regular exercise. You can lose up about 1 pound a week by decreasing the number of calories you eat by 500 calories each day.   Healthy meal plan for weight management:  A healthy meal plan includes a variety of foods, contains fewer calories, and helps you stay healthy. A healthy meal plan includes the following:  Eat whole-grain foods more often.  A healthy meal plan should contain fiber. Fiber is the part of grains, fruits, and vegetables that is not broken down by your body. Whole-grain foods are healthy and provide extra fiber in your diet. Some examples of whole-grain foods are whole-wheat breads and pastas, oatmeal, brown rice, and bulgur.  Eat a variety of vegetables every day.  Include dark, leafy greens such as spinach, kale, oksana greens, and mustard greens. Eat yellow and orange vegetables such as carrots, sweet potatoes, and winter squash.   Eat a variety of fruits every day.  Choose fresh or canned fruit (canned in its own juice or light syrup) instead of juice. Fruit juice has very little or no fiber.  Eat low-fat dairy foods.  Drink fat-free (skim) milk or 1% milk. Eat fat-free yogurt and low-fat cottage cheese. Try low-fat cheeses such as mozzarella and other reduced-fat cheeses.  Choose meat and other protein foods that are low in fat.  Choose beans or  other legumes such as split peas or lentils. Choose fish, skinless poultry (chicken or turkey), or lean cuts of red meat (beef or pork). Before you cook meat or poultry, cut off any visible fat.   Use less fat and oil.  Try baking foods instead of frying them. Add less fat, such as margarine, sour cream, regular salad dressing and mayonnaise to foods. Eat fewer high-fat foods. Some examples of high-fat foods include french fries, doughnuts, ice cream, and cakes.  Eat fewer sweets.  Limit foods and drinks that are high in sugar. This includes candy, cookies, regular soda, and sweetened drinks.  Exercise:  Exercise at least 30 minutes per day on most days of the week. Some examples of exercise include walking, biking, dancing, and swimming. You can also fit in more physical activity by taking the stairs instead of the elevator or parking farther away from stores. Ask your healthcare provider about the best exercise plan for you.      © Copyright [employer] 2018 Information is for End User's use only and may not be sold, redistributed or otherwise used for commercial purposes. All illustrations and images included in CareNotes® are the copyrighted property of [employer] or [employer]

## 2024-01-15 NOTE — ASSESSMENT & PLAN NOTE
Instructed to monitor FBG and prior to meals with log. Patient states that she previously utilized her NPH with each meal. Instructed that typically it is a BID med. Patient plans to go back to 10 U TID. States that she has been having hypoglycemia symptoms in the morning due to taking 15 U at night- tremors/shaky but has not checked FBG.  125-150 goal for FBG and 140-150 prior to dinner until bg under better control. Patient to follow up 3-4 weeks with bg log. Will adjust insulin accordingly. States that she is unable to tolerate metformin due to GI upset. Consider endo in the future. Declines presently. Recheck A1c 3 months.     Lab Results   Component Value Date    HGBA1C 13.4 (H) 01/03/2024

## 2024-01-15 NOTE — TELEPHONE ENCOUNTER
Patient scheduled for:    Follow up with [facility] at [facility] on 2/26/24   Imaging will be done 2/12/24

## 2024-01-15 NOTE — PROGRESS NOTES
Assessment and Plan:     Problem List Items Addressed This Visit       Partial idiopathic epilepsy with seizures of localized onset, intractable, without status epilepticus (HCC)     Not taking any AEDs at this time. Following with neuro.          Type 2 diabetes mellitus with hyperglycemia, with long-term current use of insulin (HCC)     Instructed to monitor FBG and prior to meals with log. Patient states that she previously utilized her NPH with each meal. Instructed that typically it is a BID med. Patient plans to go back to 10 U TID. States that she has been having hypoglycemia symptoms in the morning due to taking 15 U at night- tremors/shaky but has not checked FBG.  125-150 goal for FBG and 140-150 prior to dinner until bg under better control. Patient to follow up 3-4 weeks with bg log. Will adjust insulin accordingly. States that she is unable to tolerate metformin due to GI upset. Consider endo in the future. Declines presently. Recheck A1c 3 months.     Lab Results   Component Value Date    HGBA1C 13.4 (H) 01/03/2024            Relevant Orders    Albumin / creatinine urine ratio    Hemoglobin A1C    Hypertensive urgency     States that lisinopril is causing diarrhea and plans to discontinue. Encouraged BP trending with log. SBP goal <140. If BPs not at goal can consider trial of amlodipine or valsartan but potential SE for diarrhea with both.          Renal abscess, right     Will call to schedule CT scan today. Reports compliance with abx. Following with ID.           Other Visit Diagnoses       Medicare annual wellness visit, subsequent    -  Primary              Depression Screening and Follow-up Plan: Patient was screened for depression during today's encounter. They screened negative with a PHQ-2 score of 0.      Preventive health issues were discussed with patient, and age appropriate screening tests were ordered as noted in patient's After Visit Summary.  Personalized health advice and  appropriate referrals for health education or preventive services given if needed, as noted in patient's After Visit Summary.     History of Present Illness:     Patient presents for a Medicare Wellness Visit    Diabetes  She presents for her initial diabetic visit. She has type 2 diabetes mellitus. Her disease course has been worsening. Hypoglycemia symptoms include nervousness/anxiousness and tremors. Pertinent negatives for hypoglycemia include no dizziness. (Reports upon waking. Encouraged FBG.) There are no diabetic associated symptoms. Pertinent negatives for diabetes include no chest pain, no fatigue and no weakness. There are no hypoglycemic complications. Risk factors for coronary artery disease include diabetes mellitus, obesity, hypertension and post-menopausal. Current diabetic treatment includes insulin injections. She is compliant with treatment some of the time.      Patient Care Team:  SHASHANK Miguel as PCP - General (Nurse Practitioner)  Arsh Jeronimo MD (Neurosurgery)     Review of Systems:     Review of Systems   Constitutional:  Negative for activity change and fatigue.   HENT:  Negative for congestion, ear pain, rhinorrhea and sore throat.    Eyes:  Negative for pain.   Respiratory:  Negative for cough, shortness of breath and wheezing.    Cardiovascular:  Negative for chest pain and leg swelling.   Gastrointestinal:  Negative for abdominal pain, diarrhea, nausea and vomiting.   Musculoskeletal:  Negative for arthralgias and myalgias.   Skin:  Negative for rash.   Neurological:  Positive for tremors. Negative for dizziness, weakness and numbness.   Psychiatric/Behavioral:  The patient is nervous/anxious.    All other systems reviewed and are negative.       Problem List:     Patient Active Problem List   Diagnosis    Partial idiopathic epilepsy with seizures of localized onset, intractable, without status epilepticus (HCC)    Type 2 diabetes mellitus with hyperglycemia, with  long-term current use of insulin (HCC)    Hypertensive urgency    Urinary urgency    Meningioma (HCC)    S/P craniotomy    Hyponatremia    Seizure-like activity (HCC)    Sepsis without acute organ dysfunction (HCC)    Renal abscess, right    Obesity (BMI 30.0-34.9)      Past Medical and Surgical History:     Past Medical History:   Diagnosis Date    Bipolar depression (HCC)     Cancer (HCC)     Depression     Diabetes mellitus (HCC)     Hypertension     PTSD (post-traumatic stress disorder) 2002    Seizures (HCC)      Past Surgical History:   Procedure Laterality Date    CHOLECYSTECTOMY      HYSTERECTOMY      IMPLANTABLE CONTACT LENS IMPLANTATION      GA CRANIEC TREPHINE BONE FLP BRAIN TUMOR SUPRTENTOR Right 3/15/2018    Procedure: IMAGE-GUIDED RIGHT FRONTOPARIETAL CRANIOTOMY FOR TUMOR;  Surgeon: Arsh Jeronimo MD;  Location: [facility];  Service: Neurosurgery      Family History:     Family History   Adopted: Yes      Social History:     Social History     Socioeconomic History    Marital status: Significant Other     Spouse name: Marty    Number of children: 4    Years of education: Some college    Highest education level: None   Occupational History    Occupation: disabled     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   Tobacco Use    Smoking status: Former     Current packs/day: 0.00     Types: Cigarettes     Quit date: 3/5/2000     Years since quittin.8    Smokeless tobacco: Never   Vaping Use    Vaping status: Never Used   Substance and Sexual Activity    Alcohol use: Never    Drug use: No    Sexual activity: Yes     Partners: Male   Other Topics Concern    None   Social History Narrative    Lives at home with significant otherMarty     Social Determinants of Health     Financial Resource Strain: Low Risk  (1/15/2024)    Overall Financial Resource Strain (CARDIA)     Difficulty of Paying Living Expenses: Not hard at all   Food Insecurity: No Food Insecurity (1/15/2024)    Hunger Vital Sign      Worried About Running Out of Food in the Last Year: Never true     Ran Out of Food in the Last Year: Never true   Transportation Needs: No Transportation Needs (1/15/2024)    PRAPARE - Transportation     Lack of Transportation (Medical): No     Lack of Transportation (Non-Medical): No   Physical Activity: Not on file   Stress: Not on file   Social Connections: Not on file   Intimate Partner Violence: Not on file   Housing Stability: Low Risk  (1/8/2024)    Housing Stability Vital Sign     Unable to Pay for Housing in the Last Year: No     Number of Places Lived in the Last Year: 1     Unstable Housing in the Last Year: No      Medications and Allergies:     Current Outpatient Medications   Medication Sig Dispense Refill    Alcohol Swabs 70 % PADS May substitute brand based on insurance coverage. Check glucose ACHS. 200 each 0    Ascorbic Acid (VITAMIN C) 1000 MG tablet Take 1,000 mg by mouth daily      cefadroxil (DURICEF) 500 mg capsule Take 1 capsule (500 mg total) by mouth every 12 (twelve) hours 60 capsule 0    cholecalciferol (VITAMIN D3) 1,000 units tablet Take 1,000 Units by mouth daily      CHROMIUM PO Take by mouth 2 (two) times a day      ferrous sulfate 325 (65 Fe) mg tablet Take 325 mg by mouth daily       insulin aspart protamine-insulin aspart (NovoLOG Mix 70/30 FlexPen) 100 Units/mL injection pen Inject 15 Units under the skin 2 (two) times a day before meals 9 mL 0    L-LYSINE PO Take 500 mg by mouth daily      lisinopril (ZESTRIL) 20 mg tablet Take 1 tablet (20 mg total) by mouth daily 30 tablet 0    Probiotic Product (PROBIOTIC DAILY PO) Take 1 capsule by mouth daily      BD PEN NEEDLE BON U/F 32G X 4 MM MISC TEST TID UTD (Patient not taking: Reported on 3/10/2023)  2    Blood Glucose Monitoring Suppl (OneTouch Verio Reflect) w/Device KIT May substitute brand based on insurance coverage. Check glucose ACHS. (Patient not taking: Reported on 1/8/2024) 1 kit 0    Blood Glucose Monitoring Suppl  "(OneTouch Verio Reflect) w/Device KIT May substitute brand based on insurance coverage. Check glucose BID. 1 kit 0    glucose blood (OneTouch Verio) test strip May substitute brand based on insurance coverage. Check glucose BID. 100 each 0    insulin aspart protamine-insulin aspart (NovoLOG 70/30) 100 Units/mL injection pen Inject 15 Units under the skin 2 (two) times a day with meals 9 mL 0    insulin NPH-insulin regular (HumuLIN 70/30) 100 units/mL subcutaneous injection Inject 15 Units under the skin 2 (two) times a day before meals 3 mL 0    Insulin Pen Needle (BD Pen Needle Yolis 2nd Gen) 32G X 4 MM MISC For use with insulin pen. Pharmacy may dispense brand covered by insurance. (Patient not taking: Reported on 1/8/2024) 100 each 0    Insulin Pen Needle 29G X 12.7MM MISC Use as directed with novolog flexpen to administer insulin with meals and use with lantus solostar pen to administer insulin in the morning (Patient not taking: Reported on 3/10/2023) 100 each 0    Insulin Syringe-Needle U-100 (BD Insulin Syringe U/F) 31G X 5/16\" 0.5 ML MISC For use with insulin. Pharmacy may dispense brand covered by insurance. 100 each 0    OneTouch Delica Lancets 33G MISC May substitute brand based on insurance coverage. Check glucose ACHS. (Patient not taking: Reported on 1/8/2024) 200 each 0    OneTouch Delica Lancets 33G MISC May substitute brand based on insurance coverage. Check glucose BID. 100 each 0    Zinc 50 MG TABS Take by mouth daily (Patient not taking: Reported on 1/15/2024)       No current facility-administered medications for this visit.     Allergies   Allergen Reactions    Dilantin [Phenytoin]     Lidocaine     Statins       Immunizations:       There is no immunization history on file for this patient.   Health Maintenance:         Topic Date Due    Hepatitis C Screening  Never done    HIV Screening  Never done    Breast Cancer Screening: Mammogram  Never done    Colorectal Cancer Screening  Never done "         Topic Date Due    COVID-19 Vaccine (1) Never done    Pneumococcal Vaccine: Pediatrics (0 to 5 Years) and At-Risk Patients (6 to 64 Years) (1 - PCV) Never done    Influenza Vaccine (1) Never done      Medicare Screening Tests and Risk Assessments:         Health Risk Assessment:   Patient rates overall health as fair. Patient feels that their physical health rating is slightly worse. Patient is very satisfied with their life. Eyesight was rated as same. Hearing was rated as same. Patient feels that their emotional and mental health rating is slightly better. Patients states they are often angry. Patient states they are often unusually tired/fatigued. Pain experienced in the last 7 days has been none. Patient states that she has experienced no weight loss or gain in last 6 months.     Depression Screening:   PHQ-2 Score: 0      Fall Risk Screening:   In the past year, patient has experienced: history of falling in past year    Number of falls: 2 or more  Injured during fall?: Yes    Feels unsteady when standing or walking?: No    Worried about falling?: Yes      Urinary Incontinence Screening:   Patient has not leaked urine accidently in the last six months.     Home Safety:  Patient has trouble with stairs inside or outside of their home. Patient has working smoke alarms and has working carbon monoxide detector. Home safety hazards include: none.     Nutrition:   Current diet is Regular.     Medications:   Patient is currently taking over-the-counter supplements. OTC medications include: see medication list. Patient is able to manage medications.     Activities of Daily Living (ADLs)/Instrumental Activities of Daily Living (IADLs):   Walk and transfer into and out of bed and chair?: Yes  Dress and groom yourself?: Yes    Bathe or shower yourself?: Yes    Feed yourself? Yes  Do your laundry/housekeeping?: Yes  Manage your money, pay your bills and track your expenses?: Yes  Make your own meals?: Yes    Do your  own shopping?: Yes    Previous Hospitalizations:   Any hospitalizations or ED visits within the last 12 months?: Yes    How many hospitalizations have you had in the last year?: 1-2    Advance Care Planning:   Living will: No    Durable POA for healthcare: No    Advanced directive: No    Advanced directive counseling given: Yes    ACP document given: Yes    Patient declined ACP directive: No    End of Life Decisions reviewed with patient: Yes    Provider agrees with end of life decisions: Yes      Cognitive Screening:   Provider or family/friend/caregiver concerned regarding cognition?: No    PREVENTIVE SCREENINGS      Cardiovascular Screening:    General: Screening Current      Diabetes Screening:     General: Screening Not Indicated and History Diabetes      Lung Cancer Screening:     General: Screening Not Indicated    Screening, Brief Intervention, and Referral to Treatment (SBIRT)    Screening    Typical number of drinks in a week: 0    Single Item Drug Screening:  How often have you used an illegal drug (including marijuana) or a prescription medication for non-medical reasons in the past year? never    Single Item Drug Screen Score: 0  Interpretation: Negative screen for possible drug use disorder    No results found.     Physical Exam:     /84 (BP Location: Left arm, Patient Position: Sitting, Cuff Size: Standard)   Pulse 75   Temp (!) 95.8 °F (35.4 °C) (Tympanic)   Wt 81.2 kg (179 lb)   SpO2 99%   BMI 29.79 kg/m²     Physical Exam  Vitals and nursing note reviewed.   Constitutional:       General: She is not in acute distress.     Appearance: She is well-developed.   HENT:      Head: Normocephalic and atraumatic.   Eyes:      Conjunctiva/sclera: Conjunctivae normal.   Cardiovascular:      Rate and Rhythm: Normal rate and regular rhythm.      Pulses: no weak pulses          Dorsalis pedis pulses are 2+ on the right side and 2+ on the left side.        Posterior tibial pulses are 2+ on the right  side and 2+ on the left side.      Heart sounds: Normal heart sounds. No murmur heard.  Pulmonary:      Effort: Pulmonary effort is normal. No respiratory distress.      Breath sounds: Normal breath sounds.   Abdominal:      Palpations: Abdomen is soft.      Tenderness: There is no abdominal tenderness.   Musculoskeletal:         General: No swelling.      Cervical back: Neck supple.   Feet:      Right foot:      Skin integrity: No ulcer, skin breakdown, erythema, warmth, callus or dry skin.      Left foot:      Skin integrity: No ulcer, skin breakdown, erythema, warmth, callus or dry skin.   Skin:     General: Skin is warm and dry.      Capillary Refill: Capillary refill takes less than 2 seconds.      Findings: No rash.   Neurological:      General: No focal deficit present.      Mental Status: She is alert and oriented to person, place, and time. Mental status is at baseline.      Sensory: No sensory deficit.      Motor: No weakness.      Coordination: Coordination normal.      Gait: Gait normal.   Psychiatric:         Mood and Affect: Mood normal.         Behavior: Behavior normal.         Thought Content: Thought content normal.         Judgment: Judgment normal.     Patient's shoes and socks removed.    Right Foot/Ankle   Right Foot Inspection  Skin Exam: skin normal and skin intact. No dry skin, no warmth, no callus, no erythema, no maceration, no abnormal color, no pre-ulcer, no ulcer and no callus.     Toe Exam: ROM and strength within normal limits.     Sensory   Vibration: intact  Proprioception: intact  Monofilament testing: intact    Vascular  Capillary refills: < 3 seconds  The right DP pulse is 2+. The right PT pulse is 2+.     Left Foot/Ankle  Left Foot Inspection  Skin Exam: skin normal and skin intact. No dry skin, no warmth, no erythema, no maceration, normal color, no pre-ulcer, no ulcer and no callus.     Toe Exam: ROM and strength within normal limits.     Sensory   Vibration:  intact  Proprioception: intact  Monofilament testing: intact    Vascular  Capillary refills: < 3 seconds  The left DP pulse is 2+. The left PT pulse is 2+.     Assign Risk Category  No deformity present  No loss of protective sensation  No weak pulses  Risk: 0       SHASHANK Miguel

## 2024-01-15 NOTE — ASSESSMENT & PLAN NOTE
States that lisinopril is causing diarrhea and plans to discontinue. Encouraged BP trending with log. SBP goal <140. If BPs not at goal can consider trial of amlodipine or valsartan but potential SE for diarrhea with both.

## 2024-01-16 ENCOUNTER — TELEPHONE (OUTPATIENT)
Dept: FAMILY MEDICINE CLINIC | Facility: CLINIC | Age: 64
End: 2024-01-16

## 2024-01-16 LAB
ALBUMIN/CREAT UR: 606 MG/G CREAT (ref 0–29)
CREAT UR-MCNC: 72.4 MG/DL
MICROALBUMIN UR-MCNC: 438.9 UG/ML

## 2024-01-16 NOTE — TELEPHONE ENCOUNTER
----- Message from SHASHANK Miguel sent at 1/16/2024 12:25 PM EST -----  Please notify patient:  Urine microalbumin/creatinine ratio is high. As discussed yesterday, we need to get better control of blood glucose. We can discuss more at next visit.   ----- Message -----  [facility] Results [facility]  Sent: 1/16/2024  12:05 PM EST  To: SHASHANK Miguel

## 2024-01-25 ENCOUNTER — TELEPHONE (OUTPATIENT)
Dept: INFECTIOUS DISEASES | Facility: CLINIC | Age: 64
End: 2024-01-25

## 2024-01-25 ENCOUNTER — APPOINTMENT (OUTPATIENT)
Dept: LAB | Facility: CLINIC | Age: 64
End: 2024-01-25
Payer: MEDICARE

## 2024-01-25 DIAGNOSIS — E11.29 MICROALBUMINURIA DUE TO TYPE 2 DIABETES MELLITUS: Primary | ICD-10-CM

## 2024-01-25 DIAGNOSIS — Z98.890 STATUS POST RESECTION OF MENINGIOMA: ICD-10-CM

## 2024-01-25 DIAGNOSIS — R80.9 MICROALBUMINURIA DUE TO TYPE 2 DIABETES MELLITUS: Primary | ICD-10-CM

## 2024-01-25 DIAGNOSIS — E11.65 TYPE 2 DIABETES MELLITUS WITH HYPERGLYCEMIA, WITH LONG-TERM CURRENT USE OF INSULIN (HCC): ICD-10-CM

## 2024-01-25 DIAGNOSIS — Z86.018 STATUS POST RESECTION OF MENINGIOMA: ICD-10-CM

## 2024-01-25 DIAGNOSIS — Z79.4 TYPE 2 DIABETES MELLITUS WITH HYPERGLYCEMIA, WITH LONG-TERM CURRENT USE OF INSULIN (HCC): ICD-10-CM

## 2024-01-25 LAB
BASOPHILS # BLD AUTO: 0.03 THOUSANDS/ÂΜL (ref 0–0.1)
BASOPHILS NFR BLD AUTO: 0 % (ref 0–1)
BUN SERPL-MCNC: 22 MG/DL (ref 5–25)
CREAT SERPL-MCNC: 0.99 MG/DL (ref 0.6–1.3)
CREAT UR-MCNC: 24.8 MG/DL
EOSINOPHIL # BLD AUTO: 0.07 THOUSAND/ÂΜL (ref 0–0.61)
EOSINOPHIL NFR BLD AUTO: 1 % (ref 0–6)
ERYTHROCYTE [DISTWIDTH] IN BLOOD BY AUTOMATED COUNT: 13.1 % (ref 11.6–15.1)
GFR SERPL CREATININE-BSD FRML MDRD: 60 ML/MIN/1.73SQ M
HCT VFR BLD AUTO: 37.7 % (ref 34.8–46.1)
HGB BLD-MCNC: 12.1 G/DL (ref 11.5–15.4)
IMM GRANULOCYTES # BLD AUTO: 0.03 THOUSAND/UL (ref 0–0.2)
IMM GRANULOCYTES NFR BLD AUTO: 0 % (ref 0–2)
LYMPHOCYTES # BLD AUTO: 2.14 THOUSANDS/ÂΜL (ref 0.6–4.47)
LYMPHOCYTES NFR BLD AUTO: 28 % (ref 14–44)
MCH RBC QN AUTO: 28.8 PG (ref 26.8–34.3)
MCHC RBC AUTO-ENTMCNC: 32.1 G/DL (ref 31.4–37.4)
MCV RBC AUTO: 90 FL (ref 82–98)
MICROALBUMIN UR-MCNC: 118.1 MG/L
MICROALBUMIN/CREAT 24H UR: 476 MG/G CREATININE (ref 0–30)
MONOCYTES # BLD AUTO: 0.55 THOUSAND/ÂΜL (ref 0.17–1.22)
MONOCYTES NFR BLD AUTO: 7 % (ref 4–12)
NEUTROPHILS # BLD AUTO: 4.89 THOUSANDS/ÂΜL (ref 1.85–7.62)
NEUTS SEG NFR BLD AUTO: 64 % (ref 43–75)
NRBC BLD AUTO-RTO: 0 /100 WBCS
PLATELET # BLD AUTO: 281 THOUSANDS/UL (ref 149–390)
PMV BLD AUTO: 10.6 FL (ref 8.9–12.7)
RBC # BLD AUTO: 4.2 MILLION/UL (ref 3.81–5.12)
WBC # BLD AUTO: 7.71 THOUSAND/UL (ref 4.31–10.16)

## 2024-01-25 PROCEDURE — 82570 ASSAY OF URINE CREATININE: CPT

## 2024-01-25 PROCEDURE — 82043 UR ALBUMIN QUANTITATIVE: CPT

## 2024-01-25 PROCEDURE — 84520 ASSAY OF UREA NITROGEN: CPT

## 2024-01-25 PROCEDURE — 36415 COLL VENOUS BLD VENIPUNCTURE: CPT

## 2024-01-25 NOTE — TELEPHONE ENCOUNTER
Called and spoke with patient today.  Informed patient I was calling regarding CT appointment that was scheduled after her follow up appointment. Patient state when she called to scheduled that was all that was available. Patient states I could call and see if something sooner is available. Patient states she want to go to [place].     Called central scheduling and spoke with Chelsy today.   Chelsy states she has an appointment 1/30/24 at 7:15AM at [facility] available. Added patient to the call. Patient states she can do that day and time and patient scheduled.

## 2024-01-26 ENCOUNTER — TELEPHONE (OUTPATIENT)
Dept: FAMILY MEDICINE CLINIC | Facility: CLINIC | Age: 64
End: 2024-01-26

## 2024-01-26 NOTE — TELEPHONE ENCOUNTER
----- Message from SHASHANK Miguel sent at 1/25/2024  4:18 PM EST -----  Please notify patient:    Urine showed microalbuminuria. Microalbuminuria can signal renal/cardiovascular complications from diabetes. Did improve from levels while hospitalized. Will place nephro consult.   ----- Message -----  From: Lab, Background User  Sent: 1/25/2024   4:12 PM EST  To: SHASHANK Miguel

## 2024-01-30 ENCOUNTER — HOSPITAL ENCOUNTER (OUTPATIENT)
Dept: CT IMAGING | Facility: HOSPITAL | Age: 64
Discharge: HOME | End: 2024-01-30
Payer: MEDICARE

## 2024-01-30 DIAGNOSIS — N12 PYELONEPHRITIS OF RIGHT KIDNEY: ICD-10-CM

## 2024-01-30 PROCEDURE — 74177 CT ABD & PELVIS W/CONTRAST: CPT

## 2024-01-30 PROCEDURE — G1004 CDSM NDSC: HCPCS

## 2024-01-30 RX ADMIN — IOHEXOL 100 ML: 350 INJECTION, SOLUTION INTRAVENOUS at 07:36

## 2024-02-01 ENCOUNTER — TELEPHONE (OUTPATIENT)
Dept: NEPHROLOGY | Facility: CLINIC | Age: 64
End: 2024-02-01

## 2024-02-01 ENCOUNTER — TELEPHONE (OUTPATIENT)
Age: 64
End: 2024-02-01

## 2024-02-01 NOTE — TELEPHONE ENCOUNTER
Pt returned call to schedule consult, and stated she needs sooner appointment than what is available in [facility], pt is willing to travel to different offices. Pt is scheduled in [facility], pt has appointment same day with ID. Pt will call back to confirm if she can keep appointment pt is trying to rearrange appointment time with ID.

## 2024-02-01 NOTE — TELEPHONE ENCOUNTER
Nathalia called in to r/s her FU appointment with ID . Reached out to the emiliano to confirm if it was okay to r/s the patient appointment since the pt was schedule with Neph on the same day.   Emiliano was able to r/s the pt for 2/8/24 with

## 2024-02-01 NOTE — TELEPHONE ENCOUNTER
New Patient Intake Form   Patient Details   Nathalia Monge     1960     [id number]     Insurance Information   Name of Insurance Company Medicare    Does the patient need an insurance referral? no   If patient has VA insurance, please ask if they will be using their VA insurance.   Appointment Information   Who is calling to schedule?  If not patient, what is callers name? Patient   Referring Provider FATMATA JO    Reason for Appt (Diagnosis) E11.29, R80.9 (ICD-10-CM) - Microalbuminuria due to type 2 diabetes mellitus    Does Patient have labs/urine done at [facility]?  If not, where do they go?  List the date of last lab / urine  *Please try to get labs 2 years back if not at  yes   Has patient been hospitalized recently?  If yes, list name and location of hospital they were in Yes  1/8/-1/11/24     Has patient been seen by a Nephrologist before?  If yes, list name, location and phone number no   Has the patient had renal imaging done?  If so, list the most recent date and type of imaging yes   1/30/24   Does patient have a history of Kidney Stones? no   Appointment Details   Is there a referral on file? yes    Appointment Date 2/7/24    Location  [place]   Miscelljim Lam

## 2024-02-05 ENCOUNTER — OFFICE VISIT (OUTPATIENT)
Dept: FAMILY MEDICINE CLINIC | Facility: CLINIC | Age: 64
End: 2024-02-05
Payer: MEDICARE

## 2024-02-05 VITALS
OXYGEN SATURATION: 99 % | WEIGHT: 186 LBS | HEART RATE: 74 BPM | DIASTOLIC BLOOD PRESSURE: 84 MMHG | BODY MASS INDEX: 30.99 KG/M2 | SYSTOLIC BLOOD PRESSURE: 137 MMHG | HEIGHT: 65 IN

## 2024-02-05 DIAGNOSIS — Z59.89 INSURANCE COVERAGE PROBLEMS: ICD-10-CM

## 2024-02-05 DIAGNOSIS — Z12.31 ENCOUNTER FOR SCREENING MAMMOGRAM FOR MALIGNANT NEOPLASM OF BREAST: ICD-10-CM

## 2024-02-05 DIAGNOSIS — R56.9 SEIZURE-LIKE ACTIVITY (HCC): ICD-10-CM

## 2024-02-05 DIAGNOSIS — E11.65 TYPE 2 DIABETES MELLITUS WITH HYPERGLYCEMIA, WITH LONG-TERM CURRENT USE OF INSULIN (HCC): Primary | ICD-10-CM

## 2024-02-05 DIAGNOSIS — Z12.11 SCREEN FOR COLON CANCER: ICD-10-CM

## 2024-02-05 DIAGNOSIS — Z79.4 TYPE 2 DIABETES MELLITUS WITH HYPERGLYCEMIA, WITH LONG-TERM CURRENT USE OF INSULIN (HCC): Primary | ICD-10-CM

## 2024-02-05 DIAGNOSIS — N15.1 RENAL ABSCESS, RIGHT: ICD-10-CM

## 2024-02-05 PROBLEM — Z59.71 INSURANCE COVERAGE PROBLEMS: Status: ACTIVE | Noted: 2024-02-05

## 2024-02-05 PROCEDURE — 99214 OFFICE O/P EST MOD 30 MIN: CPT

## 2024-02-05 SDOH — ECONOMIC STABILITY - INCOME SECURITY: OTHER PROBLEMS RELATED TO HOUSING AND ECONOMIC CIRCUMSTANCES: Z59.89

## 2024-02-05 NOTE — ASSESSMENT & PLAN NOTE
Reports 5 more days of abx treatment. Follow up with ID on Thursday. States that she overall feels well and tolerating abx.

## 2024-02-05 NOTE — PROGRESS NOTES
"Name: Nathalia Monge      : 1960      MRN: [id number]  Encounter Provider: SHASHANK Miguel  Encounter Date: 2024   Encounter department: [facility]    Assessment & Plan     1. Type 2 diabetes mellitus with hyperglycemia, with long-term current use of insulin (HCC)  Assessment & Plan:  FBG trending 140-160's. Discussed goal of 125-150 until bg better controlled. Discussed increasing insulin 2-3U every 3 days until target reached. Patient verbalized understanding. Will reach out with trends and insulin dosage. A1C recheck approx 2 months. Patient states that she is unable to afford endo follow up. Will manage diabetes meds at this time.   Lab Results   Component Value Date    HGBA1C 13.4 (H) 2024         2. Renal abscess, right  Assessment & Plan:  Reports 5 more days of abx treatment. Follow up with ID on Thursday. States that she overall feels well and tolerating abx.       3. Seizure-like activity (HCC)  Assessment & Plan:  Follows with neuro. Reports \"more weakness in my left side\" since her hospitalization. Scheduled for follow up MRI. Declines moving appt up with neuro due to financial concerns.       4. Insurance coverage problems  Assessment & Plan:  Patient tearful during visit due to financial concerns. States that she has a monthly income of $1200. States that she lives in a camper on a friend's property--- rent is $650 month. Reports financial hardship due to medical bills. states that she pays [employer] $35/month which is a financial burden. Patient is declining and cancelling appts due to medical bill concerns. Patient crying stating, \"I just can't afford it. I can't afford the bill. I can't afford the gas to get to appts. I just can't afford any of it.\" Social work referred.    Orders:  -     Ambulatory Referral to Social Work Care Management Program; Future    5. Encounter for screening mammogram for malignant neoplasm of breast  -     Mammo " screening bilateral w cad; Future; Expected date: 02/05/2024    6. Screen for colon cancer  -     Ambulatory Referral to Gastroenterology; Future        Depression Screening and Follow-up Plan: Patient was screened for depression during today's encounter. They screened negative with a PHQ-2 score of 0.        Subjective      Diabetes  She presents for her follow-up diabetic visit. She has type 2 diabetes mellitus. Her disease course has been improving. Pertinent negatives for hypoglycemia include no confusion, dizziness, headaches, hunger, mood changes, nervousness/anxiousness, pallor, seizures, sleepiness, speech difficulty, sweats or tremors. Associated symptoms include weakness. Pertinent negatives for diabetes include no blurred vision, no chest pain, no fatigue, no foot paresthesias, no foot ulcerations, no polydipsia, no polyphagia, no polyuria, no visual change and no weight loss. Pertinent negatives for hypoglycemia complications include no blackouts, no hospitalization, no nocturnal hypoglycemia, no required assistance and no required glucagon injection. Symptoms are stable. Risk factors for coronary artery disease include diabetes mellitus and stress. Current diabetic treatment includes insulin injections. Her weight is stable. Her breakfast blood glucose is taken between 6-7 am. Her breakfast blood glucose range is generally 140-180 mg/dl. (FBG)     Review of Systems   Constitutional:  Negative for activity change, fatigue and weight loss.   HENT:  Negative for congestion, ear pain, rhinorrhea and sore throat.    Eyes:  Negative for blurred vision and pain.   Respiratory:  Negative for cough, shortness of breath and wheezing.    Cardiovascular:  Negative for chest pain and leg swelling.   Gastrointestinal:  Negative for abdominal pain, diarrhea, nausea and vomiting.   Endocrine: Negative for polydipsia, polyphagia and polyuria.   Musculoskeletal:  Negative for arthralgias and myalgias.   Skin:  Negative  for pallor and rash.   Neurological:  Positive for weakness. Negative for dizziness, tremors, seizures, speech difficulty, numbness and headaches.   Psychiatric/Behavioral:  Negative for confusion. The patient is not nervous/anxious.    All other systems reviewed and are negative.      Current Outpatient Medications on File Prior to Visit   Medication Sig    Alcohol Swabs 70 % PADS May substitute brand based on insurance coverage. Check glucose ACHS.    Ascorbic Acid (VITAMIN C) 1000 MG tablet Take 1,000 mg by mouth daily    cefadroxil (DURICEF) 500 mg capsule Take 1 capsule (500 mg total) by mouth every 12 (twelve) hours    cholecalciferol (VITAMIN D3) 1,000 units tablet Take 1,000 Units by mouth daily    CHROMIUM PO Take by mouth 2 (two) times a day    ferrous sulfate 325 (65 Fe) mg tablet Take 325 mg by mouth daily     insulin aspart protamine-insulin aspart (NovoLOG Mix 70/30 FlexPen) 100 Units/mL injection pen Inject 15 Units under the skin 2 (two) times a day before meals    L-LYSINE PO Take 500 mg by mouth daily    lisinopril (ZESTRIL) 20 mg tablet Take 1 tablet (20 mg total) by mouth daily    Probiotic Product (PROBIOTIC DAILY PO) Take 1 capsule by mouth daily    BD PEN NEEDLE BON U/F 32G X 4 MM MISC TEST TID UTD (Patient not taking: Reported on 3/10/2023)    Blood Glucose Monitoring Suppl (OneTouch Verio Reflect) w/Device KIT May substitute brand based on insurance coverage. Check glucose ACHS. (Patient not taking: Reported on 1/8/2024)    Blood Glucose Monitoring Suppl (OneTouch Verio Reflect) w/Device KIT May substitute brand based on insurance coverage. Check glucose BID.    glucose blood (OneTouch Verio) test strip May substitute brand based on insurance coverage. Check glucose BID.    Insulin Pen Needle (BD Pen Needle Bon 2nd Gen) 32G X 4 MM MISC For use with insulin pen. Pharmacy may dispense brand covered by insurance. (Patient not taking: Reported on 1/8/2024)    Insulin Pen Needle 29G X 12.7MM  "MISC Use as directed with novolog flexpen to administer insulin with meals and use with lantus solostar pen to administer insulin in the morning (Patient not taking: Reported on 3/10/2023)    Insulin Syringe-Needle U-100 (BD Insulin Syringe U/F) 31G X 5/16\" 0.5 ML MISC For use with insulin. Pharmacy may dispense brand covered by insurance.    OneTouch Delica Lancets 33G MISC May substitute brand based on insurance coverage. Check glucose ACHS. (Patient not taking: Reported on 1/8/2024)    OneTouch Delica Lancets 33G MISC May substitute brand based on insurance coverage. Check glucose BID.    [DISCONTINUED] insulin aspart protamine-insulin aspart (NovoLOG 70/30) 100 Units/mL injection pen Inject 15 Units under the skin 2 (two) times a day with meals    [DISCONTINUED] insulin NPH-insulin regular (HumuLIN 70/30) 100 units/mL subcutaneous injection Inject 15 Units under the skin 2 (two) times a day before meals    [DISCONTINUED] Zinc 50 MG TABS Take by mouth daily (Patient not taking: Reported on 1/15/2024)       Objective     /84 (BP Location: Left arm, Patient Position: Sitting, Cuff Size: Standard)   Pulse 74   Ht 5' 5\" (1.651 m)   Wt 84.4 kg (186 lb)   SpO2 99%   BMI 30.95 kg/m²     Physical Exam  Vitals and nursing note reviewed.   Constitutional:       Appearance: Normal appearance.   HENT:      Head: Normocephalic.      Right Ear: External ear normal.      Left Ear: External ear normal.   Cardiovascular:      Rate and Rhythm: Normal rate and regular rhythm.      Heart sounds: Normal heart sounds. No murmur heard.  Pulmonary:      Effort: Pulmonary effort is normal. No respiratory distress.      Breath sounds: Normal breath sounds. No wheezing.   Abdominal:      General: Bowel sounds are normal.   Musculoskeletal:      Cervical back: Neck supple.   Skin:     General: Skin is warm and dry.      Findings: No rash.   Neurological:      General: No focal deficit present.      Mental Status: She is alert and " oriented to person, place, and time. Mental status is at baseline.      GCS: GCS eye subscore is 4. GCS verbal subscore is 5. GCS motor subscore is 6.      Cranial Nerves: No dysarthria or facial asymmetry.      Sensory: No sensory deficit.      Coordination: Coordination is intact. Coordination normal.      Gait: Gait is intact. Gait normal.   Psychiatric:         Attention and Perception: Attention and perception normal.         Mood and Affect: Affect is tearful.         Speech: Speech normal.         Behavior: Behavior normal. Behavior is cooperative.         Thought Content: Thought content normal.         Cognition and Memory: Cognition and memory normal.         Judgment: Judgment normal.       SHASHANK Miguel

## 2024-02-05 NOTE — ASSESSMENT & PLAN NOTE
"Patient tearful during visit due to financial concerns. States that she has a monthly income of $1200. States that she lives in a camper on a friend's property--- rent is $650 month. Reports financial hardship due to medical bills. states that she pays [employer] $35/month which is a financial burden. Patient is declining and cancelling appts due to medical bill concerns. Patient crying stating, \"I just can't afford it. I can't afford the bill. I can't afford the gas to get to appts. I just can't afford any of it.\" Social work referred.  "

## 2024-02-05 NOTE — ASSESSMENT & PLAN NOTE
FBG trending 140-160's. Discussed goal of 125-150 until bg better controlled. Discussed increasing insulin 2-3U every 3 days until target reached. Patient verbalized understanding. Will reach out with trends and insulin dosage. A1C recheck approx 2 months. Patient states that she is unable to afford endo follow up. Will manage diabetes meds at this time.   Lab Results   Component Value Date    HGBA1C 13.4 (H) 01/03/2024

## 2024-02-05 NOTE — ASSESSMENT & PLAN NOTE
"Follows with neuro. Reports \"more weakness in my left side\" since her hospitalization. Scheduled for follow up MRI. Declines moving appt up with neuro due to financial concerns.   "

## 2024-02-05 NOTE — ASSESSMENT & PLAN NOTE
Patient states that she is unable to afford an at home BP monitor. Has not been able to trend at home. Last 2 office visits SBP <140. Not taking lisinopril. States that when she picks up meds will use pharmacy BP monitor to trend.

## 2024-02-06 ENCOUNTER — TELEPHONE (OUTPATIENT)
Dept: NEUROLOGY | Facility: CLINIC | Age: 64
End: 2024-02-06

## 2024-02-06 ENCOUNTER — PATIENT OUTREACH (OUTPATIENT)
Dept: FAMILY MEDICINE CLINIC | Facility: CLINIC | Age: 64
End: 2024-02-06

## 2024-02-06 NOTE — TELEPHONE ENCOUNTER
Appointment canceled for Nathalia Monge   [facility] FOLLOW UP [facility]  2/21/2024    9:00 AM  60 mins.    Shashank Villaseñor MD           [facility]     Reason for Cancellation: Financial    Called patient in attempt to r/s appt. No answer. LMOM.

## 2024-02-06 NOTE — PROGRESS NOTES
SHANTHI SALES received a referral from patient's SHANTHI SALES received a referral from patient's PCP to discuss financial resources. SHANTHI SALES reviewed patient's chart and called patient ([phone]). Patient answered and SHANTHI SALES introduced SHANTHI SALES role and reason for calling.     Patient lives with her significant other in a camper. Patient receives about $1,196 a month in SSI after insurance costs are taken out.  She pays around $650 a month to stay on her friends property. Patient does have Medicare extra help to assist with expense of medications. Patient is legally  but  and estranged from her . SO lives with her and he does not have any income as patient states he is dyslexic and has had difficulty obtaining a job. Patient does not get SNAP. Patient often utilizes the food bank and also grows a few of her own vegetables. Patient currently staying in [place]. She has a friend that drives her to appointments as she cannot drive. Patient uses an SPC to ambulate and her SO as needed with ADLs. She is paying $35 a month to [facility] for a medical bill and did recently speak with a financial counselor about gina care. SHANTHI SALES did ask about MH hx but patient was not interested in answering and become upset. Patient hung up on SHANTHI SALES. SHANTHI SALES called back and discussed with patient that SHANTHI ASLES would like to assist patient with applying for SNAP. Patient Is not interested if she will only will receive $20 a month. SHANTHI SALES attempted to discuss that SHANTHI SALES is unsure the amount she will receive but patient hung up. SHANTHI SALES will close as patient is not interested in SHANTHI SALES. Please re consult SHANTHI SALES as needed.     SHANTHI SALES sent PCP an inbasket message regarding above.

## 2024-02-08 ENCOUNTER — TELEPHONE (OUTPATIENT)
Dept: INFECTIOUS DISEASES | Facility: CLINIC | Age: 64
End: 2024-02-08

## 2024-02-08 ENCOUNTER — OFFICE VISIT (OUTPATIENT)
Dept: INFECTIOUS DISEASES | Facility: CLINIC | Age: 64
End: 2024-02-08
Payer: MEDICARE

## 2024-02-08 ENCOUNTER — APPOINTMENT (OUTPATIENT)
Dept: LAB | Facility: CLINIC | Age: 64
End: 2024-02-08
Payer: MEDICARE

## 2024-02-08 VITALS
BODY MASS INDEX: 30.99 KG/M2 | RESPIRATION RATE: 18 BRPM | DIASTOLIC BLOOD PRESSURE: 70 MMHG | HEIGHT: 65 IN | HEART RATE: 72 BPM | OXYGEN SATURATION: 98 % | WEIGHT: 186 LBS | SYSTOLIC BLOOD PRESSURE: 134 MMHG | TEMPERATURE: 97.2 F

## 2024-02-08 DIAGNOSIS — R78.81 BACTEREMIA DUE TO KLEBSIELLA PNEUMONIAE: ICD-10-CM

## 2024-02-08 DIAGNOSIS — Z22.1 CLOSTRIDIOIDES DIFFICILE CARRIER: ICD-10-CM

## 2024-02-08 DIAGNOSIS — E11.65 TYPE 2 DIABETES MELLITUS WITH HYPERGLYCEMIA, WITH LONG-TERM CURRENT USE OF INSULIN (HCC): ICD-10-CM

## 2024-02-08 DIAGNOSIS — N15.1 RENAL ABSCESS, RIGHT: Primary | ICD-10-CM

## 2024-02-08 DIAGNOSIS — Z79.4 TYPE 2 DIABETES MELLITUS WITH HYPERGLYCEMIA, WITH LONG-TERM CURRENT USE OF INSULIN (HCC): ICD-10-CM

## 2024-02-08 DIAGNOSIS — B96.1 BACTEREMIA DUE TO KLEBSIELLA PNEUMONIAE: ICD-10-CM

## 2024-02-08 PROCEDURE — 99214 OFFICE O/P EST MOD 30 MIN: CPT | Performed by: INTERNAL MEDICINE

## 2024-02-08 RX ORDER — CEFADROXIL 500 MG/1
500 CAPSULE ORAL EVERY 12 HOURS SCHEDULED
Qty: 60 CAPSULE | Refills: 0 | Status: SHIPPED | OUTPATIENT
Start: 2024-02-08 | End: 2024-03-09

## 2024-02-08 NOTE — TELEPHONE ENCOUNTER
Called [facility] scheduling and spoke with Lyssa today.   Informed Lyssa ESCALERA was calling to get patient scheduled for CT. Patient is scheduled 3/4/24 at 2:30PM at [facility].     Called and spoke with patient today.   Informed patient CT and follow up appointment are scheduled. Patient states she will check her mychart for the dates and times.   Informed patient if there are any further questions or concerns to call the office.   Patient verbalizes understanding at this time.

## 2024-02-08 NOTE — PROGRESS NOTES
Progress Note - Infectious Disease   Nathalia Monge 63 y.o. female MRN: [id number]  Unit/Bed#:  Encounter: [id number]      Impression/Recommendations:  Right-sided pyelonephritis most likely right renal abscess.  Patient is clinically much improved on antibiotic.  She has no further urinary symptoms or flank pain.  Repeat CT on 1/30 showed much improved abscess size.  Patient will continue her current antibiotic regimen until abscess is resolved completely on repeat imaging.  Continue Duricef.  Recheck abdomen/pelvis CT in 1 month.  Patient will need to stay on antibiotic until renal abscess is completely resolved on repeat CT imaging.    Klebsiella pneumoniae bacteremia, secondary to pyelonephritis above.  Patient is clinically improved.  Antibiotic plan as in above.    C. difficile colonization.  Patient has no diarrhea or evidence of colitis.  Monitor for development of diarrhea on systemic antibiotic.    DM, poorly controlled, with hyperglycemia and elevated hemoglobin A1c.  This could lead to infection above.  Management per PCP.    Intermittent focal seizure, with no evidence of breakthrough seizure since discharge.    Discussed with patient and her  in detail regarding the above plan.  Follow-up with us in 1 month, after repeat CT.    Antibiotics:  Cefadroxil  Antibiotic x 4 weeks    Subjective:  Patient feels well.  No urinary symptoms.  No further flank pain.  Temperature stays down.  No chills.  She is tolerating antibiotic well.  No nausea, vomiting or diarrhea.    The following portions of the patient's history were reviewed and updated as appropriate: allergies, current medications, past medical history, past social history, past surgical history and problem list    ROS:  A complete review of systems was done.  Except for what is noted above, the rest of the review of systems was negative.       Objective:  Vitals:   Temperature: (!) 97.2 °F (36.2 °C)    Physical Exam:     General: Awake, alert,  "cooperative, no distress.   Neck:  Supple. No lymphadenopathy.  No mass.   Lungs: Expansion symmetric, no rales, no wheezing, respirations unlabored.   Heart:  Regular rate and rhythm, S1 and S2 normal, no murmur.   Abdomen: Soft, nondistended, non-tender, bowel sounds active all four quadrants,        no masses, no organomegaly.   Extremities: No edema. No erythema/warmth. No ulcer. Nontender to palpation.   Skin:  No rash.   Neuro: Moves all extremities.     Invasive Devices       None                   Labs studies:   I have personally reviewed pertinent labs.        Invalid input(s): \"ALBUMIN\"  Results from last 7 days   Lab Units 02/08/24  0810   WBC Thousand/uL 7.09   HEMOGLOBIN g/dL 12.5   HEMATOCRIT % 37.9   PLATELETS Thousands/uL 216   NEUTROS PCT % 67   MONOS PCT % 6   EOS PCT % 2           Imaging Studies:   I have personally reviewed pertinent imaging study reports and images in PACS.  1/30 abdomen/pelvis CT reviewed personally.  Right kidney abscess much decreased in size.    EKG, Pathology, and Other Studies:   I have personally reviewed pertinent reports.                          "

## 2024-02-19 ENCOUNTER — TELEPHONE (OUTPATIENT)
Age: 64
End: 2024-02-19

## 2024-02-19 NOTE — TELEPHONE ENCOUNTER
Pt calling in to verify that she has active orders for labs in her chart as she was unable to see them in her mychart. Verified the two orders that she had active. PT stated she would go to a [facility] lab to have them drawn. No further questions.

## 2024-02-21 ENCOUNTER — APPOINTMENT (OUTPATIENT)
Dept: LAB | Facility: CLINIC | Age: 64
End: 2024-02-21
Payer: MEDICARE

## 2024-02-22 DIAGNOSIS — E11.65 TYPE 2 DIABETES MELLITUS WITH HYPERGLYCEMIA, WITH LONG-TERM CURRENT USE OF INSULIN (HCC): ICD-10-CM

## 2024-02-22 DIAGNOSIS — Z79.4 TYPE 2 DIABETES MELLITUS WITH HYPERGLYCEMIA, WITH LONG-TERM CURRENT USE OF INSULIN (HCC): ICD-10-CM

## 2024-02-22 RX ORDER — INSULIN ASPART 100 [IU]/ML
15 INJECTION, SUSPENSION SUBCUTANEOUS
Qty: 9 ML | Refills: 0 | Status: SHIPPED | OUTPATIENT
Start: 2024-02-22 | End: 2024-03-23

## 2024-02-22 RX ORDER — INSULIN ASPART 100 [IU]/ML
15 INJECTION, SUSPENSION SUBCUTANEOUS
Qty: 9 ML | Refills: 0 | Status: CANCELLED | OUTPATIENT
Start: 2024-02-22 | End: 2024-03-23

## 2024-02-26 ENCOUNTER — OFFICE VISIT (OUTPATIENT)
Dept: UROLOGY | Facility: CLINIC | Age: 64
End: 2024-02-26
Payer: MEDICARE

## 2024-02-26 VITALS
OXYGEN SATURATION: 98 % | HEIGHT: 65 IN | BODY MASS INDEX: 30.99 KG/M2 | WEIGHT: 186 LBS | SYSTOLIC BLOOD PRESSURE: 120 MMHG | HEART RATE: 76 BPM | DIASTOLIC BLOOD PRESSURE: 80 MMHG

## 2024-02-26 DIAGNOSIS — N15.1 RENAL ABSCESS, RIGHT: Primary | ICD-10-CM

## 2024-02-26 PROCEDURE — 99213 OFFICE O/P EST LOW 20 MIN: CPT | Performed by: PHYSICIAN ASSISTANT

## 2024-02-26 NOTE — PROGRESS NOTES
UROLOGY PROGRESS NOTE   Patient Identifiers: Nathalia Monge (MRN [id number])  Date of Service: 2/26/2024    Subjective:   63-year-old female seen in the hospital pyelonephritis and sepsis.  She had a 2.9 cm masslike hypoenhancing focus involving the midpole of the right kidney.  Recommended follow-up in repeat imaging.  No urinary symptoms.  Follow-up CT as scheduled March 4.  No prior history of UTI or kidney stone.    Reason for visit: Hospital follow-up    Objective:     VITALS:    There were no vitals filed for this visit.        LABS:  Lab Results   Component Value Date    HGB 11.8 02/21/2024    HCT 35.0 02/21/2024    WBC 6.22 02/21/2024     02/21/2024   ]    Lab Results   Component Value Date    K 3.7 01/11/2024     01/11/2024    CO2 24 01/11/2024    BUN 22 01/25/2024    CREATININE 0.99 02/21/2024    CALCIUM 9.1 01/11/2024    GLUCOSE 211 (H) 03/15/2018   ]        INPATIENT MEDS:    Current Outpatient Medications:     Alcohol Swabs 70 % PADS, May substitute brand based on insurance coverage. Check glucose ACHS., Disp: 200 each, Rfl: 0    Ascorbic Acid (VITAMIN C) 1000 MG tablet, Take 1,000 mg by mouth daily, Disp: , Rfl:     BD PEN NEEDLE BON U/F 32G X 4 MM MISC, TEST TID UTD (Patient not taking: Reported on 3/10/2023), Disp: , Rfl: 2    Blood Glucose Monitoring Suppl (OneTouch Verio Reflect) w/Device KIT, May substitute brand based on insurance coverage. Check glucose ACHS. (Patient not taking: Reported on 1/8/2024), Disp: 1 kit, Rfl: 0    Blood Glucose Monitoring Suppl (OneTouch Verio Reflect) w/Device KIT, May substitute brand based on insurance coverage. Check glucose BID., Disp: 1 kit, Rfl: 0    cefadroxil (DURICEF) 500 mg capsule, Take 1 capsule (500 mg total) by mouth every 12 (twelve) hours, Disp: 60 capsule, Rfl: 0    cholecalciferol (VITAMIN D3) 1,000 units tablet, Take 1,000 Units by mouth daily, Disp: , Rfl:     CHROMIUM PO, Take by mouth 2 (two) times a day, Disp: , Rfl:     ferrous  "sulfate 325 (65 Fe) mg tablet, Take 325 mg by mouth daily , Disp: , Rfl:     glucose blood (OneTouch Verio) test strip, May substitute brand based on insurance coverage. Check glucose BID., Disp: 100 each, Rfl: 0    insulin aspart protamine-insulin aspart (NovoLOG Mix 70/30 FlexPen) 100 Units/mL injection pen, Inject 15 Units under the skin 2 (two) times a day before meals, Disp: 9 mL, Rfl: 0    Insulin Pen Needle (BD Pen Needle Yolis 2nd Gen) 32G X 4 MM MISC, For use with insulin pen. Pharmacy may dispense brand covered by insurance. (Patient not taking: Reported on 1/8/2024), Disp: 100 each, Rfl: 0    Insulin Pen Needle 29G X 12.7MM MISC, Use as directed with novolog flexpen to administer insulin with meals and use with lantus solostar pen to administer insulin in the morning (Patient not taking: Reported on 3/10/2023), Disp: 100 each, Rfl: 0    Insulin Syringe-Needle U-100 (BD Insulin Syringe U/F) 31G X 5/16\" 0.5 ML MISC, For use with insulin. Pharmacy may dispense brand covered by insurance., Disp: 100 each, Rfl: 0    L-LYSINE PO, Take 500 mg by mouth daily, Disp: , Rfl:     lisinopril (ZESTRIL) 20 mg tablet, Take 1 tablet (20 mg total) by mouth daily, Disp: 30 tablet, Rfl: 0    OneTouch Delica Lancets 33G MISC, May substitute brand based on insurance coverage. Check glucose ACHS. (Patient not taking: Reported on 1/8/2024), Disp: 200 each, Rfl: 0    OneTouch Delica Lancets 33G MISC, May substitute brand based on insurance coverage. Check glucose BID., Disp: 100 each, Rfl: 0    Probiotic Product (PROBIOTIC DAILY PO), Take 1 capsule by mouth daily, Disp: , Rfl:       Physical Exam:   There were no vitals taken for this visit.  GEN: no acute distress    RESP: breathing comfortably with no accessory muscle use    ABD: soft, non-tender, non-distended   INCISION:    EXT: no significant peripheral edema       RADIOLOGY:   IMPRESSION:     Masslike area in the posterior midportion of the right kidney has decreased in " size. There is again mild perinephric stranding. Pyelonephritis with abscess is considered most likely. Recommend additional follow-up to ensure complete resolution.      Assessment:   #1.  Abnormal kidney CAT scan in the setting of prior sepsis    Plan:   -Her imaging improved from January 8 until January 30  -New scan scheduled for March 4  -If normal she may follow-up as needed  -If there is residual findings we can rescan her in 4 to 6 months and follow-up

## 2024-02-27 NOTE — TELEPHONE ENCOUNTER
----- Message from Vianney Baldwin MD sent at 2/23/2019 12:52 PM EST -----  Regarding: f/u  Patient seen in [facility] in early Jan  F/U was either canceled or not scheduled  EEGs that were orderd appear to be canceled  She did not tolerate the Vimpat increase and had to decrease back to 150 mg AM / 200 mg PM   Our goal is to significantly lessen seizure burden and avoid medication side effects  Goal of EEGs was to characterize new events involving left face symptoms  She has a longstanding history of right face motor seizures  Please inquire about canceled EEGs  Offer next available f/u with me (previously seen in [facility] office)  Inquire about recent sz frequency (distinuguish events involving right face symptoms from new events involving left face symptoms)  0

## 2024-03-04 ENCOUNTER — HOSPITAL ENCOUNTER (OUTPATIENT)
Dept: CT IMAGING | Facility: HOSPITAL | Age: 64
Discharge: HOME | End: 2024-03-04
Attending: INTERNAL MEDICINE
Payer: MEDICARE

## 2024-03-04 DIAGNOSIS — N15.1 RENAL ABSCESS, RIGHT: ICD-10-CM

## 2024-03-04 PROCEDURE — G1004 CDSM NDSC: HCPCS

## 2024-03-04 PROCEDURE — 74177 CT ABD & PELVIS W/CONTRAST: CPT

## 2024-03-04 RX ADMIN — IOHEXOL 100 ML: 350 INJECTION, SOLUTION INTRAVENOUS at 14:38

## 2024-03-06 ENCOUNTER — OFFICE VISIT (OUTPATIENT)
Dept: INFECTIOUS DISEASES | Facility: CLINIC | Age: 64
End: 2024-03-06
Payer: MEDICARE

## 2024-03-06 VITALS
RESPIRATION RATE: 18 BRPM | SYSTOLIC BLOOD PRESSURE: 138 MMHG | HEART RATE: 80 BPM | TEMPERATURE: 97.1 F | OXYGEN SATURATION: 97 % | DIASTOLIC BLOOD PRESSURE: 70 MMHG

## 2024-03-06 DIAGNOSIS — Z79.4 TYPE 2 DIABETES MELLITUS WITH HYPERGLYCEMIA, WITH LONG-TERM CURRENT USE OF INSULIN (HCC): ICD-10-CM

## 2024-03-06 DIAGNOSIS — E11.65 TYPE 2 DIABETES MELLITUS WITH HYPERGLYCEMIA, WITH LONG-TERM CURRENT USE OF INSULIN (HCC): ICD-10-CM

## 2024-03-06 DIAGNOSIS — N15.1 RENAL ABSCESS, RIGHT: Primary | ICD-10-CM

## 2024-03-06 PROBLEM — R93.89 IMAGING ABNORMALITY: Status: ACTIVE | Noted: 2024-03-06

## 2024-03-06 PROBLEM — Z22.1 CLOSTRIDIUM DIFFICILE CARRIER: Status: ACTIVE | Noted: 2024-03-06

## 2024-03-06 PROBLEM — R78.81 BACTEREMIA DUE TO KLEBSIELLA PNEUMONIAE: Status: ACTIVE | Noted: 2024-03-06

## 2024-03-06 PROBLEM — B96.1 BACTEREMIA DUE TO KLEBSIELLA PNEUMONIAE: Status: ACTIVE | Noted: 2024-03-06

## 2024-03-06 PROCEDURE — 99214 OFFICE O/P EST MOD 30 MIN: CPT | Performed by: PHYSICIAN ASSISTANT

## 2024-03-06 NOTE — ASSESSMENT & PLAN NOTE
C. difficile colonization. Patient has no diarrhea or evidence of colitis. Patient has had no diarrhea on antibiotic course.

## 2024-03-06 NOTE — PROGRESS NOTES
Assessment/Plan:    Renal abscess, right  Patient is clinically much improved on antibiotic.  She has no further urinary symptoms or flank pain.  Repeat CT on 1/30 showed much improved abscess size.  Patient continued her current antibiotic regimen with intentions to stop it once abscess is resolved completely on repeat imaging.  Patient has taken duricef as directed and tolerated well.  Repeat CT on 3/4 with minimal fat stranding likely representing nearly resolved infectious/inflammatory etiology in the area where the was a right renal abscess. No evidence of residual abscess was noted.   Discontinue Duricef at this time as abscess has resolved on imaging.  No further ID follow up needed at this time.    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 Mild to moderate atherosclerotic calcification of the aorta. Prior CT scans have not shown this finding and this concerns the patient. She has T2DM, and cardiac murmur, though no other cardiac history.  She is asymptomatic.   Plan to follow up with PCP regarding this finding    Bacteremia due to Klebsiella pneumoniae  Resolved.  Secondary to pyelonephritis/renal abscess, as above.  Patient treated with IV cefazolin inpatient and transitioned to PO Duricef outpatient with resolution of abscess, as above.      Clostridium difficile carrier  C. difficile colonization. Patient has no diarrhea or evidence of colitis. Patient has had no diarrhea on antibiotic course.    Type 2 diabetes mellitus with hyperglycemia, with long-term current use of insulin ([facility])    Lab Results   Component Value Date    HGBA1C 13.4 (H) 01/03/2024     Likely contributor to above. Follow up with PCP.       Diagnoses and all orders for this visit:    Renal abscess, right    Type 2  diabetes mellitus with hyperglycemia, with long-term current use of insulin ([facility])          Subjective:      Patient ID: Nathalia Monge is a 63 y.o. female.    Nathalia Monge is a 63 y.o. female with uncontrolled diabetes mellitus type 2, recent right renal abscess/pyelonephritis, klebsiella pneumoniae bacteremia, following up in the office today.  Before this most recent admission, she had a recent hospitalization from 1/2 to 1/4 at [facility] due to suspected breakthrough seizures who presented to ER on 1/8 after suspected witnessed seizure.  Had fever of 102 on presentation.  Also complained of urinary frequency, urgency.  Abdominal CT was concerning for right-sided pyelonephritis with possibility of intrarenal abscess.  Patient was initially on broad-spectrum antibiotics.  Blood cultures isolated Klebsiella pneumoniae and antibiotic has since been narrowed to cefazolin.  Patient with transition to PO duricef until radiologic resolution of abscess. CT from 1/30 showed improvement of abscess, and she continued antibiotics.  CT imagign from 3/4 shows minimal fat stranding though resolution of abscess. Patient is feeling great today with no acute complaints.  Tolerated antibiotic well.  Denies active urinary symptoms or flank pain.  She did have transient episode of diarrhea while admitted for which C. difficile was checked and PCR was positive but EIA was negative.  No symptoms of diarrhea or abdominal pain.      The following portions of the patient's history were reviewed and updated as appropriate: allergies, current medications, past family history, past medical history, past social history, past surgical history, and problem list.    Review of Systems   Constitutional:  Negative for appetite change, chills, fatigue and fever.   Respiratory:  Negative for shortness of breath.    Cardiovascular:  Negative for chest pain.   Gastrointestinal:  Negative for abdominal distention, abdominal pain, blood in stool, constipation,  diarrhea, nausea and vomiting.   Genitourinary:  Negative for decreased urine volume, difficulty urinating, dysuria, frequency, hematuria and urgency.   Skin:  Negative for rash.   Neurological:  Negative for headaches.         Objective:      /70   Pulse 80   Temp (!) 97.1 °F (36.2 °C)   Resp 18   SpO2 97%          Physical Exam  Constitutional:       General: She is not in acute distress.     Appearance: Normal appearance. She is not ill-appearing, toxic-appearing or diaphoretic.   HENT:      Head: Normocephalic and atraumatic.      Right Ear: External ear normal.      Left Ear: External ear normal.      Nose: Nose normal.      Mouth/Throat:      Mouth: Mucous membranes are moist.   Eyes:      Extraocular Movements: Extraocular movements intact.      Conjunctiva/sclera: Conjunctivae normal.      Pupils: Pupils are equal, round, and reactive to light.   Cardiovascular:      Rate and Rhythm: Normal rate and regular rhythm.      Pulses: Normal pulses.      Heart sounds: Normal heart sounds.   Pulmonary:      Effort: Pulmonary effort is normal.      Breath sounds: Normal breath sounds.   Abdominal:      General: Abdomen is flat. Bowel sounds are normal.      Palpations: Abdomen is soft.   Musculoskeletal:      Cervical back: Normal range of motion and neck supple.   Skin:     General: Skin is warm and dry.      Capillary Refill: Capillary refill takes less than 2 seconds.   Neurological:      Mental Status: She is alert and oriented to person, place, and time.   Psychiatric:         Mood and Affect: Mood normal.         Behavior: Behavior normal.     CT AP w contrast 3/4- Within the area of previously described right renal abscess there is minimal fat stranding likely representing nearly resolved infectious/inflammatory etiology. No evidence of residual abscess. 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

## 2024-03-06 NOTE — ASSESSMENT & PLAN NOTE
Lab Results   Component Value Date    HGBA1C 13.4 (H) 01/03/2024     Likely contributor to above. Follow up with PCP.

## 2024-03-06 NOTE — ASSESSMENT & PLAN NOTE
Patient is clinically much improved on antibiotic.  She has no further urinary symptoms or flank pain.  Repeat CT on 1/30 showed much improved abscess size.  Patient continued her current antibiotic regimen with intentions to stop it once abscess is resolved completely on repeat imaging.  Patient has taken duricef as directed and tolerated well.  Repeat CT on 3/4 with minimal fat stranding likely representing nearly resolved infectious/inflammatory etiology in the area where the was a right renal abscess. No evidence of residual abscess was noted.   Discontinue Duricef at this time as abscess has resolved on imaging.  No further ID follow up needed at this time.

## 2024-03-06 NOTE — ASSESSMENT & PLAN NOTE
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 Mild to moderate atherosclerotic calcification of the aorta. Prior CT scans have not shown this finding and this concerns the patient. She has T2DM, and cardiac murmur, though no other cardiac history.  She is asymptomatic.   Plan to follow up with PCP regarding this finding

## 2024-03-06 NOTE — PATIENT INSTRUCTIONS
-Finished with antibiotic course, as abscess has resolved on imaging  -No further ID follow up needed at this time, call for any questions or concerns  -Follow up with PCP regarding management of diabetes and incidental imaging finding

## 2024-03-06 NOTE — ASSESSMENT & PLAN NOTE
Resolved.  Secondary to pyelonephritis/renal abscess, as above.  Patient treated with IV cefazolin inpatient and transitioned to PO Duricef outpatient with resolution of abscess, as above.

## 2024-03-29 DIAGNOSIS — E78.2 MIXED HYPERLIPIDEMIA: Primary | ICD-10-CM

## 2024-03-29 DIAGNOSIS — Z79.4 TYPE 2 DIABETES MELLITUS WITH HYPERGLYCEMIA, WITH LONG-TERM CURRENT USE OF INSULIN (HCC): ICD-10-CM

## 2024-03-29 DIAGNOSIS — E11.65 TYPE 2 DIABETES MELLITUS WITH HYPERGLYCEMIA, WITH LONG-TERM CURRENT USE OF INSULIN (HCC): ICD-10-CM

## 2024-04-04 DIAGNOSIS — N15.1 RENAL ABSCESS, RIGHT: Primary | ICD-10-CM

## 2024-04-15 ENCOUNTER — APPOINTMENT (OUTPATIENT)
Dept: LAB | Facility: HOSPITAL | Age: 64
End: 2024-04-15
Payer: MEDICARE

## 2024-04-15 DIAGNOSIS — N15.1 RENAL ABSCESS, RIGHT: ICD-10-CM

## 2024-04-15 DIAGNOSIS — E11.65 TYPE 2 DIABETES MELLITUS WITH HYPERGLYCEMIA, WITH LONG-TERM CURRENT USE OF INSULIN (HCC): ICD-10-CM

## 2024-04-15 DIAGNOSIS — E78.2 MIXED HYPERLIPIDEMIA: ICD-10-CM

## 2024-04-15 DIAGNOSIS — Z79.4 TYPE 2 DIABETES MELLITUS WITH HYPERGLYCEMIA, WITH LONG-TERM CURRENT USE OF INSULIN (HCC): ICD-10-CM

## 2024-04-16 ENCOUNTER — APPOINTMENT (OUTPATIENT)
Dept: LAB | Facility: CLINIC | Age: 64
End: 2024-04-16
Payer: MEDICARE

## 2024-04-16 ENCOUNTER — PATIENT MESSAGE (OUTPATIENT)
Dept: FAMILY MEDICINE CLINIC | Facility: CLINIC | Age: 64
End: 2024-04-16

## 2024-04-16 DIAGNOSIS — E78.2 MIXED HYPERLIPIDEMIA: Primary | ICD-10-CM

## 2024-04-16 DIAGNOSIS — E11.65 TYPE 2 DIABETES MELLITUS WITH HYPERGLYCEMIA, WITH LONG-TERM CURRENT USE OF INSULIN (HCC): ICD-10-CM

## 2024-04-16 DIAGNOSIS — Z79.4 TYPE 2 DIABETES MELLITUS WITH HYPERGLYCEMIA, WITH LONG-TERM CURRENT USE OF INSULIN (HCC): ICD-10-CM

## 2024-04-16 LAB
ALBUMIN SERPL BCP-MCNC: 4 G/DL (ref 3.5–5)
ALP SERPL-CCNC: 73 U/L (ref 34–104)
ALT SERPL W P-5'-P-CCNC: 7 U/L (ref 7–52)
ANION GAP SERPL CALCULATED.3IONS-SCNC: 7 MMOL/L (ref 4–13)
AST SERPL W P-5'-P-CCNC: 11 U/L (ref 13–39)
BASOPHILS # BLD AUTO: 0.02 THOUSANDS/ÂΜL (ref 0–0.1)
BASOPHILS NFR BLD AUTO: 0 % (ref 0–1)
BILIRUB SERPL-MCNC: 0.47 MG/DL (ref 0.2–1)
BUN SERPL-MCNC: 28 MG/DL (ref 5–25)
CALCIUM SERPL-MCNC: 9.6 MG/DL (ref 8.4–10.2)
CHLORIDE SERPL-SCNC: 106 MMOL/L (ref 96–108)
CHOLEST SERPL-MCNC: 206 MG/DL
CO2 SERPL-SCNC: 26 MMOL/L (ref 21–32)
CREAT SERPL-MCNC: 0.92 MG/DL (ref 0.6–1.3)
EOSINOPHIL # BLD AUTO: 0.2 THOUSAND/ÂΜL (ref 0–0.61)
EOSINOPHIL NFR BLD AUTO: 3 % (ref 0–6)
ERYTHROCYTE [DISTWIDTH] IN BLOOD BY AUTOMATED COUNT: 12.8 % (ref 11.6–15.1)
EST. AVERAGE GLUCOSE BLD GHB EST-MCNC: 177 MG/DL
GFR SERPL CREATININE-BSD FRML MDRD: 66 ML/MIN/1.73SQ M
GLUCOSE P FAST SERPL-MCNC: 130 MG/DL (ref 65–99)
HBA1C MFR BLD: 7.8 %
HCT VFR BLD AUTO: 36.9 % (ref 34.8–46.1)
HDLC SERPL-MCNC: 50 MG/DL
HGB BLD-MCNC: 12.2 G/DL (ref 11.5–15.4)
IMM GRANULOCYTES # BLD AUTO: 0.02 THOUSAND/UL (ref 0–0.2)
IMM GRANULOCYTES NFR BLD AUTO: 0 % (ref 0–2)
LDLC SERPL CALC-MCNC: 135 MG/DL (ref 0–100)
LYMPHOCYTES # BLD AUTO: 2.08 THOUSANDS/ÂΜL (ref 0.6–4.47)
LYMPHOCYTES NFR BLD AUTO: 28 % (ref 14–44)
MCH RBC QN AUTO: 29.3 PG (ref 26.8–34.3)
MCHC RBC AUTO-ENTMCNC: 33.1 G/DL (ref 31.4–37.4)
MCV RBC AUTO: 89 FL (ref 82–98)
MONOCYTES # BLD AUTO: 0.45 THOUSAND/ÂΜL (ref 0.17–1.22)
MONOCYTES NFR BLD AUTO: 6 % (ref 4–12)
NEUTROPHILS # BLD AUTO: 4.77 THOUSANDS/ÂΜL (ref 1.85–7.62)
NEUTS SEG NFR BLD AUTO: 63 % (ref 43–75)
NRBC BLD AUTO-RTO: 0 /100 WBCS
PLATELET # BLD AUTO: 202 THOUSANDS/UL (ref 149–390)
PMV BLD AUTO: 10.4 FL (ref 8.9–12.7)
POTASSIUM SERPL-SCNC: 4 MMOL/L (ref 3.5–5.3)
PROT SERPL-MCNC: 6.7 G/DL (ref 6.4–8.4)
RBC # BLD AUTO: 4.17 MILLION/UL (ref 3.81–5.12)
SODIUM SERPL-SCNC: 139 MMOL/L (ref 135–147)
TRIGL SERPL-MCNC: 106 MG/DL
WBC # BLD AUTO: 7.54 THOUSAND/UL (ref 4.31–10.16)

## 2024-04-16 PROCEDURE — 85025 COMPLETE CBC W/AUTO DIFF WBC: CPT

## 2024-04-16 PROCEDURE — 80053 COMPREHEN METABOLIC PANEL: CPT

## 2024-04-16 PROCEDURE — 80061 LIPID PANEL: CPT

## 2024-04-16 PROCEDURE — 36415 COLL VENOUS BLD VENIPUNCTURE: CPT

## 2024-05-09 ENCOUNTER — OFFICE VISIT (OUTPATIENT)
Dept: FAMILY MEDICINE CLINIC | Facility: CLINIC | Age: 64
End: 2024-05-09
Payer: MEDICARE

## 2024-05-09 VITALS
HEIGHT: 65 IN | HEART RATE: 97 BPM | OXYGEN SATURATION: 97 % | BODY MASS INDEX: 31.65 KG/M2 | SYSTOLIC BLOOD PRESSURE: 160 MMHG | WEIGHT: 190 LBS | RESPIRATION RATE: 18 BRPM | DIASTOLIC BLOOD PRESSURE: 98 MMHG | TEMPERATURE: 95.3 F

## 2024-05-09 DIAGNOSIS — N15.1 RENAL ABSCESS, RIGHT: ICD-10-CM

## 2024-05-09 DIAGNOSIS — M54.50 LUMBAR PAIN: ICD-10-CM

## 2024-05-09 DIAGNOSIS — R10.9 RIGHT FLANK PAIN: Primary | ICD-10-CM

## 2024-05-09 LAB
SL AMB  POCT GLUCOSE, UA: ABNORMAL
SL AMB LEUKOCYTE ESTERASE,UA: ABNORMAL
SL AMB POCT BILIRUBIN,UA: ABNORMAL
SL AMB POCT BLOOD,UA: ABNORMAL
SL AMB POCT CLARITY,UA: CLEAR
SL AMB POCT COLOR,UA: YELLOW
SL AMB POCT KETONES,UA: ABNORMAL
SL AMB POCT NITRITE,UA: ABNORMAL
SL AMB POCT PH,UA: 5.5
SL AMB POCT SPECIFIC GRAVITY,UA: >1.03
SL AMB POCT URINE PROTEIN: >300
SL AMB POCT UROBILINOGEN: 0.2

## 2024-05-09 PROCEDURE — G2211 COMPLEX E/M VISIT ADD ON: HCPCS

## 2024-05-09 PROCEDURE — 87086 URINE CULTURE/COLONY COUNT: CPT

## 2024-05-09 PROCEDURE — 99214 OFFICE O/P EST MOD 30 MIN: CPT

## 2024-05-09 PROCEDURE — 81002 URINALYSIS NONAUTO W/O SCOPE: CPT

## 2024-05-09 RX ORDER — CYCLOBENZAPRINE HCL 5 MG
5 TABLET ORAL 3 TIMES DAILY PRN
Qty: 30 TABLET | Refills: 2 | Status: SHIPPED | OUTPATIENT
Start: 2024-05-09

## 2024-05-09 RX ORDER — CEFADROXIL 500 MG/1
500 CAPSULE ORAL EVERY 12 HOURS SCHEDULED
Qty: 20 CAPSULE | Refills: 0 | Status: SHIPPED | OUTPATIENT
Start: 2024-05-09 | End: 2024-05-19

## 2024-05-09 NOTE — PROGRESS NOTES
"Name: Nathalia Monge      : 1960      MRN: [id number]  Encounter Provider: SHASHANK Miguel  Encounter Date: 2024   Encounter department: [facility]    Assessment & Plan     1. Right flank pain  -     CT abdomen pelvis w contrast; Future; Expected date: 2024  -     POCT urine dip  -     CBC and differential; Future  -     Comprehensive metabolic panel; Future  -     Urine culture    2. Renal abscess, right  Assessment & Plan:  Continues with right flank pain. Patient states, \"It feels swollen. I have visible blood in my urine. It is really warm when I go and sometimes burns. I'm just concerned because my last CT said ' nearly resolved' but then I was taken off my antibiotics.    Repeat CT. Ordered CBC and CMP. POCT +trace leuks-- treat with duricef and send for culture.     Orders:  -     cefadroxil (DURICEF) 500 mg capsule; Take 1 capsule (500 mg total) by mouth every 12 (twelve) hours for 10 days    3. Lumbar pain  -     cyclobenzaprine (FLEXERIL) 5 mg tablet; Take 1 tablet (5 mg total) by mouth 3 (three) times a day as needed for muscle spasms        Depression Screening and Follow-up Plan: Patient was screened for depression during today's encounter. They screened negative with a PHQ-2 score of 0.        Subjective      Flank Pain  This is a recurrent problem. The current episode started 1 to 4 weeks ago. The problem occurs intermittently. The problem is unchanged. The pain is present in the thoracic spine and lumbar spine. The pain does not radiate. Associated symptoms include dysuria and a fever. Pertinent negatives include no abdominal pain, bladder incontinence, bowel incontinence, chest pain, headaches, leg pain, numbness, paresis, paresthesias, pelvic pain, perianal numbness, tingling, weakness or weight loss. Risk factors include menopause. She has tried analgesics for the symptoms. The treatment provided mild relief.     Review of Systems "   Constitutional:  Positive for chills, fatigue and fever. Negative for activity change and weight loss.   HENT:  Negative for congestion, ear pain, rhinorrhea and sore throat.    Eyes:  Negative for pain.   Respiratory:  Negative for cough, shortness of breath and wheezing.    Cardiovascular:  Negative for chest pain, palpitations and leg swelling.   Gastrointestinal:  Negative for abdominal pain, bowel incontinence, diarrhea, nausea and vomiting.   Endocrine: Negative for polydipsia, polyphagia and polyuria.   Genitourinary:  Positive for dysuria, flank pain and hematuria. Negative for bladder incontinence, frequency, pelvic pain and urgency.   Musculoskeletal:  Negative for arthralgias and myalgias.   Skin:  Negative for rash.   Neurological:  Negative for dizziness, tingling, weakness, numbness, headaches and paresthesias.   All other systems reviewed and are negative.      Current Outpatient Medications on File Prior to Visit   Medication Sig    Ascorbic Acid (VITAMIN C) 1000 MG tablet Take 1,000 mg by mouth daily    BD PEN NEEDLE BON U/F 32G X 4 MM MISC     cholecalciferol (VITAMIN D3) 1,000 units tablet Take 1,000 Units by mouth daily    CHROMIUM PO Take by mouth 2 (two) times a day    ferrous sulfate 325 (65 Fe) mg tablet Take 325 mg by mouth daily     insulin aspart protamine-insulin aspart (NovoLOG Mix 70/30 FlexPen) 100 Units/mL injection pen Inject 15 Units under the skin 2 (two) times a day before meals    Insulin Pen Needle (BD Pen Needle Bon 2nd Gen) 32G X 4 MM MISC For use with insulin pen. Pharmacy may dispense brand covered by insurance.    L-LYSINE PO Take 500 mg by mouth daily    Probiotic Product (PROBIOTIC DAILY PO) Take 1 capsule by mouth daily    OneTouch Delica Lancets 33G MISC May substitute brand based on insurance coverage. Check glucose ACHS. (Patient not taking: Reported on 1/8/2024)       Objective     /98 (BP Location: Left arm, Patient Position: Sitting, Cuff Size: Adult)    "Pulse 97   Temp (!) 95.3 °F (35.2 °C) (Tympanic)   Resp 18   Ht 5' 5\" (1.651 m)   Wt 86.2 kg (190 lb)   SpO2 97%   BMI 31.62 kg/m²     Physical Exam  Vitals and nursing note reviewed.   Constitutional:       General: She is not in acute distress.     Appearance: Normal appearance. She is not ill-appearing or toxic-appearing.   HENT:      Head: Normocephalic.      Right Ear: External ear normal.      Left Ear: External ear normal.      Mouth/Throat:      Mouth: Mucous membranes are moist.      Pharynx: No oropharyngeal exudate or posterior oropharyngeal erythema.   Cardiovascular:      Rate and Rhythm: Normal rate and regular rhythm.      Heart sounds: Murmur heard.   Pulmonary:      Effort: Pulmonary effort is normal. No respiratory distress.      Breath sounds: Normal breath sounds. No wheezing.   Abdominal:      General: Bowel sounds are normal. There is no distension.      Palpations: Abdomen is soft.      Tenderness: There is no abdominal tenderness. There is no right CVA tenderness, left CVA tenderness, guarding or rebound.   Musculoskeletal:      Cervical back: Neck supple.   Skin:     General: Skin is warm and dry.   Neurological:      General: No focal deficit present.      Mental Status: She is alert and oriented to person, place, and time. Mental status is at baseline.      Motor: No weakness.   Psychiatric:         Mood and Affect: Mood normal.         Behavior: Behavior normal.         Thought Content: Thought content normal.         Judgment: Judgment normal.       SHASHANK Miguel    "

## 2024-05-09 NOTE — PATIENT INSTRUCTIONS
Take entire course of duricef x 10 days.  Call to schedule CT.  Get labs if updated kidney function required for CT.     Take flexeril as needed for muscle spasms.    Return if symptoms persist or fail to improve.  Go to ER with persistent flank pain/fevers/n/v, SOB, CP, weakness, or dizziness.

## 2024-05-09 NOTE — ASSESSMENT & PLAN NOTE
"Continues with right flank pain. Patient states, \"It feels swollen. I have visible blood in my urine. It is really warm when I go and sometimes burns. I'm just concerned because my last CT said ' nearly resolved' but then I was taken off my antibiotics.    Repeat CT. Ordered CBC and CMP. POCT +trace leuks-- treat with duricef and send for culture.   "

## 2024-05-11 LAB — BACTERIA UR CULT: NORMAL

## 2024-05-20 RX ORDER — PEN NEEDLE, DIABETIC 32GX 5/32"
NEEDLE, DISPOSABLE MISCELLANEOUS 3 TIMES DAILY
Qty: 100 EACH | Refills: 2 | Status: SHIPPED | OUTPATIENT
Start: 2024-05-20

## 2024-05-21 ENCOUNTER — TELEPHONE (OUTPATIENT)
Dept: FAMILY MEDICINE CLINIC | Facility: CLINIC | Age: 64
End: 2024-05-21

## 2024-05-21 NOTE — TELEPHONE ENCOUNTER
05/22/2024: spoke with patient she is advised that the insurance probably will not cover the CT due to site care denial, patient states she is going to cancel the CT that is scheduled for 5/23/2024 and she will re-schedule. Patient states she has [facility] and they will  anything the insurance doesn't cover. Advised patient I will let Honey know she is cancelling imaging for CT 5/23/2024 but patient states she will re-schedule.     Blanca Amin  The patient should reach out to her insurance and verify what facility they will approve and she should also reach out to central scheduling to inform them.    Contacted patient to advise that site of care was denied by insurance per inbasket we have received below; per patient she has [facility].    :   The prior authorization request for this study has been denied through [employer]. Case # [id number]. The insurance determined the following:     [ ] Does not meet Medical Necessity   [ ] No prior imaging   [ ] PT or conservative treatment incomplete   [ ] Missing Labs   [ ] Frequency   [X] Site of Care     Denial Rationale:   Based on [employer] Site of Care [facility] [facility] Coverage Policy, we cannot approve   this request. Your records show that you have a known or suspected problem for which   imaging may be needed. The requested site (facility) would have been approved if it was   needed for one of the following reasons:     -Your doctor needs to observe you while pregnant.   -The study will be done around the time of childbirth.   -It is related to a transplant at an approved transplant site.   -Your doctor needs to use contrast dye for your study, and you have a known problem   with contrast dye.   -Known chronic (long term) disease with prior imaging to find the source of your   problem (diagnose), manage, or to observe (monitor) your disease at the requested site   (hospital-affiliated imaging department).   -Imaging done at the site prior  to a planned service (surgery or procedure) is a vital part   of the service.   -You need to be sedated for the imaging, and there is no freestanding site available.   -The requested site is the only one that has the size of machine you need.     Please notify your patient of insurance denial and follow up with the patient for next steps in their treatment plan. Notify central scheduling by calling [phone] to cancel appointment and close this order in Epic.     Thank you,   Blanca

## 2024-05-28 ENCOUNTER — TELEPHONE (OUTPATIENT)
Dept: FAMILY MEDICINE CLINIC | Facility: CLINIC | Age: 64
End: 2024-05-28

## 2024-05-28 ENCOUNTER — TELEPHONE (OUTPATIENT)
Dept: INFECTIOUS DISEASES | Facility: CLINIC | Age: 64
End: 2024-05-28

## 2024-05-28 NOTE — TELEPHONE ENCOUNTER
Called and spoke with patient today.   Informed patient Dr. Chen can see her in the office tomorrow 5/29/24 at 8:45AM. Patient states she does not have transportation but will have transportation on Thursday.   Informed patient there are no available appointments at this time on Thursday. Offered patient a lyft for appointment on 5/29/24. Patient states she can not do the lyft as [place]. Patient states she will call the office back regarding appointment on 5/29/24. If patient is able to do appointment 5*29*24 at 8:45AM office can schedule.

## 2024-05-28 NOTE — TELEPHONE ENCOUNTER
Patient called back and stated that they will be able to make it on 5/29/24 but they need a lyft please     Address: [address] [address] they will be up at the main office whenever Ly[employer] will be there.     Telephone: [phone]

## 2024-05-28 NOTE — TELEPHONE ENCOUNTER
Called [employer] and spoke with Ed today.   Informed Ed patient has appointment 5/29/24 at 8:45AM with Dr. Chen at the [facility].   Gave Ed address patient updated office with and also phone number patient can be contacted on.   Ed states lyft is set up with a 7:40AM  time.     Called and spoke with patient today.   Informed patient of above information regarding lyft.   Patient verbalizes understanding at this time.

## 2024-05-29 ENCOUNTER — OFFICE VISIT (OUTPATIENT)
Dept: INFECTIOUS DISEASES | Facility: CLINIC | Age: 64
End: 2024-05-29
Payer: COMMERCIAL

## 2024-05-29 VITALS
BODY MASS INDEX: 31.99 KG/M2 | WEIGHT: 192 LBS | HEIGHT: 65 IN | TEMPERATURE: 97.9 F | HEART RATE: 80 BPM | RESPIRATION RATE: 16 BRPM | SYSTOLIC BLOOD PRESSURE: 150 MMHG | DIASTOLIC BLOOD PRESSURE: 80 MMHG | OXYGEN SATURATION: 97 %

## 2024-05-29 DIAGNOSIS — N15.1 RENAL ABSCESS, RIGHT: Primary | ICD-10-CM

## 2024-05-29 DIAGNOSIS — Z22.1 CLOSTRIDIUM DIFFICILE CARRIER: ICD-10-CM

## 2024-05-29 DIAGNOSIS — Z79.4 TYPE 2 DIABETES MELLITUS WITH HYPERGLYCEMIA, WITH LONG-TERM CURRENT USE OF INSULIN (HCC): ICD-10-CM

## 2024-05-29 DIAGNOSIS — E11.65 TYPE 2 DIABETES MELLITUS WITH HYPERGLYCEMIA, WITH LONG-TERM CURRENT USE OF INSULIN (HCC): ICD-10-CM

## 2024-05-29 PROCEDURE — 99215 OFFICE O/P EST HI 40 MIN: CPT | Performed by: INTERNAL MEDICINE

## 2024-05-29 RX ORDER — CEFADROXIL 500 MG/1
500 CAPSULE ORAL EVERY 12 HOURS SCHEDULED
Qty: 60 CAPSULE | Refills: 1 | Status: SHIPPED | OUTPATIENT
Start: 2024-05-29 | End: 2024-07-28

## 2024-05-29 NOTE — PATIENT INSTRUCTIONS
- Start labs every other week.    - Start antibiotic as prescribed    - Start Probiotic     - Central Schedule [phone] to schedule [facility] week of Jaqui 19th,2024    - Call the office with any questions or concerns    -Follow up appointment at the end of June with     -    dizziness. spontaneously moving all 4 extremities, muscle strength/tone equal bilaterally, sensation intact x4. no visible facial droop or slurred speech.

## 2024-05-29 NOTE — PROGRESS NOTES
Progress Note - Infectious Disease   Nathalia Monge 63 y.o. female MRN: [id number]  Unit/Bed#:  Encounter: [id number]      Impression/Plan:  1.  Right renal abscess.  Patient had a complicated episode of Klebsiella bacteremia with right-sided pyelonephritis and complicating abscess.  She recently completed antibiotic course in March with CTs showing improvement.  Unfortunately she developed relapsing symptoms mid April which have been progressing.  She received 10-day course of Duricef from her primary with some improvement.  We discussed the possibility of relapse/recurrence and restarting antibiotics with repeat imaging in a few weeks.  Encouraged the patient to call our office should further issues develop and reviewed labs and alarm symptoms.  Discontinued previous lab orders and CT ordered from PCP.  New orders placed.  Restart Duricef 500 mg every 12 hours  Obtain labs CBCD and BMP every other week until next visit, start next week  Obtain repeat CT in about 3 weeks, week of 6/19  Follow-up in our office 1 week after imaging completed  Will plan to continue antibiotics until resolution on imaging  Discussed potential for repeat episodes and need for further workup at that time  Continue to follow-up otherwise with PCP  Additional questions answered    2.  C. difficile colonization.  No active diarrhea at this time.  Seen during prior admission.  Encouraged patient to continue probiotic and monitor for progressing symptoms requiring testing and treatment.    3.  Type 2 diabetes.  Hemoglobin A1c has improved to 7.8.  Continue glucose management with PCP.  Discussed with patient as this may be a risk factor for the above.    Above plan discussed in detail with the patient  Above plan discussed with ID colleague  Will forward office visit to PCP and referring provider.    RTO 1 week after CT completed    Antibiotics:  None    Subjective:  Briefly this patient is a 63-year-old female with type 2 diabetes, prior  meningioma resection, seizure disorder.  She was seen inpatient by our service in January for episode of fever with urinary complaints.  CT imaging at the time showed right-sided pyelonephritis with possible intrarenal abscess.  Blood cultures later isolated Klebsiella.  She was also noted on that admission to have C. difficile colonization by testing but no prophylaxis was recommended without significant symptoms.  Ultimately patient was discharged on oral antibiotic with Duricef to have repeat imaging.  She followed up in our office in February with improvement noting and imaging.  She then followed up in March and CT imaging was reviewed and antibiotics were ultimately discontinued.  She then presented to her primary care provider in May for recurrent symptoms that had been going on for a few weeks.  Patient had been reporting some flank discomfort and dysuria and possible fever.  She was ultimately placed back on Duricef for only 10 days through 5/19.  Patient then called our office for reevaluation.  Recent labs in April including CMP, CBC unremarkable.  Hemoglobin A1c of 7.8.  Office urine dipstick unremarkable.  Urine culture unremarkable.  Patient presents at this time for evaluation.    On evaluation, the patient reports that about 4 weeks after stopping her antibiotics she started to develop worsening right flank discomfort, discomfort with urination and discoloration in her urine.  She also has been feeling intermittent episodes of heat and chills but has not been taking her temperature.  She is currently homeless and is living in a camper but denies any significant outdoor exposures.  She is essentially parked [place] a campground.  She notes that she had some improvement with starting Duricef with her primary.  At this time we reviewed the possibility of recurrence and restarting antibiotics along with having repeat imaging in a few weeks.  We discussed having labs every other week which she is agreeable to  "at this time.  The patient does continue to take a probiotic given previous history of C. difficile colonization.  We reviewed in detail the possibility of C. difficile developing during treatment course and after and signs and symptoms to monitor for.  We discussed continuing antibiotic at this point until complete resolution on her imaging.  If the patient were to develop another episode after CT with resolution then I let her know that there would need to be more detailed investigation into causes for her UTI.  Currently we discussed I suspect that her poorly controlled diabetes at the time was driving this issue.  She questions about positional improvement in emptying that she has noticed and we discussed again also the possibility of a neurogenic bladder or even pelvic prolapse and pelvic floor dysfunction.  She states that she saw urology in February and no additional recommendations were made at that time.  Additional questions were answered for the patient.  Lastly I reviewed with the patient alarm symptoms and that should she develop them we would recommend that she go to the ER immediately or call our office to further triage.    ROS: A complete 12 point ROS is negative other than that noted in the HPI    Followup portions patient history reviewed and updated as:  Allergies, current medications, past medical history, past social history, past surgical history, and the problem list    Objective:  Vitals:  Vitals:    05/29/24 0833   BP: 150/80   Pulse: 80   Resp: 16   Temp: 97.9 °F (36.6 °C)   SpO2: 97%   Weight: 87.1 kg (192 lb)   Height: 5' 5\" (1.651 m)       Physical Exam:   General Appearance:  Alert, interactive, appearing well, nontoxic, no acute distress.  Able to provide detailed history.   Throat: Oropharynx moist without lesions.    Lungs:   Clear to auscultation bilaterally; no audible wheezes, rhonchi or rales; respirations unlabored on room air   Heart:  RRR; no murmur, rub or gallop noted " "  Abdomen:   Soft, non-tender, non-distended, positive bowel sounds.  No suprapubic discomfort elicited.  Patient does however have right flank discomfort on palpation.   Extremities: No clubbing, cyanosis or edema   Skin: No new rashes or lesions. No new draining wounds.       Labs, Imaging, & Other studies:   All pertinent labs and imaging studies were personally reviewed as below    Lab Results   Component Value Date    K 4.0 04/16/2024     04/16/2024    CO2 26 04/16/2024    BUN 28 (H) 04/16/2024    CREATININE 0.92 04/16/2024    GLUCOSE 211 (H) 03/15/2018    GLUF 130 (H) 04/16/2024    CALCIUM 9.6 04/16/2024    AST 11 (L) 04/16/2024    ALT 7 04/16/2024    ALKPHOS 73 04/16/2024    EGFR 66 04/16/2024     Lab Results   Component Value Date    WBC 7.54 04/16/2024    HGB 12.2 04/16/2024    HCT 36.9 04/16/2024    MCV 89 04/16/2024     04/16/2024   No results found for: \"SEDRATE\"    Recent Lab interpretation/comments: Labs reviewed from April and hemoglobin A1c of 7.8.  CBC and chemistry otherwise unremarkable    Recent Imaging interpretation/comments: No recent relevant imaging.    Culture data: Prior culture data with Klebsiella reviewed    External/inpatient provider notes: Reviewed previous ID consult and discharge summary.  "

## 2024-06-06 ENCOUNTER — APPOINTMENT (OUTPATIENT)
Dept: LAB | Facility: CLINIC | Age: 64
End: 2024-06-06
Payer: COMMERCIAL

## 2024-06-06 DIAGNOSIS — N15.1 RENAL ABSCESS, RIGHT: ICD-10-CM

## 2024-06-06 LAB
ANION GAP SERPL CALCULATED.3IONS-SCNC: 6 MMOL/L (ref 4–13)
BASOPHILS # BLD AUTO: 0.03 THOUSANDS/ÂΜL (ref 0–0.1)
BASOPHILS NFR BLD AUTO: 0 % (ref 0–1)
BUN SERPL-MCNC: 28 MG/DL (ref 5–25)
CALCIUM SERPL-MCNC: 9.6 MG/DL (ref 8.4–10.2)
CHLORIDE SERPL-SCNC: 105 MMOL/L (ref 96–108)
CO2 SERPL-SCNC: 30 MMOL/L (ref 21–32)
CREAT SERPL-MCNC: 1.07 MG/DL (ref 0.6–1.3)
EOSINOPHIL # BLD AUTO: 0.09 THOUSAND/ÂΜL (ref 0–0.61)
EOSINOPHIL NFR BLD AUTO: 1 % (ref 0–6)
ERYTHROCYTE [DISTWIDTH] IN BLOOD BY AUTOMATED COUNT: 12.7 % (ref 11.6–15.1)
GFR SERPL CREATININE-BSD FRML MDRD: 55 ML/MIN/1.73SQ M
GLUCOSE SERPL-MCNC: 135 MG/DL (ref 65–140)
HCT VFR BLD AUTO: 36 % (ref 34.8–46.1)
HGB BLD-MCNC: 12.1 G/DL (ref 11.5–15.4)
IMM GRANULOCYTES # BLD AUTO: 0.03 THOUSAND/UL (ref 0–0.2)
IMM GRANULOCYTES NFR BLD AUTO: 0 % (ref 0–2)
LYMPHOCYTES # BLD AUTO: 1.57 THOUSANDS/ÂΜL (ref 0.6–4.47)
LYMPHOCYTES NFR BLD AUTO: 23 % (ref 14–44)
MCH RBC QN AUTO: 29.6 PG (ref 26.8–34.3)
MCHC RBC AUTO-ENTMCNC: 33.6 G/DL (ref 31.4–37.4)
MCV RBC AUTO: 88 FL (ref 82–98)
MONOCYTES # BLD AUTO: 0.42 THOUSAND/ÂΜL (ref 0.17–1.22)
MONOCYTES NFR BLD AUTO: 6 % (ref 4–12)
NEUTROPHILS # BLD AUTO: 4.65 THOUSANDS/ÂΜL (ref 1.85–7.62)
NEUTS SEG NFR BLD AUTO: 70 % (ref 43–75)
NRBC BLD AUTO-RTO: 0 /100 WBCS
PLATELET # BLD AUTO: 219 THOUSANDS/UL (ref 149–390)
PMV BLD AUTO: 10.1 FL (ref 8.9–12.7)
POTASSIUM SERPL-SCNC: 4.4 MMOL/L (ref 3.5–5.3)
RBC # BLD AUTO: 4.09 MILLION/UL (ref 3.81–5.12)
SODIUM SERPL-SCNC: 141 MMOL/L (ref 135–147)
WBC # BLD AUTO: 6.79 THOUSAND/UL (ref 4.31–10.16)

## 2024-06-06 PROCEDURE — 80048 BASIC METABOLIC PNL TOTAL CA: CPT

## 2024-06-06 PROCEDURE — 36415 COLL VENOUS BLD VENIPUNCTURE: CPT

## 2024-06-06 PROCEDURE — 85025 COMPLETE CBC W/AUTO DIFF WBC: CPT

## 2024-06-13 ENCOUNTER — APPOINTMENT (OUTPATIENT)
Dept: LAB | Facility: HOSPITAL | Age: 64
End: 2024-06-13
Payer: COMMERCIAL

## 2024-06-13 ENCOUNTER — NURSE TRIAGE (OUTPATIENT)
Age: 64
End: 2024-06-13

## 2024-06-13 ENCOUNTER — TELEPHONE (OUTPATIENT)
Dept: OTHER | Facility: HOSPITAL | Age: 64
End: 2024-06-13

## 2024-06-13 DIAGNOSIS — N15.1 RENAL ABSCESS: Primary | ICD-10-CM

## 2024-06-13 DIAGNOSIS — N15.1 RENAL ABSCESS: ICD-10-CM

## 2024-06-13 LAB
BACTERIA UR QL AUTO: ABNORMAL /HPF
BILIRUB UR QL STRIP: NEGATIVE
CLARITY UR: CLEAR
COLOR UR: YELLOW
GLUCOSE UR STRIP-MCNC: NEGATIVE MG/DL
HGB UR QL STRIP.AUTO: NEGATIVE
HYALINE CASTS #/AREA URNS LPF: ABNORMAL /LPF
KETONES UR STRIP-MCNC: NEGATIVE MG/DL
LEUKOCYTE ESTERASE UR QL STRIP: NEGATIVE
MUCOUS THREADS UR QL AUTO: ABNORMAL
NITRITE UR QL STRIP: NEGATIVE
NON-SQ EPI CELLS URNS QL MICRO: ABNORMAL /HPF
PH UR STRIP.AUTO: 5.5 [PH]
PROT UR STRIP-MCNC: ABNORMAL MG/DL
RBC #/AREA URNS AUTO: ABNORMAL /HPF
SP GR UR STRIP.AUTO: >=1.03 (ref 1–1.03)
UROBILINOGEN UR STRIP-ACNC: <2 MG/DL
WBC #/AREA URNS AUTO: ABNORMAL /HPF

## 2024-06-13 PROCEDURE — 87086 URINE CULTURE/COLONY COUNT: CPT

## 2024-06-13 PROCEDURE — 81001 URINALYSIS AUTO W/SCOPE: CPT

## 2024-06-13 RX ORDER — AMOXICILLIN AND CLAVULANATE POTASSIUM 875; 125 MG/1; MG/1
1 TABLET, FILM COATED ORAL EVERY 12 HOURS SCHEDULED
Qty: 60 TABLET | Refills: 0 | Status: SHIPPED | OUTPATIENT
Start: 2024-06-13 | End: 2024-07-13

## 2024-06-13 NOTE — TELEPHONE ENCOUNTER
Returned pt's phone call- recommended pt proceed to ER for further evaluation. Pt is not agreeable to ER dispo as she is very busy and unable to get there until Sunday. She asked for OTC pain medication recommendations. Did advise she could take tylenol or ibuprofen OTC but that ultimately she should be evaluated in the ER. Pt verbalized understanding of instructions but states she is unable to go at this time.

## 2024-06-13 NOTE — TELEPHONE ENCOUNTER
Pt of Dr. Chen with right renal abscess, currently on Duricef.    Pt calling c/o left sided flank pain. Pain began about 4 days ago, is severe and constant. She is experiencing intermittent hematuria as well as fevers but pt states this has been ongoing with her renal abscess. She feels she is not improving on the abx and is concerned she is now having similar pain on the left side. She is scheduled for 3 week f/u CT on 6/20 and is wondering if she should have the repeat imaging done sooner. Will review with provider and update pt with further recommendations.

## 2024-06-13 NOTE — TELEPHONE ENCOUNTER
"Reason for Disposition  • Taking antibiotic > 72 hours (3 days) and symptoms (other than fever) not improved    Answer Assessment - Initial Assessment Questions  1. ONSET: \"When did the pain begin?\"       4 days ago  2. LOCATION: \"Where does it hurt?\" (upper, mid or lower back)      Left flank  3. SEVERITY: \"How bad is the pain?\"  (e.g., Scale 1-10; mild, moderate, or severe)    - MILD (1-3): doesn't interfere with normal activities     - MODERATE (4-7): interferes with normal activities or awakens from sleep     - SEVERE (8-10): excruciating pain, unable to do any normal activities       Severe  4. PATTERN: \"Is the pain constant?\" (e.g., yes, no; constant, intermittent)       Constant  5. RADIATION: \"Does the pain shoot into your legs or elsewhere?\"      No  6. CAUSE:  \"What do you think is causing the back pain?\"       Kidney infection  7. BACK OVERUSE:  \"Any recent lifting of heavy objects, strenuous work or exercise?\"      NA  8. MEDICATIONS: \"What have you taken so far for the pain?\" (e.g., nothing, acetaminophen, NSAIDS)      Tylenol  9. NEUROLOGIC SYMPTOMS: \"Do you have any weakness, numbness, or problems with bowel/bladder control?\"      No  10. OTHER SYMPTOMS: \"Do you have any other symptoms?\" (e.g., fever, abdominal pain, burning with urination, blood in urine)        Frothy urine, intermittent hematuria and fevers (has been ongoing with right sided renal abscess)   11. OTHER        CT next Thursday    Protocols used: Back Pain-ADULT-[place], Infection on Antibiotic Follow-up Call-ADULT-[place]    "

## 2024-06-13 NOTE — TELEPHONE ENCOUNTER
Contacted the patient directly to discuss.  She states that since being on the Duricef she has not had improvement of her symptoms and she feels that she is having intermittent fever, abnormal appearance to her urine and ongoing discomfort in her back.  Given her social situation or issues at home she cannot go to the ER today.  At this point we discussed adjusting her antibiotic based on what she grew before and switching her to Augmentin 875 mg every 12 hours.  I requested that the patient obtain a UA and a urine culture before starting the antibiotic and she was agreeable.  Orders placed for both.I confirm the patient's pharmacy.    I let the patient know that I would have our office start to call out to radiology to see if there are any openings for an earlier CT scan appointment.  Will forward this phone note to office staff for reference.    Lastly I stressed to the patient that if she does not improve or continues to progress that she has to go to the emergency department for more rapid intervention and evaluation.  She states she is agreeable to this option.  I told her to give our office a call should she progress and if she is planning to go to the ER so that we could potentially call ahead for her.  Additional questions were answered.

## 2024-06-13 NOTE — TELEPHONE ENCOUNTER
Spoke with Central Scheduling.  Patient can have CT at [facility] Monday 6/17 at 0800.     Called patient, she will take that appointment.  She confirms understanding that our recommendation is ED evaluation and should things get worse she should not wait until the CT is done on Monday.    She is on her way to do a urine sample right now.    She thanks me for my help getting her in sooner for CT scan.

## 2024-06-13 NOTE — TELEPHONE ENCOUNTER
Patient called in because she was looking for an update on the conversation that she had with the nurse this morning. I talked with patient and advised her of Dr. Chen's note. Patient became very emotional when mentioning to go to the ER. Patient states she will not even be able to go the ER until Sunday evening because she has things to take care of and no one to fill her shoes. Patient is asking to please have a medication prescribed for the pain that she is having. She is asking for a doctor or nurse to give her a call back before the end of the day. Please advise.

## 2024-06-14 LAB — BACTERIA UR CULT: ABNORMAL

## 2024-06-17 ENCOUNTER — TELEPHONE (OUTPATIENT)
Dept: INFECTIOUS DISEASES | Facility: CLINIC | Age: 64
End: 2024-06-17

## 2024-06-17 ENCOUNTER — HOSPITAL ENCOUNTER (OUTPATIENT)
Dept: CT IMAGING | Facility: HOSPITAL | Age: 64
Discharge: HOME | End: 2024-06-17
Attending: INTERNAL MEDICINE
Payer: COMMERCIAL

## 2024-06-17 DIAGNOSIS — N15.1 RENAL ABSCESS, RIGHT: ICD-10-CM

## 2024-06-17 PROCEDURE — 74178 CT ABD&PLV WO CNTR FLWD CNTR: CPT

## 2024-06-17 RX ADMIN — IOHEXOL 80 ML: 350 INJECTION, SOLUTION INTRAVENOUS at 08:23

## 2024-06-17 NOTE — TELEPHONE ENCOUNTER
Let patient know urine samples came back without significant findings.    Pain is the same as last Thursday.  She reports that she is light headed and does not feel. She reports she has a constant fever. She is very upset and is concerned. She states that the numbers online do not say that her urine tests were normal. She is very concerned with how she feels.     I did let her know that I communicated with the Radiology department to have the CT scan read, to which they expressed they would send it to a radiologist for a read.     Patient continues to express her frustrations with not being taken seriously, and feels that the situation is not being managed properly.     I did leave her know that I would let Dr. Chen know of her concerns. We will continue to monitor for the final read on her CT. If she would like to discuss those results she may do so in person for an appointment. Otherwise, Dr. Chen will reach out to patients PCP to discuss the plan.

## 2024-06-17 NOTE — TELEPHONE ENCOUNTER
Spoke with Clyde to request CT from today be read.  He has forwarded this to radiologist for read.

## 2024-06-18 ENCOUNTER — TELEPHONE (OUTPATIENT)
Dept: OTHER | Facility: HOSPITAL | Age: 64
End: 2024-06-18

## 2024-06-18 ENCOUNTER — TELEPHONE (OUTPATIENT)
Dept: INFECTIOUS DISEASES | Facility: CLINIC | Age: 64
End: 2024-06-18

## 2024-06-18 NOTE — TELEPHONE ENCOUNTER
Spoke with patient. Unfortunately next week she has a dental appointment during the same time she is scheduled to follow up with us in the office. Patient cannot make it for that day/time. Patient however can come to an appointment tomorrow, and will need lyft. Lyft request sent via epic.

## 2024-06-18 NOTE — TELEPHONE ENCOUNTER
Contacted patient this afternoon to discuss her CT findings.  Patient initially started conversation very angry and yelling at provider.  She overall expressed her frustration with feeling that her symptoms are being overlooked.  After she relayed an extensive amount of frustration I was eventually able to review with her that her labs have been generally unremarkable, her recent UA and urine culture are showing no signs of continued or progressing lower urinary tract infection.  We reviewed the purpose of obtaining her CT was to look for objective evidence to determine if her right kidney still remained an issue and causing recurrence of her symptoms.  I was able to review with her that the right-sided findings have now entirely resolved.  The patient also was concerned about cyst on her left kidney which I let her know can be a benign finding and likely just need to be monitored with time by her primary.  We reviewed that on her current imaging there is no concern at this point for an ongoing urinary infection.  Patient primarily discussed issues that she has been having with the change in the quality of her urine but none of her symptoms are typical for ongoing urinary tract infection.  She continues to however have the back discomfort.  I reviewed her prior imaging with her in January and at that time there was a compression deformity developing at T12.  It was uncertain the etiology of the lesion and there was consideration for possibly obtaining an MRI.  Subsequent imaging at the end of January showed stable findings at the site with resolving abscess.  Imaging in March again showed no acute fractures or suspicious bony lesions.  Now again this imaging is showing age-appropriate degenerative changes and stable chronic endplate compression fracture deformity of T12.  This may be the issue contributing to the patient's ongoing back discomfort.  She does report taking Motrin twice daily and has avoided Flexeril  as it makes her tired.  We discussed potentially reducing the dose of the Flexeril or cutting it in half when using it.  But she does also mention episodes of feeling warm and almost as if she is having a night sweat at night but does not develop true fever.  I let her know at this point that I would keep the current appointment that she has coming up with our office so that we can reevaluate her exam and if appropriate we can order an MRI of the back to see if there is any progressing infection at the site.  She may have a partially treated process.  Or this could be noninfectious and she would benefit from further evaluation such as PT and further work with pain control.  Patient requested adjustment to the time of her appointment.  Let her know that I would reach out to the office.  Additional questions were answered.  I did ask the patient maintain Augmentin for now until she is seen.    After my phone call with the patient I sent an in basket message to our office staff, ID clinic physician for this month and patient's primary care provider.  I detailed my concern based on her progression and current imaging and question for the consideration of an expedited MRI.  Ultimately if that imaging is negative then she would not need further workup from an ID standpoint but will need further workup with her primary.  If there is abnormal findings we may need to arrange for IV antibiotics as an outpatient.  Requested the office staff reach out to the patient today to potentially adjust the time of her appointment or offer a Lyft ride.    Will forward this phone note to office staff for reference.

## 2024-06-18 NOTE — TELEPHONE ENCOUNTER
Patient called in regarding CT results . Patient is fustrated that no one has called her to go over her results . She is in pain and wants the doctor to give her a call today .

## 2024-06-19 ENCOUNTER — OFFICE VISIT (OUTPATIENT)
Dept: INFECTIOUS DISEASES | Facility: CLINIC | Age: 64
End: 2024-06-19
Payer: COMMERCIAL

## 2024-06-19 VITALS
TEMPERATURE: 97.7 F | HEART RATE: 66 BPM | HEIGHT: 65 IN | OXYGEN SATURATION: 97 % | RESPIRATION RATE: 16 BRPM | WEIGHT: 196 LBS | DIASTOLIC BLOOD PRESSURE: 82 MMHG | BODY MASS INDEX: 32.65 KG/M2 | SYSTOLIC BLOOD PRESSURE: 156 MMHG

## 2024-06-19 DIAGNOSIS — M54.9 BACK PAIN: ICD-10-CM

## 2024-06-19 DIAGNOSIS — N15.1 RENAL ABSCESS, RIGHT: Primary | ICD-10-CM

## 2024-06-19 DIAGNOSIS — R93.89 ABNORMAL CT SCAN: ICD-10-CM

## 2024-06-19 DIAGNOSIS — Z79.4 TYPE 2 DIABETES MELLITUS WITH HYPERGLYCEMIA, WITH LONG-TERM CURRENT USE OF INSULIN (HCC): ICD-10-CM

## 2024-06-19 DIAGNOSIS — E11.65 TYPE 2 DIABETES MELLITUS WITH HYPERGLYCEMIA, WITH LONG-TERM CURRENT USE OF INSULIN (HCC): ICD-10-CM

## 2024-06-19 PROCEDURE — 99214 OFFICE O/P EST MOD 30 MIN: CPT | Performed by: INTERNAL MEDICINE

## 2024-06-19 NOTE — PROGRESS NOTES
Progress Note - Infectious Disease   Nathalia Monge 63 y.o. female MRN: [id number]  Unit/Bed#:  Encounter: [id number]      Impression/Plan:  1.  Right renal abscess.  Patient had episode of Klebsiella bacteremia with right-sided pyelonephritis and complicating abscess.  She recently completed antibiotic course in March with serial CTs showing resolution of abscess, including on most recent CT from 6/17.  After completion of antibiotic course, patient began experiencing what seemed to be relapsing symptoms in mid April. She received 10-day course of Duricef from her primary with some reported improvement.  On last visit, patient was restarted on Duricef based on prior culture pending further workup.  This was subsequently changed to Augmentin.  Most recent labs, UA/urine culture, CT are inconsistent with persistent kidney infection.  Will hold additional antibiotics given negative infectious workup thus far.  Patient agrees with this recommendation.  Check MRI thoracic spine, as below.    2.  Left-sided back pain, with abnormal abdominal CT.  Most recent CT from 6/17/2024 with what appears to be stable chronic superior plate compression fracture of T12.  This may be the etiology of her pain, as no radiologic evidence of new or persistent kidney infection.  Consider possibility of infection at this site in the setting of recent bacteremia, although less likely given radiologic stability.  I offered patient the option of holding additional antibiotics given negative infectious workup thus far as we obtain MRI to better evaluate T12.  If her symptoms worsen or MRI is concerning for infection, antibiotics can be reinitiated.  Patient agrees with plan with this plan.   Hold additional antibiotics for now, as above.   Check MRI thoracic spine.   Follow-up in office to review MRI results.   Follow symptoms.   Serial back exams.     3.  C. difficile colonization.  No active diarrhea at this time.  Seen during prior admission.   Encouraged patient to continue probiotic and monitor for progressing symptoms requiring testing and treatment.     4.  Type 2 diabetes.  Hemoglobin A1c has improved to 7.8.  Continue glucose management with PCP.  Discussed with patient as this may be a risk factor for the above.     I discussed above plan in detail with patient and answered all questions.  Follow-up after MRI is performed to review results, or sooner if new concerns arise.        Subjective:  Patient is here for follow-up regarding recent Klebsiella pyelonephritis bacteremia secondary to pyelonephritis complicated by right renal abscess for which she completed extended course of Duricef.  On last visit, patient reported recurrence of right flank discomfort, dysuria and discoloration of her urine.  She was restarted on Duricef with recommendation for repeat blood work, urine culture, and CT.  Since last visit, antibiotic was changed to Augmentin as Duricef did not seem to provide any relief of her symptoms.  Currently, patient's main complaint is left sided mid back pain worse with palpation at the site.  It also is exacerbated by flexion of the back.  Patient does states she slipped and fell in November 2023 which may have injured her back. No documented fevers or chills.  She does occasionally have episodes of subjective fevers and myalgias which spontaneously improve.  No numbness or weakness in her extremities.  She still has dysuria which waxes and wanes.  Frequency of urination is stable.  No new rashes, focal joint pains or swelling.    Reviewed and updated as appropriate: Past medical history, past surgical history, social history, current medications, allergies, problem list.    Objective:  Vitals:  [unfilled]  @TMAX(24)@Current:      Physical Exam:   General:  Well-nourished, well-developed, in no acute distress  Eyes:  Conjunctive clear with no hemorrhages or effusions  Oropharynx:  No visible oral lesions  Neck: Trachea midline  Lungs:  Nonlabored respirations  Abdomen: Nondistended with no rigidity or guarding  Back: No spinal tenderness to palpation.  Left-sided mid back tenderness to palpation.  No palpable mass.  Intact flexion/extension.  Extremities:  No peripheral cyanosis, clubbing, or edema  Skin:  No visible rashes or wounds  Neurological:  Moves all four extremities spontaneously    Lab Results:  I have personally reviewed pertinent labs.    UA from 6/13/2024 with no pyuria.    BMP and CBC from 6/6/2024 reviewed.      Results from last 7 days   Lab Units 06/13/24  1604   URINE CULTURE  <10,000 cfu/ml Staphylococcus species*       Imaging Studies:   I have personally reviewed pertinent imaging study reports and images in PACS.    CT abdomen/pelvis from 6/17/2024, personally reviewed, with resolution of previous right renal abscess with minimal right perinephric scarring.  No evidence of recurrent pyelonephritis or abscess.  No renal stones.  No bladder stones.  Redemonstration of left renal cortical scarring likely sequela of prior infection.  Stable chronic superior plate compression fracture deformity of T12.    EKG, Pathology, and Other Studies:   I have personally reviewed pertinent reports.

## 2024-07-03 DIAGNOSIS — Z79.4 TYPE 2 DIABETES MELLITUS WITH HYPERGLYCEMIA, WITH LONG-TERM CURRENT USE OF INSULIN (HCC): ICD-10-CM

## 2024-07-03 DIAGNOSIS — E11.65 TYPE 2 DIABETES MELLITUS WITH HYPERGLYCEMIA, WITH LONG-TERM CURRENT USE OF INSULIN (HCC): ICD-10-CM

## 2024-07-03 RX ORDER — INSULIN ASPART 100 [IU]/ML
15 INJECTION, SUSPENSION SUBCUTANEOUS
Qty: 9 ML | Refills: 5 | Status: SHIPPED | OUTPATIENT
Start: 2024-07-03 | End: 2024-12-30

## 2024-07-08 ENCOUNTER — TELEPHONE (OUTPATIENT)
Dept: INFECTIOUS DISEASES | Facility: CLINIC | Age: 64
End: 2024-07-08

## 2024-07-08 ENCOUNTER — HOSPITAL ENCOUNTER (OUTPATIENT)
Dept: MRI IMAGING | Facility: HOSPITAL | Age: 64
Discharge: HOME | End: 2024-07-08
Attending: INTERNAL MEDICINE
Payer: COMMERCIAL

## 2024-07-08 DIAGNOSIS — R93.89 ABNORMAL CT SCAN: ICD-10-CM

## 2024-07-08 DIAGNOSIS — M54.9 BACK PAIN: ICD-10-CM

## 2024-07-08 PROCEDURE — 72146 MRI CHEST SPINE W/O DYE: CPT

## 2024-07-08 RX ORDER — GADOBUTROL 604.72 MG/ML
8 INJECTION INTRAVENOUS
Status: DISCONTINUED | OUTPATIENT
Start: 2024-07-08 | End: 2024-07-12 | Stop reason: HOSPADM

## 2024-07-08 NOTE — TELEPHONE ENCOUNTER
Called Radiology Reading Room and spoke with Clyde today.   Informed Clyde patient has an MRI that needs to be read for an upcoming appointment. Clyde states he will send it to be read.

## 2024-07-10 ENCOUNTER — TELEMEDICINE (OUTPATIENT)
Dept: INFECTIOUS DISEASES | Facility: CLINIC | Age: 64
End: 2024-07-10
Payer: COMMERCIAL

## 2024-07-10 ENCOUNTER — TELEPHONE (OUTPATIENT)
Dept: INFECTIOUS DISEASES | Facility: CLINIC | Age: 64
End: 2024-07-10

## 2024-07-10 DIAGNOSIS — R39.9 LOWER URINARY TRACT SYMPTOMS: Primary | ICD-10-CM

## 2024-07-10 DIAGNOSIS — M54.9 DISCOMFORT OF BACK: ICD-10-CM

## 2024-07-10 PROCEDURE — 99215 OFFICE O/P EST HI 40 MIN: CPT | Performed by: INTERNAL MEDICINE

## 2024-07-10 NOTE — TELEPHONE ENCOUNTER
Nathalia shearer. Explained to her that this was set up through the address that she has on file. She explained that she is more upset about the MRI that they stated on the read that there is no prior imaging to compare to. She will now connect virtually rather than in person at the scheduled appt time.
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
## 2024-07-10 NOTE — TELEPHONE ENCOUNTER
Made contact with patient . He states the Lyft should have went to [facility] but didn't know the address to the [facility]. He states he was going to try to call his wife to call us back.

## 2024-07-10 NOTE — TELEPHONE ENCOUNTER
POD Received a call from patient stating the lyft went to the wrong address. I tried calling patient and the phone goes to voice mail and says voice mail box is full and I am unable to leave a voice message to gain contact with patient to verify address.

## 2024-07-16 ENCOUNTER — OFFICE VISIT (OUTPATIENT)
Dept: FAMILY MEDICINE CLINIC | Facility: CLINIC | Age: 64
End: 2024-07-16
Payer: COMMERCIAL

## 2024-07-16 VITALS
HEART RATE: 76 BPM | TEMPERATURE: 97.2 F | HEIGHT: 65 IN | WEIGHT: 195 LBS | BODY MASS INDEX: 32.49 KG/M2 | OXYGEN SATURATION: 96 %

## 2024-07-16 DIAGNOSIS — N15.1 RENAL ABSCESS, RIGHT: Primary | ICD-10-CM

## 2024-07-16 DIAGNOSIS — N95.2 VAGINAL ATROPHY: ICD-10-CM

## 2024-07-16 DIAGNOSIS — E11.65 TYPE 2 DIABETES MELLITUS WITH HYPERGLYCEMIA, WITH LONG-TERM CURRENT USE OF INSULIN (HCC): ICD-10-CM

## 2024-07-16 DIAGNOSIS — Z12.31 ENCOUNTER FOR SCREENING MAMMOGRAM FOR BREAST CANCER: ICD-10-CM

## 2024-07-16 DIAGNOSIS — Z79.4 TYPE 2 DIABETES MELLITUS WITH HYPERGLYCEMIA, WITH LONG-TERM CURRENT USE OF INSULIN (HCC): ICD-10-CM

## 2024-07-16 DIAGNOSIS — D32.9 MENINGIOMA (HCC): ICD-10-CM

## 2024-07-16 LAB — SL AMB POCT HEMOGLOBIN AIC: 7.1 (ref ?–6.5)

## 2024-07-16 PROCEDURE — 83036 HEMOGLOBIN GLYCOSYLATED A1C: CPT

## 2024-07-16 PROCEDURE — 99214 OFFICE O/P EST MOD 30 MIN: CPT

## 2024-07-16 RX ORDER — ESTRADIOL 0.1 MG/G
CREAM VAGINAL
Qty: 42.5 G | Refills: 1 | Status: SHIPPED | OUTPATIENT
Start: 2024-07-16

## 2024-07-16 NOTE — PROGRESS NOTES
Ambulatory Visit  Name: Nathalia Monge      : 1960      MRN: [id number]  Encounter Provider: SHASHANK Miguel  Encounter Date: 2024   Encounter department: [facility]    Assessment & Plan   1. Renal abscess, right  Assessment & Plan:  Patient has been cleared by infectious disease. Per infection disease, MRI essentially  negative and suspicion is overall low for an infection involving back and there is no residual infection involving her kidneys.    With given history, ID suspects the pain in her back is possibly from her disc disease and degenerative disease. Patient declines referral for pain mgmt/PT/ortho at this time. The urinary symptoms may be related to vaginal atrophy and dryness. Prescribed vaginal estrogen cream and referred OBGYN for consult.   2. Type 2 diabetes mellitus with hyperglycemia, with long-term current use of insulin (HCC)  Assessment & Plan:  A1C improved from 7.8 to 7.1. continue current medication regimen.   Lab Results   Component Value Date    HGBA1C 7.1 (A) 2024     Orders:  -     POCT hemoglobin A1c  3. Encounter for screening mammogram for breast cancer  4. Vaginal atrophy  -     estradiol (ESTRACE) 0.1 mg/g vaginal cream; Start 2 g every day for 2 weeks. Then use 2 g every other day for 2 weeks. Then decrease to 2 g 1-3 times a week  -     Ambulatory Referral to Obstetrics / Gynecology; Future  5. Meningioma (HCC)  Assessment & Plan:  Patient follows with neurology.        History of Present Illness     Presents for follow up after clearance from infectious disease.         Review of Systems   Constitutional:  Negative for activity change.   HENT:  Negative for congestion, ear pain, rhinorrhea and sore throat.    Eyes:  Negative for pain.   Respiratory:  Negative for cough, shortness of breath and wheezing.    Cardiovascular:  Negative for chest pain and leg swelling.   Gastrointestinal:  Negative for abdominal pain,  "diarrhea, nausea and vomiting.   Genitourinary:  Positive for dysuria.   Musculoskeletal:  Positive for back pain. Negative for arthralgias and myalgias.   Skin:  Negative for rash.   Neurological:  Negative for dizziness, weakness and numbness.   All other systems reviewed and are negative.    Objective     Pulse 76   Temp (!) 97.2 °F (36.2 °C) (Tympanic)   Ht 5' 5\" (1.651 m)   Wt 88.5 kg (195 lb)   SpO2 96%   BMI 32.45 kg/m²     Physical Exam  Vitals and nursing note reviewed.   Constitutional:       General: She is not in acute distress.     Appearance: Normal appearance. She is well-developed. She is not ill-appearing.   HENT:      Head: Normocephalic and atraumatic.      Right Ear: External ear normal.      Left Ear: External ear normal.   Cardiovascular:      Rate and Rhythm: Normal rate and regular rhythm.      Heart sounds: Normal heart sounds. No murmur heard.  Pulmonary:      Effort: Pulmonary effort is normal. No respiratory distress.      Breath sounds: Normal breath sounds. No wheezing.   Abdominal:      General: Bowel sounds are normal. There is no distension.      Palpations: Abdomen is soft.      Tenderness: There is no abdominal tenderness.   Musculoskeletal:      Cervical back: Neck supple.   Skin:     General: Skin is warm and dry.      Capillary Refill: Capillary refill takes less than 2 seconds.   Neurological:      Mental Status: She is alert. Mental status is at baseline.   Psychiatric:         Mood and Affect: Mood normal.         Behavior: Behavior normal.         Thought Content: Thought content normal.       Administrative Statements   I have spent a total time of 20 minutes in caring for this patient on the day of the visit/encounter including Diagnostic results, Risks and benefits of tx options, Instructions for management, Patient and family education, Importance of tx compliance, Impressions, Documenting in the medical record, Reviewing / ordering tests, medicine, procedures  , " and Obtaining or reviewing history  .

## 2024-07-16 NOTE — ASSESSMENT & PLAN NOTE
A1C improved from 7.8 to 7.1. continue current medication regimen.   Lab Results   Component Value Date    HGBA1C 7.1 (A) 07/16/2024

## 2024-07-16 NOTE — ASSESSMENT & PLAN NOTE
Patient has been cleared by infectious disease. Per infection disease, MRI essentially  negative and suspicion is overall low for an infection involving back and there is no residual infection involving her kidneys.    With given history, ID suspects the pain in her back is possibly from her disc disease and degenerative disease. Patient declines referral for pain mgmt/PT/ortho at this time. The urinary symptoms may be related to vaginal atrophy and dryness. Prescribed vaginal estrogen cream and referred OBGYN for consult.

## 2024-07-25 ENCOUNTER — OFFICE VISIT (OUTPATIENT)
Dept: OBGYN CLINIC | Facility: CLINIC | Age: 64
End: 2024-07-25
Payer: COMMERCIAL

## 2024-07-25 VITALS
BODY MASS INDEX: 32.72 KG/M2 | WEIGHT: 196.4 LBS | HEIGHT: 65 IN | DIASTOLIC BLOOD PRESSURE: 96 MMHG | SYSTOLIC BLOOD PRESSURE: 188 MMHG

## 2024-07-25 DIAGNOSIS — Z01.419 ROUTINE GYNECOLOGICAL EXAMINATION: Primary | ICD-10-CM

## 2024-07-25 DIAGNOSIS — N95.2 VAGINAL ATROPHY: ICD-10-CM

## 2024-07-25 DIAGNOSIS — Z12.4 SCREENING FOR MALIGNANT NEOPLASM OF THE CERVIX: ICD-10-CM

## 2024-07-25 PROCEDURE — G0145 SCR C/V CYTO,THINLAYER,RESCR: HCPCS | Performed by: OBSTETRICS & GYNECOLOGY

## 2024-07-25 PROCEDURE — G0101 CA SCREEN;PELVIC/BREAST EXAM: HCPCS | Performed by: OBSTETRICS & GYNECOLOGY

## 2024-07-25 NOTE — PROGRESS NOTES
[facility] - [place]  [address] [id number]    ASSESSMENT/PLAN: Nathalia Monge is a 63 y.o.  who presents for annual gynecologic exam.    Encounter for routine gynecologic examination  - Routine well woman exam completed today.  - Cervical Cancer Screening: Current ASCCP Guidelines reviewed. Last Pap: 2024 . Next Pap Due: today  - HPV Vaccination status: Not immunized  - Contraceptive counseling discussed.  Current contraception: none - menopause  - Breast Cancer Screening: Last Mammogram Not on file, She has order from primary  - Colorectal cancer screening was not ordered.  - The following were reviewed in today's visit: breast self exam, mammography screening ordered, use and side effects of HRT, and menopause    Additional problems addressed during this visit:  1. Routine gynecological examination  2. Screening for malignant neoplasm of the cervix  -     Liquid-based pap, screening  -     Molecular Hold Sample  3. Vaginal atrophy  Comments:  Discussed the option of vaginal estradiol, if she desires.  Orders:  -     Ambulatory Referral to Obstetrics / Gynecology      CC:  Annual Gynecologic Examination    HPI: Nathalia Monge is a 63 y.o.  who presents for annual gynecologic examination.  HPI    The following portions of the patient's history were reviewed and updated as appropriate: She  has a past medical history of Bipolar depression (HCC), Cancer (HCC), Depression, Diabetes mellitus (HCC), Epilepsy (HCC), Hypertension, Miscarriage (), PTSD (post-traumatic stress disorder) (), and Seizures (HCC).  She  has a past surgical history that includes Hysterectomy; Cholecystectomy (); Implantable contact lens implantation; pr craniec trephine bone flp brain tumor suprtentor (Right, 03/15/2018); Brain surgery; and Eye surgery.  Her family history is not on file. She was adopted.  She  reports that she quit smoking about 24 years ago. Her smoking use included cigarettes. She  "has never used smokeless tobacco. She reports that she does not drink alcohol and does not use drugs.  Current Outpatient Medications   Medication Sig Dispense Refill    Ascorbic Acid (VITAMIN C) 1000 MG tablet Take 1,000 mg by mouth daily      cholecalciferol (VITAMIN D3) 1,000 units tablet Take 1,000 Units by mouth daily      CHROMIUM PO Take by mouth 2 (two) times a day      cyclobenzaprine (FLEXERIL) 5 mg tablet Take 1 tablet (5 mg total) by mouth 3 (three) times a day as needed for muscle spasms 30 tablet 2    estradiol (ESTRACE) 0.1 mg/g vaginal cream Start 2 g every day for 2 weeks. Then use 2 g every other day for 2 weeks. Then decrease to 2 g 1-3 times a week 42.5 g 1    ferrous sulfate 325 (65 Fe) mg tablet Take 325 mg by mouth daily       insulin aspart protamine-insulin aspart (NovoLOG Mix 70/30 FlexPen) 100 Units/mL injection pen Inject 15 Units under the skin 2 (two) times a day before meals 9 mL 5    Insulin Pen Needle (BD Pen Needle Yolis 2nd Gen) 32G X 4 MM MISC For use with insulin pen. Pharmacy may dispense brand covered by insurance. 100 each 0    L-LYSINE PO Take 500 mg by mouth daily      Probiotic Product (PROBIOTIC DAILY PO) Take 1 capsule by mouth daily      BD Pen Needle Yolis 2nd Gen 32G X 4 MM MISC USE 1 NEEDLE THREE TIMES DAILY 100 each 0     No current facility-administered medications for this visit.     She is allergic to dilantin [phenytoin], lidocaine, and statins..    Review of Systems      Objective:  BP (!) 188/96 (BP Location: Left arm, Patient Position: Sitting, Cuff Size: Standard)   Ht 5' 5\" (1.651 m)   Wt 89.1 kg (196 lb 6.4 oz)   BMI 32.68 kg/m²    Physical Exam      PE:  General Appearance: alert and oriented, in no acute distress.   HEENT: PERRL, thyroid without masses or tenderness  Breast: No masses, tenderness, skin changes, nipple D/C or axillary or supraclavicular adenopathy  Abdomen: Soft, non-tender, non-distended, no masses, no rebound or guarding.  Pelvic:       " External genitalia: Normal appearance, no abnormal pigmentation, no lesions or masses. Normal Bartholin's and Bulpitt's.      Urinary system: Urethral meatus normal, bladder non-tender.      Vaginal: atrophic mucosa without prolapse or lesions. Normal-appearing physiologic discharge      Cervix: No lesions or D/C      Adnexa: No adnexal masses or tenderness noted.      Uterus: Absent  Extremities: Normal range of motion.   Skin: normal, no rash or abnormalities  Neurologic: alert, oriented x3  Psychiatric: Appropriate affect, mood stable, cooperative with exam.

## 2024-08-01 LAB
LAB AP GYN PRIMARY INTERPRETATION: NORMAL
Lab: NORMAL

## 2024-08-23 DIAGNOSIS — Z79.4 TYPE 2 DIABETES MELLITUS WITH HYPERGLYCEMIA, WITH LONG-TERM CURRENT USE OF INSULIN (HCC): ICD-10-CM

## 2024-08-23 DIAGNOSIS — E11.65 TYPE 2 DIABETES MELLITUS WITH HYPERGLYCEMIA, WITH LONG-TERM CURRENT USE OF INSULIN (HCC): ICD-10-CM

## 2024-08-23 RX ORDER — PEN NEEDLE, DIABETIC 32GX 5/32"
NEEDLE, DISPOSABLE MISCELLANEOUS
Qty: 100 EACH | Refills: 0 | Status: SHIPPED | OUTPATIENT
Start: 2024-08-23

## 2024-09-26 ENCOUNTER — PATIENT MESSAGE (OUTPATIENT)
Dept: FAMILY MEDICINE CLINIC | Facility: CLINIC | Age: 64
End: 2024-09-26

## 2024-09-26 DIAGNOSIS — Z79.4 TYPE 2 DIABETES MELLITUS WITH HYPERGLYCEMIA, WITH LONG-TERM CURRENT USE OF INSULIN (HCC): ICD-10-CM

## 2024-09-26 DIAGNOSIS — E11.65 TYPE 2 DIABETES MELLITUS WITH HYPERGLYCEMIA, WITH LONG-TERM CURRENT USE OF INSULIN (HCC): ICD-10-CM

## 2024-09-26 RX ORDER — PEN NEEDLE, DIABETIC 32GX 5/32"
NEEDLE, DISPOSABLE MISCELLANEOUS 3 TIMES DAILY
Qty: 100 EACH | Refills: 5 | Status: SHIPPED | OUTPATIENT
Start: 2024-09-26 | End: 2024-09-26 | Stop reason: SDUPTHER

## 2024-09-26 RX ORDER — PEN NEEDLE, DIABETIC 32GX 5/32"
NEEDLE, DISPOSABLE MISCELLANEOUS 3 TIMES DAILY
Qty: 100 EACH | Refills: 5 | Status: SHIPPED | OUTPATIENT
Start: 2024-09-26

## 2024-09-26 NOTE — TELEPHONE ENCOUNTER
Medication: BD Pen Needle Yolis 2nd Gen 32G X 4 MM MISC     Dose/Frequency: USE 1 NEEDLE THREE TIMES DAILY     Quantity: 100    Pharmacy: [id number] - [address] -     Office:   [x] PCP/Provider -   [] Speciality/Provider -     Does the patient have enough for 3 days?   [x] Yes   [] No - Send as HP to POD

## 2024-11-30 ENCOUNTER — PATIENT MESSAGE (OUTPATIENT)
Dept: FAMILY MEDICINE CLINIC | Facility: CLINIC | Age: 64
End: 2024-11-30

## 2024-11-30 DIAGNOSIS — M54.50 LUMBAR PAIN: ICD-10-CM

## 2024-11-30 DIAGNOSIS — L02.91 ABSCESS: Primary | ICD-10-CM

## 2024-12-03 ENCOUNTER — TELEPHONE (OUTPATIENT)
Age: 64
End: 2024-12-03

## 2024-12-03 ENCOUNTER — TELEPHONE (OUTPATIENT)
Dept: FAMILY MEDICINE CLINIC | Facility: CLINIC | Age: 64
End: 2024-12-03

## 2024-12-03 ENCOUNTER — OFFICE VISIT (OUTPATIENT)
Dept: FAMILY MEDICINE CLINIC | Facility: CLINIC | Age: 64
End: 2024-12-03
Payer: COMMERCIAL

## 2024-12-03 VITALS — OXYGEN SATURATION: 97 % | HEART RATE: 89 BPM | WEIGHT: 196 LBS | BODY MASS INDEX: 32.62 KG/M2

## 2024-12-03 DIAGNOSIS — M54.6 ACUTE LEFT-SIDED THORACIC BACK PAIN: ICD-10-CM

## 2024-12-03 DIAGNOSIS — R10.12 LUQ PAIN: ICD-10-CM

## 2024-12-03 DIAGNOSIS — N15.1 RENAL ABSCESS, RIGHT: ICD-10-CM

## 2024-12-03 DIAGNOSIS — R06.89 BREATHING DIFFICULTY: ICD-10-CM

## 2024-12-03 DIAGNOSIS — D32.9 MENINGIOMA (HCC): ICD-10-CM

## 2024-12-03 DIAGNOSIS — E78.2 MIXED HYPERLIPIDEMIA: ICD-10-CM

## 2024-12-03 DIAGNOSIS — I16.0 HYPERTENSIVE URGENCY: ICD-10-CM

## 2024-12-03 DIAGNOSIS — G56.02 CARPAL TUNNEL SYNDROME ON LEFT: Primary | ICD-10-CM

## 2024-12-03 DIAGNOSIS — Z79.4 TYPE 2 DIABETES MELLITUS WITH HYPERGLYCEMIA, WITH LONG-TERM CURRENT USE OF INSULIN (HCC): ICD-10-CM

## 2024-12-03 DIAGNOSIS — E11.65 TYPE 2 DIABETES MELLITUS WITH HYPERGLYCEMIA, WITH LONG-TERM CURRENT USE OF INSULIN (HCC): ICD-10-CM

## 2024-12-03 PROCEDURE — 99214 OFFICE O/P EST MOD 30 MIN: CPT

## 2024-12-03 PROCEDURE — G2211 COMPLEX E/M VISIT ADD ON: HCPCS

## 2024-12-03 RX ORDER — GABAPENTIN 100 MG/1
100 CAPSULE ORAL
Qty: 90 CAPSULE | Refills: 3 | Status: SHIPPED | OUTPATIENT
Start: 2024-12-03

## 2024-12-03 RX ORDER — PREDNISONE 20 MG/1
TABLET ORAL
Qty: 15 TABLET | Refills: 0 | Status: SHIPPED | OUTPATIENT
Start: 2024-12-03 | End: 2024-12-12

## 2024-12-03 NOTE — TELEPHONE ENCOUNTER
I am just documenting this here because patient called back very angry and irate and yelling that she did not receive a phone call back in over 24 hours to schedule an appointment with Honey posey. This is in regards to the message she sent on my chart please see message if any questions period I was able to schedule her on Thursday at 10:00 AM with Honey

## 2024-12-03 NOTE — TELEPHONE ENCOUNTER
Spoke to pt. To schedule an appt. regarding a call made earlier. Pt. States she has an appt. and no longer needs to schedule with me.

## 2024-12-04 ENCOUNTER — APPOINTMENT (OUTPATIENT)
Dept: LAB | Facility: CLINIC | Age: 64
End: 2024-12-04
Payer: COMMERCIAL

## 2024-12-04 DIAGNOSIS — E78.2 MIXED HYPERLIPIDEMIA: ICD-10-CM

## 2024-12-04 DIAGNOSIS — N15.1 RENAL ABSCESS, RIGHT: ICD-10-CM

## 2024-12-04 DIAGNOSIS — R10.12 LUQ PAIN: ICD-10-CM

## 2024-12-04 DIAGNOSIS — Z79.4 TYPE 2 DIABETES MELLITUS WITH HYPERGLYCEMIA, WITH LONG-TERM CURRENT USE OF INSULIN (HCC): ICD-10-CM

## 2024-12-04 DIAGNOSIS — E11.65 TYPE 2 DIABETES MELLITUS WITH HYPERGLYCEMIA, WITH LONG-TERM CURRENT USE OF INSULIN (HCC): ICD-10-CM

## 2024-12-04 LAB
ALBUMIN SERPL BCG-MCNC: 4.2 G/DL (ref 3.5–5)
ALP SERPL-CCNC: 94 U/L (ref 34–104)
ALT SERPL W P-5'-P-CCNC: 10 U/L (ref 7–52)
ANION GAP SERPL CALCULATED.3IONS-SCNC: 7 MMOL/L (ref 4–13)
AST SERPL W P-5'-P-CCNC: 12 U/L (ref 13–39)
BASOPHILS # BLD AUTO: 0.04 THOUSANDS/ÂΜL (ref 0–0.1)
BASOPHILS NFR BLD AUTO: 0 % (ref 0–1)
BILIRUB SERPL-MCNC: 0.62 MG/DL (ref 0.2–1)
BUN SERPL-MCNC: 25 MG/DL (ref 5–25)
CALCIUM SERPL-MCNC: 9.6 MG/DL (ref 8.4–10.2)
CHLORIDE SERPL-SCNC: 101 MMOL/L (ref 96–108)
CHOLEST SERPL-MCNC: 246 MG/DL (ref ?–200)
CO2 SERPL-SCNC: 32 MMOL/L (ref 21–32)
CREAT SERPL-MCNC: 1.05 MG/DL (ref 0.6–1.3)
EOSINOPHIL # BLD AUTO: 0.11 THOUSAND/ÂΜL (ref 0–0.61)
EOSINOPHIL NFR BLD AUTO: 1 % (ref 0–6)
ERYTHROCYTE [DISTWIDTH] IN BLOOD BY AUTOMATED COUNT: 12.8 % (ref 11.6–15.1)
EST. AVERAGE GLUCOSE BLD GHB EST-MCNC: 197 MG/DL
GFR SERPL CREATININE-BSD FRML MDRD: 56 ML/MIN/1.73SQ M
GLUCOSE P FAST SERPL-MCNC: 137 MG/DL (ref 65–99)
HBA1C MFR BLD: 8.5 %
HCT VFR BLD AUTO: 39.9 % (ref 34.8–46.1)
HDLC SERPL-MCNC: 49 MG/DL
HGB BLD-MCNC: 13 G/DL (ref 11.5–15.4)
IMM GRANULOCYTES # BLD AUTO: 0.03 THOUSAND/UL (ref 0–0.2)
IMM GRANULOCYTES NFR BLD AUTO: 0 % (ref 0–2)
LDLC SERPL CALC-MCNC: 166 MG/DL (ref 0–100)
LYMPHOCYTES # BLD AUTO: 2.2 THOUSANDS/ÂΜL (ref 0.6–4.47)
LYMPHOCYTES NFR BLD AUTO: 24 % (ref 14–44)
MCH RBC QN AUTO: 28.6 PG (ref 26.8–34.3)
MCHC RBC AUTO-ENTMCNC: 32.6 G/DL (ref 31.4–37.4)
MCV RBC AUTO: 88 FL (ref 82–98)
MONOCYTES # BLD AUTO: 0.49 THOUSAND/ÂΜL (ref 0.17–1.22)
MONOCYTES NFR BLD AUTO: 5 % (ref 4–12)
NEUTROPHILS # BLD AUTO: 6.36 THOUSANDS/ÂΜL (ref 1.85–7.62)
NEUTS SEG NFR BLD AUTO: 70 % (ref 43–75)
NRBC BLD AUTO-RTO: 0 /100 WBCS
PLATELET # BLD AUTO: 221 THOUSANDS/UL (ref 149–390)
PMV BLD AUTO: 11 FL (ref 8.9–12.7)
POTASSIUM SERPL-SCNC: 4.2 MMOL/L (ref 3.5–5.3)
PROT SERPL-MCNC: 6.8 G/DL (ref 6.4–8.4)
RBC # BLD AUTO: 4.55 MILLION/UL (ref 3.81–5.12)
SODIUM SERPL-SCNC: 140 MMOL/L (ref 135–147)
TRIGL SERPL-MCNC: 157 MG/DL (ref ?–150)
WBC # BLD AUTO: 9.23 THOUSAND/UL (ref 4.31–10.16)

## 2024-12-04 PROCEDURE — 80061 LIPID PANEL: CPT

## 2024-12-04 PROCEDURE — 83036 HEMOGLOBIN GLYCOSYLATED A1C: CPT

## 2024-12-04 PROCEDURE — 36415 COLL VENOUS BLD VENIPUNCTURE: CPT

## 2024-12-04 PROCEDURE — 80053 COMPREHEN METABOLIC PANEL: CPT

## 2024-12-04 PROCEDURE — 85025 COMPLETE CBC W/AUTO DIFF WBC: CPT

## 2024-12-05 ENCOUNTER — TELEPHONE (OUTPATIENT)
Age: 64
End: 2024-12-05

## 2024-12-05 ENCOUNTER — TELEPHONE (OUTPATIENT)
Dept: FAMILY MEDICINE CLINIC | Facility: CLINIC | Age: 64
End: 2024-12-05

## 2024-12-05 DIAGNOSIS — R10.12 LUQ PAIN: ICD-10-CM

## 2024-12-05 DIAGNOSIS — N15.1 RENAL ABSCESS, RIGHT: ICD-10-CM

## 2024-12-05 DIAGNOSIS — R06.89 BREATHING DIFFICULTY: ICD-10-CM

## 2024-12-05 NOTE — ASSESSMENT & PLAN NOTE
BP elevated today 168/98. Encouraged to monitor at home. Patient reports likely related to pain. .

## 2024-12-05 NOTE — ED PROVIDER NOTES
"History  Chief Complaint   Patient presents with    Seizure - Prior Hx Of     Pt called EMS tonight due to having 5 grand mal seizures within the last three days. Had last night around 2200. Prior to calling EMS states left arm started shaking and thought she was going to have another. Also reports fever x 8 days. Was recently admitted at [facility] and placed on lisinopril for HBP, has been taking as directed     Patient is a 63-year-old female with history of bipolar depression, cancer, depression, diabetes mellitis, hypertension, partial idiopathic epilepsy with seizures of localized onset, surgical history of cholecystectomy, hysterectomy, craniotomy secondary to brain tumor on right side of brain that presents emergency department with seizure left-sided seizure activity lasting approximately 2 minutes in duration.  Patient with recent in hospital admission on 1/2/2024 for partial idiopathic epilepsy with seizures of localized onset, with EEG negative, seen by neurology, recommending continuing Vimpat 150 mg twice daily on discharge, type 2 diabetes mellitus with hyperglycemia restarted on NovoLog 70/30 dosing 15 units twice daily with prescriptions for pending diabetic testing supplies on discharge, with patient stating that she will be following up with a local clinic near her house.  Patient states that everyone of her seizures since \"I was 6, I have been conscious for.\"  Patient denies COVID flu RSV swab at this time.  Patient denies cough or URI symptoms.  Patient denies palliative and provocative factors.  Patient has not effective treatment.  Patient denies chills, nausea, vomiting, diarrhea, constipation and urinary symptoms.  Patient denies recent fall recent trauma.  Patient denies sick contacts or recent travel.  Patient denies chest pain, shortness breath, and abdominal pain.      History provided by:  Patient   used: No    Seizure - Prior Hx Of      Prior to Admission Medications "   Prescriptions Last Dose Informant Patient Reported? Taking?   Alcohol Swabs 70 % PADS Not Taking  No No   Sig: May substitute brand based on insurance coverage. Check glucose ACHS.   Patient not taking: Reported on 1/8/2024   Ascorbic Acid (VITAMIN C) 1000 MG tablet  Self Yes No   Sig: Take 1,000 mg by mouth daily   BD PEN NEEDLE BON U/F 32G X 4 MM MISC  Self Yes No   Sig: TEST TID UTD   Patient not taking: Reported on 3/10/2023   Blood Glucose Monitoring Suppl (OneTouch Verio Reflect) w/Device KIT Not Taking  No No   Sig: May substitute brand based on insurance coverage. Check glucose ACHS.   Patient not taking: Reported on 1/8/2024   CHROMIUM PO  Self Yes No   Sig: Take by mouth 2 (two) times a day   Insulin Pen Needle (BD Pen Needle Bon 2nd Gen) 32G X 4 MM MISC Not Taking  No No   Sig: For use with insulin pen. Pharmacy may dispense brand covered by insurance.   Patient not taking: Reported on 1/8/2024   Insulin Pen Needle 29G X 12.7MM MISC Not Taking Self No No   Sig: Use as directed with novolog flexpen to administer insulin with meals and use with lantus solostar pen to administer insulin in the morning   Patient not taking: Reported on 3/10/2023   L-LYSINE PO  Self Yes No   Sig: Take 500 mg by mouth daily   Magnesium 250 MG TABS  Self Yes No   Sig: Take by mouth daily   NON FORMULARY  Self Yes No   Sig: 3 (three) times a day Bon Silver - 3 tablespoons a day   OneTouch Delica Lancets 33G MISC Not Taking  No No   Sig: May substitute brand based on insurance coverage. Check glucose ACHS.   Patient not taking: Reported on 1/8/2024   Probiotic Product (PROBIOTIC DAILY PO)  Self Yes No   Sig: Take 1 capsule by mouth daily   Zinc 50 MG TABS  Self Yes No   Sig: Take by mouth daily   cholecalciferol (VITAMIN D3) 1,000 units tablet  Self Yes No   Sig: Take 1,000 Units by mouth daily   ferrous sulfate 325 (65 Fe) mg tablet  Self Yes No   Sig: Take 325 mg by mouth daily    glucose blood (OneTouch Verio) test strip  Not Taking  No No   Sig: May substitute brand based on insurance coverage. Check glucose ACHS.   Patient not taking: Reported on 2024   insulin aspart protamine-insulin aspart (NovoLOG 70/30) 100 Units/mL injection pen Not Taking  No No   Sig: Inject 15 Units under the skin 2 (two) times a day with meals   Patient not taking: Reported on 2024   lacosamide (VIMPAT) 150 mg tablet 2024  No Yes   Sig: Take 1 tablet (150 mg total) by mouth every 12 (twelve) hours   lisinopril (ZESTRIL) 20 mg tablet 2024  No Yes   Sig: Take 1 tablet (20 mg total) by mouth daily   sertraline (ZOLOFT) 100 mg tablet  Self Yes No   Sig: Take 100 mg by mouth daily at bedtime        Facility-Administered Medications: None       Past Medical History:   Diagnosis Date    Bipolar depression (HCC)     Cancer (HCC)     Depression     Diabetes mellitus (HCC)     Hypertension     PTSD (post-traumatic stress disorder)     Seizures (HCC)        Past Surgical History:   Procedure Laterality Date    CHOLECYSTECTOMY      HYSTERECTOMY      IMPLANTABLE CONTACT LENS IMPLANTATION      DC CRANIEC TREPHINE BONE FLP BRAIN TUMOR SUPRTENTOR Right 3/15/2018    Procedure: IMAGE-GUIDED RIGHT FRONTOPARIETAL CRANIOTOMY FOR TUMOR;  Surgeon: Arsh Jeronimo MD;  Location: [facility];  Service: Neurosurgery       Family History   Adopted: Yes     I have reviewed and agree with the history as documented.    E-Cigarette/Vaping    E-Cigarette Use Never User      E-Cigarette/Vaping Substances    Nicotine No     THC No     CBD No     Flavoring No     Other No     Unknown No      Social History     Tobacco Use    Smoking status: Former     Current packs/day: 0.00     Types: Cigarettes     Quit date: 3/5/2000     Years since quittin.8    Smokeless tobacco: Never   Vaping Use    Vaping status: Never Used   Substance Use Topics    Alcohol use: Never    Drug use: No       Review of Systems   Constitutional:  Positive for fever. Negative for activity  change, appetite change and chills.   HENT:  Negative for congestion, ear pain, postnasal drip, rhinorrhea, sinus pressure, sinus pain, sore throat and tinnitus.    Eyes:  Negative for photophobia, pain and visual disturbance.   Respiratory:  Negative for cough, chest tightness and shortness of breath.    Cardiovascular:  Negative for chest pain and palpitations.   Gastrointestinal:  Negative for abdominal pain, constipation, diarrhea, nausea and vomiting.   Genitourinary:  Negative for difficulty urinating, dysuria, flank pain, frequency, hematuria and urgency.   Musculoskeletal:  Negative for arthralgias, back pain, gait problem, neck pain and neck stiffness.   Skin:  Negative for color change, pallor and rash.   Allergic/Immunologic: Negative for environmental allergies and food allergies.   Neurological:  Positive for seizures. Negative for dizziness, syncope, weakness, numbness and headaches.   Psychiatric/Behavioral:  Negative for confusion.    All other systems reviewed and are negative.      Physical Exam  Physical Exam  Vitals and nursing note reviewed.   Constitutional:       General: She is awake.      Appearance: Normal appearance. She is well-developed and normal weight. She is not ill-appearing, toxic-appearing or diaphoretic.      Comments: BP (!) 246/111 (BP Location: Right arm)   Pulse 102   Temp (!) 102.1 °F (38.9 °C)   Resp 18   SpO2 92%      HENT:      Head: Normocephalic and atraumatic.      Jaw: There is normal jaw occlusion.      Right Ear: Hearing, tympanic membrane and external ear normal. No decreased hearing noted. No drainage, swelling or tenderness. No mastoid tenderness.      Left Ear: Hearing, tympanic membrane and external ear normal. No decreased hearing noted. No drainage, swelling or tenderness. No mastoid tenderness.      Nose: Nose normal.      Mouth/Throat:      Lips: Pink.      Mouth: Mucous membranes are moist.      Pharynx: Oropharynx is clear. Uvula midline.   Eyes:       General: Lids are normal. Vision grossly intact. Gaze aligned appropriately.         Right eye: No discharge.         Left eye: No discharge.      Extraocular Movements: Extraocular movements intact.      Conjunctiva/sclera: Conjunctivae normal.      Pupils: Pupils are equal, round, and reactive to light.   Neck:      Vascular: No JVD.      Trachea: Trachea and phonation normal. No tracheal tenderness or tracheal deviation.   Cardiovascular:      Rate and Rhythm: Normal rate and regular rhythm.      Pulses: Normal pulses.           Radial pulses are 2+ on the right side and 2+ on the left side.        Posterior tibial pulses are 2+ on the right side and 2+ on the left side.      Heart sounds: Normal heart sounds.   Pulmonary:      Effort: Pulmonary effort is normal.      Breath sounds: Normal breath sounds and air entry. No stridor. No decreased breath sounds, wheezing, rhonchi or rales.   Chest:      Chest wall: No tenderness.   Abdominal:      General: Abdomen is flat. Bowel sounds are normal. There is no distension.      Palpations: Abdomen is soft. Abdomen is not rigid.      Tenderness: There is no abdominal tenderness. There is no guarding or rebound.   Musculoskeletal:         General: Normal range of motion.      Cervical back: Full passive range of motion without pain, normal range of motion and neck supple. No rigidity. No spinous process tenderness or muscular tenderness. Normal range of motion.      Comments: Passive ROM intact  Upper and lower extremity 5/5 bilaterally  Neurovascularly intact  No grinding or clicking of joints       Feet:      Right foot:      Toenail Condition: Right toenails are normal.      Left foot:      Toenail Condition: Left toenails are normal.   Lymphadenopathy:      Head:      Right side of head: No submental, submandibular, tonsillar, preauricular, posterior auricular or occipital adenopathy.      Left side of head: No submental, submandibular, tonsillar, preauricular,  posterior auricular or occipital adenopathy.      Cervical: No cervical adenopathy.      Right cervical: No superficial, deep or posterior cervical adenopathy.     Left cervical: No superficial, deep or posterior cervical adenopathy.   Skin:     General: Skin is warm.      Capillary Refill: Capillary refill takes less than 2 seconds.      Findings: No rash.   Neurological:      General: No focal deficit present.      Mental Status: She is alert and oriented to person, place, and time. Mental status is at baseline.      GCS: GCS eye subscore is 4. GCS verbal subscore is 5. GCS motor subscore is 6.      Cranial Nerves: Cranial nerves 2-12 are intact.      Sensory: Sensation is intact. No sensory deficit.      Motor: Motor function is intact.      Coordination: Coordination is intact.      Deep Tendon Reflexes: Reflexes are normal and symmetric.      Reflex Scores:       Patellar reflexes are 2+ on the right side and 2+ on the left side.  Psychiatric:         Attention and Perception: Attention normal.         Mood and Affect: Mood normal.         Speech: Speech normal.         Behavior: Behavior normal. Behavior is cooperative.         Thought Content: Thought content normal.         Judgment: Judgment normal.         Vital Signs  ED Triage Vitals   Temperature Pulse Respirations Blood Pressure SpO2   01/08/24 0207 01/08/24 0207 01/08/24 0207 01/08/24 0207 01/08/24 0207   (!) 102.1 °F (38.9 °C) 102 18 (!) 246/111 92 %      Temp Source Heart Rate Source Patient Position - Orthostatic VS BP Location FiO2 (%)   01/08/24 0342 01/08/24 0207 01/08/24 0207 01/08/24 0207 --   Oral Monitor Sitting Right arm       Pain Score       01/08/24 0207       No Pain           Vitals:    01/08/24 0415 01/08/24 0430 01/08/24 0630 01/08/24 0700   BP: (!) 176/81 161/74 126/62 153/72   Pulse: 101 97 86 86   Patient Position - Orthostatic VS: Sitting Sitting Sitting          Visual Acuity      ED Medications  Medications   vancomycin  (VANCOCIN) 1,750 mg in sodium chloride 0.9 % 500 mL IVPB (1,750 mg Intravenous New Bag 1/8/24 0706)   sodium chloride 0.9 % bolus 1,000 mL (0 mL Intravenous Stopped 1/8/24 0331)   acetaminophen (TYLENOL) tablet 650 mg (650 mg Oral Given 1/8/24 0227)   ceftriaxone (ROCEPHIN) 1 g/50 mL in dextrose IVPB (0 mg Intravenous Stopped 1/8/24 0357)   magnesium sulfate 2 g/50 mL IVPB (premix) 2 g (0 g Intravenous Stopped 1/8/24 0427)   potassium chloride (K-DUR,KLOR-CON) CR tablet 40 mEq (40 mEq Oral Given 1/8/24 0328)   ibuprofen (MOTRIN) tablet 400 mg (400 mg Oral Given 1/8/24 0449)   sodium chloride 0.9 % bolus 1,000 mL (0 mL Intravenous Stopped 1/8/24 0631)   iohexol (OMNIPAQUE) 350 MG/ML injection (MULTI-DOSE) 100 mL (100 mL Intravenous Given 1/8/24 0443)       Diagnostic Studies  Results Reviewed       Procedure Component Value Units Date/Time    HS Troponin I 4hr [[id number]]  (Normal) Collected: 01/08/24 0644    Lab Status: Final result Specimen: Blood from Arm, Right Updated: 01/08/24 0719     hs TnI 4hr 31 ng/L      Delta 4hr hsTnI 13 ng/L     HS Troponin I 2hr [[id number]]  (Normal) Collected: 01/08/24 0449    Lab Status: Final result Specimen: Blood from Arm, Right Updated: 01/08/24 0527     hs TnI 2hr 25 ng/L      Delta 2hr hsTnI 7 ng/L     Procalcitonin [[id number]]  (Abnormal) Collected: 01/08/24 0228    Lab Status: Final result Specimen: Blood from Arm, Left Updated: 01/08/24 0402     Procalcitonin 1.58 ng/ml     Beta Hydroxybutyrate [[id number]]  (Normal) Collected: 01/08/24 0311    Lab Status: Final result Specimen: Blood from Arm, Right Updated: 01/08/24 0334     BETA-HYDROXYBUTYRATE 0.1 mmol/L     Blood gas, venous [[id number]]  (Abnormal) Collected: 01/08/24 0311    Lab Status: Final result Specimen: Blood from Arm, Right Updated: 01/08/24 0330     pH, Omar 7.430     pCO2, Omar 40.0 mm Hg      pO2, Omar 34.0 mm Hg      HCO3, Omar 26.0 mmol/L      Base Excess, Omar 1.6 mmol/L      O2 Content, Omar 10.8 ml/dL      O2  HGB, VENOUS 66.5 %     Blood culture #2 [[id number]] Collected: 01/08/24 0311    Lab Status: In process Specimen: Blood from Arm, Right Updated: 01/08/24 0315    TSH, 3rd generation with Free T4 reflex [[id number]]  (Normal) Collected: 01/08/24 0228    Lab Status: Final result Specimen: Blood from Arm, Left Updated: 01/08/24 0309     TSH 3RD GENERATON 1.863 uIU/mL     HS Troponin 0hr (reflex protocol) [[id number]]  (Normal) Collected: 01/08/24 0228    Lab Status: Final result Specimen: Blood from Arm, Left Updated: 01/08/24 0301     hs TnI 0hr 18 ng/L     Urine Microscopic [[id number]]  (Abnormal) Collected: 01/08/24 0228    Lab Status: Final result Specimen: Urine, Clean Catch Updated: 01/08/24 0258     RBC, UA 4-10 /hpf      WBC, UA 10-20 /hpf      Epithelial Cells Occasional /hpf      Bacteria, UA None Seen /hpf     Urine culture [[id number]] Collected: 01/08/24 0228    Lab Status: In process Specimen: Urine, Clean Catch Updated: 01/08/24 0258    Protime-INR [[id number]]  (Normal) Collected: 01/08/24 0228    Lab Status: Final result Specimen: Blood from Arm, Left Updated: 01/08/24 0255     Protime 13.1 seconds      INR 0.94    APTT [[id number]]  (Normal) Collected: 01/08/24 0228    Lab Status: Final result Specimen: Blood from Arm, Left Updated: 01/08/24 0255     PTT 30 seconds     Lactic acid, plasma (w/reflex if result > 2.0) [[id number]]  (Normal) Collected: 01/08/24 0228    Lab Status: Final result Specimen: Blood from Arm, Left Updated: 01/08/24 0253     LACTIC ACID 1.7 mmol/L     Narrative:      Result may be elevated if tourniquet was used during collection.    Magnesium [[id number]]  (Abnormal) Collected: 01/08/24 0228    Lab Status: Final result Specimen: Blood from Arm, Left Updated: 01/08/24 0253     Magnesium 1.6 mg/dL     CK [[id number]]  (Abnormal) Collected: 01/08/24 0228    Lab Status: Final result Specimen: Blood from Arm, Left Updated: 01/08/24 0253     Total CK 25 U/L     Comprehensive metabolic panel  [[id number]]  (Abnormal) Collected: 01/08/24 0228    Lab Status: Final result Specimen: Blood from Arm, Left Updated: 01/08/24 0253     Sodium 136 mmol/L      Potassium 3.1 mmol/L      Chloride 99 mmol/L      CO2 26 mmol/L      ANION GAP 11 mmol/L      BUN 21 mg/dL      Creatinine 1.00 mg/dL      Glucose 405 mg/dL      Calcium 9.0 mg/dL      AST 23 U/L      ALT 27 U/L      Alkaline Phosphatase 124 U/L      Total Protein 7.1 g/dL      Albumin 3.8 g/dL      Total Bilirubin 0.48 mg/dL      eGFR 60 ml/min/1.73sq m     Narrative:      National Kid[employer] Foundation guidelines for Chronic Kidney Disease (CKD):     Stage 1 with normal or high GFR (GFR > 90 mL/min/1.73 square meters)    Stage 2 Mild CKD (GFR = 60-89 mL/min/1.73 square meters)    Stage 3A Moderate CKD (GFR = 45-59 mL/min/1.73 square meters)    Stage 3B Moderate CKD (GFR = 30-44 mL/min/1.73 square meters)    Stage 4 Severe CKD (GFR = 15-29 mL/min/1.73 square meters)    Stage 5 End Stage CKD (GFR <15 mL/min/1.73 square meters)  Note: GFR calculation is accurate only with a steady state creatinine    UA w Reflex to Microscopic w Reflex to Culture [[id number]]  (Abnormal) Collected: 01/08/24 0228    Lab Status: Final result Specimen: Urine, Clean Catch Updated: 01/08/24 0247     Color, UA Colorless     Clarity, UA Clear     Specific Gravity, UA 1.020     pH, UA 6.0     Leukocytes, UA Moderate     Nitrite, UA Negative     Protein,  (2+) mg/dl      Glucose, UA >=1000 (1%) mg/dl      Ketones, UA Negative mg/dl      Urobilinogen, UA <2.0 mg/dl      Bilirubin, UA Negative     Occult Blood, UA Trace    Blood culture #1 [[id number]] Collected: 01/08/24 0243    Lab Status: In process Specimen: Blood from Line, Venous Updated: 01/08/24 0247    CBC and differential [[id number]]  (Abnormal) Collected: 01/08/24 0228    Lab Status: Final result Specimen: Blood from Arm, Left Updated: 01/08/24 0236     WBC 10.37 Thousand/uL      RBC 3.90 Million/uL      Hemoglobin 11.3  g/dL      Hematocrit 33.8 %      MCV 87 fL      MCH 29.0 pg      MCHC 33.4 g/dL      RDW 12.2 %      MPV 10.9 fL      Platelets 240 Thousands/uL      nRBC 0 /100 WBCs      Neutrophils Relative 85 %      Immat GRANS % 2 %      Lymphocytes Relative 11 %      Monocytes Relative 1 %      Eosinophils Relative 1 %      Basophils Relative 0 %      Neutrophils Absolute 8.81 Thousands/µL      Immature Grans Absolute 0.19 Thousand/uL      Lymphocytes Absolute 1.13 Thousands/µL      Monocytes Absolute 0.12 Thousand/µL      Eosinophils Absolute 0.08 Thousand/µL      Basophils Absolute 0.04 Thousands/µL     Lacosamide [[id number]] Collected: 01/08/24 0228    Lab Status: In process Specimen: Blood from Arm, Left Updated: 01/08/24 0233    Fingerstick Glucose (POCT) [[id number]]  (Abnormal) Collected: 01/08/24 0210    Lab Status: Final result Updated: 01/08/24 0211     POC Glucose 380 mg/dl                    CT chest abdomen pelvis w contrast   ED Interpretation by Blaise Gage PA-C (01/08 0621)   Reading Physician Reading Date Result Priority  Princess Sandoval MD  [phone] 1/8/2024 STAT    Narrative & Impression  CT CHEST, ABDOMEN AND PELVIS WITH IV CONTRAST     INDICATION: Sepsis  sepsis.     COMPARISON: None.     TECHNIQUE: CT examination of the chest, abdomen and pelvis was performed. Multiplanar 2D reformatted images were created from the source data.     This examination, like all CT scans performed in the [facility], was performed utilizing techniques to minimize radiation dose exposure, including the use of iterative reconstruction and automated exposure control. Radiation dose length   product (DLP) for this visit:  704 mGy-cm     IV Contrast: 100 mL of iohexol (OMNIPAQUE)  Enteric Contrast: Enteric contrast was administered.     FINDINGS:     CHEST     LUNGS: Tracheobronchial tree is unremarkable. There is mild groundglass and consolidative opacities in bilateral lower lobes primarily involving dependent  portions.     There is 4 mm right upper lobe nod   ule (series 303 image 51)     PLEURA: Unremarkable.     HEART/GREAT VESSELS: Heart is unremarkable for patient's age. No thoracic aortic aneurysm.     MEDIASTINUM AND AUDRA: Unremarkable.     CHEST WALL AND LOWER NECK: Unremarkable.     ABDOMEN     LIVER/BILIARY TREE: Unremarkable.     GALLBLADDER: Surgically absent     SPLEEN: Unremarkable.     PANCREAS: Unremarkable.     ADRENAL GLANDS: Unremarkable.     KIDNEYS/URETERS: There is a hypoenhancing focus involving the midpole of the right kidney with associated 2.6 x 2.9 cm complex cystic lesion (series 301 image 152). There is mild adjacent perinephric fat stranding.     The left kidney is unremarkable.  STOMACH AND BOWEL: Unremarkable.     APPENDIX: A normal appendix was visualized.     ABDOMINOPELVIC CAVITY: No ascites.  No pneumoperitoneum.  No lymphadenopathy.     VESSELS: Unremarkable for patient's age.     PELVIS     REPRODUCTIVE ORGANS: Unremarkable for patient's age.     URINARY BLADDER: Unremarkable.     ABDOMINAL WALL/INGUINA   L REGIONS: Unremarkable.     OSSEOUS STRUCTURES: There is disc height loss, endplate sclerosis and mild irregularity at level T11-12. No acute fracture or destructive osseous lesion.     IMPRESSION:     1.  2.9 cm masslike hypoenhancing focus involving midpole of the right kidney with mild perinephric fat stranding. Given clinical presentation this may indicate acute pyelonephritis with intrarenal abscess versus malignancy. Recommend urology   consultation.  2.  Mild groundglass and consolidative opacities involving primarily dependent lower lobe of the lungs. Aspiration or infection are not excluded in the right clinical setting.  3.  Disc height loss, endplate sclerosis and mild irregularity at level of T11-12, most likely degenerative in nature versus sequela of remote discitis/osteomyelitis. There is no paraspinal soft tissue inflammation to suggest infection. May consider MRI    without and with contrast if there is high clinical concern for infection.  4.  4 mm right upper lobe n   odule. Based on current Fleischner Society 2017 Guidelines on incidental pulmonary nodule, no routine follow-up is needed if the patient is low risk.  If the patient is high risk for lung cancer, optional follow-up chest CT at   12 months can be considered.  For patients with history of malignancy, 3 months follow-up chest CT is recommended.                 The study was marked in EPIC for immediate notification.           This study was marked in EPIC for notification and follow-up.                 Workstation performed: [id number]           Final Result by Princess Sandoval MD (01/08 0618)      1.  2.9 cm masslike hypoenhancing focus involving midpole of the right kidney with mild perinephric fat stranding. Given clinical presentation this may indicate acute pyelonephritis with intrarenal abscess versus malignancy. Recommend urology    consultation.   2.  Mild groundglass and consolidative opacities involving primarily dependent lower lobe of the lungs. Aspiration or infection are not excluded in the right clinical setting.   3.  Disc height loss, endplate sclerosis and mild irregularity at level of T11-12, most likely degenerative in nature versus sequela of remote discitis/osteomyelitis. There is no paraspinal soft tissue inflammation to suggest infection. May consider MRI    without and with contrast if there is high clinical concern for infection.   4.  4 mm right upper lobe nodule. Based on current Fleischner Society 2017 Guidelines on incidental pulmonary nodule, no routine follow-up is needed if the patient is low risk.  If the patient is high risk for lung cancer, optional follow-up chest CT at    12 months can be considered.  For patients with history of malignancy, 3 months follow-up chest CT is recommended.                  The study was marked in EPIC for immediate notification.            This study  Improved was marked in EPIC for notification and follow-up.                  Workstation performed: [id number]         CT head without contrast   ED Interpretation by Blaise Gage PA-C (01/08 0412)   Reading Physician Reading Date Result Priority  Leroy Jones MD  [phone] 1/8/2024     Narrative & Impression  CT BRAIN - WITHOUT CONTRAST     INDICATION:   recent seizure; one hour ago prior to current presentation; hx of seuzure; hypertensive.     COMPARISON: 1/2/2024.     TECHNIQUE:  CT examination of the brain was performed.  Multiplanar 2D reformatted images were created from the source data.     Radiation dose length product (DLP) for this visit:  946 mGy-cm .  This examination, like all CT scans performed in the [facility], was performed utilizing techniques to minimize radiation dose exposure, including the use of iterative   reconstruction and automated exposure control.     IMAGE QUALITY:  Diagnostic.     FINDINGS:     PARENCHYMA: Encephalomalacia in the right frontal vertex region again noted likely related to prior surgery. There is no acute intracranial hemorrhage, extra-axial fluid collection, space-occupying mass, mass effect, or midline shift not   ed. The   gray-white differentiation is preserved and unremarkable.     VENTRICLES AND EXTRA-AXIAL SPACES:  Normal for the patient's age.     VISUALIZED ORBITS: Normal visualized orbits.     PARANASAL SINUSES: Minimal mucoperiosteal thickening in the bilateral ethmoid and inferior maxillary sinuses. No air-fluid levels in the paranasal sinuses or mastoid air cells.     CALVARIUM AND EXTRACRANIAL SOFT TISSUES: Right frontal craniotomy defect noted. No acute depressed calvarial fracture. Bilateral temporomandibular joints are intact and unremarkable. The scalp soft tissues are grossly unremarkable.     IMPRESSION:     Stable findings without acute intracranial abnormality.                 Workstation performed: [id number]        Final Result by  Leroy Jones MD (01/08 0408)      Stable findings without acute intracranial abnormality.                  Workstation performed: [id number]         XR chest 1 view portable    (Results Pending)              Procedures  ECG 12 Lead Documentation Only    Date/Time: 1/8/2024 2:04 AM    Performed by: Blaise Gage PA-C  Authorized by: Blaise Gage PA-C    Indications / Diagnosis:  Hx of seizure  ECG reviewed by me, the ED Provider: yes    Patient location:  ED  Previous ECG:     Previous ECG:  Compared to current    Comparison ECG info:  When compared with ECG of January 2, 2024 no significant changes were noted    Similarity:  No change    Comparison to cardiac monitor: Yes    Interpretation:     Interpretation: normal    Rate:     ECG rate:  103    ECG rate assessment: normal    Rhythm:     Rhythm: sinus rhythm    Ectopy:     Ectopy: none    QRS:     QRS axis:  Normal    QRS intervals:  Normal  Conduction:     Conduction: normal    ST segments:     ST segments:  Normal  T waves:     T waves: normal             ED Course  ED Course as of 01/08/24 0738   Mon Jan 08, 2024   0244 Patient febrile, likely lowering of seizure threshold for etiology of provokes left-sided focal seizure.   0255 Leukocytes, UA(!): Moderate   0255 POCT URINE PROTEIN(!): 100 (2+)   0255 Glucose, UA(!): >=1000 (1%)   0255 Patient meets sepsis criteria, however patient will have elevated lactic acidosis because recent reported left-sided seizure.  Patient does report that she had taken her Vimpat dose this evening.                            Initial Sepsis Screening       Row Name 01/08/24 0400                Is the patient's history suggestive of a new or worsening infection? Yes (Proceed)  -RG        Suspected source of infection suspect infection, source unknown;urinary tract infection;pneumonia  -RG        Indicate SIRS criteria Hyperthemia > 38.3C (100.9F) OR Hypothermia <36C (96.8F);Tachycardia > 90 bpm  -RG        Are two or more of the  above signs & symptoms of infection both present and new to the patient? Yes (Proceed)  -RG        Assess for evidence of organ dysfunction: Are any of the below criteria present within 6 hours of suspected infection and SIRS criteria that are NOT considered to be chronic conditions? --                  User Key  (r) = Recorded By, (t) = Taken By, (c) = Cosigned By      Initials Name Provider Type    MAT Gage PA-C Physician Assistant                                  Medical Decision Making  Patient is a 63-year-old female with history of bipolar depression, cancer, depression, diabetes mellitis, hypertension, partial idiopathic epilepsy with seizures of localized onset, surgical history of cholecystectomy, hysterectomy, craniotomy secondary to brain tumor on right side of brain that presents emergency department with seizure left-sided seizure activity lasting approximately 2 minutes in duration.    Patient tachycardic, febrile  Calcitonin 1.58, WBC 10.37, patient febrile and tachycardic's  Chest abdomen pelvis CT denoting multilobar lower lateral pneumonia, right pyelonephritis  Ceftriaxone and vancomycin  IV fluids 2 L bolus normal saline solution  Electrolytes repleted  Urinalysis with hematuria and pyuria, no bacteria seen, occasional epithelial cells, urine culture in process  Normal kidney function  Discussed patient case with internal medicine team and both agreed to place patient on inpatient status under the care of Dr. Duffy internal medicine  Patient with verbal understanding of all clinical laboratory and imaging findings, admission instructions and verbalized agreement with patient current treatment plan.  Patient also with verbal repeat denial of COVID/flu/RSV swab.    Amount and/or Complexity of Data Reviewed  Labs: ordered. Decision-making details documented in ED Course.  Radiology: ordered.    Risk  OTC drugs.  Prescription drug management.  Decision regarding hospitalization.              Disposition  Final diagnoses:   Seizure (HCC)   Fever   Pyuria   Hypomagnesemia   Hypokalemia   Hyperglycemia due to diabetes mellitus (HCC)   HAP (hospital-acquired pneumonia)   Sepsis (HCC)   Right upper lobe pulmonary nodule   Pyelonephritis of right kidney     Time reflects when diagnosis was documented in both MDM as applicable and the Disposition within this note       Time User Action Codes Description Comment    1/8/2024  2:40 AM Blaise Gage [R56.9] Seizure (HCC)     1/8/2024  2:40 AM Blaise Gage [R50.9] Fever     1/8/2024  4:01 AM Blaise Gage [R82.81] Pyuria     1/8/2024  4:01 AM Blaise Gage [E83.42] Hypomagnesemia     1/8/2024  4:02 AM Blaise Gage [E87.6] Hypokalemia     1/8/2024  4:02 AM Blaise Gage [E11.65] Hyperglycemia due to diabetes mellitus (HCC)     1/8/2024  6:22 AM Blaise Gage [J18.9,  Y95] HAP (hospital-acquired pneumonia)     1/8/2024  6:22 AM Blaise Gage Modify [R56.9] Seizure (HCC)     1/8/2024  6:22 AM Blaise Gage Modify [J18.9,  Y95] HAP (hospital-acquired pneumonia)     1/8/2024  6:22 AM Blaise Gage [A41.9] Sepsis (HCC)     1/8/2024  6:42 AM Blaise Gage [R91.1] Right upper lobe pulmonary nodule     1/8/2024  6:48 AM Blaise Gage [N12] Pyelonephritis of right kidney           ED Disposition       ED Disposition   Admit    Condition   Stable    Date/Time   Mon Jan 8, 2024  6:47 AM    Comment   Case was discussed with staci ap and the patient's admission status was agreed to be Admission Status: inpatient status to the service of Dr. Wright, internal medicine .               Follow-up Information    None         Patient's Medications   Discharge Prescriptions    No medications on file       No discharge procedures on file.    PDMP Review         Value Time User    PDMP Reviewed  Yes 1/8/2024  7:38 AM Blaise Gage PA-C            ED Provider  Electronically Signed by             Blaise Gage PA-C  01/08/24 0738

## 2024-12-05 NOTE — ASSESSMENT & PLAN NOTE
Patient reports burning, shooting pain in left wrist radiating into hand for appox. 4-6 months. +tinel. Encouraged wrist splint--order provided. Carpal tunnel exercises encouraged. Trial course of prednisone.     Orders:    Cock Up Wrist Splint    predniSONE 20 mg tablet; TAKE 1 TABLET BID X 5 DAYS THEN TAKE 1 TABLET QD FOR 5 DAYS

## 2024-12-05 NOTE — PROGRESS NOTES
"Name: Nathalia Monge      : 1960      MRN: [id number]  Encounter Provider: SHASHANK Miguel  Encounter Date: 12/3/2024   Encounter department: [facility]  :  Assessment & Plan  Carpal tunnel syndrome on left  Patient reports burning, shooting pain in left wrist radiating into hand for appox. 4-6 months. +tinel. Encouraged wrist splint--order provided. Carpal tunnel exercises encouraged. Trial course of prednisone.     Orders:    Cock Up Wrist Splint    predniSONE 20 mg tablet; TAKE 1 TABLET BID X 5 DAYS THEN TAKE 1 TABLET QD FOR 5 DAYS    Renal abscess, right  Cleared by infection disease. Reports thoracic and lumbar pain since . Has increasingly worsened. Denies any urinary symptoms.     Orders:    CT chest abdomen pelvis w contrast; Future    CBC and differential; Future    Comprehensive metabolic panel; Future    LUQ pain  Patient states that \"it feels like the pain is starting in my back and wrapping around under my ribs and breast and on the top of my stomach. Pain is dull ache under my rib cage and then there is a numbness under my breast. The pain just makes it hard to take a deep breath. I'm not even singing anymore bc I don't have the strength in my abdomen. I take 2 aleve and flexeril and it only relieves the pain for like a hour. I just feel like something was missed.\"    MRI thoracic spine 2024-- IMPRESSION: Diffuse primarily mild thoracic degenerative disc disease most pronounced at the T10-11 level where there is slightly more pronounced moderate canal stenosis but no discrete cord compression.     CT abd/pelvis 2024-- Diffuse primarily mild thoracic degenerative disc disease most pronounced at the T10-11 level where there is slightly more pronounced moderate canal stenosis but no discrete cord compression.  IMPRESSION: 1. Resolution of previous right renal abscess with minimal right perinephric scarring. No evidence of recurrent " pyelonephritis or abscess. No renal stones. No bladder stones. 2. Redemonstration of left renal cortical scarring likely sequela of prior infection.    Due to new LUQ pain, SOB, lightheadedness ordered CT chest/abd/pelvis.     Orders:    CT chest abdomen pelvis w contrast; Future    CBC and differential; Future    Comprehensive metabolic panel; Future    Breathing difficulty  See LUQ note.   Orders:    CT chest abdomen pelvis w contrast; Future    Mixed hyperlipidemia  Trend lipid panel.  Orders:    Lipid Panel with Direct LDL reflex; Future    Type 2 diabetes mellitus with hyperglycemia, with long-term current use of insulin ([facility])    Lab Results   Component Value Date    HGBA1C 8.5 (H) 12/04/2024       Orders:    Hemoglobin A1C; Future    Acute left-sided thoracic back pain  See LUQ pain.  Orders:    predniSONE 20 mg tablet; TAKE 1 TABLET BID X 5 DAYS THEN TAKE 1 TABLET QD FOR 5 DAYS    gabapentin (NEURONTIN) 100 mg capsule; Take 1 capsule (100 mg total) by mouth daily at bedtime    Hypertensive urgency  BP elevated today 168/98. Encouraged to monitor at home. Patient reports likely related to pain. .         Meningioma ([facility])  Follows with neuro.               Depression Screening and Follow-up Plan: Patient was screened for depression during today's encounter. They screened negative with a PHQ-2 score of 0.      History of Present Illness     Back Pain  This is a chronic problem. The current episode started more than 1 month ago. The problem occurs constantly. The problem has been gradually worsening since onset. The pain is present in the thoracic spine. The quality of the pain is described as shooting, aching and burning (sharp, intermittent numbness). The pain is at a severity of 10/10. The pain is severe. The pain is The same all the time. Associated symptoms include abdominal pain, numbness and paresthesias. Pertinent negatives include no chest pain (pain under left rib-- LUQ), dysuria, fever, headaches, leg  pain, paresis, pelvic pain, perianal numbness, tingling, weakness or weight loss. Risk factors include menopause. She has tried NSAIDs and muscle relaxant for the symptoms. The treatment provided mild relief.     Review of Systems   Constitutional:  Positive for fatigue. Negative for activity change, fever and weight loss.   HENT:  Negative for congestion, ear pain, rhinorrhea and sore throat.    Eyes:  Negative for pain.   Respiratory:  Positive for shortness of breath. Negative for cough and wheezing.    Cardiovascular:  Negative for chest pain (pain under left rib-- LUQ) and leg swelling.   Gastrointestinal:  Positive for abdominal pain. Negative for diarrhea, nausea and vomiting.   Genitourinary:  Negative for dysuria and pelvic pain.   Musculoskeletal:  Positive for arthralgias and back pain. Negative for myalgias.   Skin:  Negative for rash.   Neurological:  Positive for light-headedness, numbness and paresthesias. Negative for dizziness, tingling, weakness and headaches.   All other systems reviewed and are negative.    Medical History Reviewed by provider this encounter:  Tobacco  Allergies  Meds  Problems  Med Hx  Surg Hx  Fam Hx     .  Current Outpatient Medications on File Prior to Visit   Medication Sig Dispense Refill    Ascorbic Acid (VITAMIN C) 1000 MG tablet Take 1,000 mg by mouth daily      BD Pen Needle Yolis 2nd Gen 32G X 4 MM MISC Use 3 (three) times a day 100 each 5    cholecalciferol (VITAMIN D3) 1,000 units tablet Take 1,000 Units by mouth daily      CHROMIUM PO Take by mouth 2 (two) times a day      cyclobenzaprine (FLEXERIL) 5 mg tablet Take 1 tablet (5 mg total) by mouth 3 (three) times a day as needed for muscle spasms 30 tablet 2    estradiol (ESTRACE) 0.1 mg/g vaginal cream Start 2 g every day for 2 weeks. Then use 2 g every other day for 2 weeks. Then decrease to 2 g 1-3 times a week 42.5 g 1    ferrous sulfate 325 (65 Fe) mg tablet Take 325 mg by mouth daily       insulin aspart  protamine-insulin aspart (NovoLOG Mix 70/30 FlexPen) 100 Units/mL injection pen Inject 15 Units under the skin 2 (two) times a day before meals 9 mL 5    Insulin Pen Needle (BD Pen Needle Yolis 2nd Gen) 32G X 4 MM MISC For use with insulin pen. Pharmacy may dispense brand covered by insurance. 100 each 0    L-LYSINE PO Take 500 mg by mouth daily      Probiotic Product (PROBIOTIC DAILY PO) Take 1 capsule by mouth daily       No current facility-administered medications on file prior to visit.         Objective   Pulse 89   Wt 88.9 kg (196 lb)   SpO2 97%   BMI 32.62 kg/m²      Physical Exam  Vitals and nursing note reviewed.   Constitutional:       General: She is not in acute distress.     Appearance: Normal appearance. She is well-developed.   HENT:      Head: Normocephalic and atraumatic.      Right Ear: External ear normal.      Left Ear: External ear normal.      Mouth/Throat:      Mouth: Mucous membranes are moist.      Pharynx: No oropharyngeal exudate or posterior oropharyngeal erythema.   Cardiovascular:      Rate and Rhythm: Normal rate and regular rhythm.      Heart sounds: Normal heart sounds. No murmur heard.  Pulmonary:      Effort: Pulmonary effort is normal. No respiratory distress.      Breath sounds: Normal breath sounds. No wheezing.   Abdominal:      General: Bowel sounds are normal. There is no distension.      Palpations: Abdomen is soft.      Tenderness: There is abdominal tenderness in the left upper quadrant. There is no guarding or rebound.   Musculoskeletal:      Left wrist: Tenderness (+tinel) present. No swelling.      Cervical back: Neck supple.      Thoracic back: Tenderness present. No swelling or bony tenderness. Decreased range of motion.      Lumbar back: Tenderness present. No swelling or bony tenderness. Decreased range of motion.   Skin:     General: Skin is warm and dry.      Capillary Refill: Capillary refill takes less than 2 seconds.   Neurological:      Mental Status: She  "is alert and oriented to person, place, and time. Mental status is at baseline.      GCS: GCS eye subscore is 4. GCS verbal subscore is 5. GCS motor subscore is 6.      Cranial Nerves: No dysarthria or facial asymmetry.      Motor: Motor function is intact.      Gait: Gait is intact.   Psychiatric:         Attention and Perception: Attention and perception normal.         Mood and Affect: Mood and affect normal. Mood is not anxious or depressed.         Speech: Speech normal.         Behavior: Behavior normal. Agitated: reports that the pain \"makes me bitchy\". Behavior is cooperative.         Thought Content: Thought content normal.       Administrative Statements   I have spent a total time of 30 minutes in caring for this patient on the day of the visit/encounter including Risks and benefits of tx options, Instructions for management, Patient and family education, Importance of tx compliance, Risk factor reductions, Impressions, Documenting in the medical record, Reviewing / ordering tests, medicine, procedures  , and Obtaining or reviewing history  .   "

## 2024-12-05 NOTE — ASSESSMENT & PLAN NOTE
Lab Results   Component Value Date    HGBA1C 8.5 (H) 12/04/2024       Orders:    Hemoglobin A1C; Future

## 2024-12-05 NOTE — TELEPHONE ENCOUNTER
Patient advised as per the BOLD note at bottom of page.  She states she will call Central Scheduling and advise of updated order w/o the PO contras.        Call from patient -- states that she called to schedule her CT and was told needs both IV & PO contrast.    Central Scheduling told her to call [facility] to change order to IV only as she refuses Barium.    I explained to her that depending on what is being looked at the oral contrast is needed for clarification.  Patient states she has had some family/friends die by not being able to get all the Barium out and therefore she will not use it.    Advised I would send note to SHASHANK Moura MA    Placed new order but currently she is scheduled on old order with PO contrast ordered. Would have her call central scheduling so that they can change her appt to reflect the new order.

## 2024-12-05 NOTE — ASSESSMENT & PLAN NOTE
Cleared by infection disease. Reports thoracic and lumbar pain since Jan. Has increasingly worsened. Denies any urinary symptoms.     Orders:    CT chest abdomen pelvis w contrast; Future    CBC and differential; Future    Comprehensive metabolic panel; Future

## 2024-12-05 NOTE — TELEPHONE ENCOUNTER
Patient does not want to take the barium for her upcoming CT scan.  She said she doesn't mind the regular contarast in her arm, so the order has to be amended.  Please advise.

## 2024-12-06 ENCOUNTER — RA CDI HCC (OUTPATIENT)
Dept: OTHER | Facility: HOSPITAL | Age: 64
End: 2024-12-06

## 2024-12-06 PROBLEM — A41.9 SEPSIS WITHOUT ACUTE ORGAN DYSFUNCTION (HCC): Status: RESOLVED | Noted: 2024-01-08 | Resolved: 2024-12-06

## 2024-12-11 ENCOUNTER — HOSPITAL ENCOUNTER (OUTPATIENT)
Dept: CT IMAGING | Facility: HOSPITAL | Age: 64
Discharge: HOME | End: 2024-12-11
Payer: COMMERCIAL

## 2024-12-11 DIAGNOSIS — N15.1 RENAL ABSCESS, RIGHT: ICD-10-CM

## 2024-12-11 DIAGNOSIS — R06.89 BREATHING DIFFICULTY: ICD-10-CM

## 2024-12-11 DIAGNOSIS — R10.12 LUQ PAIN: ICD-10-CM

## 2024-12-11 PROCEDURE — 71260 CT THORAX DX C+: CPT

## 2024-12-11 PROCEDURE — 74177 CT ABD & PELVIS W/CONTRAST: CPT

## 2024-12-11 RX ADMIN — IOHEXOL 100 ML: 350 INJECTION, SOLUTION INTRAVENOUS at 18:30

## 2024-12-12 ENCOUNTER — TELEPHONE (OUTPATIENT)
Dept: FAMILY MEDICINE CLINIC | Facility: CLINIC | Age: 64
End: 2024-12-12

## 2024-12-12 RX ORDER — PREDNISONE 20 MG/1
TABLET ORAL
Qty: 15 TABLET | Refills: 0 | Status: SHIPPED | OUTPATIENT
Start: 2024-12-12

## 2024-12-12 RX ORDER — CEFUROXIME AXETIL 500 MG/1
500 TABLET ORAL EVERY 12 HOURS SCHEDULED
Qty: 14 TABLET | Refills: 0 | Status: SHIPPED | OUTPATIENT
Start: 2024-12-12 | End: 2024-12-19

## 2024-12-12 NOTE — TELEPHONE ENCOUNTER
"Patient reports that she believes an abscess \"burst in my chest last night. I was in excrutiating pain. I was going to go to the hospital but then the pain started to subside. I'm just worried about sepsis with WBC being more elevated than usual and my symptoms. I was told the CT is a week out for reading. Im just hoping to treat infection before it becomes sepsis.\" Ceftin ordered. Patient taking steroid for pain--- has been effective. Discussed risk of hyperglycemia/gastritis on prednisone. Patient verbalized understanding. Follow up in office Monday.   "

## 2024-12-16 ENCOUNTER — OFFICE VISIT (OUTPATIENT)
Dept: FAMILY MEDICINE CLINIC | Facility: CLINIC | Age: 64
End: 2024-12-16
Payer: COMMERCIAL

## 2024-12-16 VITALS — BODY MASS INDEX: 32.78 KG/M2 | WEIGHT: 197 LBS

## 2024-12-16 DIAGNOSIS — K29.00 ACUTE GASTRITIS, PRESENCE OF BLEEDING UNSPECIFIED, UNSPECIFIED GASTRITIS TYPE: Primary | ICD-10-CM

## 2024-12-16 DIAGNOSIS — Z79.4 TYPE 2 DIABETES MELLITUS WITH HYPERGLYCEMIA, WITH LONG-TERM CURRENT USE OF INSULIN (HCC): ICD-10-CM

## 2024-12-16 DIAGNOSIS — D32.9 MENINGIOMA (HCC): ICD-10-CM

## 2024-12-16 DIAGNOSIS — I16.0 HYPERTENSIVE URGENCY: ICD-10-CM

## 2024-12-16 DIAGNOSIS — E78.2 MIXED HYPERLIPIDEMIA: ICD-10-CM

## 2024-12-16 DIAGNOSIS — E11.65 TYPE 2 DIABETES MELLITUS WITH HYPERGLYCEMIA, WITH LONG-TERM CURRENT USE OF INSULIN (HCC): ICD-10-CM

## 2024-12-16 DIAGNOSIS — R10.12 LUQ PAIN: ICD-10-CM

## 2024-12-16 PROCEDURE — 99214 OFFICE O/P EST MOD 30 MIN: CPT

## 2024-12-16 PROCEDURE — G2211 COMPLEX E/M VISIT ADD ON: HCPCS

## 2024-12-16 RX ORDER — PANTOPRAZOLE SODIUM 40 MG/1
40 TABLET, DELAYED RELEASE ORAL
Qty: 90 TABLET | Refills: 3 | Status: SHIPPED | OUTPATIENT
Start: 2024-12-16 | End: 2025-12-11

## 2024-12-16 NOTE — ASSESSMENT & PLAN NOTE
A1C elevated and not well controlled. Patient would like to treat current pain concern and then focus on DM control.   Lab Results   Component Value Date    HGBA1C 8.5 (H) 12/04/2024

## 2024-12-16 NOTE — PROGRESS NOTES
"Name: Nathalia Monge      : 1960      MRN: [id number]  Encounter Provider: SHASHANK Miguel  Encounter Date: 2024   Encounter department: [facility]  :  Assessment & Plan  Acute gastritis, presence of bleeding unspecified, unspecified gastritis type  Pain now pinpoint to LUQ. Previously it was diffuse left lower chest, LUQ, back. Patient reports pain is now a dull ache. Palpation worsens pain. Patient has been taking ibuprofen fairly consistently for the past several months. Symptoms concerning for gastritis/ulcer. Encouraged discontinuation of ibuprofen and prednisone. Encouraged trial of omeprazole. Will call patient with results of CT when rec'd. Follow up with GI. Patient refuses colon cancer screening stating \"that is an exit only.\"  Orders:    pantoprazole (PROTONIX) 40 mg tablet; Take 1 tablet (40 mg total) by mouth daily before breakfast    Ambulatory Referral to Gastroenterology; Future    LUQ pain  See gastritis note.  Orders:    pantoprazole (PROTONIX) 40 mg tablet; Take 1 tablet (40 mg total) by mouth daily before breakfast    Ambulatory Referral to Gastroenterology; Future    Hypertensive urgency  /90. Patient reports due to pain. Encouraged to monitor at home. Goal <140/90.        Type 2 diabetes mellitus with hyperglycemia, with long-term current use of insulin (HCC)  A1C elevated and not well controlled. Patient would like to treat current pain concern and then focus on DM control.   Lab Results   Component Value Date    HGBA1C 8.5 (H) 2024            Meningioma (HCC)  Follows with neuro.        Mixed hyperlipidemia  Cholesterol 246, triglycerides 157, HDL 49, . Not interested in statin at this time. ASCVD 22.3%. will revisit discussion.         Depression Screening and Follow-up Plan: Patient was screened for depression during today's encounter. They screened negative with a PHQ-2 score of 0.      History of Present Illness " "    Patient reports night of CT scan (last weds) she was in excruciating pain in LUQ and then \"something popped and the pain relieved. It feels like when I went sepsis so I'm grateful you put me on the antibiotic.\" It is  to palpation. Reports reports stools are \"different like bile is in them.\"     Abdominal Pain  The current episode started more than 1 month ago. The pain is located in the LUQ and epigastric region (back). The quality of the pain is dull, aching and burning. The abdominal pain does not radiate. Associated symptoms include a fever. Pertinent negatives include no anorexia, arthralgias, diarrhea, flatus, frequency, headaches, hematochezia, hematuria, melena, myalgias, nausea, vomiting or weight loss. The pain is aggravated by palpation. She has tried acetaminophen (NSAIDs) for the symptoms.     Review of Systems   Constitutional:  Positive for fever. Negative for activity change, fatigue and weight loss.   HENT:  Negative for congestion, ear pain, rhinorrhea and sore throat.    Eyes:  Negative for pain.   Respiratory:  Negative for cough, shortness of breath and wheezing.    Cardiovascular:  Negative for chest pain and leg swelling.   Gastrointestinal:  Positive for abdominal pain. Negative for anorexia, diarrhea, flatus, hematochezia, melena, nausea and vomiting.   Genitourinary:  Negative for frequency and hematuria.   Musculoskeletal:  Negative for arthralgias and myalgias.   Skin:  Negative for rash.   Neurological:  Negative for dizziness, weakness, numbness and headaches.   All other systems reviewed and are negative.      Objective   Wt 89.4 kg (197 lb)   BMI 32.78 kg/m²      Physical Exam  Vitals and nursing note reviewed.   Constitutional:       General: She is not in acute distress.     Appearance: Normal appearance. She is well-developed. She is not ill-appearing.   HENT:      Head: Normocephalic and atraumatic.      Right Ear: External ear normal.      Left Ear: External ear " normal.   Cardiovascular:      Rate and Rhythm: Normal rate and regular rhythm.      Heart sounds: Normal heart sounds. No murmur heard.  Pulmonary:      Effort: Pulmonary effort is normal. No respiratory distress.      Breath sounds: Normal breath sounds. No wheezing.   Abdominal:      General: Bowel sounds are normal. There is no distension.      Palpations: Abdomen is soft. There is no splenomegaly.      Tenderness: There is abdominal tenderness in the left upper quadrant. There is no right CVA tenderness, left CVA tenderness, guarding or rebound.   Musculoskeletal:      Cervical back: Neck supple.   Skin:     General: Skin is warm and dry.      Capillary Refill: Capillary refill takes less than 2 seconds.   Neurological:      Mental Status: She is alert and oriented to person, place, and time. Mental status is at baseline.   Psychiatric:         Mood and Affect: Mood normal.         Behavior: Behavior normal.       Administrative Statements   I have spent a total time of 30 minutes in caring for this patient on the day of the visit/encounter including Diagnostic results, Risks and benefits of tx options, Instructions for management, Patient and family education, Importance of tx compliance, Risk factor reductions, Impressions, Documenting in the medical record, Reviewing / ordering tests, medicine, procedures  , and Obtaining or reviewing history  .

## 2024-12-16 NOTE — ASSESSMENT & PLAN NOTE
Cholesterol 246, triglycerides 157, HDL 49, . Not interested in statin at this time. ASCVD 22.3%. will revisit discussion.

## 2024-12-17 ENCOUNTER — DOCUMENTATION (OUTPATIENT)
Dept: GASTROENTEROLOGY | Facility: CLINIC | Age: 64
End: 2024-12-17

## 2024-12-17 DIAGNOSIS — R93.5 ABNORMAL ABDOMINAL CT SCAN: Primary | ICD-10-CM

## 2024-12-17 NOTE — PROGRESS NOTES
Patient came to office today to be seen.  I went to room to see the patient.  I started discussing about her symptoms.  Patient was in severe pain in the abdomen.  She says something burst inside her after the latest CT scan.  I discussed the CT scan findings with her which shows stable vascular dilation in the portal circulation.  I offered patient to discuss further to find out more GI symptoms.  Patient shared with me that I would not be able to help her and she does not know why she is here.  She shared with me that she thinks it is the vasculature in the abdomen that may be the problem.  She would not allow further questioning and want to believe office and did not want to pay the co-pay.  I took patient to the checkout desk and requested them to cancel the visit so she does not have to pay the co-pay.  I requested patient to go to the ER for further medical attention including repeating a CT scan of the abdomen.  Patient did not want to go to the ER.  I did request the patient to come back to our office if further workup is unrevealing and she needs GI care.  She understood and said that she will come back if she needs GI care.    Suhail Tripp MD  Gastroenterology  [facility]  Date: December 17, 2024

## 2024-12-19 ENCOUNTER — TELEPHONE (OUTPATIENT)
Dept: VASCULAR SURGERY | Facility: CLINIC | Age: 64
End: 2024-12-19

## 2024-12-19 NOTE — TELEPHONE ENCOUNTER
S/w pt ( she is new )to let her know we had to r/s her appt on 12/19 because she had a ct scan and needed to see a VS not an AP,  gave her 1/7/25 w/ Dr Costello pt stated pain in her abdomen she is talking about contiuned she need to go to the ED. Ct was doen 12/11/24 and is following w/ here pcp w/ this as of now until seen by vascular.

## 2024-12-20 ENCOUNTER — HOSPITAL ENCOUNTER (EMERGENCY)
Facility: HOSPITAL | Age: 64
Discharge: HOME | End: 2024-12-20
Attending: EMERGENCY MEDICINE
Payer: COMMERCIAL

## 2024-12-20 ENCOUNTER — APPOINTMENT (EMERGENCY)
Dept: CT IMAGING | Facility: HOSPITAL | Age: 64
End: 2024-12-20
Payer: COMMERCIAL

## 2024-12-20 VITALS
DIASTOLIC BLOOD PRESSURE: 93 MMHG | HEART RATE: 81 BPM | SYSTOLIC BLOOD PRESSURE: 203 MMHG | HEIGHT: 65 IN | WEIGHT: 200 LBS | TEMPERATURE: 97.9 F | RESPIRATION RATE: 16 BRPM | BODY MASS INDEX: 33.32 KG/M2 | OXYGEN SATURATION: 97 %

## 2024-12-20 DIAGNOSIS — R10.12 LEFT UPPER QUADRANT ABDOMINAL PAIN: Primary | ICD-10-CM

## 2024-12-20 LAB
2HR DELTA HS TROPONIN: -1 NG/L
ALBUMIN SERPL BCG-MCNC: 4 G/DL (ref 3.5–5)
ALP SERPL-CCNC: 83 U/L (ref 34–104)
ALT SERPL W P-5'-P-CCNC: 16 U/L (ref 7–52)
ANION GAP SERPL CALCULATED.3IONS-SCNC: 9 MMOL/L (ref 4–13)
AST SERPL W P-5'-P-CCNC: 11 U/L (ref 13–39)
ATRIAL RATE: 78 BPM
BACTERIA UR QL AUTO: NORMAL /HPF
BASOPHILS # BLD AUTO: 0.02 THOUSANDS/ΜL (ref 0–0.1)
BASOPHILS NFR BLD AUTO: 0 % (ref 0–1)
BILIRUB SERPL-MCNC: 0.58 MG/DL (ref 0.2–1)
BILIRUB UR QL STRIP: NEGATIVE
BUN SERPL-MCNC: 36 MG/DL (ref 5–25)
CALCIUM SERPL-MCNC: 9 MG/DL (ref 8.4–10.2)
CARDIAC TROPONIN I PNL SERPL HS: 19 NG/L (ref ?–50)
CARDIAC TROPONIN I PNL SERPL HS: 20 NG/L (ref ?–50)
CHLORIDE SERPL-SCNC: 99 MMOL/L (ref 96–108)
CLARITY UR: CLEAR
CO2 SERPL-SCNC: 26 MMOL/L (ref 21–32)
COLOR UR: ABNORMAL
CREAT SERPL-MCNC: 1.19 MG/DL (ref 0.6–1.3)
EOSINOPHIL # BLD AUTO: 0.06 THOUSAND/ΜL (ref 0–0.61)
EOSINOPHIL NFR BLD AUTO: 0 % (ref 0–6)
ERYTHROCYTE [DISTWIDTH] IN BLOOD BY AUTOMATED COUNT: 12.9 % (ref 11.6–15.1)
GFR SERPL CREATININE-BSD FRML MDRD: 48 ML/MIN/1.73SQ M
GLUCOSE SERPL-MCNC: 338 MG/DL (ref 65–140)
GLUCOSE UR STRIP-MCNC: NEGATIVE MG/DL
HCT VFR BLD AUTO: 41.7 % (ref 34.8–46.1)
HGB BLD-MCNC: 13.7 G/DL (ref 11.5–15.4)
HGB UR QL STRIP.AUTO: NEGATIVE
IMM GRANULOCYTES # BLD AUTO: 0.17 THOUSAND/UL (ref 0–0.2)
IMM GRANULOCYTES NFR BLD AUTO: 1 % (ref 0–2)
KETONES UR STRIP-MCNC: NEGATIVE MG/DL
LEUKOCYTE ESTERASE UR QL STRIP: NEGATIVE
LIPASE SERPL-CCNC: 76 U/L (ref 11–82)
LYMPHOCYTES # BLD AUTO: 1.99 THOUSANDS/ΜL (ref 0.6–4.47)
LYMPHOCYTES NFR BLD AUTO: 12 % (ref 14–44)
MCH RBC QN AUTO: 29.1 PG (ref 26.8–34.3)
MCHC RBC AUTO-ENTMCNC: 32.9 G/DL (ref 31.4–37.4)
MCV RBC AUTO: 89 FL (ref 82–98)
MONOCYTES # BLD AUTO: 0.65 THOUSAND/ΜL (ref 0.17–1.22)
MONOCYTES NFR BLD AUTO: 4 % (ref 4–12)
NEUTROPHILS # BLD AUTO: 14.16 THOUSANDS/ΜL (ref 1.85–7.62)
NEUTS SEG NFR BLD AUTO: 83 % (ref 43–75)
NITRITE UR QL STRIP: NEGATIVE
NON-SQ EPI CELLS URNS QL MICRO: NORMAL /HPF
NRBC BLD AUTO-RTO: 0 /100 WBCS
P AXIS: 77 DEGREES
PH UR STRIP.AUTO: 6 [PH]
PLATELET # BLD AUTO: 209 THOUSANDS/UL (ref 149–390)
PMV BLD AUTO: 10.7 FL (ref 8.9–12.7)
POTASSIUM SERPL-SCNC: 4.3 MMOL/L (ref 3.5–5.3)
PR INTERVAL: 148 MS
PROT SERPL-MCNC: 6.7 G/DL (ref 6.4–8.4)
PROT UR STRIP-MCNC: ABNORMAL MG/DL
QRS AXIS: 86 DEGREES
QRSD INTERVAL: 84 MS
QT INTERVAL: 356 MS
QTC INTERVAL: 405 MS
RBC # BLD AUTO: 4.71 MILLION/UL (ref 3.81–5.12)
RBC #/AREA URNS AUTO: NORMAL /HPF
SODIUM SERPL-SCNC: 134 MMOL/L (ref 135–147)
SP GR UR STRIP.AUTO: <1.005 (ref 1–1.03)
T WAVE AXIS: 84 DEGREES
UROBILINOGEN UR STRIP-ACNC: <2 MG/DL
VENTRICULAR RATE: 78 BPM
WBC # BLD AUTO: 17.05 THOUSAND/UL (ref 4.31–10.16)
WBC #/AREA URNS AUTO: NORMAL /HPF

## 2024-12-20 PROCEDURE — 99285 EMERGENCY DEPT VISIT HI MDM: CPT | Performed by: EMERGENCY MEDICINE

## 2024-12-20 PROCEDURE — 74177 CT ABD & PELVIS W/CONTRAST: CPT

## 2024-12-20 PROCEDURE — 80053 COMPREHEN METABOLIC PANEL: CPT | Performed by: EMERGENCY MEDICINE

## 2024-12-20 PROCEDURE — 85025 COMPLETE CBC W/AUTO DIFF WBC: CPT | Performed by: EMERGENCY MEDICINE

## 2024-12-20 PROCEDURE — 99284 EMERGENCY DEPT VISIT MOD MDM: CPT

## 2024-12-20 PROCEDURE — 36415 COLL VENOUS BLD VENIPUNCTURE: CPT | Performed by: EMERGENCY MEDICINE

## 2024-12-20 PROCEDURE — 81001 URINALYSIS AUTO W/SCOPE: CPT | Performed by: EMERGENCY MEDICINE

## 2024-12-20 PROCEDURE — 93005 ELECTROCARDIOGRAM TRACING: CPT

## 2024-12-20 PROCEDURE — 84484 ASSAY OF TROPONIN QUANT: CPT | Performed by: EMERGENCY MEDICINE

## 2024-12-20 PROCEDURE — 83690 ASSAY OF LIPASE: CPT | Performed by: EMERGENCY MEDICINE

## 2024-12-20 RX ADMIN — IOHEXOL 100 ML: 350 INJECTION, SOLUTION INTRAVENOUS at 12:40

## 2024-12-20 NOTE — ED NOTES
Patient lying in stretcher. Yellow Fall risk sign is outside of patients door. Patient instructed to fall for help before getting OOB. Patient orientated to call light. Bed in lowest position with side rails up.     Eddie Luevano RN  12/20/24 [id number]

## 2024-12-20 NOTE — ED PROVIDER NOTES
Time reflects when diagnosis was documented in both MDM as applicable and the Disposition within this note       Time User Action Codes Description Comment    12/20/2024  3:15 PM Jeffrey Perez Add [R10.12] Left upper quadrant abdominal pain           ED Disposition       ED Disposition   Discharge    Condition   Stable    Date/Time   Fri Dec 20, 2024  3:15 PM    Comment   Nathalia Monge discharge to home/self care.                   Assessment & Plan       Medical Decision Making  64-year-old female, presenting with pain in left upper abdomen.  Differential diagnosis includes ACS, gastritis, perforation among other diagnoses.  Patient appears in no distress, normal respiratory efforts, oxygen saturation normal on room air.  Normal sinus rhythm on cardiac monitor.  EKG, labs, CT imaging ordered.  Will continue to monitor in ED and reevaluate.    I have reviewed test results and diagnosis with patient.  Follow-up plan reviewed.  Precautions for acute return for re-evaluation are reviewed.  Opportunity to ask questions was provided.  Patient verbalizes understanding.      Amount and/or Complexity of Data Reviewed  External Data Reviewed: radiology.     Details: CT chest, abdomen, pelvis 12/11 read by radiology, no acute findings.  Labs: ordered. Decision-making details documented in ED Course.  Radiology: ordered. Decision-making details documented in ED Course.  ECG/medicine tests: ordered and independent interpretation performed. Decision-making details documented in ED Course.    Risk  Prescription drug management.        ED Course as of 12/20/24 1525   Fri Dec 20, 2024   1311 WBC(!): 17.05  Elevated white count noted, patient is currently on oral steroids which may be contributing.   1406 CT scan findings reviewed and discussed with patient.  Patient comfortable, tolerating oral intake without difficulty.   1518 2-hour repeat troponin with no increase, urinalysis with no signs of infection.  Patient feels well to be  discharged, has follow-up scheduled with vascular surgery.       Medications   iohexol (OMNIPAQUE) 350 MG/ML injection (MULTI-DOSE) 100 mL (100 mL Intravenous Given 12/20/24 1240)       ED Risk Strat Scores   HEART Risk Score      Flowsheet Row Most Recent Value   Heart Score Risk Calculator    History 0 Filed at: 12/20/2024 1525   ECG 0 Filed at: 12/20/2024 1525   Age 1 Filed at: 12/20/2024 1525   Risk Factors 1 Filed at: 12/20/2024 1525   Troponin 1 Filed at: 12/20/2024 1525   HEART Score 3 Filed at: 12/20/2024 1525          HEART Risk Score      Flowsheet Row Most Recent Value   Heart Score Risk Calculator    History 0 Filed at: 12/20/2024 1525   ECG 0 Filed at: 12/20/2024 1525   Age 1 Filed at: 12/20/2024 1525   Risk Factors 1 Filed at: 12/20/2024 1525   Troponin 1 Filed at: 12/20/2024 1525   HEART Score 3 Filed at: 12/20/2024 1525                                                History of Present Illness       Chief Complaint   Patient presents with    Chest Pain     Has CT scan on 12/11, the next day pt started with chest pain and warmth in her chest, see CT results; vascular surgery can't get pt in until 1/7 and pt cannot wait until then; took steroid and abx this AM PTA       Past Medical History:   Diagnosis Date    Bipolar depression (HCC)     Cancer (HCC)     Depression     Diabetes mellitus (HCC)     Epilepsy (HCC)     Hypertension     Miscarriage 1990's    PTSD (post-traumatic stress disorder) 2002    Seizures (HCC)       Past Surgical History:   Procedure Laterality Date    BRAIN SURGERY      CHOLECYSTECTOMY  2004    EYE SURGERY      HYSTERECTOMY      IMPLANTABLE CONTACT LENS IMPLANTATION      WA CRANIEC TREPHINE BONE FLP BRAIN TUMOR SUPRTENTOR Right 03/15/2018    Procedure: IMAGE-GUIDED RIGHT FRONTOPARIETAL CRANIOTOMY FOR TUMOR;  Surgeon: Arsh Jeronimo MD;  Location: [facility];  Service: Neurosurgery      Family History   Adopted: Yes      Social History     Tobacco Use    Smoking status: Former      Current packs/day: 0.00     Types: Cigarettes     Quit date: 3/5/2000     Years since quittin.8    Smokeless tobacco: Never   Vaping Use    Vaping status: Never Used   Substance Use Topics    Alcohol use: Never    Drug use: No      E-Cigarette/Vaping    E-Cigarette Use Never User       E-Cigarette/Vaping Substances    Nicotine No     THC No     CBD No     Flavoring No     Other No     Unknown No       I have reviewed and agree with the history as documented.     64-year-old female, presenting with pain in left upper abdomen.  Patient states she has been having pain for several weeks, has been evaluated by GI and had imaging performed.  Patient states she developed worsening pain in left upper abdomen during the night.  No associated fevers, vomiting, or shortness of breath.      History provided by:  Patient   used: No    Chest Pain  Associated symptoms: abdominal pain    Associated symptoms: no shortness of breath        Review of Systems   Constitutional: Negative.    Respiratory:  Negative for shortness of breath.    Gastrointestinal:  Positive for abdominal pain.           Objective       ED Triage Vitals [24 1127]   Temperature Pulse Blood Pressure Respirations SpO2 Patient Position - Orthostatic VS   97.9 °F (36.6 °C) 85 (!) 202/102 18 96 % Sitting      Temp Source Heart Rate Source BP Location FiO2 (%) Pain Score    Temporal Monitor Left arm -- 8      Vitals      Date and Time Temp Pulse SpO2 Resp BP Pain Score FACES Pain Rating User   24 1503 -- 81 97 % 16 203/93 -- -- BY   24 1127 97.9 °F (36.6 °C) 85 96 % 18 202/102 8 -- MS            Physical Exam  Vitals and nursing note reviewed.   Constitutional:       General: She is not in acute distress.  HENT:      Head: Normocephalic and atraumatic.   Eyes:      Extraocular Movements: Extraocular movements intact.      Pupils: Pupils are equal, round, and reactive to light.   Cardiovascular:      Rate and Rhythm: Normal  rate and regular rhythm.   Pulmonary:      Effort: Pulmonary effort is normal.      Breath sounds: Normal breath sounds.   Abdominal:      Palpations: Abdomen is soft.      Tenderness: There is no abdominal tenderness.      Comments: Nondistended, soft  Nontender, no rebound or guarding   Musculoskeletal:         General: Normal range of motion.   Skin:     General: Skin is warm and dry.   Neurological:      General: No focal deficit present.      Mental Status: She is alert and oriented to person, place, and time.      Motor: No weakness.      Gait: Gait normal.         Results Reviewed       Procedure Component Value Units Date/Time    Urine Microscopic [[id number]]  (Normal) Collected: 12/20/24 1442    Lab Status: Final result Specimen: Urine, Clean Catch Updated: 12/20/24 1519     RBC, UA None Seen /hpf      WBC, UA None Seen /hpf      Epithelial Cells None Seen /hpf      Bacteria, UA None Seen /hpf     UA w Reflex to Microscopic w Reflex to Culture [[id number]]  (Abnormal) Collected: 12/20/24 1442    Lab Status: Final result Specimen: Urine, Clean Catch Updated: 12/20/24 1512     Color, UA Light Yellow     Clarity, UA Clear     Specific Gravity, UA <1.005     pH, UA 6.0     Leukocytes, UA Negative     Nitrite, UA Negative     Protein, UA 30 (1+) mg/dl      Glucose, UA Negative mg/dl      Ketones, UA Negative mg/dl      Urobilinogen, UA <2.0 mg/dl      Bilirubin, UA Negative     Occult Blood, UA Negative    HS Troponin I 2hr [[id number]]  (Normal) Collected: 12/20/24 1411    Lab Status: Final result Specimen: Blood from Arm, Right Updated: 12/20/24 1440     hs TnI 2hr 19 ng/L      Delta 2hr hsTnI -1 ng/L     HS Troponin 0hr (reflex protocol) [[id number]]  (Normal) Collected: 12/20/24 1206    Lab Status: Final result Specimen: Blood from Arm, Right Updated: 12/20/24 1235     hs TnI 0hr 20 ng/L     Comprehensive metabolic panel [[id number]]  (Abnormal) Collected: 12/20/24 1206    Lab Status: Final result Specimen:  Blood from Arm, Right Updated: 12/20/24 1231     Sodium 134 mmol/L      Potassium 4.3 mmol/L      Chloride 99 mmol/L      CO2 26 mmol/L      ANION GAP 9 mmol/L      BUN 36 mg/dL      Creatinine 1.19 mg/dL      Glucose 338 mg/dL      Calcium 9.0 mg/dL      AST 11 U/L      ALT 16 U/L      Alkaline Phosphatase 83 U/L      Total Protein 6.7 g/dL      Albumin 4.0 g/dL      Total Bilirubin 0.58 mg/dL      eGFR 48 ml/min/1.73sq m     Narrative:      National Kidney Disease Foundation guidelines for Chronic Kidney Disease (CKD):     Stage 1 with normal or high GFR (GFR > 90 mL/min/1.73 square meters)    Stage 2 Mild CKD (GFR = 60-89 mL/min/1.73 square meters)    Stage 3A Moderate CKD (GFR = 45-59 mL/min/1.73 square meters)    Stage 3B Moderate CKD (GFR = 30-44 mL/min/1.73 square meters)    Stage 4 Severe CKD (GFR = 15-29 mL/min/1.73 square meters)    Stage 5 End Stage CKD (GFR <15 mL/min/1.73 square meters)  Note: GFR calculation is accurate only with a steady state creatinine    Lipase [[id number]]  (Normal) Collected: 12/20/24 1206    Lab Status: Final result Specimen: Blood from Arm, Right Updated: 12/20/24 1231     Lipase 76 u/L     CBC and differential [[id number]]  (Abnormal) Collected: 12/20/24 1206    Lab Status: Final result Specimen: Blood from Arm, Right Updated: 12/20/24 1213     WBC 17.05 Thousand/uL      RBC 4.71 Million/uL      Hemoglobin 13.7 g/dL      Hematocrit 41.7 %      MCV 89 fL      MCH 29.1 pg      MCHC 32.9 g/dL      RDW 12.9 %      MPV 10.7 fL      Platelets 209 Thousands/uL      nRBC 0 /100 WBCs      Segmented % 83 %      Immature Grans % 1 %      Lymphocytes % 12 %      Monocytes % 4 %      Eosinophils Relative 0 %      Basophils Relative 0 %      Absolute Neutrophils 14.16 Thousands/µL      Absolute Immature Grans 0.17 Thousand/uL      Absolute Lymphocytes 1.99 Thousands/µL      Absolute Monocytes 0.65 Thousand/µL      Eosinophils Absolute 0.06 Thousand/µL      Basophils Absolute 0.02 Thousands/µL              CT abdomen pelvis with contrast   Final Interpretation by Leroy Escobar MD (12/20 2216)      1.  Small amount of gas in the bladder lumen. Correlate for recent catheterization. In the absence of instrumentation, correlate for urinalysis evidence of infection. Otherwise, no acute abdominopelvic findings.      2.  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 This has grown from 2.8 cm x 3.1 cm in January. No associated thrombosis or evidence for rupture.      3.  Small nondependent free air in the bladder, which may be physiologic or related to recent catheterization however cannot exclude infectious etiology in the proper clinical setting.      The study was marked in EPIC for immediate notification.         Resident: Abdelrahman Thao I, the attending radiologist, have reviewed the images and agree with the final report above.      Workstation performed: [id number]             ECG 12 Lead Documentation Only    Date/Time: 12/20/2024 12:01 PM    Performed by: Jeffrey Perez MD  Authorized by: Jeffrey Perez MD    ECG reviewed by me, the ED Provider: yes    Patient location:  ED  Previous ECG:     Previous ECG:  Compared to current  Rate:     ECG rate assessment: normal    Rhythm:     Rhythm: sinus rhythm    Ectopy:     Ectopy: none    QRS:     QRS axis:  Normal    QRS intervals:  Normal  Conduction:     Conduction: normal    ST segments:     ST segments:  Normal  Comments:      Sinus rhythm at 78, no acute changes      ED Medication and Procedure Management   Prior to Admission Medications   Prescriptions Last Dose Informant Patient Reported? Taking?   Ascorbic Acid (VITAMIN C) 1000 MG tablet  Self Yes No   Sig: Take 1,000 mg by mouth daily   BD Pen Needle Yolis 2nd Gen 32G X 4 MM MISC  Self No No   Sig: Use 3 (three) times a day   CHROMIUM PO  Self Yes No   Sig: Take by mouth 2 (two) times a day   Insulin Pen Needle (BD Pen Needle Yolis 2nd Gen) 32G X 4 MM  MISC  Self No No   Sig: For use with insulin pen. Pharmacy may dispense brand covered by insurance.   L-LYSINE PO  Self Yes No   Sig: Take 500 mg by mouth daily   Probiotic Product (PROBIOTIC DAILY PO)  Self Yes No   Sig: Take 1 capsule by mouth daily   cefuroxime (CEFTIN) 500 mg tablet  Self No No   Sig: Take 1 tablet (500 mg total) by mouth every 12 (twelve) hours for 7 days   cholecalciferol (VITAMIN D3) 1,000 units tablet  Self Yes No   Sig: Take 1,000 Units by mouth daily   cyclobenzaprine (FLEXERIL) 5 mg tablet  Self No No   Sig: Take 1 tablet (5 mg total) by mouth 3 (three) times a day as needed for muscle spasms   estradiol (ESTRACE) 0.1 mg/g vaginal cream  Self No No   Sig: Start 2 g every day for 2 weeks. Then use 2 g every other day for 2 weeks. Then decrease to 2 g 1-3 times a week   ferrous sulfate 325 (65 Fe) mg tablet  Self Yes No   Sig: Take 325 mg by mouth daily    gabapentin (NEURONTIN) 100 mg capsule  Self No No   Sig: Take 1 capsule (100 mg total) by mouth daily at bedtime   insulin aspart protamine-insulin aspart (NovoLOG Mix 70/30 FlexPen) 100 Units/mL injection pen  Self No No   Sig: Inject 15 Units under the skin 2 (two) times a day before meals   pantoprazole (PROTONIX) 40 mg tablet  Self No No   Sig: Take 1 tablet (40 mg total) by mouth daily before breakfast   predniSONE 20 mg tablet  Self No No   Sig: TAKE 1 TABLET BID X 5 DAYS THEN TAKE 1 TABLET QD FOR 5 DAYS      Facility-Administered Medications: None     Patient's Medications   Discharge Prescriptions    No medications on file     No discharge procedures on file.  ED SEPSIS DOCUMENTATION   Time reflects when diagnosis was documented in both MDM as applicable and the Disposition within this note       Time User Action Codes Description Comment    12/20/2024  3:15 PM Jeffrey Perez Add [R10.12] Left upper quadrant abdominal pain                  Jeffrey Perez MD  12/20/24 4560

## 2024-12-26 ENCOUNTER — TELEPHONE (OUTPATIENT)
Dept: FAMILY MEDICINE CLINIC | Facility: CLINIC | Age: 64
End: 2024-12-26

## 2024-12-26 ENCOUNTER — TELEPHONE (OUTPATIENT)
Age: 64
End: 2024-12-26

## 2024-12-26 NOTE — TELEPHONE ENCOUNTER
"Spoke with patient and reviewed CT results. Small gas in bladder--- no bacteria in microscopic. Reports 1 episode of burning with urination and \"like I was having my kidney stone\" but that ceased by the following day.     12/11--- CT-- Unremarkable for patient's age. 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 This has grown from 2.8 cm x 3.1 cm in January. No associated thrombosis or evidence for rupture.     Will contact vascular to see if earlier appt available.   "

## 2024-12-26 NOTE — TELEPHONE ENCOUNTER
Pt called. Pt was seen on 12/20/24 in ED. Pt had CT done and requesting PCP review and and result.        Please review and advise.  Thank you

## 2024-12-27 NOTE — TELEPHONE ENCOUNTER
Called and scheduled patient with Dr Garner to review-scheduled for 12/31/2024-declined OV on 12/30/2024.        MD Chelsea Bird MA; SHASHANK Miguel  I just reviewed her imaging. I know the read says the area is larger but after looking at all of her scans it appears stable to me. I am more than happy to see her but I think her current appointment on 2/4/25 is appropriate and do not feel like seeing her sooner would have any clinical impact or . Please let me know if you have other questions.    Best,  Leroy Costello          Previous Messages       ----- Message -----  From: Chelsea Amin MA  Sent: 12/26/2024   2:03 PM EST  To: SHASHANK Miguel; *  Subject: Appointment                                      Good Afternoon Dr Costello,    Honey ENCARNACION is inquiring if there is a sooner appointment for this patient, currently scheduled 2/4/25. 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 This has grown from 2.8 cm x 3.1 cm in January. No associated thrombosis or evidence for rupture.     Please let me know if there is a way to get her in sooner and if there is anything we can do on our end to assist.    Thank you    Chelsea

## 2024-12-31 ENCOUNTER — OFFICE VISIT (OUTPATIENT)
Dept: FAMILY MEDICINE CLINIC | Facility: CLINIC | Age: 64
End: 2024-12-31
Payer: COMMERCIAL

## 2024-12-31 VITALS — DIASTOLIC BLOOD PRESSURE: 88 MMHG | SYSTOLIC BLOOD PRESSURE: 138 MMHG | BODY MASS INDEX: 33.28 KG/M2 | WEIGHT: 200 LBS

## 2024-12-31 DIAGNOSIS — M54.50 LUMBAR PAIN: ICD-10-CM

## 2024-12-31 DIAGNOSIS — M51.34 DISCOGENIC THORACIC PAIN: ICD-10-CM

## 2024-12-31 DIAGNOSIS — R10.12 LEFT UPPER QUADRANT ABDOMINAL PAIN: Primary | ICD-10-CM

## 2024-12-31 PROCEDURE — G2211 COMPLEX E/M VISIT ADD ON: HCPCS | Performed by: FAMILY MEDICINE

## 2024-12-31 PROCEDURE — 99214 OFFICE O/P EST MOD 30 MIN: CPT | Performed by: FAMILY MEDICINE

## 2024-12-31 RX ORDER — GABAPENTIN 300 MG/1
300 CAPSULE ORAL 3 TIMES DAILY
Qty: 270 CAPSULE | Refills: 3 | Status: SHIPPED | OUTPATIENT
Start: 2024-12-31 | End: 2025-01-10

## 2024-12-31 RX ORDER — PREDNISONE 20 MG/1
TABLET ORAL
Qty: 15 TABLET | Refills: 1 | Status: SHIPPED | OUTPATIENT
Start: 2024-12-31

## 2024-12-31 NOTE — PROGRESS NOTES
Assessment/Plan:    No problem-specific Assessment & Plan notes found for this encounter.       Diagnoses and all orders for this visit:    Left upper quadrant abdominal pain  -     Ambulatory Referral to General Surgery; Future    Discogenic thoracic pain  -     Ambulatory referral to Spine & Pain Management; Future  -     gabapentin (NEURONTIN) 300 mg capsule; Take 1 capsule (300 mg total) by mouth 3 (three) times a day          Subjective:   Chief Complaint   Patient presents with    Pain        Patient ID: Nathalia Monge is a 64 y.o. female.    HPI    The following portions of the patient's history were reviewed and updated as appropriate: allergies, current medications, past family history, past medical history, past social history, past surgical history and problem list.    Review of Systems   Constitutional:  Negative for fatigue, fever and unexpected weight change.   HENT:  Negative for congestion, sinus pain and sore throat.    Eyes:  Negative for visual disturbance.   Respiratory:  Negative for shortness of breath and wheezing.    Cardiovascular:  Negative for chest pain and palpitations.   Gastrointestinal:  Negative for abdominal pain, nausea and vomiting.   Musculoskeletal: Negative.  Negative for arthralgias and myalgias.   Neurological:  Negative for syncope, weakness and numbness.   Psychiatric/Behavioral: Negative.  Negative for confusion, dysphoric mood and suicidal ideas.          Objective:  Vitals:    12/31/24 1105   Weight: 90.7 kg (200 lb)      Physical Exam  Constitutional:       Appearance: She is well-developed.   HENT:      Right Ear: Ear canal normal. Tympanic membrane is not injected.      Left Ear: Ear canal normal. Tympanic membrane is not injected.      Nose: Nose normal.   Eyes:      General:         Right eye: No discharge.         Left eye: No discharge.      Conjunctiva/sclera: Conjunctivae normal.      Pupils: Pupils are equal, round, and reactive to light.   Neck:      Thyroid: No  thyromegaly.   Cardiovascular:      Rate and Rhythm: Normal rate and regular rhythm.      Heart sounds: Normal heart sounds. No murmur heard.  Pulmonary:      Effort: Pulmonary effort is normal. No respiratory distress.      Breath sounds: Normal breath sounds. No wheezing.   Abdominal:      General: Bowel sounds are normal. There is no distension.      Palpations: Abdomen is soft.      Tenderness: There is no abdominal tenderness.   Musculoskeletal:         General: Normal range of motion.      Cervical back: Normal range of motion and neck supple.   Lymphadenopathy:      Cervical: No cervical adenopathy.   Skin:     General: Skin is warm and dry.   Neurological:      Mental Status: She is alert and oriented to person, place, and time. She is not disoriented.      Sensory: No sensory deficit.      Motor: No weakness.      Coordination: Coordination normal.      Gait: Gait normal.      Deep Tendon Reflexes: Reflexes are normal and symmetric.   Psychiatric:         Speech: Speech normal.         Behavior: Behavior normal.         Thought Content: Thought content normal.         Judgment: Judgment normal.

## 2025-01-03 ENCOUNTER — TELEPHONE (OUTPATIENT)
Dept: FAMILY MEDICINE CLINIC | Facility: CLINIC | Age: 65
End: 2025-01-03

## 2025-01-06 ENCOUNTER — CONSULT (OUTPATIENT)
Dept: SURGERY | Facility: HOSPITAL | Age: 65
End: 2025-01-06
Payer: COMMERCIAL

## 2025-01-06 VITALS
TEMPERATURE: 97.5 F | HEIGHT: 65 IN | DIASTOLIC BLOOD PRESSURE: 105 MMHG | HEART RATE: 72 BPM | SYSTOLIC BLOOD PRESSURE: 196 MMHG | BODY MASS INDEX: 33.28 KG/M2

## 2025-01-06 DIAGNOSIS — I86.8 INTRA-ABDOMINAL VARICES: Primary | ICD-10-CM

## 2025-01-06 DIAGNOSIS — R10.12 LEFT UPPER QUADRANT ABDOMINAL PAIN: ICD-10-CM

## 2025-01-06 PROCEDURE — 99203 OFFICE O/P NEW LOW 30 MIN: CPT | Performed by: SURGERY

## 2025-01-10 ENCOUNTER — TELEPHONE (OUTPATIENT)
Dept: FAMILY MEDICINE CLINIC | Facility: CLINIC | Age: 65
End: 2025-01-10

## 2025-01-10 ENCOUNTER — TELEPHONE (OUTPATIENT)
Age: 65
End: 2025-01-10

## 2025-01-10 NOTE — TELEPHONE ENCOUNTER
Patient called , feels the gabapentin is turning her world upside down emotionally. Would like to see what Honey recommends. She stated she is mean, mean is an understatement.  Also stated she's about to lose her career and relationship because of it

## 2025-01-10 NOTE — TELEPHONE ENCOUNTER
"Patient reports that the gabapentin made her agitated and \"mean.\" Patient states, \"I was thinking about packing my bags and being homeless. I was even mean to the cat. That medicine made me crazy.\" Reports that she has been tapering off the gabapentin. States that she plans to wait to speak with pain mgmt on tues and go from there.   "

## 2025-01-13 ENCOUNTER — TELEPHONE (OUTPATIENT)
Dept: PAIN MEDICINE | Facility: CLINIC | Age: 65
End: 2025-01-13

## 2025-01-13 NOTE — TELEPHONE ENCOUNTER
Called Pt to remind her of consult with Dr Butler on 1/14/25. Pt started yelling at me that received all the reminders, she doesn't need me calling her too! Pt hung up the phone before I could remind her of the New patient paperwork    Called Pt back, went to  but mailbox is full not able to leave a message. Called Pt again to remind her of required PW. Pt answered the phone, What do you want? I said you hung up on me, Pt started yelling that she received her reminder, leave me the F--- alone! Pt hung up again

## 2025-01-14 ENCOUNTER — HOME CARE VISIT (OUTPATIENT)
Dept: HOME HEALTH SERVICES | Facility: HOME HEALTHCARE | Age: 65
End: 2025-01-14

## 2025-01-14 ENCOUNTER — CONSULT (OUTPATIENT)
Dept: PAIN MEDICINE | Facility: CLINIC | Age: 65
End: 2025-01-14
Payer: COMMERCIAL

## 2025-01-14 VITALS — TEMPERATURE: 98.2 F | BODY MASS INDEX: 33.32 KG/M2 | HEIGHT: 65 IN | WEIGHT: 200 LBS | HEART RATE: 88 BPM

## 2025-01-14 DIAGNOSIS — M79.18 MYOFASCIAL PAIN SYNDROME: ICD-10-CM

## 2025-01-14 DIAGNOSIS — M48.04 THORACIC SPINAL STENOSIS: ICD-10-CM

## 2025-01-14 DIAGNOSIS — E11.65 TYPE 2 DIABETES MELLITUS WITH HYPERGLYCEMIA, WITH LONG-TERM CURRENT USE OF INSULIN (HCC): ICD-10-CM

## 2025-01-14 DIAGNOSIS — Z79.4 TYPE 2 DIABETES MELLITUS WITH HYPERGLYCEMIA, WITH LONG-TERM CURRENT USE OF INSULIN (HCC): ICD-10-CM

## 2025-01-14 DIAGNOSIS — M51.34 DISCOGENIC THORACIC PAIN: ICD-10-CM

## 2025-01-14 DIAGNOSIS — M54.14 THORACIC RADICULOPATHY: Primary | ICD-10-CM

## 2025-01-14 PROCEDURE — 99204 OFFICE O/P NEW MOD 45 MIN: CPT | Performed by: ANESTHESIOLOGY

## 2025-01-14 NOTE — PROGRESS NOTES
Assessment  1. Thoracic radiculopathy    2. Discogenic thoracic pain    3. Thoracic spinal stenosis    4. Myofascial pain syndrome    5. Type 2 diabetes mellitus with hyperglycemia, with long-term current use of insulin (HCC)        Plan  Patient is a 64-year-old female sent in as a referral from PCP for evaluation of midthoracic back pain.  Patient reports midthoracic back pain for the past 1 year that started after sustaining a seizure.  Patient states that her midthoracic back pain radiates to the left anterior rib cage.  Patient states that she tried taking gabapentin but caused hallucinations so stopping the medications.  Patient states that she has been taking ibuprofen, Tylenol, Flexeril for symptomatic relief.  Patient states that she has not gone to physical therapy recently.  MRI of thoracic spine done in July 2024 and interpreted by me showing moderate stenosis at T10-T11.  I suspect that this patient's symptoms are secondary to thoracic radiculopathy and would benefit from Thoracic SAADIA.    Will reach out to Dr. Garner regarding patient being cleared to receive Thoracic SAADIA given elevated A1C levels   Will provide referral to Chiropracter  Given that clinical presentation of thoracic radiculopathy matches MRI findings, I would like to proceed forward with performing T10-T11 SAADIA. Risks vs benefits discussed in detail with the patient. These risks include, but are not limited to, bleeding, infection, nerve damage, paralysis. Patient is on/not on anticoagulant therapy. Patient denies contrast allergy. All patient’s questions were answered. Patient understands risks and is willing to pursue the procedure. The approach was demonstrated using models and literature was provided. Verbal consent obtained.    My impressions and treatment recommendations were discussed in detail with the patient who verbalized understanding and had no further questions.  Discharge instructions were provided. I personally saw and  examined the patient and I agree with the above discussed plan of care.    Orders Placed This Encounter   Procedures    FL spine and pain procedure     Standing Status:   Future     Expected Date:   1/14/2025     Expiration Date:   1/14/2029     Reason for Exam::   T10-T11 SAADIA     Anticoagulant hold needed?:   no    Ambulatory referral to Chiropractic     Standing Status:   Future     Expiration Date:   1/14/2026     Referral Priority:   Routine     Referral Type:   Chiropractic     Referral Reason:   Specialty Services Required     Referred to Provider:   Kostas Perez     Requested Specialty:   Chiropractic Medicine     Number of Visits Requested:   1     Expiration Date:   1/14/2026     No orders of the defined types were placed in this encounter.    Referred By: Nelson Garner MD  History of Present Illness    Nathalia Monge is a 64 y.o. female presents with midthoracic back pain that started approximately 1 year ago after sustaining a seizure.  Patient states that intensity of pain is severe, rated as 8 out of 10, states that the pain occurs nearly constantly, described as a throbbing and pressure-like sensation, with associated numbness radiating to left anterior rib cage.    I have personally reviewed and/or updated the patient's past medical history, past surgical history, family history, social history, current medications, allergies, and vital signs today.     Review of Systems   Constitutional:  Positive for unexpected weight change. Negative for fever.   HENT:  Negative for trouble swallowing.    Eyes:  Negative for visual disturbance.   Respiratory:  Negative for shortness of breath and wheezing.    Cardiovascular:  Positive for chest pain. Negative for palpitations.   Gastrointestinal:  Negative for constipation, diarrhea, nausea and vomiting.   Endocrine: Negative for cold intolerance, heat intolerance and polydipsia.   Genitourinary:  Negative for difficulty urinating and frequency.   Musculoskeletal:   Negative for arthralgias, gait problem, joint swelling and myalgias.   Skin:  Negative for rash.   Neurological:  Positive for numbness. Negative for dizziness, seizures, syncope, weakness and headaches.   Hematological:  Does not bruise/bleed easily.   Psychiatric/Behavioral:  Positive for decreased concentration and dysphoric mood.    All other systems reviewed and are negative.  General: no fevers, chills, infections  Neuro: no saddle anesthesia, no dropping objects or balance issues  GI: no changes in bowel habits  : no changes in bladder habits  Hem: no bleeding      Patient Active Problem List   Diagnosis    Partial idiopathic epilepsy with seizures of localized onset, intractable, without status epilepticus (HCC)    Type 2 diabetes mellitus with hyperglycemia, with long-term current use of insulin (HCC)    Hypertensive urgency    Urinary urgency    Meningioma (HCC)    S/P craniotomy    Hyponatremia    Seizure-like activity (HCC)    Renal abscess, right    Obesity (BMI 30.0-34.9)    Insurance coverage problems    Imaging abnormality    Bacteremia due to Klebsiella pneumoniae    Clostridium difficile carrier    Carpal tunnel syndrome on left    Mixed hyperlipidemia       Past Medical History:   Diagnosis Date    Bipolar depression (HCC)     Cancer (HCC)     Depression     Diabetes mellitus (HCC)     Epilepsy (HCC)     Hypertension     Miscarriage 1990's    PTSD (post-traumatic stress disorder) 2002    Seizures (HCC)        Past Surgical History:   Procedure Laterality Date    BRAIN SURGERY      CHOLECYSTECTOMY  2004    EYE SURGERY      HYSTERECTOMY      IMPLANTABLE CONTACT LENS IMPLANTATION      ND CRNEC TREPH BONE FLAP CRNOT EXC BRAIN TUMOR STTL Right 03/15/2018    Procedure: IMAGE-GUIDED RIGHT FRONTOPARIETAL CRANIOTOMY FOR TUMOR;  Surgeon: Arsh Jeronimo MD;  Location: [facility];  Service: Neurosurgery       Family History   Adopted: Yes       Social History     Occupational History    Occupation: disabled      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   Tobacco Use    Smoking status: Former     Current packs/day: 0.00     Types: Cigarettes     Quit date: 3/5/2000     Years since quittin.8    Smokeless tobacco: Never   Vaping Use    Vaping status: Never Used   Substance and Sexual Activity    Alcohol use: Never    Drug use: No    Sexual activity: Not Currently     Partners: Male     Birth control/protection: Post-menopausal     Comment: Everything has been removed and haven't had sex since        Current Outpatient Medications on File Prior to Visit   Medication Sig    Ascorbic Acid (VITAMIN C) 1000 MG tablet Take 1,000 mg by mouth daily    BD Pen Needle Yolis 2nd Gen 32G X 4 MM MISC Use 3 (three) times a day    cholecalciferol (VITAMIN D3) 1,000 units tablet Take 1,000 Units by mouth daily    CHROMIUM PO Take by mouth 2 (two) times a day    cyclobenzaprine (FLEXERIL) 5 mg tablet Take 1 tablet (5 mg total) by mouth 3 (three) times a day as needed for muscle spasms    ferrous sulfate 325 (65 Fe) mg tablet Take 325 mg by mouth daily     Insulin Pen Needle (BD Pen Needle Yolis 2nd Gen) 32G X 4 MM MISC For use with insulin pen. Pharmacy may dispense brand covered by insurance.    L-LYSINE PO Take 500 mg by mouth daily    pantoprazole (PROTONIX) 40 mg tablet Take 1 tablet (40 mg total) by mouth daily before breakfast    Probiotic Product (PROBIOTIC DAILY PO) Take 1 capsule by mouth daily    estradiol (ESTRACE) 0.1 mg/g vaginal cream Start 2 g every day for 2 weeks. Then use 2 g every other day for 2 weeks. Then decrease to 2 g 1-3 times a week (Patient not taking: Reported on 2025)    insulin aspart protamine-insulin aspart (NovoLOG Mix 70/30 FlexPen) 100 Units/mL injection pen Inject 15 Units under the skin 2 (two) times a day before meals    predniSONE 20 mg tablet TAKE 1 TABLET BID X 5 DAYS THEN TAKE 1 TABLET QD FOR 5 DAYS (Patient not taking: Reported on 2025)     No current facility-administered  "medications on file prior to visit.       Allergies   Allergen Reactions    Dilantin [Phenytoin]     Gabapentin Other (See Comments)     Anger/irritability/mood disturbance    Lidocaine     Statins        Physical Exam    Pulse 88   Temp 98.2 °F (36.8 °C)   Ht 5' 5\" (1.651 m)   Wt 90.7 kg (200 lb)   BMI 33.28 kg/m²     Vitals:    01/14/25 1425   Pulse: 88   Temp: 98.2 °F (36.8 °C)     Constitutional: no apparent distress, does not appear sedated   HEENT: pupils equal and round, symmetric facial muscles   Neck: supple  Cardiovascular: well perfused, no peripheral cyanosis  Pulmonary: good chest wall excursion, breathing unlabored   Psych: appropriate affect and insight, No agitation. No evidence of aberrant behavior   Skin: No rashes or lesions  Neuro: cranial nerves II-XII grossly intact   MSK:  Inspection: no signs of infection to back or chest wall  Palpation: tender to palpation to mid thoracic spine  ROM: no significant rom abnormalities in lumbar spine   MMT 5/5 strength in B/L UE   Sensation to light touch intact B/L UE   DTR: 2+ in B/L biceps and triceps reflex  Gait: ambulates unassisted, gait is not antalgic        Imaging  MRI THORACIC SPINE WITHOUT CONTRAST @  7-8-24     INDICATION: M54.9: Dorsalgia, unspecified  R93.89: Abnormal findings on diagnostic imaging of other specified body structures.     COMPARISON:  None.     TECHNIQUE:  Multiplanar, multisequence imaging of the thoracic spine was performed. .     IMAGE QUALITY: Diagnostic.     FINDINGS:     ALIGNMENT: Normal alignment of the thoracic spine.  No compression fracture.  No subluxation.  No scoliosis.     MARROW SIGNAL: Multilevel endplate marrow degenerative change scattered within the lower cervical spine and thoracic spine, a combination of Modic degenerative change. Type I Modic degenerative change noted at the T3-4 level and at the T11-12 level.     THORACIC CORD: Normal signal within the thoracic cord.     PARAVERTEBRAL SOFT TISSUES: "  Normal.     THORACIC DEGENERATIVE CHANGE: Multilevel thoracic degenerative disc disease with annular bulging, mild endplate and mild posterior element hypertrophic degenerative change. There is moderate canal stenosis at the T10-11 level with distortion of the   thecal sac but no chris cord compression. No foraminal nerve compression.     OTHER FINDINGS:  None.     IMPRESSION:     Diffuse primarily mild thoracic degenerative disc disease most pronounced at the T10-11 level where there is slightly more pronounced moderate canal stenosis but no discrete cord compression.     I have personally reviewed pertinent films in PACS.      Hemoglobin A1C 8.5@  12-4-24

## 2025-01-15 ENCOUNTER — TELEPHONE (OUTPATIENT)
Dept: FAMILY MEDICINE CLINIC | Facility: CLINIC | Age: 65
End: 2025-01-15

## 2025-01-15 ENCOUNTER — TELEPHONE (OUTPATIENT)
Dept: PAIN MEDICINE | Facility: CLINIC | Age: 65
End: 2025-01-15

## 2025-01-15 ENCOUNTER — TELEPHONE (OUTPATIENT)
Age: 65
End: 2025-01-15

## 2025-01-15 NOTE — TELEPHONE ENCOUNTER
S/w pt, advised of above. Provided Dr. Vasu Beltre's contact information. Per pt, she will proceed as discussed.

## 2025-01-15 NOTE — TELEPHONE ENCOUNTER
"Spoke with patient, she was not happy that she had to come see Dr Garner, she repots \"what is he going to do for me, I eat right, I have been on steroids for the past 4 weeks out of 6, no one expect me understands the pain I am going through, I have been in pain for over 1 year, my blood pressure off the chart, I just want to get the procedure over with\", tried to talk and patient then stated \"you guys can call me will you are blue in the face\", advised patient that she needs to come in and talk with Dr Garner about her sugar, patient states \" fine I will come in but I will sit like a allan on the wall\".  Scheduled patient for 1/20/2024 as she couldn't come in sooner.       ----- Message from Nelson Garner MD sent at 1/15/2025  6:37 AM EST -----  Needs TW appt prior to spine injections  ----- Message -----  From: Abel Valencia DO  Sent: 1/14/2025   3:03 PM EST  To: Nelson Garner MD    Patient hbgA1c is slightly elevated for and SAADIA which we are considering.    Do you think it would be ok to proceed with an injection and would you treat her with anything additional for the procedure..  "

## 2025-01-15 NOTE — TELEPHONE ENCOUNTER
----- Message from Abel Valencia DO sent at 1/15/2025  9:42 AM EST -----  Please let patient know and place  this note in chart  ----- Message -----  From: Nelson Garner MD  Sent: 1/15/2025   6:36 AM EST  To: Abel Valencia DO    We will contact pt for OV prior to injections  ----- Message -----  From: Abel Valencia DO  Sent: 1/14/2025   3:03 PM EST  To: Nelson Garner MD    Patient hbgA1c is slightly elevated for and SAADIA which we are considering.    Do you think it would be ok to proceed with an injection and would you treat her with anything additional for the procedure..

## 2025-01-15 NOTE — TELEPHONE ENCOUNTER
Caller: Patient    Doctor: RENETTA    Reason for call: Patient called chiro to schedule a consult and chiro does not take her insurance, patient would like to know another recommendation     Call back#:

## 2025-01-15 NOTE — TELEPHONE ENCOUNTER
S/w pt, advised of above. Per pt, she is already scheduled to see Dr. Garner on 1/20/25. Pt expressed great frustration s/t pain. Advised pt, her safety and health is paramount. Please fu with Dr. Garner to discuss a plan re: her blood sugar prior to procedures with SL. Pt verbalized understanding and confirmed 1/23/25 procedure.

## 2025-01-16 NOTE — PROGRESS NOTES
Assessment/Plan:   Nathalia Monge is a 64 y.o.female who is here for Consult (Left upper quadrant abdominal pain//PT had a CT scan done on 12/20/2024. Pt also stated that her splenic vein is dilated and it [facility] surgery cannot see her until next month so her PCP stated she should come to us. The pain began over a year ago, it also radiates to the front and that when they spotted the issue with the vein. She has been having shortness of breath lately that has gotten progressively worse, No changes with bowel movement, urination, and eating habits. Stated that lately BP is high.)  .    Plan: LUQ pain/Varix of SMV - doubt abdominal pain related to varix of SMV although patient needs followup with vascular surgery as this out of the scope of my practice, pain likely related to chronic back issues, patient scheduled with vascular surgery. F/u with me PRN    Preoperative Clearance: None    HPI:  Nathalia Monge is a 64 y.o.female who was referred for evaluation of Consult (Left upper quadrant abdominal pain//PT had a CT scan done on 12/20/2024. Pt also stated that her splenic vein is dilated and it [facility] surgery cannot see her until next month so her PCP stated she should come to us. The pain began over a year ago, it also radiates to the front and that when they spotted the issue with the vein. She has been having shortness of breath lately that has gotten progressively worse, No changes with bowel movement, urination, and eating habits. Stated that lately BP is high.)  .    Currently LUQ pain.     ROS:  General ROS: negative  negative for - chills, fatigue, fever or night sweats, weight loss  Respiratory ROS: no cough, shortness of breath, or wheezing  Cardiovascular ROS: no chest pain or dyspnea on exertion  Genito-Urinary ROS: no dysuria, trouble voiding, or hematuria  Musculoskeletal ROS: negative for - gait disturbance, joint pain or muscle pain  Neurological ROS: no TIA or stroke symptoms  LUQ pain    [unfilled]  Dilantin  [phenytoin], Gabapentin, Lidocaine, and Statins    Current Outpatient Medications:     Ascorbic Acid (VITAMIN C) 1000 MG tablet, Take 1,000 mg by mouth daily, Disp: , Rfl:     BD Pen Needle Yolis 2nd Gen 32G X 4 MM MISC, Use 3 (three) times a day, Disp: 100 each, Rfl: 5    cholecalciferol (VITAMIN D3) 1,000 units tablet, Take 1,000 Units by mouth daily, Disp: , Rfl:     CHROMIUM PO, Take by mouth 2 (two) times a day, Disp: , Rfl:     cyclobenzaprine (FLEXERIL) 5 mg tablet, Take 1 tablet (5 mg total) by mouth 3 (three) times a day as needed for muscle spasms, Disp: 30 tablet, Rfl: 2    estradiol (ESTRACE) 0.1 mg/g vaginal cream, Start 2 g every day for 2 weeks. Then use 2 g every other day for 2 weeks. Then decrease to 2 g 1-3 times a week (Patient not taking: Reported on 1/14/2025), Disp: 42.5 g, Rfl: 1    ferrous sulfate 325 (65 Fe) mg tablet, Take 325 mg by mouth daily , Disp: , Rfl:     Insulin Pen Needle (BD Pen Needle Yolis 2nd Gen) 32G X 4 MM MISC, For use with insulin pen. Pharmacy may dispense brand covered by insurance., Disp: 100 each, Rfl: 0    L-LYSINE PO, Take 500 mg by mouth daily, Disp: , Rfl:     pantoprazole (PROTONIX) 40 mg tablet, Take 1 tablet (40 mg total) by mouth daily before breakfast, Disp: 90 tablet, Rfl: 3    predniSONE 20 mg tablet, TAKE 1 TABLET BID X 5 DAYS THEN TAKE 1 TABLET QD FOR 5 DAYS (Patient not taking: Reported on 1/14/2025), Disp: 15 tablet, Rfl: 1    Probiotic Product (PROBIOTIC DAILY PO), Take 1 capsule by mouth daily, Disp: , Rfl:     insulin aspart protamine-insulin aspart (NovoLOG Mix 70/30 FlexPen) 100 Units/mL injection pen, Inject 15 Units under the skin 2 (two) times a day before meals, Disp: 9 mL, Rfl: 5  Past Medical History:   Diagnosis Date    Bipolar depression (HCC)     Cancer (HCC)     Depression     Diabetes mellitus (HCC)     Epilepsy (HCC)     Hypertension     Miscarriage 1990's    PTSD (post-traumatic stress disorder) 2002    Seizures (HCC)      Past Surgical  History:   Procedure Laterality Date    BRAIN SURGERY      CHOLECYSTECTOMY  2004    EYE SURGERY      HYSTERECTOMY      IMPLANTABLE CONTACT LENS IMPLANTATION      [place] CRNEC TREPH BONE FLAP CRNOT EXC BRAIN TUMOR STTL Right 03/15/2018    Procedure: IMAGE-GUIDED RIGHT FRONTOPARIETAL CRANIOTOMY FOR TUMOR;  Surgeon: Arsh Jeronimo MD;  Location: [facility];  Service: Neurosurgery     Family History   Adopted: Yes      reports that she quit smoking about 24 years ago. Her smoking use included cigarettes. She has never used smokeless tobacco. She reports that she does not drink alcohol and does not use drugs.    Labs:   Lab Results   Component Value Date    WBC 17.05 (H) 12/20/2024    WBC 9.23 12/04/2024    WBC 6.79 06/06/2024    HGB 13.7 12/20/2024    HGB 13.0 12/04/2024    HGB 12.1 06/06/2024     12/20/2024     12/04/2024     06/06/2024     Lab Results   Component Value Date    ALT 16 12/20/2024    ALT 10 12/04/2024    ALT 7 04/16/2024    AST 11 (L) 12/20/2024    AST 12 (L) 12/04/2024    AST 11 (L) 04/16/2024     This SmartLink has not been configured with any valid records.       PHYSICAL EXAM  General Appearance:    Alert, cooperative, no distress,    Head:    Normocephalic without obvious abnormality   Eyes:    PERRL, conjunctiva/corneas clear, EOM's intact        Neck:   Supple, no adenopathy, no JVD   Back:     Symmetric, no spinal or CVA tenderness   Lungs:     Clear to auscultation bilaterally, no wheezing or rhonchi   Heart:    Regular rate and rhythm, S1 and S2 normal, no murmur   Abdomen:     Soft +BS ND NT    Extremities:   Extremities normal. No clubbing, cyanosis or edema   Psych:   Normal Affect, AOx3.    Neurologic:  Skin:   CNII-XII intact. Strength symmetric, speech intact    Warm, dry, intact, no visible rashes or lesions         Physical Exam              Some portions of this record may have been generated with voice recognition software. There may be translation, syntax,  or  "grammatical errors. Occasional wrong word or \"sound-a-like\" substitutions may have occurred due to the inherent limitations of the voice recognition software. Read the chart carefully and recognize, using context, where substitutions may have occurred. If you have any questions, please contact the dictating provider for clarification or correction, as needed. This encounter has been coded by a non-certified coder.   "

## 2025-01-17 NOTE — TELEPHONE ENCOUNTER
Caller: Patient    Doctor: RENETTA    Reason for call: Patient is concern about her  blood sugar increasing     If she still takes her regular dietary and meds   Stated her sugar will be increasing after her procedure     Please advise    Call back#:

## 2025-01-17 NOTE — TELEPHONE ENCOUNTER
S/w pt, stated that she is confused - her blood sugar is high because of oral steroids she has been prescribed, the pt has been scheduled for an madi with SL however this office has advised the pt to go back to Dr. Garner to discuss her dm prior to her procedure. Pt stated that she has also been instructed to eat prior to her procedure which will also raise her blood sugar.     Advised pt, her dietary elevation in blood sugar will be controlled by her medication. Advised pt that she will discuss how to manager her blood sugar after her procedure with Dr. Garner at her ov on Monday. This way, she will have a plan in place to cover her blood sugar for the 2 - 4 weeks post procedure. Pt verbalized understanding and appreciation. Will proceed as discussed.

## 2025-01-20 ENCOUNTER — TELEPHONE (OUTPATIENT)
Dept: FAMILY MEDICINE CLINIC | Facility: CLINIC | Age: 65
End: 2025-01-20

## 2025-01-20 NOTE — TELEPHONE ENCOUNTER
Caller: myriam Sloan    Doctor: Erik    Reason for call: pt has upcoming procedure that is scheduled for 1/23/25, pt stated it needs to be cancelled. Pt would like a call back to r/s. Please Advise     Call back#: [phone]

## 2025-01-20 NOTE — TELEPHONE ENCOUNTER
Pt wanted to make appt for Wednesday morning with Dr Garner for the Pre Op - I told her none available only in afternoon.  Got disconnected.

## 2025-01-21 ENCOUNTER — TELEPHONE (OUTPATIENT)
Age: 65
End: 2025-01-21

## 2025-01-21 NOTE — TELEPHONE ENCOUNTER
Caller: Nathalia    Doctor: RENETTA    Reason for call: xfer pt to nurse to reschedule     Call back#: [phone]

## 2025-01-21 NOTE — TELEPHONE ENCOUNTER
Called to see if patient wanted to move appt up from 2/6/25 to today, but voicemail box was full.

## 2025-01-21 NOTE — TELEPHONE ENCOUNTER
Patient stated she needed to r/s because she was unable to see Dr. Garner yesterday.  Assisted patient in rescheduling to Feb. 10 which is Dr. Valencia's next available    Patient would like to be called with any cancellations.

## 2025-01-23 ENCOUNTER — CONSULT (OUTPATIENT)
Dept: FAMILY MEDICINE CLINIC | Facility: CLINIC | Age: 65
End: 2025-01-23
Payer: COMMERCIAL

## 2025-01-23 VITALS — SYSTOLIC BLOOD PRESSURE: 138 MMHG | BODY MASS INDEX: 32.78 KG/M2 | DIASTOLIC BLOOD PRESSURE: 88 MMHG | WEIGHT: 197 LBS

## 2025-01-23 DIAGNOSIS — E11.65 TYPE 2 DIABETES MELLITUS WITH HYPERGLYCEMIA, WITH LONG-TERM CURRENT USE OF INSULIN (HCC): ICD-10-CM

## 2025-01-23 DIAGNOSIS — D32.9 MENINGIOMA (HCC): ICD-10-CM

## 2025-01-23 DIAGNOSIS — R56.9 SEIZURE-LIKE ACTIVITY (HCC): ICD-10-CM

## 2025-01-23 DIAGNOSIS — Z00.00 MEDICARE ANNUAL WELLNESS VISIT, SUBSEQUENT: Primary | ICD-10-CM

## 2025-01-23 DIAGNOSIS — Z79.4 TYPE 2 DIABETES MELLITUS WITH HYPERGLYCEMIA, WITH LONG-TERM CURRENT USE OF INSULIN (HCC): ICD-10-CM

## 2025-01-23 PROCEDURE — G2211 COMPLEX E/M VISIT ADD ON: HCPCS | Performed by: FAMILY MEDICINE

## 2025-01-23 PROCEDURE — 99214 OFFICE O/P EST MOD 30 MIN: CPT | Performed by: FAMILY MEDICINE

## 2025-01-23 RX ORDER — CYCLOBENZAPRINE HCL 10 MG
10 TABLET ORAL 3 TIMES DAILY PRN
Qty: 90 TABLET | Refills: 3 | Status: SHIPPED | OUTPATIENT
Start: 2025-01-23

## 2025-01-23 RX ORDER — INSULIN ASPART 100 [IU]/ML
20 INJECTION, SUSPENSION SUBCUTANEOUS
Qty: 12 ML | Refills: 5 | Status: SHIPPED | OUTPATIENT
Start: 2025-01-23 | End: 2025-07-22

## 2025-01-23 NOTE — PATIENT INSTRUCTIONS
Medicare Preventive Visit Patient Instructions  Thank you for completing your Welcome to Medicare Visit or Medicare Annual Wellness Visit today. Your next wellness visit will be due in one year (1/24/2026).  The screening/preventive services that you may require over the next 5-10 years are detailed below. Some tests may not apply to you based off risk factors and/or age. Screening tests ordered at today's visit but not completed yet may show as past due. Also, please note that scanned in results may not display below.  Preventive Screenings:  Service Recommendations Previous Testing/Comments   Colorectal Cancer Screening  * Colonoscopy    * Fecal Occult Blood Test (FOBT)/Fecal Immunochemical Test (FIT)  * Fecal DNA/Cologuard Test  * Flexible Sigmoidoscopy Age: 45-75 years old   Colonoscopy: every 10 years (may be performed more frequently if at higher risk)  OR  FOBT/FIT: every 1 year  OR  Cologuard: every 3 years  OR  Sigmoidoscopy: every 5 years  Screening may be recommended earlier than age 45 if at higher risk for colorectal cancer. Also, an individualized decision between you and your healthcare provider will decide whether screening between the ages of 76-85 would be appropriate. Colonoscopy: Not on file  FOBT/FIT: Not on file  Cologuard: Not on file  Sigmoidoscopy: Not on file          Breast Cancer Screening Age: 40+ years old  Frequency: every 1-2 years  Not required if history of left and right mastectomy Mammogram: Not on file        Cervical Cancer Screening Between the ages of 21-29, pap smear recommended once every 3 years.   Between the ages of 30-65, can perform pap smear with HPV co-testing every 5 years.   Recommendations may differ for women with a history of total hysterectomy, cervical cancer, or abnormal pap smears in past. Pap Smear: 07/25/2024        Hepatitis C Screening Once for adults born between 1945 and 1965  More frequently in patients at high risk for Hepatitis C Hep C Antibody: Not  on file        Diabetes Screening 1-2 times per year if you're at risk for diabetes or have pre-diabetes Fasting glucose: 137 mg/dL (12/4/2024)  A1C: 8.5 % (12/4/2024)      Cholesterol Screening Once every 5 years if you don't have a lipid disorder. May order more often based on risk factors. Lipid panel: 12/04/2024          Other Preventive Screenings Covered by Medicare:  Abdominal Aortic Aneurysm (AAA) Screening: covered once if your at risk. You're considered to be at risk if you have a family history of AAA.  Lung Cancer Screening: covers low dose CT scan once per year if you meet all of the following conditions: (1) Age 55-77; (2) No signs or symptoms of lung cancer; (3) Current smoker or have quit smoking within the last 15 years; (4) You have a tobacco smoking history of at least 20 pack years (packs per day multiplied by number of years you smoked); (5) You get a written order from a healthcare provider.  Glaucoma Screening: covered annually if you're considered high risk: (1) You have diabetes OR (2) Family history of glaucoma OR (3)  aged 50 and older OR (4)  American aged 65 and older  Osteoporosis Screening: covered every 2 years if you meet one of the following conditions: (1) You're estrogen deficient and at risk for osteoporosis based off medical history and other findings; (2) Have a vertebral abnormality; (3) On glucocorticoid therapy for more than 3 months; (4) Have primary hyperparathyroidism; (5) On osteoporosis medications and need to assess response to drug therapy.   Last bone density test (DXA Scan): Not on file.  HIV Screening: covered annually if you're between the age of 15-65. Also covered annually if you are younger than 15 and older than 65 with risk factors for HIV infection. For pregnant patients, it is covered up to 3 times per pregnancy.    Immunizations:  Immunization Recommendations   Influenza Vaccine Annual influenza vaccination during flu season is  recommended for all persons aged >= 6 months who do not have contraindications   Pneumococcal Vaccine   * Pneumococcal conjugate vaccine = PCV13 (Prevnar 13), PCV15 (Vaxneuvance), PCV20 (Prevnar 20)  * Pneumococcal polysaccharide vaccine = PPSV23 (Pneumovax) Adults 19-63 yo with certain risk factors or if 65+ yo  If never received any pneumonia vaccine: recommend Prevnar 20 (PCV20)  Give PCV20 if previously received 1 dose of PCV13 or PPSV23   Hepatitis B Vaccine 3 dose series if at intermediate or high risk (ex: diabetes, end stage renal disease, liver disease)   Respiratory syncytial virus (RSV) Vaccine - COVERED BY MEDICARE PART D  * RSVPreF3 (Arexvy) CDC recommends that adults 60 years of age and older may receive a single dose of RSV vaccine using shared clinical decision-making (SCDM)   Tetanus (Td) Vaccine - COST NOT COVERED BY MEDICARE PART B Following completion of primary series, a booster dose should be given every 10 years to maintain immunity against tetanus. Td may also be given as tetanus wound prophylaxis.   Tdap Vaccine - COST NOT COVERED BY MEDICARE PART B Recommended at least once for all adults. For pregnant patients, recommended with each pregnancy.   Shingles Vaccine (Shingrix) - COST NOT COVERED BY MEDICARE PART B  2 shot series recommended in those 19 years and older who have or will have weakened immune systems or those 50 years and older     Health Maintenance Due:      Topic Date Due   • HIV Screening  Never done   • Hepatitis C Screening  05/09/2025 (Originally 1960)   • Colorectal Cancer Screening  05/09/2025 (Originally 11/17/2005)   • Breast Cancer Screening: Mammogram  12/16/2025 (Originally 11/17/2000)     Immunizations Due:      Topic Date Due   • Pneumococcal Vaccine: Pediatrics (0 to 5 Years) and At-Risk Patients (6 to 64 Years) (1 of 2 - PCV) Never done   • Influenza Vaccine (1) Never done   • COVID-19 Vaccine (1 - 2024-25 season) Never done     Advance Directives   What  are advance directives?  Advance directives are legal documents that state your wishes and plans for medical care. These plans are made ahead of time in case you lose your ability to make decisions for yourself. Advance directives can apply to any medical decision, such as the treatments you want, and if you want to donate organs.   What are the types of advance directives?  There are many types of advance directives, and each state has rules about how to use them. You may choose a combination of any of the following:  Living will:  This is a written record of the treatment you want. You can also choose which treatments you do not want, which to limit, and which to stop at a certain time. This includes surgery, medicine, IV fluid, and tube feedings.   Durable power of  for healthcare (DPAHC):  This is a written record that states who you want to make healthcare choices for you when you are unable to make them for yourself. This person, called a proxy, is usually a family member or a friend. You may choose more than 1 proxy.  Do not resuscitate (DNR) order:  A DNR order is used in case your heart stops beating or you stop breathing. It is a request not to have certain forms of treatment, such as CPR. A DNR order may be included in other types of advance directives.  Medical directive:  This covers the care that you want if you are in a coma, near death, or unable to make decisions for yourself. You can list the treatments you want for each condition. Treatment may include pain medicine, surgery, blood transfusions, dialysis, IV or tube feedings, and a ventilator (breathing machine).  Values history:  This document has questions about your views, beliefs, and how you feel and think about life. This information can help others choose the care that you would choose.  Why are advance directives important?  An advance directive helps you control your care. Although spoken wishes may be used, it is better to have  your wishes written down. Spoken wishes can be misunderstood, or not followed. Treatments may be given even if you do not want them. An advance directive may make it easier for your family to make difficult choices about your care.   Weight Management   Why it is important to manage your weight:  Being overweight increases your risk of health conditions such as heart disease, high blood pressure, type 2 diabetes, and certain types of cancer. It can also increase your risk for osteoarthritis, sleep apnea, and other respiratory problems. Aim for a slow, steady weight loss. Even a small amount of weight loss can lower your risk of health problems.  How to lose weight safely:  A safe and healthy way to lose weight is to eat fewer calories and get regular exercise. You can lose up about 1 pound a week by decreasing the number of calories you eat by 500 calories each day.   Healthy meal plan for weight management:  A healthy meal plan includes a variety of foods, contains fewer calories, and helps you stay healthy. A healthy meal plan includes the following:  Eat whole-grain foods more often.  A healthy meal plan should contain fiber. Fiber is the part of grains, fruits, and vegetables that is not broken down by your body. Whole-grain foods are healthy and provide extra fiber in your diet. Some examples of whole-grain foods are whole-wheat breads and pastas, oatmeal, brown rice, and bulgur.  Eat a variety of vegetables every day.  Include dark, leafy greens such as spinach, kale, oksana greens, and mustard greens. Eat yellow and orange vegetables such as carrots, sweet potatoes, and winter squash.   Eat a variety of fruits every day.  Choose fresh or canned fruit (canned in its own juice or light syrup) instead of juice. Fruit juice has very little or no fiber.  Eat low-fat dairy foods.  Drink fat-free (skim) milk or 1% milk. Eat fat-free yogurt and low-fat cottage cheese. Try low-fat cheeses such as mozzarella and other  reduced-fat cheeses.  Choose meat and other protein foods that are low in fat.  Choose beans or other legumes such as split peas or lentils. Choose fish, skinless poultry (chicken or turkey), or lean cuts of red meat (beef or pork). Before you cook meat or poultry, cut off any visible fat.   Use less fat and oil.  Try baking foods instead of frying them. Add less fat, such as margarine, sour cream, regular salad dressing and mayonnaise to foods. Eat fewer high-fat foods. Some examples of high-fat foods include french fries, doughnuts, ice cream, and cakes.  Eat fewer sweets.  Limit foods and drinks that are high in sugar. This includes candy, cookies, regular soda, and sweetened drinks.  Exercise:  Exercise at least 30 minutes per day on most days of the week. Some examples of exercise include walking, biking, dancing, and swimming. You can also fit in more physical activity by taking the stairs instead of the elevator or parking farther away from stores. Ask your healthcare provider about the best exercise plan for you.      © Copyright "[employer] 2018 Information is for End User's use only and may not be sold, redistributed or otherwise used for commercial purposes. All illustrations and images included in CareNotes® are the copyrighted property of [employer] Inc[employer] or [employer]

## 2025-01-23 NOTE — ASSESSMENT & PLAN NOTE
Lab Results   Component Value Date    HGBA1C 8.5 (H) 12/04/2024   Pt will monitor-keep diary---f/u 2 wks for epidural clearance

## 2025-01-23 NOTE — PROGRESS NOTES
Name: Nathalia Monge      : 1960      MRN: [id number]  Encounter Provider: Nelson Garner MD  Encounter Date: 2025   Encounter department: [facility]    Assessment & Plan       Preventive health issues were discussed with patient, and age appropriate screening tests were ordered as noted in patient's After Visit Summary. Personalized health advice and appropriate referrals for health education or preventive services given if needed, as noted in patient's After Visit Summary.    History of Present Illness     Pre-op Exam    Pre-operative Risk Factors:    History of cerebrovascular disease: No    History of ischemic heart disease: No  Pre-operative treatment with insulin: No  Pre-operative creatinine >2 mg/dL: No    History of congestive heart failure: No     Patient Care Team:  SHASHANK Miguel as PCP - General (Nurse Practitioner)  Arsh Jeronimo MD (Neurosurgery)    Review of Systems  Medical History Reviewed by provider this encounter:       Annual Wellness Visit Questionnaire   Last Medicare Wellness visit information reviewed, patient interviewed and updates made to the record today.      Health Risk Assessment:   Patient rates overall health as good. Patient feels that their physical health rating is same. Patient is satisfied with their life. Eyesight was rated as same. Hearing was rated as same. Patient feels that their emotional and mental health rating is same. Patients states they are never, rarely angry. Patient states they are never, rarely unusually tired/fatigued. Pain experienced in the last 7 days has been none. Patient states that she has experienced no weight loss or gain in last 6 months.     Depression Screening:   PHQ-2 Score: 2      Fall Risk Screening:   In the past year, patient has experienced: no history of falling in past year      Urinary Incontinence Screening:   Patient has not leaked urine accidently in the last six months.      Home Safety:  Patient does not have trouble with stairs inside or outside of their home. Patient has working smoke alarms and has working carbon monoxide detector. Home safety hazards include: none.     Nutrition:   Current diet is Diabetic.     Medications:   Patient is not currently taking any over-the-counter supplements. Patient is able to manage medications.     Activities of Daily Living (ADLs)/Instrumental Activities of Daily Living (IADLs):   Walk and transfer into and out of bed and chair?: Yes  Dress and groom yourself?: Yes    Bathe or shower yourself?: Yes    Feed yourself? Yes  Do your laundry/housekeeping?: Yes  Manage your money, pay your bills and track your expenses?: Yes  Make your own meals?: Yes    Do your own shopping?: Yes    Previous Hospitalizations:   Any hospitalizations or ED visits within the last 12 months?: No      PREVENTIVE SCREENINGS      Cardiovascular Screening:    General: Screening Not Indicated and History Lipid Disorder      Diabetes Screening:     General: Screening Not Indicated and History Diabetes      Cervical Cancer Screening:    General: Screening Current      Lung Cancer Screening:     General: Screening Not Indicated      Hepatitis C Screening:    General: Screening Current    Screening, Brief Intervention, and Referral to Treatment (SBIRT)    Screening      Single Item Drug Screening:  How often have you used an illegal drug (including marijuana) or a prescription medication for non-medical reasons in the past year? never    Single Item Drug Screen Score: 0  Interpretation: Negative screen for possible drug use disorder    Social Drivers of Health     Financial Resource Strain: Low Risk  (1/15/2024)    Overall Financial Resource Strain (CARDIA)     Difficulty of Paying Living Expenses: Not hard at all   Food Insecurity: No Food Insecurity (1/23/2025)    Hunger Vital Sign     Worried About Running Out of Food in the Last Year: Never true     Ran Out of Food in the  Last Year: Never true   Transportation Needs: No Transportation Needs (1/23/2025)    PRAPARE - Transportation     Lack of Transportation (Medical): No     Lack of Transportation (Non-Medical): No   Housing Stability: Unknown (1/23/2025)    Housing Stability Vital Sign     Unable to Pay for Housing in the Last Year: No     Homeless in the Last Year: No   Utilities: Not At Risk (1/23/2025)    [facility]     Threatened with loss of utilities: No     No results found.    Objective   Wt 89.4 kg (197 lb)   BMI 32.78 kg/m²     Physical Exam

## 2025-01-28 NOTE — PROGRESS NOTES
Assessment/Plan:    Type 2 diabetes mellitus with hyperglycemia, with long-term current use of insulin (formerly [facility])    Lab Results   Component Value Date    HGBA1C 8.5 (H) 12/04/2024   Pt will monitor-keep diary---f/u 2 wks for epidural clearance     Diagnoses and all orders for this visit:    Medicare annual wellness visit, subsequent    Type 2 diabetes mellitus with hyperglycemia, with long-term current use of insulin (formerly [facility])  -     insulin aspart protamine-insulin aspart (NovoLOG Mix 70/30 FlexPen) 100 Units/mL injection pen; Inject 20 Units under the skin 2 (two) times a day before meals  -     cyclobenzaprine (FLEXERIL) 10 mg tablet; Take 1 tablet (10 mg total) by mouth 3 (three) times a day as needed for muscle spasms    Seizure-like activity (formerly [facility])    Meningioma (formerly [facility])          Subjective:   Chief Complaint   Patient presents with    Medicare Wellness Visit    Pre-op Exam     Patient's shoes and socks removed.    Right Foot/Ankle   Right Foot Inspection  Skin Exam: skin normal and skin intact. No dry skin, no warmth, no callus, no erythema, no maceration, no abnormal color, no pre-ulcer, no ulcer and no callus.     Toe Exam: ROM and strength within normal limits.     Sensory   Vibration: intact  Proprioception: intact  Monofilament testing: intact    Vascular  Capillary refills: < 3 seconds  The right DP pulse is 2+. The right PT pulse is 2+.     Left Foot/Ankle  Left Foot Inspection  Skin Exam: skin normal and skin intact. No dry skin, no warmth, no erythema, no maceration, normal color, no pre-ulcer, no ulcer and no callus.     Toe Exam: ROM and strength within normal limits.     Sensory   Vibration: intact  Proprioception: intact  Monofilament testing: intact    Vascular  Capillary refills: < 3 seconds  The left DP pulse is 2+. The left PT pulse is 2+.     Assign Risk Category  No deformity present  No loss of protective sensation  No weak pulses  Risk: 0      Patient ID: Nathalia Monge is a 64 y.o. female.    HPI    The  following portions of the patient's history were reviewed and updated as appropriate: allergies, current medications, past family history, past medical history, past social history, past surgical history and problem list.    Review of Systems      Objective:  Vitals:    01/23/25 0716   BP: 138/88   Weight: 89.4 kg (197 lb)      Physical Exam  Cardiovascular:      Pulses: no weak pulses.           Dorsalis pedis pulses are 2+ on the right side and 2+ on the left side.        Posterior tibial pulses are 2+ on the right side and 2+ on the left side.   Feet:      Right foot:      Skin integrity: No ulcer, skin breakdown, erythema, warmth, callus or dry skin.      Left foot:      Skin integrity: No ulcer, skin breakdown, erythema, warmth, callus or dry skin.

## 2025-02-04 ENCOUNTER — OFFICE VISIT (OUTPATIENT)
Dept: VASCULAR SURGERY | Facility: CLINIC | Age: 65
End: 2025-02-04
Payer: COMMERCIAL

## 2025-02-04 VITALS
BODY MASS INDEX: 33.49 KG/M2 | SYSTOLIC BLOOD PRESSURE: 172 MMHG | DIASTOLIC BLOOD PRESSURE: 88 MMHG | OXYGEN SATURATION: 97 % | WEIGHT: 201 LBS | HEIGHT: 65 IN | HEART RATE: 91 BPM

## 2025-02-04 DIAGNOSIS — R93.5 ABNORMAL ABDOMINAL CT SCAN: ICD-10-CM

## 2025-02-04 PROCEDURE — 99204 OFFICE O/P NEW MOD 45 MIN: CPT | Performed by: STUDENT IN AN ORGANIZED HEALTH CARE EDUCATION/TRAINING PROGRAM

## 2025-02-04 NOTE — PROGRESS NOTES
Vascular Surgery New Patient Visit  Date: 02/04/25      Assessment:  Nathalia Monge is a 64 y.o. female with at least a 1 year history of nonlocalized abdominal pain that appears to have began on following the diagnosis of pyelonephritis last year.  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  I discussed that this finding is generally rare and further it would be unlikely for this particular phenomenon to cause her any pain, especially in the absence of thrombus.  I also explained that it is uncommon for these to enlarge to a significant degree over time.  Lastly we discussed that because surgical intervention in this location would be a substantial undertaking and would not be without risk of complications, we would need compelling evidence that her portal vein was rapidly enlarging or extremely high confidence that it was the source of abdominal pain to ever consider surgical intervention.  She acknowledged understanding of this information and all her questions were answered to her satisfaction.      Plan:  - Return in 1 year w/ CT scan for surveillance     Diagnoses and all orders for this visit:    Abnormal abdominal CT scan  -     Ambulatory Referral to Vascular Surgery  -     CTA abdomen pelvis w wo contrast; Future           Subjective:     HPI:  Nathalia Monge is a 64 y.o. female with PMH of depression, T2DM, HTN, and klebsiella pyelonephritis c/b right renal abscess. 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    Abdominal pain has been present since 1/2024, around the time she was diagnosed with pyelonephritis.  Pain symptoms have been ongoing since then.  Character of the pain seems to vary periodically and is also not localized to 1 specific region in her abdomen.    She was noted to have enlargement of her portal vein at the confluence of her splenic vein and SMV. She has had  "abdominal CT scans in 1/2024 and 12/2024. It appears relatively stable in size at ~3.3x3cm by my measurement. Of note, as this is a venous phenomenon, size could vary to some degree between studies in relation to volume status, phase of respiration etc.     Objective:    ROS:  All systems were reviewed and are negative except those mentioned in HPI and below.      Vitals: BP (!) 172/88 (BP Location: Left arm, Patient Position: Sitting, Cuff Size: Standard)   Pulse 91   Ht 5' 5\" (1.651 m)   Wt 91.2 kg (201 lb)   SpO2 97%   BMI 33.45 kg/m²      General: female appears stated age, no apparent distress, alert and oriented   HEENT: normocephalic, atraumatic   Cardiovascular: hemodynamically stable   Chest/Lungs: no increased work of breathing, chest rise equal bilaterally   Abdomen: Soft, ND, NT, no pulsatile masses   Extremities: Bilateral femoral pulses   Skin: warm and dry   Neuro: no gross deficits      Medications:  Current Outpatient Medications   Medication Sig Dispense Refill    Ascorbic Acid (VITAMIN C) 1000 MG tablet Take 1,000 mg by mouth daily      BD Pen Needle Yolis 2nd Gen 32G X 4 MM MISC Use 3 (three) times a day 100 each 5    cholecalciferol (VITAMIN D3) 1,000 units tablet Take 1,000 Units by mouth daily      CHROMIUM PO Take by mouth 2 (two) times a day      cyclobenzaprine (FLEXERIL) 10 mg tablet Take 1 tablet (10 mg total) by mouth 3 (three) times a day as needed for muscle spasms 90 tablet 3    cyclobenzaprine (FLEXERIL) 5 mg tablet Take 1 tablet (5 mg total) by mouth 3 (three) times a day as needed for muscle spasms 30 tablet 2    ferrous sulfate 325 (65 Fe) mg tablet Take 325 mg by mouth daily       insulin aspart protamine-insulin aspart (NovoLOG Mix 70/30 FlexPen) 100 Units/mL injection pen Inject 20 Units under the skin 2 (two) times a day before meals 12 mL 5    Insulin Pen Needle (BD Pen Needle Yolis 2nd Gen) 32G X 4 MM MISC For use with insulin pen. Pharmacy may dispense brand covered by " insurance. 100 each 0    L-LYSINE PO Take 500 mg by mouth daily      pantoprazole (PROTONIX) 40 mg tablet Take 1 tablet (40 mg total) by mouth daily before breakfast 90 tablet 3    Probiotic Product (PROBIOTIC DAILY PO) Take 1 capsule by mouth daily      estradiol (ESTRACE) 0.1 mg/g vaginal cream Start 2 g every day for 2 weeks. Then use 2 g every other day for 2 weeks. Then decrease to 2 g 1-3 times a week (Patient not taking: Reported on 1/14/2025) 42.5 g 1    predniSONE 20 mg tablet TAKE 1 TABLET BID X 5 DAYS THEN TAKE 1 TABLET QD FOR 5 DAYS (Patient not taking: Reported on 1/14/2025) 15 tablet 1     No current facility-administered medications for this visit.       Allergies:  Allergies   Allergen Reactions    Dilantin [Phenytoin]     Gabapentin Other (See Comments)     Anger/irritability/mood disturbance    Lidocaine     Statins         PMH:  Past Medical History:   Diagnosis Date    Bipolar depression (HCC)     Cancer (HCC)     Depression     Diabetes mellitus (HCC)     Epilepsy (HCC)     Hypertension     Miscarriage 1990's    PTSD (post-traumatic stress disorder) 2002    Seizures (HCC)         PSH:  Past Surgical History:   Procedure Laterality Date    BRAIN SURGERY      CHOLECYSTECTOMY  2004    EYE SURGERY      HYSTERECTOMY      IMPLANTABLE CONTACT LENS IMPLANTATION      [place] CRNEC TREPH BONE FLAP CRNOT EXC BRAIN TUMOR STTL Right 03/15/2018    Procedure: IMAGE-GUIDED RIGHT FRONTOPARIETAL CRANIOTOMY FOR TUMOR;  Surgeon: Arsh Jeronimo MD;  Location: [facility];  Service: Neurosurgery        FHx:  Family History   Adopted: Yes        SHx:  Social History     Socioeconomic History    Marital status: Unknown     Spouse name: Marty    Number of children: 4    Years of education: Some college    Highest education level: Not on file   Occupational History    Occupation: disabled     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   Tobacco Use    Smoking status: Former     Current packs/day: 0.00     Types: Cigarettes      Quit date: 3/5/2000     Years since quittin.9    Smokeless tobacco: Never   Vaping Use    Vaping status: Never Used   Substance and Sexual Activity    Alcohol use: Never    Drug use: No    Sexual activity: Not Currently     Partners: Male     Birth control/protection: Post-menopausal     Comment: Everything has been removed and haven't had sex since    Other Topics Concern    Not on file   Social History Narrative    Lives at home with significant other, Marty     Social Drivers of Health     Financial Resource Strain: Low Risk  (1/15/2024)    Overall Financial Resource Strain (CARDIA)     Difficulty of Paying Living Expenses: Not hard at all   Food Insecurity: No Food Insecurity (2025)    Hunger Vital Sign     Worried About Running Out of Food in the Last Year: Never true     Ran Out of Food in the Last Year: Never true   Transportation Needs: No Transportation Needs (2025)    PRAPARE - Transportation     Lack of Transportation (Medical): No     Lack of Transportation (Non-Medical): No   Physical Activity: Not on file   Stress: Not on file   Social Connections: Not on file   Intimate Partner Violence: Not on file   Housing Stability: Unknown (2025)    Housing Stability Vital Sign     Unable to Pay for Housing in the Last Year: No     Number of Times Moved in the Last Year: Not on file     Homeless in the Last Year: No

## 2025-02-05 ENCOUNTER — TELEPHONE (OUTPATIENT)
Age: 65
End: 2025-02-05

## 2025-02-05 NOTE — TELEPHONE ENCOUNTER
Caller: myriam Sloan    Doctor: Dr. Valencia    Reason for call: pt calling about her procedure appt & f/u OV being cx'd and no one called her.  Pt found this info out via [email or web address] when the visits weren't there anymore    Call back#: [phone]

## 2025-02-10 ENCOUNTER — CONSULT (OUTPATIENT)
Dept: FAMILY MEDICINE CLINIC | Facility: CLINIC | Age: 65
End: 2025-02-10
Payer: COMMERCIAL

## 2025-02-10 VITALS
DIASTOLIC BLOOD PRESSURE: 98 MMHG | SYSTOLIC BLOOD PRESSURE: 160 MMHG | WEIGHT: 198 LBS | HEART RATE: 77 BPM | BODY MASS INDEX: 32.95 KG/M2 | OXYGEN SATURATION: 98 %

## 2025-02-10 DIAGNOSIS — E78.2 MIXED HYPERLIPIDEMIA: ICD-10-CM

## 2025-02-10 DIAGNOSIS — M51.34 DEGENERATIVE DISC DISEASE, THORACIC: ICD-10-CM

## 2025-02-10 DIAGNOSIS — M54.50 LUMBAR PAIN: ICD-10-CM

## 2025-02-10 DIAGNOSIS — Z79.4 TYPE 2 DIABETES MELLITUS WITH HYPERGLYCEMIA, WITH LONG-TERM CURRENT USE OF INSULIN (HCC): ICD-10-CM

## 2025-02-10 DIAGNOSIS — E11.65 TYPE 2 DIABETES MELLITUS WITH HYPERGLYCEMIA, WITH LONG-TERM CURRENT USE OF INSULIN (HCC): ICD-10-CM

## 2025-02-10 DIAGNOSIS — Z01.818 PREPROCEDURAL EXAMINATION: Primary | ICD-10-CM

## 2025-02-10 PROCEDURE — G2211 COMPLEX E/M VISIT ADD ON: HCPCS

## 2025-02-10 PROCEDURE — 99214 OFFICE O/P EST MOD 30 MIN: CPT

## 2025-02-10 RX ORDER — CYCLOBENZAPRINE HCL 10 MG
10 TABLET ORAL 3 TIMES DAILY PRN
Qty: 90 TABLET | Refills: 3 | Status: SHIPPED | OUTPATIENT
Start: 2025-02-10

## 2025-02-10 NOTE — PROGRESS NOTES
Presurgical Evaluation    Subjective:      Patient ID: Nathalia Monge is a 64 y.o. female.    Chief Complaint   Patient presents with    Pre-op Exam    Medicare Wellness Visit     Patient declined       Pre-op Exam  Surgery: DDD, thoracic  Anticipated Date of Surgery: 2/13/25  Surgeon: Dr. Valencia    Previous history of bleeding disorders or clots?: No  Previous Anesthesia reaction?: Yes  Prolonged steroid use in the last 6 months?: No    Assessment of Cardiac Risk:   - Unstable or severe angina or MI in the last 6 weeks or history of stent placement in the last year?: No   - Decompensated heart failure (e.g. New onset heart failure, NYHA  Class IV heart failure, or worsening existing heart failure)?: No  - Significant arrhythmias such as high grade AV block, symptomatic ventricular arrhythmia, newly recognized ventricular tachycardia, supraventricular tachycardia with resting heart rate >100, or symptomatic bradycardia?: No  - Severe heart valve disease including aortic stenosis or symptomatic mitral stenosis?: No      Pre-operative Risk Factors:  Elevated-risk surgery: Yes    History of cerebrovascular disease: No    History of ischemic heart disease: No  Pre-operative treatment with insulin: Yes  Pre-operative creatinine >2 mg/dL: No    History of congestive heart failure: No    Medications of Perioperative Concern:   Insulin      The following portions of the patient's history were reviewed and updated as appropriate: allergies, current medications, past family history, past medical history, past social history, past surgical history, and problem list.    Procedure date: February 13, 2025    Physician:  Dr. Valencia  Planned procedure:  epidural--thoracic  Diagnosis for procedure:  DDD, thoracic    Prior anesthesia: Yes   General; Complications:  None / Tolerated well    LIDOCAINE ALLERGY---seizures    CAD History: None   NOTE: Patient should see Cardiology if time available before surgery, and if appropriate.    Pulmonary  History: None    Renal history: CKD stage 3 - GFR 30-59    Diabetes History:  Type 2  Uncontrolled--- A1C 8.5 after 3 courses of PO steroids. Has had strict insulin and blood glucose monitoring with fasting blood glucose trending in 110's.     Neurological History: Mass/tumor--- meningioma excised     On Immunosuppressant meds/biologics: No      Review of Systems   Constitutional:  Negative for activity change, fatigue and fever.   HENT:  Negative for congestion, ear pain, rhinorrhea and sore throat.    Eyes:  Negative for pain.   Respiratory:  Negative for cough, shortness of breath and wheezing.    Cardiovascular:  Negative for chest pain and leg swelling.   Gastrointestinal:  Negative for abdominal pain, diarrhea, nausea and vomiting.   Musculoskeletal:  Positive for back pain. Negative for arthralgias and myalgias.   Skin:  Negative for rash.   Neurological:  Negative for dizziness, weakness and numbness.   All other systems reviewed and are negative.        Current Outpatient Medications   Medication Sig Dispense Refill    Ascorbic Acid (VITAMIN C) 1000 MG tablet Take 1,000 mg by mouth daily      BD Pen Needle Yolis 2nd Gen 32G X 4 MM MISC Use 3 (three) times a day 100 each 5    cholecalciferol (VITAMIN D3) 1,000 units tablet Take 1,000 Units by mouth daily      CHROMIUM PO Take by mouth 2 (two) times a day      cyclobenzaprine (FLEXERIL) 10 mg tablet Take 1 tablet (10 mg total) by mouth 3 (three) times a day as needed for muscle spasms 90 tablet 3    ferrous sulfate 325 (65 Fe) mg tablet Take 325 mg by mouth daily       insulin aspart protamine-insulin aspart (NovoLOG Mix 70/30 FlexPen) 100 Units/mL injection pen Inject 20 Units under the skin 2 (two) times a day before meals 12 mL 5    Insulin Pen Needle (BD Pen Needle Yolis 2nd Gen) 32G X 4 MM MISC For use with insulin pen. Pharmacy may dispense brand covered by insurance. 100 each 0    L-LYSINE PO Take 500 mg by mouth daily      pantoprazole (PROTONIX) 40 mg  tablet Take 1 tablet (40 mg total) by mouth daily before breakfast 90 tablet 3    Probiotic Product (PROBIOTIC DAILY PO) Take 1 capsule by mouth daily       No current facility-administered medications for this visit.       Allergies on file:   Dilantin [phenytoin], Gabapentin, Lidocaine, and Statins    Patient Active Problem List   Diagnosis    Partial idiopathic epilepsy with seizures of localized onset, intractable, without status epilepticus (HCC)    Type 2 diabetes mellitus with hyperglycemia, with long-term current use of insulin (HCC)    Hypertensive urgency    Urinary urgency    Meningioma (HCC)    S/P craniotomy    Hyponatremia    Seizure-like activity (HCC)    Renal abscess, right    Obesity (BMI 30.0-34.9)    Insurance coverage problems    Imaging abnormality    Bacteremia due to Klebsiella pneumoniae    Clostridium difficile carrier    Carpal tunnel syndrome on left    Mixed hyperlipidemia    Degenerative disc disease, thoracic        Past Medical History:   Diagnosis Date    Bipolar depression (HCC)     Cancer (HCC)     Depression     Diabetes mellitus (HCC)     Epilepsy (HCC)     Hypertension     Miscarriage     PTSD (post-traumatic stress disorder)     Seizures (HCC)        Past Surgical History:   Procedure Laterality Date    BRAIN SURGERY      CHOLECYSTECTOMY      EYE SURGERY      HYSTERECTOMY      IMPLANTABLE CONTACT LENS IMPLANTATION      [place] CRNEC TREPH BONE FLAP CRNOT EXC BRAIN TUMOR STTL Right 03/15/2018    Procedure: IMAGE-GUIDED RIGHT FRONTOPARIETAL CRANIOTOMY FOR TUMOR;  Surgeon: Arsh Jeronimo MD;  Location: [facility];  Service: Neurosurgery       Family History   Adopted: Yes       Social History     Tobacco Use    Smoking status: Former     Current packs/day: 0.00     Types: Cigarettes     Quit date: 3/5/2000     Years since quittin.9    Smokeless tobacco: Never   Vaping Use    Vaping status: Never Used   Substance Use Topics    Alcohol use: Never    Drug use: No        Objective:    Vitals:    02/10/25 0743   BP: 160/98   Pulse: 77   SpO2: 98%   Weight: 89.8 kg (198 lb)        Physical Exam  Vitals and nursing note reviewed.   Constitutional:       General: She is not in acute distress.     Appearance: Normal appearance. She is not ill-appearing.   HENT:      Head: Normocephalic.      Right Ear: Tympanic membrane, ear canal and external ear normal. There is no impacted cerumen.      Left Ear: Tympanic membrane, ear canal and external ear normal. There is no impacted cerumen.      Nose: Nose normal. No congestion.      Mouth/Throat:      Mouth: Mucous membranes are moist.      Pharynx: No oropharyngeal exudate or posterior oropharyngeal erythema.   Cardiovascular:      Rate and Rhythm: Normal rate and regular rhythm.      Heart sounds: Murmur heard.   Pulmonary:      Effort: Pulmonary effort is normal. No respiratory distress.      Breath sounds: Normal breath sounds. No wheezing.   Abdominal:      General: Bowel sounds are normal. There is no distension.      Palpations: Abdomen is soft.      Tenderness: There is no abdominal tenderness.   Musculoskeletal:      Cervical back: Neck supple.   Skin:     General: Skin is warm and dry.      Capillary Refill: Capillary refill takes less than 2 seconds.   Neurological:      General: No focal deficit present.      Mental Status: She is alert and oriented to person, place, and time. Mental status is at baseline.   Psychiatric:         Mood and Affect: Mood normal.         Behavior: Behavior normal.           Preop labs/testing available and reviewed: no--- labs from Dec.               EKG yes--- from dec. 2024    Echo No    Stress test/cath no    PFT/[place] no    Functional capacity: Walking , 4-5 MPH               4 Mets   Pick the highest level patient can comfortably perform   4 mets or greater for surgery    RCRI  High Risk surgery?         1 Point  CAD History:         1 Point   MI; Positive Stress Test; CP due to Mi;  Nitrate  Usage to control Angina; Pathologic Q wave on EKG  CHF Active:         1 Point   Pulm Edema; Paroxysmal Nocturnal Dyspnea;  Bibasilar Rales (crackles);S3; CHF on CXR  Cerebrovascular Disease (TIA or CVA):     1 Point  DM on Insulin:        1 Point  Serum Creat >2.0 mg/dl:       1 Point          Total Points: 1     Scorin: Class I, Very Low Risk (0.4%)     1: Class II, Low risk (0.9%)     2: Class III Moderate (6.6%)     3: Class IV High (>11%)      YING Risk:  GFR:        For PCP:  If GFR>60, Hold ACE/ARB/Diuretic on the day of surgery, and NSAIDS 10 days before.    If GFR<45, Consider PRE and POST op Nephrology Consult.    If 46 <GFR> 59 : Has Patient had YING in last 6 Months? no   If YES: Preop Nephrology consult   If No:  Post Op Nephrology consult.           Assessment/Plan:    Patient is medically optimized (cleared) for the planned procedure.    Further testing/evaluation is not required.    Postop concerns: no    Problem List Items Addressed This Visit       Type 2 diabetes mellitus with hyperglycemia, with long-term current use of insulin ([facility])    Relevant Medications    cyclobenzaprine (FLEXERIL) 10 mg tablet    Other Relevant Orders    CBC and differential    Comprehensive metabolic panel    Lipid Panel with Direct LDL reflex    Hemoglobin A1C    Albumin / creatinine urine ratio    Mixed hyperlipidemia    Degenerative disc disease, thoracic     Other Visit Diagnoses         Preprocedural examination    -  Primary      Lumbar pain        Relevant Orders    Lipid Panel with Direct LDL reflex             Diagnoses and all orders for this visit:    Preprocedural examination    Degenerative disc disease, thoracic    Lumbar pain    Type 2 diabetes mellitus with hyperglycemia, with long-term current use of insulin ([facility])  -     cyclobenzaprine (FLEXERIL) 10 mg tablet; Take 1 tablet (10 mg total) by mouth 3 (three) times a day as needed for muscle spasms      No outpatient medications have been marked as taking  for the 2/10/25 encounter (Appointment) with SHASHANK Miguel.        ---Refused [facility] today. Will schedule at 3 month follow up.---

## 2025-02-13 ENCOUNTER — HOSPITAL ENCOUNTER (OUTPATIENT)
Dept: RADIOLOGY | Facility: CLINIC | Age: 65
Discharge: HOME | End: 2025-02-13
Payer: COMMERCIAL

## 2025-02-13 VITALS
RESPIRATION RATE: 18 BRPM | DIASTOLIC BLOOD PRESSURE: 81 MMHG | SYSTOLIC BLOOD PRESSURE: 157 MMHG | TEMPERATURE: 97.3 F | OXYGEN SATURATION: 97 % | HEART RATE: 78 BPM

## 2025-02-13 DIAGNOSIS — M54.14 THORACIC RADICULOPATHY: ICD-10-CM

## 2025-02-13 PROCEDURE — 62321 NJX INTERLAMINAR CRV/THRC: CPT | Performed by: ANESTHESIOLOGY

## 2025-02-13 RX ORDER — PAPAVERINE HCL 150 MG
10 CAPSULE, EXTENDED RELEASE ORAL ONCE
Status: COMPLETED | OUTPATIENT
Start: 2025-02-13 | End: 2025-02-13

## 2025-02-13 RX ADMIN — DEXAMETHASONE SODIUM PHOSPHATE 10 MG: 10 INJECTION, SOLUTION INTRAMUSCULAR; INTRAVENOUS at 13:26

## 2025-02-13 RX ADMIN — IOHEXOL 1 ML: 300 INJECTION, SOLUTION INTRAVENOUS at 13:24

## 2025-02-13 NOTE — DISCHARGE INSTR - LAB
Epidural Steroid Injection   WHAT YOU NEED TO KNOW:   An epidural steroid injection (SAADIA) is a procedure to inject steroid medicine into the epidural space. The epidural space is between your spinal cord and vertebrae. Steroids reduce inflammation and fluid buildup in your spine that may be causing pain. You may be given pain medicine along with the steroids.          ACTIVITY  Do not drive or operate machinery today.  No strenuous activity today - bending, lifting, etc.  You may resume normal activites starting tomorrow - start slowly and as tolerated.  You may shower today, but no tub baths or hot tubs.  You may have numbness for several hours from the local anesthetic. Please use caution and common sense, especially with weight-bearing activities.    CARE OF THE INJECTION SITE  If you have soreness or pain, apply ice to the area today (20 minutes on/20 minutes off).  Starting tomorrow, you may use warm, moist heat or ice if needed.  You may have an increase or change in your discomfort for 36-48 hours after your treatment.  Apply ice and continue with any pain medication you have been prescribed.  Notify the [facility] if you have any of the following: redness, drainage, swelling, headache, stiff neck or fever above 100°F.    SPECIAL INSTRUCTIONS  Our office will contact you in approximately 14 days for a progress report.    MEDICATIONS  Continue to take all routine medications.  Our office may have instructed you to hold some medications.    As no general anesthesia was used in today's procedure, you should not experience any side effects related to anesthesia.     If you are diabetic, the steroids used in today's injection may temporarily increase your blood sugar levels after the first few days after your injection. Please keep a close eye on your sugars and alert the doctor who manages your diabetes if your sugars are significantly high from your baseline or you are symptomatic.     If you have a  problem specifically related to your procedure, please call our office at [phone].  Problems not related to your procedure should be directed to your primary care physician.

## 2025-02-13 NOTE — H&P
History of Present Illness: The patient is a 64 y.o. female who presents with complaints of thoracic radicular symptoms.    Past Medical History:   Diagnosis Date    Bipolar depression (HCC)     Cancer (HCC)     Depression     Diabetes mellitus (HCC)     Epilepsy (HCC)     Hypertension     Miscarriage 1990's    PTSD (post-traumatic stress disorder) 2002    Seizures (HCC)        Past Surgical History:   Procedure Laterality Date    BRAIN SURGERY      CHOLECYSTECTOMY  2004    EYE SURGERY      HYSTERECTOMY      IMPLANTABLE CONTACT LENS IMPLANTATION      [place] CRNEC TREPH BONE FLAP CRNOT EXC BRAIN TUMOR STTL Right 03/15/2018    Procedure: IMAGE-GUIDED RIGHT FRONTOPARIETAL CRANIOTOMY FOR TUMOR;  Surgeon: Arsh Jeronimo MD;  Location: [facility];  Service: Neurosurgery         Current Outpatient Medications:     Ascorbic Acid (VITAMIN C) 1000 MG tablet, Take 1,000 mg by mouth daily, Disp: , Rfl:     BD Pen Needle Yolis 2nd Gen 32G X 4 MM MISC, Use 3 (three) times a day, Disp: 100 each, Rfl: 5    cholecalciferol (VITAMIN D3) 1,000 units tablet, Take 1,000 Units by mouth daily, Disp: , Rfl:     CHROMIUM PO, Take by mouth 2 (two) times a day, Disp: , Rfl:     cyclobenzaprine (FLEXERIL) 10 mg tablet, Take 1 tablet (10 mg total) by mouth 3 (three) times a day as needed for muscle spasms, Disp: 90 tablet, Rfl: 3    ferrous sulfate 325 (65 Fe) mg tablet, Take 325 mg by mouth daily , Disp: , Rfl:     insulin aspart protamine-insulin aspart (NovoLOG Mix 70/30 FlexPen) 100 Units/mL injection pen, Inject 20 Units under the skin 2 (two) times a day before meals, Disp: 12 mL, Rfl: 5    Insulin Pen Needle (BD Pen Needle Yolis 2nd Gen) 32G X 4 MM MISC, For use with insulin pen. Pharmacy may dispense brand covered by insurance., Disp: 100 each, Rfl: 0    L-LYSINE PO, Take 500 mg by mouth daily, Disp: , Rfl:     pantoprazole (PROTONIX) 40 mg tablet, Take 1 tablet (40 mg total) by mouth daily before breakfast, Disp: 90 tablet, Rfl: 3     Probiotic Product (PROBIOTIC DAILY PO), Take 1 capsule by mouth daily, Disp: , Rfl:     Current Facility-Administered Medications:     dexamethasone (PF) (DECADRON) injection 10 mg, 10 mg, Epidural, Once, Abel Valencia DO    iohexol (OMNIPAQUE) 300 mg/mL injection 1 mL, 1 mL, Epidural, Once, Abel Valencia DO    Allergies   Allergen Reactions    Dilantin [Phenytoin]     Gabapentin Other (See Comments)     Anger/irritability/mood disturbance    Lidocaine Seizures    Statins        Physical Exam:   Vitals:    02/13/25 1310   BP: 168/88   Resp: 20   Temp: (!) 97.3 °F (36.3 °C)   SpO2: 97%     General: Awake, Alert, Oriented x 3, Mood and affect appropriate  Respiratory: Respirations even and unlabored  Cardiovascular: Peripheral pulses intact; no edema  Musculoskeletal Exam: Normal gait    ASA Score: 2    Patient/Chart Verification  Patient ID Verified: Verbal  ID Band Applied: No  Consents Confirmed: To be obtained in the Procedural area  H&P( within 30 days) Verified: To be obtained in the Procedural area  Interval H&P(within 24 hr) Complete (required for Outpatients and Surgery Admit only): To be obtained in the Procedural area  Allergies Reviewed: Yes  Anticoag/NSAID held?: NA  Currently on antibiotics?: No    Assessment:   1. Thoracic radiculopathy        Plan: T10-T11 SAADIA

## 2025-02-27 ENCOUNTER — TELEPHONE (OUTPATIENT)
Dept: PAIN MEDICINE | Facility: CLINIC | Age: 65
End: 2025-02-27

## 2025-03-04 ENCOUNTER — HOSPITAL ENCOUNTER (OUTPATIENT)
Dept: MRI IMAGING | Facility: HOSPITAL | Age: 65
Discharge: HOME | End: 2025-03-04
Payer: COMMERCIAL

## 2025-03-04 DIAGNOSIS — D32.9 MENINGIOMA (HCC): ICD-10-CM

## 2025-03-04 DIAGNOSIS — Z98.890 S/P CRANIOTOMY: ICD-10-CM

## 2025-03-04 PROCEDURE — A9585 GADOBUTROL INJECTION: HCPCS

## 2025-03-04 PROCEDURE — 70553 MRI BRAIN STEM W/O & W/DYE: CPT

## 2025-03-04 RX ORDER — GADOBUTROL 604.72 MG/ML
9 INJECTION INTRAVENOUS
Status: COMPLETED | OUTPATIENT
Start: 2025-03-04 | End: 2025-03-04

## 2025-03-04 RX ADMIN — GADOBUTROL 9 ML: 604.72 INJECTION INTRAVENOUS at 18:51

## 2025-03-06 ENCOUNTER — OFFICE VISIT (OUTPATIENT)
Dept: PAIN MEDICINE | Facility: CLINIC | Age: 65
End: 2025-03-06
Payer: COMMERCIAL

## 2025-03-06 VITALS
HEART RATE: 82 BPM | HEIGHT: 65 IN | WEIGHT: 198 LBS | TEMPERATURE: 98 F | BODY MASS INDEX: 32.99 KG/M2 | OXYGEN SATURATION: 96 %

## 2025-03-06 DIAGNOSIS — M54.14 THORACIC RADICULOPATHY: Primary | ICD-10-CM

## 2025-03-06 DIAGNOSIS — M54.9 MID BACK PAIN: ICD-10-CM

## 2025-03-06 PROCEDURE — 99213 OFFICE O/P EST LOW 20 MIN: CPT | Performed by: PHYSICIAN ASSISTANT

## 2025-03-06 NOTE — PROGRESS NOTES
Assessment:  1. Thoracic radiculopathy    2. Mid back pain        Plan:  While the patient was in the office today, I did have a thorough conversation regarding their chronic pain syndrome, medication management, and treatment plan options.    The patient is status post thoracic epidural steroid injection done on 2/13/2025.  Patient was able to report 75% reduction in pain at the 2-week clarita and now that relief is greater than 90%.  She is very pleased with the relief that she has obtained and feels that her residual minor pain is something she is able to manage without further procedures.    Patient was made aware that injections can be repeated if indicated in the future.  At this point in time she will follow-up with us as needed.    The patient was advised to contact the office should their symptoms worsen in the interim. The patient was agreeable and verbalized an understanding.        History of Present Illness:    The patient is a 64 y.o. female last seen on 2/13/2025 who presents for a follow up office visit in regards to thoracic pain secondary to degenerative disc disease with moderate spinal stenosis at T10-11.  The patient currently reports mid thoracic pain that she rates a 1 out of 10 today and describes an occasional dull and aching pain.  On 2/13/2025 patient underwent a T10-11 epidural steroid injection and is able to report greater than 90% relief on today's visit.  Patient is very pleased with the results of the procedure and has no new symptoms or acute issues on today's visit.  Patient states that she is going to be starting chiropractic therapy in the near future.    I have personally reviewed and/or updated the patient's past medical history, past surgical history, family history, social history, current medications, allergies, and vital signs today.       Review of Systems:    Review of Systems   Respiratory:  Positive for shortness of breath.    Cardiovascular:  Negative for chest pain.    Gastrointestinal:  Negative for constipation, diarrhea, nausea and vomiting.   Musculoskeletal:  Positive for gait problem. Negative for arthralgias, joint swelling (Joint stiffness) and myalgias.   Skin:  Negative for rash.   Neurological:  Positive for seizures. Negative for dizziness and weakness.   All other systems reviewed and are negative.        Past Medical History:   Diagnosis Date   • Bipolar depression (HCC)    • Cancer (HCC)    • Depression    • Diabetes mellitus (HCC)    • Epilepsy (HCC)    • Hypertension    • Miscarriage    • PTSD (post-traumatic stress disorder)    • Seizures (HCC)        Past Surgical History:   Procedure Laterality Date   • BRAIN SURGERY     • CHOLECYSTECTOMY     • EYE SURGERY     • HYSTERECTOMY     • IMPLANTABLE CONTACT LENS IMPLANTATION     • [place] CRNEC TREPH BONE FLAP CRNOT EXC BRAIN TUMOR STTL Right 03/15/2018    Procedure: IMAGE-GUIDED RIGHT FRONTOPARIETAL CRANIOTOMY FOR TUMOR;  Surgeon: Arsh Jeronimo MD;  Location: [facility];  Service: Neurosurgery       Family History   Adopted: Yes       Social History     Occupational History   • Occupation: disabled     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   Tobacco Use   • Smoking status: Former     Current packs/day: 0.00     Types: Cigarettes     Quit date: 3/5/2000     Years since quittin.0   • Smokeless tobacco: Never   Vaping Use   • Vaping status: Never Used   Substance and Sexual Activity   • Alcohol use: Never   • Drug use: No   • Sexual activity: Not Currently     Partners: Male     Birth control/protection: Post-menopausal     Comment: Everything has been removed and haven't had sex since          Current Outpatient Medications:   •  Ascorbic Acid (VITAMIN C) 1000 MG tablet, Take 1,000 mg by mouth daily, Disp: , Rfl:   •  cholecalciferol (VITAMIN D3) 1,000 units tablet, Take 1,000 Units by mouth daily, Disp: , Rfl:   •  CHROMIUM PO, Take by mouth 2 (two) times a day, Disp: , Rfl:   •   "cyclobenzaprine (FLEXERIL) 10 mg tablet, Take 1 tablet (10 mg total) by mouth 3 (three) times a day as needed for muscle spasms, Disp: 90 tablet, Rfl: 3  •  ferrous sulfate 325 (65 Fe) mg tablet, Take 325 mg by mouth daily , Disp: , Rfl:   •  insulin aspart protamine-insulin aspart (NovoLOG Mix 70/30 FlexPen) 100 Units/mL injection pen, Inject 20 Units under the skin 2 (two) times a day before meals, Disp: 12 mL, Rfl: 5  •  L-LYSINE PO, Take 500 mg by mouth daily, Disp: , Rfl:   •  pantoprazole (PROTONIX) 40 mg tablet, Take 1 tablet (40 mg total) by mouth daily before breakfast, Disp: 90 tablet, Rfl: 3  •  Probiotic Product (PROBIOTIC DAILY PO), Take 1 capsule by mouth daily, Disp: , Rfl:   •  BD Pen Needle Yolis 2nd Gen 32G X 4 MM MISC, Use 3 (three) times a day, Disp: 100 each, Rfl: 5  •  Insulin Pen Needle (BD Pen Needle Yolis 2nd Gen) 32G X 4 MM MISC, For use with insulin pen. Pharmacy may dispense brand covered by insurance., Disp: 100 each, Rfl: 0    Allergies   Allergen Reactions   • Dilantin [Phenytoin]    • Gabapentin Other (See Comments)     Anger/irritability/mood disturbance   • Lidocaine Seizures   • Statins        Physical Exam:    Pulse 82   Temp 98 °F (36.7 °C)   Ht 5' 5\" (1.651 m)   Wt 89.8 kg (198 lb)   SpO2 96%   BMI 32.95 kg/m²     Constitutional:normal, well developed, well nourished, alert, in no distress and non-toxic and no overt pain behavior.  Eyes:anicteric  HEENT:grossly intact  Pulmonary:even and unlabored  Cardiovascular:No edema or pitting edema present  Skin:Normal without rashes or lesions and well hydrated  Psychiatric:Mood and affect appropriate  Neurologic:Cranial Nerves II-XII grossly intact  Musculoskeletal:normal      Imaging  No orders to display         No orders of the defined types were placed in this encounter.      "

## 2025-03-13 ENCOUNTER — TELEPHONE (OUTPATIENT)
Dept: NEUROSURGERY | Facility: CLINIC | Age: 65
End: 2025-03-13

## 2025-03-13 ENCOUNTER — OFFICE VISIT (OUTPATIENT)
Dept: NEUROSURGERY | Facility: CLINIC | Age: 65
End: 2025-03-13
Payer: COMMERCIAL

## 2025-03-13 VITALS
SYSTOLIC BLOOD PRESSURE: 160 MMHG | OXYGEN SATURATION: 96 % | HEART RATE: 97 BPM | TEMPERATURE: 97.8 F | DIASTOLIC BLOOD PRESSURE: 80 MMHG

## 2025-03-13 DIAGNOSIS — Z98.890 S/P CRANIOTOMY: Primary | ICD-10-CM

## 2025-03-13 DIAGNOSIS — D32.9 MENINGIOMA (HCC): ICD-10-CM

## 2025-03-13 PROCEDURE — 99214 OFFICE O/P EST MOD 30 MIN: CPT | Performed by: PHYSICIAN ASSISTANT

## 2025-03-13 NOTE — TELEPHONE ENCOUNTER
3/13/25 2 year recall set in [facility] for MRI Brain 03/2027 as per AP Jeremy patient should follow up with Neurosurgery with an AP after study complete

## 2025-03-13 NOTE — ASSESSMENT & PLAN NOTE
Patient is status post resection of 6 mm right parafalcine meningioma, who grade 1 3/15/2018 by Dr. Jeronimo with preop embolization 3/13/2018 Dr. Negrete  Since last visit patient did have multiple seizures in January 2024 and admission for sepsis.   Declined seeing neurology and is not on any AEDs.    Imaging:   MRI brain with and without contrast 3/4/25: Expected with several changes associated gliosis encephalomalacia in the right frontal lobe, similar to prior.  Indentation of the medulla oblongata by a torturous right vertebral artery.    Plan:   Continue to monitor neurological symptoms.  MRI imaging reviewed in detail patient compared to original preoperative as well as a postoperative MRI.  Expected postoperative changes without any concern for residual or recurrent tumor  Will plan ongoing surveillance with repeat MRI brain with and without contrast completed in 2 years.   Patient aware to call the office with any questions concerns or new symptoms in the interim.    Orders:    MRI brain with and without contrast; Future

## 2025-03-13 NOTE — PROGRESS NOTES
Name: Nathalia Monge      : 1960      MRN: [id number]  Encounter Provider: Fidelina Meadows PA-C  Encounter Date: 3/13/2025   Encounter department: [facility]  :  Assessment & Plan  S/P craniotomy  Patient is status post resection of 6 mm right parafalcine meningioma, who grade 1 3/15/2018 by Dr. Jeronimo with preop embolization 3/13/2018 Dr. Negrete  Since last visit patient did have multiple seizures in 2024 and admission for sepsis.   Declined seeing neurology and is not on any AEDs.    Imaging:   MRI brain with and without contrast 3/4/25: Expected with several changes associated gliosis encephalomalacia in the right frontal lobe, similar to prior.  Indentation of the medulla oblongata by a torturous right vertebral artery.    Plan:   Continue to monitor neurological symptoms.  MRI imaging reviewed in detail patient compared to original preoperative as well as a postoperative MRI.  Expected postoperative changes without any concern for residual or recurrent tumor  Will plan ongoing surveillance with repeat MRI brain with and without contrast completed in 2 years.   Patient aware to call the office with any questions concerns or new symptoms in the interim.    Orders:    MRI brain with and without contrast; Future    Meningioma (HCC)  No recurrence   Orders:    MRI brain with and without contrast; Future        History of Present Illness       This is a 64-year-old female past medical history significant for who grade 1 meningioma status post resection in 2018, hypertension, epilepsy, diabetes and bipolar depression who presents today for follow-up after updated MRI brain.  Patient originally presented with left-sided weakness and seizures diagnosed with a 6 cm right parafalcine meningioma for which she underwent embolization and ultimately resection.  Patient had good neurological recovery with no residual weakness.  She does note some mild difficulties in her  left hand.  Patient has been undergoing surveillance MRI imaging.     Today, she endorses a feeling of vibration throughout her entire body that cannot be described any further.  Intermittent word finding difficulties and headaches.  Noted pain in the left frontal region.  No pain over incision or incisional concerns.  Endorses tenderness in intermittent balance difficulties.         Review of Systems   Constitutional: Negative.    HENT:  Positive for tinnitus.    Eyes: Negative.  Negative for visual disturbance.   Respiratory: Negative.     Cardiovascular: Negative.    Gastrointestinal: Negative.    Endocrine: Negative.    Genitourinary: Negative.    Musculoskeletal:  Positive for gait problem (losing her balance).   Skin: Negative.    Allergic/Immunologic: Negative.    Neurological:  Positive for tremors (whole body shakes when she gets up- daily), speech difficulty (finding words) and headaches.        2 Year follow up with MRI Brain on 3/4/25  Meningioma    Constant ringing in the ears  Trouble finding words  Headaches  Confusion/decreased concentration    No ac/Former smoker    IMAGE-GUIDED RIGHT FRONTOPARIETAL CRANIOTOMY FOR TUMOR 3/15/18  HDM     Hematological: Negative.    Psychiatric/Behavioral:  Positive for decreased concentration.    All other systems reviewed and are negative.    I have personally reviewed the MA's review of systems and made changes as necessary.    Past Medical History   Past Medical History:   Diagnosis Date    Bipolar depression (HCC)     Cancer (HCC)     Depression     Diabetes mellitus (HCC)     Epilepsy (HCC)     Hypertension     Miscarriage 1990's    PTSD (post-traumatic stress disorder) 2002    Seizures (HCC)      Past Surgical History:   Procedure Laterality Date    BRAIN SURGERY      CHOLECYSTECTOMY  2004    EYE SURGERY      HYSTERECTOMY      IMPLANTABLE CONTACT LENS IMPLANTATION      [place] CRNEC TREPH BONE FLAP CRNOT EXC BRAIN TUMOR STTL Right 03/15/2018    Procedure:  IMAGE-GUIDED RIGHT FRONTOPARIETAL CRANIOTOMY FOR TUMOR;  Surgeon: Arsh Jeronimo MD;  Location: [facility];  Service: Neurosurgery     Family History   Adopted: Yes     she reports that she quit smoking about 25 years ago. Her smoking use included cigarettes. She has never used smokeless tobacco. She reports that she does not drink alcohol and does not use drugs.  Current Outpatient Medications   Medication Instructions    BD Pen Needle Yolis 2nd Gen 32G X 4 MM MISC Other, 3 times daily    cholecalciferol (VITAMIN D3) 1,000 Units, Daily    CHROMIUM PO 2 times daily    cyclobenzaprine (FLEXERIL) 10 mg, Oral, 3 times daily PRN    ferrous sulfate 325 mg, Daily    insulin aspart protamine-insulin aspart (NovoLOG Mix 70/30 FlexPen) 100 Units/mL injection pen 20 Units, Subcutaneous, 2 times daily before meals    Insulin Pen Needle (BD Pen Needle Yolis 2nd Gen) 32G X 4 MM MISC For use with insulin pen. Pharmacy may dispense brand covered by insurance.    L-LYSINE  mg, Daily    pantoprazole (PROTONIX) 40 mg, Oral, Daily before breakfast    Probiotic Product (PROBIOTIC DAILY PO) 1 capsule, Daily    vitamin C 1,000 mg, Daily     Allergies   Allergen Reactions    Dilantin [Phenytoin]     Gabapentin Other (See Comments)     Anger/irritability/mood disturbance    Lidocaine Seizures    Statins       Objective   /80 (Patient Position: Sitting, Cuff Size: Standard)   Pulse 97   Temp 97.8 °F (36.6 °C) (Temporal)   SpO2 96%     Physical Exam  Constitutional:       General: She is not in acute distress.     Appearance: Normal appearance. She is well-developed. She is not ill-appearing.   HENT:      Head: Normocephalic and atraumatic.      Right Ear: External ear normal.      Left Ear: External ear normal.      Nose: Nose normal.      Mouth/Throat:      Mouth: Mucous membranes are moist.   Eyes:      General: No scleral icterus.        Right eye: No discharge.         Left eye: No discharge.      Extraocular Movements:  Extraocular movements intact.      Conjunctiva/sclera: Conjunctivae normal.   Cardiovascular:      Rate and Rhythm: Normal rate.   Pulmonary:      Effort: Pulmonary effort is normal. No respiratory distress.   Skin:     General: Skin is warm and dry.      Findings: No rash.      Comments: Incision well healed   Neurological:      Mental Status: She is alert.      Motor: Motor strength is normal.  Psychiatric:         Mood and Affect: Mood normal.         Speech: Speech normal.         Behavior: Behavior normal.         Thought Content: Thought content normal.         Judgment: Judgment normal.       Neurological Exam  Mental Status  Alert. Speech is normal. Language is fluent with no aphasia. Attention and concentration are normal.    Cranial Nerves  CN III, IV, VI: Extraocular movements intact bilaterally.  CN V: Facial sensation is normal.  CN VII: Full and symmetric facial movement.  CN VIII: Hearing is normal.  CN XI: Shoulder shrug strength is normal.  CN XII: Tongue midline without atrophy or fasciculations.    Motor  Normal muscle bulk throughout. Normal muscle tone. Strength is 5/5 throughout all four extremities.    Sensory  Light touch is normal in upper and lower extremities.     Reflexes    Right pathological reflexes: Quinton's absent. Ankle clonus absent.  Left pathological reflexes: Quinton's absent. Ankle clonus absent.    Coordination  Right: Finger-to-nose normal.Left: Finger-to-nose normal.      Radiology Results Review: I personally reviewed the following image studies in PACS and associated radiology reports: MRI brain. My interpretation of the radiology images/reports is: as above.    Administrative Statements   I have spent a total time of 38 minutes in caring for this patient on the day of the visit/encounter including Diagnostic results, Patient and family education, Impressions, Documenting in the medical record, Reviewing/placing orders in the medical record (including tests,  medications, and/or procedures), and Obtaining or reviewing history  .

## 2025-03-13 NOTE — TELEPHONE ENCOUNTER
3/13/25 Patient came into the office Lindsey was checking her in and she requested a copay patient stated she had kayleigh care and could not pay the copayment at that point walked out then she came back and came to my desk to check in stating she spoke with billing and that she applied for Kayleigh Care and it is pending no copay was collected due to pending kayleigh ok as per SG because patient spoke to the billing dept and Jazz at the office stated it can be billed. Patient was checked in and [facility] roomed her for her visit.

## 2025-03-13 NOTE — TELEPHONE ENCOUNTER
"This pt was not seen today. Towards the end of check-in I asked her about paying a co pay she said she's in the process of gina care, but she doesn't have it yet. I told her in order to be seen today we would need some kind of payment. She said did you not just hear I don't have anything. I said I understand, I can have my manager come up to help. She said no cancel the appt and walked out. She said she'll be let \"them\" know about her experience with us.  "

## 2025-03-18 ENCOUNTER — APPOINTMENT (INPATIENT)
Dept: INTERVENTIONAL RADIOLOGY/VASCULAR | Facility: HOSPITAL | Age: 65
DRG: 187 | End: 2025-03-18
Attending: STUDENT IN AN ORGANIZED HEALTH CARE EDUCATION/TRAINING PROGRAM
Payer: COMMERCIAL

## 2025-03-18 ENCOUNTER — APPOINTMENT (EMERGENCY)
Dept: RADIOLOGY | Facility: HOSPITAL | Age: 65
DRG: 187 | End: 2025-03-18
Attending: EMERGENCY MEDICINE
Payer: COMMERCIAL

## 2025-03-18 ENCOUNTER — HOSPITAL ENCOUNTER (INPATIENT)
Facility: HOSPITAL | Age: 65
LOS: 1 days | Discharge: HOME | DRG: 187 | End: 2025-03-19
Attending: EMERGENCY MEDICINE | Admitting: INTERNAL MEDICINE
Payer: COMMERCIAL

## 2025-03-18 ENCOUNTER — APPOINTMENT (EMERGENCY)
Dept: CT IMAGING | Facility: HOSPITAL | Age: 65
DRG: 187 | End: 2025-03-18
Payer: COMMERCIAL

## 2025-03-18 ENCOUNTER — APPOINTMENT (INPATIENT)
Dept: NON INVASIVE DIAGNOSTICS | Facility: HOSPITAL | Age: 65
DRG: 187 | End: 2025-03-18
Payer: COMMERCIAL

## 2025-03-18 DIAGNOSIS — I16.0 HYPERTENSIVE URGENCY: ICD-10-CM

## 2025-03-18 DIAGNOSIS — I50.42 CHRONIC COMBINED SYSTOLIC AND DIASTOLIC CONGESTIVE HEART FAILURE (HCC): ICD-10-CM

## 2025-03-18 DIAGNOSIS — R06.02 SHORTNESS OF BREATH: ICD-10-CM

## 2025-03-18 DIAGNOSIS — J90 PLEURAL EFFUSION: ICD-10-CM

## 2025-03-18 DIAGNOSIS — R94.31 PROLONGED Q-T INTERVAL ON ECG: Primary | ICD-10-CM

## 2025-03-18 LAB
2HR DELTA HS TROPONIN: 3 NG/L
ALBUMIN SERPL BCG-MCNC: 4.2 G/DL (ref 3.5–5)
ALP SERPL-CCNC: 82 U/L (ref 34–104)
ALT SERPL W P-5'-P-CCNC: 11 U/L (ref 7–52)
ANION GAP SERPL CALCULATED.3IONS-SCNC: 9 MMOL/L (ref 4–13)
APPEARANCE FLD: ABNORMAL
APPEARANCE FLD: ABNORMAL
APTT PPP: 28 SECONDS (ref 23–34)
AST SERPL W P-5'-P-CCNC: 12 U/L (ref 13–39)
BASE EXCESS BLDA CALC-SCNC: 4 MMOL/L (ref -2–3)
BASOPHILS # BLD AUTO: 0.03 THOUSANDS/ÂΜL (ref 0–0.1)
BASOPHILS NFR BLD AUTO: 0 % (ref 0–1)
BILIRUB SERPL-MCNC: 0.48 MG/DL (ref 0.2–1)
BNP SERPL-MCNC: 226 PG/ML (ref 0–100)
BUN SERPL-MCNC: 19 MG/DL (ref 5–25)
CA-I BLD-SCNC: 1.12 MMOL/L (ref 1.12–1.32)
CALCIUM SERPL-MCNC: 9.2 MG/DL (ref 8.4–10.2)
CARDIAC TROPONIN I PNL SERPL HS: 13 NG/L (ref ?–50)
CARDIAC TROPONIN I PNL SERPL HS: 16 NG/L (ref ?–50)
CHLORIDE SERPL-SCNC: 106 MMOL/L (ref 96–108)
CO2 SERPL-SCNC: 25 MMOL/L (ref 21–32)
COLOR FLD: ABNORMAL
COLOR FLD: ABNORMAL
CREAT SERPL-MCNC: 1.04 MG/DL (ref 0.6–1.3)
D DIMER PPP FEU-MCNC: 1.09 UG/ML FEU
EOSINOPHIL # BLD AUTO: 0.14 THOUSAND/ÂΜL (ref 0–0.61)
EOSINOPHIL NFR BLD AUTO: 2 % (ref 0–6)
ERYTHROCYTE [DISTWIDTH] IN BLOOD BY AUTOMATED COUNT: 12.8 % (ref 11.6–15.1)
EST. AVERAGE GLUCOSE BLD GHB EST-MCNC: 166 MG/DL
GFR SERPL CREATININE-BSD FRML MDRD: 56 ML/MIN/1.73SQ M
GLUCOSE FLD-MCNC: 209 MG/DL
GLUCOSE SERPL-MCNC: 128 MG/DL (ref 65–140)
GLUCOSE SERPL-MCNC: 156 MG/DL (ref 65–140)
GLUCOSE SERPL-MCNC: 162 MG/DL (ref 65–140)
GLUCOSE SERPL-MCNC: 183 MG/DL (ref 65–140)
GLUCOSE SERPL-MCNC: 202 MG/DL (ref 65–140)
GLUCOSE SERPL-MCNC: 205 MG/DL (ref 65–140)
HBA1C MFR BLD: 7.4 %
HCO3 BLDA-SCNC: 27.7 MMOL/L (ref 24–30)
HCT VFR BLD AUTO: 41.2 % (ref 34.8–46.1)
HCT VFR BLD CALC: 40 % (ref 34.8–46.1)
HGB BLD-MCNC: 13.8 G/DL (ref 11.5–15.4)
HGB BLDA-MCNC: 13.6 G/DL (ref 11.5–15.4)
HISTIOCYTES NFR FLD: 25 %
HISTIOCYTES NFR FLD: 35 %
IMM GRANULOCYTES # BLD AUTO: 0.03 THOUSAND/UL (ref 0–0.2)
IMM GRANULOCYTES NFR BLD AUTO: 0 % (ref 0–2)
INR PPP: 0.94 (ref 0.85–1.19)
LDH FLD L TO P-CCNC: 70 U/L
LDH SERPL-CCNC: 217 U/L (ref 140–271)
LYMPHOCYTES # BLD AUTO: 1.98 THOUSANDS/ÂΜL (ref 0.6–4.47)
LYMPHOCYTES NFR BLD AUTO: 23 % (ref 14–44)
LYMPHOCYTES NFR BLD AUTO: 50 %
LYMPHOCYTES NFR BLD AUTO: 70 %
MCH RBC QN AUTO: 29.9 PG (ref 26.8–34.3)
MCHC RBC AUTO-ENTMCNC: 33.5 G/DL (ref 31.4–37.4)
MCV RBC AUTO: 89 FL (ref 82–98)
MONO+MESO NFR FLD MANUAL: 3 %
MONO+MESO NFR FLD MANUAL: 3 %
MONOCYTES # BLD AUTO: 0.46 THOUSAND/ÂΜL (ref 0.17–1.22)
MONOCYTES NFR BLD AUTO: 5 % (ref 4–12)
NEUTROPHILS # BLD AUTO: 6.01 THOUSANDS/ÂΜL (ref 1.85–7.62)
NEUTS SEG NFR BLD AUTO: 12 %
NEUTS SEG NFR BLD AUTO: 2 %
NEUTS SEG NFR BLD AUTO: 70 % (ref 43–75)
NRBC BLD AUTO-RTO: 0 /100 WBCS
PCO2 BLD: 29 MMOL/L (ref 21–32)
PCO2 BLD: 38.1 MM HG (ref 42–50)
PH BLD: 7.47 [PH] (ref 7.3–7.4)
PH BODY FLUID: 7.7
PLATELET # BLD AUTO: 242 THOUSANDS/UL (ref 149–390)
PMV BLD AUTO: 10.7 FL (ref 8.9–12.7)
PO2 BLD: 38 MM HG (ref 35–45)
POTASSIUM BLD-SCNC: 4.4 MMOL/L (ref 3.5–5.3)
POTASSIUM SERPL-SCNC: 3.8 MMOL/L (ref 3.5–5.3)
PROT FLD-MCNC: <3 G/DL
PROT SERPL-MCNC: 7 G/DL (ref 6.4–8.4)
PROTHROMBIN TIME: 13.1 SECONDS (ref 12.3–15)
RBC # BLD AUTO: 4.62 MILLION/UL (ref 3.81–5.12)
SAO2 % BLD FROM PO2: 76 % (ref 60–85)
SITE: ABNORMAL
SITE: ABNORMAL
SODIUM BLD-SCNC: 141 MMOL/L (ref 136–145)
SODIUM SERPL-SCNC: 140 MMOL/L (ref 135–147)
SPECIMEN SOURCE: ABNORMAL
TOTAL CELLS COUNTED SPEC: 100
TOTAL CELLS COUNTED SPEC: 100
TOTAL PROTEIN FLUID: 1.6 G/DL
WBC # BLD AUTO: 8.65 THOUSAND/UL (ref 4.31–10.16)
WBC # FLD MANUAL: 709 /UL
WBC # FLD MANUAL: 854 /UL

## 2025-03-18 PROCEDURE — 32555 ASPIRATE PLEURA W/ IMAGING: CPT

## 2025-03-18 PROCEDURE — 87070 CULTURE OTHR SPECIMN AEROBIC: CPT | Performed by: STUDENT IN AN ORGANIZED HEALTH CARE EDUCATION/TRAINING PROGRAM

## 2025-03-18 PROCEDURE — 85610 PROTHROMBIN TIME: CPT | Performed by: EMERGENCY MEDICINE

## 2025-03-18 PROCEDURE — 82948 REAGENT STRIP/BLOOD GLUCOSE: CPT

## 2025-03-18 PROCEDURE — 89051 BODY FLUID CELL COUNT: CPT | Performed by: STUDENT IN AN ORGANIZED HEALTH CARE EDUCATION/TRAINING PROGRAM

## 2025-03-18 PROCEDURE — 83036 HEMOGLOBIN GLYCOSYLATED A1C: CPT | Performed by: STUDENT IN AN ORGANIZED HEALTH CARE EDUCATION/TRAINING PROGRAM

## 2025-03-18 PROCEDURE — 82945 GLUCOSE OTHER FLUID: CPT | Performed by: STUDENT IN AN ORGANIZED HEALTH CARE EDUCATION/TRAINING PROGRAM

## 2025-03-18 PROCEDURE — 80053 COMPREHEN METABOLIC PANEL: CPT | Performed by: EMERGENCY MEDICINE

## 2025-03-18 PROCEDURE — 93970 EXTREMITY STUDY: CPT | Performed by: SURGERY

## 2025-03-18 PROCEDURE — 82947 ASSAY GLUCOSE BLOOD QUANT: CPT

## 2025-03-18 PROCEDURE — 85014 HEMATOCRIT: CPT

## 2025-03-18 PROCEDURE — 83986 ASSAY PH BODY FLUID NOS: CPT | Performed by: STUDENT IN AN ORGANIZED HEALTH CARE EDUCATION/TRAINING PROGRAM

## 2025-03-18 PROCEDURE — 82330 ASSAY OF CALCIUM: CPT

## 2025-03-18 PROCEDURE — 84484 ASSAY OF TROPONIN QUANT: CPT | Performed by: EMERGENCY MEDICINE

## 2025-03-18 PROCEDURE — 99285 EMERGENCY DEPT VISIT HI MDM: CPT

## 2025-03-18 PROCEDURE — 99223 1ST HOSP IP/OBS HIGH 75: CPT | Performed by: STUDENT IN AN ORGANIZED HEALTH CARE EDUCATION/TRAINING PROGRAM

## 2025-03-18 PROCEDURE — 84132 ASSAY OF SERUM POTASSIUM: CPT

## 2025-03-18 PROCEDURE — 88112 CYTOPATH CELL ENHANCE TECH: CPT | Performed by: PATHOLOGY

## 2025-03-18 PROCEDURE — 85730 THROMBOPLASTIN TIME PARTIAL: CPT | Performed by: EMERGENCY MEDICINE

## 2025-03-18 PROCEDURE — 96365 THER/PROPH/DIAG IV INF INIT: CPT

## 2025-03-18 PROCEDURE — 87205 SMEAR GRAM STAIN: CPT | Performed by: STUDENT IN AN ORGANIZED HEALTH CARE EDUCATION/TRAINING PROGRAM

## 2025-03-18 PROCEDURE — 88305 TISSUE EXAM BY PATHOLOGIST: CPT | Performed by: PATHOLOGY

## 2025-03-18 PROCEDURE — 84157 ASSAY OF PROTEIN OTHER: CPT | Performed by: STUDENT IN AN ORGANIZED HEALTH CARE EDUCATION/TRAINING PROGRAM

## 2025-03-18 PROCEDURE — 0W993ZZ DRAINAGE OF RIGHT PLEURAL CAVITY, PERCUTANEOUS APPROACH: ICD-10-PCS | Performed by: RADIOLOGY

## 2025-03-18 PROCEDURE — 84295 ASSAY OF SERUM SODIUM: CPT

## 2025-03-18 PROCEDURE — 85379 FIBRIN DEGRADATION QUANT: CPT | Performed by: EMERGENCY MEDICINE

## 2025-03-18 PROCEDURE — 84484 ASSAY OF TROPONIN QUANT: CPT | Performed by: STUDENT IN AN ORGANIZED HEALTH CARE EDUCATION/TRAINING PROGRAM

## 2025-03-18 PROCEDURE — 93005 ELECTROCARDIOGRAM TRACING: CPT

## 2025-03-18 PROCEDURE — 36415 COLL VENOUS BLD VENIPUNCTURE: CPT | Performed by: EMERGENCY MEDICINE

## 2025-03-18 PROCEDURE — 71275 CT ANGIOGRAPHY CHEST: CPT

## 2025-03-18 PROCEDURE — 83615 LACTATE (LD) (LDH) ENZYME: CPT | Performed by: STUDENT IN AN ORGANIZED HEALTH CARE EDUCATION/TRAINING PROGRAM

## 2025-03-18 PROCEDURE — 85025 COMPLETE CBC W/AUTO DIFF WBC: CPT | Performed by: EMERGENCY MEDICINE

## 2025-03-18 PROCEDURE — C1729 CATH, DRAINAGE: HCPCS

## 2025-03-18 PROCEDURE — 93970 EXTREMITY STUDY: CPT

## 2025-03-18 PROCEDURE — 0W9B3ZZ DRAINAGE OF LEFT PLEURAL CAVITY, PERCUTANEOUS APPROACH: ICD-10-PCS | Performed by: RADIOLOGY

## 2025-03-18 PROCEDURE — 71045 X-RAY EXAM CHEST 1 VIEW: CPT

## 2025-03-18 PROCEDURE — 82803 BLOOD GASES ANY COMBINATION: CPT

## 2025-03-18 PROCEDURE — 93306 TTE W/DOPPLER COMPLETE: CPT

## 2025-03-18 PROCEDURE — 83880 ASSAY OF NATRIURETIC PEPTIDE: CPT | Performed by: EMERGENCY MEDICINE

## 2025-03-18 PROCEDURE — 99291 CRITICAL CARE FIRST HOUR: CPT | Performed by: EMERGENCY MEDICINE

## 2025-03-18 RX ORDER — DIPHENHYDRAMINE HYDROCHLORIDE 50 MG/ML
INJECTION, SOLUTION INTRAMUSCULAR; INTRAVENOUS AS NEEDED
Status: COMPLETED | OUTPATIENT
Start: 2025-03-18 | End: 2025-03-18

## 2025-03-18 RX ORDER — MAGNESIUM HYDROXIDE/ALUMINUM HYDROXICE/SIMETHICONE 120; 1200; 1200 MG/30ML; MG/30ML; MG/30ML
30 SUSPENSION ORAL EVERY 6 HOURS PRN
Status: DISCONTINUED | OUTPATIENT
Start: 2025-03-18 | End: 2025-03-19 | Stop reason: HOSPADM

## 2025-03-18 RX ORDER — NIFEDIPINE 30 MG/1
30 TABLET, EXTENDED RELEASE ORAL DAILY
Status: DISCONTINUED | OUTPATIENT
Start: 2025-03-18 | End: 2025-03-19

## 2025-03-18 RX ORDER — PANTOPRAZOLE SODIUM 40 MG/1
40 TABLET, DELAYED RELEASE ORAL
Status: DISCONTINUED | OUTPATIENT
Start: 2025-03-18 | End: 2025-03-19 | Stop reason: HOSPADM

## 2025-03-18 RX ORDER — INSULIN LISPRO 100 [IU]/ML
1-6 INJECTION, SOLUTION INTRAVENOUS; SUBCUTANEOUS
Status: DISCONTINUED | OUTPATIENT
Start: 2025-03-18 | End: 2025-03-19 | Stop reason: HOSPADM

## 2025-03-18 RX ORDER — LABETALOL HYDROCHLORIDE 5 MG/ML
10 INJECTION, SOLUTION INTRAVENOUS EVERY 6 HOURS PRN
Status: DISCONTINUED | OUTPATIENT
Start: 2025-03-18 | End: 2025-03-19 | Stop reason: HOSPADM

## 2025-03-18 RX ORDER — CYCLOBENZAPRINE HCL 10 MG
10 TABLET ORAL 3 TIMES DAILY PRN
Status: DISCONTINUED | OUTPATIENT
Start: 2025-03-18 | End: 2025-03-19 | Stop reason: HOSPADM

## 2025-03-18 RX ORDER — INSULIN ASPART 100 [IU]/ML
20 INJECTION, SUSPENSION SUBCUTANEOUS
Status: DISCONTINUED | OUTPATIENT
Start: 2025-03-18 | End: 2025-03-19 | Stop reason: HOSPADM

## 2025-03-18 RX ORDER — MAGNESIUM SULFATE HEPTAHYDRATE 40 MG/ML
2 INJECTION, SOLUTION INTRAVENOUS ONCE
Status: COMPLETED | OUTPATIENT
Start: 2025-03-18 | End: 2025-03-18

## 2025-03-18 RX ORDER — ENOXAPARIN SODIUM 100 MG/ML
40 INJECTION SUBCUTANEOUS DAILY
Status: DISCONTINUED | OUTPATIENT
Start: 2025-03-18 | End: 2025-03-19 | Stop reason: HOSPADM

## 2025-03-18 RX ORDER — LABETALOL HYDROCHLORIDE 5 MG/ML
10 INJECTION, SOLUTION INTRAVENOUS ONCE
Status: DISCONTINUED | OUTPATIENT
Start: 2025-03-18 | End: 2025-03-18

## 2025-03-18 RX ORDER — POLYETHYLENE GLYCOL 3350 17 G/17G
17 POWDER, FOR SOLUTION ORAL DAILY PRN
Status: DISCONTINUED | OUTPATIENT
Start: 2025-03-18 | End: 2025-03-19 | Stop reason: HOSPADM

## 2025-03-18 RX ORDER — ACETAMINOPHEN 325 MG/1
650 TABLET ORAL EVERY 6 HOURS PRN
Status: DISCONTINUED | OUTPATIENT
Start: 2025-03-18 | End: 2025-03-19 | Stop reason: HOSPADM

## 2025-03-18 RX ORDER — FUROSEMIDE 10 MG/ML
40 INJECTION INTRAMUSCULAR; INTRAVENOUS ONCE
Status: COMPLETED | OUTPATIENT
Start: 2025-03-18 | End: 2025-03-18

## 2025-03-18 RX ADMIN — FUROSEMIDE 40 MG: 10 INJECTION, SOLUTION INTRAVENOUS at 09:26

## 2025-03-18 RX ADMIN — PANTOPRAZOLE SODIUM 40 MG: 40 TABLET, DELAYED RELEASE ORAL at 09:27

## 2025-03-18 RX ADMIN — INSULIN LISPRO 1 UNITS: 100 INJECTION, SOLUTION INTRAVENOUS; SUBCUTANEOUS at 22:03

## 2025-03-18 RX ADMIN — MAGNESIUM SULFATE HEPTAHYDRATE 2 G: 40 INJECTION, SOLUTION INTRAVENOUS at 06:43

## 2025-03-18 RX ADMIN — CYCLOBENZAPRINE 10 MG: 10 TABLET, FILM COATED ORAL at 09:27

## 2025-03-18 RX ADMIN — IOHEXOL 85 ML: 350 INJECTION, SOLUTION INTRAVENOUS at 06:34

## 2025-03-18 RX ADMIN — LABETALOL HYDROCHLORIDE 10 MG: 5 INJECTION, SOLUTION INTRAVENOUS at 09:27

## 2025-03-18 RX ADMIN — NIFEDIPINE 30 MG: 30 TABLET, FILM COATED, EXTENDED RELEASE ORAL at 09:31

## 2025-03-18 RX ADMIN — DIPHENHYDRAMINE HYDROCHLORIDE 50 MG: 50 INJECTION INTRAMUSCULAR; INTRAVENOUS at 09:56

## 2025-03-18 RX ADMIN — INSULIN ASPART 20 UNITS: 100 INJECTION, SUSPENSION SUBCUTANEOUS at 17:54

## 2025-03-18 RX ADMIN — INSULIN LISPRO 1 UNITS: 100 INJECTION, SOLUTION INTRAVENOUS; SUBCUTANEOUS at 09:27

## 2025-03-18 RX ADMIN — INSULIN LISPRO 1 UNITS: 100 INJECTION, SOLUTION INTRAVENOUS; SUBCUTANEOUS at 18:12

## 2025-03-18 RX ADMIN — ENOXAPARIN SODIUM 40 MG: 40 INJECTION SUBCUTANEOUS at 09:28

## 2025-03-18 NOTE — PLAN OF CARE
Problem: Potential for Falls  Goal: Patient will remain free of falls  Description: INTERVENTIONS:  - Educate patient/family on patient safety including physical limitations  - Instruct patient to call for assistance with activity   - Consult OT/PT to assist with strengthening/mobility   - Keep Call bell within reach  - Keep bed low and locked with side rails adjusted as appropriate  - Keep care items and personal belongings within reach  - Initiate and maintain comfort rounds  - Make Fall Risk Sign visible to staff  - Offer Toileting every 2 Hours, in advance of need  - Initiate/Maintain bed/chair alarm  - Obtain necessary fall risk management equipment:   - Apply yellow socks and bracelet for high fall risk patients  - Consider moving patient to room near nurses station  Outcome: Progressing     Problem: PAIN - ADULT  Goal: Verbalizes/displays adequate comfort level or baseline comfort level  Description: Interventions:  - Encourage patient to monitor pain and request assistance  - Assess pain using appropriate pain scale  - Administer analgesics based on type and severity of pain and evaluate response  - Implement non-pharmacological measures as appropriate and evaluate response  - Consider cultural and social influences on pain and pain management  - Notify physician/advanced practitioner if interventions unsuccessful or patient reports new pain  Outcome: Progressing     Problem: INFECTION - ADULT  Goal: Absence or prevention of progression during hospitalization  Description: INTERVENTIONS:  - Assess and monitor for signs and symptoms of infection  - Monitor lab/diagnostic results  - Monitor all insertion sites, i.e. indwelling lines, tubes, and drains  - Monitor endotracheal if appropriate and nasal secretions for changes in amount and color  - [place] appropriate cooling/warming therapies per order  - Administer medications as ordered  - Instruct and encourage patient and family to use good hand hygiene  technique  - Identify and instruct in appropriate isolation precautions for identified infection/condition  Outcome: Progressing  Goal: Absence of fever/infection during neutropenic period  Description: INTERVENTIONS:  - Monitor WBC    Outcome: Progressing     Problem: SAFETY ADULT  Goal: Patient will remain free of falls  Description: INTERVENTIONS:  - Educate patient/family on patient safety including physical limitations  - Instruct patient to call for assistance with activity   - Consult OT/PT to assist with strengthening/mobility   - Keep Call bell within reach  - Keep bed low and locked with side rails adjusted as appropriate  - Keep care items and personal belongings within reach  - Initiate and maintain comfort rounds  - Make Fall Risk Sign visible to staff  - Offer Toileting every 2 Hours, in advance of need  - Initiate/Maintain bed/chair alarm  - Obtain necessary fall risk management equipment:   - Apply yellow socks and bracelet for high fall risk patients  - Consider moving patient to room near nurses station  Outcome: Progressing  Goal: Maintain or return to baseline ADL function  Description: INTERVENTIONS:  -  Assess patient's ability to carry out ADLs; assess patient's baseline for ADL function and identify physical deficits which impact ability to perform ADLs (bathing, care of mouth/teeth, toileting, grooming, dressing, etc.)  - Assess/evaluate cause of self-care deficits   - Assess range of motion  - Assess patient's mobility; develop plan if impaired  - Assess patient's need for assistive devices and provide as appropriate  - Encourage maximum independence but intervene and supervise when necessary  - Involve family in performance of ADLs  - Assess for home care needs following discharge   - Consider OT consult to assist with ADL evaluation and planning for discharge  - Provide patient education as appropriate  Outcome: Progressing  Goal: Maintains/Returns to pre admission functional  level  Description: INTERVENTIONS:  - Perform AM-PAC 6 Click Basic Mobility/ Daily Activity assessment daily.  - Set and communicate daily mobility goal to care team and patient/family/caregiver.   - Collaborate with rehabilitation services on mobility goals if consulted  - Perform Range of Motion 3 times a day.  - Reposition patient every 3 hours.  - Dangle patient 3 times a day  - Stand patient 3 times a day  - Ambulate patient 3 times a day  - Out of bed to chair 3 times a day   - Out of bed for meals 3 times a day  - Out of bed for toileting  - Record patient progress and toleration of activity level   Outcome: Progressing     Problem: DISCHARGE PLANNING  Goal: Discharge to home or other facility with appropriate resources  Description: INTERVENTIONS:  - Identify barriers to discharge w/patient and caregiver  - Arrange for needed discharge resources and transportation as appropriate  - Identify discharge learning needs (meds, wound care, etc.)  - Arrange for interpretive services to assist at discharge as needed  - Refer to Case Management Department for coordinating discharge planning if the patient needs post-hospital services based on physician/advanced practitioner order or complex needs related to functional status, cognitive ability, or social support system  Outcome: Progressing     Problem: Knowledge Deficit  Goal: Patient/family/caregiver demonstrates understanding of disease process, treatment plan, medications, and discharge instructions  Description: Complete learning assessment and assess knowledge base.  Interventions:  - Provide teaching at level of understanding  - Provide teaching via preferred learning methods  Outcome: Progressing     Problem: CARDIOVASCULAR - ADULT  Goal: Maintains optimal cardiac output and hemodynamic stability  Description: INTERVENTIONS:  - Monitor I/O, vital signs and rhythm  - Monitor for S/S and trends of decreased cardiac output  - Administer and titrate ordered  vasoactive medications to optimize hemodynamic stability  - Assess quality of pulses, skin color and temperature  - Assess for signs of decreased coronary artery perfusion  - Instruct patient to report change in severity of symptoms  Outcome: Progressing  Goal: Absence of cardiac dysrhythmias or at baseline rhythm  Description: INTERVENTIONS:  - Continuous cardiac monitoring, vital signs, obtain 12 lead EKG if ordered  - Administer antiarrhythmic and heart rate control medications as ordered  - Monitor electrolytes and administer replacement therapy as ordered  Outcome: Progressing     Problem: RESPIRATORY - ADULT  Goal: Achieves optimal ventilation and oxygenation  Description: INTERVENTIONS:  - Assess for changes in respiratory status  - Assess for changes in mentation and behavior  - Position to facilitate oxygenation and minimize respiratory effort  - Oxygen administered by appropriate delivery if ordered  - Initiate smoking cessation education as indicated  - Encourage broncho-pulmonary hygiene including cough, deep breathe, Incentive Spirometry  - Assess the need for suctioning and aspirate as needed  - Assess and instruct to report SOB or any respiratory difficulty  - Respiratory Therapy support as indicated  Outcome: Progressing

## 2025-03-18 NOTE — ED PROVIDER NOTES
Time reflects when diagnosis was documented in both MDM as applicable and the Disposition within this note       Time User Action Codes Description Comment    3/18/2025  6:28 AM Fabiola Cano Add [I45.81] Prolonged QT syndrome     3/18/2025  6:28 AM Fabiola Cano Remove [I45.81] Prolonged QT syndrome     3/18/2025  6:29 AM Fabiola Cano Add [R94.31] Prolonged Q-T interval on ECG     3/18/2025  6:29 AM Fabiola Cano Add [R06.02] Shortness of breath     3/18/2025  6:29 AM Fabiola Cano Add [I16.0] Hypertensive urgency     3/18/2025  6:48 AM Fabiola Cano Add [J90] Pleural effusion           ED Disposition       ED Disposition   Admit    Condition   Stable    Date/Time   Tue Mar 18, 2025  7:04 AM    Comment   Case was discussed with MARLENI and the patient's admission status was agreed to be Admission Status: inpatient status to the service of Dr. Zhong .               Assessment & Plan       Medical Decision Making  64-year-old female with dyspnea on exertion, orthopnea with significantly elevated blood pressures concern for hypertensive emergency with pulmonary edema, versus new onset CHF, ACS, arrhythmia,  at risk for pneumonia,    Amount and/or Complexity of Data Reviewed  Labs: ordered. Decision-making details documented in ED Course.  Radiology: ordered.    Risk  Prescription drug management.  Decision regarding hospitalization.        ED Course as of 03/18/25 0705   Tue Mar 18, 2025   0511 Medical record reviewed patient is status postcraniotomy due to meningioma removal in 2018, since then has had multiple seizures currently not on any AEDs, most recent seizure in 2024, currently being monitored by neurosurgery with repeat MRI in the future   0528 Procedure Note: EKG  Date/Time: 03/18/25 5:28 AM   Performed by: FABIOLA CANO  Authorized by: FABIOLA CANO  Indications / Diagnosis: CP  ECG reviewed by me, the ED Provider: yes   The EKG demonstrates:  Rhythm: normal sinus  Intervals:    Axis: normal axis  QRS/Blocks: normal  QRS  ST Changes: No acute ST Changes, no STD/BRAYDEN.       0620 Bedside ultrasound with no evidence of pericardial effusion, EF visually appears somewhat reduced with no significant right heart dilatation, there is bilateral B-lines concerning for pulmonary edema   0621 Blood Pressure(!): 237/122  Improved to 196/88 without any vasoactive medications   0628 BNP(!): 226   0652 Patient with hypertensive urgency, bilateral pleural effusions, will admit for further evaluation and care   0654 Critical Care Time Statement: Upon my evaluation, this patient had a high probability of imminent or life-threatening deterioration due to hypertensive urgency, pleural effusions, which required my direct attention, intervention, and personal management.  I spent a total of 32  minutes directly providing critical care services, including interpretation of complex medical databases, evaluating for the presence of life-threatening injuries or illnesses, management of organ system failure(s) , complex medical decision making (to support/prevent further life-threatening deterioration)., interpretation of hemodynamic data, and titration of vasoactive medications. This time is exclusive of procedures, teaching, treating other patients, family meetings, and any prior time recorded by providers other than myself.               Medications   magnesium sulfate 2 g/50 mL IVPB (premix) 2 g (2 g Intravenous New Bag 3/18/25 0643)   labetalol (NORMODYNE) injection 10 mg (has no administration in time range)   furosemide (LASIX) injection 40 mg (has no administration in time range)   iohexol (OMNIPAQUE) 350 MG/ML injection (MULTI-DOSE) 85 mL (85 mL Intravenous Given 3/18/25 0634)       ED Risk Strat Scores                            SBIRT 20yo+      Flowsheet Row Most Recent Value   Initial Alcohol Screen: US AUDIT-C     1. How often do you have a drink containing alcohol? 0 Filed at: 03/18/2025 7955   2. How many drinks containing alcohol do you have  on a typical day you are drinking?  0 Filed at: [id number]   3a. Male UNDER 65: How often do you have five or more drinks on one occasion? 0 Filed at: 2025   3b. FEMALE Any Age, or MALE 65+: How often do you have 4 or more drinks on one occassion? 0 Filed at: 2025   Audit-C Score 0 Filed at: 2025   SELENE: How many times in the past year have you...    Used an illegal drug or used a prescription medication for non-medical reasons? Never Filed at: 2025                            History of Present Illness       Chief Complaint   Patient presents with    Shortness of Breath     Pt c/o of sob and chest pain x months.        Past Medical History:   Diagnosis Date    Bipolar depression (HCC)     Cancer (HCC)     Depression     Diabetes mellitus (HCC)     Epilepsy (HCC)     Hypertension     Miscarriage     PTSD (post-traumatic stress disorder) 2002    Seizures (HCC)       Past Surgical History:   Procedure Laterality Date    BRAIN SURGERY      CHOLECYSTECTOMY      EYE SURGERY      HYSTERECTOMY      IMPLANTABLE CONTACT LENS IMPLANTATION      NV CRNEC TREPH BONE FLAP CRNOT EXC BRAIN TUMOR STTL Right 03/15/2018    Procedure: IMAGE-GUIDED RIGHT FRONTOPARIETAL CRANIOTOMY FOR TUMOR;  Surgeon: Arsh Jeronimo MD;  Location: [facility];  Service: Neurosurgery      Family History   Adopted: Yes      Social History     Tobacco Use    Smoking status: Former     Current packs/day: 0.00     Types: Cigarettes     Quit date: 3/5/2000     Years since quittin.0    Smokeless tobacco: Never   Vaping Use    Vaping status: Never Used   Substance Use Topics    Alcohol use: Never    Drug use: No      E-Cigarette/Vaping    E-Cigarette Use Never User       E-Cigarette/Vaping Substances    Nicotine No     THC No     CBD No     Flavoring No     Other No     Unknown No       I have reviewed and agree with the history as documented.     64-year-old female with ongoing dyspnea on exertion,  over the last 2 days constant dyspnea on exertion with orthopnea no lower extremity swelling, no recent weight gain, no history of CHF or COPD.  Patient states that she is having some chest tightness but no chest pain no productive cough, no fevers, is not had any recent cardiac evaluation such as echoes or stress tests        Review of Systems   Constitutional:  Negative for appetite change and fever.   HENT:  Negative for rhinorrhea and sore throat.    Eyes:  Negative for photophobia and visual disturbance.   Respiratory:  Positive for chest tightness and shortness of breath. Negative for cough and wheezing.    Cardiovascular:  Negative for chest pain, palpitations and leg swelling.   Gastrointestinal:  Negative for abdominal distention, abdominal pain, blood in stool, constipation and diarrhea.   Genitourinary:  Negative for dysuria, flank pain, frequency, hematuria and urgency.   Musculoskeletal:  Negative for back pain.   Skin:  Negative for rash.   Neurological:  Negative for dizziness, weakness and headaches.   All other systems reviewed and are negative.          Objective       ED Triage Vitals   Temperature Pulse Blood Pressure Respirations SpO2 Patient Position - Orthostatic VS   03/18/25 0521 03/18/25 0518 03/18/25 0521 03/18/25 0518 03/18/25 0518 --   97.9 °F (36.6 °C) 105 (!) 233/107 18 93 %       Temp src Heart Rate Source BP Location FiO2 (%) Pain Score    -- -- -- -- 03/18/25 0545        No Pain      Vitals      Date and Time Temp Pulse SpO2 Resp BP Pain Score FACES Pain Rating User   03/18/25 0648 -- -- -- -- -- No Pain --    03/18/25 0645 -- 85 96 % 18 203/109 -- --    03/18/25 0624 -- -- -- -- -- No Pain --    03/18/25 0600 -- 78 97 % 18 196/88 -- --    03/18/25 0546 -- -- -- -- -- No Pain --    03/18/25 0545 -- 83 95 % 18 237/122 No Pain --    03/18/25 0521 97.9 °F (36.6 °C) -- -- -- 233/107 -- -- EK   03/18/25 0518 -- 105 93 % 18 -- -- -- EK            Physical Exam  Vitals and  nursing note reviewed.   Constitutional:       Appearance: She is well-developed.   HENT:      Head: Normocephalic and atraumatic.   Eyes:      Pupils: Pupils are equal, round, and reactive to light.   Cardiovascular:      Rate and Rhythm: Normal rate and regular rhythm.      Heart sounds: No murmur heard.     No friction rub. No gallop.   Pulmonary:      Effort: Pulmonary effort is normal.      Breath sounds: Wheezing and rales present.   Chest:      Chest wall: No tenderness.   Abdominal:      General: There is no distension.      Palpations: Abdomen is soft. There is no mass.      Tenderness: There is no guarding or rebound.   Musculoskeletal:      Cervical back: Normal range of motion and neck supple.   Skin:     General: Skin is warm and dry.   Neurological:      Mental Status: She is alert and oriented to person, place, and time.         Results Reviewed       Procedure Component Value Units Date/Time    B-Type Natriuretic Peptide(BNP) [[id number]]  (Abnormal) Collected: 03/18/25 0540    Lab Status: Final result Specimen: Blood from Arm, Right Updated: 03/18/25 0618      pg/mL     D-dimer, quantitative [[id number]]  (Abnormal) Collected: 03/18/25 0540    Lab Status: Final result Specimen: Blood from Arm, Right Updated: 03/18/25 0612     D-Dimer, Quant 1.09 ug/ml FEU     Narrative:      In the evaluation for possible pulmonary embolism, in the appropriate (Well's Score of 4 or less) patient, the age adjusted d-dimer cutoff for this patient can be calculated as:    Age x 0.01 (in ug/mL) for Age-adjusted D-dimer exclusion threshold for a patient over 50 years.    HS Troponin I 2hr [[id number]]     Lab Status: No result Specimen: Blood     HS Troponin 0hr (reflex protocol) [[id number]]  (Normal) Collected: 03/18/25 0540    Lab Status: Final result Specimen: Blood from Arm, Right Updated: 03/18/25 0609     hs TnI 0hr 13 ng/L     Protime-INR [[id number]]  (Normal) Collected: 03/18/25 0540    Lab Status: Final  result Specimen: Blood from Arm, Right Updated: 03/18/25 0607     Protime 13.1 seconds      INR 0.94    Narrative:      INR Therapeutic Range    Indication                                             INR Range      Atrial Fibrillation                                               2.0-3.0  Hypercoagulable State                                    2.0.2.3  Left Ventricular Asist Device                            2.0-3.0  Mechanical Heart Valve                                  -    Aortic(with afib, MI, embolism, HF, LA enlargement,    and/or coagulopathy)                                     2.0-3.0 (2.5-3.5)     Mitral                                                             2.5-3.5  Prosthetic/Bioprosthetic Heart Valve               2.0-3.0  Venous thromboembolism (VTE: VT, PE        2.0-3.0    APTT [[id number]]  (Normal) Collected: 03/18/25 0540    Lab Status: Final result Specimen: Blood from Arm, Right Updated: 03/18/25 0607     PTT 28 seconds     Comprehensive metabolic panel [[id number]]  (Abnormal) Collected: 03/18/25 0540    Lab Status: Final result Specimen: Blood from Arm, Right Updated: 03/18/25 0602     Sodium 140 mmol/L      Potassium 3.8 mmol/L      Chloride 106 mmol/L      CO2 25 mmol/L      ANION GAP 9 mmol/L      BUN 19 mg/dL      Creatinine 1.04 mg/dL      Glucose 205 mg/dL      Calcium 9.2 mg/dL      AST 12 U/L      ALT 11 U/L      Alkaline Phosphatase 82 U/L      Total Protein 7.0 g/dL      Albumin 4.2 g/dL      Total Bilirubin 0.48 mg/dL      eGFR 56 ml/min/1.73sq m     Narrative:      [employer] Kid[employer] guidelines for Chronic Kidney Disease (CKD):     Stage 1 with normal or high GFR (GFR > 90 mL/min/1.73 square meters)    Stage 2 Mild CKD (GFR = 60-89 mL/min/1.73 square meters)    Stage 3A Moderate CKD (GFR = 45-59 mL/min/1.73 square meters)    Stage 3B Moderate CKD (GFR = 30-44 mL/min/1.73 square meters)    Stage 4 Severe CKD (GFR = 15-29 mL/min/1.73 square meters)    Stage 5 End  Stage CKD (GFR <15 mL/min/1.73 square meters)  Note: GFR calculation is accurate only with a steady state creatinine    CBC and differential [[id number]] Collected: 03/18/25 0540    Lab Status: Final result Specimen: Blood from Arm, Right Updated: 03/18/25 0547     WBC 8.65 Thousand/uL      RBC 4.62 Million/uL      Hemoglobin 13.8 g/dL      Hematocrit 41.2 %      MCV 89 fL      MCH 29.9 pg      MCHC 33.5 g/dL      RDW 12.8 %      MPV 10.7 fL      Platelets 242 Thousands/uL      nRBC 0 /100 WBCs      Segmented % 70 %      Immature Grans % 0 %      Lymphocytes % 23 %      Monocytes % 5 %      Eosinophils Relative 2 %      Basophils Relative 0 %      Absolute Neutrophils 6.01 Thousands/µL      Absolute Immature Grans 0.03 Thousand/uL      Absolute Lymphocytes 1.98 Thousands/µL      Absolute Monocytes 0.46 Thousand/µL      Eosinophils Absolute 0.14 Thousand/µL      Basophils Absolute 0.03 Thousands/µL     POCT Blood Gas (CG8+) [[id number]]  (Abnormal) Collected: 03/18/25 0541    Lab Status: Final result Specimen: Venous Updated: 03/18/25 0544     ph, Omar ISTAT 7.470     pCO2, Omar i-STAT 38.1 mm HG      pO2, Omar i-STAT 38.0 mm HG      BE, i-STAT 4 mmol/L      HCO3, Omar i-STAT 27.7 mmol/L      CO2, i-STAT 29 mmol/L      O2 Sat, i-STAT 76 %      SODIUM, I-STAT 141 mmol/l      Potassium, i-STAT 4.4 mmol/L      Calcium, Ionized i-STAT 1.12 mmol/L      Hct, i-STAT 40 %      Hgb, i-STAT 13.6 g/dl      Glucose, i-STAT 202 mg/dl      Specimen Type VENOUS            CTA chest pe study   Final Interpretation by Leroy Jones MD (03/18 0649)      1.  No acute pulmonary embolism or thoracic aortic aneurysm/dissection.   2.  Moderate bilateral pleural effusions with bibasilar dependent atelectasis. Scattered areas of hazy airspace opacity are seen bilaterally with lower lobe predominance likely due to infectious/inflammatory process of the lung parenchyma. Recommend    clinical correlation.                  Workstation performed:  [id number]         XR chest portable    (Results Pending)       Procedures    ED Medication and Procedure Management   Prior to Admission Medications   Prescriptions Last Dose Informant Patient Reported? Taking?   Ascorbic Acid (VITAMIN C) 1000 MG tablet  Self Yes No   Sig: Take 1,000 mg by mouth daily   BD Pen Needle Yolis 2nd Gen 32G X 4 MM MISC  Self No No   Sig: Use 3 (three) times a day   CHROMIUM PO  Self Yes No   Sig: Take by mouth 2 (two) times a day   Insulin Pen Needle (BD Pen Needle Yolis 2nd Gen) 32G X 4 MM MISC  Self No No   Sig: For use with insulin pen. Pharmacy may dispense brand covered by insurance.   L-LYSINE PO  Self Yes No   Sig: Take 500 mg by mouth daily   Probiotic Product (PROBIOTIC DAILY PO)  Self Yes No   Sig: Take 1 capsule by mouth daily   cholecalciferol (VITAMIN D3) 1,000 units tablet  Self Yes No   Sig: Take 1,000 Units by mouth daily   cyclobenzaprine (FLEXERIL) 10 mg tablet   No No   Sig: Take 1 tablet (10 mg total) by mouth 3 (three) times a day as needed for muscle spasms   ferrous sulfate 325 (65 Fe) mg tablet  Self Yes No   Sig: Take 325 mg by mouth daily    insulin aspart protamine-insulin aspart (NovoLOG Mix 70/30 FlexPen) 100 Units/mL injection pen  Self No No   Sig: Inject 20 Units under the skin 2 (two) times a day before meals   pantoprazole (PROTONIX) 40 mg tablet  Self No No   Sig: Take 1 tablet (40 mg total) by mouth daily before breakfast      Facility-Administered Medications: None     Patient's Medications   Discharge Prescriptions    No medications on file     No discharge procedures on file.  ED SEPSIS DOCUMENTATION   Time reflects when diagnosis was documented in both MDM as applicable and the Disposition within this note       Time User Action Codes Description Comment    3/18/2025  6:28 AM Nafisa Cano Add [I45.81] Prolonged QT syndrome     3/18/2025  6:28 AM Nafisa Cano Remove [I45.81] Prolonged QT syndrome     3/18/2025  6:29 AM Nafisa Cano Add [R94.31] Prolonged  Q-T interval on ECG     3/18/2025  6:29 AM Nafisa Cano [R06.02] Shortness of breath     3/18/2025  6:29 AM Nafisa Cano [I16.0] Hypertensive urgency     3/18/2025  6:48 AM Nafisa Cano [J90] Pleural effusion                  Nafisa Cano, DO  03/18/25 0705

## 2025-03-18 NOTE — BRIEF OP NOTE (RAD/CATH)
INTERVENTIONAL RADIOLOGY PROCEDURE NOTE    Date: 3/18/2025    Procedure: Bilateral thorqacentesis  Procedure Summary       Date:  Room / Location:     Anesthesia Start:  Anesthesia Stop:     Procedure:  Diagnosis:     Scheduled Providers:  Responsible Provider:     Anesthesia Type: Not recorded ASA Status: Not recorded            Preoperative diagnosis:   1. Prolonged Q-T interval on ECG    2. Shortness of breath    3. Hypertensive urgency    4. Pleural effusion         Postoperative diagnosis: Same.    Surgeon: Mamadou Gamino MD     Assistant: None. No qualified resident was available.    Blood loss: None    Specimens: Bilateral pleural fluid     Findings: US guided bilateral thoracentesis performed and 775 ml right and 650 ml left serous fluid obtained.    Complications: None immediate.    Anesthesia: local

## 2025-03-18 NOTE — ASSESSMENT & PLAN NOTE
Patient presenting due to shortness of breath and chest pain    Patient with a history of noncompliance and declining medications for blood pressure and hyperlipidemia    Blood pressure on arrival 233/107  EKG showing no ischemic changes however prolonged QT  Troponin negative    Creatinine at baseline at 1.04  D-dimer slightly elevated at 1.09-> CTA PE negative obtain venous Dopplers  Initiated nifedipine  Labetalol 10 mg every 6 hours for systolic blood pressure greater than 210  Goal blood pressure -190  Obtain echo

## 2025-03-18 NOTE — ASSESSMENT & PLAN NOTE
Lab Results   Component Value Date    HGBA1C 8.5 (H) 12/04/2024       Recent Labs     03/18/25  0837 03/18/25  1348   POCGLU 183* 128       Blood Sugar Average: Last 72 hrs:  (P) 155.5  Obtain updated A1c  Insulin sliding scale  ADA diet  Hypoglycemic protocol  Continue NovoLog 20 units twice daily

## 2025-03-18 NOTE — ASSESSMENT & PLAN NOTE
Patient with bilateral pleural effusions  IR consulted for bilateral thoracentesis  LDH ordered  Patient received Lasix 40 mg x 1

## 2025-03-18 NOTE — H&P
H&P - Hospitalist   Name: Nathalia Monge 64 y.o. female I MRN: [id number]  Unit/Bed#: ED 10 I Date of Admission: 3/18/2025   Date of Service: 3/18/2025 I Hospital Day: 0     Assessment & Plan  Hypertensive urgency  Patient presenting due to shortness of breath and chest pain    Patient with a history of noncompliance and declining medications for blood pressure and hyperlipidemia    Blood pressure on arrival 233/107  EKG showing no ischemic changes however prolonged QT  Troponin negative    Creatinine at baseline at 1.04  D-dimer slightly elevated at 1.09-> CTA PE negative obtain venous Dopplers  Initiated nifedipine  Labetalol 10 mg every 6 hours for systolic blood pressure greater than 210  Goal blood pressure -190  Obtain echo    Bilateral pleural effusion  Patient with bilateral pleural effusions  IR consulted for bilateral thoracentesis  LDH ordered  Patient received Lasix 40 mg x 1    Prolonged QT interval  Daily EKGs  Giving magnesium  Type 2 diabetes mellitus with hyperglycemia, with long-term current use of insulin ([facility])  Lab Results   Component Value Date    HGBA1C 8.5 (H) 12/04/2024       Recent Labs     03/18/25  0837 03/18/25  1348   POCGLU 183* 128       Blood Sugar Average: Last 72 hrs:  (P) 155.5  Obtain updated A1c  Insulin sliding scale  ADA diet  Hypoglycemic protocol  Continue NovoLog 20 units twice daily  Mixed hyperlipidemia  Obtain lipid panel  Patient declined statin therapy previously      VTE Pharmacologic Prophylaxis: VTE Score: 3 Moderate Risk (Score 3-4) - Pharmacological DVT Prophylaxis Ordered: enoxaparin (Lovenox).  Code Status: Level 1 - Full Code   Discussion with family: Patient declined call to .     Anticipated Length of Stay: Patient will be admitted on an inpatient basis with an anticipated length of stay of greater than 2 midnights secondary to hypertensive urgency.    History of Present Illness   Chief Complaint: Shortness of breath chest pain    Nathalia  Saleem is a 64 y.o. female with a PMH of hypertension, diabetes, hyperlipidemia who presents with shortness of breath on exertion orthopnea hypertensive urgency possible pulmonary edema and chest pain.  Patient noted to have significantly elevated blood pressures on arrival.  Patient started on nifedipine.  Patient given Lasix due to pulmonary edema/bilateral pleural effusions.  IR consulted for bilateral pleural effusions LDH ordered.  Blood pressures have since significantly improved.  Will obtain venous Dopplers due to elevated D-dimer.  Patient with prolonged QT will give mag and obtain daily EKGs.  Patient with a history of noncompliance and declining medication for blood pressure and hyperlipidemia.  Echo ordered.    Review of Systems    Historical Information   Past Medical History:   Diagnosis Date    Bipolar depression (HCC)     Cancer (HCC)     Depression     Diabetes mellitus (HCC)     Epilepsy (HCC)     Hypertension     Miscarriage     PTSD (post-traumatic stress disorder)     Seizures (HCC)      Past Surgical History:   Procedure Laterality Date    BRAIN SURGERY      CHOLECYSTECTOMY      EYE SURGERY      HYSTERECTOMY      IMPLANTABLE CONTACT LENS IMPLANTATION      VT CRNEC TREPH BONE FLAP CRNOT EXC BRAIN TUMOR STTL Right 03/15/2018    Procedure: IMAGE-GUIDED RIGHT FRONTOPARIETAL CRANIOTOMY FOR TUMOR;  Surgeon: Arsh Jeronimo MD;  Location: [facility];  Service: Neurosurgery     Social History     Tobacco Use    Smoking status: Former     Current packs/day: 0.00     Types: Cigarettes     Quit date: 3/5/2000     Years since quittin.0    Smokeless tobacco: Never   Vaping Use    Vaping status: Never Used   Substance and Sexual Activity    Alcohol use: Never    Drug use: No    Sexual activity: Not Currently     Partners: Male     Birth control/protection: Post-menopausal     Comment: Everything has been removed and haven't had sex since      E-Cigarette/Vaping    E-Cigarette Use Never  User      E-Cigarette/Vaping Substances    Nicotine No     THC No     CBD No     Flavoring No     Other No     Unknown No      Family History   Adopted: Yes     Social History:  Marital Status: Unknown   Occupation:   Patient Pre-hospital Living Situation: Home  Patient Pre-hospital Level of Mobility: walks  Patient Pre-hospital Diet Restrictions: none    Meds/Allergies   I have reviewed home medications using recent Epic encounter.  Prior to Admission medications    Medication Sig Start Date End Date Taking? Authorizing Provider   Ascorbic Acid (VITAMIN C) 1000 MG tablet Take 1,000 mg by mouth daily    Historical Provider, MD   BD Pen Needle Yolis 2nd Gen 32G X 4 MM MISC Use 3 (three) times a day 9/26/24   SHASHANK Miguel   cholecalciferol (VITAMIN D3) 1,000 units tablet Take 1,000 Units by mouth daily    Historical Provider, MD   CHROMIUM PO Take by mouth 2 (two) times a day    Historical Provider, MD   cyclobenzaprine (FLEXERIL) 10 mg tablet Take 1 tablet (10 mg total) by mouth 3 (three) times a day as needed for muscle spasms 2/10/25   SHASHANK Miguel   ferrous sulfate 325 (65 Fe) mg tablet Take 325 mg by mouth daily     Historical Provider, MD   insulin aspart protamine-insulin aspart (NovoLOG Mix 70/30 FlexPen) 100 Units/mL injection pen Inject 20 Units under the skin 2 (two) times a day before meals 1/23/25 7/22/25  Nelson Garner MD   Insulin Pen Needle (BD Pen Needle Yolis 2nd Gen) 32G X 4 MM MISC For use with insulin pen. Pharmacy may dispense brand covered by insurance. 1/4/24   Steven Bray DO   L-LYSINE PO Take 500 mg by mouth daily    Historical Provider, MD   pantoprazole (PROTONIX) 40 mg tablet Take 1 tablet (40 mg total) by mouth daily before breakfast 12/16/24 12/11/25  SHASHANK Miguel   Probiotic Product (PROBIOTIC DAILY PO) Take 1 capsule by mouth daily    Historical Provider, MD     Allergies   Allergen Reactions    Dilantin [Phenytoin]     Gabapentin  Other (See Comments)     Anger/irritability/mood disturbance    Lidocaine Seizures    Statins        Objective :  Temp:  [97.9 °F (36.6 °C)] 97.9 °F (36.6 °C)  HR:  [] 83  BP: (116-237)/() 126/62  Resp:  [18-24] 18  SpO2:  [67 %-97 %] 95 %  O2 Device: None (Room air)    Physical Exam  Vitals and nursing note reviewed.   Constitutional:       General: She is not in acute distress.     Appearance: She is obese. She is not ill-appearing.   HENT:      Head: Normocephalic and atraumatic.   Cardiovascular:      Rate and Rhythm: Normal rate and regular rhythm.      Pulses: Normal pulses.      Heart sounds: Normal heart sounds.   Pulmonary:      Effort: Pulmonary effort is normal.      Breath sounds: Normal breath sounds.      Comments: diminished  Abdominal:      General: Abdomen is flat. Bowel sounds are normal.      Palpations: Abdomen is soft.   Musculoskeletal:      Right lower leg: No edema.      Left lower leg: No edema.   Skin:     General: Skin is warm.   Neurological:      General: No focal deficit present.      Mental Status: She is alert and oriented to person, place, and time.          Lines/Drains:            Lab Results: I have reviewed the following results:  Results from last 7 days   Lab Units 03/18/25  0541 03/18/25  0540   WBC Thousand/uL  --  8.65   HEMOGLOBIN g/dL  --  13.8   I STAT HEMOGLOBIN g/dl 13.6  --    HEMATOCRIT %  --  41.2   HEMATOCRIT, ISTAT % 40  --    PLATELETS Thousands/uL  --  242   SEGS PCT %  --  70   LYMPHO PCT %  --  23   MONO PCT %  --  5   EOS PCT %  --  2     Results from last 7 days   Lab Units 03/18/25  0541 03/18/25  0540   SODIUM mmol/L  --  140   POTASSIUM mmol/L  --  3.8   CHLORIDE mmol/L  --  106   CO2 mmol/L  --  25   CO2, I-STAT mmol/L 29  --    BUN mg/dL  --  19   CREATININE mg/dL  --  1.04   ANION GAP mmol/L  --  9   CALCIUM mg/dL  --  9.2   ALBUMIN g/dL  --  4.2   TOTAL BILIRUBIN mg/dL  --  0.48   ALK PHOS U/L  --  82   ALT U/L  --  11   AST U/L  --  12*    GLUCOSE RANDOM mg/dL  --  205*     Results from last 7 days   Lab Units 03/18/25  0540   INR  0.94     Results from last 7 days   Lab Units 03/18/25  1348 03/18/25  0837   POC GLUCOSE mg/dl 128 183*     Lab Results   Component Value Date    HGBA1C 8.5 (H) 12/04/2024    HGBA1C 7.1 (A) 07/16/2024    HGBA1C 7.8 (H) 04/16/2024           Imaging Results Review: I personally reviewed the following image studies in PACS and associated radiology reports: chest xray. My interpretation of the radiology images/reports is: Bilateral pleural effusion.  Other Study Results Review: EKG was reviewed.  EKG was personally reviewed and my interpretation is: Personally Reviewed. NSR. HR 85..    Administrative Statements   I have spent a total time of 80 minutes in caring for this patient on the day of the visit/encounter including Diagnostic results, Prognosis, Risks and benefits of tx options, Instructions for management, Patient and family education, Importance of tx compliance, Risk factor reductions, Impressions, Counseling / Coordination of care, Documenting in the medical record, Reviewing/placing orders in the medical record (including tests, medications, and/or procedures), Obtaining or reviewing history  , and Communicating with other healthcare professionals .    ** Please Note: This note has been constructed using a voice recognition system. **

## 2025-03-18 NOTE — DISCHARGE INSTRUCTIONS
Thoracentesis   WHAT YOU NEED TO KNOW:   A thoracentesis is a procedure to remove extra fluid or air from between your lungs and your inner chest wall. Air or fluid buildup may make it hard for you to breathe. A thoracentesis allows your lungs to expand fully so you can breathe more easily.  DISCHARGE INSTRUCTIONS:   Medicines:   Pain medicine:  You may be given a prescription medicine to decrease pain. Do not wait until the pain is severe before you take your medicine.    Antibiotics:  This medicine helps fight or prevent an infection.    Take your medicine as directed.  Call your healthcare provider if you think your medicine is not helping or if you have side effects. Tell him if you are allergic to any medicine. Keep a list of the medicines, vitamins, and herbs you take. Include the amounts, and when and why you take them. Bring the list or the pill bottles to follow-up visits. Carry your medicine list with you in case of an emergency.  Follow up with your healthcare provider as directed:  Write down your questions so you remember to ask them during your visits.   Rest:  Rest when you feel it is needed. Slowly start to do more each day. Return to your daily activities as directed.   Do not smoke:  If you smoke, it is never too late to quit. Ask for information about how to stop smoking if you need help.  Contact your healthcare provider if:   You have a fever.    Your puncture site is red, warm, swollen, or draining pus.    You have questions or concerns about your procedure, medicine, or care.    If you have any questions regarding, call the IR department @ [phone].     Seek care immediately or call 911 if:   Blood soaks through your bandage.    There is blood in your spit.

## 2025-03-18 NOTE — SEDATION DOCUMENTATION
Bedside B/L thoracentesis done in ER. Right 775ml dark yellow fluid removed. Left 650ml dark yellow fluid removed. Specimen sent to lab, band aid to site. Care assumed by primary RN.

## 2025-03-19 VITALS
HEIGHT: 65 IN | DIASTOLIC BLOOD PRESSURE: 78 MMHG | OXYGEN SATURATION: 96 % | SYSTOLIC BLOOD PRESSURE: 132 MMHG | HEART RATE: 84 BPM | WEIGHT: 197.97 LBS | BODY MASS INDEX: 32.98 KG/M2 | TEMPERATURE: 98.5 F | RESPIRATION RATE: 20 BRPM

## 2025-03-19 PROBLEM — I50.42 CHRONIC COMBINED SYSTOLIC AND DIASTOLIC CONGESTIVE HEART FAILURE (HCC): Status: ACTIVE | Noted: 2025-03-19

## 2025-03-19 LAB
ALBUMIN SERPL BCG-MCNC: 3.7 G/DL (ref 3.5–5)
ALP SERPL-CCNC: 58 U/L (ref 34–104)
ALT SERPL W P-5'-P-CCNC: 6 U/L (ref 7–52)
ANION GAP SERPL CALCULATED.3IONS-SCNC: 8 MMOL/L (ref 4–13)
AORTIC ROOT: 3.2 CM
AORTIC VALVE MEAN VELOCITY: 11 M/S
ASCENDING AORTA: 2.8 CM
AST SERPL W P-5'-P-CCNC: 11 U/L (ref 13–39)
ATRIAL RATE: 80 BPM
AV AREA BY CONTINUOUS VTI: 1.4 CM2
AV AREA PEAK VELOCITY: 1.4 CM2
AV LVOT MEAN GRADIENT: 2 MMHG
AV LVOT PEAK GRADIENT: 3 MMHG
AV MEAN PRESS GRAD SYS DOP V1V2: 6 MMHG
AV ORIFICE AREA US: 1.44 CM2
AV PEAK GRADIENT: 11 MMHG
AV VELOCITY RATIO: 0.51
BASOPHILS # BLD AUTO: 0.05 THOUSANDS/ÂΜL (ref 0–0.1)
BASOPHILS NFR BLD AUTO: 1 % (ref 0–1)
BILIRUB SERPL-MCNC: 0.74 MG/DL (ref 0.2–1)
BSA FOR ECHO PROCEDURE: 1.97 M2
BUN SERPL-MCNC: 23 MG/DL (ref 5–25)
CALCIUM SERPL-MCNC: 8.5 MG/DL (ref 8.4–10.2)
CHLORIDE SERPL-SCNC: 102 MMOL/L (ref 96–108)
CHOLEST SERPL-MCNC: 190 MG/DL (ref ?–200)
CO2 SERPL-SCNC: 29 MMOL/L (ref 21–32)
CREAT SERPL-MCNC: 1.23 MG/DL (ref 0.6–1.3)
DOP CALC AO VTI: 30.9 CM
DOP CALC LVOT AREA: 2.83
DOP CALC LVOT CARDIAC OUTPUT: 3.8 L/MIN
DOP CALC LVOT DIAMETER: 1.9 CM
DOP CALC LVOT PEAK VEL VTI: 15.7 CM
DOP CALC LVOT PEAK VEL: 0.85 M/S
DOP CALC LVOT STROKE INDEX: 22.8 ML/M2
DOP CALC LVOT STROKE VOLUME: 44.49
E WAVE DECELERATION TIME: 154 MS
E/A RATIO: 1.11
EOSINOPHIL # BLD AUTO: 0.15 THOUSAND/ÂΜL (ref 0–0.61)
EOSINOPHIL NFR BLD AUTO: 2 % (ref 0–6)
ERYTHROCYTE [DISTWIDTH] IN BLOOD BY AUTOMATED COUNT: 12.8 % (ref 11.6–15.1)
FRACTIONAL SHORTENING: 20 (ref 28–44)
GFR SERPL CREATININE-BSD FRML MDRD: 46 ML/MIN/1.73SQ M
GLUCOSE SERPL-MCNC: 110 MG/DL (ref 65–140)
GLUCOSE SERPL-MCNC: 122 MG/DL (ref 65–140)
GLUCOSE SERPL-MCNC: 123 MG/DL (ref 65–140)
HCT VFR BLD AUTO: 36.9 % (ref 34.8–46.1)
HDLC SERPL-MCNC: 42 MG/DL
HGB BLD-MCNC: 12.1 G/DL (ref 11.5–15.4)
IMM GRANULOCYTES # BLD AUTO: 0.03 THOUSAND/UL (ref 0–0.2)
IMM GRANULOCYTES NFR BLD AUTO: 0 % (ref 0–2)
INTERVENTRICULAR SEPTUM IN DIASTOLE (PARASTERNAL SHORT AXIS VIEW): 1.1 CM
INTERVENTRICULAR SEPTUM: 1.1 CM (ref 0.6–1.1)
LA/AORTA RATIO 2D: 1
LAAS-AP2: 20.8 CM2
LAAS-AP4: 14.6 CM2
LDLC SERPL CALC-MCNC: 120 MG/DL (ref 0–100)
LEFT ATRIUM SIZE: 3.2 CM
LEFT ATRIUM VOLUME (MOD BIPLANE): 45 ML
LEFT ATRIUM VOLUME INDEX (MOD BIPLANE): 22.8 ML/M2
LEFT INTERNAL DIMENSION IN SYSTOLE: 3.9 CM (ref 2.1–4)
LEFT VENTRICLE DIASTOLIC VOLUME (MOD BIPLANE): 129 ML
LEFT VENTRICLE DIASTOLIC VOLUME INDEX (MOD BIPLANE): 65.5 ML/M2
LEFT VENTRICLE SYSTOLIC VOLUME (MOD BIPLANE): 90 ML
LEFT VENTRICLE SYSTOLIC VOLUME INDEX (MOD BIPLANE): 45.7 ML/M2
LEFT VENTRICULAR INTERNAL DIMENSION IN DIASTOLE: 4.9 CM (ref 3.5–6)
LEFT VENTRICULAR POSTERIOR WALL IN END DIASTOLE: 1.3 CM
LEFT VENTRICULAR STROKE VOLUME: 46 ML
LV EF BIPLANE MOD: 30 %
LV EF US.2D.A4C+ESTIMATED: 25 %
LVSV (TEICH): 46 ML
LYMPHOCYTES # BLD AUTO: 1.74 THOUSANDS/ÂΜL (ref 0.6–4.47)
LYMPHOCYTES NFR BLD AUTO: 22 % (ref 14–44)
MAGNESIUM SERPL-MCNC: 1.9 MG/DL (ref 1.9–2.7)
MCH RBC QN AUTO: 29.4 PG (ref 26.8–34.3)
MCHC RBC AUTO-ENTMCNC: 32.8 G/DL (ref 31.4–37.4)
MCV RBC AUTO: 90 FL (ref 82–98)
MONOCYTES # BLD AUTO: 0.49 THOUSAND/ÂΜL (ref 0.17–1.22)
MONOCYTES NFR BLD AUTO: 6 % (ref 4–12)
MV E'TISSUE VEL-LAT: 8 CM/S
MV E'TISSUE VEL-SEP: 5 CM/S
MV PEAK A VEL: 0.74 M/S
MV PEAK E VEL: 82 CM/S
MV STENOSIS PRESSURE HALF TIME: 45 MS
MV VALVE AREA P 1/2 METHOD: 4.89
NEUTROPHILS # BLD AUTO: 5.3 THOUSANDS/ÂΜL (ref 1.85–7.62)
NEUTS SEG NFR BLD AUTO: 69 % (ref 43–75)
NONHDLC SERPL-MCNC: 148 MG/DL
NRBC BLD AUTO-RTO: 0 /100 WBCS
P AXIS: 74 DEGREES
PHOSPHATE SERPL-MCNC: 4.4 MG/DL (ref 2.3–4.1)
PLATELET # BLD AUTO: 217 THOUSANDS/UL (ref 149–390)
PMV BLD AUTO: 10.5 FL (ref 8.9–12.7)
POTASSIUM SERPL-SCNC: 3.4 MMOL/L (ref 3.5–5.3)
PR INTERVAL: 174 MS
PROT SERPL-MCNC: 6.2 G/DL (ref 6.4–8.4)
QRS AXIS: 87 DEGREES
QRSD INTERVAL: 88 MS
QT INTERVAL: 432 MS
QTC INTERVAL: 498 MS
RBC # BLD AUTO: 4.11 MILLION/UL (ref 3.81–5.12)
RIGHT VENTRICLE ID DIMENSION: 3.1 CM
SL CV LV EF: 30
SL CV PED ECHO LEFT VENTRICLE DIASTOLIC VOLUME (MOD BIPLANE) 2D: 112 ML
SL CV PED ECHO LEFT VENTRICLE SYSTOLIC VOLUME (MOD BIPLANE) 2D: 66 ML
SODIUM SERPL-SCNC: 139 MMOL/L (ref 135–147)
T WAVE AXIS: 90 DEGREES
TRICUSPID ANNULAR PLANE SYSTOLIC EXCURSION: 2.2 CM
TRIGL SERPL-MCNC: 138 MG/DL (ref ?–150)
VENTRICULAR RATE: 80 BPM
WBC # BLD AUTO: 7.76 THOUSAND/UL (ref 4.31–10.16)

## 2025-03-19 PROCEDURE — 80061 LIPID PANEL: CPT | Performed by: STUDENT IN AN ORGANIZED HEALTH CARE EDUCATION/TRAINING PROGRAM

## 2025-03-19 PROCEDURE — 82948 REAGENT STRIP/BLOOD GLUCOSE: CPT

## 2025-03-19 PROCEDURE — 80053 COMPREHEN METABOLIC PANEL: CPT | Performed by: STUDENT IN AN ORGANIZED HEALTH CARE EDUCATION/TRAINING PROGRAM

## 2025-03-19 PROCEDURE — 93010 ELECTROCARDIOGRAM REPORT: CPT | Performed by: INTERNAL MEDICINE

## 2025-03-19 PROCEDURE — 83735 ASSAY OF MAGNESIUM: CPT | Performed by: STUDENT IN AN ORGANIZED HEALTH CARE EDUCATION/TRAINING PROGRAM

## 2025-03-19 PROCEDURE — 84100 ASSAY OF PHOSPHORUS: CPT | Performed by: STUDENT IN AN ORGANIZED HEALTH CARE EDUCATION/TRAINING PROGRAM

## 2025-03-19 PROCEDURE — 93005 ELECTROCARDIOGRAM TRACING: CPT

## 2025-03-19 PROCEDURE — 99239 HOSP IP/OBS DSCHRG MGMT >30: CPT | Performed by: STUDENT IN AN ORGANIZED HEALTH CARE EDUCATION/TRAINING PROGRAM

## 2025-03-19 PROCEDURE — 99222 1ST HOSP IP/OBS MODERATE 55: CPT | Performed by: INTERNAL MEDICINE

## 2025-03-19 PROCEDURE — 93306 TTE W/DOPPLER COMPLETE: CPT | Performed by: INTERNAL MEDICINE

## 2025-03-19 PROCEDURE — 85025 COMPLETE CBC W/AUTO DIFF WBC: CPT | Performed by: STUDENT IN AN ORGANIZED HEALTH CARE EDUCATION/TRAINING PROGRAM

## 2025-03-19 RX ORDER — CARVEDILOL 3.12 MG/1
6.25 TABLET ORAL 2 TIMES DAILY WITH MEALS
Status: DISCONTINUED | OUTPATIENT
Start: 2025-03-19 | End: 2025-03-19 | Stop reason: HOSPADM

## 2025-03-19 RX ORDER — POTASSIUM CHLORIDE 1500 MG/1
20 TABLET, EXTENDED RELEASE ORAL ONCE
Status: COMPLETED | OUTPATIENT
Start: 2025-03-19 | End: 2025-03-19

## 2025-03-19 RX ORDER — LISINOPRIL 10 MG/1
10 TABLET ORAL DAILY
Status: DISCONTINUED | OUTPATIENT
Start: 2025-03-19 | End: 2025-03-19 | Stop reason: HOSPADM

## 2025-03-19 RX ORDER — NIFEDIPINE 30 MG
30 TABLET, EXTENDED RELEASE ORAL DAILY
Qty: 30 TABLET | Refills: 0 | Status: SHIPPED | OUTPATIENT
Start: 2025-03-20 | End: 2025-03-19

## 2025-03-19 RX ORDER — LISINOPRIL 10 MG/1
10 TABLET ORAL DAILY
Qty: 30 TABLET | Refills: 0 | Status: SHIPPED | OUTPATIENT
Start: 2025-03-20 | End: 2025-04-19

## 2025-03-19 RX ORDER — CARVEDILOL 6.25 MG/1
6.25 TABLET ORAL 2 TIMES DAILY WITH MEALS
Qty: 60 TABLET | Refills: 0 | Status: SHIPPED | OUTPATIENT
Start: 2025-03-19 | End: 2025-04-18

## 2025-03-19 RX ORDER — FUROSEMIDE 20 MG/1
20 TABLET ORAL DAILY
Qty: 30 TABLET | Refills: 0 | Status: SHIPPED | OUTPATIENT
Start: 2025-03-19 | End: 2025-04-18

## 2025-03-19 RX ADMIN — POTASSIUM CHLORIDE 20 MEQ: 1500 TABLET, EXTENDED RELEASE ORAL at 09:40

## 2025-03-19 RX ADMIN — ENOXAPARIN SODIUM 40 MG: 40 INJECTION SUBCUTANEOUS at 09:29

## 2025-03-19 RX ADMIN — INSULIN ASPART 20 UNITS: 100 INJECTION, SUSPENSION SUBCUTANEOUS at 09:40

## 2025-03-19 RX ADMIN — NIFEDIPINE 30 MG: 30 TABLET, FILM COATED, EXTENDED RELEASE ORAL at 09:30

## 2025-03-19 RX ADMIN — LISINOPRIL 10 MG: 10 TABLET ORAL at 12:14

## 2025-03-19 NOTE — NURSING NOTE
Discharge education reviewed and provided to patient and . Patient escorted by PCA in wheelchair to  vehicle. All questions answered.

## 2025-03-19 NOTE — DISCHARGE SUMMARY
Discharge Summary - Hospitalist   Name: Nathalia Monge 64 y.o. female I MRN: [id number]  Unit/Bed#: -01 I Date of Admission: 3/18/2025   Date of Service: 3/19/2025 I Hospital Day: 1     Assessment & Plan  Hypertensive urgency  Patient presenting due to shortness of breath and chest pain    Patient with a history of noncompliance and declining medications for blood pressure and hyperlipidemia    Blood pressure on arrival 233/107  EKG showing no ischemic changes however prolonged QT  Troponin negative    Creatinine at baseline at 1.04  D-dimer slightly elevated at 1.09-> CTA PE negative venous Dopplers negative  Continue nifedipine 30 qd and lasix 20 qd  Echo showing an LVEF of 30% and G2dd  F/u with PCP, cardio referral on discharge   Bilateral pleural effusion  Patient with bilateral pleural effusions  3/18 IR bilateral thoracentesis 775 on the right and 650 on the left  LDH ordered  S/p  Lasix 40 mg x 1  Transudative in nature  Prolonged QT interval  Daily EKGs  Giving magnesium  improved  Type 2 diabetes mellitus with hyperglycemia, with long-term current use of insulin (HCC)  Lab Results   Component Value Date    HGBA1C 7.4 (H) 03/18/2025       Recent Labs     03/18/25  1348 03/18/25  1616 03/18/25  2200 03/19/25  0813   POCGLU 128 162* 156* 123       Blood Sugar Average: Last 72 hrs:  (P) 150.4  Obtain updated A1c  Insulin sliding scale  ADA diet  Hypoglycemic protocol  Continue NovoLog 20 units twice daily  Mixed hyperlipidemia  lipid panel showing elevated  low hdl 42  Patient declined statin therapy previously  Chronic combined systolic and diastolic congestive heart failure (HCC)  Wt Readings from Last 3 Encounters:   03/18/25 89.8 kg (197 lb 15.6 oz)   03/06/25 89.8 kg (198 lb)   02/10/25 89.8 kg (198 lb)     See mgx above  NOT in exacerbation          Medical Problems       Resolved Problems  Date Reviewed: 3/6/2025   None       Discharging Physician / Practitioner: Agnes Smith MD  PCP:  SHASHANK Miguel  Admission Date:   Admission Orders (From admission, onward)       Ordered        03/18/25 0704  INPATIENT ADMISSION  Once                          Discharge Date: 03/19/25    Consultations During Hospital Stay:  CM    Procedures Performed:   IR IN-Patient Thoracentesis   Final Result   Impression:   1. Successful ultrasound-guided bilateral thoracentesis yielding 775 mL on the right and 650 mL on the left                  Workstation performed: [id number]          VAS VENOUS DUPLEX - LOWER LIMB BILATERAL   Final Result      CTA chest pe study   Final Result      1.  No acute pulmonary embolism or thoracic aortic aneurysm/dissection.   2.  Moderate bilateral pleural effusions with bibasilar dependent atelectasis. Scattered areas of hazy airspace opacity are seen bilaterally with lower lobe predominance likely due to infectious/inflammatory process of the lung parenchyma. Recommend    clinical correlation.                  Workstation performed: [id number]         XR chest portable   Final Result      Moderate pulmonary edema with moderate pleural effusions.            Workstation performed: [id number]           3/18/25 ECHO:    Left Ventricle: Left ventricular cavity size is mildly dilated. Wall thickness is mildly increased. The left ventricular ejection fraction is 30%. Systolic function is severely reduced. There is severe global hypokinesis with specific regional wall abnormalities. Diastolic function is moderately abnormal, consistent with grade II (pseudonormal) relaxation.    Aortic Valve: There is mild stenosis. The aortic valve mean gradient is 6 mmHg. The dimensionless velocity index is 0.51. The aortic valve area is 1.44 cm2.    Mitral Valve: There is mild regurgitation.    Tricuspid Valve: There is mild regurgitation.    Significant Findings / Test Results:   See above    Incidental Findings:   See above   I reviewed the above mentioned incidental findings with the patient  "and/or family and they expressed understanding.    Test Results Pending at Discharge (will require follow up):   none     Outpatient Tests Requested:  none    Complications:  none known at this time    Reason for Admission: Hypertensive urgency    Hospital Course:   Nathalia Monge is a 64 y.o. female patient who originally presented to the hospital on 3/18/2025 due to shortness of breath and chest pain.  Patient noted to have significant hypertensive urgency on admission EKG showed no ischemic changes or troponin elevation.  Patient with a slightly elevated BNP.  Imaging noting bilateral pleural effusions.  Patient had QT prolongation.  IR was consulted and performed bilateral thoracentesis.  Patient received Lasix with good urine output.  Patient started on nifedipine.  Blood pressures have remained controlled.  She is otherwise remained hemodynamically stable afebrile no acute respiratory distress.  Patient has been medically cleared for discharge.  Patient is to follow-up with her PCP and will have a referral for cardiology.  She will be discharged with nifedipine 30 mg daily and Lasix 20 mg daily.    No notes on file      Please see above list of diagnoses and related plan for additional information.     Condition at Discharge: stable    Discharge Day Visit / Exam:   Subjective:  Nathalia was seen and examined at bedside.  No acute events overnight.  Discussed plan of care.  All questions and concerns were answered and addressed.  Patient has no acute complaints at this time.  Feels significantly improved.  States that her legs are less edematous  Vitals: Blood Pressure: 132/78 (03/19/25 0816)  Pulse: 84 (03/19/25 0816)  Temperature: 98.5 °F (36.9 °C) (03/19/25 0816)  Temp Source: Oral (03/19/25 0816)  Respirations: 20 (03/19/25 0816)  Height: 5' 5\" (165.1 cm) (03/18/25 1411)  Weight - Scale: 89.8 kg (197 lb 15.6 oz) (03/18/25 1411)  SpO2: 96 % (03/19/25 0816)  Physical Exam  Vitals and nursing note reviewed. "   Constitutional:       General: She is not in acute distress.     Appearance: She is obese. She is not ill-appearing.   HENT:      Head: Normocephalic and atraumatic.   Cardiovascular:      Rate and Rhythm: Normal rate and regular rhythm.      Pulses: Normal pulses.      Heart sounds: Normal heart sounds.   Pulmonary:      Effort: Pulmonary effort is normal.      Breath sounds: Normal breath sounds.   Abdominal:      General: Abdomen is flat. Bowel sounds are normal.      Palpations: Abdomen is soft.   Musculoskeletal:      Right lower leg: No edema.      Left lower leg: No edema.   Skin:     General: Skin is warm.   Neurological:      General: No focal deficit present.      Mental Status: She is alert and oriented to person, place, and time.          Discussion with Family: Patient declined call to .     Discharge instructions/Information to patient and family:   See after visit summary for information provided to patient and family.      Provisions for Follow-Up Care:  See after visit summary for information related to follow-up care and any pertinent home health orders.      Mobility at time of Discharge:   Basic Mobility Inpatient Raw Score: 23  JH-HLM Goal: 7: Walk 25 feet or more  JH-HLM Achieved: 7: Walk 25 feet or more  HLM Goal achieved. Continue to encourage appropriate mobility.     Disposition:   Home    Planned Readmission: no     Discharge Medications:  See after visit summary for reconciled discharge medications provided to patient and/or family.      Administrative Statements   Discharge Statement:  I have spent a total time of 40 minutes in caring for this patient on the day of the visit/encounter. >30 minutes of time was spent on: Diagnostic results, Prognosis, Risks and benefits of tx options, Instructions for management, Patient and family education, Importance of tx compliance, Risk factor reductions, Impressions, Counseling / Coordination of care, Documenting in the medical  record, Reviewing / ordering tests, medicine, procedures  , and Communicating with other healthcare professionals .    **Please Note: This note may have been constructed using a voice recognition system**

## 2025-03-19 NOTE — ASSESSMENT & PLAN NOTE
Patient with bilateral pleural effusions  3/18 IR bilateral thoracentesis 775 on the right and 650 on the left  LDH ordered  S/p  Lasix 40 mg x 1  Transudative in nature

## 2025-03-19 NOTE — ASSESSMENT & PLAN NOTE
Lab Results   Component Value Date    HGBA1C 7.4 (H) 03/18/2025       Recent Labs     03/18/25  1348 03/18/25  1616 03/18/25  2200 03/19/25  0813   POCGLU 128 162* 156* 123       Blood Sugar Average: Last 72 hrs:  (P) 150.4  Obtain updated A1c  Insulin sliding scale  ADA diet  Hypoglycemic protocol  Continue NovoLog 20 units twice daily

## 2025-03-19 NOTE — CASE MANAGEMENT
Case Management Assessment & Discharge Planning Note    Patient name Nathalia Monge  Location /-01 MRN [id number]  : 1960 Date 3/19/2025       Current Admission Date: 3/18/2025  Current Admission Diagnosis:Hypertensive urgency   Patient Active Problem List    Diagnosis Date Noted Date Diagnosed    Chronic combined systolic and diastolic congestive heart failure (HCC) 2025     Bilateral pleural effusion 2025     Prolonged QT interval 2025     Thoracic radiculopathy 2025     Degenerative disc disease, thoracic 02/10/2025     Mixed hyperlipidemia 2024     Carpal tunnel syndrome on left 2024     Imaging abnormality 2024     Bacteremia due to Klebsiella pneumoniae 2024     Clostridium difficile carrier 2024     Insurance coverage problems 2024     Obesity (BMI 30.0-34.9) 2024     Renal abscess, right 01/10/2024     Seizure-like activity (HCC) 2024     Hyponatremia 2024     S/P craniotomy 10/21/2020     Meningioma (HCC) 2018     Hypertensive urgency 2018     Urinary urgency 2018     Partial idiopathic epilepsy with seizures of localized onset, intractable, without status epilepticus (HCC)      Type 2 diabetes mellitus with hyperglycemia, with long-term current use of insulin (HCC)        LOS (days): 1  Geometric Mean LOS (GMLOS) (days): 3  Days to GMLOS:1.8     OBJECTIVE:    Risk of Unplanned Readmission Score: 12.61         Current admission status: Inpatient       Preferred Pharmacy:   [facility]  AMAYA BAIRES - [address]  [address]  [place] [address]  Phone: [phone] Fax: [phone]    Primary Care Provider: SHASHANK Miguel    Primary Insurance: LOR NICHOLSON  Secondary Insurance:     ASSESSMENT:  Active Health Care Proxies       Marty Valles Health Care Representative - Significant Other   Primary Phone: [phone] (Mobile)  Home Phone:  [phone]                 Advance Directives  Does patient have a Health Care POA?: No  Was patient offered paperwork?: Yes (declined)  Does patient currently have a Health Care decision maker?: Yes, please see Health Care Proxy section  Does patient have Advance Directives?: No  Was patient offered paperwork?: Yes (declined)  Primary Contact: Marty         Readmission Root Cause  30 Day Readmission: No    Patient Information  Admitted from:: Home  Mental Status: Alert  During Assessment patient was accompanied by: Not accompanied during assessment  Assessment information provided by:: Patient  Primary Caregiver: Self  Support Systems: Self, Spouse/significant other  Living Arrangements: Lives w/ Spouse/significant other    Activities of Daily Living Prior to Admission  Functional Status: Independent  Completes ADLs independently?: Yes  Ambulates independently?: Yes  Does patient use assisted devices?: No  Does patient currently own DME?: No  Does patient have a history of Outpatient Therapy (PT/OT)?: No  Does the patient have a history of Short-Term Rehab?: No  Does patient have a history of HHC?: No  Does patient currently have HHC?: No         Patient Information Continued  Does patient have prescription coverage?: Yes  Can the patient afford their medications and any related supplies (such as glucometers or test strips)?: Yes  Does patient receive dialysis treatments?: No  Does patient have a history of substance abuse?: Yes  Is patient currently in treatment for substance abuse?: N/A - sober  Does patient have a history of Mental Health Diagnosis?: No    Means of Transportation  Means of Transport to Appts:: Family transport      DISCHARGE DETAILS:    Discharge planning discussed with:: patient and  Marty  Freedom of Choice: Yes  Comments - Freedom of Choice: no anticipated dc needs  CM contacted family/caregiver?: Yes  Were Treatment Team discharge recommendations reviewed with patient/caregiver?:  Yes  Did patient/caregiver verbalize understanding of patient care needs?: Yes       Contacts  Patient Contacts: Marty ()  Relationship to Patient:: Family  Contact Method: In Person  Reason/Outcome: Discharge Planning    Requested Home Health Care         Is the patient interested in HHC at discharge?: No    DME Referral Provided  Referral made for DME?: No    Treatment Team Recommendation: Home  Discharge Destination Plan:: Home  Transport at Discharge : Family     Additional Comments: Met with pt and pt's  Marty to discuss the role of CM and to discuss any help pt may need prior to dc. Pt lives with her . Pt performed ADL's indptly pta, no use of DME. No hx of HHC or rehab. 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 NO hx of mental health. Pt's preferred pharmacy is [facility] in [place]. Pt does not drive. Pt's  will transport home at dc.

## 2025-03-19 NOTE — ASSESSMENT & PLAN NOTE
Wt Readings from Last 3 Encounters:   03/18/25 89.8 kg (197 lb 15.6 oz)   03/06/25 89.8 kg (198 lb)   02/10/25 89.8 kg (198 lb)     See mgx above  NOT in exacerbation

## 2025-03-19 NOTE — CONSULTS
Consult - Cardiology   Nathalia Monge 64 y.o. 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   Came in volume overloaded with bilateral pleural effusions s/p IR guided thoracentesis. Fluid analysis shows transudative process.   Placed on PO Lasix 20mg QD for volume maintenance (no diuretics prior to discharge)  BP severely elevated on admission (233/107). Switch nifedipine 30mg QD with Coreg 6.25mg BID and Lisinopril 10mg QD.    Further up titration of GDMT in the outpatient setting  Will need further workup ie exercise MPI versus coronary CTA in the outpatient setting  Telemetry without arrhythmia or significant ectopy  We will arrange for close follow-up in the outpatient setting in 48-72 hours    DIAGNOSTIC DATA:      Telemetry: Sinus Rhythm, HR 70-80s. No events. No ventricular ectopy.   EKG: Sinus Rhythm, Biatrial enlargement, HR 80 bpm  Echo 3/18/2025: EF 30%, severe global hypokinesis, G2DD, mild AS/MR/TR         History of Present Illness:  Nathalia Monge is a 64 y.o. female with PMH as above who presents to [facility] with elevated BP and increased dyspnea. Discovered with bilateral pleural effusions. BNP elevated. HS troponin WNL. EKG showing SR without ischemia. Underwent IR thoracentesis yielding 775 mL on the right and 650 mL on the left and feeling much better. Responded well to IV lasix (40mg x1 with -1.6L output). Now on PO Lasix 20mg daily for maintenance.    PMH for DM type 2, well controlled on Novalog. Hx of HLD with history of statin intolerance (N/V/D) controls with diet and exercise.   No prior history of CVA, PE/DVT, PAD.   She notes history of cardiac murmur dx at age 15. No other cardiac history. Does not see a cardiologist regularly.   Does not know about her family history as she was adopted  Fairly active. Works as a volunteer for [employer]    Past Medical History:        Past Medical History:   Diagnosis Date    Bipolar depression (HCC)     Cancer (HCC)     Depression     Diabetes mellitus (HCC)     Epilepsy (HCC)     Hypertension     Miscarriage 1990's    PTSD (post-traumatic stress disorder) 2002    Seizures (HCC)       Past Surgical History:   Procedure Laterality Date    BRAIN SURGERY      CHOLECYSTECTOMY  2004    EYE SURGERY      HYSTERECTOMY      IMPLANTABLE CONTACT LENS IMPLANTATION      IR THORACENTESIS  3/18/2025    FL CRNEC TREPH BONE FLAP CRNOT EXC BRAIN TUMOR STTL Right 03/15/2018    Procedure: IMAGE-GUIDED RIGHT FRONTOPARIETAL CRANIOTOMY FOR TUMOR;  Surgeon: Arsh Jeronimo MD;  Location: [facility];  Service: Neurosurgery        Allergy:        Allergies   Allergen Reactions    Dilantin [Phenytoin]     Gabapentin Other (See Comments)     Anger/irritability/mood disturbance    Lidocaine Seizures    Statins        Medications:       Prior to Admission medications    Medication Sig Start Date End Date Taking? Authorizing Provider   BD Pen Needle Yolis 2nd Gen 32G X 4 MM MISC Use 3 (three) times a day 9/26/24  Yes SHASHANK Miguel   cholecalciferol (VITAMIN D3) 1,000 units tablet Take 1,000 Units by mouth daily   Yes Historical Provider, MD   CHROMIUM PO Take by mouth 2 (two) times a day   Yes Historical Provider, MD   cyclobenzaprine (FLEXERIL) 10 mg tablet Take 1 tablet (10 mg total) by mouth 3 (three) times a day as needed for muscle spasms 2/10/25  Yes SHASHANK Miguel   ferrous sulfate 325 (65 Fe)  mg tablet Take 325 mg by mouth daily    Yes Historical Provider, MD   insulin aspart protamine-insulin aspart (NovoLOG Mix 70/30 FlexPen) 100 Units/mL injection pen Inject 20 Units under the skin 2 (two) times a day before meals 25 Yes Nelson Garner MD   Insulin Pen Needle (BD Pen Needle Yolis 2nd Gen) 32G X 4 MM MISC For use with insulin pen. Pharmacy may dispense brand covered by insurance. 24  Yes Steven Bray DO   L-LYSINE PO Take 500 mg by mouth daily   Yes Historical Provider, MD   Probiotic Product (PROBIOTIC DAILY PO) Take 1 capsule by mouth daily   Yes Historical Provider, MD   Ascorbic Acid (VITAMIN C) 1000 MG tablet Take 1,000 mg by mouth daily  Patient not taking: Reported on 3/18/2025    Historical Provider, MD   pantoprazole (PROTONIX) 40 mg tablet Take 1 tablet (40 mg total) by mouth daily before breakfast 24  SHASHANK Miguel       Family History:     Family History   Adopted: Yes        Social History:       Social History     Socioeconomic History    Marital status: Unknown     Spouse name: Marty    Number of children: 4    Years of education: Some college    Highest education level: None   Occupational History    Occupation: disabled     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   Tobacco Use    Smoking status: Former     Current packs/day: 0.00     Types: Cigarettes     Quit date: 3/5/2000     Years since quittin.0    Smokeless tobacco: Never   Vaping Use    Vaping status: Never Used   Substance and Sexual Activity    Alcohol use: Never    Drug use: No    Sexual activity: Not Currently     Partners: Male     Birth control/protection: Post-menopausal     Comment: Everything has been removed and haven't had sex since    Other Topics Concern    None   Social History Narrative    Lives at home with significant otherMarty     Social Drivers of Health     Financial Resource Strain: Low Risk  (1/15/2024)    Overall Financial Resource  Strain (CARDIA)     Difficulty of Paying Living Expenses: Not hard at all   Food Insecurity: No Food Insecurity (1/23/2025)    Hunger Vital Sign     Worried About Running Out of Food in the Last Year: Never true     Ran Out of Food in the Last Year: Never true   Transportation Needs: No Transportation Needs (1/23/2025)    PRAPARE - Transportation     Lack of Transportation (Medical): No     Lack of Transportation (Non-Medical): No   Physical Activity: Not on file   Stress: Not on file   Social Connections: Not on file   Intimate Partner Violence: Not on file   Housing Stability: Unknown (1/23/2025)    Housing Stability Vital Sign     Unable to Pay for Housing in the Last Year: No     Number of Times Moved in the Last Year: Not on file     Homeless in the Last Year: No       Weights/BMI:    Wt Readings from Last 3 Encounters:   03/18/25 89.8 kg (197 lb 15.6 oz)   03/06/25 89.8 kg (198 lb)   02/10/25 89.8 kg (198 lb)   , Body mass index is 32.94 kg/m².      ROS:  14 point ROS negative except as outlined above  Remainder review of systems is negative    Exam:  General: Alert, oriented and in no acute distress, cooperative  Head: Normocephalic, atraumatic.  Eyes: PERRLA. No icterus. Normal Conjunctiva.   Oropharynx: Moist and normal-appearing mucosa  Neck: Supple, symmetrical, trachea midline, JVD not appreciated.   Heart: RRR, no murmur, rub or gallop, S1 & S2 normal   Lungs: Normal air entry, lungs clear to auscultation and no rales, rhonchi or wheezing   Abdomen: Flat, normal findings: bowel sounds normal and soft, non-tender  Lower Limbs:  No pitting edema, 2+ peripheral pulses, capillary refill within normal limits  Musculoskeletal: ROM grossly normal

## 2025-03-19 NOTE — PLAN OF CARE
Problem: Potential for Falls  Goal: Patient will remain free of falls  Description: INTERVENTIONS:  - Educate patient/family on patient safety including physical limitations  - Instruct patient to call for assistance with activity   - Consult OT/PT to assist with strengthening/mobility   - Keep Call bell within reach  - Keep bed low and locked with side rails adjusted as appropriate  - Keep care items and personal belongings within reach  - Initiate and maintain comfort rounds  - Make Fall Risk Sign visible to staff  - Offer Toileting every 2 Hours, in advance of need  - Initiate/Maintain bed/chair alarm  - Obtain necessary fall risk management equipment:   - Apply yellow socks and bracelet for high fall risk patients  - Consider moving patient to room near nurses station  Outcome: Progressing     Problem: PAIN - ADULT  Goal: Verbalizes/displays adequate comfort level or baseline comfort level  Description: Interventions:  - Encourage patient to monitor pain and request assistance  - Assess pain using appropriate pain scale  - Administer analgesics based on type and severity of pain and evaluate response  - Implement non-pharmacological measures as appropriate and evaluate response  - Consider cultural and social influences on pain and pain management  - Notify physician/advanced practitioner if interventions unsuccessful or patient reports new pain  Outcome: Progressing     Problem: SAFETY ADULT  Goal: Patient will remain free of falls  Description: INTERVENTIONS:  - Educate patient/family on patient safety including physical limitations  - Instruct patient to call for assistance with activity   - Consult OT/PT to assist with strengthening/mobility   - Keep Call bell within reach  - Keep bed low and locked with side rails adjusted as appropriate  - Keep care items and personal belongings within reach  - Initiate and maintain comfort rounds  - Make Fall Risk Sign visible to staff  - Offer Toileting every 2 Hours,  in advance of need  - Initiate/Maintain bed/chair alarm  - Obtain necessary fall risk management equipment:   - Apply yellow socks and bracelet for high fall risk patients  - Consider moving patient to room near nurses station  Outcome: Progressing     Problem: DISCHARGE PLANNING  Goal: Discharge to home or other facility with appropriate resources  Description: INTERVENTIONS:  - Identify barriers to discharge w/patient and caregiver  - Arrange for needed discharge resources and transportation as appropriate  - Identify discharge learning needs (meds, wound care, etc.)  - Arrange for interpretive services to assist at discharge as needed  - Refer to Case Management Department for coordinating discharge planning if the patient needs post-hospital services based on physician/advanced practitioner order or complex needs related to functional status, cognitive ability, or social support system  Outcome: Progressing     Problem: CARDIOVASCULAR - ADULT  Goal: Maintains optimal cardiac output and hemodynamic stability  Description: INTERVENTIONS:  - Monitor I/O, vital signs and rhythm  - Monitor for S/S and trends of decreased cardiac output  - Administer and titrate ordered vasoactive medications to optimize hemodynamic stability  - Assess quality of pulses, skin color and temperature  - Assess for signs of decreased coronary artery perfusion  - Instruct patient to report change in severity of symptoms  Outcome: Progressing  Goal: Absence of cardiac dysrhythmias or at baseline rhythm  Description: INTERVENTIONS:  - Continuous cardiac monitoring, vital signs, obtain 12 lead EKG if ordered  - Administer antiarrhythmic and heart rate control medications as ordered  - Monitor electrolytes and administer replacement therapy as ordered  Outcome: Progressing     Problem: RESPIRATORY - ADULT  Goal: Achieves optimal ventilation and oxygenation  Description: INTERVENTIONS:  - Assess for changes in respiratory status  - Assess for  changes in mentation and behavior  - Position to facilitate oxygenation and minimize respiratory effort  - Oxygen administered by appropriate delivery if ordered  - Initiate smoking cessation education as indicated  - Encourage broncho-pulmonary hygiene including cough, deep breathe, Incentive Spirometry  - Assess the need for suctioning and aspirate as needed  - Assess and instruct to report SOB or any respiratory difficulty  - Respiratory Therapy support as indicated  Outcome: Progressing

## 2025-03-19 NOTE — UTILIZATION REVIEW
Initial Clinical Review    Admission: Date/Time/Statement:   Admission Orders (From admission, onward)       Ordered        03/18/25 0704  INPATIENT ADMISSION  Once                          Orders Placed This Encounter   Procedures    INPATIENT ADMISSION     Standing Status:   Standing     Number of Occurrences:   1     Level of Care:   Med Surg [16]     Estimated length of stay:   More than 2 Midnights     Certification:   I certify that inpatient services are medically necessary for this patient for a duration of greater than two midnights. See H&P and MD Progress Notes for additional information about the patient's course of treatment.     ED Arrival Information       Expected   -    Arrival   3/18/2025 05:10    Acuity   Emergent              Means of arrival   Walk-In    Escorted by   Spouse    Service   Hospitalist    Admission type   Emergency              Arrival complaint   sob, chest tightness             Chief Complaint   Patient presents with    Shortness of Breath     Pt c/o of sob and chest pain x months.        Initial Presentation: 64 y.o. female  to ED via walk in from home.    Admitted to inpatient with Dx: Hypertensive urgency/Bilateral effusions/prolonged QT/Type 2 DM with hyperglycemia.  Presented to ED with dyspnea on exertion for months and worse last 2 days.  Chest tightness.   PMHx:hypertension, diabetes, hyperlipidemia.   On exam: hypertensive.   Lungs wheezing, rales.   .  D dimer 1.09.   glucose 205.   Imaging shows bilateral pleural effusions with atelectasis.  Ecg prolonged QT.    ED treatment:  given Mg, Lasix, Labetalol and Tigan.    Plan includes BP control,  goal  - 190.  Check echo.  Started on Nifedipine.  Labetalol as needed.   IR for thoracentesis.   Daily ecg and MG.  Start SSI.  Continue home Novolog.     Procedure 3/18/25 IR - thoracentesis.   Successful ultrasound-guided bilateral thoracentesis yielding 775 mL on the right and 650 mL on the left     Anticipated  Length of Stay/Certification Statement:   Patient will be admitted on an inpatient basis with an anticipated length of stay of greater than 2 midnights secondary to hypertensive urgency.     Date: 3/19/25    Day 2: feels improved and feels legs less edematous.   On exam: obese. Telemetry -  Sinus Rhythm, HR 70-80s. No events. No ventricular ectopy.  Post thoracentesis and  Fluid analysis shows transudative process.   Oxygen as needed.   BP control, started lisinopril.  Stop nifedipine.  Scheduled antiemetics.      Consult Cardiology.       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  Will need further workup ie exercise MPI versus coronary CTA in the outpatient setting. Continue antihypertensives.     ED Treatment-Medication Administration from 03/18/2025 0510 to 03/18/2025 1511         Date/Time Order Dose Route Action     03/18/2025 0643 magnesium sulfate 2 g/50 mL IVPB (premix) 2 g 2 g Intravenous New Bag     03/18/2025 0926 furosemide (LASIX) injection 40 mg 40 mg Intravenous Given     03/18/2025 0927 insulin lispro (HumALOG/ADMELOG) 100 units/mL subcutaneous injection 1-6 Units 1 Units Subcutaneous Given     03/18/2025 0931 NIFEdipine (PROCARDIA XL) 24 hr tablet 30 mg 30 mg Oral Given     03/18/2025 0927 labetalol (NORMODYNE) injection 10 mg 10 mg Intravenous Given     03/18/2025 0927 cyclobenzaprine (FLEXERIL) tablet 10 mg 10 mg Oral Given     03/18/2025 0927 pantoprazole (PROTONIX) EC tablet 40 mg 40 mg Oral Given     03/18/2025 0928 enoxaparin (LOVENOX) subcutaneous injection 40 mg 40 mg Subcutaneous Given     03/18/2025 0956 diphenhydrAMINE (BENADRYL) injection 50 mg Intravenous Given            Scheduled Medications:  carvedilol, 6.25 mg,  Oral, BID With Meals  enoxaparin, 40 mg, Subcutaneous, Daily  insulin aspart protamine-insulin aspart, 20 Units, Subcutaneous, BID AC  insulin lispro, 1-6 Units, Subcutaneous, TID AC  insulin lispro, 1-6 Units, Subcutaneous, HS  lisinopril, 10 mg, Oral, Daily  pantoprazole, 40 mg, Oral, Daily Before Breakfast  trimethobenzamide, 100 mg, Intramuscular, Q8H    NIFEdipine (PROCARDIA XL) 24 hr tablet 30 mg  Dose: 30 mg  Freq: Daily Route: PO  Start: 03/18/25 0900 End: 03/19/25 1136  potassium chloride (Klor-Con M20) CR tablet 20 mEq  Dose: 20 mEq  Freq: Once Route: PO  Start: 03/19/25 0700 End: 03/19/25 0940    Continuous IV Infusions:     PRN Meds:  acetaminophen, 650 mg, Oral, Q6H PRN  aluminum-magnesium hydroxide-simethicone, 30 mL, Oral, Q6H PRN  cyclobenzaprine, 10 mg, Oral, TID PRN - x 1 3/18  labetalol, 10 mg, Intravenous, Q6H PRN - x 1 3/18/25  polyethylene glycol, 17 g, Oral, Daily PRN      ED Triage Vitals   Temperature Pulse Respirations Blood Pressure SpO2 Pain Score   03/18/25 0521 03/18/25 0518 03/18/25 0518 03/18/25 0521 03/18/25 0518 03/18/25 0545   97.9 °F (36.6 °C) 105 18 (!) 233/107 93 % No Pain     Weight (last 2 days)       Date/Time Weight    03/18/25 1411 89.8 (197.97)          Vital Signs (last 3 days)       Date/Time Temp Pulse Resp BP MAP (mmHg) SpO2 O2 Device Patient Position - Orthostatic VS Pain    03/19/25 08:16:12 98.5 °F (36.9 °C) 84 20 132/78 96 96 % None (Room air) Sitting --    03/19/25 05:22:37 98.4 °F (36.9 °C) 79 18 135/79 98 94 % None (Room air) Lying --    03/19/25 00:05:45 -- -- -- 126/63 84 -- -- Lying --    03/18/25 2200 -- -- -- -- -- -- -- -- No Pain    03/18/25 20:50:22 98 °F (36.7 °C) 89 22 126/77 93 91 % None (Room air) Lying --    03/18/25 2000 -- -- -- 126/77 -- -- -- -- --    03/18/25 1700 -- -- -- -- -- -- -- -- No Pain    03/18/25 1637 98.9 °F (37.2 °C) 83 20 171/81 117 93 % None (Room air) Lying --    03/18/25 1600 -- -- -- 171/81 -- -- -- -- --    03/18/25 1516 98.9  °F (37.2 °C) -- -- 153/88 110 -- None (Room air) Lying --    03/18/25 1411 -- -- -- 126/62 -- -- -- -- --    03/18/25 1349 -- 83 18 126/62 88 95 % None (Room air) Lying --    03/18/25 1107 -- 75 24 157/77 105 97 % None (Room air) Lying --    03/18/25 10:13:09 -- 71 18 116/67 -- 67 % -- -- --    03/18/25 10:03:50 -- 61 19 163/78 -- 97 % -- -- --    03/18/25 0648 -- -- -- -- -- -- -- -- No Pain    03/18/25 0645 -- 85 18 203/109 149 96 % -- -- --    03/18/25 0636 -- -- -- -- -- -- Nasal cannula -- --    03/18/25 0624 -- -- -- -- -- -- -- -- No Pain    03/18/25 0600 -- 78 18 196/88 126 97 % -- -- --    03/18/25 0546 -- -- -- -- -- -- -- -- No Pain    03/18/25 0545 -- 83 18 237/122 168 95 % -- -- No Pain    03/18/25 0521 97.9 °F (36.6 °C) -- -- 233/107 -- -- -- -- --    03/18/25 0518 -- 105 18 -- -- 93 % None (Room air) -- --             03/17  0701 03/18  0701 03/19  0701    Intake   (mL/kg) -- 720   (8) 240   (2.7)    P.O. -- 720 240    Output -- 1,625 100    Urine   (mL/kg/hr) -- 200   (0.1) 100    Other -- 1,425 --    Net -- -905 +140    Unmeasured Urine Occurrence -- 2  x --        Pertinent Labs/Diagnostic Test Results:   Radiology:  IR IN-Patient Thoracentesis   Final Interpretation by Mamadou Gamino MD (03/18 9329)   Impression:   1. Successful ultrasound-guided bilateral thoracentesis yielding 775 mL on the right and 650 mL on the left                  Workstation performed: [id number]          VAS VENOUS DUPLEX - LOWER LIMB BILATERAL   Final Interpretation by Quincy Thomson MD (03/18 8870)   RIGHT LOWER LIMB:   No evidence of acute or chronic deep vein thrombosis.   No evidence of superficial thrombophlebitis noted.   Doppler evaluation shows a normal response to augmentation maneuvers.   Popliteal, posterior tibial and anterior tibial arterial Doppler waveforms are   biphasic.      LEFT LOWER LIMB:   No evidence of acute or chronic deep vein thrombosis.   No evidence of superficial  thrombophlebitis noted.   Doppler evaluation shows a normal response to augmentation maneuvers.   Popliteal, posterior tibial and anterior tibial arterial Doppler waveforms are   biphasic.   There is a well defined hypoechoic non-vascularized cystic-type structure noted   in the popliteal fossa.    CTA chest pe study   Final Interpretation by Leroy Jones MD (03/18 0649)      1.  No acute pulmonary embolism or thoracic aortic aneurysm/dissection.   2.  Moderate bilateral pleural effusions with bibasilar dependent atelectasis. Scattered areas of hazy airspace opacity are seen bilaterally with lower lobe predominance likely due to infectious/inflammatory process of the lung parenchyma. Recommend    clinical correlation.                  Workstation performed: [id number]         XR chest portable   Final Interpretation by Lisette Delatorre MD (03/18 0814)      Moderate pulmonary edema with moderate pleural effusions.            Workstation performed: [id number]           Cardiology:  ECG 12 lead   Final Result by Orville Zhou MD (03/19 1112)   Normal sinus rhythm   Biatrial enlargement   QTcB >= 480 msec   Abnormal ECG   When compared with ECG of 18-Mar-2025 04:27, (unconfirmed)   No significant change was found   Confirmed by Orville Zhou ([id number]) on 3/19/2025 11:12:06 AM      Echo complete w/ contrast if indicated   Final Result by Orville Zhou MD (03/19 1028)        Left Ventricle: Left ventricular cavity size is mildly dilated. Wall    thickness is mildly increased. The left ventricular ejection fraction is    30%. Systolic function is severely reduced. There is severe global    hypokinesis with specific regional wall abnormalities. Diastolic function    is moderately abnormal, consistent with grade II (pseudonormal)    relaxation.     Aortic Valve: There is mild stenosis. The aortic valve mean gradient is    6 mmHg. The dimensionless velocity index is 0.51. The aortic valve area is    1.44 cm2.     Mitral  Valve: There is mild regurgitation.     Tricuspid Valve: There is mild regurgitation.         ECG 12 lead    by Interface, Ris Results In (03/18 0526)          Procedure Note: EKG  Date/Time: 03/18/25 5:28 AM   Indications / Diagnosis: CP  ECG reviewed by the ED Provider: yes   The EKG demonstrates:  Rhythm: normal sinus  Intervals:    Axis: normal axis  QRS/Blocks: normal QRS  ST Changes: No acute ST Changes, no STD/BRAYDEN.     GI:  No orders to display     Results from last 7 days   Lab Units 03/19/25  0533 03/18/25  0541 03/18/25  0540   WBC Thousand/uL 7.76  --  8.65   HEMOGLOBIN g/dL 12.1  --  13.8   I STAT HEMOGLOBIN g/dl  --  13.6  --    HEMATOCRIT % 36.9  --  41.2   HEMATOCRIT, ISTAT %  --  40  --    PLATELETS Thousands/uL 217  --  242   TOTAL NEUT ABS Thousands/µL 5.30  --  6.01     Results from last 7 days   Lab Units 03/19/25  0533 03/18/25  0541 03/18/25  0540   SODIUM mmol/L 139  --  140   POTASSIUM mmol/L 3.4*  --  3.8   CHLORIDE mmol/L 102  --  106   CO2 mmol/L 29  --  25   CO2, I-STAT mmol/L  --  29  --    ANION GAP mmol/L 8  --  9   BUN mg/dL 23  --  19   CREATININE mg/dL 1.23  --  1.04   EGFR ml/min/1.73sq m 46  --  56   CALCIUM mg/dL 8.5  --  9.2   CALCIUM, IONIZED, ISTAT mmol/L  --  1.12  --    MAGNESIUM mg/dL 1.9  --   --    PHOSPHORUS mg/dL 4.4*  --   --      Results from last 7 days   Lab Units 03/19/25  0533 03/18/25  0540   AST U/L 11* 12*   ALT U/L 6* 11   ALK PHOS U/L 58 82   TOTAL PROTEIN g/dL 6.2* 7.0   ALBUMIN g/dL 3.7 4.2   TOTAL BILIRUBIN mg/dL 0.74 0.48     Results from last 7 days   Lab Units 03/19/25  0813 03/18/25  2200 03/18/25  1616 03/18/25  1348 03/18/25  0837   POC GLUCOSE mg/dl 123 156* 162* 128 183*     Results from last 7 days   Lab Units 03/19/25  0533 03/18/25  0540   GLUCOSE RANDOM mg/dL 110 205*     Results from last 7 days   Lab Units 03/18/25  0945   HEMOGLOBIN A1C % 7.4*   EAG mg/dl 166     BETA-HYDROXYBUTYRATE   Date Value Ref Range Status   01/08/2024 0.1 <0.6  mmol/L Final   01/02/2024 0.2 <0.6 mmol/L Final      Results from last 7 days   Lab Units 03/18/25  0541   PH, HUAN I-STAT  7.470*   PCO2, HUAN ISTAT mm HG 38.1*   PO2, HUAN ISTAT mm HG 38.0   HCO3, HUAN ISTAT mmol/L 27.7   I STAT BASE EXC mmol/L 4*   I STAT O2 SAT % 76     Results from last 7 days   Lab Units 03/18/25  0945 03/18/25  0540   HS TNI 0HR ng/L  --  13   HS TNI 2HR ng/L 16  --    HSTNI D2 ng/L 3  --      Results from last 7 days   Lab Units 03/18/25  0540   D-DIMER QUANTITATIVE ug/ml FEU 1.09*     Results from last 7 days   Lab Units 03/18/25  0540   PROTIME seconds 13.1   INR  0.94   PTT seconds 28     Results from last 7 days   Lab Units 03/18/25  0540   BNP pg/mL 226*     Results from last 7 days   Lab Units 03/18/25  1015   GRAM STAIN RESULT  No organisms seen  No organisms seen     Results from last 7 days   Lab Units 03/18/25  1015   TOTAL COUNTED  100  100   WBC FLUID /ul 709  854     Past Medical History:   Diagnosis Date    Bipolar depression (HCC)     Cancer (HCC)     Depression     Diabetes mellitus (HCC)     Epilepsy (HCC)     Hypertension     Miscarriage 1990's    PTSD (post-traumatic stress disorder) 2002    Seizures (HCC)      Present on Admission:   Hypertensive urgency   Mixed hyperlipidemia      Admitting Diagnosis: Shortness of breath [R06.02]  Pleural effusion [J90]  Prolonged Q-T interval on ECG [R94.31]  Hypertensive urgency [I16.0]  Shortness of breath [R06.02]  Age/Sex: 64 y.o. female    Network Utilization Review Department  ATTENTION: Please call with any questions or concerns to [phone] and carefully listen to the prompts so that you are directed to the right person. All voicemails are confidential.   For Discharge needs, contact Care Management DC Support Team at [phone] opt. 2  Send all requests for admission clinical reviews, approved or denied determinations and any other requests to dedicated fax number below belonging to the campus where the patient is receiving  treatment. List of dedicated fax numbers for the Facilities:  FACILITY NAME UR FAX NUMBER   ADMISSION DENIALS (Administrative/Medical Necessity) [phone]   DISCHARGE SUPPORT TEAM (NETWORK) [phone]   PARENT CHILD HEALTH (Maternity/NICU/Pediatrics) [phone]   [facility] [phone]   [facility] [phone]   [facility] [phone]   [facility] [phone]   [facility] [phone]   [facility] [phone]   [facility] [phone]   [facility] [phone]   [facility] [phone]   Critical access hospital [phone]   [facility] [phone]   [facility] [phone]

## 2025-03-19 NOTE — DISCHARGE INSTR - AVS FIRST PAGE
You are to follow-up with your PCP in 1 to 2 weeks    You are to follow-up with cardiology    You will be discharged with the following:  Lasix 20 mg to be taken daily  coreg 6.25 mg to be taken twice a day   lisinopril 10 mg to be taken      You are NOT to take nifedipine

## 2025-03-19 NOTE — ASSESSMENT & PLAN NOTE
Patient presenting due to shortness of breath and chest pain    Patient with a history of noncompliance and declining medications for blood pressure and hyperlipidemia    Blood pressure on arrival 233/107  EKG showing no ischemic changes however prolonged QT  Troponin negative    Creatinine at baseline at 1.04  D-dimer slightly elevated at 1.09-> CTA PE negative venous Dopplers negative  Continue nifedipine 30 qd and lasix 20 qd  Echo showing an LVEF of 30% and G2dd  F/u with PCP, cardio referral on discharge

## 2025-03-20 ENCOUNTER — PATIENT OUTREACH (OUTPATIENT)
Dept: CASE MANAGEMENT | Facility: OTHER | Age: 65
End: 2025-03-20

## 2025-03-20 ENCOUNTER — TRANSITIONAL CARE MANAGEMENT (OUTPATIENT)
Dept: FAMILY MEDICINE CLINIC | Facility: CLINIC | Age: 65
End: 2025-03-20

## 2025-03-20 LAB
ATRIAL RATE: 88 BPM
P AXIS: 60 DEGREES
PR INTERVAL: 156 MS
QRS AXIS: 98 DEGREES
QRSD INTERVAL: 86 MS
QT INTERVAL: 404 MS
QTC INTERVAL: 489 MS
T WAVE AXIS: 91 DEGREES
VENTRICULAR RATE: 88 BPM

## 2025-03-20 PROCEDURE — 93010 ELECTROCARDIOGRAM REPORT: CPT | Performed by: INTERNAL MEDICINE

## 2025-03-20 PROCEDURE — 88305 TISSUE EXAM BY PATHOLOGIST: CPT | Performed by: PATHOLOGY

## 2025-03-20 PROCEDURE — 88112 CYTOPATH CELL ENHANCE TECH: CPT | Performed by: PATHOLOGY

## 2025-03-20 NOTE — UTILIZATION REVIEW
NOTIFICATION OF ADMISSION DISCHARGE   This is a Notification of Discharge from [facility]. Please be advised that this patient has been discharge from our facility. Below you will find the admission and discharge date and time including the patient’s disposition.   UTILIZATION REVIEW CONTACT:  Maddy Anne  Utilization   Network Utilization Review Department  Phone: [phone] x carefully listen to the prompts. All voicemails are confidential.  Email: [email or web address]     ADMISSION INFORMATION  PRESENTATION DATE: 3/18/2025  5:27 AM  OBERVATION ADMISSION DATE: N/A  INPATIENT ADMISSION DATE: 3/18/25  7:05 AM   DISCHARGE DATE: 3/19/2025 12:55 PM   DISPOSITION:Home/Self Care    Network Utilization Review Department  ATTENTION: Please call with any questions or concerns to [phone] and carefully listen to the prompts so that you are directed to the right person. All voicemails are confidential.   For Discharge needs, contact Care Management DC Support Team at [phone] opt. 2  Send all requests for admission clinical reviews, approved or denied determinations and any other requests to dedicated fax number below belonging to the campus where the patient is receiving treatment. List of dedicated fax numbers for the Facilities:  FACILITY NAME UR FAX NUMBER   ADMISSION DENIALS (Administrative/Medical Necessity) [phone]   DISCHARGE SUPPORT TEAM ([facility]) [phone]   PARENT CHILD HEALTH (Maternity/NICU/Pediatrics) [phone]   [facility] [phone]   [facility] [phone]   [facility] [phone]   [facility] [phone]   [facility] [phone]   [facility] [phone]   [facility] [phone]   [facility] [phone]    [facility] [phone]   [facility] [phone]   [facility] [phone]   [facility] [phone]

## 2025-03-21 ENCOUNTER — TELEPHONE (OUTPATIENT)
Age: 65
End: 2025-03-21

## 2025-03-21 LAB
BACTERIA SPEC BFLD CULT: NO GROWTH
BACTERIA SPEC BFLD CULT: NO GROWTH
GRAM STN SPEC: NORMAL
GRAM STN SPEC: NORMAL

## 2025-03-21 NOTE — TELEPHONE ENCOUNTER
Received call from patient after her hospital visit 3/18/25. She reports multiple vascular tests were done and she would like vascular's input on the results. Patient had a venous duplex and IR thoracentesis done in hospital.

## 2025-03-21 NOTE — TELEPHONE ENCOUNTER
Reviewed LEV from hospital visit 3/18/25. Testing is negative for bilateral DVT. No need for additional office visit at this time with negative study. IR thoracentesis is not vascular related and we are unable to advise in this regard. She should continue to f/u with our office according to 's last office visit and f/u with appropriate teams as per her discharge instructions.

## 2025-03-24 ENCOUNTER — OFFICE VISIT (OUTPATIENT)
Dept: FAMILY MEDICINE CLINIC | Facility: CLINIC | Age: 65
End: 2025-03-24
Payer: COMMERCIAL

## 2025-03-24 ENCOUNTER — PATIENT OUTREACH (OUTPATIENT)
Dept: CASE MANAGEMENT | Facility: OTHER | Age: 65
End: 2025-03-24

## 2025-03-24 VITALS
HEART RATE: 84 BPM | BODY MASS INDEX: 32.62 KG/M2 | OXYGEN SATURATION: 95 % | WEIGHT: 196 LBS | SYSTOLIC BLOOD PRESSURE: 136 MMHG | DIASTOLIC BLOOD PRESSURE: 80 MMHG

## 2025-03-24 DIAGNOSIS — L03.012 CELLULITIS OF LEFT MIDDLE FINGER: Primary | ICD-10-CM

## 2025-03-24 PROCEDURE — 99214 OFFICE O/P EST MOD 30 MIN: CPT | Performed by: FAMILY MEDICINE

## 2025-03-24 PROCEDURE — G2211 COMPLEX E/M VISIT ADD ON: HCPCS | Performed by: FAMILY MEDICINE

## 2025-03-24 RX ORDER — SULFAMETHOXAZOLE AND TRIMETHOPRIM 800; 160 MG/1; MG/1
1 TABLET ORAL EVERY 12 HOURS SCHEDULED
Qty: 10 TABLET | Refills: 0 | Status: SHIPPED | OUTPATIENT
Start: 2025-03-24 | End: 2025-03-29

## 2025-03-24 NOTE — PROGRESS NOTES
Second attempt to contact patient to f/u on recent hospitalization.  No answer and mailbox was full.  Unable to reach letter sent.  Will close referral at this time.

## 2025-03-24 NOTE — PROGRESS NOTES
Name: Nathalia Monge      : 1960      MRN: [id number]  Encounter Provider: Nelson Garner MD  Encounter Date: 3/24/2025   Encounter department: [facility]  :  Assessment & Plan  Cellulitis of left middle finger    Orders:    sulfamethoxazole-trimethoprim (BACTRIM DS) 800-160 mg per tablet; Take 1 tablet by mouth every 12 (twelve) hours for 5 days           History of Present Illness   HPI  Review of Systems   Constitutional:  Negative for fatigue, fever and unexpected weight change.   HENT:  Negative for congestion, sinus pain and sore throat.    Eyes:  Negative for visual disturbance.   Respiratory:  Negative for shortness of breath and wheezing.    Cardiovascular:  Negative for chest pain and palpitations.   Gastrointestinal:  Negative for abdominal pain, nausea and vomiting.   Musculoskeletal: Negative.  Negative for arthralgias and myalgias.   Neurological:  Negative for syncope, weakness and numbness.   Psychiatric/Behavioral: Negative.  Negative for confusion, dysphoric mood and suicidal ideas.        Objective   /80 (BP Location: Left arm, Patient Position: Sitting, Cuff Size: Standard)   Pulse 84   Wt 88.9 kg (196 lb)   SpO2 95%   BMI 32.62 kg/m²      Physical Exam  Vitals and nursing note reviewed.   Constitutional:       General: She is not in acute distress.     Appearance: She is well-developed.   HENT:      Head: Normocephalic and atraumatic.   Eyes:      Conjunctiva/sclera: Conjunctivae normal.   Cardiovascular:      Rate and Rhythm: Normal rate and regular rhythm.      Heart sounds: No murmur heard.  Pulmonary:      Effort: Pulmonary effort is normal. No respiratory distress.      Breath sounds: Normal breath sounds.   Abdominal:      Palpations: Abdomen is soft.      Tenderness: There is no abdominal tenderness.   Musculoskeletal:         General: No swelling.      Cervical back: Neck supple.   Skin:     General: Skin is warm and dry.      Capillary  Refill: Capillary refill takes less than 2 seconds.   Neurological:      Mental Status: She is alert.   Psychiatric:         Mood and Affect: Mood normal.

## 2025-03-24 NOTE — LETTER
Date: 03/24/25    Dear Nathalia Monge,   My name is Charmaine; I am a registered nurse care manager working with [facility].    I have not been able to reach you and would like to set a time that I can talk with you over the phone.  My work is to help patients that have complex medical conditions get the care they need. This includes patients who may have been in the hospital or emergency room.    Please call me at [phone] with any questions you may have. I look forward to hearing from  you.  Sincerely,  Charmaine Morfin RN  Outpatient Care Manager

## 2025-03-27 ENCOUNTER — OFFICE VISIT (OUTPATIENT)
Dept: CARDIOLOGY CLINIC | Facility: CLINIC | Age: 65
End: 2025-03-27
Payer: COMMERCIAL

## 2025-03-27 ENCOUNTER — OFFICE VISIT (OUTPATIENT)
Dept: FAMILY MEDICINE CLINIC | Facility: CLINIC | Age: 65
End: 2025-03-27
Payer: COMMERCIAL

## 2025-03-27 ENCOUNTER — TELEPHONE (OUTPATIENT)
Dept: CARDIOLOGY CLINIC | Facility: CLINIC | Age: 65
End: 2025-03-27

## 2025-03-27 VITALS
WEIGHT: 194 LBS | SYSTOLIC BLOOD PRESSURE: 134 MMHG | DIASTOLIC BLOOD PRESSURE: 78 MMHG | BODY MASS INDEX: 32.32 KG/M2 | OXYGEN SATURATION: 98 % | HEIGHT: 65 IN | HEART RATE: 75 BPM

## 2025-03-27 VITALS
WEIGHT: 193.6 LBS | SYSTOLIC BLOOD PRESSURE: 130 MMHG | BODY MASS INDEX: 31.12 KG/M2 | HEART RATE: 86 BPM | HEIGHT: 66 IN | DIASTOLIC BLOOD PRESSURE: 68 MMHG

## 2025-03-27 DIAGNOSIS — I50.42 CHRONIC COMBINED SYSTOLIC AND DIASTOLIC CONGESTIVE HEART FAILURE (HCC): Primary | ICD-10-CM

## 2025-03-27 DIAGNOSIS — Z79.4 TYPE 2 DIABETES MELLITUS WITH HYPERGLYCEMIA, WITH LONG-TERM CURRENT USE OF INSULIN (HCC): ICD-10-CM

## 2025-03-27 DIAGNOSIS — I16.0 HYPERTENSIVE URGENCY: ICD-10-CM

## 2025-03-27 DIAGNOSIS — E11.65 TYPE 2 DIABETES MELLITUS WITH HYPERGLYCEMIA, WITH LONG-TERM CURRENT USE OF INSULIN (HCC): ICD-10-CM

## 2025-03-27 DIAGNOSIS — J90 BILATERAL PLEURAL EFFUSION: ICD-10-CM

## 2025-03-27 DIAGNOSIS — E78.2 MIXED HYPERLIPIDEMIA: ICD-10-CM

## 2025-03-27 DIAGNOSIS — I50.42 CHRONIC COMBINED SYSTOLIC AND DIASTOLIC CONGESTIVE HEART FAILURE (HCC): ICD-10-CM

## 2025-03-27 DIAGNOSIS — D32.9 MENINGIOMA (HCC): ICD-10-CM

## 2025-03-27 PROCEDURE — 99495 TRANSJ CARE MGMT MOD F2F 14D: CPT

## 2025-03-27 PROCEDURE — 99214 OFFICE O/P EST MOD 30 MIN: CPT | Performed by: PHYSICIAN ASSISTANT

## 2025-03-27 NOTE — ASSESSMENT & PLAN NOTE
BP well controlled. Continue lisinopril, carvedilol, and lasix. States that she believes one of these meds are causing her to have myalgias. Has follow up with cards today.

## 2025-03-27 NOTE — PROGRESS NOTES
"Transition of Care Visit  Name: Nathalia Monge      : 1960      MRN: [id number]  Encounter Provider: SHASHANK Miguel  Encounter Date: 3/27/2025   Encounter department: [facility]    Assessment & Plan  Chronic combined systolic and diastolic congestive heart failure (HCC)  Wt Readings from Last 3 Encounters:   25 87.8 kg (193 lb 9.6 oz)   25 88 kg (194 lb)   25 88.9 kg (196 lb)   Reports compliance with lasix. Has not been checking DWTs as she lives in a camper and states there is no room for a scale. States that she will \"try to figure something out\" after explanation of importance of DWTs.     Echo:   Interpretation Summary    Left Ventricle: Left ventricular cavity size is mildly dilated. Wall thickness is mildly increased. The left ventricular ejection fraction is 30%. Systolic function is severely reduced. There is severe global hypokinesis with specific regional wall abnormalities. Diastolic function is moderately abnormal, consistent with grade II (pseudonormal) relaxation.    Aortic Valve: There is mild stenosis. The aortic valve mean gradient is 6 mmHg. The dimensionless velocity index is 0.51. The aortic valve area is 1.44 cm2.    Mitral Valve: There is mild regurgitation.    Tricuspid Valve: There is mild regurgitation.    POCT EKG-- NSR-- QT prolongation resolved.          Bilateral pleural effusion  3/18/25-- BL pleural effusions---thoracenteses. Patient reports significant improvement in ability to breath. States that the pain she was having in LUQ has resolved since procedure.          Type 2 diabetes mellitus with hyperglycemia, with long-term current use of insulin ([facility])  A1C improved from 8.5 to 7.4. continue current medication regimen.   Lab Results   Component Value Date    HGBA1C 7.4 (H) 2025            Meningioma (HCC)  Follows with neuro.          Mixed hyperlipidemia  Cholesterol 190, triglycerides 138, HDL 42, . " " Patient reports that she is unable to tolerate statins. States, \"It shuts my entire body down.     The 10-year ASCVD risk score (Loulou THAO, et al., 2019) is: 14.2%    Values used to calculate the score:      Age: 64 years      Sex: Female      Is Non- : No      Diabetic: Yes      Tobacco smoker: No      Systolic Blood Pressure: 130 mmHg      Is BP treated: Yes      HDL Cholesterol: 42 mg/dL      Total Cholesterol: 190 mg/dL        Hypertensive urgency  BP well controlled. Continue lisinopril, carvedilol, and lasix. States that she believes one of these meds are causing her to have myalgias. Has follow up with cards today.            Depression Screening and Follow-up Plan: Patient was screened for depression during today's encounter. They screened negative with a PHQ-2 score of 0.          History of Present Illness     Transitional Care Management Review:   Nathalia Monge is a 64 y.o. female here for TCM follow up.     During the TCM phone call patient stated:  TCM Call (since 3/13/2025)       Date and time call was made  3/20/2025  4:29 PM    Hospital care reviewed  Records reviewed    Patient was hospitialized at  [facility]    Date of Admission  03/18/25    Date of discharge  03/19/24    Diagnosis  Hypertensive urgency    Disposition  Home    Were the patients medications reviewed and updated  Yes    Current Symptoms  None          TCM Call (since 3/13/2025)       Post hospital issues  None    Scheduled for follow up?  Yes    Patients specialists  Cardiologist    Did you obtain your prescribed medications  Yes    Do you need help managing your prescriptions or medications  No    Is transportation to your appointment needed  No    I have advised the patient to call PCP with any new or worsening symptoms  JOSE Colon    Living Arrangements  Spouse or Significiant other    Support System  Partner    The type of support provided  Emotional; Financial; Physical    Do you have social " "support  Yes, as much as I need          TCM sp hospitalization for hypertensive urgency, CHF, BL pleural effusions.       Review of Systems   Constitutional:  Positive for fatigue. Negative for activity change and fever.   HENT:  Negative for congestion, ear pain, rhinorrhea and sore throat.    Eyes:  Negative for pain.   Respiratory:  Negative for cough, chest tightness, shortness of breath and wheezing.    Cardiovascular:  Negative for chest pain and leg swelling.   Gastrointestinal:  Negative for abdominal distention, abdominal pain, diarrhea, nausea and vomiting.   Musculoskeletal:  Positive for myalgias. Negative for arthralgias.   Skin:  Negative for rash.   Neurological:  Negative for dizziness, weakness and numbness.   All other systems reviewed and are negative.    Objective   /78   Pulse 75   Ht 5' 5\" (1.651 m)   Wt 88 kg (194 lb)   SpO2 98%   BMI 32.28 kg/m²     Physical Exam  Vitals and nursing note reviewed.   Constitutional:       General: She is not in acute distress.     Appearance: Normal appearance. She is well-developed. She is not ill-appearing.   HENT:      Head: Normocephalic and atraumatic.      Right Ear: External ear normal.      Left Ear: External ear normal.   Cardiovascular:      Rate and Rhythm: Normal rate and regular rhythm.      Heart sounds: Murmur heard.   Pulmonary:      Effort: Pulmonary effort is normal. No respiratory distress.      Breath sounds: Normal breath sounds.   Abdominal:      Palpations: Abdomen is soft.      Tenderness: There is no abdominal tenderness.   Musculoskeletal:      Cervical back: Neck supple.   Skin:     General: Skin is warm and dry.      Capillary Refill: Capillary refill takes less than 2 seconds.   Neurological:      Mental Status: She is alert and oriented to person, place, and time. Mental status is at baseline.   Psychiatric:         Mood and Affect: Mood normal.         Behavior: Behavior normal.       Medications have been reviewed by " provider in current encounter    Administrative Statements   I have spent a total time of 30 minutes in caring for this patient on the day of the visit/encounter including Diagnostic results, Risks and benefits of tx options, Instructions for management, Patient and family education, Importance of tx compliance, Risk factor reductions, Impressions, Documenting in the medical record, Reviewing/placing orders in the medical record (including tests, medications, and/or procedures), and Obtaining or reviewing history  .

## 2025-03-27 NOTE — ASSESSMENT & PLAN NOTE
A1C improved from 8.5 to 7.4. continue current medication regimen.   Lab Results   Component Value Date    HGBA1C 7.4 (H) 03/18/2025

## 2025-03-27 NOTE — ASSESSMENT & PLAN NOTE
"Cholesterol 190, triglycerides 138, HDL 42, .  Patient reports that she is unable to tolerate statins. States, \"It shuts my entire body down.     The 10-year ASCVD risk score (Loulou THAO, et al., 2019) is: 14.2%    Values used to calculate the score:      Age: 64 years      Sex: Female      Is Non- : No      Diabetic: Yes      Tobacco smoker: No      Systolic Blood Pressure: 130 mmHg      Is BP treated: Yes      HDL Cholesterol: 42 mg/dL      Total Cholesterol: 190 mg/dL        "

## 2025-03-27 NOTE — PROGRESS NOTES
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 Instructed to take Coreg 12.5mg AM prior to CTA to achieve goal heart rate closer to 60.  Otherwise continue with Coreg 6.25 mg twice daily, lisinopril 10 mg daily, Lasix 20 mg daily  Standing weights recommended.  Report any weight gain more than 3 pounds over 1 day or 5 pounds over 1 week for diuretic adjustment.  Offered ZOLL LifeVest today given EF is less than 35%.  Patient prefers to hold off at this time.  Plan to reassess EF in 90 days to determine need for primary prevention ICD.   RTO in 4 weeks for cardiac CTA review or sooner if needed.    Interval History/ HPI:   Nathalia Monge is a 64 year old who presents for hospital follow-up visit.  Originally presented to [facility] with elevated BP and increased dyspnea.  She was discovered with bilateral pleural effusions with elevated BNP.  High-sensitivity troponins within normal limits.  Initial EKG showing sinus rhythm without evidence of ischemia. She underwent bilateral IR thoracentesis with 775 mL on the right and 650 mL on the left and felt much better postprocedure.  Fluid analysis showed transudative process.  She did also respond well to IV Lasix 40 mg x 1 with -1.5 L output.  She was discharged on PO Lasix 20 mg for volume maintenance.  Her blood pressure was controlled with nifedipine 30 mg daily.  Inpatient TTE showed EF of 30% with severe global hypokinesis, G2 DD, mild AS/MR/TR.  Given abnormal echo findings cardiology was consulted for additional recommendations.  Her nifedipine was switched to GDMT with Coreg 6.25 mg twice daily and lisinopril 10 mg daily. She was advised for cardiology  follow-up for additional workup as she was being actively discharged at the time of our evaluation.  Today feels well overall.  She denies any weight gain, orthopnea, PND, lower extremity edema.  She has been avoiding heavy sodium intake.  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  She has been taking her medications consistently.  Reports of good urine output. Home BPs are much better now with new meds.      Vitals:  130/68  86  193 LBS    Past Medical History:   Diagnosis Date    Bipolar depression (HCC)     Cancer (HCC)     Depression     Diabetes mellitus (HCC)     Epilepsy (HCC)     Hypertension     Miscarriage     PTSD (post-traumatic stress disorder) 2002    Seizures (HCC)      Social History     Socioeconomic History    Marital status: Unknown     Spouse name: Marty    Number of children: 4    Years of education: Some college    Highest education level: Not on file   Occupational History    Occupation: disabled     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   Tobacco Use    Smoking status: Former     Current packs/day: 0.00     Types: Cigarettes     Quit date: 3/5/2000     Years since quittin.0    Smokeless tobacco: Never   Vaping Use    Vaping status: Never Used   Substance and Sexual Activity    Alcohol use: Never    Drug use: No    Sexual activity: Not Currently     Partners: Male     Birth control/protection: Post-menopausal     Comment: Everything has been removed and haven't had sex since    Other Topics Concern    Not on file   Social History Narrative    Lives at home with significant other, Marty     Social Drivers of Health     Financial Resource Strain: Low Risk  (1/15/2024)    Overall Financial Resource Strain (CARDIA)     Difficulty of Paying Living Expenses: Not hard at all   Food Insecurity: No Food Insecurity (2025)    Hunger Vital Sign     Worried About Running Out of Food in the Last Year: Never true      Ran Out of Food in the Last Year: Never true   Transportation Needs: No Transportation Needs (1/23/2025)    PRAPARE - Transportation     Lack of Transportation (Medical): No     Lack of Transportation (Non-Medical): No   Physical Activity: Not on file   Stress: Not on file   Social Connections: Not on file   Intimate Partner Violence: Not on file   Housing Stability: Unknown (1/23/2025)    Housing Stability Vital Sign     Unable to Pay for Housing in the Last Year: No     Number of Times Moved in the Last Year: Not on file     Homeless in the Last Year: No      Family History   Adopted: Yes     Past Surgical History:   Procedure Laterality Date    BRAIN SURGERY      CHOLECYSTECTOMY  2004    EYE SURGERY      HYSTERECTOMY      IMPLANTABLE CONTACT LENS IMPLANTATION      IR THORACENTESIS  3/18/2025    CA CRNEC TREPH BONE FLAP CRNOT EXC BRAIN TUMOR STTL Right 03/15/2018    Procedure: IMAGE-GUIDED RIGHT FRONTOPARIETAL CRANIOTOMY FOR TUMOR;  Surgeon: Arsh Jeronimo MD;  Location: [facility];  Service: Neurosurgery       Current Outpatient Medications:     BD Pen Needle Yolis 2nd Gen 32G X 4 MM MISC, Use 3 (three) times a day, Disp: 100 each, Rfl: 5    carvedilol (COREG) 6.25 mg tablet, Take 1 tablet (6.25 mg total) by mouth 2 (two) times a day with meals, Disp: 60 tablet, Rfl: 0    cholecalciferol (VITAMIN D3) 1,000 units tablet, Take 1,000 Units by mouth daily, Disp: , Rfl:     CHROMIUM PO, Take by mouth 2 (two) times a day, Disp: , Rfl:     cyclobenzaprine (FLEXERIL) 10 mg tablet, Take 1 tablet (10 mg total) by mouth 3 (three) times a day as needed for muscle spasms, Disp: 90 tablet, Rfl: 3    ferrous sulfate 325 (65 Fe) mg tablet, Take 325 mg by mouth daily , Disp: , Rfl:     furosemide (LASIX) 20 mg tablet, Take 1 tablet (20 mg total) by mouth daily, Disp: 30 tablet, Rfl: 0    insulin aspart protamine-insulin aspart (NovoLOG Mix 70/30 FlexPen) 100 Units/mL injection pen, Inject 20 Units under the skin 2 (two) times a  day before meals, Disp: 12 mL, Rfl: 5    Insulin Pen Needle (BD Pen Needle Yolis 2nd Gen) 32G X 4 MM MISC, For use with insulin pen. Pharmacy may dispense brand covered by insurance., Disp: 100 each, Rfl: 0    L-LYSINE PO, Take 500 mg by mouth daily, Disp: , Rfl:     lisinopril (ZESTRIL) 10 mg tablet, Take 1 tablet (10 mg total) by mouth daily, Disp: 30 tablet, Rfl: 0    pantoprazole (PROTONIX) 40 mg tablet, Take 1 tablet (40 mg total) by mouth daily before breakfast, Disp: 90 tablet, Rfl: 3    Probiotic Product (PROBIOTIC DAILY PO), Take 1 capsule by mouth daily, Disp: , Rfl:     sulfamethoxazole-trimethoprim (BACTRIM DS) 800-160 mg per tablet, Take 1 tablet by mouth every 12 (twelve) hours for 5 days, Disp: 10 tablet, Rfl: 0      Review of Systems:  Review of Systems   Constitutional:  Negative for appetite change, chills, diaphoresis, fatigue and fever.   Respiratory:  Negative for cough, chest tightness and shortness of breath.    Cardiovascular:  Negative for chest pain, palpitations and leg swelling.   Gastrointestinal:  Negative for diarrhea, nausea and vomiting.   Endocrine: Negative for cold intolerance and heat intolerance.   Genitourinary:  Negative for difficulty urinating, dysuria and enuresis.   Musculoskeletal:  Negative for arthralgias, back pain and gait problem.   Allergic/Immunologic: Negative for environmental allergies and food allergies.   Neurological:  Negative for dizziness, facial asymmetry and headaches.   Hematological:  Negative for adenopathy. Does not bruise/bleed easily.   Psychiatric/Behavioral:  Negative for agitation, behavioral problems and confusion.          Physical Exam:  Physical Exam  Constitutional:       Appearance: She is well-developed.   HENT:      Right Ear: External ear normal.      Left Ear: External ear normal.   Eyes:      Pupils: Pupils are equal, round, and reactive to light.   Cardiovascular:      Rate and Rhythm: Normal rate and regular rhythm.      Heart  sounds: Normal heart sounds. No murmur heard.     No friction rub. No gallop.   Pulmonary:      Effort: Pulmonary effort is normal.      Breath sounds: Normal breath sounds.   Abdominal:      Palpations: Abdomen is soft.   Musculoskeletal:         General: Normal range of motion.      Cervical back: Normal range of motion.   Skin:     General: Skin is warm and dry.   Neurological:      Mental Status: She is alert and oriented to person, place, and time.      Deep Tendon Reflexes: Reflexes are normal and symmetric.   Psychiatric:         Behavior: Behavior normal.         Thought Content: Thought content normal.         Judgment: Judgment normal.         This note was completed in part utilizing M-Modal Fluency Direct Software.  Grammatical errors, random word insertions, spelling mistakes, and incomplete sentences can be an occasional consequence of this system secondary to software limitations, ambient noise, and hardware issues.  If you have any questions or concerns about the content, text, or information contained within the body of this dictation, please contact the provider for clarification.

## 2025-03-27 NOTE — ASSESSMENT & PLAN NOTE
"Wt Readings from Last 3 Encounters:   03/27/25 87.8 kg (193 lb 9.6 oz)   03/27/25 88 kg (194 lb)   03/24/25 88.9 kg (196 lb)   Reports compliance with lasix. Has not been checking DWTs as she lives in a camper and states there is no room for a scale. States that she will \"try to figure something out\" after explanation of importance of DWTs.     Echo:   Interpretation Summary    Left Ventricle: Left ventricular cavity size is mildly dilated. Wall thickness is mildly increased. The left ventricular ejection fraction is 30%. Systolic function is severely reduced. There is severe global hypokinesis with specific regional wall abnormalities. Diastolic function is moderately abnormal, consistent with grade II (pseudonormal) relaxation.    Aortic Valve: There is mild stenosis. The aortic valve mean gradient is 6 mmHg. The dimensionless velocity index is 0.51. The aortic valve area is 1.44 cm2.    Mitral Valve: There is mild regurgitation.    Tricuspid Valve: There is mild regurgitation.    POCT EKG-- NSR-- QT prolongation resolved.          "

## 2025-03-27 NOTE — ASSESSMENT & PLAN NOTE
3/18/25-- BL pleural effusions---thoracenteses. Patient reports significant improvement in ability to breath. States that the pain she was having in LUQ has resolved since procedure.

## 2025-03-30 ENCOUNTER — APPOINTMENT (OUTPATIENT)
Dept: LAB | Facility: HOSPITAL | Age: 65
End: 2025-03-30
Payer: COMMERCIAL

## 2025-03-30 DIAGNOSIS — I16.0 HYPERTENSIVE URGENCY: ICD-10-CM

## 2025-03-30 LAB
ANION GAP SERPL CALCULATED.3IONS-SCNC: 7 MMOL/L (ref 4–13)
BUN SERPL-MCNC: 33 MG/DL (ref 5–25)
CALCIUM SERPL-MCNC: 9 MG/DL (ref 8.4–10.2)
CHLORIDE SERPL-SCNC: 106 MMOL/L (ref 96–108)
CO2 SERPL-SCNC: 29 MMOL/L (ref 21–32)
CREAT SERPL-MCNC: 1.83 MG/DL (ref 0.6–1.3)
GFR SERPL CREATININE-BSD FRML MDRD: 28 ML/MIN/1.73SQ M
GLUCOSE SERPL-MCNC: 96 MG/DL (ref 65–140)
MAGNESIUM SERPL-MCNC: 2.4 MG/DL (ref 1.9–2.7)
POTASSIUM SERPL-SCNC: 4.7 MMOL/L (ref 3.5–5.3)
SODIUM SERPL-SCNC: 142 MMOL/L (ref 135–147)

## 2025-03-30 PROCEDURE — 36415 COLL VENOUS BLD VENIPUNCTURE: CPT

## 2025-03-30 PROCEDURE — 80048 BASIC METABOLIC PNL TOTAL CA: CPT

## 2025-03-30 PROCEDURE — 83735 ASSAY OF MAGNESIUM: CPT

## 2025-04-04 ENCOUNTER — TELEPHONE (OUTPATIENT)
Age: 65
End: 2025-04-04

## 2025-04-04 NOTE — TELEPHONE ENCOUNTER
Patient has questions about the instructions given to her from Antonieta in radiology regarding her coronary CTA scheduled on 4/9.  I advised her to message Antonieta in My chart when Antonieta returns on Monday with any questions she may have. Patient agreed to do so .  She will call back if she has any other questions or concerns.

## 2025-04-08 ENCOUNTER — TELEPHONE (OUTPATIENT)
Age: 65
End: 2025-04-08

## 2025-04-08 DIAGNOSIS — I16.0 HYPERTENSIVE URGENCY: Primary | ICD-10-CM

## 2025-04-08 RX ORDER — LOSARTAN POTASSIUM 50 MG/1
50 TABLET ORAL DAILY
Qty: 30 TABLET | Refills: 2 | Status: SHIPPED | OUTPATIENT
Start: 2025-04-08

## 2025-04-08 NOTE — TELEPHONE ENCOUNTER
Mrs Monge would like a call back from Jeffrey regarding the testing he ordered at the office visit on 3/31.    Jeffrey's order is for CTA Cardiac w FFR if needed.  Patient has researched this test which indicates it is more like a catheterization using a catheter threaded into a coronary artery via the groin.    Mrs Monge has the procedure scheduled but is reluctant to proceed given that she thought it just involved insertion of an IV with dye given.    Please call patient to discuss more details about the test.

## 2025-04-10 ENCOUNTER — HOSPITAL ENCOUNTER (OUTPATIENT)
Dept: CT IMAGING | Facility: HOSPITAL | Age: 65
Discharge: HOME | End: 2025-04-10

## 2025-04-10 ENCOUNTER — TELEPHONE (OUTPATIENT)
Dept: CARDIOLOGY CLINIC | Facility: CLINIC | Age: 65
End: 2025-04-10

## 2025-04-10 DIAGNOSIS — I16.0 HYPERTENSIVE URGENCY: ICD-10-CM

## 2025-04-10 DIAGNOSIS — I50.42 CHRONIC COMBINED SYSTOLIC AND DIASTOLIC CONGESTIVE HEART FAILURE (HCC): ICD-10-CM

## 2025-04-10 DIAGNOSIS — I50.42 CHRONIC COMBINED SYSTOLIC AND DIASTOLIC CONGESTIVE HEART FAILURE (HCC): Primary | ICD-10-CM

## 2025-04-10 RX ORDER — NITROGLYCERIN 0.4 MG/1
0.8 TABLET SUBLINGUAL ONCE
Status: DISCONTINUED | OUTPATIENT
Start: 2025-04-10 | End: 2025-04-10

## 2025-04-10 RX ORDER — METOPROLOL TARTRATE 1 MG/ML
5 INJECTION, SOLUTION INTRAVENOUS
Status: DISCONTINUED | OUTPATIENT
Start: 2025-04-10 | End: 2025-04-10

## 2025-04-10 NOTE — PROGRESS NOTES
BMP reviewed showing YING with Cr 1.8. Patient had been complaining of diarrhea due medication side effects. She has already stopped taking lisinopril on her own. Cardiac CTA scheduled 4/22 to evaluate for ischemic CMP.  Will advise patient to hydrate well over the next 48-72 hours and repeat BMP next week. If GFR <30 will order pre-test hydration.

## 2025-04-11 ENCOUNTER — NURSE TRIAGE (OUTPATIENT)
Age: 65
End: 2025-04-11

## 2025-04-11 NOTE — TELEPHONE ENCOUNTER
Spoke with patient, believes she just missed a call from Jeffrey CONDE.  Contact [place] office, Jeffrey did call and will return patient's call soon.  Advised patient of this.

## 2025-04-11 NOTE — TELEPHONE ENCOUNTER
"Pt called and reported that she had the same issue with the Losartan that she had with the Lisinopril. She has been in the bathroom non stop and also had to throw out a pair of pants. She is not able to tolerate it.     Advised patient would inform the provider of this and call patient back with further recommendations        Answer Assessment - Initial Assessment Questions  1. NAME of MEDICINE: \"What medicine(s) are you calling about?\"      Losartan   2. QUESTION: \"What is your question?\" (e.g., double dose of medicine, side effect)      Side effect   3. PRESCRIBER: \"Who prescribed the medicine?\" Reason: if prescribed by specialist, call should be referred to that group.      Jeffrey Pool   4. SYMPTOMS: \"Do you have any symptoms?\" If Yes, ask: \"What symptoms are you having?\"  \"How bad are the symptoms (e.g., mild, moderate, severe)      Diarrhea    Protocols used: Medication Question Call-Adult-[place]    "

## 2025-04-15 DIAGNOSIS — I50.42 CHRONIC COMBINED SYSTOLIC AND DIASTOLIC CONGESTIVE HEART FAILURE (HCC): ICD-10-CM

## 2025-04-15 DIAGNOSIS — I16.0 HYPERTENSIVE URGENCY: ICD-10-CM

## 2025-04-15 NOTE — TELEPHONE ENCOUNTER
Requested medication(s) are due for refill today: Yes  Patient has already received a courtesy refill: Yes  Other reason request has been forwarded to provider: Please review RX

## 2025-04-15 NOTE — TELEPHONE ENCOUNTER
Last filled discharge from [facility]     Reason for call:   [x] Refill   [] Prior Auth  [] Other:     Office:   [] PCP/Provider -   [x] Specialty/Provider - CARD Jeffrey Pool     Medication: Carvedilol   Dose/Frequency: 6.25 mg BID  Quantity: 60    Medication: Furosemide   Dose/Frequency: 20 mg Daily   Quantity: 30    Pharmacy: [facility] [place] w [address] [facility]    Local Pharmacy   Does the patient have enough for 3 days?   [] Yes   [x] No - Send as HP to POD    Mail Away Pharmacy   Does the patient have enough for 10 days?   [] Yes   [] No - Send as HP to POD

## 2025-04-16 RX ORDER — FUROSEMIDE 20 MG/1
20 TABLET ORAL DAILY
Qty: 30 TABLET | Refills: 0 | Status: SHIPPED | OUTPATIENT
Start: 2025-04-16 | End: 2025-05-16

## 2025-04-16 RX ORDER — CARVEDILOL 6.25 MG/1
6.25 TABLET ORAL 2 TIMES DAILY WITH MEALS
Qty: 60 TABLET | Refills: 0 | Status: SHIPPED | OUTPATIENT
Start: 2025-04-16 | End: 2025-05-16

## 2025-04-16 NOTE — TELEPHONE ENCOUNTER
Patient called to request a refill for their furosemide (LASIX) 20 mg tablet advised a refill was requested on 4/11/2025 and is pending approval. Patient verbalized understanding and is in agreement.     Does the patient have enough for 3 days?   [] Yes   [x] No - Send as HP to POD

## 2025-04-16 NOTE — TELEPHONE ENCOUNTER
Pt calling in very frustrated about several things, and prioritizing that she needs her refil for Furosemide as she has 1 tablet left.     Please address refill at this time

## 2025-04-16 NOTE — TELEPHONE ENCOUNTER
Requested medication(s) are due for refill today: Yes  Patient has already received a courtesy refill: No  Other reason request has been forwarded to provider: Pt has 1 tablet left, please provide refill for pt at this time.

## 2025-04-17 ENCOUNTER — APPOINTMENT (OUTPATIENT)
Dept: LAB | Facility: HOSPITAL | Age: 65
End: 2025-04-17
Payer: COMMERCIAL

## 2025-04-17 DIAGNOSIS — I50.42 CHRONIC COMBINED SYSTOLIC AND DIASTOLIC CONGESTIVE HEART FAILURE (HCC): ICD-10-CM

## 2025-04-17 DIAGNOSIS — Z01.818 PRE-PROCEDURAL EXAMINATION: ICD-10-CM

## 2025-04-17 LAB
ANION GAP SERPL CALCULATED.3IONS-SCNC: 6 MMOL/L (ref 4–13)
BUN SERPL-MCNC: 22 MG/DL (ref 5–25)
CALCIUM SERPL-MCNC: 9.2 MG/DL (ref 8.4–10.2)
CHLORIDE SERPL-SCNC: 105 MMOL/L (ref 96–108)
CO2 SERPL-SCNC: 32 MMOL/L (ref 21–32)
CREAT SERPL-MCNC: 1.05 MG/DL (ref 0.6–1.3)
GFR SERPL CREATININE-BSD FRML MDRD: 56 ML/MIN/1.73SQ M
GLUCOSE P FAST SERPL-MCNC: 100 MG/DL (ref 65–99)
POTASSIUM SERPL-SCNC: 3.6 MMOL/L (ref 3.5–5.3)
SODIUM SERPL-SCNC: 143 MMOL/L (ref 135–147)

## 2025-04-17 PROCEDURE — 80048 BASIC METABOLIC PNL TOTAL CA: CPT

## 2025-04-17 PROCEDURE — 36415 COLL VENOUS BLD VENIPUNCTURE: CPT

## 2025-04-22 ENCOUNTER — TELEPHONE (OUTPATIENT)
Age: 65
End: 2025-04-22

## 2025-04-22 ENCOUNTER — HOSPITAL ENCOUNTER (OUTPATIENT)
Dept: CT IMAGING | Facility: HOSPITAL | Age: 65
Discharge: HOME | End: 2025-04-22
Payer: COMMERCIAL

## 2025-04-22 VITALS — RESPIRATION RATE: 20 BRPM | SYSTOLIC BLOOD PRESSURE: 185 MMHG | DIASTOLIC BLOOD PRESSURE: 84 MMHG | HEART RATE: 64 BPM

## 2025-04-22 PROCEDURE — 75580 N-INVAS EST C FFR SW ALY CTA: CPT

## 2025-04-22 PROCEDURE — 75574 CT ANGIO HRT W/3D IMAGE: CPT

## 2025-04-22 RX ORDER — METOPROLOL TARTRATE 1 MG/ML
5 INJECTION, SOLUTION INTRAVENOUS
Status: DISCONTINUED | OUTPATIENT
Start: 2025-04-22 | End: 2025-04-23 | Stop reason: HOSPADM

## 2025-04-22 RX ORDER — NITROGLYCERIN 0.4 MG/1
0.8 TABLET SUBLINGUAL ONCE
Status: COMPLETED | OUTPATIENT
Start: 2025-04-22 | End: 2025-04-22

## 2025-04-22 RX ADMIN — IOHEXOL 70 ML: 350 INJECTION, SOLUTION INTRAVENOUS at 11:19

## 2025-04-22 RX ADMIN — NITROGLYCERIN 0.8 MG: 0.4 TABLET, ORALLY DISINTEGRATING SUBLINGUAL at 11:12

## 2025-04-22 NOTE — TELEPHONE ENCOUNTER
Caller: Adelita-Reading room     Doctor: Jeffrey Pool    Reason for call: Adelita from the reading room called with immediate findings regarding Nathalia Monge.  I tried to call the office, there was no answer.  Please call her.    Thank you    Call back#: [phone]

## 2025-04-22 NOTE — NURSING NOTE
Patient arrived for coronary CT angiography. Test education reviewed with patient. Medications and allergies verified. Patient took 12.5 mg Coreg this am as instructed by cardiology. SL nitro given per protocol. See vitals flowsheet. Tolerated test well. Instructed to increase fluid intake remainder of day. Vitals stable, patient asymptomatic upon departure with .

## 2025-04-22 NOTE — LETTER
[facility]  [address] [address]      April 28, 2025    MRN: [id number]     Phone: [phone]     Dear Ms. Monge,    You recently had a(n) Cat Scan performed on 4/22/2025 at  [facility] that was requested by Jeffrey Pool PA-C. The study was reviewed by a radiologist, which is a physician who specializes in medical imaging. The radiologist issued a report describing his or her findings. In that report there was a finding that the radiologist felt warranted further discussion with your health care provider and that discussion would be beneficial to you.      The results were sent to Jeffrey Pool PA-C on 04/22/2025  3:05 PM. We recommend that you contact Jeffrey Pool PA-C at [phone] or set up an appointment to discuss the results of the imaging test. If you have already heard from Jeffrey Pool PA-C regarding the results of your study, you can disregard this letter.     This letter is not meant to alarm you, but intended to encourage you to follow-up on your results with the provider that sent you for the imaging study. In addition, we have enclosed answers to frequently asked questions by other patients who have also received a letter to review results with their health care provider (see page two).      Thank you for choosing [facility] for your medical imaging needs.                                                                                                                                                        FREQUENTLY ASKED QUESTIONS    Why am I receiving this letter?  [place] State Law requires us to notify patients who have findings on imaging exams that may require more testing or follow-up with a health professional within the next 3 months.        How serious is the finding on the imaging test?  This letter is sent to all patients who may need follow-up or more testing within the next 3  months.  Receiving this letter does not necessarily mean you have a life-threatening imaging finding or disease.  Recommendations in the radiologist’s imaging report are general in nature and it is up to your healthcare provider to say whether those recommendations make sense for your situation.  You are strongly encouraged to talk to your health care provider about the results and ask whether additional steps need to be taken.    Where can I get a copy of the final report for my recent radiology exam?  To get a full copy of the report you can access your records online at [email or web address] or please contact [facility]’s Medical Records Department at [phone] Monday through Friday between 8 am and 6 pm.         What do I need to do now?           Please contact your health care provider who requested the imaging study to discuss what further actions (if any) are needed.  You may have already heard from (your ordering provider) in regard to this test in which case you can disregard this letter.        NOTICE IN ACCORDANCE WITH THE [place] STATE “PATIENT TEST RESULT INFORMATION ACT OF 2018”    You are receiving this notice as a result of a determination by your diagnostic imaging service that further discussions of your test results are warranted and would be beneficial to you.    The complete results of your test or tests have been or will be sent to the health care practitioner that ordered the test or tests. It is recommended that you contact your health care practitioner to discuss your results as soon as possible.

## 2025-04-23 ENCOUNTER — TELEPHONE (OUTPATIENT)
Dept: CARDIOLOGY CLINIC | Facility: CLINIC | Age: 65
End: 2025-04-23

## 2025-04-23 DIAGNOSIS — I50.42 CHRONIC COMBINED SYSTOLIC AND DIASTOLIC CONGESTIVE HEART FAILURE (HCC): Primary | ICD-10-CM

## 2025-04-23 NOTE — TELEPHONE ENCOUNTER
Patient scheduled for  LHC  at [facility] on 05/05/2025   with Dr JULES.  Patient aware of general instructions, labs test required.   Meds holds:   Lasix to hold am of.  Losartan patient confirmed is not taking this med.  Novolog to apply 1/2 de night prior, hold Am of.

## 2025-04-23 NOTE — TELEPHONE ENCOUNTER
Pt returned missed call.  Spoke with Itzel in the [place] office who stated Jeffrey was currently with a patient and will have Jason call the pt back when he is available.

## 2025-04-23 NOTE — H&P (VIEW-ONLY)
Patient CTA results discussed. Risks vs benefits of LHC were discussed and agreeable to proceed. Will pass along to scheduling team.

## 2025-04-23 NOTE — TELEPHONE ENCOUNTER
Received call from pt regarding CTA.  She is concerned with results, and asking for next steps.  Please advise.

## 2025-04-29 ENCOUNTER — APPOINTMENT (OUTPATIENT)
Dept: LAB | Facility: HOSPITAL | Age: 65
End: 2025-04-29
Payer: COMMERCIAL

## 2025-04-29 DIAGNOSIS — I50.42 CHRONIC COMBINED SYSTOLIC AND DIASTOLIC CONGESTIVE HEART FAILURE (HCC): ICD-10-CM

## 2025-04-29 LAB
BASOPHILS # BLD AUTO: 0.03 THOUSANDS/ÂΜL (ref 0–0.1)
BASOPHILS NFR BLD AUTO: 0 % (ref 0–1)
EOSINOPHIL # BLD AUTO: 0.14 THOUSAND/ÂΜL (ref 0–0.61)
EOSINOPHIL NFR BLD AUTO: 2 % (ref 0–6)
ERYTHROCYTE [DISTWIDTH] IN BLOOD BY AUTOMATED COUNT: 12.3 % (ref 11.6–15.1)
HCT VFR BLD AUTO: 38.7 % (ref 34.8–46.1)
HGB BLD-MCNC: 12.9 G/DL (ref 11.5–15.4)
IMM GRANULOCYTES # BLD AUTO: 0.01 THOUSAND/UL (ref 0–0.2)
IMM GRANULOCYTES NFR BLD AUTO: 0 % (ref 0–2)
LYMPHOCYTES # BLD AUTO: 2.11 THOUSANDS/ÂΜL (ref 0.6–4.47)
LYMPHOCYTES NFR BLD AUTO: 30 % (ref 14–44)
MCH RBC QN AUTO: 29.6 PG (ref 26.8–34.3)
MCHC RBC AUTO-ENTMCNC: 33.3 G/DL (ref 31.4–37.4)
MCV RBC AUTO: 89 FL (ref 82–98)
MONOCYTES # BLD AUTO: 0.4 THOUSAND/ÂΜL (ref 0.17–1.22)
MONOCYTES NFR BLD AUTO: 6 % (ref 4–12)
NEUTROPHILS # BLD AUTO: 4.34 THOUSANDS/ÂΜL (ref 1.85–7.62)
NEUTS SEG NFR BLD AUTO: 62 % (ref 43–75)
NRBC BLD AUTO-RTO: 0 /100 WBCS
PLATELET # BLD AUTO: 213 THOUSANDS/UL (ref 149–390)
PMV BLD AUTO: 10.7 FL (ref 8.9–12.7)
RBC # BLD AUTO: 4.36 MILLION/UL (ref 3.81–5.12)
WBC # BLD AUTO: 7.03 THOUSAND/UL (ref 4.31–10.16)

## 2025-04-29 PROCEDURE — 85025 COMPLETE CBC W/AUTO DIFF WBC: CPT

## 2025-04-29 PROCEDURE — 36415 COLL VENOUS BLD VENIPUNCTURE: CPT

## 2025-04-30 NOTE — TELEPHONE ENCOUNTER
Called and discussed further evaluation needed with left heart cath. Patient agreeable. Orders placed.

## 2025-05-01 NOTE — TELEPHONE ENCOUNTER
Pt called to find out time of procedure 5/5, states she has to arrange transportation. Warm transferred to [facility].

## 2025-05-02 NOTE — TELEPHONE ENCOUNTER
Received call from pt, says she did not get a call yet from the hospital.  Advised pt of instructions- [place] [facility] [place] [phone] if not get call by 5.00pm.  provided pt this phone number and advised her she can call this.

## 2025-05-05 ENCOUNTER — HOSPITAL ENCOUNTER (OUTPATIENT)
Facility: HOSPITAL | Age: 65
Setting detail: OUTPATIENT SURGERY
Discharge: HOME | End: 2025-05-05
Attending: INTERNAL MEDICINE | Admitting: INTERNAL MEDICINE
Payer: COMMERCIAL

## 2025-05-05 VITALS
WEIGHT: 192 LBS | HEART RATE: 62 BPM | RESPIRATION RATE: 16 BRPM | OXYGEN SATURATION: 98 % | DIASTOLIC BLOOD PRESSURE: 79 MMHG | BODY MASS INDEX: 30.86 KG/M2 | TEMPERATURE: 96.6 F | HEIGHT: 66 IN | SYSTOLIC BLOOD PRESSURE: 166 MMHG

## 2025-05-05 DIAGNOSIS — I25.10 CORONARY ARTERY CALCIFICATION SEEN ON CT SCAN: ICD-10-CM

## 2025-05-05 DIAGNOSIS — I25.118 CORONARY ARTERY DISEASE OF NATIVE ARTERY OF NATIVE HEART WITH STABLE ANGINA PECTORIS (HCC): Primary | ICD-10-CM

## 2025-05-05 LAB
ANION GAP SERPL CALCULATED.3IONS-SCNC: 7 MMOL/L (ref 4–13)
ATRIAL RATE: 63 BPM
BUN SERPL-MCNC: 24 MG/DL (ref 5–25)
CALCIUM SERPL-MCNC: 9.2 MG/DL (ref 8.4–10.2)
CHLORIDE SERPL-SCNC: 104 MMOL/L (ref 96–108)
CO2 SERPL-SCNC: 30 MMOL/L (ref 21–32)
CREAT SERPL-MCNC: 1.16 MG/DL (ref 0.6–1.3)
GFR SERPL CREATININE-BSD FRML MDRD: 49 ML/MIN/1.73SQ M
GLUCOSE P FAST SERPL-MCNC: 168 MG/DL (ref 65–99)
GLUCOSE SERPL-MCNC: 168 MG/DL (ref 65–140)
KCT BLD-ACNC: 293 SEC (ref 89–137)
P AXIS: 64 DEGREES
POTASSIUM SERPL-SCNC: 3.7 MMOL/L (ref 3.5–5.3)
PR INTERVAL: 186 MS
QRS AXIS: 46 DEGREES
QRSD INTERVAL: 90 MS
QT INTERVAL: 464 MS
QTC INTERVAL: 475 MS
SODIUM SERPL-SCNC: 141 MMOL/L (ref 135–147)
SPECIMEN SOURCE: ABNORMAL
T WAVE AXIS: 75 DEGREES
VENTRICULAR RATE: 63 BPM

## 2025-05-05 PROCEDURE — 93571 IV DOP VEL&/PRESS C FLO 1ST: CPT | Performed by: INTERNAL MEDICINE

## 2025-05-05 PROCEDURE — 93005 ELECTROCARDIOGRAM TRACING: CPT

## 2025-05-05 PROCEDURE — 93010 ELECTROCARDIOGRAM REPORT: CPT | Performed by: INTERNAL MEDICINE

## 2025-05-05 PROCEDURE — 99152 MOD SED SAME PHYS/QHP 5/>YRS: CPT | Performed by: INTERNAL MEDICINE

## 2025-05-05 PROCEDURE — 93458 L HRT ARTERY/VENTRICLE ANGIO: CPT | Performed by: INTERNAL MEDICINE

## 2025-05-05 PROCEDURE — C1769 GUIDE WIRE: HCPCS | Performed by: INTERNAL MEDICINE

## 2025-05-05 PROCEDURE — C1894 INTRO/SHEATH, NON-LASER: HCPCS | Performed by: INTERNAL MEDICINE

## 2025-05-05 PROCEDURE — 85347 COAGULATION TIME ACTIVATED: CPT

## 2025-05-05 PROCEDURE — C1887 CATHETER, GUIDING: HCPCS | Performed by: INTERNAL MEDICINE

## 2025-05-05 PROCEDURE — 80048 BASIC METABOLIC PNL TOTAL CA: CPT | Performed by: PHYSICIAN ASSISTANT

## 2025-05-05 PROCEDURE — NC001 PR NO CHARGE: Performed by: INTERNAL MEDICINE

## 2025-05-05 PROCEDURE — 99153 MOD SED SAME PHYS/QHP EA: CPT | Performed by: INTERNAL MEDICINE

## 2025-05-05 RX ORDER — BUPIVACAINE HYDROCHLORIDE 2.5 MG/ML
INJECTION, SOLUTION EPIDURAL; INFILTRATION; INTRACAUDAL; PERINEURAL CODE/TRAUMA/SEDATION MEDICATION
Status: DISCONTINUED | OUTPATIENT
Start: 2025-05-05 | End: 2025-05-05 | Stop reason: HOSPADM

## 2025-05-05 RX ORDER — BUPIVACAINE HYDROCHLORIDE 2.5 MG/ML
10 INJECTION, SOLUTION EPIDURAL; INFILTRATION; INTRACAUDAL; PERINEURAL ONCE
Status: DISCONTINUED | OUTPATIENT
Start: 2025-05-05 | End: 2025-05-05

## 2025-05-05 RX ORDER — BUPIVACAINE HYDROCHLORIDE 2.5 MG/ML
30 INJECTION, SOLUTION EPIDURAL; INFILTRATION; INTRACAUDAL; PERINEURAL ONCE
Status: DISCONTINUED | OUTPATIENT
Start: 2025-05-05 | End: 2025-05-05 | Stop reason: HOSPADM

## 2025-05-05 RX ORDER — FENTANYL CITRATE 50 UG/ML
INJECTION, SOLUTION INTRAMUSCULAR; INTRAVENOUS CODE/TRAUMA/SEDATION MEDICATION
Status: DISCONTINUED | OUTPATIENT
Start: 2025-05-05 | End: 2025-05-05 | Stop reason: HOSPADM

## 2025-05-05 RX ORDER — NITROGLYCERIN 20 MG/100ML
INJECTION INTRAVENOUS CODE/TRAUMA/SEDATION MEDICATION
Status: DISCONTINUED | OUTPATIENT
Start: 2025-05-05 | End: 2025-05-05 | Stop reason: HOSPADM

## 2025-05-05 RX ORDER — SODIUM CHLORIDE 9 MG/ML
75 INJECTION, SOLUTION INTRAVENOUS CONTINUOUS
Status: DISCONTINUED | OUTPATIENT
Start: 2025-05-05 | End: 2025-05-05

## 2025-05-05 RX ORDER — HEPARIN SODIUM 1000 [USP'U]/ML
INJECTION, SOLUTION INTRAVENOUS; SUBCUTANEOUS CODE/TRAUMA/SEDATION MEDICATION
Status: DISCONTINUED | OUTPATIENT
Start: 2025-05-05 | End: 2025-05-05 | Stop reason: HOSPADM

## 2025-05-05 RX ORDER — VERAPAMIL HYDROCHLORIDE 2.5 MG/ML
INJECTION INTRAVENOUS CODE/TRAUMA/SEDATION MEDICATION
Status: DISCONTINUED | OUTPATIENT
Start: 2025-05-05 | End: 2025-05-05 | Stop reason: HOSPADM

## 2025-05-05 RX ORDER — MIDAZOLAM HYDROCHLORIDE 2 MG/2ML
INJECTION, SOLUTION INTRAMUSCULAR; INTRAVENOUS CODE/TRAUMA/SEDATION MEDICATION
Status: DISCONTINUED | OUTPATIENT
Start: 2025-05-05 | End: 2025-05-05 | Stop reason: HOSPADM

## 2025-05-05 RX ORDER — ADENOSINE 3 MG/ML
INJECTION, SOLUTION INTRAVENOUS
Status: DISCONTINUED | OUTPATIENT
Start: 2025-05-05 | End: 2025-05-05 | Stop reason: HOSPADM

## 2025-05-05 RX ORDER — SODIUM CHLORIDE 9 MG/ML
75 INJECTION, SOLUTION INTRAVENOUS CONTINUOUS
OUTPATIENT
Start: 2025-05-05 | End: 2025-05-05

## 2025-05-05 RX ORDER — ASPIRIN 81 MG/1
324 TABLET, CHEWABLE ORAL ONCE
Status: COMPLETED | OUTPATIENT
Start: 2025-05-05 | End: 2025-05-05

## 2025-05-05 RX ADMIN — ASPIRIN 81 MG CHEWABLE TABLET 324 MG: 81 TABLET CHEWABLE at 07:35

## 2025-05-05 RX ADMIN — SODIUM CHLORIDE 263.4 ML: 0.9 INJECTION, SOLUTION INTRAVENOUS at 07:35

## 2025-05-05 NOTE — PROGRESS NOTES
"H&P Exam - Cardiology   Nathalia Monge 64 y.o. female MRN: [id number]  Unit/Bed#: [facility] CATH LAB ROOM Encounter: [id number]     Office cardiologist:Jeffrey Pool PA-C    PCP:SHASHANK Miguel        Assessment & Plan   New cardiomyopathy with ejection fraction of 30%  secondary to uncontrolled hypertension  Hypertension  Hyperlipidemia  Diabetes type 2      BP (!) 184/86   Pulse 67   Temp (!) 97.4 °F (36.3 °C) (Temporal)   Resp 18   Ht 5' 5.5\" (1.664 m)   Wt 87.1 kg (192 lb)   SpO2 98%   BMI 31.46 kg/m²     History of Present Illness   HPI:  Nathalia Monge is a 64 y.o. female  with history of elevated BP and increased dyspnea.  She had bilateral pleural effusions with elevated BNP.  She underwent bilateral IR thoracentesis with 775 mL on the right and 650 mL on the left and felt much better postprocedure.  Fluid analysis showed transudative process.  She responded well to IV Lasix 40 mg x 1 with -1.5 L output.  She was discharged on PO Lasix 20 mg for volume maintenance.   At time of that hospitalization patient refused LifeVest.    Had outpatient coronary CTA.  Calcium score was 33.  Results of the CTA are as follows:    Severe flow-limiting atherosclerotic stenoses in the mid left anterior descending coronary artery/origin of first diagonal and in the midportion of a large caliber ramus intermedius branch.  She was electively admitted for cardiac catheterization to identify her coronary anatomy and ischemic burden.  Patient was electively admitted for cardiac catheterization to identify her coronary anatomy and ischemic burden.    Patient is allergic to lidocaine secondary to seizures.  We will use Sensorcaine for her infiltration at her access sites.    Historical Information   Past Medical History:   Diagnosis Date    Bipolar depression (HCC)     Cancer (HCC)     Depression     Diabetes mellitus (HCC)     Epilepsy (HCC)     Hypertension     Miscarriage 1990's    PTSD (post-traumatic stress disorder) 2002    " Seizures (HCC)      Past Surgical History:   Procedure Laterality Date    BRAIN SURGERY      CHOLECYSTECTOMY  2004    EYE SURGERY      HYSTERECTOMY      IMPLANTABLE CONTACT LENS IMPLANTATION      IR THORACENTESIS  3/18/2025    LA CRNEC TREPH BONE FLAP CRNOT EXC BRAIN TUMOR STTL Right 03/15/2018    Procedure: IMAGE-GUIDED RIGHT FRONTOPARIETAL CRANIOTOMY FOR TUMOR;  Surgeon: Arsh Jeronimo MD;  Location: [facility] OR;  Service: Neurosurgery     Social History   Social History     Substance and Sexual Activity   Alcohol Use Never     Social History     Substance and Sexual Activity   Drug Use No     Social History     Tobacco Use   Smoking Status Former    Current packs/day: 0.00    Types: Cigarettes    Quit date: 3/5/2000    Years since quittin.1   Smokeless Tobacco Never     Family History:   Family History   Adopted: Yes       Meds/Allergies   PTA meds:   Prior to Admission Medications   Prescriptions Last Dose Informant Patient Reported? Taking?   BD Pen Needle Yolis 2nd Gen 32G X 4 MM MISC 2025 Self No Yes   Sig: Use 3 (three) times a day   CHROMIUM PO 2025 Self Yes Yes   Sig: Take by mouth 2 (two) times a day   Insulin Pen Needle (BD Pen Needle Yolis 2nd Gen) 32G X 4 MM MISC 2025 Self No Yes   Sig: For use with insulin pen. Pharmacy may dispense brand covered by insurance.   L-LYSINE PO 2025 Self Yes Yes   Sig: Take 500 mg by mouth daily   Probiotic Product (PROBIOTIC DAILY PO) 2025 Self Yes Yes   Sig: Take 1 capsule by mouth daily   carvedilol (COREG) 6.25 mg tablet 2025 Morning  No Yes   Sig: Take 1 tablet (6.25 mg total) by mouth 2 (two) times a day with meals   cholecalciferol (VITAMIN D3) 1,000 units tablet 2025 Self Yes Yes   Sig: Take 1,000 Units by mouth daily   cyclobenzaprine (FLEXERIL) 10 mg tablet 2025 Self No Yes   Sig: Take 1 tablet (10 mg total) by mouth 3 (three) times a day as needed for muscle spasms   ferrous sulfate 325 (65 Fe) mg tablet 2025 Self Yes Yes    Sig: Take 325 mg by mouth daily    furosemide (LASIX) 20 mg tablet 5/4/2025  No Yes   Sig: Take 1 tablet (20 mg total) by mouth daily   insulin aspart protamine-insulin aspart (NovoLOG Mix 70/30 FlexPen) 100 Units/mL injection pen 5/4/2025 Self No Yes   Sig: Inject 20 Units under the skin 2 (two) times a day before meals   losartan (COZAAR) 50 mg tablet   No No   Sig: Take 1 tablet (50 mg total) by mouth daily   pantoprazole (PROTONIX) 40 mg tablet 5/4/2025 Self No Yes   Sig: Take 1 tablet (40 mg total) by mouth daily before breakfast      Facility-Administered Medications: None     Allergies   Allergen Reactions    Dilantin [Phenytoin]     Gabapentin Other (See Comments)     Anger/irritability/mood disturbance    Lidocaine Seizures    Statins        Review of Systems   All other systems reviewed and are negative.      Physical Exam  Constitutional:       Appearance: Normal appearance.   HENT:      Head: Normocephalic and atraumatic.      Mouth/Throat:      Mouth: Mucous membranes are moist.   Cardiovascular:      Rate and Rhythm: Normal rate and regular rhythm.   Pulmonary:      Effort: Pulmonary effort is normal.      Breath sounds: Normal breath sounds.   Musculoskeletal:      Right lower leg: No edema.      Left lower leg: No edema.   Skin:     Capillary Refill: Capillary refill takes 2 to 3 seconds.   Neurological:      Mental Status: She is alert and oriented to person, place, and time.   Psychiatric:         Behavior: Behavior normal.         EKG: Sinus rhythm with sinus arrhythmia  ECHO: Left Ventricle: Left ventricular cavity size is mildly dilated. Wall thickness is mildly increased. The left ventricular ejection fraction is 30%. Systolic function is severely reduced. There is severe global hypokinesis with specific regional wall abnormalities. Diastolic function is moderately abnormal, consistent with grade II (pseudonormal) relaxation.    Aortic Valve: There is mild stenosis. The aortic valve mean  gradient is 6 mmHg. The dimensionless velocity index is 0.51. The aortic valve area is 1.44 cm2.    Mitral Valve: There is mild regurgitation.    Tricuspid Valve: There is mild regurgitation.

## 2025-05-05 NOTE — INTERVAL H&P NOTE
H&P reviewed. After examining the patient I find no changes in the patients condition since the H&P had been written.    Vitals:    05/05/25 0717   BP: (!) 184/86   Pulse: 67   Resp: 18   Temp: (!) 97.4 °F (36.3 °C)   SpO2: 98%     63 y/o with hx of HTN, HLD, T2DM here for left heart cath after echo showed newly reduced LVEF and CTA FFR showed significant LAD and Lcx lesions.

## 2025-05-06 DIAGNOSIS — I50.42 CHRONIC COMBINED SYSTOLIC AND DIASTOLIC CONGESTIVE HEART FAILURE (HCC): ICD-10-CM

## 2025-05-06 DIAGNOSIS — I16.0 HYPERTENSIVE URGENCY: ICD-10-CM

## 2025-05-06 RX ORDER — CARVEDILOL 6.25 MG/1
6.25 TABLET ORAL 2 TIMES DAILY WITH MEALS
Qty: 180 TABLET | Refills: 1 | Status: SHIPPED | OUTPATIENT
Start: 2025-05-06

## 2025-05-06 RX ORDER — FUROSEMIDE 20 MG/1
20 TABLET ORAL DAILY
Qty: 90 TABLET | Refills: 1 | Status: SHIPPED | OUTPATIENT
Start: 2025-05-06

## 2025-05-08 ENCOUNTER — TELEPHONE (OUTPATIENT)
Age: 65
End: 2025-05-08

## 2025-05-08 NOTE — TELEPHONE ENCOUNTER
Reviewed.  Open heart surgery by itself is highly stressful on the body and therefore we would not pursue repair of splenic vein at that time. Also, there is no way to say whether or not her abdominal pain is from these blockages or not. Would advise that patient proceed with cardiac surgery as planned and can follow up with Dr. Costello as previously recommended

## 2025-05-14 ENCOUNTER — CONSULT (OUTPATIENT)
Dept: CARDIAC SURGERY | Facility: CLINIC | Age: 65
End: 2025-05-14
Payer: COMMERCIAL

## 2025-05-14 VITALS
SYSTOLIC BLOOD PRESSURE: 177 MMHG | DIASTOLIC BLOOD PRESSURE: 81 MMHG | BODY MASS INDEX: 31.34 KG/M2 | WEIGHT: 195 LBS | HEIGHT: 66 IN | OXYGEN SATURATION: 98 % | HEART RATE: 64 BPM

## 2025-05-14 DIAGNOSIS — Z87.891 FORMER TOBACCO USE: ICD-10-CM

## 2025-05-14 DIAGNOSIS — R60.0 LOCALIZED EDEMA: ICD-10-CM

## 2025-05-14 DIAGNOSIS — I42.9 CARDIOMYOPATHY, UNSPECIFIED TYPE (HCC): ICD-10-CM

## 2025-05-14 DIAGNOSIS — Z01.89 ENCOUNTER FOR IMAGING OF BILATERAL GREATER SAPHENOUS VEINS: ICD-10-CM

## 2025-05-14 DIAGNOSIS — I25.119 CORONARY ARTERY DISEASE INVOLVING NATIVE CORONARY ARTERY OF NATIVE HEART WITH ANGINA PECTORIS (HCC): Primary | ICD-10-CM

## 2025-05-14 DIAGNOSIS — Z13.6 ENCOUNTER FOR SCREENING FOR STENOSIS OF CAROTID ARTERY: ICD-10-CM

## 2025-05-14 PROBLEM — I25.10 CAD (CORONARY ARTERY DISEASE): Status: ACTIVE | Noted: 2025-05-14

## 2025-05-14 PROCEDURE — 99205 OFFICE O/P NEW HI 60 MIN: CPT | Performed by: THORACIC SURGERY (CARDIOTHORACIC VASCULAR SURGERY)

## 2025-05-14 NOTE — PROGRESS NOTES
Consultation - Cardiac Surgery   Nathalia Monge 64 y.o. female MRN: [id number]    Reason for Consult / Principal Problem: Cardiomyopathy, Coronary artery disease    History of Present Illness: Nathalia Monge is a 64 y.o. year old female presents for evaluation of MV CAD in the setting of CM. The patient's cardiac hx started in 3/2025 when she was admitted w/ HTN urgency & CHF. During this hospital stay she had b/l thoracentesis & TTE showing new EF 30%. She was discharged w/o a cardiology inpatient consult & presented for o/p evaluation. She had the LifeVest brought to her attention but she states she was not told what it really was or served & would have taken one had she been explained this better. She had a cardiac CTA which was suggestive of obstructive CAD. She also had a cardiac cath showing MV CAD therefore she is referred today.    She is very disgruntled w/ her cardiac care & desires a new provider for cardiology f/u. She also states retrospectively she has been dealing w/ LUE pain intermittently superficially & was in the ED in 12/2024 due to this w/ SBP 200s & was discharged home w/o this addressed by the ED & is concerned this was related to her heart as well.    Upon examination today, Mrs. Monge reports she feeling well. Admits to intermittent LE edema which she states has occurred since her meningioma resection. Denies CP, palpitations, SOB, ANDERSON, orthopnea, PND, numbness/tinlging/paresthesias in UE or LE bilaterally, HA, lightheadeness, dizziness, presyncopal symptoms, hx syncopal events, N/V/D, hemoptysis, hematemesis, hematochezia, melena. Denies hx stroke, hx cancer w/ chest wall radiation, hx blood loss anemia, hx varicose veins or vein stripping.    PMH includes CAD, CM (EF 30%, no LifeVest), HTN, HL, IDDM2, h/o meningioma (s/p resection), bipolar d/o, epilepsy (no maintaince medications, has petite seizures in her right eye frequently throughout the day, does not drive), PTSD, CKD 3, h/o pyelonephritis &  rnal abscess, h/o medication non-compliance, vitamin D deficiency, CHF, ERNESTO, GERD, thoracic DDD 9s/p injections), h/o c diff carrier, obesity (BMI 31.46), prolonged QT, former tobacco use, b/l LE telangectasias. PSH includes cardiac cath, hysterectomy, cholecystectomy, cataract extraction b/l, wisdom teeth extractions, b/l thoracentesis, craniotomy w/ meningioma removal. Patient is adopted. Denies FH of CAD/MI, HTN, HL, valvular disease, DM, aortic aneurysms, or SCD. Patient is working for a non-profit, stressful, manages it. Lives in a camper home w/ . Does not use an assist device for ambulation. Does not drive due to epilepsy. (+) former social tobacco use. Denies current or prior use of ETOH or drug use. Allergies include statins with rxn diarrhea (has tried all statins w/ same SE), Lidocaine (tolerated bupivacaine  w/ cardiac cath) w/ rxn seizures, gabapentin w/ rxn irritability/mood disturbances, Dilantitn w/ unknown reaction. Cardiac pertinent medications include: Coreg 6.25mg, Lasix 20mg, Losartan 50mg. Other medications can be reviewed in the patient chart.    Patient sees Honey ENCARNACION as her primary care provider, their prior visit documentation was reviewed at this visit. Patient sees Jeffrey Foy PA-C as her cardiology provider, their proior visit documentation was reviewed at this visit, she does not wish to see him again in f/u.     Past Medical History:  Past Medical History:   Diagnosis Date    Bipolar depression (HCC)     CAD (coronary artery disease)     Cardiomyopathy (HCC)     EF 30%    CHF (congestive heart failure) (HCC)     CKD (chronic kidney disease), stage III (HCC)     DDD (degenerative disc disease), thoracic     Depression     Diabetes mellitus (HCC)     type 2, insulin dependent    Epilepsy (HCC)     no maintaince medications, experiences petite seizures to right eye multiple times throughout the day, does not drive due to this    Former tobacco use     GERD  (gastroesophageal reflux disease)     History of Clostridioides difficile infection     History of pyelonephritis     w/ renal abscess    Hyperlipidemia     Hypertension     ERNESTO (iron deficiency anemia)     Meningioma (HCC)     s/p surgical resection    Obesity     Prolonged QT interval     PTSD (post-traumatic stress disorder)     Vitamin D deficiency      Past Surgical History:   Past Surgical History:   Procedure Laterality Date    CARDIAC CATHETERIZATION Left 05/05/2025    Procedure: Cardiac Left Heart Cath;  Surgeon: Latoya Garner DO;  Location: [facility];  Service: Cardiology    CARDIAC CATHETERIZATION N/A 05/05/2025    Procedure: Cardiac FFR/IFR;  Surgeon: Latoya Garner DO;  Location: [facility];  Service: Cardiology    CHOLECYSTECTOMY  2004    EPIDURAL BLOCK INJECTION      thoracic    HYSTERECTOMY      IMPLANTABLE CONTACT LENS IMPLANTATION Bilateral     IR THORACENTESIS  03/18/2025    MO CRNEC TREPH BONE FLAP CRNOT EXC BRAIN TUMOR STTL Right 03/15/2018    Procedure: IMAGE-GUIDED RIGHT FRONTOPARIETAL CRANIOTOMY FOR TUMOR;  Surgeon: Arsh Jeronimo MD;  Location: [facility];  Service: Neurosurgery     Family History:  Family History   Adopted: Yes     Social History:  Social History     Substance and Sexual Activity   Alcohol Use Never     Social History     Substance and Sexual Activity   Drug Use No     Tobacco Use History[1]    Home Medications:   Prior to Admission medications    Medication Sig Start Date End Date Taking? Authorizing Provider   BD Pen Needle Yolis 2nd Gen 32G X 4 MM MISC Use 3 (three) times a day 9/26/24  Yes SHASHANK Miguel   carvedilol (COREG) 6.25 mg tablet Take 1 tablet (6.25 mg total) by mouth 2 (two) times a day with meals 5/6/25  Yes Jeffrey Pool PA-C   cholecalciferol (VITAMIN D3) 1,000 units tablet Take 1,000 Units by mouth in the morning.   Yes Historical Provider, MD   CHROMIUM PO Take by mouth in the morning and in the evening.   Yes  Historical Provider, MD   cyclobenzaprine (FLEXERIL) 10 mg tablet Take 1 tablet (10 mg total) by mouth 3 (three) times a day as needed for muscle spasms 2/10/25  Yes SHASHANK Miguel   ferrous sulfate 325 (65 Fe) mg tablet Take 325 mg by mouth in the morning.   Yes Historical Provider, MD   furosemide (LASIX) 20 mg tablet Take 1 tablet (20 mg total) by mouth daily 5/6/25  Yes Jeffrey Pool PA-C   insulin aspart protamine-insulin aspart (NovoLOG Mix 70/30 FlexPen) 100 Units/mL injection pen Inject 20 Units under the skin 2 (two) times a day before meals 1/23/25 7/22/25 Yes Nelson Garner MD   Insulin Pen Needle (BD Pen Needle Yolis 2nd Gen) 32G X 4 MM MISC For use with insulin pen. Pharmacy may dispense brand covered by insurance. 1/4/24  Yes Steven Bray DO   L-LYSINE PO Take 500 mg by mouth in the morning.   Yes Historical Provider, MD   losartan (COZAAR) 50 mg tablet Take 1 tablet (50 mg total) by mouth daily 4/8/25  Yes Jeffrey Pool PA-C   Probiotic Product (PROBIOTIC DAILY PO) Take 1 capsule by mouth in the morning.   Yes Historical Provider, MD   pantoprazole (PROTONIX) 40 mg tablet Take 1 tablet (40 mg total) by mouth daily before breakfast  Patient not taking: Reported on 5/14/2025 12/16/24 12/11/25  SHASHANK Miguel       Allergies:  Allergies[2]    Review of Systems:     Review of Systems   Constitutional: Negative.  Negative for activity change, diaphoresis, fatigue and unexpected weight change.   HENT: Negative.     Respiratory: Negative.  Negative for chest tightness, shortness of breath and wheezing.    Cardiovascular:  Positive for leg swelling. Negative for chest pain and palpitations.   Gastrointestinal: Negative.  Negative for blood in stool, constipation, diarrhea, nausea and vomiting.   Genitourinary: Negative.    Musculoskeletal: Negative.  Negative for arthralgias, back pain, gait problem and myalgias.   Skin: Negative.    Neurological: Negative.  Negative  "for dizziness, syncope, weakness, light-headedness, numbness and headaches.   Hematological: Negative.  Does not bruise/bleed easily.   All other systems reviewed and are negative.      Vital Signs:     Vitals:    05/14/25 1432 05/14/25 1439   BP: (!) 182/78 (!) 177/81   BP Location: Left arm Right arm   Patient Position: Sitting Sitting   Cuff Size: Large Large   Pulse: 64    SpO2: 98%    Weight: 88.5 kg (195 lb)    Height: 5' 5.5\" (1.664 m)      Repeat LUE manual blood pressure --> 168/59    Physical Exam:     Physical Exam  Constitutional:       General: She is not in acute distress.     Appearance: Normal appearance. She is well-developed. She is obese. She is not ill-appearing or toxic-appearing.      Comments: Sitting in chair in NAD   HENT:      Head: Normocephalic and atraumatic.   Neck:      Vascular: No carotid bruit or JVD.      Trachea: Trachea normal.     Cardiovascular:      Rate and Rhythm: Normal rate and regular rhythm.      Chest Wall: PMI is not displaced.      Heart sounds: S1 normal and S2 normal. No murmur heard.     No friction rub. No gallop. No S3 or S4 sounds.      Comments: No heaves/lifts on palpation  Pulmonary:      Effort: Pulmonary effort is normal. No accessory muscle usage or respiratory distress.      Breath sounds: Normal breath sounds. No wheezing, rhonchi or rales.   Abdominal:      General: Bowel sounds are normal. There is no distension.      Palpations: Abdomen is not rigid. There is no mass.      Tenderness: There is no abdominal tenderness. There is no guarding or rebound.     Musculoskeletal:      Cervical back: Normal range of motion and neck supple.     Skin:     General: Skin is warm and dry.      Comments: Trace b/l LE; no VV to b/l LE, (+) telangiectasias to b/l LE     Neurological:      Mental Status: She is alert and oriented to person, place, and time.      Cranial Nerves: No cranial nerve deficit.      Sensory: No sensory deficit.      Comments: No focal deficits " "      Lab Results:               Invalid input(s): \"LABGLOM\"      Lab Results   Component Value Date    HGBA1C 7.4 (H) 03/18/2025     Lab Results   Component Value Date    CKTOTAL 25 (L) 01/08/2024       Imaging Studies:     Cardiac Catheterization: 75% diffuse mid LAD disease, 85% mid RCA    Echocardiogram: EF 30%, mild AS, mild MR, mild TR    EKG: done    Cardiac CTA 4/22/2025: severe flow-limiting atherosclerotic stenosis mid LAD/D1/ramus, high burden plaque    LE Duplex 3/18/2025: (-) DVT b/l    CTA PE study 3/18/2025:  no PE, moderate b/l pleural effusions w/ atelectasis    Results Review Statement: I personally reviewed the following image studies in PACS and associated radiology reports: cardiac CTA, TTE, CT chest, procedure reports, Ultrasound(s), and Echocardiogram. My interpretation of the radiology images/reports is: as above.    Assessment:  Patient Active Problem List    Diagnosis Date Noted    CAD (coronary artery disease) 05/14/2025    Chronic combined systolic and diastolic congestive heart failure (HCC) 03/19/2025    Bilateral pleural effusion 03/18/2025    Prolonged QT interval 03/18/2025    Thoracic radiculopathy 02/13/2025    Degenerative disc disease, thoracic 02/10/2025    Mixed hyperlipidemia 12/16/2024    Carpal tunnel syndrome on left 12/03/2024    Imaging abnormality 03/06/2024    Bacteremia due to Klebsiella pneumoniae 03/06/2024    Clostridium difficile carrier 03/06/2024    Insurance coverage problems 02/05/2024    Obesity (BMI 30.0-34.9) 01/11/2024    Renal abscess, right 01/10/2024    Seizure-like activity (HCC) 01/04/2024    Hyponatremia 01/03/2024    S/P craniotomy 10/21/2020    Meningioma (HCC) 03/13/2018    Hypertensive urgency 03/05/2018    Urinary urgency 03/05/2018    Partial idiopathic epilepsy with seizures of localized onset, intractable, without status epilepticus (HCC)     Type 2 diabetes mellitus with hyperglycemia, with long-term current use of insulin (HCC)      Severe " coronary artery disease; Ongoing CABG workup  Cardiomyopathy    Plan:  Risks and benefits of coronary artery bypass grafting were discussed in detail today with the patient.  They understand and wish to proceed with further workup and ultimately surgical intervention.  We have ordered routine preoperative laboratory and vascular studies.  Pending the results of these tests, they will be scheduled for surgery with Dr. Abhi Rosales M.D. if she has viability.    Nathalia Monge was comfortable with our recommendations, and their questions were answered to their satisfaction.  Thank you for allowing us to participate in the care of this patient.     Colonoscopy referral for routine colon cancer screening will be deferred til after cardiac screening.    SIGNATURE: Manuela Zavala PA-C  DATE: 25  TIME: 3:19 PM    * This note was completed in part utilizing nubelo direct voice recognition software.   Grammatical errors, random word insertion, spelling mistakes, and incomplete sentences may be an occasional consequence of the system secondary to software limitations, ambient noise and hardware issues. At the time of dictation, efforts were made to edit, clarify and /or correct errors. Please read the chart carefully and recognize, using context, where substitutions have occurred.  If you have any questions or concerns about the context, text or information contained within the body of this dictation, please contact myself, the provider, for further clarification.        [1]   Social History  Tobacco Use   Smoking Status Former    Current packs/day: 0.00    Types: Cigarettes    Quit date: 3/5/2000    Years since quittin.2   Smokeless Tobacco Never   [2]   Allergies  Allergen Reactions    Dilantin [Phenytoin]     Gabapentin Other (See Comments)     Anger/irritability/mood disturbance    Lidocaine Seizures    Statins

## 2025-05-14 NOTE — LETTER
May 14, 2025     SHASHANK Miguel  [address]   [address]  [facility] [address]    Patient: Nathalia Monge   YOB: 1960   Date of Visit: 5/14/2025       Dear SHASHANK Saleh MD:    Thank you for referring Nathalia Monge to me for evaluation. Below are my notes for this consultation.    If you have questions, please do not hesitate to call me. I look forward to following your patient along with you.         Sincerely,        Abhi Rosales MD        CC: MD Manuela Rahman PA-C  5/14/2025  3:59 PM  Attested Addendum  Consultation - Cardiac Surgery   Nathalia Monge 64 y.o. female MRN: [id number]    Reason for Consult / Principal Problem: Cardiomyopathy, Coronary artery disease    History of Present Illness: Nathalia Monge is a 64 y.o. year old female presents for evaluation of MV CAD in the setting of CM. The patient's cardiac hx started in 3/2025 when she was admitted w/ HTN urgency & CHF. During this hospital stay she had b/l thoracentesis & TTE showing new EF 30%. She was discharged w/o a cardiology inpatient consult & presented for o/p evaluation. She had the LifeVest brought to her attention but she states she was not told what it really was or served & would have taken one had she been explained this better. She had a cardiac CTA which was suggestive of obstructive CAD. She also had a cardiac cath showing MV CAD therefore she is referred today.    She is very disgruntled w/ her cardiac care & desires a new provider for cardiology f/u. She also states retrospectively she has been dealing w/ LUE pain intermittently superficially & was in the ED in 12/2024 due to this w/ SBP 200s & was discharged home w/o this addressed by the ED & is concerned this was related to her heart as well.    Upon examination today, Mrs. Monge reports she feeling well. Admits to intermittent LE edema which she states has occurred since her meningioma resection. Denies CP,  palpitations, SOB, ANDERSON, orthopnea, PND, numbness/tinlging/paresthesias in UE or LE bilaterally, HA, lightheadeness, dizziness, presyncopal symptoms, hx syncopal events, N/V/D, hemoptysis, hematemesis, hematochezia, melena. Denies hx stroke, hx cancer w/ chest wall radiation, hx blood loss anemia, hx varicose veins or vein stripping.    PMH includes CAD, CM (EF 30%, no LifeVest), HTN, HL, IDDM2, h/o meningioma (s/p resection), bipolar d/o, epilepsy (no maintaince medications, has petite seizures in her right eye frequently throughout the day, does not drive), PTSD, CKD 3, h/o pyelonephritis & rnal abscess, h/o medication non-compliance, vitamin D deficiency, CHF, ERNESTO, GERD, thoracic DDD 9s/p injections), h/o c diff carrier, obesity (BMI 31.46), prolonged QT, former tobacco use, b/l LE telangectasias. PSH includes cardiac cath, hysterectomy, cholecystectomy, cataract extraction b/l, wisdom teeth extractions, b/l thoracentesis, craniotomy w/ meningioma removal. Patient is adopted. Denies FH of CAD/MI, HTN, HL, valvular disease, DM, aortic aneurysms, or SCD. Patient is working for a non-profit, stressful, manages it. Lives in a camper home w/ . Does not use an assist device for ambulation. Does not drive due to epilepsy. (+) former social tobacco use. Denies current or prior use of ETOH or drug use. Allergies include statins with rxn diarrhea (has tried all statins w/ same SE), Lidocaine (tolerated bupivacaine  w/ cardiac cath) w/ rxn seizures, gabapentin w/ rxn irritability/mood disturbances, Dilantitn w/ unknown reaction. Cardiac pertinent medications include: Coreg 6.25mg, Lasix 20mg, Losartan 50mg. Other medications can be reviewed in the patient chart.    Patient sees Honey ENCARNACION as her primary care provider, their prior visit documentation was reviewed at this visit. Patient sees Jeffrey Foy PA-C as her cardiology provider, their proior visit documentation was reviewed at this visit, she does  not wish to see him again in f/u.     Past Medical History:  Past Medical History:   Diagnosis Date   • Bipolar depression (HCC)    • CAD (coronary artery disease)    • Cardiomyopathy (HCC)     EF 30%   • CHF (congestive heart failure) (HCC)    • CKD (chronic kidney disease), stage III (HCC)    • DDD (degenerative disc disease), thoracic    • Depression    • Diabetes mellitus (HCC)     type 2, insulin dependent   • Epilepsy (HCC)     no maintaince medications, experiences petite seizures to right eye multiple times throughout the day, does not drive due to this   • Former tobacco use    • GERD (gastroesophageal reflux disease)    • History of Clostridioides difficile infection    • History of pyelonephritis     w/ renal abscess   • Hyperlipidemia    • Hypertension    • ERNESTO (iron deficiency anemia)    • Meningioma (HCC)     s/p surgical resection   • Obesity    • Prolonged QT interval    • PTSD (post-traumatic stress disorder)    • Vitamin D deficiency      Past Surgical History:   Past Surgical History:   Procedure Laterality Date   • CARDIAC CATHETERIZATION Left 05/05/2025    Procedure: Cardiac Left Heart Cath;  Surgeon: Latoya Garner DO;  Location: [facility];  Service: Cardiology   • CARDIAC CATHETERIZATION N/A 05/05/2025    Procedure: Cardiac FFR/IFR;  Surgeon: Latoya Garner DO;  Location: [facility];  Service: Cardiology   • CHOLECYSTECTOMY  2004   • EPIDURAL BLOCK INJECTION      thoracic   • HYSTERECTOMY     • IMPLANTABLE CONTACT LENS IMPLANTATION Bilateral    • IR THORACENTESIS  03/18/2025   • [place] CRNEC TREPH BONE FLAP CRNOT EXC BRAIN TUMOR STTL Right 03/15/2018    Procedure: IMAGE-GUIDED RIGHT FRONTOPARIETAL CRANIOTOMY FOR TUMOR;  Surgeon: Arsh Jeronimo MD;  Location: [facility];  Service: Neurosurgery     Family History:  Family History   Adopted: Yes     Social History:  Social History     Substance and Sexual Activity   Alcohol Use Never     Social History     Substance and  Sexual Activity   Drug Use No     Tobacco Use History[1]    Home Medications:   Prior to Admission medications    Medication Sig Start Date End Date Taking? Authorizing Provider   BD Pen Needle Yolis 2nd Gen 32G X 4 MM MISC Use 3 (three) times a day 9/26/24  Yes SHASHANK Miguel   carvedilol (COREG) 6.25 mg tablet Take 1 tablet (6.25 mg total) by mouth 2 (two) times a day with meals 5/6/25  Yes Jeffrey Pool PA-C   cholecalciferol (VITAMIN D3) 1,000 units tablet Take 1,000 Units by mouth in the morning.   Yes Historical Provider, MD   CHROMIUM PO Take by mouth in the morning and in the evening.   Yes Historical Provider, MD   cyclobenzaprine (FLEXERIL) 10 mg tablet Take 1 tablet (10 mg total) by mouth 3 (three) times a day as needed for muscle spasms 2/10/25  Yes SHASHANK Miguel   ferrous sulfate 325 (65 Fe) mg tablet Take 325 mg by mouth in the morning.   Yes Historical Provider, MD   furosemide (LASIX) 20 mg tablet Take 1 tablet (20 mg total) by mouth daily 5/6/25  Yes Jeffrey Pool PA-C   insulin aspart protamine-insulin aspart (NovoLOG Mix 70/30 FlexPen) 100 Units/mL injection pen Inject 20 Units under the skin 2 (two) times a day before meals 1/23/25 7/22/25 Yes Nelson Garner MD   Insulin Pen Needle (BD Pen Needle Yolis 2nd Gen) 32G X 4 MM MISC For use with insulin pen. Pharmacy may dispense brand covered by insurance. 1/4/24  Yes Steven Bray,    L-LYSINE PO Take 500 mg by mouth in the morning.   Yes Historical Provider, MD   losartan (COZAAR) 50 mg tablet Take 1 tablet (50 mg total) by mouth daily 4/8/25  Yes Jeffrey Pool PA-C   Probiotic Product (PROBIOTIC DAILY PO) Take 1 capsule by mouth in the morning.   Yes Historical Provider, MD   pantoprazole (PROTONIX) 40 mg tablet Take 1 tablet (40 mg total) by mouth daily before breakfast  Patient not taking: Reported on 5/14/2025 12/16/24 12/11/25  SHASHANK Miguel       Allergies:  Allergies[2]    Review of  "Systems:     Review of Systems   Constitutional: Negative.  Negative for activity change, diaphoresis, fatigue and unexpected weight change.   HENT: Negative.     Respiratory: Negative.  Negative for chest tightness, shortness of breath and wheezing.    Cardiovascular:  Positive for leg swelling. Negative for chest pain and palpitations.   Gastrointestinal: Negative.  Negative for blood in stool, constipation, diarrhea, nausea and vomiting.   Genitourinary: Negative.    Musculoskeletal: Negative.  Negative for arthralgias, back pain, gait problem and myalgias.   Skin: Negative.    Neurological: Negative.  Negative for dizziness, syncope, weakness, light-headedness, numbness and headaches.   Hematological: Negative.  Does not bruise/bleed easily.   All other systems reviewed and are negative.      Vital Signs:     Vitals:    05/14/25 1432 05/14/25 1439   BP: (!) 182/78 (!) 177/81   BP Location: Left arm Right arm   Patient Position: Sitting Sitting   Cuff Size: Large Large   Pulse: 64    SpO2: 98%    Weight: 88.5 kg (195 lb)    Height: 5' 5.5\" (1.664 m)      Repeat LUE manual blood pressure --> 168/59    Physical Exam:     Physical Exam  Constitutional:       General: She is not in acute distress.     Appearance: Normal appearance. She is well-developed. She is obese. She is not ill-appearing or toxic-appearing.      Comments: Sitting in chair in NAD   HENT:      Head: Normocephalic and atraumatic.   Neck:      Vascular: No carotid bruit or JVD.      Trachea: Trachea normal.     Cardiovascular:      Rate and Rhythm: Normal rate and regular rhythm.      Chest Wall: PMI is not displaced.      Heart sounds: S1 normal and S2 normal. No murmur heard.     No friction rub. No gallop. No S3 or S4 sounds.      Comments: No heaves/lifts on palpation  Pulmonary:      Effort: Pulmonary effort is normal. No accessory muscle usage or respiratory distress.      Breath sounds: Normal breath sounds. No wheezing, rhonchi or rales. " "  Abdominal:      General: Bowel sounds are normal. There is no distension.      Palpations: Abdomen is not rigid. There is no mass.      Tenderness: There is no abdominal tenderness. There is no guarding or rebound.     Musculoskeletal:      Cervical back: Normal range of motion and neck supple.     Skin:     General: Skin is warm and dry.      Comments: Trace b/l LE; no VV to b/l LE, (+) telangiectasias to b/l LE     Neurological:      Mental Status: She is alert and oriented to person, place, and time.      Cranial Nerves: No cranial nerve deficit.      Sensory: No sensory deficit.      Comments: No focal deficits       Lab Results:               Invalid input(s): \"LABGLOM\"      Lab Results   Component Value Date    HGBA1C 7.4 (H) 03/18/2025     Lab Results   Component Value Date    CKTOTAL 25 (L) 01/08/2024       Imaging Studies:     Cardiac Catheterization: 75% diffuse mid LAD disease, 85% mid RCA    Echocardiogram: EF 30%, mild AS, mild MR, mild TR    EKG: done    Cardiac CTA 4/22/2025: severe flow-limiting atherosclerotic stenosis mid LAD/D1/ramus, high burden plaque    LE Duplex 3/18/2025: (-) DVT b/l    CTA PE study 3/18/2025:  no PE, moderate b/l pleural effusions w/ atelectasis    Results Review Statement: I personally reviewed the following image studies in PACS and associated radiology reports: cardiac CTA, TTE, CT chest, procedure reports, Ultrasound(s), and Echocardiogram. My interpretation of the radiology images/reports is: as above.    Assessment:  Patient Active Problem List    Diagnosis Date Noted   • CAD (coronary artery disease) 05/14/2025   • Chronic combined systolic and diastolic congestive heart failure (HCC) 03/19/2025   • Bilateral pleural effusion 03/18/2025   • Prolonged QT interval 03/18/2025   • Thoracic radiculopathy 02/13/2025   • Degenerative disc disease, thoracic 02/10/2025   • Mixed hyperlipidemia 12/16/2024   • Carpal tunnel syndrome on left 12/03/2024   • Imaging abnormality " 03/06/2024   • Bacteremia due to Klebsiella pneumoniae 03/06/2024   • Clostridium difficile carrier 03/06/2024   • Insurance coverage problems 02/05/2024   • Obesity (BMI 30.0-34.9) 01/11/2024   • Renal abscess, right 01/10/2024   • Seizure-like activity (HCC) 01/04/2024   • Hyponatremia 01/03/2024   • S/P craniotomy 10/21/2020   • Meningioma (HCC) 03/13/2018   • Hypertensive urgency 03/05/2018   • Urinary urgency 03/05/2018   • Partial idiopathic epilepsy with seizures of localized onset, intractable, without status epilepticus (HCC)    • Type 2 diabetes mellitus with hyperglycemia, with long-term current use of insulin (HCC)      Severe coronary artery disease; Ongoing CABG workup  Cardiomyopathy    Plan:  Risks and benefits of coronary artery bypass grafting were discussed in detail today with the patient.  They understand and wish to proceed with further workup and ultimately surgical intervention.  We have ordered routine preoperative laboratory and vascular studies.  Pending the results of these tests, they will be scheduled for surgery with Dr. Abhi Rosales M.D. if she has viability.    Nathalia Monge was comfortable with our recommendations, and their questions were answered to their satisfaction.  Thank you for allowing us to participate in the care of this patient.     Colonoscopy referral for routine colon cancer screening will be deferred til after cardiac screening.    SIGNATURE: Manuela Zavala PA-C  DATE: 05/14/25  TIME: 3:19 PM    * This note was completed in part utilizing [employer] direct voice recognition software.   Grammatical errors, random word insertion, spelling mistakes, and incomplete sentences may be an occasional consequence of the system secondary to software limitations, ambient noise and hardware issues. At the time of dictation, efforts were made to edit, clarify and /or correct errors. Please read the chart carefully and recognize, using context, where substitutions have  occurred.  If you have any questions or concerns about the context, text or information contained within the body of this dictation, please contact myself, the provider, for further clarification.        [1]   Social History  Tobacco Use   Smoking Status Former   • Current packs/day: 0.00   • Types: Cigarettes   • Quit date: 3/5/2000   • Years since quittin.2   Smokeless Tobacco Never   [2]   Allergies  Allergen Reactions   • Dilantin [Phenytoin]    • Gabapentin Other (See Comments)     Anger/irritability/mood disturbance   • Lidocaine Seizures   • Statins      Attestation signed by Abhi Rosales MD at 2025  4:29 PM:  Attending Attestation:    I supervised the Advanced Practitioner.? In addition to personally performing portions of the history and physical exam, I performed, in its entirety, the assessment/plan/medical decision making/counseling/care coordination component of the visit.  I reviewed the Advanced Practitioner's note,  medications prescribed and orders placed.    Medical decision making is detailed below:    The patient is a 64-year-old female referred for evaluation of multivessel coronary artery disease and severe ischemic cardiomyopathy.  She was recently admitted to the hospital with CHF exacerbation.  She denies angina.  Other significant comorbidities include HTN, HLD, DM2, meningioma resection, bipolar disease, epilepsy, CKD 3.  I have personally reviewed her diagnostic images, a TTE on 3/18/2025 shows EF 30%, mild AS, mild MR.  Coronary angiography shows LAD 75%, large ramus that splitting 2 branches both with significant stenosis, PDA 85.  Plan the diagnosis to the patient and treatment options including medications, PCI and surgery, before making a final recommendation she needs to have a cardiac MRI to assess myocardial viability, if she has viability on the anterior wall then I would recommend surgical revascularization, otherwise PCI or medical management.    Abhi Rosales,  MD  05/14/25  4:26 PM

## 2025-05-19 ENCOUNTER — HOSPITAL ENCOUNTER (OUTPATIENT)
Dept: NON INVASIVE DIAGNOSTICS | Facility: CLINIC | Age: 65
Discharge: HOME | End: 2025-05-19
Attending: PHYSICIAN ASSISTANT
Payer: COMMERCIAL

## 2025-05-19 DIAGNOSIS — Z13.6 ENCOUNTER FOR SCREENING FOR STENOSIS OF CAROTID ARTERY: ICD-10-CM

## 2025-05-19 DIAGNOSIS — Z87.891 FORMER TOBACCO USE: ICD-10-CM

## 2025-05-19 DIAGNOSIS — I25.119 CORONARY ARTERY DISEASE INVOLVING NATIVE CORONARY ARTERY OF NATIVE HEART WITH ANGINA PECTORIS (HCC): ICD-10-CM

## 2025-05-19 DIAGNOSIS — I42.9 CARDIOMYOPATHY, UNSPECIFIED TYPE (HCC): ICD-10-CM

## 2025-05-19 PROCEDURE — 93880 EXTRACRANIAL BILAT STUDY: CPT

## 2025-05-19 PROCEDURE — 93880 EXTRACRANIAL BILAT STUDY: CPT | Performed by: SURGERY

## 2025-05-21 ENCOUNTER — TELEPHONE (OUTPATIENT)
Dept: CARDIOLOGY CLINIC | Facility: CLINIC | Age: 65
End: 2025-05-21

## 2025-05-21 NOTE — TELEPHONE ENCOUNTER
Med refill request for Losartan 50 mg.    In pt med list stated pt request for removal.    Please review and verified if pt is to continue taking this med.     If so please refill

## 2025-05-22 NOTE — TELEPHONE ENCOUNTER
Spoke with pt relayed Jeffrey VALDEZ response, pt stated that she can not take the Losartan 50 mg medication due to the side effects that she gets when she takes it.     Pt stated that she gets a lot of Diarrhea and she does not want to continue taking Losartan 50 mg medication.     Please D/C pt Losartan in her med list

## 2025-05-22 NOTE — TELEPHONE ENCOUNTER
Called spoke to pt, she was irate on the phone with just hearing what office I was calling from, pt refuses to make any future appts with cardiology. She states she has cardiothoracic surgery and only needs to deal with them, pt disconnected call

## 2025-05-22 NOTE — TELEPHONE ENCOUNTER
*when patient calls back, please schedule with new cardiologist in [place] per patient request!*    Received call from patient following up on notes she saw in her chart and wishing to clarify information from previous calls. She states the phone disconnected due to unreliable service in her area, but she would like to be re-established with a new cardiologist in [place], as her Cardiovascular Surgeon Dr. Rosales is in [place] as well and this would be more convenient for her. She asked if she still needs a general cardiologist if she is seeing cardiovascular surgery. I reached out to Dr. Rosales's office and spoke with Maribeth, who confirmed patient will need a general cardiologist leading up to and after her procedure with Dr. Rosales. Patient expressed understanding of this and states she would like to schedule with a new cardiologist in [place]. Offered to schedule patient for appointment in [place], but she states she would like to wait until her appointment with Dr. Rosales, and she will call to schedule.

## 2025-05-22 NOTE — TELEPHONE ENCOUNTER
Hunter Lemos,   Appears this patient does not want me involved with her medical care as per her last note from cardiac surgery. She can reach out to her PCP or schedule an appt with our office with another cardiologist or AP to assess if this medication is no longer appropriate. Thank you.

## 2025-05-22 NOTE — TELEPHONE ENCOUNTER
Pt called back and speaking with [facility], asking to make an appt and is refusing to make an appt with them, and to be transferred to [facility], I answered the call. Access dm was asked to relay that when our  is back in the office she will receive a call to be scheduled at that time, if she still wants at that time.

## 2025-05-27 ENCOUNTER — HOSPITAL ENCOUNTER (OUTPATIENT)
Dept: MRI IMAGING | Facility: HOSPITAL | Age: 65
Discharge: HOME | End: 2025-05-27
Attending: PHYSICIAN ASSISTANT
Payer: COMMERCIAL

## 2025-05-27 DIAGNOSIS — Z87.891 FORMER TOBACCO USE: ICD-10-CM

## 2025-05-27 DIAGNOSIS — I42.9 CARDIOMYOPATHY, UNSPECIFIED TYPE (HCC): ICD-10-CM

## 2025-05-27 DIAGNOSIS — I25.119 CORONARY ARTERY DISEASE INVOLVING NATIVE CORONARY ARTERY OF NATIVE HEART WITH ANGINA PECTORIS (HCC): ICD-10-CM

## 2025-05-27 PROCEDURE — 75561 CARDIAC MRI FOR MORPH W/DYE: CPT

## 2025-05-27 PROCEDURE — A9585 GADOBUTROL INJECTION: HCPCS | Performed by: PHYSICIAN ASSISTANT

## 2025-05-27 RX ORDER — GADOBUTROL 604.72 MG/ML
16 INJECTION INTRAVENOUS
Status: COMPLETED | OUTPATIENT
Start: 2025-05-27 | End: 2025-05-27

## 2025-05-27 RX ADMIN — GADOBUTROL 16 ML: 604.72 INJECTION INTRAVENOUS at 20:42

## 2025-05-28 ENCOUNTER — HOSPITAL ENCOUNTER (OUTPATIENT)
Dept: NON INVASIVE DIAGNOSTICS | Facility: CLINIC | Age: 65
Discharge: HOME | End: 2025-05-28
Attending: PHYSICIAN ASSISTANT
Payer: COMMERCIAL

## 2025-05-28 DIAGNOSIS — Z01.89 ENCOUNTER FOR IMAGING OF BILATERAL GREATER SAPHENOUS VEINS: ICD-10-CM

## 2025-05-28 DIAGNOSIS — R60.0 LOCALIZED EDEMA: ICD-10-CM

## 2025-05-28 DIAGNOSIS — I25.119 CORONARY ARTERY DISEASE INVOLVING NATIVE CORONARY ARTERY OF NATIVE HEART WITH ANGINA PECTORIS (HCC): ICD-10-CM

## 2025-05-28 DIAGNOSIS — I42.9 CARDIOMYOPATHY, UNSPECIFIED TYPE (HCC): ICD-10-CM

## 2025-05-28 DIAGNOSIS — Z87.891 FORMER TOBACCO USE: ICD-10-CM

## 2025-05-28 PROCEDURE — 93971 EXTREMITY STUDY: CPT | Performed by: SURGERY

## 2025-05-28 PROCEDURE — 93971 EXTREMITY STUDY: CPT

## 2025-05-30 ENCOUNTER — TELEPHONE (OUTPATIENT)
Age: 65
End: 2025-05-30

## 2025-05-30 ENCOUNTER — NURSE TRIAGE (OUTPATIENT)
Age: 65
End: 2025-05-30

## 2025-05-30 DIAGNOSIS — E11.65 TYPE 2 DIABETES MELLITUS WITH HYPERGLYCEMIA, WITH LONG-TERM CURRENT USE OF INSULIN (HCC): ICD-10-CM

## 2025-05-30 DIAGNOSIS — Z79.4 TYPE 2 DIABETES MELLITUS WITH HYPERGLYCEMIA, WITH LONG-TERM CURRENT USE OF INSULIN (HCC): ICD-10-CM

## 2025-05-30 NOTE — TELEPHONE ENCOUNTER
Caller:  Nathalia    Doctor: Dr. Rosales     Reason for call:  Patient calling in to speak with Irvory. Called office and transferred     Call back#: [phone]

## 2025-05-30 NOTE — TELEPHONE ENCOUNTER
Spoke with pt. Gave her appt on Monday. Advised to go to ER if palpitations/ cp get worse. She can not afford a bp monitor. Advised she get script for one when she comes in for appt on Monday.  Verbally understood.    Pt states last time she went to ER they could not tell her what was causing her issues. Now she knows she has 4 blockages and will have appt on 6/11 with CV surgery.    She wants to be seen before that due to her symptoms.

## 2025-05-30 NOTE — TELEPHONE ENCOUNTER
"REASON FOR CONVERSATION: Chest Pain  Also palipations  SYMPTOMS: palipations    OTHER HEALTH INFORMATION:   Appt scheduled with surgeon 6/11/2025  Pt does not check BP/HR- weight today 194.6 LBS    PROTOCOL DISPOSITION: Go to ED/UCC Now (Or to Office with PCP Approval)    CARE ADVICE PROVIDED: Recommended with ED    PRACTICE FOLLOW-UP:   Contacted HF and warm transferred to [place].  Pt was seen in the hospital, left cath on 5/5/2025 Not seeing any current appt today or early next week, pt is requesting a different office does not want to go to [facility]      Reason for Disposition   Chest pain or 'angina' comes and goes and is happening more often (increasing in frequency) or getting worse (increasing in severity) (Exception: Chest pains that last only a few seconds.)    Answer Assessment - Initial Assessment Questions  1. LOCATION: \"Where does it hurt?\"        Chest pain- burning sensation off and on-more frequent,left side under breast       Palpation- same area  2. RADIATION: \"Does the pain go anywhere else?\" (e.g., into neck, jaw, arms, back)      Left arm sensation at times, not currently happening-last time was last week  3. ONSET: \"When did the chest pain begin?\" (Minutes, hours or days)       Last year  4. PATTERN: \"Does the pain come and go, or has it been constant since it started?\"  \"Does it get worse with exertion?\"       More frequent  5. DURATION: \"How long does it last\" (e.g., seconds, minutes, hours)      When has episodes comes and goes- minutes   6. SEVERITY: \"How bad is the pain?\"  (e.g., Scale 1-10; mild, moderate, or severe)      Chest pain-burning sensation- 6/10  7. CARDIAC RISK FACTORS: \"Do you have any history of heart problems or risk factors for heart disease?\" (e.g., angina, prior heart attack; diabetes, high blood pressure, high cholesterol, smoker, or strong family history of heart disease)      CHF,mixed hyperlipidemia  8. PULMONARY RISK FACTORS: \"Do you have any history of lung disease?\"  " "(e.g., blood clots in lung, asthma, emphysema, birth control pills)      denies  9. CAUSE: \"What do you think is causing the chest pain?\"      Unsure   10. OTHER SYMPTOMS: \"Do you have any other symptoms?\" (e.g., dizziness, nausea, vomiting, sweating, fever, difficulty breathing, cough)        Denies    Protocols used: Chest Pain-Adult-[place]    "

## 2025-06-01 PROBLEM — E78.2 MIXED HYPERLIPIDEMIA: Chronic | Status: ACTIVE | Noted: 2024-12-16

## 2025-06-01 PROBLEM — I50.22 CHRONIC HEART FAILURE WITH REDUCED EJECTION FRACTION (HFREF, <= 40%) (HCC): Chronic | Status: ACTIVE | Noted: 2025-03-19

## 2025-06-01 PROBLEM — I16.0 HYPERTENSIVE URGENCY: Status: RESOLVED | Noted: 2018-03-05 | Resolved: 2025-06-01

## 2025-06-01 PROBLEM — R78.81 BACTEREMIA DUE TO KLEBSIELLA PNEUMONIAE: Status: RESOLVED | Noted: 2024-03-06 | Resolved: 2025-06-01

## 2025-06-01 PROBLEM — B96.1 BACTEREMIA DUE TO KLEBSIELLA PNEUMONIAE: Status: RESOLVED | Noted: 2024-03-06 | Resolved: 2025-06-01

## 2025-06-01 RX ORDER — PEN NEEDLE, DIABETIC 32GX 5/32"
NEEDLE, DISPOSABLE MISCELLANEOUS 3 TIMES DAILY
Qty: 100 EACH | Refills: 1 | Status: SHIPPED | OUTPATIENT
Start: 2025-06-01

## 2025-06-02 ENCOUNTER — OFFICE VISIT (OUTPATIENT)
Dept: CARDIOLOGY CLINIC | Facility: CLINIC | Age: 65
End: 2025-06-02
Payer: COMMERCIAL

## 2025-06-02 VITALS
BODY MASS INDEX: 32.2 KG/M2 | HEART RATE: 73 BPM | OXYGEN SATURATION: 96 % | DIASTOLIC BLOOD PRESSURE: 72 MMHG | WEIGHT: 196.5 LBS | SYSTOLIC BLOOD PRESSURE: 166 MMHG

## 2025-06-02 DIAGNOSIS — I50.22 CHRONIC HEART FAILURE WITH REDUCED EJECTION FRACTION (HFREF, <= 40%) (HCC): Chronic | ICD-10-CM

## 2025-06-02 DIAGNOSIS — I25.10 CORONARY ARTERY DISEASE INVOLVING NATIVE CORONARY ARTERY OF NATIVE HEART WITHOUT ANGINA PECTORIS: Primary | ICD-10-CM

## 2025-06-02 DIAGNOSIS — I10 PRIMARY HYPERTENSION: ICD-10-CM

## 2025-06-02 PROCEDURE — 99214 OFFICE O/P EST MOD 30 MIN: CPT | Performed by: PHYSICIAN ASSISTANT

## 2025-06-02 RX ORDER — NEBULIZER AND COMPRESSOR
EACH MISCELLANEOUS DAILY
Qty: 1 KIT | Refills: 0 | Status: SHIPPED | OUTPATIENT
Start: 2025-06-02

## 2025-06-02 RX ORDER — ASPIRIN 81 MG/1
81 TABLET, CHEWABLE ORAL DAILY
COMMUNITY

## 2025-06-02 RX ORDER — ASPIRIN 81 MG/1
81 TABLET, CHEWABLE ORAL DAILY
Qty: 30 TABLET | Refills: 2 | Status: CANCELLED | OUTPATIENT
Start: 2025-06-02

## 2025-06-02 NOTE — PATIENT INSTRUCTIONS
Continue on baby aspirin.   Removed losartan from medication list.     Look into the [employer] website.     Please weigh yourself every day (after emptying your bladder) and keep a detailed log of weights.   Contact Cardiology at [phone] if you gain 3+ lbs overnight or 5+ lbs in 5-7 days.  Limit daily sodium/salt intake to 2000 mg daily to prevent fluid retention.  Avoid canned foods, fast food/Chinese food, and processed meats (hot dogs, lunch meat, and sausage etc.). Caution with condiments.  Limit fluid intake to 2000 mL or 2 liters (about 60-65 ounces) daily.  Avoid electrolyte replacement drinks (such as Gatorade, Pedialyte, Propel, Liquid IV, etc.).  Bring complete list of medications and log of daily weights to your follow-up appointment.

## 2025-06-02 NOTE — PROGRESS NOTES
"General Cardiology Outpatient Visit    Nathalia Monge 64 y.o. female   MRN: [id number]  Encounter: [id number]    Assessment:  Patient Active Problem List    Diagnosis Date Noted    CAD (coronary artery disease) 05/14/2025    Chronic heart failure with reduced ejection fraction (HFrEF, <= 40%) (Formerly [facility]) 03/19/2025    Bilateral pleural effusion 03/18/2025    Prolonged QT interval 03/18/2025    Thoracic radiculopathy 02/13/2025    Degenerative disc disease, thoracic 02/10/2025    Mixed hyperlipidemia 12/16/2024    Carpal tunnel syndrome on left 12/03/2024    Imaging abnormality 03/06/2024    Clostridium difficile carrier 03/06/2024    Insurance coverage problems 02/05/2024    Obesity (BMI 30.0-34.9) 01/11/2024    Renal abscess, right 01/10/2024    Seizure-like activity (HCC) 01/04/2024    Hyponatremia 01/03/2024    S/P craniotomy 10/21/2020    Meningioma ([facility]) 03/13/2018    Urinary urgency 03/05/2018    Partial idiopathic epilepsy with seizures of localized onset, intractable, without status epilepticus ([facility])     Type 2 diabetes mellitus with hyperglycemia, with long-term current use of insulin (Former[facility])        Today's Plan:  Continue baby aspirin.   Reports loose stool/diarrhea with losartan. Removed medication from medication list. Discussed option of starting MRA as alternative for PV control and HF GDMT. Patient wishes to defer at this time and consider s/p revascularization.  Patient given prescription for home BP cuff.  Discussed LifeVest today. Patient to consider and contact office if interested.   Established with CT surgery in 05/2025. To see again in their office next week.  Patient okay to establish with Dr. Chavez in coming weeks/months. Wishes to contact office to schedule next follow-up visit when seen again by CT surgery.    Plan:  Coronary artery disease  [facility] 05/2025: 75% stenosis mid LAD. \"Intermediate lesion\" of ramus. Mild diffuse RCA disease. 85% stenosis mid rPDA.   Established with CT surgery in 05/2025. They " "ordered cMRI to assess for viability.  cMRI 05/2025: LVEF 30%. \"No evidence of myocardial inflammation, infiltrative disease or scarring.\"   Start ASA. Intolerant of all statins, per patient.     Chronic HFrEF; LVEF 30%   Etiology: ischemic. Sepsis in 01/2024.   Weight of 193 lbs on 03/27 ([facility] office). Today, weighs 196 lbs.    Most recent BMP from 05/05/2025: sodium 141; potassium 3.7; BUN 24; creatinine 1.16; eGFR 49.     Guideline-Directed Medical Therapy:  --Beta Blocker: carvedilol 6.25 mg BID.   --ARNi / ACEi / ARB: No (recent history of GI issues with ARB and ACEi).    --Aldosterone Antagonist: No.   --SGLT2 Inhibitor: No.     Volume Management:  --Diuretic: Lasix 20 mg daily.     Sudden Cardiac Death Risk Reduction:  --LVEF 30%. Discussed LifeVest today. Patient to consider and contact office if interested.   --Plan to reassess LVEF with limited TTE after 3-4 months of uninterrupted and optimized HF GDMT +/- revascularization.     Advanced Therapies: Will continue to monitor.    Hypertension / history of hypertensive urgency  Hyperlipidemia  Diabetes mellitus, type II  Chronic kidney disease  Bipolar depression  PTSD  History of epilepsy  History of tobacco abuse    HPI:   Nathalia Monge is a 64-year-old woman with a PMH as above who presents to the office for follow-up.    03/27/2025 with JT: \"Originally presented to [facility] with elevated BP and increased dyspnea.  She was discovered with bilateral pleural effusions with elevated BNP.  High-sensitivity troponins within normal limits.  Initial EKG showing sinus rhythm without evidence of ischemia. She underwent bilateral IR thoracentesis with 775 mL on the right and 650 mL on the left and felt much better postprocedure.  Fluid analysis showed transudative process. She did also respond well to IV Lasix 40 mg x 1 with -1.5 L output.  She was discharged on PO Lasix 20 mg for volume maintenance.  Her blood pressure was controlled with " "nifedipine 30 mg daily.  Inpatient TTE showed EF of 30% with severe global hypokinesis, G2 DD, mild AS/MR/TR.  Given abnormal echo findings cardiology was consulted for additional recommendations.  Her nifedipine was switched to GDMT with Coreg 6.25 mg twice daily and lisinopril 10 mg daily. She was advised for cardiology follow-up for additional workup as she was being actively discharged at the time of our evaluation.  Today feels well overall.  She denies any weight gain, orthopnea, PND, lower extremity edema.  She has been avoiding heavy sodium intake. 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  She has been taking her medications consistently.  Reports of good urine output. Home BPs are much better now with new meds.\"     06/02/2025: Patient presents with her partner, Marty, for follow-up. Feeling okay today.  Continues with early morning palpitations and occasional chest pain.  Also reports experiencing \"severe diarrhea \"with losartan. Since stopping medication, patient reports this symptom has resolved. Has been completing daily weights. Denies SOB at rest, bilateral LE swelling, acute weight gain, PND, orthopnea. Discussed LifeVest today; patient considering but wishes to research on her own prior to meeting with rep from [employer].  Provided education on pathophysiology of HFrEF/ICM.    Past Medical History[1]    Review of Systems   Constitutional:  Negative for activity change, appetite change and unexpected weight change.   Respiratory:  Negative for shortness of breath.    Cardiovascular:  Positive for chest pain (left sided) and palpitations (left sided in AM, upon waking). Negative for leg swelling.   Gastrointestinal:  Negative for abdominal distention.   Neurological:  Positive for light-headedness (random). Negative for dizziness, syncope and weakness.   Psychiatric/Behavioral:  Negative for agitation and confusion.  "       Allergies[2]    Current Medications[3]    Social History     Socioeconomic History    Marital status: Unknown     Spouse name: Marty    Number of children: 4    Years of education: Some college    Highest education level: Not on file   Occupational History    Occupation: disabled     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   Tobacco Use    Smoking status: Former     Current packs/day: 0.00     Types: Cigarettes     Quit date: 3/5/2000     Years since quittin.2    Smokeless tobacco: Never    Tobacco comments:     Social use up to    Vaping Use    Vaping status: Never Used   Substance and Sexual Activity    Alcohol use: Never    Drug use: No    Sexual activity: Not Currently     Partners: Male     Birth control/protection: Post-menopausal     Comment: Everything has been removed and haven't had sex since    Other Topics Concern    Not on file   Social History Narrative    Lives at home with significant other, Marty     Social Drivers of Health     Financial Resource Strain: Low Risk  (1/15/2024)    Overall Financial Resource Strain (CARDIA)     Difficulty of Paying Living Expenses: Not hard at all   Food Insecurity: No Food Insecurity (2025)    Nursing - Inadequate Food Risk Classification     Worried About Running Out of Food in the Last Year: Never true     Ran Out of Food in the Last Year: Never true     Ran Out of Food in the Last Year: Not on file   Transportation Needs: No Transportation Needs (2025)    PRAPARE - Transportation     Lack of Transportation (Medical): No     Lack of Transportation (Non-Medical): No   Physical Activity: Not on file   Stress: Not on file   Social Connections: Not on file   Intimate Partner Violence: Not on file   Housing Stability: Unknown (2025)    Housing Stability Vital Sign     Unable to Pay for Housing in the Last Year: No     Number of Times Moved in the Last Year: Not on file     Homeless in the Last Year: No     Family History[4]    Vitals:    Blood pressure 166/72, pulse 73, weight 89.1 kg (196 lb 8 oz), SpO2 96%.    Wt Readings from Last 10 Encounters:   06/02/25 89.1 kg (196 lb 8 oz)   05/14/25 88.5 kg (195 lb)   05/05/25 87.1 kg (192 lb)   03/27/25 87.8 kg (193 lb 9.6 oz)   03/27/25 88 kg (194 lb)   03/24/25 88.9 kg (196 lb)   03/18/25 89.8 kg (197 lb 15.6 oz)   03/06/25 89.8 kg (198 lb)   02/10/25 89.8 kg (198 lb)   02/04/25 91.2 kg (201 lb)     Vitals:    06/02/25 1437   BP: 166/72   BP Location: Left arm   Patient Position: Sitting   Cuff Size: Large   Pulse: 73   SpO2: 96%   Weight: 89.1 kg (196 lb 8 oz)       Physical Exam  Vitals reviewed.   Constitutional:       General: She is awake. She is not in acute distress.     Appearance: Normal appearance. She is well-developed. She is not toxic-appearing or diaphoretic.   HENT:      Head: Normocephalic.      Nose: Nose normal.   Neck:      Vascular: No JVD.     Cardiovascular:      Rate and Rhythm: Normal rate and regular rhythm. No extrasystoles are present.  Pulmonary:      Effort: Pulmonary effort is normal. No tachypnea, bradypnea or respiratory distress.      Breath sounds: Normal air entry. No decreased breath sounds, wheezing or rales.   Abdominal:      General: There is no distension.     Musculoskeletal:      Cervical back: Neck supple.      Right lower leg: No edema.      Left lower leg: No edema.     Skin:     General: Skin is warm and dry.      Coloration: Skin is not jaundiced or pale.     Neurological:      Mental Status: She is alert and oriented to person, place, and time.     Psychiatric:         Attention and Perception: Attention normal.         Mood and Affect: Mood and affect normal.         Speech: Speech normal.         Behavior: Behavior normal. Behavior is cooperative.         Thought Content: Thought content normal.       Labs & Results:  Lab Results   Component Value Date    WBC 7.03 04/29/2025    HGB 12.9 04/29/2025    HCT 38.7 04/29/2025    MCV 89 04/29/2025      04/29/2025     Lab Results   Component Value Date    SODIUM 141 05/05/2025    K 3.7 05/05/2025     05/05/2025    CO2 30 05/05/2025    BUN 24 05/05/2025    CREATININE 1.16 05/05/2025    GLUC 168 (H) 05/05/2025    CALCIUM 9.2 05/05/2025     Lab Results   Component Value Date    INR 0.94 03/18/2025    INR 0.94 01/08/2024    INR 0.95 03/17/2018    PROTIME 13.1 03/18/2025    PROTIME 13.1 01/08/2024    PROTIME 12.7 03/17/2018     Lab Results   Component Value Date     (H) 03/18/2025      Marcia Higgins PA-C         [1]   Past Medical History:  Diagnosis Date    Bacteremia due to Klebsiella pneumoniae 03/06/2024    Bipolar depression ([facility])     CAD (coronary artery disease)     Chronic heart failure with reduced ejection fraction (HFrEF, <= 40%) ([facility])     EF 30%    Chronic heart failure with reduced ejection fraction (HFrEF, <= 40%) ([facility]) 03/19/2025    CKD (chronic kidney disease), stage III ([facility])     DDD (degenerative disc disease), thoracic     Depression     Diabetes mellitus ([facility])     type 2, insulin dependent    Epilepsy ([facility])     no maintaince medications, experiences petite seizures to right eye multiple times throughout the day, does not drive due to this    Former tobacco use     GERD (gastroesophageal reflux disease)     History of Clostridioides difficile infection     History of pyelonephritis     w/ renal abscess    Hyperlipidemia     Hypertension     Hypertensive urgency 03/05/2018    ERNESTO (iron deficiency anemia)     Meningioma ([facility])     s/p surgical resection    Obesity     Prolonged QT interval     PTSD (post-traumatic stress disorder)     Telangiectasias     b/l LE    Vitamin D deficiency    [2]   Allergies  Allergen Reactions    Lidocaine Seizures     Tolerated bupivacaine w/ cardiac cath    Statins Diarrhea     Has tried all statins    Dilantin [Phenytoin]     Gabapentin Other (See Comments)     Anger/irritability/mood disturbance   [3]   Current Outpatient Medications:     aspirin 81 mg  chewable tablet, Chew 81 mg daily, Disp: , Rfl:     BD Pen Needle Yolis 2nd Gen 32G X 4 MM MISC, USE  THREE TIMES DAILY, Disp: 100 each, Rfl: 1    Blood Pressure Monitoring (Adult Blood Pressure Cuff Lg) KIT, Use daily, Disp: 1 kit, Rfl: 0    carvedilol (COREG) 6.25 mg tablet, Take 1 tablet (6.25 mg total) by mouth 2 (two) times a day with meals, Disp: 180 tablet, Rfl: 1    cholecalciferol (VITAMIN D3) 1,000 units tablet, Take 1,000 Units by mouth in the morning., Disp: , Rfl:     CHROMIUM PO, Take by mouth in the morning and in the evening., Disp: , Rfl:     cyclobenzaprine (FLEXERIL) 10 mg tablet, Take 1 tablet (10 mg total) by mouth 3 (three) times a day as needed for muscle spasms, Disp: 90 tablet, Rfl: 3    ferrous sulfate 325 (65 Fe) mg tablet, Take 325 mg by mouth in the morning., Disp: , Rfl:     furosemide (LASIX) 20 mg tablet, Take 1 tablet (20 mg total) by mouth daily, Disp: 90 tablet, Rfl: 1    insulin aspart protamine-insulin aspart (NovoLOG Mix 70/30 FlexPen) 100 Units/mL injection pen, Inject 20 Units under the skin 2 (two) times a day before meals, Disp: 12 mL, Rfl: 5    Insulin Pen Needle (BD Pen Needle Yolis 2nd Gen) 32G X 4 MM MISC, For use with insulin pen. Pharmacy may dispense brand covered by insurance., Disp: 100 each, Rfl: 0    L-LYSINE PO, Take 500 mg by mouth in the morning., Disp: , Rfl:     Probiotic Product (PROBIOTIC DAILY PO), Take 1 capsule by mouth in the morning., Disp: , Rfl:     pantoprazole (PROTONIX) 40 mg tablet, Take 1 tablet (40 mg total) by mouth daily before breakfast (Patient not taking: Reported on 5/14/2025), Disp: 90 tablet, Rfl: 3  [4]   Family History  Adopted: Yes   Problem Relation Name Age of Onset    Atrial septal defect Daughter Spring         repaired at 4

## 2025-06-11 ENCOUNTER — OFFICE VISIT (OUTPATIENT)
Dept: CARDIAC SURGERY | Facility: CLINIC | Age: 65
End: 2025-06-11
Payer: COMMERCIAL

## 2025-06-11 ENCOUNTER — TELEPHONE (OUTPATIENT)
Age: 65
End: 2025-06-11

## 2025-06-11 VITALS
HEIGHT: 66 IN | WEIGHT: 196.9 LBS | SYSTOLIC BLOOD PRESSURE: 140 MMHG | DIASTOLIC BLOOD PRESSURE: 88 MMHG | HEART RATE: 64 BPM | BODY MASS INDEX: 31.64 KG/M2 | OXYGEN SATURATION: 98 %

## 2025-06-11 DIAGNOSIS — Z79.4 TYPE 2 DIABETES MELLITUS WITH HYPERGLYCEMIA, WITH LONG-TERM CURRENT USE OF INSULIN (HCC): ICD-10-CM

## 2025-06-11 DIAGNOSIS — I25.10 CORONARY ARTERY DISEASE INVOLVING NATIVE CORONARY ARTERY OF NATIVE HEART WITHOUT ANGINA PECTORIS: ICD-10-CM

## 2025-06-11 DIAGNOSIS — Z01.818 PRE-OP TESTING: Primary | ICD-10-CM

## 2025-06-11 DIAGNOSIS — E11.65 TYPE 2 DIABETES MELLITUS WITH HYPERGLYCEMIA, WITH LONG-TERM CURRENT USE OF INSULIN (HCC): ICD-10-CM

## 2025-06-11 DIAGNOSIS — I25.119 CORONARY ARTERY DISEASE INVOLVING NATIVE CORONARY ARTERY OF NATIVE HEART WITH ANGINA PECTORIS (HCC): Primary | ICD-10-CM

## 2025-06-11 DIAGNOSIS — I50.22 CHRONIC HEART FAILURE WITH REDUCED EJECTION FRACTION (HFREF, <= 40%) (HCC): Primary | ICD-10-CM

## 2025-06-11 PROCEDURE — 99215 OFFICE O/P EST HI 40 MIN: CPT | Performed by: THORACIC SURGERY (CARDIOTHORACIC VASCULAR SURGERY)

## 2025-06-11 RX ORDER — CEFAZOLIN SODIUM 2 G/50ML
2000 SOLUTION INTRAVENOUS ONCE
OUTPATIENT
Start: 2025-06-11 | End: 2025-06-11

## 2025-06-11 RX ORDER — CHLORHEXIDINE GLUCONATE ORAL RINSE 1.2 MG/ML
15 SOLUTION DENTAL ONCE
OUTPATIENT
Start: 2025-06-11 | End: 2025-06-11

## 2025-06-11 RX ORDER — MUPIROCIN 20 MG/G
1 OINTMENT TOPICAL 2 TIMES DAILY
Qty: 22 G | Refills: 0 | Status: SHIPPED | OUTPATIENT
Start: 2025-06-11 | End: 2025-06-16

## 2025-06-11 NOTE — H&P (VIEW-ONLY)
Follow Up from Consultation - Cardiac Surgery   Nathalia Monge 64 y.o. female MRN: [id number]    Reason for Consult / Principal Problem: MVCAD and ischemic cardiomyopathy     History of Present Illness: Nathalia Monge is a 64 y.o. year old female referred to the office for MVCAD and ischemic cardiomyopathy. She was admitted to the hospital with CHF exacerbation in May. Her notable medical history is for HTN, HL, DM2, bipolar disorder, epilepsy, CKD3.  Echo from 3/18/25 LVEF 30%, mild AS, mild MR. Cardiac catheterization LAD 75%, large ramus splitting into 2 branches both with severe stenosis, PDA 85%.  She returns to the office with cardiac MRI results.   Seen in the heart failure office after our last visit as well - losartan d/c due to diarrhea/loose stools. No lifevest patient not committed to wearing. She is here with her  Marty in the office today. She is doing well at this time, no swelling in the legs, no SOB.  She does have intermittent chest palpitations. She was just at the [employer] this morning volunteering with Marty.      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 Authorizing Provider   aspirin 81 mg chewable tablet Chew 81 mg daily    Historical Provider, MD   BD Pen Needle Yolis 2nd Gen 32G X 4 MM MISC USE  THREE TIMES DAILY 6/1/25   SHASHANK Miguel   Blood Pressure Monitoring (Adult Blood Pressure Cuff Lg) KIT Use daily 6/2/25   Marcia Higgins PA-C   carvedilol (COREG) 6.25 mg tablet Take 1 tablet (6.25 mg total) by mouth 2 (two) times a day with meals 5/6/25   Jeffrey Pool PA-C   cholecalciferol (VITAMIN D3) 1,000 units tablet Take 1,000 Units by mouth in the morning.    Historical Provider, MD   CHROMIUM PO Take by mouth in the morning and in the evening.    Historical Provider, MD   cyclobenzaprine (FLEXERIL) 10 mg tablet Take 1 tablet (10 mg total) by mouth 3 (three) times a day as needed for muscle spasms 2/10/25   SHASHANK Miguel   ferrous sulfate 325 (65 Fe) mg tablet Take 325 mg by mouth in the morning.    Historical Provider, MD   furosemide (LASIX) 20 mg tablet Take 1 tablet (20 mg total) by mouth daily 5/6/25   Jeffrey Pool PA-C   insulin aspart protamine-insulin aspart (NovoLOG Mix 70/30 FlexPen) 100 Units/mL injection pen Inject 20 Units under the skin 2 (two) times a day before meals 1/23/25 7/22/25  Nelson Garner MD   Insulin Pen Needle (BD Pen Needle Yolis 2nd Gen) 32G X 4 MM MISC For use with insulin pen. Pharmacy may dispense brand covered by insurance. 1/4/24   Steven Bray DO   L-LYSINE PO Take 500 mg by mouth in the morning.    Historical Provider, MD   pantoprazole (PROTONIX) 40 mg tablet Take 1 tablet (40 mg total) by mouth daily before breakfast  Patient not taking: Reported on 5/14/2025 12/16/24 12/11/25  SHASHANK Miguel   Probiotic Product (PROBIOTIC DAILY PO) Take 1 capsule by mouth in the  "morning.    Historical Provider, MD       Allergies:  Allergies[5]    Review of Systems:     Review of Systems   Constitutional:  Positive for activity change and fatigue.   HENT: Negative.     Eyes: Negative.    Respiratory:  Positive for shortness of breath. Negative for chest tightness.    Cardiovascular:  Negative for palpitations and leg swelling.   Gastrointestinal: Negative.    Endocrine: Negative.    Genitourinary: Negative.    Musculoskeletal: Negative.    Skin: Negative.    Allergic/Immunologic: Negative.    Hematological: Negative.  Negative for adenopathy.   Psychiatric/Behavioral: Negative.         Vital Signs:     Vitals:    06/11/25 1419 06/11/25 1426 06/11/25 1427   BP: (!) 204/84 158/82 140/88   BP Location: Left arm Right arm Left arm   Patient Position: Sitting Sitting Sitting   Cuff Size: Large Large Large   Pulse: 64     SpO2: 98%     Weight: 89.3 kg (196 lb 14.4 oz)     Height: 5' 5.5\" (1.664 m)     BP elevated she is anxious nervous to discuss surgery     Physical Exam:     Physical Exam  Constitutional:       General: She is not in acute distress.     Appearance: She is normal weight. She is not ill-appearing, toxic-appearing or diaphoretic.   HENT:      Head: Normocephalic and atraumatic.      Right Ear: External ear normal.      Left Ear: External ear normal.      Nose: Nose normal.      Mouth/Throat:      Mouth: Mucous membranes are moist.      Pharynx: Oropharynx is clear. No oropharyngeal exudate or posterior oropharyngeal erythema.     Eyes:      General:         Right eye: No discharge.         Left eye: No discharge.      Extraocular Movements: Extraocular movements intact.      Conjunctiva/sclera: Conjunctivae normal.      Pupils: Pupils are equal, round, and reactive to light.     Neck:      Vascular: No carotid bruit.     Cardiovascular:      Rate and Rhythm: Normal rate and regular rhythm.      Pulses: Normal pulses.      Heart sounds: No murmur heard.  Pulmonary:      Effort: " "Pulmonary effort is normal. No respiratory distress.      Breath sounds: Normal breath sounds. No wheezing or rales.   Abdominal:      General: Abdomen is flat. There is no distension.      Tenderness: There is no abdominal tenderness. There is no guarding.     Musculoskeletal:      Cervical back: Normal range of motion and neck supple.      Right lower leg: No edema.      Left lower leg: No edema.     Skin:     General: Skin is warm and dry.     Neurological:      General: No focal deficit present.      Mental Status: She is alert and oriented to person, place, and time.     Psychiatric:         Mood and Affect: Mood normal.         Behavior: Behavior normal.         Thought Content: Thought content normal.         Judgment: Judgment normal.         Lab Results:               Invalid input(s): \"LABGLOM\"      Lab Results   Component Value Date    HGBA1C 7.4 (H) 03/18/2025     Lab Results   Component Value Date    CKTOTAL 25 (L) 01/08/2024       Imaging Studies:   Cardiac MRI:   Findings:  1. Normal left ventricular size with severely reduced systolic function. Mild concentric left ventricular hypertrophy. Global hypokinesis without regional wall motion abnormality.  2. Normal right ventricular size and systolic function.  3. The aortic, mitral, and tricuspid valves open without restriction. Mitral regurgitation visualized.  4. The left atrium is normal in size. The aortic root is normal in size.  5. There is no evidence of myocardial edema.  6. Delayed post-gadolinium imaging demonstrates no region of hyperenhancement.     IMPRESSION:  Impression:  1.  Normal left ventricular size with severely reduced systolic function, EF 30%. Mild concentric left ventricular hypertrophy.  2.  Normal right ventricular size and systolic function.  3.  Normal bilateral atria. Mitral regurgitation visualized.  4.  No evidence of myocardial inflammation, infiltrative disease or scarring.    Cardiac Catheterization:   Left Anterior " Descending   The vessel was visualized by angiography and is small. There is severe diffuse disease throughout the vessel.   Mid LAD lesion is 75% stenosed. Not the culprit lesion. Culprit for anginal symptoms.CORAZON flow is 3.      Ramus Intermedius   The vessel was visualized by angiography and is moderate in size. There is severe diffuse disease throughout the vessel.   Ramus lesion is 65% stenosed. Culprit lesion. Culprit for anginal symptoms.CORAZON flow is 3. iFR was measured in the Ramus. iFR ratio: 0.9. Pressure wire was inserted in the Ramus. FFR: 0.82.      Lateral Ramus Intermedius   The vessel is moderate in size and was visualized. There is moderate diffuse disease throughout the vessel.   Lat Ramus lesion is 65% stenosed. Culprit lesion. Culprit for anginal symptoms.CORAZON flow is 3. DFR was measured in the Lat Ramus. DFR ratio: 0.91.      Left Circumflex   The vessel was visualized by angiography, is small and is angiographically normal.      Right Coronary Artery   The vessel was visualized by angiography and is moderate in size. There is mild diffuse disease throughout the vessel.      Right Posterior Descending Artery   The vessel is moderate in size and was visualized. There is severe diffuse disease throughout the vessel.   RPDA lesion is 85% stenosed. Culprit lesion. Culprit for anginal symptoms.CORAZON flow is 3.        Echocardiogram:     Findings    Left Ventricle Left ventricular cavity size is mildly dilated. Wall thickness is mildly increased. The left ventricular ejection fraction is 30%. Systolic function is severely reduced. There is severe global hypokinesis with specific regional wall abnormalities. Diastolic function is moderately abnormal, consistent with grade II (pseudonormal) relaxation.   Right Ventricle Right ventricular cavity size is normal. Systolic function is normal. Wall thickness is normal.   Left Atrium The atrium is normal in size.   Right Atrium The atrium is normal in size.    Aortic Valve The aortic valve is trileaflet. The leaflets are not thickened. The leaflets are mildly calcified. There is mildly reduced mobility. There is no evidence of regurgitation. There is mild stenosis. The aortic valve mean gradient is 6 mmHg. The dimensionless velocity index is 0.51. The aortic valve area is 1.44 cm2.   Mitral Valve Mitral valve structure is normal.  There is mild regurgitation. There is no evidence of stenosis.   Tricuspid Valve Tricuspid valve structure is normal. There is mild regurgitation. There is no evidence of stenosis.   Pulmonic Valve Pulmonic valve structure is normal. There is trace regurgitation. There is no evidence of stenosis.   Ascending Aorta The aortic root is normal in size.   IVC/SVC The inferior vena cava is normal in size.   Pericardium There is no pericardial effusion. The pericardium is normal in appearance.       Carotid ultrasound:  CONCLUSION:  RIGHT:    There is <50% stenosis noted in the internal carotid artery.  Plaque is homogenous and irregular.    Vertebral artery flow is antegrade.     There is no significant subclavian artery disease.         LEFT:    There is no evidence of arterial disease throughout the extracranial carotid system.    Vertebral artery flow is antegrade.     There is no significant subclavian artery disease.      Vein mapping: good bilaterally     CTA chest 3/2024  FINDINGS:     PULMONARY ARTERIAL TREE:  No pulmonary embolus.        LUNGS: Central airways are patent. No tracheal or endobronchial lesion. Moderate bilateral pleural effusions with bibasilar dependent atelectasis. Scattered areas of hazy airspace opacities are seen bilaterally with lower lobe predominance likely due to   infectious/inflammatory process of the lung parenchyma. No suspicious pulmonary parenchymal mass identified in the aerated lung fields.     PLEURA: No pneumothorax. Moderate bilateral pleural effusions with bibasilar atelectasis.     HEART/GREAT VESSELS:  Heart is unremarkable for patient's age. No pericardial effusion. No thoracic aortic aneurysm old dissection. Visualized proximal thoracic great vessels are patent with normal course and caliber.     MEDIASTINUM AND AUDRA: No mediastinal mass, fluid collection, or lymphadenopathy by size criteria. Nonspecific subcentimeter mediastinal lymph nodes are seen. The visualized thyroid glands appear grossly unremarkable. Esophagus is normal in course and   caliber. No significant prevertebral soft tissue swelling.     CHEST WALL AND LOWER NECK: Unremarkable.     VISUALIZED STRUCTURES IN THE UPPER ABDOMEN: Unremarkable.     OSSEOUS STRUCTURES: No acute fracture or destructive osseous lesion. Extensive multilevel degenerative changes throughout the thoracic and visualized upper lumbar spine, worse at the T11-T12 level.     IMPRESSION:     1.  No acute pulmonary embolism or thoracic aortic aneurysm/dissection.  2.  Moderate bilateral pleural effusions with bibasilar dependent atelectasis. Scattered areas of hazy airspace opacity are seen bilaterally with lower lobe predominance likely due to infectious/inflammatory process of the lung parenchyma. Recommend   clinical correlation.    Above reviewed with patient     Assessment:  Patient Active Problem List    Diagnosis Date Noted    CAD (coronary artery disease) 05/14/2025    Chronic heart failure with reduced ejection fraction (HFrEF, <= 40%) (HCC) 03/19/2025    Bilateral pleural effusion 03/18/2025    Prolonged QT interval 03/18/2025    Thoracic radiculopathy 02/13/2025    Degenerative disc disease, thoracic 02/10/2025    Mixed hyperlipidemia 12/16/2024    Carpal tunnel syndrome on left 12/03/2024    Imaging abnormality 03/06/2024    Clostridium difficile carrier 03/06/2024    Insurance coverage problems 02/05/2024    Obesity (BMI 30.0-34.9) 01/11/2024    Renal abscess, right 01/10/2024    Seizure-like activity (HCC) 01/04/2024    Hyponatremia 01/03/2024    S/P craniotomy  10/21/2020    Meningioma (HCC) 03/13/2018    Urinary urgency 03/05/2018    Partial idiopathic epilepsy with seizures of localized onset, intractable, without status epilepticus ([facility])     Type 2 diabetes mellitus with hyperglycemia, with long-term current use of insulin ([facility])        Plan:  Risks and benefits of coronary artery bypass grafting were discussed in detail today with the patient.  They understand and wish to proceed with further workup and ultimately surgical intervention.      Nathalia Monge was comfortable with our recommendations, and their questions were answered to their satisfaction.  Thank you for allowing us to participate in the care of this patient.     Colonoscopy referral for routine colon cancer screening will be deferred til after cardiac screening.     SIGNATURE: Lyssa Bucio PA-C  DATE: 06/11/25  TIME: 2:54 PM    * This note was completed in part utilizing Paxfire direct voice recognition software.   Grammatical errors, random word insertion, spelling mistakes, and incomplete sentences may be an occasional consequence of the system secondary to software limitations, ambient noise and hardware issues. At the time of dictation, efforts were made to edit, clarify and /or correct errors. Please read the chart carefully and recognize, using context, where substitutions have occurred.  If you have any questions or concerns about the context, text or information contained within the body of this dictation, please contact myself, the provider, for further clarification.           [1]   Past Medical History:  Diagnosis Date    Bacteremia due to Klebsiella pneumoniae 03/06/2024    Bipolar depression ([facility])     CAD (coronary artery disease)     Chronic heart failure with reduced ejection fraction (HFrEF, <= 40%) ([facility])     EF 30%    Chronic heart failure with reduced ejection fraction (HFrEF, <= 40%) ([facility]) 03/19/2025    CKD (chronic kidney disease), stage III ([facility])     DDD (degenerative disc  disease), thoracic     Depression     Diabetes mellitus (HCC)     type 2, insulin dependent    Epilepsy (HCC)     no maintaince medications, experiences petite seizures to right eye multiple times throughout the day, does not drive due to this    Former tobacco use     GERD (gastroesophageal reflux disease)     History of Clostridioides difficile infection     History of pyelonephritis     w/ renal abscess    Hyperlipidemia     Hypertension     Hypertensive urgency 2018    ERNESTO (iron deficiency anemia)     Meningioma (HCC)     s/p surgical resection    Obesity     Prolonged QT interval     PTSD (post-traumatic stress disorder)     Telangiectasias     b/l LE    Vitamin D deficiency    [2]   Past Surgical History:  Procedure Laterality Date    CARDIAC CATHETERIZATION Left 2025    Procedure: Cardiac Left Heart Cath;  Surgeon: Latoya Garner DO;  Location: [facility];  Service: Cardiology    CARDIAC CATHETERIZATION N/A 2025    Procedure: Cardiac FFR/IFR;  Surgeon: Latoya Garner DO;  Location: [facility];  Service: Cardiology    CHOLECYSTECTOMY  2004    EPIDURAL BLOCK INJECTION      thoracic    HYSTERECTOMY      IMPLANTABLE CONTACT LENS IMPLANTATION Bilateral     IR THORACENTESIS  2025    MO CRNEC TREPH BONE FLAP CRNOT EXC BRAIN TUMOR STTL Right 03/15/2018    Procedure: IMAGE-GUIDED RIGHT FRONTOPARIETAL CRANIOTOMY FOR TUMOR;  Surgeon: Arsh Jeronimo MD;  Location: [facility];  Service: Neurosurgery   [3]   Family History  Adopted: Yes   Problem Relation Name Age of Onset    Atrial septal defect Daughter Spring         repaired at 4   [4]   Social History  Tobacco Use   Smoking Status Former    Current packs/day: 0.00    Types: Cigarettes    Quit date: 3/5/2000    Years since quittin.2   Smokeless Tobacco Never   Tobacco Comments    Social use up to    [5]   Allergies  Allergen Reactions    Lidocaine Seizures     Tolerated bupivacaine w/ cardiac cath    Statins  Diarrhea     Has tried all statins    Dilantin [Phenytoin]     Gabapentin Other (See Comments)     Anger/irritability/mood disturbance

## 2025-06-11 NOTE — PROGRESS NOTES
Follow Up from Consultation - Cardiac Surgery   Nathalia Monge 64 y.o. female MRN: [id number]    Reason for Consult / Principal Problem: MVCAD and ischemic cardiomyopathy     History of Present Illness: Nathalia Monge is a 64 y.o. year old female referred to the office for MVCAD and ischemic cardiomyopathy. She was admitted to the hospital with CHF exacerbation in May. Her notable medical history is for HTN, HL, DM2, bipolar disorder, epilepsy, CKD3.  Echo from 3/18/25 LVEF 30%, mild AS, mild MR. Cardiac catheterization LAD 75%, large ramus splitting into 2 branches both with severe stenosis, PDA 85%.  She returns to the office with cardiac MRI results.   Seen in the heart failure office after our last visit as well - losartan d/c due to diarrhea/loose stools. No lifevest patient not committed to wearing. She is here with her  Marty in the office today. She is doing well at this time, no swelling in the legs, no SOB.  She does have intermittent chest palpitations. She was just at the [email or web address] this morning volunteering with Marty.      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 Authorizing Provider   aspirin 81 mg chewable tablet Chew 81 mg daily    Historical Provider, MD   BD Pen Needle Yolis 2nd Gen 32G X 4 MM MISC USE  THREE TIMES DAILY 6/1/25   SHASHANK Miguel   Blood Pressure Monitoring (Adult Blood Pressure Cuff Lg) KIT Use daily 6/2/25   Marcia Higgins PA-C   carvedilol (COREG) 6.25 mg tablet Take 1 tablet (6.25 mg total) by mouth 2 (two) times a day with meals 5/6/25   Jeffrey Pool PA-C   cholecalciferol (VITAMIN D3) 1,000 units tablet Take 1,000 Units by mouth in the morning.    Historical Provider, MD   CHROMIUM PO Take by mouth in the morning and in the evening.    Historical Provider, MD   cyclobenzaprine (FLEXERIL) 10 mg tablet Take 1 tablet (10 mg total) by mouth 3 (three) times a day as needed for muscle spasms 2/10/25   SHASHANK Miguel   ferrous sulfate 325 (65 Fe) mg tablet Take 325 mg by mouth in the morning.    Historical Provider, MD   furosemide (LASIX) 20 mg tablet Take 1 tablet (20 mg total) by mouth daily 5/6/25   Jeffrey Pool PA-C   insulin aspart protamine-insulin aspart (NovoLOG Mix 70/30 FlexPen) 100 Units/mL injection pen Inject 20 Units under the skin 2 (two) times a day before meals 1/23/25 7/22/25  Nelson Garner MD   Insulin Pen Needle (BD Pen Needle Yolis 2nd Gen) 32G X 4 MM MISC For use with insulin pen. Pharmacy may dispense brand covered by insurance. 1/4/24   Steven Bray DO   L-LYSINE PO Take 500 mg by mouth in the morning.    Historical Provider, MD   pantoprazole (PROTONIX) 40 mg tablet Take 1 tablet (40 mg total) by mouth daily before breakfast  Patient not taking: Reported on 5/14/2025 12/16/24 12/11/25  SHASHANK Miguel   Probiotic Product (PROBIOTIC DAILY PO) Take 1 capsule by mouth in the  "morning.    Historical Provider, MD       Allergies:  Allergies[5]    Review of Systems:     Review of Systems   Constitutional:  Positive for activity change and fatigue.   HENT: Negative.     Eyes: Negative.    Respiratory:  Positive for shortness of breath. Negative for chest tightness.    Cardiovascular:  Negative for palpitations and leg swelling.   Gastrointestinal: Negative.    Endocrine: Negative.    Genitourinary: Negative.    Musculoskeletal: Negative.    Skin: Negative.    Allergic/Immunologic: Negative.    Hematological: Negative.  Negative for adenopathy.   Psychiatric/Behavioral: Negative.         Vital Signs:     Vitals:    06/11/25 1419 06/11/25 1426 06/11/25 1427   BP: (!) 204/84 158/82 140/88   BP Location: Left arm Right arm Left arm   Patient Position: Sitting Sitting Sitting   Cuff Size: Large Large Large   Pulse: 64     SpO2: 98%     Weight: 89.3 kg (196 lb 14.4 oz)     Height: 5' 5.5\" (1.664 m)     BP elevated she is anxious nervous to discuss surgery     Physical Exam:     Physical Exam  Constitutional:       General: She is not in acute distress.     Appearance: She is normal weight. She is not ill-appearing, toxic-appearing or diaphoretic.   HENT:      Head: Normocephalic and atraumatic.      Right Ear: External ear normal.      Left Ear: External ear normal.      Nose: Nose normal.      Mouth/Throat:      Mouth: Mucous membranes are moist.      Pharynx: Oropharynx is clear. No oropharyngeal exudate or posterior oropharyngeal erythema.     Eyes:      General:         Right eye: No discharge.         Left eye: No discharge.      Extraocular Movements: Extraocular movements intact.      Conjunctiva/sclera: Conjunctivae normal.      Pupils: Pupils are equal, round, and reactive to light.     Neck:      Vascular: No carotid bruit.     Cardiovascular:      Rate and Rhythm: Normal rate and regular rhythm.      Pulses: Normal pulses.      Heart sounds: No murmur heard.  Pulmonary:      Effort: " "Pulmonary effort is normal. No respiratory distress.      Breath sounds: Normal breath sounds. No wheezing or rales.   Abdominal:      General: Abdomen is flat. There is no distension.      Tenderness: There is no abdominal tenderness. There is no guarding.     Musculoskeletal:      Cervical back: Normal range of motion and neck supple.      Right lower leg: No edema.      Left lower leg: No edema.     Skin:     General: Skin is warm and dry.     Neurological:      General: No focal deficit present.      Mental Status: She is alert and oriented to person, place, and time.     Psychiatric:         Mood and Affect: Mood normal.         Behavior: Behavior normal.         Thought Content: Thought content normal.         Judgment: Judgment normal.         Lab Results:               Invalid input(s): \"LABGLOM\"      Lab Results   Component Value Date    HGBA1C 7.4 (H) 03/18/2025     Lab Results   Component Value Date    CKTOTAL 25 (L) 01/08/2024       Imaging Studies:   Cardiac MRI:   Findings:  1. Normal left ventricular size with severely reduced systolic function. Mild concentric left ventricular hypertrophy. Global hypokinesis without regional wall motion abnormality.  2. Normal right ventricular size and systolic function.  3. The aortic, mitral, and tricuspid valves open without restriction. Mitral regurgitation visualized.  4. The left atrium is normal in size. The aortic root is normal in size.  5. There is no evidence of myocardial edema.  6. Delayed post-gadolinium imaging demonstrates no region of hyperenhancement.     IMPRESSION:  Impression:  1.  Normal left ventricular size with severely reduced systolic function, EF 30%. Mild concentric left ventricular hypertrophy.  2.  Normal right ventricular size and systolic function.  3.  Normal bilateral atria. Mitral regurgitation visualized.  4.  No evidence of myocardial inflammation, infiltrative disease or scarring.    Cardiac Catheterization:   Left Anterior " Descending   The vessel was visualized by angiography and is small. There is severe diffuse disease throughout the vessel.   Mid LAD lesion is 75% stenosed. Not the culprit lesion. Culprit for anginal symptoms.CORAZON flow is 3.      Ramus Intermedius   The vessel was visualized by angiography and is moderate in size. There is severe diffuse disease throughout the vessel.   Ramus lesion is 65% stenosed. Culprit lesion. Culprit for anginal symptoms.CORAZON flow is 3. iFR was measured in the Ramus. iFR ratio: 0.9. Pressure wire was inserted in the Ramus. FFR: 0.82.      Lateral Ramus Intermedius   The vessel is moderate in size and was visualized. There is moderate diffuse disease throughout the vessel.   Lat Ramus lesion is 65% stenosed. Culprit lesion. Culprit for anginal symptoms.CORAZON flow is 3. DFR was measured in the Lat Ramus. DFR ratio: 0.91.      Left Circumflex   The vessel was visualized by angiography, is small and is angiographically normal.      Right Coronary Artery   The vessel was visualized by angiography and is moderate in size. There is mild diffuse disease throughout the vessel.      Right Posterior Descending Artery   The vessel is moderate in size and was visualized. There is severe diffuse disease throughout the vessel.   RPDA lesion is 85% stenosed. Culprit lesion. Culprit for anginal symptoms.CORAZON flow is 3.        Echocardiogram:     Findings    Left Ventricle Left ventricular cavity size is mildly dilated. Wall thickness is mildly increased. The left ventricular ejection fraction is 30%. Systolic function is severely reduced. There is severe global hypokinesis with specific regional wall abnormalities. Diastolic function is moderately abnormal, consistent with grade II (pseudonormal) relaxation.   Right Ventricle Right ventricular cavity size is normal. Systolic function is normal. Wall thickness is normal.   Left Atrium The atrium is normal in size.   Right Atrium The atrium is normal in size.    Aortic Valve The aortic valve is trileaflet. The leaflets are not thickened. The leaflets are mildly calcified. There is mildly reduced mobility. There is no evidence of regurgitation. There is mild stenosis. The aortic valve mean gradient is 6 mmHg. The dimensionless velocity index is 0.51. The aortic valve area is 1.44 cm2.   Mitral Valve Mitral valve structure is normal.  There is mild regurgitation. There is no evidence of stenosis.   Tricuspid Valve Tricuspid valve structure is normal. There is mild regurgitation. There is no evidence of stenosis.   Pulmonic Valve Pulmonic valve structure is normal. There is trace regurgitation. There is no evidence of stenosis.   Ascending Aorta The aortic root is normal in size.   IVC/SVC The inferior vena cava is normal in size.   Pericardium There is no pericardial effusion. The pericardium is normal in appearance.       Carotid ultrasound:  CONCLUSION:  RIGHT:    There is <50% stenosis noted in the internal carotid artery.  Plaque is homogenous and irregular.    Vertebral artery flow is antegrade.     There is no significant subclavian artery disease.         LEFT:    There is no evidence of arterial disease throughout the extracranial carotid system.    Vertebral artery flow is antegrade.     There is no significant subclavian artery disease.      Vein mapping: good bilaterally     CTA chest 3/2024  FINDINGS:     PULMONARY ARTERIAL TREE:  No pulmonary embolus.        LUNGS: Central airways are patent. No tracheal or endobronchial lesion. Moderate bilateral pleural effusions with bibasilar dependent atelectasis. Scattered areas of hazy airspace opacities are seen bilaterally with lower lobe predominance likely due to   infectious/inflammatory process of the lung parenchyma. No suspicious pulmonary parenchymal mass identified in the aerated lung fields.     PLEURA: No pneumothorax. Moderate bilateral pleural effusions with bibasilar atelectasis.     HEART/GREAT VESSELS:  Heart is unremarkable for patient's age. No pericardial effusion. No thoracic aortic aneurysm old dissection. Visualized proximal thoracic great vessels are patent with normal course and caliber.     MEDIASTINUM AND AUDRA: No mediastinal mass, fluid collection, or lymphadenopathy by size criteria. Nonspecific subcentimeter mediastinal lymph nodes are seen. The visualized thyroid glands appear grossly unremarkable. Esophagus is normal in course and   caliber. No significant prevertebral soft tissue swelling.     CHEST WALL AND LOWER NECK: Unremarkable.     VISUALIZED STRUCTURES IN THE UPPER ABDOMEN: Unremarkable.     OSSEOUS STRUCTURES: No acute fracture or destructive osseous lesion. Extensive multilevel degenerative changes throughout the thoracic and visualized upper lumbar spine, worse at the T11-T12 level.     IMPRESSION:     1.  No acute pulmonary embolism or thoracic aortic aneurysm/dissection.  2.  Moderate bilateral pleural effusions with bibasilar dependent atelectasis. Scattered areas of hazy airspace opacity are seen bilaterally with lower lobe predominance likely due to infectious/inflammatory process of the lung parenchyma. Recommend   clinical correlation.    Above reviewed with patient     Assessment:  Patient Active Problem List    Diagnosis Date Noted    CAD (coronary artery disease) 05/14/2025    Chronic heart failure with reduced ejection fraction (HFrEF, <= 40%) (HCC) 03/19/2025    Bilateral pleural effusion 03/18/2025    Prolonged QT interval 03/18/2025    Thoracic radiculopathy 02/13/2025    Degenerative disc disease, thoracic 02/10/2025    Mixed hyperlipidemia 12/16/2024    Carpal tunnel syndrome on left 12/03/2024    Imaging abnormality 03/06/2024    Clostridium difficile carrier 03/06/2024    Insurance coverage problems 02/05/2024    Obesity (BMI 30.0-34.9) 01/11/2024    Renal abscess, right 01/10/2024    Seizure-like activity (HCC) 01/04/2024    Hyponatremia 01/03/2024    S/P craniotomy  10/21/2020    Meningioma (HCC) 03/13/2018    Urinary urgency 03/05/2018    Partial idiopathic epilepsy with seizures of localized onset, intractable, without status epilepticus ([facility])     Type 2 diabetes mellitus with hyperglycemia, with long-term current use of insulin ([facility])        Plan:  Risks and benefits of coronary artery bypass grafting were discussed in detail today with the patient.  They understand and wish to proceed with further workup and ultimately surgical intervention.      Nathalia Monge was comfortable with our recommendations, and their questions were answered to their satisfaction.  Thank you for allowing us to participate in the care of this patient.     Colonoscopy referral for routine colon cancer screening will be deferred til after cardiac screening.     SIGNATURE: Lyssa Bucio PA-C  DATE: 06/11/25  TIME: 2:54 PM    * This note was completed in part utilizing [employer] direct voice recognition software.   Grammatical errors, random word insertion, spelling mistakes, and incomplete sentences may be an occasional consequence of the system secondary to software limitations, ambient noise and hardware issues. At the time of dictation, efforts were made to edit, clarify and /or correct errors. Please read the chart carefully and recognize, using context, where substitutions have occurred.  If you have any questions or concerns about the context, text or information contained within the body of this dictation, please contact myself, the provider, for further clarification.         [1]   Past Medical History:  Diagnosis Date    Bacteremia due to Klebsiella pneumoniae 03/06/2024    Bipolar depression ([facility])     CAD (coronary artery disease)     Chronic heart failure with reduced ejection fraction (HFrEF, <= 40%) ([facility])     EF 30%    Chronic heart failure with reduced ejection fraction (HFrEF, <= 40%) ([facility]) 03/19/2025    CKD (chronic kidney disease), stage III ([facility])     DDD (degenerative disc  disease), thoracic     Depression     Diabetes mellitus (HCC)     type 2, insulin dependent    Epilepsy (HCC)     no maintaince medications, experiences petite seizures to right eye multiple times throughout the day, does not drive due to this    Former tobacco use     GERD (gastroesophageal reflux disease)     History of Clostridioides difficile infection     History of pyelonephritis     w/ renal abscess    Hyperlipidemia     Hypertension     Hypertensive urgency 2018    ERNESTO (iron deficiency anemia)     Meningioma (HCC)     s/p surgical resection    Obesity     Prolonged QT interval     PTSD (post-traumatic stress disorder)     Telangiectasias     b/l LE    Vitamin D deficiency    [2]   Past Surgical History:  Procedure Laterality Date    CARDIAC CATHETERIZATION Left 2025    Procedure: Cardiac Left Heart Cath;  Surgeon: Latoya Garner DO;  Location: [facility];  Service: Cardiology    CARDIAC CATHETERIZATION N/A 2025    Procedure: Cardiac FFR/IFR;  Surgeon: Latoya Garner DO;  Location: [facility];  Service: Cardiology    CHOLECYSTECTOMY  2004    EPIDURAL BLOCK INJECTION      thoracic    HYSTERECTOMY      IMPLANTABLE CONTACT LENS IMPLANTATION Bilateral     IR THORACENTESIS  2025    TX CRNEC TREPH BONE FLAP CRNOT EXC BRAIN TUMOR STTL Right 03/15/2018    Procedure: IMAGE-GUIDED RIGHT FRONTOPARIETAL CRANIOTOMY FOR TUMOR;  Surgeon: Arsh Jeronimo MD;  Location: [facility]IN [facility];  Service: Neurosurgery   [3]   Family History  Adopted: Yes   Problem Relation Name Age of Onset    Atrial septal defect Daughter Spring         repaired at 4   [4]   Social History  Tobacco Use   Smoking Status Former    Current packs/day: 0.00    Types: Cigarettes    Quit date: 3/5/2000    Years since quittin.2   Smokeless Tobacco Never   Tobacco Comments    Social use up to    [5]   Allergies  Allergen Reactions    Lidocaine Seizures     Tolerated bupivacaine w/ cardiac cath    Statins  Diarrhea     Has tried all statins    Dilantin [Phenytoin]     Gabapentin Other (See Comments)     Anger/irritability/mood disturbance

## 2025-06-11 NOTE — LETTER
June 11, 2025     SHASHANK Miguel  [address]  [facility] [address]    Patient: Nathalia Monge   YOB: 1960   Date of Visit: 6/11/2025       Dear SHASHANK Saleh MD:    Thank you for referring Nathalia Monge to me for evaluation. Below are my notes for this consultation.    If you have questions, please do not hesitate to call me. I look forward to following your patient along with you.         Sincerely,        Abhi Rosales MD        CC: MD Lyssa Ochoa PA-C  6/11/2025  3:58 PM  Attested Addendum  Follow Up from Consultation - Cardiac Surgery   Nathalia Monge 64 y.o. female MRN: [id number]    Reason for Consult / Principal Problem: MVCAD and ischemic cardiomyopathy     History of Present Illness: Nathalia Monge is a 64 y.o. year old female referred to the office for MVCAD and ischemic cardiomyopathy. She was admitted to the hospital with CHF exacerbation in May. Her notable medical history is for HTN, HL, DM2, bipolar disorder, epilepsy, CKD3.  Echo from 3/18/25 LVEF 30%, mild AS, mild MR. Cardiac catheterization LAD 75%, large ramus splitting into 2 branches both with severe stenosis, PDA 85%.  She returns to the office with cardiac MRI results.   Seen in the heart failure office after our last visit as well - losartan d/c due to diarrhea/loose stools. No lifevest patient not committed to wearing. She is here with her  Marty in the office today. She is doing well at this time, no swelling in the legs, no SOB.  She does have intermittent chest palpitations. She was just at the [employer] this morning volunteering with Marty.      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 Authorizing Provider   aspirin 81 mg chewable tablet Chew 81 mg daily    Historical Provider, MD   BD Pen Needle Yolis 2nd Gen 32G X 4 MM MISC USE  THREE TIMES DAILY 6/1/25   SHASHANK Miguel   Blood Pressure Monitoring (Adult Blood Pressure Cuff Lg) KIT Use daily 6/2/25   Marcia Higgins PA-C   carvedilol (COREG) 6.25 mg tablet Take 1 tablet (6.25 mg total) by mouth 2 (two) times a day with meals 5/6/25   Jeffrey Pool PA-C   cholecalciferol (VITAMIN D3) 1,000 units tablet Take 1,000 Units by mouth in the morning.    Historical Provider, MD   CHROMIUM PO Take by mouth in the morning and in the evening.    Historical Provider, MD   cyclobenzaprine (FLEXERIL) 10 mg tablet Take 1 tablet (10 mg total) by mouth 3 (three) times a day as needed for muscle spasms 2/10/25   SHASHANK Miguel   ferrous sulfate 325 (65 Fe) mg tablet Take 325 mg by mouth in the morning.    Historical Provider, MD   furosemide (LASIX) 20 mg tablet Take 1 tablet (20 mg total) by mouth daily 5/6/25   Jeffrey Pool PA-C   insulin aspart protamine-insulin aspart (NovoLOG Mix 70/30 FlexPen) 100 Units/mL injection pen Inject 20  "Units under the skin 2 (two) times a day before meals 1/23/25 7/22/25  Nelson Garner MD   Insulin Pen Needle (BD Pen Needle Yolis 2nd Gen) 32G X 4 MM MISC For use with insulin pen. Pharmacy may dispense brand covered by insurance. 1/4/24   Steven Bray DO   L-LYSINE PO Take 500 mg by mouth in the morning.    Historical Provider, MD   pantoprazole (PROTONIX) 40 mg tablet Take 1 tablet (40 mg total) by mouth daily before breakfast  Patient not taking: Reported on 5/14/2025 12/16/24 12/11/25  Honey TanyaSHASHANK Sanz   Probiotic Product (PROBIOTIC DAILY PO) Take 1 capsule by mouth in the morning.    Historical Provider, MD       Allergies:  Allergies[5]    Review of Systems:     Review of Systems   Constitutional:  Positive for activity change and fatigue.   HENT: Negative.     Eyes: Negative.    Respiratory:  Positive for shortness of breath. Negative for chest tightness.    Cardiovascular:  Negative for palpitations and leg swelling.   Gastrointestinal: Negative.    Endocrine: Negative.    Genitourinary: Negative.    Musculoskeletal: Negative.    Skin: Negative.    Allergic/Immunologic: Negative.    Hematological: Negative.  Negative for adenopathy.   Psychiatric/Behavioral: Negative.         Vital Signs:     Vitals:    06/11/25 1419 06/11/25 1426 06/11/25 1427   BP: (!) 204/84 158/82 140/88   BP Location: Left arm Right arm Left arm   Patient Position: Sitting Sitting Sitting   Cuff Size: Large Large Large   Pulse: 64     SpO2: 98%     Weight: 89.3 kg (196 lb 14.4 oz)     Height: 5' 5.5\" (1.664 m)     BP elevated she is anxious nervous to discuss surgery     Physical Exam:     Physical Exam  Constitutional:       General: She is not in acute distress.     Appearance: She is normal weight. She is not ill-appearing, toxic-appearing or diaphoretic.   HENT:      Head: Normocephalic and atraumatic.      Right Ear: External ear normal.      Left Ear: External ear normal.      Nose: Nose normal.      " "Mouth/Throat:      Mouth: Mucous membranes are moist.      Pharynx: Oropharynx is clear. No oropharyngeal exudate or posterior oropharyngeal erythema.     Eyes:      General:         Right eye: No discharge.         Left eye: No discharge.      Extraocular Movements: Extraocular movements intact.      Conjunctiva/sclera: Conjunctivae normal.      Pupils: Pupils are equal, round, and reactive to light.     Neck:      Vascular: No carotid bruit.     Cardiovascular:      Rate and Rhythm: Normal rate and regular rhythm.      Pulses: Normal pulses.      Heart sounds: No murmur heard.  Pulmonary:      Effort: Pulmonary effort is normal. No respiratory distress.      Breath sounds: Normal breath sounds. No wheezing or rales.   Abdominal:      General: Abdomen is flat. There is no distension.      Tenderness: There is no abdominal tenderness. There is no guarding.     Musculoskeletal:      Cervical back: Normal range of motion and neck supple.      Right lower leg: No edema.      Left lower leg: No edema.     Skin:     General: Skin is warm and dry.     Neurological:      General: No focal deficit present.      Mental Status: She is alert and oriented to person, place, and time.     Psychiatric:         Mood and Affect: Mood normal.         Behavior: Behavior normal.         Thought Content: Thought content normal.         Judgment: Judgment normal.         Lab Results:               Invalid input(s): \"LABGLOM\"      Lab Results   Component Value Date    HGBA1C 7.4 (H) 03/18/2025     Lab Results   Component Value Date    CKTOTAL 25 (L) 01/08/2024       Imaging Studies:   Cardiac MRI:   Findings:  1. Normal left ventricular size with severely reduced systolic function. Mild concentric left ventricular hypertrophy. Global hypokinesis without regional wall motion abnormality.  2. Normal right ventricular size and systolic function.  3. The aortic, mitral, and tricuspid valves open without restriction. Mitral regurgitation " visualized.  4. The left atrium is normal in size. The aortic root is normal in size.  5. There is no evidence of myocardial edema.  6. Delayed post-gadolinium imaging demonstrates no region of hyperenhancement.     IMPRESSION:  Impression:  1.  Normal left ventricular size with severely reduced systolic function, EF 30%. Mild concentric left ventricular hypertrophy.  2.  Normal right ventricular size and systolic function.  3.  Normal bilateral atria. Mitral regurgitation visualized.  4.  No evidence of myocardial inflammation, infiltrative disease or scarring.    Cardiac Catheterization:   Left Anterior Descending   The vessel was visualized by angiography and is small. There is severe diffuse disease throughout the vessel.   Mid LAD lesion is 75% stenosed. Not the culprit lesion. Culprit for anginal symptoms.CORAZON flow is 3.      Ramus Intermedius   The vessel was visualized by angiography and is moderate in size. There is severe diffuse disease throughout the vessel.   Ramus lesion is 65% stenosed. Culprit lesion. Culprit for anginal symptoms.CORAZON flow is 3. iFR was measured in the Ramus. iFR ratio: 0.9. Pressure wire was inserted in the Ramus. FFR: 0.82.      Lateral Ramus Intermedius   The vessel is moderate in size and was visualized. There is moderate diffuse disease throughout the vessel.   Lat Ramus lesion is 65% stenosed. Culprit lesion. Culprit for anginal symptoms.CORAZON flow is 3. DFR was measured in the Lat Ramus. DFR ratio: 0.91.      Left Circumflex   The vessel was visualized by angiography, is small and is angiographically normal.      Right Coronary Artery   The vessel was visualized by angiography and is moderate in size. There is mild diffuse disease throughout the vessel.      Right Posterior Descending Artery   The vessel is moderate in size and was visualized. There is severe diffuse disease throughout the vessel.   RPDA lesion is 85% stenosed. Culprit lesion. Culprit for anginal symptoms.CORAZON  flow is 3.        Echocardiogram:     Findings    Left Ventricle Left ventricular cavity size is mildly dilated. Wall thickness is mildly increased. The left ventricular ejection fraction is 30%. Systolic function is severely reduced. There is severe global hypokinesis with specific regional wall abnormalities. Diastolic function is moderately abnormal, consistent with grade II (pseudonormal) relaxation.   Right Ventricle Right ventricular cavity size is normal. Systolic function is normal. Wall thickness is normal.   Left Atrium The atrium is normal in size.   Right Atrium The atrium is normal in size.   Aortic Valve The aortic valve is trileaflet. The leaflets are not thickened. The leaflets are mildly calcified. There is mildly reduced mobility. There is no evidence of regurgitation. There is mild stenosis. The aortic valve mean gradient is 6 mmHg. The dimensionless velocity index is 0.51. The aortic valve area is 1.44 cm2.   Mitral Valve Mitral valve structure is normal.  There is mild regurgitation. There is no evidence of stenosis.   Tricuspid Valve Tricuspid valve structure is normal. There is mild regurgitation. There is no evidence of stenosis.   Pulmonic Valve Pulmonic valve structure is normal. There is trace regurgitation. There is no evidence of stenosis.   Ascending Aorta The aortic root is normal in size.   IVC/SVC The inferior vena cava is normal in size.   Pericardium There is no pericardial effusion. The pericardium is normal in appearance.       Carotid ultrasound:  CONCLUSION:  RIGHT:    There is <50% stenosis noted in the internal carotid artery.  Plaque is homogenous and irregular.    Vertebral artery flow is antegrade.     There is no significant subclavian artery disease.         LEFT:    There is no evidence of arterial disease throughout the extracranial carotid system.    Vertebral artery flow is antegrade.     There is no significant subclavian artery disease.      Vein mapping: good  bilaterally     CTA chest 3/2024  FINDINGS:     PULMONARY ARTERIAL TREE:  No pulmonary embolus.        LUNGS: Central airways are patent. No tracheal or endobronchial lesion. Moderate bilateral pleural effusions with bibasilar dependent atelectasis. Scattered areas of hazy airspace opacities are seen bilaterally with lower lobe predominance likely due to   infectious/inflammatory process of the lung parenchyma. No suspicious pulmonary parenchymal mass identified in the aerated lung fields.     PLEURA: No pneumothorax. Moderate bilateral pleural effusions with bibasilar atelectasis.     HEART/GREAT VESSELS: Heart is unremarkable for patient's age. No pericardial effusion. No thoracic aortic aneurysm old dissection. Visualized proximal thoracic great vessels are patent with normal course and caliber.     MEDIASTINUM AND AUDRA: No mediastinal mass, fluid collection, or lymphadenopathy by size criteria. Nonspecific subcentimeter mediastinal lymph nodes are seen. The visualized thyroid glands appear grossly unremarkable. Esophagus is normal in course and   caliber. No significant prevertebral soft tissue swelling.     CHEST WALL AND LOWER NECK: Unremarkable.     VISUALIZED STRUCTURES IN THE UPPER ABDOMEN: Unremarkable.     OSSEOUS STRUCTURES: No acute fracture or destructive osseous lesion. Extensive multilevel degenerative changes throughout the thoracic and visualized upper lumbar spine, worse at the T11-T12 level.     IMPRESSION:     1.  No acute pulmonary embolism or thoracic aortic aneurysm/dissection.  2.  Moderate bilateral pleural effusions with bibasilar dependent atelectasis. Scattered areas of hazy airspace opacity are seen bilaterally with lower lobe predominance likely due to infectious/inflammatory process of the lung parenchyma. Recommend   clinical correlation.    Above reviewed with patient     Assessment:  Patient Active Problem List    Diagnosis Date Noted   • CAD (coronary artery disease) 05/14/2025    • Chronic heart failure with reduced ejection fraction (HFrEF, <= 40%) ([facility]) 03/19/2025   • Bilateral pleural effusion 03/18/2025   • Prolonged QT interval 03/18/2025   • Thoracic radiculopathy 02/13/2025   • Degenerative disc disease, thoracic 02/10/2025   • Mixed hyperlipidemia 12/16/2024   • Carpal tunnel syndrome on left 12/03/2024   • Imaging abnormality 03/06/2024   • Clostridium difficile carrier 03/06/2024   • Insurance coverage problems 02/05/2024   • Obesity (BMI 30.0-34.9) 01/11/2024   • Renal abscess, right 01/10/2024   • Seizure-like activity ([facility]) 01/04/2024   • Hyponatremia 01/03/2024   • S/P craniotomy 10/21/2020   • Meningioma ([facility]) 03/13/2018   • Urinary urgency 03/05/2018   • Partial idiopathic epilepsy with seizures of localized onset, intractable, without status epilepticus ([facility])    • Type 2 diabetes mellitus with hyperglycemia, with long-term current use of insulin ([facility])        Plan:  Risks and benefits of coronary artery bypass grafting were discussed in detail today with the patient.  They understand and wish to proceed with further workup and ultimately surgical intervention.      Nathalia Monge was comfortable with our recommendations, and their questions were answered to their satisfaction.  Thank you for allowing us to participate in the care of this patient.     Colonoscopy referral for routine colon cancer screening will be deferred til after cardiac screening.     SIGNATURE: Lyssa Bucio PA-C  DATE: 06/11/25  TIME: 2:54 PM    * This note was completed in part utilizing Excellence[employer] direct voice recognition software.   Grammatical errors, random word insertion, spelling mistakes, and incomplete sentences may be an occasional consequence of the system secondary to software limitations, ambient noise and hardware issues. At the time of dictation, efforts were made to edit, clarify and /or correct errors. Please read the chart carefully and recognize, using context, where  substitutions have occurred.  If you have any questions or concerns about the context, text or information contained within the body of this dictation, please contact myself, the provider, for further clarification.         [1]   Past Medical History:  Diagnosis Date   • Bacteremia due to Klebsiella pneumoniae 03/06/2024   • Bipolar depression ([facility])    • CAD (coronary artery disease)    • Chronic heart failure with reduced ejection fraction (HFrEF, <= 40%) ([facility])     EF 30%   • Chronic heart failure with reduced ejection fraction (HFrEF, <= 40%) ([facility]) 03/19/2025   • CKD (chronic kidney disease), stage III ([facility])    • DDD (degenerative disc disease), thoracic    • Depression    • Diabetes mellitus ([facility])     type 2, insulin dependent   • Epilepsy ([facility])     no maintaince medications, experiences petite seizures to right eye multiple times throughout the day, does not drive due to this   • Former tobacco use    • GERD (gastroesophageal reflux disease)    • History of Clostridioides difficile infection    • History of pyelonephritis     w/ renal abscess   • Hyperlipidemia    • Hypertension    • Hypertensive urgency 03/05/2018   • ERNESTO (iron deficiency anemia)    • Meningioma ([facility])     s/p surgical resection   • Obesity    • Prolonged QT interval    • PTSD (post-traumatic stress disorder)    • Telangiectasias     b/l LE   • Vitamin D deficiency    [2]   Past Surgical History:  Procedure Laterality Date   • CARDIAC CATHETERIZATION Left 05/05/2025    Procedure: Cardiac Left Heart Cath;  Surgeon: Latoya Garner DO;  Location: [facility];  Service: Cardiology   • CARDIAC CATHETERIZATION N/A 05/05/2025    Procedure: Cardiac FFR/IFR;  Surgeon: Latoya Garner DO;  Location: [facility];  Service: Cardiology   • CHOLECYSTECTOMY  2004   • EPIDURAL BLOCK INJECTION      thoracic   • HYSTERECTOMY     • IMPLANTABLE CONTACT LENS IMPLANTATION Bilateral    • IR THORACENTESIS  03/18/2025   • MT CRNEC TREPH BONE FLAP  CRNOT EXC BRAIN TUMOR STTL Right 03/15/2018    Procedure: IMAGE-GUIDED RIGHT FRONTOPARIETAL CRANIOTOMY FOR TUMOR;  Surgeon: Arsh Jeronimo MD;  Location: [facility] MAIN OR;  Service: Neurosurgery   [3]   Family History  Adopted: Yes   Problem Relation Name Age of Onset   • Atrial septal defect Daughter Spring         repaired at 4   [4]   Social History  Tobacco Use   Smoking Status Former   • Current packs/day: 0.00   • Types: Cigarettes   • Quit date: 3/5/2000   • Years since quittin.2   Smokeless Tobacco Never   Tobacco Comments    Social use up to    [5]   Allergies  Allergen Reactions   • Lidocaine Seizures     Tolerated bupivacaine w/ cardiac cath   • Statins Diarrhea     Has tried all statins   • Dilantin [Phenytoin]    • Gabapentin Other (See Comments)     Anger/irritability/mood disturbance     Attestation signed by Abhi Rosales MD at 2025  4:50 PM:  Attending Attestation:    I supervised the Advanced Practitioner.? In addition to personally performing portions of the history and physical exam, I performed, in its entirety, the assessment/plan/medical decision making/counseling/care coordination component of the visit.  I reviewed the Advanced Practitioner's note,  medications prescribed and orders placed.    Medical decision making is detailed below:    The patient is a 64-year-old female with symptomatic severe multivessel coronary artery disease, ischemic cardiomyopathy.  I saw her in consultation and recommended coronary artery bypass surgery if we were able to demonstrate myocardial viability.  She has completed preoperative testing.  I have personally reviewed diagnostic images, a cardiac MRI demonstrated viable myocardium.  I reviewed the diagnosis once again with the patient and her  and the treatment options including medications, PCI and surgery, I recommend coronary artery bypass surgery based on her coronary anatomy, ventricular dysfunction and associated diabetes.  I explained  the procedure, benefits, risk and possible complications.  She understands and agrees to proceed with surgery.  She will return for an elective CABG.    Abhi Rosales MD  06/11/25  4:48 PM

## 2025-06-11 NOTE — H&P
Follow Up from Consultation - Cardiac Surgery   Nathalia Monge 64 y.o. female MRN: [id number]    Reason for Consult / Principal Problem: MVCAD and ischemic cardiomyopathy     History of Present Illness: Nathalia Monge is a 64 y.o. year old female referred to the office for MVCAD and ischemic cardiomyopathy. She was admitted to the hospital with CHF exacerbation in May. Her notable medical history is for HTN, HL, DM2, bipolar disorder, epilepsy, CKD3.  Echo from 3/18/25 LVEF 30%, mild AS, mild MR. Cardiac catheterization LAD 75%, large ramus splitting into 2 branches both with severe stenosis, PDA 85%.  She returns to the office with cardiac MRI results.   Seen in the heart failure office after our last visit as well - losartan d/c due to diarrhea/loose stools. No lifevest patient not committed to wearing. She is here with her  Marty in the office today. She is doing well at this time, no swelling in the legs, no SOB.  She does have intermittent chest palpitations. She was just at the [employer] this morning volunteering with Marty.      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 Authorizing Provider   aspirin 81 mg chewable tablet Chew 81 mg daily    Historical Provider, MD   BD Pen Needle Yolis 2nd Gen 32G X 4 MM MISC USE  THREE TIMES DAILY 6/1/25   SHASHANK Miguel   Blood Pressure Monitoring (Adult Blood Pressure Cuff Lg) KIT Use daily 6/2/25   Marcia Higgins PA-C   carvedilol (COREG) 6.25 mg tablet Take 1 tablet (6.25 mg total) by mouth 2 (two) times a day with meals 5/6/25   Jeffrey Pool PA-C   cholecalciferol (VITAMIN D3) 1,000 units tablet Take 1,000 Units by mouth in the morning.    Historical Provider, MD   CHROMIUM PO Take by mouth in the morning and in the evening.    Historical Provider, MD   cyclobenzaprine (FLEXERIL) 10 mg tablet Take 1 tablet (10 mg total) by mouth 3 (three) times a day as needed for muscle spasms 2/10/25   SHASHANK Miguel   ferrous sulfate 325 (65 Fe) mg tablet Take 325 mg by mouth in the morning.    Historical Provider, MD   furosemide (LASIX) 20 mg tablet Take 1 tablet (20 mg total) by mouth daily 5/6/25   Jeffrey Pool PA-C   insulin aspart protamine-insulin aspart (NovoLOG Mix 70/30 FlexPen) 100 Units/mL injection pen Inject 20 Units under the skin 2 (two) times a day before meals 1/23/25 7/22/25  Nelson Garner MD   Insulin Pen Needle (BD Pen Needle Yolis 2nd Gen) 32G X 4 MM MISC For use with insulin pen. Pharmacy may dispense brand covered by insurance. 1/4/24   Steven Bray DO   L-LYSINE PO Take 500 mg by mouth in the morning.    Historical Provider, MD   pantoprazole (PROTONIX) 40 mg tablet Take 1 tablet (40 mg total) by mouth daily before breakfast  Patient not taking: Reported on 5/14/2025 12/16/24 12/11/25  SHASHANK Miguel   Probiotic Product (PROBIOTIC DAILY PO) Take 1 capsule by mouth in the  "morning.    Historical Provider, MD       Allergies:  Allergies[5]    Review of Systems:     Review of Systems   Constitutional:  Positive for activity change and fatigue.   HENT: Negative.     Eyes: Negative.    Respiratory:  Positive for shortness of breath. Negative for chest tightness.    Cardiovascular:  Negative for palpitations and leg swelling.   Gastrointestinal: Negative.    Endocrine: Negative.    Genitourinary: Negative.    Musculoskeletal: Negative.    Skin: Negative.    Allergic/Immunologic: Negative.    Hematological: Negative.  Negative for adenopathy.   Psychiatric/Behavioral: Negative.         Vital Signs:     Vitals:    06/11/25 1419 06/11/25 1426 06/11/25 1427   BP: (!) 204/84 158/82 140/88   BP Location: Left arm Right arm Left arm   Patient Position: Sitting Sitting Sitting   Cuff Size: Large Large Large   Pulse: 64     SpO2: 98%     Weight: 89.3 kg (196 lb 14.4 oz)     Height: 5' 5.5\" (1.664 m)     BP elevated she is anxious nervous to discuss surgery     Physical Exam:     Physical Exam  Constitutional:       General: She is not in acute distress.     Appearance: She is normal weight. She is not ill-appearing, toxic-appearing or diaphoretic.   HENT:      Head: Normocephalic and atraumatic.      Right Ear: External ear normal.      Left Ear: External ear normal.      Nose: Nose normal.      Mouth/Throat:      Mouth: Mucous membranes are moist.      Pharynx: Oropharynx is clear. No oropharyngeal exudate or posterior oropharyngeal erythema.     Eyes:      General:         Right eye: No discharge.         Left eye: No discharge.      Extraocular Movements: Extraocular movements intact.      Conjunctiva/sclera: Conjunctivae normal.      Pupils: Pupils are equal, round, and reactive to light.     Neck:      Vascular: No carotid bruit.     Cardiovascular:      Rate and Rhythm: Normal rate and regular rhythm.      Pulses: Normal pulses.      Heart sounds: No murmur heard.  Pulmonary:      Effort: " "Pulmonary effort is normal. No respiratory distress.      Breath sounds: Normal breath sounds. No wheezing or rales.   Abdominal:      General: Abdomen is flat. There is no distension.      Tenderness: There is no abdominal tenderness. There is no guarding.     Musculoskeletal:      Cervical back: Normal range of motion and neck supple.      Right lower leg: No edema.      Left lower leg: No edema.     Skin:     General: Skin is warm and dry.     Neurological:      General: No focal deficit present.      Mental Status: She is alert and oriented to person, place, and time.     Psychiatric:         Mood and Affect: Mood normal.         Behavior: Behavior normal.         Thought Content: Thought content normal.         Judgment: Judgment normal.         Lab Results:               Invalid input(s): \"LABGLOM\"      Lab Results   Component Value Date    HGBA1C 7.4 (H) 03/18/2025     Lab Results   Component Value Date    CKTOTAL 25 (L) 01/08/2024       Imaging Studies:   Cardiac MRI:   Findings:  1. Normal left ventricular size with severely reduced systolic function. Mild concentric left ventricular hypertrophy. Global hypokinesis without regional wall motion abnormality.  2. Normal right ventricular size and systolic function.  3. The aortic, mitral, and tricuspid valves open without restriction. Mitral regurgitation visualized.  4. The left atrium is normal in size. The aortic root is normal in size.  5. There is no evidence of myocardial edema.  6. Delayed post-gadolinium imaging demonstrates no region of hyperenhancement.     IMPRESSION:  Impression:  1.  Normal left ventricular size with severely reduced systolic function, EF 30%. Mild concentric left ventricular hypertrophy.  2.  Normal right ventricular size and systolic function.  3.  Normal bilateral atria. Mitral regurgitation visualized.  4.  No evidence of myocardial inflammation, infiltrative disease or scarring.    Cardiac Catheterization:   Left Anterior " Descending   The vessel was visualized by angiography and is small. There is severe diffuse disease throughout the vessel.   Mid LAD lesion is 75% stenosed. Not the culprit lesion. Culprit for anginal symptoms.CORAZON flow is 3.      Ramus Intermedius   The vessel was visualized by angiography and is moderate in size. There is severe diffuse disease throughout the vessel.   Ramus lesion is 65% stenosed. Culprit lesion. Culprit for anginal symptoms.CORAZON flow is 3. iFR was measured in the Ramus. iFR ratio: 0.9. Pressure wire was inserted in the Ramus. FFR: 0.82.      Lateral Ramus Intermedius   The vessel is moderate in size and was visualized. There is moderate diffuse disease throughout the vessel.   Lat Ramus lesion is 65% stenosed. Culprit lesion. Culprit for anginal symptoms.CORAZON flow is 3. DFR was measured in the Lat Ramus. DFR ratio: 0.91.      Left Circumflex   The vessel was visualized by angiography, is small and is angiographically normal.      Right Coronary Artery   The vessel was visualized by angiography and is moderate in size. There is mild diffuse disease throughout the vessel.      Right Posterior Descending Artery   The vessel is moderate in size and was visualized. There is severe diffuse disease throughout the vessel.   RPDA lesion is 85% stenosed. Culprit lesion. Culprit for anginal symptoms.CORAZON flow is 3.        Echocardiogram:     Findings    Left Ventricle Left ventricular cavity size is mildly dilated. Wall thickness is mildly increased. The left ventricular ejection fraction is 30%. Systolic function is severely reduced. There is severe global hypokinesis with specific regional wall abnormalities. Diastolic function is moderately abnormal, consistent with grade II (pseudonormal) relaxation.   Right Ventricle Right ventricular cavity size is normal. Systolic function is normal. Wall thickness is normal.   Left Atrium The atrium is normal in size.   Right Atrium The atrium is normal in size.    Aortic Valve The aortic valve is trileaflet. The leaflets are not thickened. The leaflets are mildly calcified. There is mildly reduced mobility. There is no evidence of regurgitation. There is mild stenosis. The aortic valve mean gradient is 6 mmHg. The dimensionless velocity index is 0.51. The aortic valve area is 1.44 cm2.   Mitral Valve Mitral valve structure is normal.  There is mild regurgitation. There is no evidence of stenosis.   Tricuspid Valve Tricuspid valve structure is normal. There is mild regurgitation. There is no evidence of stenosis.   Pulmonic Valve Pulmonic valve structure is normal. There is trace regurgitation. There is no evidence of stenosis.   Ascending Aorta The aortic root is normal in size.   IVC/SVC The inferior vena cava is normal in size.   Pericardium There is no pericardial effusion. The pericardium is normal in appearance.       Carotid ultrasound:  CONCLUSION:  RIGHT:    There is <50% stenosis noted in the internal carotid artery.  Plaque is homogenous and irregular.    Vertebral artery flow is antegrade.     There is no significant subclavian artery disease.         LEFT:    There is no evidence of arterial disease throughout the extracranial carotid system.    Vertebral artery flow is antegrade.     There is no significant subclavian artery disease.      Vein mapping: good bilaterally     CTA chest 3/2024  FINDINGS:     PULMONARY ARTERIAL TREE:  No pulmonary embolus.        LUNGS: Central airways are patent. No tracheal or endobronchial lesion. Moderate bilateral pleural effusions with bibasilar dependent atelectasis. Scattered areas of hazy airspace opacities are seen bilaterally with lower lobe predominance likely due to   infectious/inflammatory process of the lung parenchyma. No suspicious pulmonary parenchymal mass identified in the aerated lung fields.     PLEURA: No pneumothorax. Moderate bilateral pleural effusions with bibasilar atelectasis.     HEART/GREAT VESSELS:  Heart is unremarkable for patient's age. No pericardial effusion. No thoracic aortic aneurysm old dissection. Visualized proximal thoracic great vessels are patent with normal course and caliber.     MEDIASTINUM AND AUDRA: No mediastinal mass, fluid collection, or lymphadenopathy by size criteria. Nonspecific subcentimeter mediastinal lymph nodes are seen. The visualized thyroid glands appear grossly unremarkable. Esophagus is normal in course and   caliber. No significant prevertebral soft tissue swelling.     CHEST WALL AND LOWER NECK: Unremarkable.     VISUALIZED STRUCTURES IN THE UPPER ABDOMEN: Unremarkable.     OSSEOUS STRUCTURES: No acute fracture or destructive osseous lesion. Extensive multilevel degenerative changes throughout the thoracic and visualized upper lumbar spine, worse at the T11-T12 level.     IMPRESSION:     1.  No acute pulmonary embolism or thoracic aortic aneurysm/dissection.  2.  Moderate bilateral pleural effusions with bibasilar dependent atelectasis. Scattered areas of hazy airspace opacity are seen bilaterally with lower lobe predominance likely due to infectious/inflammatory process of the lung parenchyma. Recommend   clinical correlation.    Above reviewed with patient     Assessment:  Patient Active Problem List    Diagnosis Date Noted    CAD (coronary artery disease) 05/14/2025    Chronic heart failure with reduced ejection fraction (HFrEF, <= 40%) (HCC) 03/19/2025    Bilateral pleural effusion 03/18/2025    Prolonged QT interval 03/18/2025    Thoracic radiculopathy 02/13/2025    Degenerative disc disease, thoracic 02/10/2025    Mixed hyperlipidemia 12/16/2024    Carpal tunnel syndrome on left 12/03/2024    Imaging abnormality 03/06/2024    Clostridium difficile carrier 03/06/2024    Insurance coverage problems 02/05/2024    Obesity (BMI 30.0-34.9) 01/11/2024    Renal abscess, right 01/10/2024    Seizure-like activity (HCC) 01/04/2024    Hyponatremia 01/03/2024    S/P craniotomy  10/21/2020    Meningioma (HCC) 03/13/2018    Urinary urgency 03/05/2018    Partial idiopathic epilepsy with seizures of localized onset, intractable, without status epilepticus ([facility])     Type 2 diabetes mellitus with hyperglycemia, with long-term current use of insulin ([facility])        Plan:  Risks and benefits of coronary artery bypass grafting were discussed in detail today with the patient.  They understand and wish to proceed with further workup and ultimately surgical intervention.      Nathalia Monge was comfortable with our recommendations, and their questions were answered to their satisfaction.  Thank you for allowing us to participate in the care of this patient.     Colonoscopy referral for routine colon cancer screening will be deferred til after cardiac screening.     SIGNATURE: Lyssa Bucio PA-C  DATE: 06/11/25  TIME: 2:54 PM    * This note was completed in part utilizing [employer] direct voice recognition software.   Grammatical errors, random word insertion, spelling mistakes, and incomplete sentences may be an occasional consequence of the system secondary to software limitations, ambient noise and hardware issues. At the time of dictation, efforts were made to edit, clarify and /or correct errors. Please read the chart carefully and recognize, using context, where substitutions have occurred.  If you have any questions or concerns about the context, text or information contained within the body of this dictation, please contact myself, the provider, for further clarification.           [1]   Past Medical History:  Diagnosis Date    Bacteremia due to Klebsiella pneumoniae 03/06/2024    Bipolar depression ([facility])     CAD (coronary artery disease)     Chronic heart failure with reduced ejection fraction (HFrEF, <= 40%) ([facility])     EF 30%    Chronic heart failure with reduced ejection fraction (HFrEF, <= 40%) ([facility]) 03/19/2025    CKD (chronic kidney disease), stage III ([facility])     DDD (degenerative disc  disease), thoracic     Depression     Diabetes mellitus (HCC)     type 2, insulin dependent    Epilepsy (HCC)     no maintaince medications, experiences petite seizures to right eye multiple times throughout the day, does not drive due to this    Former tobacco use     GERD (gastroesophageal reflux disease)     History of Clostridioides difficile infection     History of pyelonephritis     w/ renal abscess    Hyperlipidemia     Hypertension     Hypertensive urgency 2018    ERNESTO (iron deficiency anemia)     Meningioma (HCC)     s/p surgical resection    Obesity     Prolonged QT interval     PTSD (post-traumatic stress disorder)     Telangiectasias     b/l LE    Vitamin D deficiency    [2]   Past Surgical History:  Procedure Laterality Date    CARDIAC CATHETERIZATION Left 2025    Procedure: Cardiac Left Heart Cath;  Surgeon: Latoya Garner DO;  Location: [facility];  Service: Cardiology    CARDIAC CATHETERIZATION N/A 2025    Procedure: Cardiac FFR/IFR;  Surgeon: Latoya Garner DO;  Location: [facility];  Service: Cardiology    CHOLECYSTECTOMY  2004    EPIDURAL BLOCK INJECTION      thoracic    HYSTERECTOMY      IMPLANTABLE CONTACT LENS IMPLANTATION Bilateral     IR THORACENTESIS  2025    OK CRNEC TREPH BONE FLAP CRNOT EXC BRAIN TUMOR STTL Right 03/15/2018    Procedure: IMAGE-GUIDED RIGHT FRONTOPARIETAL CRANIOTOMY FOR TUMOR;  Surgeon: Arsh Jeronimo MD;  Location: [facility] MAIN OR;  Service: Neurosurgery   [3]   Family History  Adopted: Yes   Problem Relation Name Age of Onset    Atrial septal defect Daughter Spring         repaired at 4   [4]   Social History  Tobacco Use   Smoking Status Former    Current packs/day: 0.00    Types: Cigarettes    Quit date: 3/5/2000    Years since quittin.2   Smokeless Tobacco Never   Tobacco Comments    Social use up to    [5]   Allergies  Allergen Reactions    Lidocaine Seizures     Tolerated bupivacaine w/ cardiac cath    Statins  Diarrhea     Has tried all statins    Dilantin [Phenytoin]     Gabapentin Other (See Comments)     Anger/irritability/mood disturbance

## 2025-06-11 NOTE — TELEPHONE ENCOUNTER
Received call from pt.  She saw CT surgery today, and surgery is not until 7/1.  She saw Marcia 6/2, and discussed life vest.  Pt was told today that she will need the life vest until surgery.  Pt would like to discuss next steps, and also was waiting to be sent life vest information through [employer].  Please assist.

## 2025-06-12 ENCOUNTER — TELEPHONE (OUTPATIENT)
Dept: FAMILY MEDICINE CLINIC | Facility: CLINIC | Age: 65
End: 2025-06-12

## 2025-06-12 NOTE — TELEPHONE ENCOUNTER
Returned call to patient who stated @ her OV yesterday with Dr Rosales with  Cardiothoracic Surgery, Dr Rosales recommended patient wear a LifeVest before 7/1/25 surgery.  Patient is calling to ask for an order for a LifeVest.  Patient's OV Notes from 6/2/25 under Today's Plan:  Discussed LifeVest today. Patient to consider and contact office if interested.       Please advise.

## 2025-06-12 NOTE — TELEPHONE ENCOUNTER
Patient called.  She is aware the Life Vest order was placed and aware [employer] will reach out to her to fit her for it.  Advised her to call us if she hasn't heard anything by 5 pm today.

## 2025-06-12 NOTE — TELEPHONE ENCOUNTER
[facility] medical order completed successfully to [facility] for processing.    F/U call to patient. Her mailbox was full & could not leave a message.

## 2025-06-12 NOTE — TELEPHONE ENCOUNTER
Per pharmacy regarding Novolog Mix Flexpen    Diabetic pt would be candidate for statin therapy if appropriate.

## 2025-06-13 ENCOUNTER — TELEPHONE (OUTPATIENT)
Dept: CASE MANAGEMENT | Facility: HOSPITAL | Age: 65
End: 2025-06-13

## 2025-06-13 NOTE — TELEPHONE ENCOUNTER
Received VM call forwarded by MÓNICA DCS from [employer] re: pt requesting additional documentation for approval for life vest.  MÓNICA returned call to  Ethan @ [phone] explained pt was not IP at hospital. Per Ethan - requested information was received and pt was approved for Life Vest w/scheduled visit for fitting by company.

## 2025-06-19 ENCOUNTER — APPOINTMENT (OUTPATIENT)
Dept: LAB | Facility: CLINIC | Age: 65
End: 2025-06-19
Attending: PHYSICIAN ASSISTANT
Payer: COMMERCIAL

## 2025-06-19 ENCOUNTER — LAB REQUISITION (OUTPATIENT)
Dept: LAB | Facility: HOSPITAL | Age: 65
End: 2025-06-19
Payer: COMMERCIAL

## 2025-06-19 DIAGNOSIS — M54.50 LUMBAR PAIN: ICD-10-CM

## 2025-06-19 DIAGNOSIS — I25.119 ATHEROSCLEROTIC HEART DISEASE OF NATIVE CORONARY ARTERY WITH UNSPECIFIED ANGINA PECTORIS (HCC): ICD-10-CM

## 2025-06-19 DIAGNOSIS — I25.119 CORONARY ARTERY DISEASE INVOLVING NATIVE CORONARY ARTERY OF NATIVE HEART WITH ANGINA PECTORIS (HCC): ICD-10-CM

## 2025-06-19 DIAGNOSIS — Z79.4 TYPE 2 DIABETES MELLITUS WITH HYPERGLYCEMIA, WITH LONG-TERM CURRENT USE OF INSULIN (HCC): ICD-10-CM

## 2025-06-19 DIAGNOSIS — E11.65 TYPE 2 DIABETES MELLITUS WITH HYPERGLYCEMIA, WITH LONG-TERM CURRENT USE OF INSULIN (HCC): ICD-10-CM

## 2025-06-19 LAB
ABO GROUP BLD: NORMAL
ANION GAP SERPL CALCULATED.3IONS-SCNC: 6 MMOL/L (ref 4–13)
BASOPHILS # BLD AUTO: 0.02 THOUSANDS/ÂΜL (ref 0–0.1)
BASOPHILS NFR BLD AUTO: 0 % (ref 0–1)
BLD GP AB SCN SERPL QL: NEGATIVE
BUN SERPL-MCNC: 28 MG/DL (ref 5–25)
CALCIUM SERPL-MCNC: 9.6 MG/DL (ref 8.4–10.2)
CHLORIDE SERPL-SCNC: 101 MMOL/L (ref 96–108)
CHOLEST SERPL-MCNC: 267 MG/DL (ref ?–200)
CO2 SERPL-SCNC: 32 MMOL/L (ref 21–32)
CREAT SERPL-MCNC: 1.09 MG/DL (ref 0.6–1.3)
EOSINOPHIL # BLD AUTO: 0.13 THOUSAND/ÂΜL (ref 0–0.61)
EOSINOPHIL NFR BLD AUTO: 2 % (ref 0–6)
ERYTHROCYTE [DISTWIDTH] IN BLOOD BY AUTOMATED COUNT: 13.2 % (ref 11.6–15.1)
GFR SERPL CREATININE-BSD FRML MDRD: 53 ML/MIN/1.73SQ M
GLUCOSE P FAST SERPL-MCNC: 110 MG/DL (ref 65–99)
HCT VFR BLD AUTO: 38.4 % (ref 34.8–46.1)
HDLC SERPL-MCNC: 51 MG/DL
HGB BLD-MCNC: 13.2 G/DL (ref 11.5–15.4)
IMM GRANULOCYTES # BLD AUTO: 0.02 THOUSAND/UL (ref 0–0.2)
IMM GRANULOCYTES NFR BLD AUTO: 0 % (ref 0–2)
INR PPP: 0.88 (ref 0.85–1.19)
LDLC SERPL CALC-MCNC: 186 MG/DL (ref 0–100)
LDLC SERPL DIRECT ASSAY-MCNC: 157 MG/DL (ref 0–100)
LYMPHOCYTES # BLD AUTO: 1.87 THOUSANDS/ÂΜL (ref 0.6–4.47)
LYMPHOCYTES NFR BLD AUTO: 24 % (ref 14–44)
MCH RBC QN AUTO: 29.5 PG (ref 26.8–34.3)
MCHC RBC AUTO-ENTMCNC: 34.4 G/DL (ref 31.4–37.4)
MCV RBC AUTO: 86 FL (ref 82–98)
MONOCYTES # BLD AUTO: 0.44 THOUSAND/ÂΜL (ref 0.17–1.22)
MONOCYTES NFR BLD AUTO: 6 % (ref 4–12)
NEUTROPHILS # BLD AUTO: 5.21 THOUSANDS/ÂΜL (ref 1.85–7.62)
NEUTS SEG NFR BLD AUTO: 68 % (ref 43–75)
NONHDLC SERPL-MCNC: 216 MG/DL
NRBC BLD AUTO-RTO: 0 /100 WBCS
PLATELET # BLD AUTO: 219 THOUSANDS/UL (ref 149–390)
PMV BLD AUTO: 10.6 FL (ref 8.9–12.7)
POTASSIUM SERPL-SCNC: 3.5 MMOL/L (ref 3.5–5.3)
PROTHROMBIN TIME: 12.6 SECONDS (ref 12.3–15)
RBC # BLD AUTO: 4.48 MILLION/UL (ref 3.81–5.12)
RH BLD: NEGATIVE
SODIUM SERPL-SCNC: 139 MMOL/L (ref 135–147)
SPECIMEN EXPIRATION DATE: NORMAL
TRIGL SERPL-MCNC: 152 MG/DL (ref ?–150)
WBC # BLD AUTO: 7.69 THOUSAND/UL (ref 4.31–10.16)

## 2025-06-19 PROCEDURE — 36415 COLL VENOUS BLD VENIPUNCTURE: CPT

## 2025-06-19 PROCEDURE — 85610 PROTHROMBIN TIME: CPT

## 2025-06-19 PROCEDURE — 83721 ASSAY OF BLOOD LIPOPROTEIN: CPT

## 2025-06-19 PROCEDURE — 80048 BASIC METABOLIC PNL TOTAL CA: CPT

## 2025-06-19 PROCEDURE — 86900 BLOOD TYPING SEROLOGIC ABO: CPT | Performed by: PHYSICIAN ASSISTANT

## 2025-06-19 PROCEDURE — 87081 CULTURE SCREEN ONLY: CPT

## 2025-06-19 PROCEDURE — 86923 COMPATIBILITY TEST ELECTRIC: CPT

## 2025-06-19 PROCEDURE — 86850 RBC ANTIBODY SCREEN: CPT | Performed by: PHYSICIAN ASSISTANT

## 2025-06-19 PROCEDURE — 85025 COMPLETE CBC W/AUTO DIFF WBC: CPT

## 2025-06-19 PROCEDURE — 86901 BLOOD TYPING SEROLOGIC RH(D): CPT | Performed by: PHYSICIAN ASSISTANT

## 2025-06-19 PROCEDURE — 80061 LIPID PANEL: CPT

## 2025-06-20 ENCOUNTER — TELEPHONE (OUTPATIENT)
Dept: CARDIAC SURGERY | Facility: CLINIC | Age: 65
End: 2025-06-20

## 2025-06-20 LAB — MRSA NOSE QL CULT: NORMAL

## 2025-06-20 NOTE — TELEPHONE ENCOUNTER
Patient called in with concerns about her BUN being slightly elevated she is scheduled to have a CABG with  July ,1. Spoke with Yeisonlópez who states her labs were just done yesterday have not been reviewed as of yet, to tell Nathalia if this would hinder her surgery that the office will reach out to her. Patient understood.

## 2025-07-01 ENCOUNTER — TELEPHONE (OUTPATIENT)
Dept: CARDIAC SURGERY | Facility: CLINIC | Age: 65
End: 2025-07-01

## 2025-07-01 ENCOUNTER — HOSPITAL ENCOUNTER (OUTPATIENT)
Facility: HOSPITAL | Age: 65
Setting detail: OUTPATIENT SURGERY
Discharge: HOME | End: 2025-07-01
Attending: THORACIC SURGERY (CARDIOTHORACIC VASCULAR SURGERY) | Admitting: THORACIC SURGERY (CARDIOTHORACIC VASCULAR SURGERY)
Payer: COMMERCIAL

## 2025-07-01 ENCOUNTER — APPOINTMENT (OUTPATIENT)
Dept: NON INVASIVE DIAGNOSTICS | Facility: HOSPITAL | Age: 65
End: 2025-07-01
Payer: COMMERCIAL

## 2025-07-01 ENCOUNTER — PREP FOR PROCEDURE (OUTPATIENT)
Dept: CARDIAC SURGERY | Facility: CLINIC | Age: 65
End: 2025-07-01

## 2025-07-01 VITALS
SYSTOLIC BLOOD PRESSURE: 207 MMHG | BODY MASS INDEX: 31.14 KG/M2 | TEMPERATURE: 96.9 F | RESPIRATION RATE: 18 BRPM | DIASTOLIC BLOOD PRESSURE: 98 MMHG | HEART RATE: 72 BPM | HEIGHT: 66 IN | WEIGHT: 193.78 LBS | OXYGEN SATURATION: 100 %

## 2025-07-01 DIAGNOSIS — I25.10 CORONARY ARTERY DISEASE INVOLVING NATIVE CORONARY ARTERY OF NATIVE HEART WITHOUT ANGINA PECTORIS: Primary | ICD-10-CM

## 2025-07-01 DIAGNOSIS — I25.10 CAD IN NATIVE ARTERY: ICD-10-CM

## 2025-07-01 DIAGNOSIS — I25.119 ATHEROSCLEROSIS OF NATIVE CORONARY ARTERY OF NATIVE HEART WITH ANGINA PECTORIS (HCC): ICD-10-CM

## 2025-07-01 LAB — GLUCOSE SERPL-MCNC: 113 MG/DL (ref 65–140)

## 2025-07-01 PROCEDURE — 82948 REAGENT STRIP/BLOOD GLUCOSE: CPT

## 2025-07-01 RX ORDER — SODIUM CHLORIDE, SODIUM LACTATE, POTASSIUM CHLORIDE, CALCIUM CHLORIDE 600; 310; 30; 20 MG/100ML; MG/100ML; MG/100ML; MG/100ML
125 INJECTION, SOLUTION INTRAVENOUS CONTINUOUS
Status: DISCONTINUED | OUTPATIENT
Start: 2025-07-01 | End: 2025-07-01 | Stop reason: HOSPADM

## 2025-07-01 RX ORDER — ZINC GLUCONATE 50 MG
50 TABLET ORAL DAILY
Status: ON HOLD | COMMUNITY

## 2025-07-01 RX ORDER — CEFAZOLIN SODIUM 2 G/50ML
2000 SOLUTION INTRAVENOUS ONCE
Status: DISCONTINUED | OUTPATIENT
Start: 2025-07-01 | End: 2025-07-01 | Stop reason: HOSPADM

## 2025-07-01 RX ORDER — PHENYLEPHRINE HCL IN 0.9% NACL 1 MG/10 ML
200-2000 SYRINGE (ML) INTRAVENOUS ONCE
Status: DISCONTINUED | OUTPATIENT
Start: 2025-07-01 | End: 2025-07-01 | Stop reason: HOSPADM

## 2025-07-01 RX ORDER — MAGNESIUM 30 MG
30 TABLET ORAL DAILY
Status: ON HOLD | COMMUNITY

## 2025-07-01 RX ORDER — HEPARIN SODIUM 1000 [USP'U]/ML
10000 INJECTION, SOLUTION INTRAVENOUS; SUBCUTANEOUS ONCE
Status: DISCONTINUED | OUTPATIENT
Start: 2025-07-01 | End: 2025-07-01 | Stop reason: HOSPADM

## 2025-07-01 RX ORDER — CHLORHEXIDINE GLUCONATE ORAL RINSE 1.2 MG/ML
15 SOLUTION DENTAL ONCE
Status: COMPLETED | OUTPATIENT
Start: 2025-07-01 | End: 2025-07-01

## 2025-07-01 RX ORDER — HEPARIN SODIUM 1000 [USP'U]/ML
400 INJECTION, SOLUTION INTRAVENOUS; SUBCUTANEOUS ONCE
Status: DISCONTINUED | OUTPATIENT
Start: 2025-07-01 | End: 2025-07-01 | Stop reason: HOSPADM

## 2025-07-01 RX ADMIN — Medication 12.5 MG: at 10:38

## 2025-07-01 RX ADMIN — CHLORHEXIDINE GLUCONATE 15 ML: 1.2 SOLUTION ORAL at 10:38

## 2025-07-01 RX ADMIN — MUPIROCIN 1 APPLICATION: 20 OINTMENT TOPICAL at 10:38

## 2025-07-01 NOTE — TELEPHONE ENCOUNTER
Pt called in regards to her surgery getting cancelled today w/ . She was very upset and expressed concerns about not getting the sx done sooner than later.     I assured the patient that once Dr. Rosales's coordinator was back in the office I would give her the message and have her give the pt a call back to reschedule.

## 2025-07-01 NOTE — TELEPHONE ENCOUNTER
"Patient called extremely upset and screaming on the phone.  She was scheduled for surgery today , but unfortunately, it was cancelled due to an emergency in the OR.  Patient stated she wants a new date or asked if another surgeon can perform her surgery.  I told her I needed to discuss this with her surgeon, and would call her as soon as I have that answer for her.    She became very upset again,screaming on the phone.  Patient stated she was  \"tired of waiting and demands some one fix her now \", and hung up.       "

## 2025-07-02 LAB
ABO GROUP BLD BPU: NORMAL
BPU ID: NORMAL
CROSSMATCH: NORMAL
UNIT DISPENSE STATUS: NORMAL
UNIT PRODUCT CODE: NORMAL
UNIT PRODUCT VOLUME: 350 ML
UNIT RH: NORMAL

## 2025-07-07 ENCOUNTER — ANESTHESIA EVENT (OUTPATIENT)
Dept: PERIOP | Facility: HOSPITAL | Age: 65
DRG: 235 | End: 2025-07-07
Payer: COMMERCIAL

## 2025-07-07 RX ORDER — PHENYLEPHRINE HCL IN 0.9% NACL 1 MG/10 ML
200-2000 SYRINGE (ML) INTRAVENOUS ONCE
Status: CANCELLED | OUTPATIENT
Start: 2025-07-08

## 2025-07-07 NOTE — ANESTHESIA PREPROCEDURE EVALUATION
Procedure:  CORONARY ARTERY BYPASS GRAFT (CABG) X 4 VESSELS (Chest)    Relevant Problems   CARDIO   (+) CAD (coronary artery disease)   (+) Mixed hyperlipidemia      ENDO   (+) Type 2 diabetes mellitus with hyperglycemia, with long-term current use of insulin (HCC)      /RENAL   (+) Renal abscess, right      NEURO/PSYCH   (+) Partial idiopathic epilepsy with seizures of localized onset, intractable, without status epilepticus (HCC)      PULMONARY   (+) Bilateral pleural effusion      LHC: mLAD 75 (diffuse disease), RI intermediate lesion, mRPDA 85    LV: 30 (severe global HK), normal RV systolic function, mild AS, mild MR, mild TR      Prior Powell use (noted to have anterior airway)    R ICA< 50, L ICA normal    Hgb 13.2, plt 219  Cr 1.09  A1c 7.4  Physical Exam    Airway     Mallampati score: III  TM Distance: >3 FB  Neck ROM: limited extension      Cardiovascular      Dental        Pulmonary      Neurological      Other Findings  Small mouth opening; missing several, none loosepost-pubertal.Small mouth opening; missing several, none loose        Anesthesia Plan  ASA Score- 4     Anesthesia Type- general with ASA Monitors.         Additional Monitors: arterial line, central veinous line and pulmonary artery catheter.    Airway Plan: Oral ETT.    Comment:   General anesthesia, endotracheal Intubation. Standard ASA monitors. Large bore peripheral IV. Pre-induction arterial line. CVP (Triple Lumen) and Cordis with PAC. RAFAEL for perioperative monitoring and diagnosis. Post-op ICU/vent. Risks and benefits discussed with patient; patient consented and agrees to proceed.  Powell - prior use.       Plan Factors-    Chart reviewed.   Existing labs reviewed.                   Induction- intravenous.    Postoperative Plan- Plan for postoperative opioid use.   Monitoring Plan - Monitoring plan - standard ASA monitoring and RAFAEL  Post Operative Pain Plan - plan for postoperative opioid use        Informed Consent-       NPO  Status:  No vitals data found for the desired time range.

## 2025-07-08 ENCOUNTER — ANESTHESIA (INPATIENT)
Dept: PERIOP | Facility: HOSPITAL | Age: 65
DRG: 235 | End: 2025-07-08
Payer: COMMERCIAL

## 2025-07-08 ENCOUNTER — APPOINTMENT (INPATIENT)
Dept: RADIOLOGY | Facility: HOSPITAL | Age: 65
DRG: 235 | End: 2025-07-08
Payer: COMMERCIAL

## 2025-07-08 ENCOUNTER — HOSPITAL ENCOUNTER (INPATIENT)
Facility: HOSPITAL | Age: 65
LOS: 6 days | Discharge: HOME HEALTH | DRG: 235 | End: 2025-07-14
Attending: THORACIC SURGERY (CARDIOTHORACIC VASCULAR SURGERY) | Admitting: THORACIC SURGERY (CARDIOTHORACIC VASCULAR SURGERY)
Payer: COMMERCIAL

## 2025-07-08 ENCOUNTER — APPOINTMENT (OUTPATIENT)
Dept: NON INVASIVE DIAGNOSTICS | Facility: HOSPITAL | Age: 65
DRG: 235 | End: 2025-07-08
Payer: COMMERCIAL

## 2025-07-08 ENCOUNTER — ANESTHESIA (OUTPATIENT)
Dept: PERIOP | Facility: HOSPITAL | Age: 65
DRG: 235 | End: 2025-07-08
Payer: COMMERCIAL

## 2025-07-08 ENCOUNTER — ANESTHESIA EVENT (INPATIENT)
Dept: PERIOP | Facility: HOSPITAL | Age: 65
DRG: 235 | End: 2025-07-08
Payer: COMMERCIAL

## 2025-07-08 DIAGNOSIS — N17.9 AKI (ACUTE KIDNEY INJURY) (HCC): ICD-10-CM

## 2025-07-08 DIAGNOSIS — Z95.1 S/P CABG (CORONARY ARTERY BYPASS GRAFT): Primary | ICD-10-CM

## 2025-07-08 DIAGNOSIS — Z79.4 TYPE 2 DIABETES MELLITUS WITH HYPERGLYCEMIA, WITH LONG-TERM CURRENT USE OF INSULIN (HCC): ICD-10-CM

## 2025-07-08 DIAGNOSIS — R56.9 SEIZURE-LIKE ACTIVITY (HCC): ICD-10-CM

## 2025-07-08 DIAGNOSIS — I50.22 CHRONIC HEART FAILURE WITH REDUCED EJECTION FRACTION (HFREF, <= 40%) (HCC): Chronic | ICD-10-CM

## 2025-07-08 DIAGNOSIS — E11.65 TYPE 2 DIABETES MELLITUS WITH HYPERGLYCEMIA, WITH LONG-TERM CURRENT USE OF INSULIN (HCC): ICD-10-CM

## 2025-07-08 DIAGNOSIS — I25.10 CAD IN NATIVE ARTERY: ICD-10-CM

## 2025-07-08 LAB
ANION GAP SERPL CALCULATED.3IONS-SCNC: 11 MMOL/L (ref 4–13)
APTT PPP: 32 SECONDS (ref 23–34)
ATRIAL RATE: 65 BPM
BASE EXCESS BLDA CALC-SCNC: -0.4 MMOL/L
BASE EXCESS BLDA CALC-SCNC: -1 MMOL/L (ref -2–3)
BASE EXCESS BLDA CALC-SCNC: -3 MMOL/L (ref -2–3)
BASE EXCESS BLDA CALC-SCNC: -4 MMOL/L (ref -2–3)
BASE EXCESS BLDA CALC-SCNC: -9 MMOL/L (ref -2–3)
BASE EXCESS BLDA CALC-SCNC: 0 MMOL/L (ref -2–3)
BASE EXCESS BLDA CALC-SCNC: 0 MMOL/L (ref -2–3)
BASE EXCESS BLDA CALC-SCNC: 1 MMOL/L (ref -2–3)
BODY TEMPERATURE: 95.5 DEGREES FEHRENHEIT
BUN SERPL-MCNC: 23 MG/DL (ref 5–25)
CA-I BLD-SCNC: 0.44 MMOL/L (ref 1.12–1.32)
CA-I BLD-SCNC: 1.02 MMOL/L (ref 1.12–1.32)
CA-I BLD-SCNC: 1.02 MMOL/L (ref 1.12–1.32)
CA-I BLD-SCNC: 1.03 MMOL/L (ref 1.12–1.32)
CA-I BLD-SCNC: 1.04 MMOL/L (ref 1.12–1.32)
CA-I BLD-SCNC: 1.06 MMOL/L (ref 1.12–1.32)
CA-I BLD-SCNC: 1.2 MMOL/L (ref 1.12–1.32)
CA-I BLD-SCNC: 1.26 MMOL/L (ref 1.12–1.32)
CA-I BLD-SCNC: 1.29 MMOL/L (ref 1.12–1.32)
CALCIUM SERPL-MCNC: 7.8 MG/DL (ref 8.4–10.2)
CHLORIDE SERPL-SCNC: 112 MMOL/L (ref 96–108)
CO2 SERPL-SCNC: 20 MMOL/L (ref 21–32)
CREAT SERPL-MCNC: 1.3 MG/DL (ref 0.6–1.3)
FIBRINOGEN PPP-MCNC: 286 MG/DL (ref 206–523)
GFR SERPL CREATININE-BSD FRML MDRD: 43 ML/MIN/1.73SQ M
GLUCOSE SERPL-MCNC: 101 MG/DL (ref 65–140)
GLUCOSE SERPL-MCNC: 115 MG/DL (ref 65–140)
GLUCOSE SERPL-MCNC: 143 MG/DL (ref 65–140)
GLUCOSE SERPL-MCNC: 162 MG/DL (ref 65–140)
GLUCOSE SERPL-MCNC: 162 MG/DL (ref 65–140)
GLUCOSE SERPL-MCNC: 175 MG/DL (ref 65–140)
GLUCOSE SERPL-MCNC: 183 MG/DL (ref 65–140)
GLUCOSE SERPL-MCNC: 184 MG/DL (ref 65–140)
GLUCOSE SERPL-MCNC: 224 MG/DL (ref 65–140)
GLUCOSE SERPL-MCNC: 229 MG/DL (ref 65–140)
GLUCOSE SERPL-MCNC: 246 MG/DL (ref 65–140)
GLUCOSE SERPL-MCNC: 265 MG/DL (ref 65–140)
GLUCOSE SERPL-MCNC: 268 MG/DL (ref 65–140)
GLUCOSE SERPL-MCNC: 94 MG/DL (ref 65–140)
HCO3 BLDA-SCNC: 18.5 MMOL/L (ref 24–30)
HCO3 BLDA-SCNC: 20.1 MMOL/L (ref 22–28)
HCO3 BLDA-SCNC: 20.3 MMOL/L (ref 22–28)
HCO3 BLDA-SCNC: 21.7 MMOL/L (ref 22–28)
HCO3 BLDA-SCNC: 21.9 MMOL/L (ref 22–28)
HCO3 BLDA-SCNC: 23.3 MMOL/L (ref 22–28)
HCO3 BLDA-SCNC: 23.8 MMOL/L (ref 22–28)
HCO3 BLDA-SCNC: 24 MMOL/L (ref 22–28)
HCO3 BLDA-SCNC: 25 MMOL/L (ref 24–30)
HCO3 BLDA-SCNC: 27.3 MMOL/L (ref 24–30)
HCT VFR BLD AUTO: 35.6 % (ref 34.8–46.1)
HCT VFR BLD CALC: 23 % (ref 34.8–46.1)
HCT VFR BLD CALC: 24 % (ref 34.8–46.1)
HCT VFR BLD CALC: 25 % (ref 34.8–46.1)
HCT VFR BLD CALC: 26 % (ref 34.8–46.1)
HCT VFR BLD CALC: 26 % (ref 34.8–46.1)
HCT VFR BLD CALC: 27 % (ref 34.8–46.1)
HCT VFR BLD CALC: 27 % (ref 34.8–46.1)
HCT VFR BLD CALC: 30 % (ref 34.8–46.1)
HCT VFR BLD CALC: 33 % (ref 34.8–46.1)
HGB BLD-MCNC: 12 G/DL (ref 11.5–15.4)
HGB BLDA-MCNC: 10.2 G/DL (ref 11.5–15.4)
HGB BLDA-MCNC: 11.2 G/DL (ref 11.5–15.4)
HGB BLDA-MCNC: 7.8 G/DL (ref 11.5–15.4)
HGB BLDA-MCNC: 8.2 G/DL (ref 11.5–15.4)
HGB BLDA-MCNC: 8.5 G/DL (ref 11.5–15.4)
HGB BLDA-MCNC: 8.8 G/DL (ref 11.5–15.4)
HGB BLDA-MCNC: 8.8 G/DL (ref 11.5–15.4)
HGB BLDA-MCNC: 9.2 G/DL (ref 11.5–15.4)
HGB BLDA-MCNC: 9.2 G/DL (ref 11.5–15.4)
HOROWITZ INDEX BLDA+IHG-RTO: 50 MM[HG]
INR PPP: 1.29 (ref 0.85–1.19)
KCT BLD-ACNC: 119 SEC (ref 89–137)
KCT BLD-ACNC: 130 SEC (ref 89–137)
KCT BLD-ACNC: 130 SEC (ref 89–137)
KCT BLD-ACNC: 136 SEC (ref 89–137)
KCT BLD-ACNC: 514 SEC (ref 89–137)
KCT BLD-ACNC: 549 SEC (ref 89–137)
KCT BLD-ACNC: 585 SEC (ref 89–137)
KCT BLD-ACNC: 614 SEC (ref 89–137)
KCT BLD-ACNC: 625 SEC (ref 89–137)
KCT BLD-ACNC: >1000 SEC (ref 89–137)
O2 CT BLDA-SCNC: 16.4 ML/DL (ref 16–23)
OXYHGB MFR BLDA: 98.5 % (ref 94–97)
P AXIS: 61 DEGREES
PCO2 BLD: 20 MMOL/L (ref 21–32)
PCO2 BLD: 21 MMOL/L (ref 21–32)
PCO2 BLD: 21 MMOL/L (ref 21–32)
PCO2 BLD: 23 MMOL/L (ref 21–32)
PCO2 BLD: 23 MMOL/L (ref 21–32)
PCO2 BLD: 24 MMOL/L (ref 21–32)
PCO2 BLD: 25 MMOL/L (ref 21–32)
PCO2 BLD: 26 MMOL/L (ref 21–32)
PCO2 BLD: 29 MMOL/L (ref 21–32)
PCO2 BLD: 31.6 MM HG (ref 36–44)
PCO2 BLD: 33.5 MM HG (ref 36–44)
PCO2 BLD: 34.5 MM HG (ref 36–44)
PCO2 BLD: 37 MM HG (ref 36–44)
PCO2 BLD: 37.3 MM HG (ref 36–44)
PCO2 BLD: 41.1 MM HG (ref 42–50)
PCO2 BLD: 42.5 MM HG (ref 36–44)
PCO2 BLD: 43 MM HG (ref 42–50)
PCO2 BLD: 51.3 MM HG (ref 42–50)
PCO2 BLDA: 37.5 MM HG (ref 36–44)
PEEP RESPIRATORY: 6 CM[H2O]
PH BLD: 7.24 [PH] (ref 7.3–7.4)
PH BLD: 7.32 [PH] (ref 7.35–7.45)
PH BLD: 7.33 [PH] (ref 7.3–7.4)
PH BLD: 7.38 [PH] (ref 7.35–7.45)
PH BLD: 7.39 [PH] (ref 7.3–7.4)
PH BLD: 7.4 [PH] (ref 7.35–7.45)
PH BLD: 7.41 [PH] (ref 7.35–7.45)
PH BLD: 7.42 [PH] (ref 7.35–7.45)
PH BLD: 7.42 [PH] (ref 7.35–7.45)
PH BLDA: 7.42 [PH] (ref 7.35–7.45)
PLATELET # BLD AUTO: 164 THOUSANDS/UL (ref 149–390)
PLATELET # BLD AUTO: 180 THOUSANDS/UL (ref 149–390)
PMV BLD AUTO: 10.6 FL (ref 8.9–12.7)
PMV BLD AUTO: 10.6 FL (ref 8.9–12.7)
PO2 BLD: 150 MM HG (ref 75–129)
PO2 BLD: 214 MM HG (ref 75–129)
PO2 BLD: 218 MM HG (ref 35–45)
PO2 BLD: 321 MM HG (ref 75–129)
PO2 BLD: 344 MM HG (ref 75–129)
PO2 BLD: 35 MM HG (ref 35–45)
PO2 BLD: 367 MM HG (ref 75–129)
PO2 BLD: 47 MM HG (ref 35–45)
PO2 BLD: >400 MM HG (ref 75–129)
PO2 BLDA: 175.1 MM HG (ref 75–129)
POTASSIUM BLD-SCNC: 2.6 MMOL/L (ref 3.5–5.3)
POTASSIUM BLD-SCNC: 3 MMOL/L (ref 3.5–5.3)
POTASSIUM BLD-SCNC: 3.2 MMOL/L (ref 3.5–5.3)
POTASSIUM BLD-SCNC: 3.3 MMOL/L (ref 3.5–5.3)
POTASSIUM BLD-SCNC: 3.3 MMOL/L (ref 3.5–5.3)
POTASSIUM BLD-SCNC: 3.8 MMOL/L (ref 3.5–5.3)
POTASSIUM BLD-SCNC: 4.1 MMOL/L (ref 3.5–5.3)
POTASSIUM BLD-SCNC: 4.1 MMOL/L (ref 3.5–5.3)
POTASSIUM BLD-SCNC: 4.2 MMOL/L (ref 3.5–5.3)
POTASSIUM SERPL-SCNC: 2.8 MMOL/L (ref 3.5–5.3)
PR INTERVAL: 170 MS
PROTHROMBIN TIME: 16.4 SECONDS (ref 12.3–15)
QRS AXIS: 66 DEGREES
QRSD INTERVAL: 106 MS
QT INTERVAL: 458 MS
QTC INTERVAL: 476 MS
SAO2 % BLD FROM PO2: 100 % (ref 60–85)
SAO2 % BLD FROM PO2: 62 % (ref 60–85)
SAO2 % BLD FROM PO2: 82 % (ref 60–85)
SAO2 % BLD FROM PO2: 99 % (ref 60–85)
SODIUM BLD-SCNC: 134 MMOL/L (ref 136–145)
SODIUM BLD-SCNC: 137 MMOL/L (ref 136–145)
SODIUM BLD-SCNC: 139 MMOL/L (ref 136–145)
SODIUM BLD-SCNC: 141 MMOL/L (ref 136–145)
SODIUM BLD-SCNC: 142 MMOL/L (ref 136–145)
SODIUM BLD-SCNC: 142 MMOL/L (ref 136–145)
SODIUM BLD-SCNC: 143 MMOL/L (ref 136–145)
SODIUM SERPL-SCNC: 143 MMOL/L (ref 135–147)
SPECIMEN SOURCE: ABNORMAL
SPECIMEN SOURCE: NORMAL
T WAVE AXIS: 52 DEGREES
VENT AC: 12
VENT- AC: AC
VENTRICULAR RATE: 65 BPM
VT SETTING VENT: 450 ML

## 2025-07-08 PROCEDURE — 02HV33Z INSERTION OF INFUSION DEVICE INTO SUPERIOR VENA CAVA, PERCUTANEOUS APPROACH: ICD-10-PCS | Performed by: ANESTHESIOLOGY

## 2025-07-08 PROCEDURE — 85730 THROMBOPLASTIN TIME PARTIAL: CPT | Performed by: PHYSICIAN ASSISTANT

## 2025-07-08 PROCEDURE — 85347 COAGULATION TIME ACTIVATED: CPT

## 2025-07-08 PROCEDURE — 30233N0 TRANSFUSION OF AUTOLOGOUS RED BLOOD CELLS INTO PERIPHERAL VEIN, PERCUTANEOUS APPROACH: ICD-10-PCS | Performed by: THORACIC SURGERY (CARDIOTHORACIC VASCULAR SURGERY)

## 2025-07-08 PROCEDURE — 71045 X-RAY EXAM CHEST 1 VIEW: CPT

## 2025-07-08 PROCEDURE — 5A1223Z PERFORMANCE OF CARDIAC PACING, CONTINUOUS: ICD-10-PCS | Performed by: THORACIC SURGERY (CARDIOTHORACIC VASCULAR SURGERY)

## 2025-07-08 PROCEDURE — 85014 HEMATOCRIT: CPT

## 2025-07-08 PROCEDURE — P9016 RBC LEUKOCYTES REDUCED: HCPCS

## 2025-07-08 PROCEDURE — 85049 AUTOMATED PLATELET COUNT: CPT | Performed by: PHYSICIAN ASSISTANT

## 2025-07-08 PROCEDURE — 5A1221Z PERFORMANCE OF CARDIAC OUTPUT, CONTINUOUS: ICD-10-PCS | Performed by: THORACIC SURGERY (CARDIOTHORACIC VASCULAR SURGERY)

## 2025-07-08 PROCEDURE — A7041 WATER SEAL DRAIN CONTAINER: HCPCS | Performed by: THORACIC SURGERY (CARDIOTHORACIC VASCULAR SURGERY)

## 2025-07-08 PROCEDURE — 33508 ENDOSCOPIC VEIN HARVEST: CPT | Performed by: THORACIC SURGERY (CARDIOTHORACIC VASCULAR SURGERY)

## 2025-07-08 PROCEDURE — 82803 BLOOD GASES ANY COMBINATION: CPT

## 2025-07-08 PROCEDURE — 93005 ELECTROCARDIOGRAM TRACING: CPT

## 2025-07-08 PROCEDURE — 06BQ4ZZ EXCISION OF LEFT SAPHENOUS VEIN, PERCUTANEOUS ENDOSCOPIC APPROACH: ICD-10-PCS | Performed by: THORACIC SURGERY (CARDIOTHORACIC VASCULAR SURGERY)

## 2025-07-08 PROCEDURE — 33518 CABG ARTERY-VEIN TWO: CPT | Performed by: PHYSICIAN ASSISTANT

## 2025-07-08 PROCEDURE — 85610 PROTHROMBIN TIME: CPT | Performed by: PHYSICIAN ASSISTANT

## 2025-07-08 PROCEDURE — 84295 ASSAY OF SERUM SODIUM: CPT

## 2025-07-08 PROCEDURE — 82805 BLOOD GASES W/O2 SATURATION: CPT | Performed by: PHYSICIAN ASSISTANT

## 2025-07-08 PROCEDURE — 33508 ENDOSCOPIC VEIN HARVEST: CPT | Performed by: PHYSICIAN ASSISTANT

## 2025-07-08 PROCEDURE — 85049 AUTOMATED PLATELET COUNT: CPT | Performed by: THORACIC SURGERY (CARDIOTHORACIC VASCULAR SURGERY)

## 2025-07-08 PROCEDURE — B246ZZ4 ULTRASONOGRAPHY OF RIGHT AND LEFT HEART, TRANSESOPHAGEAL: ICD-10-PCS | Performed by: ANESTHESIOLOGY

## 2025-07-08 PROCEDURE — 82948 REAGENT STRIP/BLOOD GLUCOSE: CPT

## 2025-07-08 PROCEDURE — 93355 ECHO TRANSESOPHAGEAL (TEE): CPT

## 2025-07-08 PROCEDURE — 02100Z9 BYPASS CORONARY ARTERY, ONE ARTERY FROM LEFT INTERNAL MAMMARY, OPEN APPROACH: ICD-10-PCS | Performed by: THORACIC SURGERY (CARDIOTHORACIC VASCULAR SURGERY)

## 2025-07-08 PROCEDURE — 82330 ASSAY OF CALCIUM: CPT

## 2025-07-08 PROCEDURE — 82947 ASSAY GLUCOSE BLOOD QUANT: CPT

## 2025-07-08 PROCEDURE — 94760 N-INVAS EAR/PLS OXIMETRY 1: CPT

## 2025-07-08 PROCEDURE — 86923 COMPATIBILITY TEST ELECTRIC: CPT

## 2025-07-08 PROCEDURE — 84132 ASSAY OF SERUM POTASSIUM: CPT

## 2025-07-08 PROCEDURE — 33518 CABG ARTERY-VEIN TWO: CPT | Performed by: THORACIC SURGERY (CARDIOTHORACIC VASCULAR SURGERY)

## 2025-07-08 PROCEDURE — 85014 HEMATOCRIT: CPT | Performed by: PHYSICIAN ASSISTANT

## 2025-07-08 PROCEDURE — 33533 CABG ARTERIAL SINGLE: CPT | Performed by: THORACIC SURGERY (CARDIOTHORACIC VASCULAR SURGERY)

## 2025-07-08 PROCEDURE — 33533 CABG ARTERIAL SINGLE: CPT | Performed by: PHYSICIAN ASSISTANT

## 2025-07-08 PROCEDURE — 85018 HEMOGLOBIN: CPT | Performed by: PHYSICIAN ASSISTANT

## 2025-07-08 PROCEDURE — 93010 ELECTROCARDIOGRAM REPORT: CPT | Performed by: INTERNAL MEDICINE

## 2025-07-08 PROCEDURE — 99223 1ST HOSP IP/OBS HIGH 75: CPT | Performed by: STUDENT IN AN ORGANIZED HEALTH CARE EDUCATION/TRAINING PROGRAM

## 2025-07-08 PROCEDURE — 85384 FIBRINOGEN ACTIVITY: CPT | Performed by: PHYSICIAN ASSISTANT

## 2025-07-08 PROCEDURE — 021109W BYPASS CORONARY ARTERY, TWO ARTERIES FROM AORTA WITH AUTOLOGOUS VENOUS TISSUE, OPEN APPROACH: ICD-10-PCS | Performed by: THORACIC SURGERY (CARDIOTHORACIC VASCULAR SURGERY)

## 2025-07-08 PROCEDURE — 80048 BASIC METABOLIC PNL TOTAL CA: CPT | Performed by: PHYSICIAN ASSISTANT

## 2025-07-08 PROCEDURE — 94002 VENT MGMT INPAT INIT DAY: CPT

## 2025-07-08 DEVICE — MARKER CORONARY BYPASS VOSS GRAFT: Type: IMPLANTABLE DEVICE | Status: FUNCTIONAL

## 2025-07-08 RX ORDER — MIDAZOLAM HYDROCHLORIDE 2 MG/2ML
INJECTION, SOLUTION INTRAMUSCULAR; INTRAVENOUS AS NEEDED
Status: DISCONTINUED | OUTPATIENT
Start: 2025-07-08 | End: 2025-07-08

## 2025-07-08 RX ORDER — ONDANSETRON 2 MG/ML
4 INJECTION INTRAMUSCULAR; INTRAVENOUS EVERY 6 HOURS PRN
Status: DISCONTINUED | OUTPATIENT
Start: 2025-07-08 | End: 2025-07-08

## 2025-07-08 RX ORDER — POLYETHYLENE GLYCOL 3350 17 G/17G
17 POWDER, FOR SOLUTION ORAL DAILY
Status: DISCONTINUED | OUTPATIENT
Start: 2025-07-09 | End: 2025-07-14 | Stop reason: HOSPADM

## 2025-07-08 RX ORDER — SODIUM CHLORIDE 9 MG/ML
INJECTION, SOLUTION INTRAVENOUS CONTINUOUS PRN
Status: DISCONTINUED | OUTPATIENT
Start: 2025-07-08 | End: 2025-07-08

## 2025-07-08 RX ORDER — PANTOPRAZOLE SODIUM 40 MG/1
40 TABLET, DELAYED RELEASE ORAL DAILY
Status: DISCONTINUED | OUTPATIENT
Start: 2025-07-09 | End: 2025-07-14 | Stop reason: HOSPADM

## 2025-07-08 RX ORDER — CEFAZOLIN SODIUM 2 G/50ML
2000 SOLUTION INTRAVENOUS ONCE
Status: DISCONTINUED | OUTPATIENT
Start: 2025-07-08 | End: 2025-07-08 | Stop reason: HOSPADM

## 2025-07-08 RX ORDER — MILRINONE LACTATE 0.2 MG/ML
INJECTION, SOLUTION INTRAVENOUS CONTINUOUS PRN
Status: DISCONTINUED | OUTPATIENT
Start: 2025-07-08 | End: 2025-07-08

## 2025-07-08 RX ORDER — DEXMEDETOMIDINE HYDROCHLORIDE 4 UG/ML
.1-.7 INJECTION, SOLUTION INTRAVENOUS
Status: DISCONTINUED | OUTPATIENT
Start: 2025-07-08 | End: 2025-07-09

## 2025-07-08 RX ORDER — INDOMETHACIN 25 MG/1
50 CAPSULE ORAL ONCE
Status: COMPLETED | OUTPATIENT
Start: 2025-07-08 | End: 2025-07-08

## 2025-07-08 RX ORDER — ROCURONIUM BROMIDE 10 MG/ML
INJECTION, SOLUTION INTRAVENOUS AS NEEDED
Status: DISCONTINUED | OUTPATIENT
Start: 2025-07-08 | End: 2025-07-08

## 2025-07-08 RX ORDER — POLYETHYLENE GLYCOL 3350 17 G/17G
17 POWDER, FOR SOLUTION ORAL DAILY
Status: DISCONTINUED | OUTPATIENT
Start: 2025-07-08 | End: 2025-07-08

## 2025-07-08 RX ORDER — POTASSIUM CHLORIDE 14.9 MG/ML
20 INJECTION INTRAVENOUS ONCE AS NEEDED
Status: DISCONTINUED | OUTPATIENT
Start: 2025-07-08 | End: 2025-07-08

## 2025-07-08 RX ORDER — AMIODARONE HYDROCHLORIDE 50 MG/ML
INJECTION, SOLUTION INTRAVENOUS AS NEEDED
Status: DISCONTINUED | OUTPATIENT
Start: 2025-07-08 | End: 2025-07-08

## 2025-07-08 RX ORDER — SODIUM CHLORIDE, SODIUM LACTATE, POTASSIUM CHLORIDE, CALCIUM CHLORIDE 600; 310; 30; 20 MG/100ML; MG/100ML; MG/100ML; MG/100ML
INJECTION, SOLUTION INTRAVENOUS CONTINUOUS PRN
Status: DISCONTINUED | OUTPATIENT
Start: 2025-07-08 | End: 2025-07-08

## 2025-07-08 RX ORDER — FENTANYL CITRATE 50 UG/ML
INJECTION, SOLUTION INTRAMUSCULAR; INTRAVENOUS AS NEEDED
Status: DISCONTINUED | OUTPATIENT
Start: 2025-07-08 | End: 2025-07-08

## 2025-07-08 RX ORDER — ACETAMINOPHEN 650 MG/1
650 SUPPOSITORY RECTAL EVERY 4 HOURS PRN
Status: DISCONTINUED | OUTPATIENT
Start: 2025-07-08 | End: 2025-07-08

## 2025-07-08 RX ORDER — DEXAMETHASONE SODIUM PHOSPHATE 10 MG/ML
INJECTION, SOLUTION INTRAMUSCULAR; INTRAVENOUS AS NEEDED
Status: DISCONTINUED | OUTPATIENT
Start: 2025-07-08 | End: 2025-07-08

## 2025-07-08 RX ORDER — HEPARIN SODIUM 1000 [USP'U]/ML
10000 INJECTION, SOLUTION INTRAVENOUS; SUBCUTANEOUS ONCE
Status: DISCONTINUED | OUTPATIENT
Start: 2025-07-08 | End: 2025-07-08 | Stop reason: HOSPADM

## 2025-07-08 RX ORDER — CHLORHEXIDINE GLUCONATE ORAL RINSE 1.2 MG/ML
15 SOLUTION DENTAL 2 TIMES DAILY
Status: DISCONTINUED | OUTPATIENT
Start: 2025-07-08 | End: 2025-07-14 | Stop reason: HOSPADM

## 2025-07-08 RX ORDER — ACETAMINOPHEN 325 MG/1
975 TABLET ORAL
Status: DISCONTINUED | OUTPATIENT
Start: 2025-07-08 | End: 2025-07-14 | Stop reason: HOSPADM

## 2025-07-08 RX ORDER — FENTANYL CITRATE 50 UG/ML
50 INJECTION, SOLUTION INTRAMUSCULAR; INTRAVENOUS ONCE
Status: DISCONTINUED | OUTPATIENT
Start: 2025-07-08 | End: 2025-07-08

## 2025-07-08 RX ORDER — SODIUM CHLORIDE 450 MG/100ML
20 INJECTION, SOLUTION INTRAVENOUS CONTINUOUS
Status: DISCONTINUED | OUTPATIENT
Start: 2025-07-08 | End: 2025-07-08

## 2025-07-08 RX ORDER — ASPIRIN 325 MG
325 TABLET ORAL DAILY
Status: DISCONTINUED | OUTPATIENT
Start: 2025-07-09 | End: 2025-07-14 | Stop reason: HOSPADM

## 2025-07-08 RX ORDER — FENTANYL CITRATE 50 UG/ML
50 INJECTION, SOLUTION INTRAMUSCULAR; INTRAVENOUS
Status: DISCONTINUED | OUTPATIENT
Start: 2025-07-08 | End: 2025-07-08

## 2025-07-08 RX ORDER — CEFAZOLIN SODIUM 1 G/3ML
INJECTION, POWDER, FOR SOLUTION INTRAMUSCULAR; INTRAVENOUS AS NEEDED
Status: DISCONTINUED | OUTPATIENT
Start: 2025-07-08 | End: 2025-07-08

## 2025-07-08 RX ORDER — ETOMIDATE 2 MG/ML
INJECTION INTRAVENOUS AS NEEDED
Status: DISCONTINUED | OUTPATIENT
Start: 2025-07-08 | End: 2025-07-08

## 2025-07-08 RX ORDER — AMINOCAPROIC ACID 250 MG/ML
INJECTION, SOLUTION INTRAVENOUS AS NEEDED
Status: DISCONTINUED | OUTPATIENT
Start: 2025-07-08 | End: 2025-07-08

## 2025-07-08 RX ORDER — SODIUM CHLORIDE, SODIUM GLUCONATE, SODIUM ACETATE, POTASSIUM CHLORIDE, MAGNESIUM CHLORIDE, SODIUM PHOSPHATE, DIBASIC, AND POTASSIUM PHOSPHATE .53; .5; .37; .037; .03; .012; .00082 G/100ML; G/100ML; G/100ML; G/100ML; G/100ML; G/100ML; G/100ML
INJECTION, SOLUTION INTRAVENOUS AS NEEDED
Status: DISCONTINUED | OUTPATIENT
Start: 2025-07-08 | End: 2025-07-08

## 2025-07-08 RX ORDER — POTASSIUM CHLORIDE 29.8 MG/ML
40 INJECTION INTRAVENOUS ONCE AS NEEDED
Status: DISCONTINUED | OUTPATIENT
Start: 2025-07-08 | End: 2025-07-08

## 2025-07-08 RX ORDER — FENTANYL CITRATE 50 UG/ML
50 INJECTION, SOLUTION INTRAMUSCULAR; INTRAVENOUS ONCE
Status: DISCONTINUED | OUTPATIENT
Start: 2025-07-08 | End: 2025-07-09

## 2025-07-08 RX ORDER — ONDANSETRON 2 MG/ML
4 INJECTION INTRAMUSCULAR; INTRAVENOUS EVERY 6 HOURS PRN
Status: DISCONTINUED | OUTPATIENT
Start: 2025-07-08 | End: 2025-07-14 | Stop reason: HOSPADM

## 2025-07-08 RX ORDER — POTASSIUM CHLORIDE 29.8 MG/ML
40 INJECTION INTRAVENOUS ONCE AS NEEDED
Status: COMPLETED | OUTPATIENT
Start: 2025-07-08 | End: 2025-07-09

## 2025-07-08 RX ORDER — VANCOMYCIN HYDROCHLORIDE 1 G/20ML
INJECTION, POWDER, LYOPHILIZED, FOR SOLUTION INTRAVENOUS AS NEEDED
Status: DISCONTINUED | OUTPATIENT
Start: 2025-07-08 | End: 2025-07-08 | Stop reason: HOSPADM

## 2025-07-08 RX ORDER — BISACODYL 10 MG
10 SUPPOSITORY, RECTAL RECTAL DAILY PRN
Status: DISCONTINUED | OUTPATIENT
Start: 2025-07-08 | End: 2025-07-08

## 2025-07-08 RX ORDER — PROPOFOL 10 MG/ML
5-50 INJECTION, EMULSION INTRAVENOUS
Status: DISCONTINUED | OUTPATIENT
Start: 2025-07-08 | End: 2025-07-08

## 2025-07-08 RX ORDER — GLYCOPYRROLATE 0.2 MG/ML
INJECTION INTRAMUSCULAR; INTRAVENOUS AS NEEDED
Status: DISCONTINUED | OUTPATIENT
Start: 2025-07-08 | End: 2025-07-08

## 2025-07-08 RX ORDER — ACETAMINOPHEN 650 MG/1
650 SUPPOSITORY RECTAL EVERY 4 HOURS PRN
Status: DISCONTINUED | OUTPATIENT
Start: 2025-07-08 | End: 2025-07-09

## 2025-07-08 RX ORDER — INDOMETHACIN 25 MG/1
CAPSULE ORAL AS NEEDED
Status: DISCONTINUED | OUTPATIENT
Start: 2025-07-08 | End: 2025-07-08

## 2025-07-08 RX ORDER — FUROSEMIDE 20 MG/1
20 TABLET ORAL DAILY
Status: DISCONTINUED | OUTPATIENT
Start: 2025-07-08 | End: 2025-07-08

## 2025-07-08 RX ORDER — CARDIOPLEGIC NO.20 (MAINT 4:1) 20 MEQ/810
PLASTIC BAG, PERFUSION (ML) PERFUSION AS NEEDED
Status: DISCONTINUED | OUTPATIENT
Start: 2025-07-08 | End: 2025-07-08

## 2025-07-08 RX ORDER — CALCIUM GLUCONATE 20 MG/ML
2 INJECTION, SOLUTION INTRAVENOUS ONCE AS NEEDED
Status: DISCONTINUED | OUTPATIENT
Start: 2025-07-08 | End: 2025-07-09

## 2025-07-08 RX ORDER — FERROUS SULFATE 325(65) MG
325 TABLET ORAL DAILY
Status: DISCONTINUED | OUTPATIENT
Start: 2025-07-09 | End: 2025-07-14 | Stop reason: HOSPADM

## 2025-07-08 RX ORDER — MANNITOL 250 MG/ML
INJECTION, SOLUTION INTRAVENOUS AS NEEDED
Status: DISCONTINUED | OUTPATIENT
Start: 2025-07-08 | End: 2025-07-08

## 2025-07-08 RX ORDER — PROTAMINE SULFATE 10 MG/ML
INJECTION, SOLUTION INTRAVENOUS AS NEEDED
Status: DISCONTINUED | OUTPATIENT
Start: 2025-07-08 | End: 2025-07-08

## 2025-07-08 RX ORDER — SACCHAROMYCES BOULARDII 250 MG
250 CAPSULE ORAL 2 TIMES DAILY
Status: DISCONTINUED | OUTPATIENT
Start: 2025-07-08 | End: 2025-07-14 | Stop reason: HOSPADM

## 2025-07-08 RX ORDER — FENTANYL CITRATE-0.9 % NACL/PF 10 MCG/ML
100 PLASTIC BAG, INJECTION (ML) INTRAVENOUS CONTINUOUS
Status: DISCONTINUED | OUTPATIENT
Start: 2025-07-08 | End: 2025-07-08

## 2025-07-08 RX ORDER — PANTOPRAZOLE SODIUM 40 MG/1
40 TABLET, DELAYED RELEASE ORAL
Status: DISCONTINUED | OUTPATIENT
Start: 2025-07-08 | End: 2025-07-08

## 2025-07-08 RX ORDER — CHLORHEXIDINE GLUCONATE ORAL RINSE 1.2 MG/ML
15 SOLUTION DENTAL ONCE
Status: COMPLETED | OUTPATIENT
Start: 2025-07-08 | End: 2025-07-08

## 2025-07-08 RX ORDER — CALCIUM CHLORIDE 100 MG/ML
INJECTION INTRAVENOUS; INTRAVENTRICULAR AS NEEDED
Status: DISCONTINUED | OUTPATIENT
Start: 2025-07-08 | End: 2025-07-08

## 2025-07-08 RX ORDER — INSULIN LISPRO 100 [IU]/ML
1-6 INJECTION, SOLUTION INTRAVENOUS; SUBCUTANEOUS
Status: DISCONTINUED | OUTPATIENT
Start: 2025-07-08 | End: 2025-07-08

## 2025-07-08 RX ORDER — ACETAMINOPHEN 325 MG/1
975 TABLET ORAL
Status: DISCONTINUED | OUTPATIENT
Start: 2025-07-08 | End: 2025-07-08

## 2025-07-08 RX ORDER — CEFAZOLIN SODIUM 2 G/50ML
SOLUTION INTRAVENOUS AS NEEDED
Status: DISCONTINUED | OUTPATIENT
Start: 2025-07-08 | End: 2025-07-08

## 2025-07-08 RX ORDER — CALCIUM GLUCONATE 20 MG/ML
2 INJECTION, SOLUTION INTRAVENOUS ONCE AS NEEDED
Status: DISCONTINUED | OUTPATIENT
Start: 2025-07-08 | End: 2025-07-08

## 2025-07-08 RX ORDER — MAGNESIUM SULFATE HEPTAHYDRATE 40 MG/ML
2 INJECTION, SOLUTION INTRAVENOUS ONCE
Status: COMPLETED | OUTPATIENT
Start: 2025-07-08 | End: 2025-07-09

## 2025-07-08 RX ORDER — CARDIOPLEGIC 33 (WARM 4:1) 40 MEQ/500
PLASTIC BAG, PERFUSION (ML) PERFUSION AS NEEDED
Status: DISCONTINUED | OUTPATIENT
Start: 2025-07-08 | End: 2025-07-08

## 2025-07-08 RX ORDER — OXYCODONE HYDROCHLORIDE 5 MG/1
5 TABLET ORAL EVERY 4 HOURS PRN
Status: DISCONTINUED | OUTPATIENT
Start: 2025-07-08 | End: 2025-07-14 | Stop reason: HOSPADM

## 2025-07-08 RX ORDER — HEPARIN SODIUM 1000 [USP'U]/ML
400 INJECTION, SOLUTION INTRAVENOUS; SUBCUTANEOUS ONCE
Status: DISCONTINUED | OUTPATIENT
Start: 2025-07-08 | End: 2025-07-08 | Stop reason: HOSPADM

## 2025-07-08 RX ORDER — CARDIOPLEGIC 33 (WARM 4:1) 40 MEQ/500
500 PLASTIC BAG, PERFUSION (ML) PERFUSION ONCE
Status: DISCONTINUED | OUTPATIENT
Start: 2025-07-08 | End: 2025-07-08

## 2025-07-08 RX ORDER — CARVEDILOL 6.25 MG/1
6.25 TABLET ORAL 2 TIMES DAILY WITH MEALS
Status: DISCONTINUED | OUTPATIENT
Start: 2025-07-08 | End: 2025-07-08

## 2025-07-08 RX ORDER — HYDROMORPHONE HCL/PF 1 MG/ML
0.5 SYRINGE (ML) INJECTION EVERY 2 HOUR PRN
Status: DISCONTINUED | OUTPATIENT
Start: 2025-07-08 | End: 2025-07-11

## 2025-07-08 RX ORDER — AMIODARONE HYDROCHLORIDE 200 MG/1
200 TABLET ORAL EVERY 8 HOURS SCHEDULED
Status: DISCONTINUED | OUTPATIENT
Start: 2025-07-08 | End: 2025-07-14 | Stop reason: HOSPADM

## 2025-07-08 RX ORDER — HYDROMORPHONE HCL/PF 1 MG/ML
0.5 SYRINGE (ML) INJECTION EVERY 2 HOUR PRN
Status: DISCONTINUED | OUTPATIENT
Start: 2025-07-08 | End: 2025-07-08

## 2025-07-08 RX ORDER — MAGNESIUM SULFATE HEPTAHYDRATE 40 MG/ML
2 INJECTION, SOLUTION INTRAVENOUS ONCE
Status: DISCONTINUED | OUTPATIENT
Start: 2025-07-08 | End: 2025-07-08

## 2025-07-08 RX ORDER — BISACODYL 10 MG
10 SUPPOSITORY, RECTAL RECTAL DAILY PRN
Status: DISCONTINUED | OUTPATIENT
Start: 2025-07-08 | End: 2025-07-14 | Stop reason: HOSPADM

## 2025-07-08 RX ORDER — OXYCODONE HYDROCHLORIDE 5 MG/1
5 TABLET ORAL EVERY 4 HOURS PRN
Status: DISCONTINUED | OUTPATIENT
Start: 2025-07-08 | End: 2025-07-08

## 2025-07-08 RX ORDER — CEFAZOLIN SODIUM 2 G/50ML
2000 SOLUTION INTRAVENOUS EVERY 8 HOURS
Status: COMPLETED | OUTPATIENT
Start: 2025-07-09 | End: 2025-07-09

## 2025-07-08 RX ORDER — FENTANYL CITRATE 50 UG/ML
50 INJECTION, SOLUTION INTRAMUSCULAR; INTRAVENOUS
Status: DISCONTINUED | OUTPATIENT
Start: 2025-07-08 | End: 2025-07-09

## 2025-07-08 RX ORDER — HEPARIN SODIUM 5000 [USP'U]/ML
5000 INJECTION, SOLUTION INTRAVENOUS; SUBCUTANEOUS EVERY 8 HOURS SCHEDULED
Status: DISCONTINUED | OUTPATIENT
Start: 2025-07-09 | End: 2025-07-08

## 2025-07-08 RX ORDER — HEPARIN SODIUM 1000 [USP'U]/ML
INJECTION, SOLUTION INTRAVENOUS; SUBCUTANEOUS AS NEEDED
Status: DISCONTINUED | OUTPATIENT
Start: 2025-07-08 | End: 2025-07-08

## 2025-07-08 RX ORDER — MAGNESIUM HYDROXIDE 1200 MG/15ML
LIQUID ORAL AS NEEDED
Status: DISCONTINUED | OUTPATIENT
Start: 2025-07-08 | End: 2025-07-08 | Stop reason: HOSPADM

## 2025-07-08 RX ORDER — INDOMETHACIN 25 MG/1
CAPSULE ORAL
Status: COMPLETED
Start: 2025-07-08 | End: 2025-07-08

## 2025-07-08 RX ORDER — PROPOFOL 10 MG/ML
INJECTION, EMULSION INTRAVENOUS CONTINUOUS PRN
Status: DISCONTINUED | OUTPATIENT
Start: 2025-07-08 | End: 2025-07-08

## 2025-07-08 RX ORDER — HEPARIN SODIUM 5000 [USP'U]/ML
5000 INJECTION, SOLUTION INTRAVENOUS; SUBCUTANEOUS EVERY 8 HOURS SCHEDULED
Status: DISCONTINUED | OUTPATIENT
Start: 2025-07-09 | End: 2025-07-14 | Stop reason: HOSPADM

## 2025-07-08 RX ORDER — ASPIRIN 325 MG
325 TABLET ORAL DAILY
Status: DISCONTINUED | OUTPATIENT
Start: 2025-07-08 | End: 2025-07-08

## 2025-07-08 RX ORDER — SODIUM CHLORIDE 450 MG/100ML
20 INJECTION, SOLUTION INTRAVENOUS CONTINUOUS
Status: DISCONTINUED | OUTPATIENT
Start: 2025-07-08 | End: 2025-07-12

## 2025-07-08 RX ORDER — HYDRALAZINE HYDROCHLORIDE 20 MG/ML
10 INJECTION INTRAMUSCULAR; INTRAVENOUS EVERY 4 HOURS PRN
Status: DISCONTINUED | OUTPATIENT
Start: 2025-07-08 | End: 2025-07-08

## 2025-07-08 RX ORDER — MAGNESIUM SULFATE 500 MG/ML
VIAL (ML) INJECTION AS NEEDED
Status: DISCONTINUED | OUTPATIENT
Start: 2025-07-08 | End: 2025-07-08

## 2025-07-08 RX ORDER — POTASSIUM CHLORIDE 14.9 MG/ML
20 INJECTION INTRAVENOUS ONCE AS NEEDED
Status: DISCONTINUED | OUTPATIENT
Start: 2025-07-08 | End: 2025-07-09

## 2025-07-08 RX ORDER — CHLORHEXIDINE GLUCONATE ORAL RINSE 1.2 MG/ML
15 SOLUTION DENTAL 2 TIMES DAILY
Status: DISCONTINUED | OUTPATIENT
Start: 2025-07-08 | End: 2025-07-08

## 2025-07-08 RX ORDER — AMIODARONE HYDROCHLORIDE 200 MG/1
200 TABLET ORAL EVERY 8 HOURS SCHEDULED
Status: DISCONTINUED | OUTPATIENT
Start: 2025-07-08 | End: 2025-07-08

## 2025-07-08 RX ORDER — CEFAZOLIN SODIUM 2 G/50ML
2000 SOLUTION INTRAVENOUS EVERY 8 HOURS
Status: DISCONTINUED | OUTPATIENT
Start: 2025-07-08 | End: 2025-07-08

## 2025-07-08 RX ADMIN — MIDAZOLAM 4 MG: 1 INJECTION INTRAMUSCULAR; INTRAVENOUS at 07:51

## 2025-07-08 RX ADMIN — FENTANYL CITRATE 50 MCG: 50 INJECTION INTRAMUSCULAR; INTRAVENOUS at 10:55

## 2025-07-08 RX ADMIN — NICARDIPINE HYDROCHLORIDE 15 MG/HR: 25 INJECTION, SOLUTION INTRAVENOUS at 17:08

## 2025-07-08 RX ADMIN — Medication 1000 ML: at 20:11

## 2025-07-08 RX ADMIN — NICARDIPINE HYDROCHLORIDE 5 MG/HR: 25 INJECTION, SOLUTION INTRAVENOUS at 14:30

## 2025-07-08 RX ADMIN — MUPIROCIN 1 APPLICATION: 20 OINTMENT TOPICAL at 06:15

## 2025-07-08 RX ADMIN — PROPOFOL 50 MCG/KG/MIN: 10 INJECTION, EMULSION INTRAVENOUS at 17:16

## 2025-07-08 RX ADMIN — HEPARIN SODIUM 5000 UNITS: 1000 INJECTION, SOLUTION INTRAVENOUS; SUBCUTANEOUS at 19:31

## 2025-07-08 RX ADMIN — AMINOCAPROIC ACID 2 G/HR: 250 INJECTION, SOLUTION INTRAVENOUS at 08:45

## 2025-07-08 RX ADMIN — AMINOCAPROIC ACID 5 G: 250 INJECTION, SOLUTION INTRAVENOUS at 08:35

## 2025-07-08 RX ADMIN — HEPARIN SODIUM 30200 UNITS: 1000 INJECTION, SOLUTION INTRAVENOUS; SUBCUTANEOUS at 19:50

## 2025-07-08 RX ADMIN — NICARDIPINE HYDROCHLORIDE 15 MG/HR: 2.5 INJECTION, SOLUTION INTRAVENOUS at 11:20

## 2025-07-08 RX ADMIN — CALCIUM CHLORIDE 1 G: 100 INJECTION INTRAVENOUS; INTRAVENTRICULAR at 21:12

## 2025-07-08 RX ADMIN — SODIUM CHLORIDE, SODIUM GLUCONATE, SODIUM ACETATE, POTASSIUM CHLORIDE, MAGNESIUM CHLORIDE, SODIUM PHOSPHATE, DIBASIC, AND POTASSIUM PHOSPHATE 300 ML: .53; .5; .37; .037; .03; .012; .00082 INJECTION, SOLUTION INTRAVENOUS at 21:09

## 2025-07-08 RX ADMIN — Medication 12.5 MG: at 06:46

## 2025-07-08 RX ADMIN — GLYCOPYRROLATE 0.2 MG: 0.2 INJECTION, SOLUTION INTRAMUSCULAR; INTRAVENOUS at 18:29

## 2025-07-08 RX ADMIN — SODIUM CHLORIDE, SODIUM LACTATE, POTASSIUM CHLORIDE, CALCIUM CHLORIDE: 600; 310; 30; 20 INJECTION, SOLUTION INTRAVENOUS at 08:30

## 2025-07-08 RX ADMIN — SODIUM CHLORIDE: 9 INJECTION, SOLUTION INTRAVENOUS at 08:30

## 2025-07-08 RX ADMIN — SODIUM CHLORIDE 4 UNITS/HR: 9 INJECTION, SOLUTION INTRAVENOUS at 19:59

## 2025-07-08 RX ADMIN — INDOMETHACIN 50 MEQ: 25 CAPSULE ORAL at 22:37

## 2025-07-08 RX ADMIN — FENTANYL CITRATE 150 MCG: 50 INJECTION INTRAMUSCULAR; INTRAVENOUS at 18:43

## 2025-07-08 RX ADMIN — Medication 350 ML: at 20:37

## 2025-07-08 RX ADMIN — ROCURONIUM 100 MG: 50 INJECTION, SOLUTION INTRAVENOUS at 07:51

## 2025-07-08 RX ADMIN — Medication 300 ML: at 20:56

## 2025-07-08 RX ADMIN — MAGNESIUM SULFATE HEPTAHYDRATE 2 G: 40 INJECTION, SOLUTION INTRAVENOUS at 22:35

## 2025-07-08 RX ADMIN — PROPOFOL 50 MCG/KG/MIN: 10 INJECTION, EMULSION INTRAVENOUS at 13:37

## 2025-07-08 RX ADMIN — Medication 100 ML: at 20:30

## 2025-07-08 RX ADMIN — SODIUM BICARBONATE 50 MEQ: 84 INJECTION, SOLUTION INTRAVENOUS at 17:53

## 2025-07-08 RX ADMIN — FENTANYL CITRATE 250 MCG: 0.05 INJECTION, SOLUTION INTRAMUSCULAR; INTRAVENOUS at 08:29

## 2025-07-08 RX ADMIN — CEFAZOLIN 2000 MG: 1 INJECTION, POWDER, FOR SOLUTION INTRAMUSCULAR; INTRAVENOUS at 08:35

## 2025-07-08 RX ADMIN — INSULIN LISPRO 1 UNITS: 100 INJECTION, SOLUTION INTRAVENOUS; SUBCUTANEOUS at 16:11

## 2025-07-08 RX ADMIN — FENTANYL CITRATE 250 MCG: 50 INJECTION INTRAMUSCULAR; INTRAVENOUS at 18:11

## 2025-07-08 RX ADMIN — CEFAZOLIN SODIUM 2000 MG: 2 SOLUTION INTRAVENOUS at 21:58

## 2025-07-08 RX ADMIN — MIDAZOLAM 3 MG: 1 INJECTION INTRAMUSCULAR; INTRAVENOUS at 21:59

## 2025-07-08 RX ADMIN — HYDRALAZINE HYDROCHLORIDE 10 MG: 20 INJECTION INTRAMUSCULAR; INTRAVENOUS at 16:05

## 2025-07-08 RX ADMIN — SODIUM CHLORIDE: 0.9 INJECTION, SOLUTION INTRAVENOUS at 07:36

## 2025-07-08 RX ADMIN — FENTANYL CITRATE 100 MCG: 50 INJECTION INTRAMUSCULAR; INTRAVENOUS at 18:35

## 2025-07-08 RX ADMIN — FENTANYL CITRATE 50 MCG: 50 INJECTION INTRAMUSCULAR; INTRAVENOUS at 16:02

## 2025-07-08 RX ADMIN — MIDAZOLAM 2 MG: 1 INJECTION INTRAMUSCULAR; INTRAVENOUS at 07:38

## 2025-07-08 RX ADMIN — MANNITOL 25 G: 12.5 INJECTION, SOLUTION INTRAVENOUS at 17:53

## 2025-07-08 RX ADMIN — POTASSIUM CHLORIDE 40 MEQ: 29.8 INJECTION, SOLUTION INTRAVENOUS at 23:49

## 2025-07-08 RX ADMIN — SODIUM CHLORIDE, SODIUM GLUCONATE, SODIUM ACETATE, POTASSIUM CHLORIDE, MAGNESIUM CHLORIDE, SODIUM PHOSPHATE, DIBASIC, AND POTASSIUM PHOSPHATE 300 ML: .53; .5; .37; .037; .03; .012; .00082 INJECTION, SOLUTION INTRAVENOUS at 20:23

## 2025-07-08 RX ADMIN — HYDRALAZINE HYDROCHLORIDE 10 MG: 20 INJECTION INTRAMUSCULAR; INTRAVENOUS at 11:52

## 2025-07-08 RX ADMIN — CHLORHEXIDINE GLUCONATE 15 ML: 1.2 SOLUTION ORAL at 06:46

## 2025-07-08 RX ADMIN — MILRINONE LACTATE 0.25 MCG/KG/MIN: 0.2 INJECTION, SOLUTION INTRAVENOUS at 08:35

## 2025-07-08 RX ADMIN — MIDAZOLAM 3 MG: 1 INJECTION INTRAMUSCULAR; INTRAVENOUS at 18:12

## 2025-07-08 RX ADMIN — ETOMIDATE 6 MG: 2 INJECTION INTRAVENOUS at 07:51

## 2025-07-08 RX ADMIN — CEFAZOLIN SODIUM 2000 MG: 2 SOLUTION INTRAVENOUS at 18:05

## 2025-07-08 RX ADMIN — SODIUM BICARBONATE 50 MEQ: 84 INJECTION, SOLUTION INTRAVENOUS at 20:26

## 2025-07-08 RX ADMIN — NOREPINEPHRINE BITARTRATE 1 MCG/MIN: 1 INJECTION, SOLUTION, CONCENTRATE INTRAVENOUS at 21:03

## 2025-07-08 RX ADMIN — INSULIN HUMAN 5 UNITS: 100 INJECTION, SOLUTION PARENTERAL at 20:38

## 2025-07-08 RX ADMIN — Medication 200 ML: at 20:31

## 2025-07-08 RX ADMIN — FENTANYL CITRATE 250 MCG: 0.05 INJECTION, SOLUTION INTRAMUSCULAR; INTRAVENOUS at 07:51

## 2025-07-08 RX ADMIN — SODIUM CHLORIDE, SODIUM LACTATE, POTASSIUM CHLORIDE, AND CALCIUM CHLORIDE: .6; .31; .03; .02 INJECTION, SOLUTION INTRAVENOUS at 20:06

## 2025-07-08 RX ADMIN — MIDAZOLAM 1 MG: 1 INJECTION INTRAMUSCULAR; INTRAVENOUS at 07:40

## 2025-07-08 RX ADMIN — INSULIN HUMAN 6 UNITS: 100 INJECTION, SOLUTION PARENTERAL at 21:08

## 2025-07-08 RX ADMIN — SODIUM CHLORIDE, SODIUM LACTATE, POTASSIUM CHLORIDE, AND CALCIUM CHLORIDE: .6; .31; .03; .02 INJECTION, SOLUTION INTRAVENOUS at 17:51

## 2025-07-08 RX ADMIN — AMIODARONE HYDROCHLORIDE 150 MG: 50 INJECTION, SOLUTION INTRAVENOUS at 20:59

## 2025-07-08 RX ADMIN — SODIUM CHLORIDE, SODIUM LACTATE, POTASSIUM CHLORIDE, AND CALCIUM CHLORIDE: .6; .31; .03; .02 INJECTION, SOLUTION INTRAVENOUS at 09:25

## 2025-07-08 RX ADMIN — MAGNESIUM SULFATE HEPTAHYDRATE 2 G: 500 INJECTION, SOLUTION INTRAMUSCULAR; INTRAVENOUS at 21:04

## 2025-07-08 RX ADMIN — PROPOFOL 50 MCG/KG/MIN: 10 INJECTION, EMULSION INTRAVENOUS at 10:09

## 2025-07-08 RX ADMIN — SODIUM CHLORIDE 20 ML/HR: 0.45 INJECTION, SOLUTION INTRAVENOUS at 22:32

## 2025-07-08 RX ADMIN — SODIUM BICARBONATE 50 MEQ: 84 INJECTION INTRAVENOUS at 22:37

## 2025-07-08 RX ADMIN — HEPARIN SODIUM 1 EACH: 5000 INJECTION, SOLUTION INTRAVENOUS; SUBCUTANEOUS at 17:53

## 2025-07-08 RX ADMIN — Medication 50 MCG/HR: at 13:25

## 2025-07-08 RX ADMIN — FENTANYL CITRATE 250 MCG: 50 INJECTION INTRAMUSCULAR; INTRAVENOUS at 19:05

## 2025-07-08 RX ADMIN — SODIUM CHLORIDE: 0.9 INJECTION, SOLUTION INTRAVENOUS at 17:51

## 2025-07-08 RX ADMIN — PHENYLEPHRINE HYDROCHLORIDE 7500 MCG: 10 INJECTION INTRAVENOUS at 21:16

## 2025-07-08 RX ADMIN — FENTANYL CITRATE 250 MCG: 50 INJECTION INTRAMUSCULAR; INTRAVENOUS at 22:07

## 2025-07-08 RX ADMIN — AMINOCAPROIC ACID 5 G: 250 INJECTION, SOLUTION INTRAVENOUS at 19:00

## 2025-07-08 RX ADMIN — DEXAMETHASONE SODIUM PHOSPHATE 10 MG: 10 INJECTION, SOLUTION INTRAMUSCULAR; INTRAVENOUS at 18:14

## 2025-07-08 RX ADMIN — EPINEPHRINE 2 MCG/MIN: 1 INJECTION INTRAMUSCULAR; INTRAVENOUS; SUBCUTANEOUS at 21:02

## 2025-07-08 RX ADMIN — ROCURONIUM BROMIDE 50 MG: 10 INJECTION, SOLUTION INTRAVENOUS at 20:02

## 2025-07-08 RX ADMIN — PROTAMINE SULFATE 180 MG: 10 INJECTION, SOLUTION INTRAVENOUS at 21:18

## 2025-07-08 RX ADMIN — SODIUM CHLORIDE, SODIUM GLUCONATE, SODIUM ACETATE, POTASSIUM CHLORIDE, MAGNESIUM CHLORIDE, SODIUM PHOSPHATE, DIBASIC, AND POTASSIUM PHOSPHATE 800 ML: .53; .5; .37; .037; .03; .012; .00082 INJECTION, SOLUTION INTRAVENOUS at 17:53

## 2025-07-08 NOTE — ANESTHESIA PROCEDURE NOTES
Arterial Line Insertion    Performed by: Emeterio Medrano MD  Authorized by: Emeterio Medrano MD  Consent: Verbal consent obtained. Written consent obtained  Risks and benefits: risks, benefits and alternatives were discussed  Consent given by: patient  Patient understanding: patient states understanding of the procedure being performed  Patient consent: the patient's understanding of the procedure matches consent given  Procedure consent: procedure consent matches procedure scheduled  Relevant documents: relevant documents present and verified  Required items: required blood products, implants, devices, and special equipment available  Patient identity confirmed: arm band  Preparation: Patient was prepped and draped in the usual sterile fashion.  Indications: multiple ABGs and hemodynamic monitoring  Orientation:  Right  Location: radial arteryMedication group details: bupivacaine 0.25% 0.5 cc.  Sedation:  Patient sedated: yes  Sedatives: see MAR for details  Analgesia: see MAR for details  Vitals: Vital signs were monitored during sedation.    Procedure Details:      Line Type: Arterial Line  Needle gauge: 20  Placement technique:  Ultrasound guided  Ultrasound image availability:  Vascular entry visualized in real time  Number of attempts: 1    Post-procedure:  Post-procedure: chlorhexidine patch applied and line sutured  Waveform: good waveform and waveform confirmed  Post-procedure CNS: normal and unchanged  Patient tolerance: patient tolerated the procedure well with no immediate complications  Comments: Pre-induction

## 2025-07-08 NOTE — INTERVAL H&P NOTE
H&P reviewed. After examining the patient I find no changes in the patients condition since the H&P had been written.    Vitals:    07/08/25 0624   BP: (!) 198/106   Pulse:    Resp:    Temp:    SpO2:      Anticipated Length of Stay:  Patient will be admitted on an Inpatient basis with an anticipated length of stay of  greater than 2 midnights.       Justification for Hospital Stay:  Post surgical recovery following open heart surgery.

## 2025-07-08 NOTE — ANESTHESIA POSTPROCEDURE EVALUATION
Post-Op Assessment Note            No anethesia notable event occurred.    Comments: see intraa-op quick note for details of ICU hand off        Last Filed PACU Vitals:  Vitals Value Taken Time   Temp     Pulse 61 07/08/25 10:30   BP     Resp 12 07/08/25 10:30   SpO2 100 % 07/08/25 10:30   Vitals shown include unfiled device data.

## 2025-07-08 NOTE — ANESTHESIA PROCEDURE NOTES
Procedure Performed: RAFAEL Anesthesia  Start Time:  7/8/2025 8:15 AM        Preanesthesia Checklist    Patient identified, IV assessed, risks and benefits discussed, monitors and equipment assessed, procedure being performed at surgeon's request and anesthesia consent obtained.      Procedure    Diagnostic Indications for RAFAEL:  assessment of ascending aorta, assessment of surgical repair and hemodynamic monitoring. Type of RAFAEL: complete RAFAEL with interpretation. Images Saved: ultrasound permanent image saved. Physician Requesting Echo: Abhi Rosales MD.  Location performed: OR. Intubated. Bite block not placed.   Heart visualized. Insertion of RAFAEL Probe:  Atraumatic. Probe Type:  Epiaortic and multiplane. Modalities:  3D, color flow mapping, continuous wave Doppler and pulse wave Doppler.      Echocardiographic and Doppler Measurements    PREPROCEDURE    LEFT VENTRICLE:  Systolic Function: severely impaired. Ejection Fraction: 30%.    Regional Wall Motion Abnormalities: global HK, worse septal, anteroseptal, inferoseptal.    RIGHT VENTRICLE:  Systolic Function: normal.  Cavity size normal.              AORTIC VALVE:  Leaflets: normal. Leaflet motions normal and normal. Stenosis: none.     Regurgitation: none.      MITRAL VALVE:  Leaflets: normal. Leaflet Motions: normal. Regurgitation: mild-mod; closer to moderate with SPB ~180 mmHg, closer to mild with SBP ~ 40 mmHg.   Stenosis: none.       TRICUSPID VALVE:  Leaflets: normal. Leaflet Motions: normal.  Regurgitation: mild.              ASCENDING AORTA:    Dissection not present.      AORTIC ARCH:    dissection not present. Grade 3: atheroma protruding < 0.5 cm into lumen.    DESCENDING AORTA:    Dissection not present. Grade 3: atheroma protruding < 0.5 cm into lumen.                    OTHER ATRIAL FINDINGS:  No TAYLOR clot.    ATRIAL SEPTUM:  Intra-atrial septal morphology: no PFO by CFD.                      POSTPROCEDURE                    REMOVAL:  Probe Removal:  atraumatic.      ECHOCARDIOGRAM COMMENTS:  Insertion; initial attempt met resistance; I placed McGrath3 to visualize, unable to pass RAFAEL probe behind ETT given limited space; attempt with jaw thrust, RAFAEL passed; each time any resistance was met, I stopped and readjusted.

## 2025-07-08 NOTE — ANESTHESIA PROCEDURE NOTES
Central Line Insertion    Performed by: Emeterio Medrano MD  Authorized by: Emeterio Medrano MD    Date/Time: 7/8/2025 8:05 AM  Catheter Type:  triple lumen  Consent: Verbal consent obtained. Written consent obtained  Risks and benefits: risks, benefits and alternatives were discussed  Consent given by: patient  Patient understanding: patient states understanding of the procedure being performed  Patient consent: the patient's understanding of the procedure matches consent given  Procedure consent: procedure consent matches procedure scheduled  Relevant documents: relevant documents present and verified  Required items: required blood products, implants, devices, and special equipment available  Patient identity confirmed: arm band  Indications: vascular access and central pressure monitoring  Catheter size: 7 Fr  Patient position: Trendelenburg  Anesthesia method: ga.  Assessment: free fluid flow and blood return through all ports  Preparation: skin prepped with ChloraPrep  Skin prep agent dried: skin prep agent completely dried prior to procedure  Sterile barriers: all five maximum sterile barriers used - cap, mask, sterile gown, sterile gloves, and large sterile sheet  Hand hygiene: hand hygiene performed prior to central venous catheter insertion  sterile gel and probe cover used in ultrasound-guided central venous catheter insertionultrasound permanent image saved  Vessel of Catheter Tip End: central venous  Number of attempts: 1  Successful placement: yes  Post-procedure: chlorhexidine patch applied, dressing applied and line sutured  Patient tolerance: patient tolerated the procedure well with no immediate complications

## 2025-07-08 NOTE — CASE MANAGEMENT
Case Management Assessment & Discharge Planning Note    Patient name Nathalia Monge  [facility] [id number]  : 1960 Date 2025       Current Admission Date: 2025  Current Admission Diagnosis:CAD (coronary artery disease)   Patient Active Problem List    Diagnosis Date Noted    S/P CABG (coronary artery bypass graft) 2025    CAD (coronary artery disease) 2025    Chronic heart failure with reduced ejection fraction (HFrEF, <= 40%) (HCC) 2025    Bilateral pleural effusion 2025    Prolonged QT interval 2025    Thoracic radiculopathy 2025    Degenerative disc disease, thoracic 02/10/2025    Mixed hyperlipidemia 2024    Carpal tunnel syndrome on left 2024    Imaging abnormality 2024    Clostridium difficile carrier 2024    Insurance coverage problems 2024    Obesity (BMI 30.0-34.9) 2024    Renal abscess, right 01/10/2024    Seizure-like activity (HCC) 2024    Hyponatremia 2024    S/P craniotomy 10/21/2020    Meningioma (HCC) 2018    Urinary urgency 2018    Partial idiopathic epilepsy with seizures of localized onset, intractable, without status epilepticus (HCC)     Type 2 diabetes mellitus with hyperglycemia, with long-term current use of insulin (HCC)       LOS (days): 0  Geometric Mean LOS (GMLOS) (days):   Days to GMLOS:     OBJECTIVE:    Risk of Unplanned Readmission Score: 12.74         Current admission status: Inpatient       Preferred Pharmacy:   [facility] - [place] - [address]  [address]  [place] [address]  Phone: [phone] Fax: [phone]    [facility] ([place] - [place] - [address]  [address]  [place] [address]  Phone: [phone] Fax: [phone]    Primary Care Provider: SHASHANK Miguel    Primary Insurance: [employer] REP  Secondary Insurance:     ASSESSMENT:  Active Health Care  "Proxies       Marty Valles Health Care Representative - Significant Other   Primary Phone: [phone] (Mobile)  Home Phone: [phone]                 Advance Directives  Does patient have a Health Care POA?: No  Does patient have Advance Directives?: No  Primary Contact: Maryt Valles-SO       Readmission Root Cause  30 Day Readmission: No    Patient Information  Admitted from:: Home  Mental Status: Intubated  During Assessment patient was accompanied by: Other-Comment (PHIL)  Assessment information provided by:: Other - please comment (PHIL-Marty)  Primary Caregiver: Self  Support Systems: Self, Spouse/significant other  County of Residence: [place]  What city do you live in?: [place]  Home entry access options. Select all that apply.: Stairs  Number of steps to enter home.: 2  Type of Current Residence: [facility]  Living Arrangements: Lives w/ Spouse/significant other  Is patient a ?: No    Activities of Daily Living Prior to Admission  Functional Status: Independent  Completes ADLs independently?: Yes  Ambulates independently?: Yes  Does patient use assisted devices?: No  Does patient currently own DME?: No  Does patient have a history of Outpatient Therapy (PT/OT)?: No  Does the patient have a history of Short-Term Rehab?: No  Does patient have a history of HHC?: No  Does patient currently have HHC?: No         Patient Information Continued  Income Source: SSI/SSD  Does patient have prescription coverage?: Yes  Can the patient afford their medications and any related supplies (such as glucometers or test strips)?: Yes  Does patient receive dialysis treatments?: No  Does patient have a history of substance abuse?: Yes  Historical substance use preference: \"Crack\" cocaine, Alcohol/ETOH  History of Withdrawal Symptoms: Denies past symptoms (Sober over 20 years)  Is patient currently in treatment for substance abuse?: N/A - sober  Does patient have a history of Mental Health Diagnosis?: Yes " (Bipolar per chart-PTSD per SO)  Is patient receiving treatment for mental health?: No. Treatment options were provided.  Has patient received inpatient treatment related to mental health in the last 2 years?: No         Means of Transportation  Means of Transport to [facility]:: Family transport      Social Determinants of Health (SDOH)      Flowsheet Row Most Recent Value   Housing Stability    In the last 12 months, was there a time when you were not able to pay the mortgage or rent on time? N   In the past 12 months, how many times have you moved where you were living? 0   At any time in the past 12 months, were you homeless or living in a shelter (including now)? 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 no   In the past 12 months, has lack of transportation kept you from meetings, work, or from getting things needed for daily living? No   Food Insecurity    Within the past 12 months, you worried that your food would run out before you got the money to buy more. Never true   Within the past 12 months, the food you bought just didn't last and you didn't have money to get more. Never true   Utilities    In the past 12 months has the electric, gas, oil, or water company threatened to shut off services in your home? No            DISCHARGE DETAILS:    Discharge planning discussed with:: Marty OVIEDO at bedside.  Freedom of Choice: Yes  Comments - Freedom of Choice: FOC-pending medical course/therapy recs  CM contacted family/caregiver?: Yes  Were Treatment Team discharge recommendations reviewed with patient/caregiver?: No  Did patient/caregiver verbalize understanding of patient care needs?: No  Were patient/caregiver advised of the risks associated with not following Treatment Team discharge recommendations?: No- see  comments    Contacts  Patient Contacts: Marty ()  Relationship to Patient:: Family  Contact Method: In Person, Phone  Phone Number: [phone]  Reason/Outcome: Continuity of Care, Emergency Contact, Discharge Planning     CM introduced self and role to patient and SO, Marty at bedside.  Marty provided information to CM.  Patient lives with SO in their trailer [place] [facility] with two steps to enter the one level living home.  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  No hx of STR/OP/HHC.  Hx of CC and ETOH, sober for over 20 years.  Hx of MI.  CM to follow for DCP needs.  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  CM to follow with DCP needs.

## 2025-07-08 NOTE — RESPIRATORY THERAPY NOTE
RT Ventilator Management Note  Nathalia Monge 64 y.o. female MRN: [id number]  [facility]: [id number]      Daily Screen         7/8/2025  1025             Patient safety screen outcome:: Failed (P)     Not Ready for Weaning due to:: Underline problem not resolved (P)               Physical Exam:   Assessment Type: (P) Assess only  General Appearance: (P) Sedated  Respiratory Pattern: (P) Assisted  Chest Assessment: (P) Chest expansion symmetrical      Resp Comments: (P) pt arrived from or placed on vent pt will possible going back to or later today

## 2025-07-08 NOTE — RESPIRATORY THERAPY NOTE
"RT Protocol Note  Nathalia Monge 64 y.o. female MRN: [id number]  Unit/Bed#: [facility]-307-01 Encounter: [id number]    Assessment    Principal Problem:    CAD (coronary artery disease)  Active Problems:    Type 2 diabetes mellitus with hyperglycemia, with long-term current use of insulin ([facility])    Obesity (BMI 30.0-34.9)    Mixed hyperlipidemia    Degenerative disc disease, thoracic    Chronic heart failure with reduced ejection fraction (HFrEF, <= 40%) ([facility])    S/P CABG (coronary artery bypass graft)      Home Pulmonary Medications:  none       Past Medical History[1]  Social History[2]    Subjective         Objective    Physical Exam:   Assessment Type: Assess only  General Appearance: Sedated  Respiratory Pattern: Assisted  Chest Assessment: Chest expansion symmetrical    Vitals:  Blood pressure (!) 198/106, pulse 64, temperature (!) 96.8 °F (36 °C), resp. rate 18, height 5' 5.5\" (1.664 m), weight 88.5 kg (195 lb 3.2 oz), SpO2 100%.          Imaging and other studies: Results Review Statement: No pertinent imaging studies reviewed.          Plan    Respiratory Plan: Vent/NIV/HFNC        Resp Comments: (P) pt ordered on protocol for vent managment. pt has no pulmonary hx and does not take any respiraotry meds at home. pt is for CABG today. pt to remain on protocol for vent management per protocol        [1]   Past Medical History:  Diagnosis Date    Bacteremia due to Klebsiella pneumoniae 03/06/2024    Bipolar depression ([facility])     CAD (coronary artery disease)     Chronic heart failure with reduced ejection fraction (HFrEF, <= 40%) ([facility])     EF 30%    Chronic heart failure with reduced ejection fraction (HFrEF, <= 40%) ([facility]) 03/19/2025    CKD (chronic kidney disease), stage III ([facility])     DDD (degenerative disc disease), thoracic     Depression     Diabetes mellitus ([facility])     type 2, insulin dependent    Epilepsy ([facility])     no maintaince medications, experiences petite seizures to right eye multiple times throughout the day, " does not drive due to this    Former tobacco use     GERD (gastroesophageal reflux disease)     History of Clostridioides difficile infection     History of pyelonephritis     w/ renal abscess    Hyperlipidemia     Hypertension     Hypertensive urgency 2018    ERNESTO (iron deficiency anemia)     Meningioma (HCC)     s/p surgical resection    Obesity     Prolonged QT interval     PTSD (post-traumatic stress disorder)     Telangiectasias     b/l LE    Vitamin D deficiency    [2]   Social History  Socioeconomic History    Marital status: Unknown     Spouse name: Marty    Number of children: 4    Years of education: Some college   Occupational History    Occupation: disabled     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   Tobacco Use    Smoking status: Former     Current packs/day: 0.00     Types: Cigarettes     Quit date: 3/5/2000     Years since quittin.3    Smokeless tobacco: Never    Tobacco comments:     Social use up to    Vaping Use    Vaping status: Never Used   Substance and Sexual Activity    Alcohol use: Never    Drug use: No    Sexual activity: Not Currently     Partners: Male     Birth control/protection: Post-menopausal     Comment: Everything has been removed and haven't had sex since    Social History Narrative    Lives at home with significant other, Marty     Social Drivers of Health     Financial Resource Strain: Low Risk  (1/15/2024)    Overall Financial Resource Strain (CARDIA)     Difficulty of Paying Living Expenses: Not hard at all   Food Insecurity: No Food Insecurity (2025)    Nursing - Inadequate Food Risk Classification     Worried About Running Out of Food in the Last Year: Never true     Ran Out of Food in the Last Year: Never true     Ran Out of Food in the Last Year: Never true   Transportation Needs: No Transportation Needs (2025)    Nursing - Transportation Risk Classification     Lack of Transportation: No   Intimate Partner Violence: Unknown (2025)     Nursing IPS     Physically Hurt by Someone: No     Hurt or Threatened by Someone: No   Housing Stability: Unknown (7/8/2025)    Nursing: Inadequate Housing Risk Classification     Unable to Pay for Housing in the Last Year: No     Has Housing: No

## 2025-07-08 NOTE — ANESTHESIA PROCEDURE NOTES
Introducer/[place]-Prashant    Performed by: Emeterio Medrano MD  Authorized by: Emeterio Medrano MD    Date/Time: 7/8/2025 8:06 AM  Consent: Verbal consent obtained. Written consent obtained  Risks and benefits: risks, benefits and alternatives were discussed  Consent given by: patient  Patient understanding: patient states understanding of the procedure being performed  Patient consent: the patient's understanding of the procedure matches consent given  Procedure consent: procedure consent matches procedure scheduled  Relevant documents: relevant documents present and verified  Required items: required blood products, implants, devices, and special equipment available  Patient identity confirmed: arm band  Indications: vascular access and central pressure monitoring  Location details: right internal jugular  Catheter size: 9 Fr  Patient position: Trendelenburg  Anesthesia method: ga.  Assessment: blood return through all ports and free fluid flow  Preparation: skin prepped with ChloraPrep  Skin prep agent dried: skin prep agent completely dried prior to procedure  Sterile barriers: all five maximum sterile barriers used - cap, mask, sterile gown, sterile gloves, and large sterile sheet  Hand hygiene: hand hygiene performed prior to central venous catheter insertion  Ultrasound guidance: yes  ultrasound permanent image saved  Site selection rationale: cabg  Number of attempts: 1  Successful placement: yes  Post-procedure: line sutured and dressing applied  Patient tolerance: patient tolerated the procedure well with no immediate complications

## 2025-07-08 NOTE — SEPSIS NOTE
Sepsis Note   Nathalia Monge 64 y.o. female MRN: [id number]  Unit/Bed#: OR POOL Encounter: [id number]         Initial Sepsis Screening       Row Name 07/08/25 1904                   Initial Sepsis Assessment    Is the patient's history suggestive of a new or worsening infection? No  -CF                  User Key  (r) = Recorded By, (t) = Taken By, (c) = Cosigned By      Initials Name Provider Type    CF Jacquelyn Pitt PA-C Physician Assistant                         Body mass index is 31.99 kg/m².  Wt Readings from Last 1 Encounters:   07/08/25 88.5 kg (195 lb 3.2 oz)     IBW (Ideal Body Weight): 58.15 kg    Ideal body weight: 58.2 kg (128 lb 3.2 oz)  Adjusted ideal body weight: 70.3 kg (155 lb)    Triggered sepsis alert for invasive mechanical ventilation and hypothermia. Patient intubated for elective CABG, frank-op abx ordered for procedure. Doubt infection at this present time, no signs of sepsis.

## 2025-07-09 ENCOUNTER — APPOINTMENT (INPATIENT)
Dept: RADIOLOGY | Facility: HOSPITAL | Age: 65
DRG: 235 | End: 2025-07-09
Payer: COMMERCIAL

## 2025-07-09 LAB
ABO GROUP BLD BPU: NORMAL
ANION GAP SERPL CALCULATED.3IONS-SCNC: 11 MMOL/L (ref 4–13)
ATRIAL RATE: 73 BPM
ATRIAL RATE: 78 BPM
BASE EX.OXY STD BLDV CALC-SCNC: 73 % (ref 60–80)
BASE EX.OXY STD BLDV CALC-SCNC: 74 % (ref 60–80)
BASE EXCESS BLDA CALC-SCNC: -7.5 MMOL/L
BASE EXCESS BLDV CALC-SCNC: -2.9 MMOL/L
BASE EXCESS BLDV CALC-SCNC: -7 MMOL/L
BODY TEMPERATURE: 98.8 DEGREES FEHRENHEIT
BODY TEMPERATURE: 99 DEGREES FEHRENHEIT
BPU ID: NORMAL
BUN SERPL-MCNC: 26 MG/DL (ref 5–25)
CALCIUM SERPL-MCNC: 8 MG/DL (ref 8.4–10.2)
CHLORIDE SERPL-SCNC: 111 MMOL/L (ref 96–108)
CO2 SERPL-SCNC: 20 MMOL/L (ref 21–32)
CREAT SERPL-MCNC: 1.45 MG/DL (ref 0.6–1.3)
CROSSMATCH: NORMAL
ERYTHROCYTE [DISTWIDTH] IN BLOOD BY AUTOMATED COUNT: 13.8 % (ref 11.6–15.1)
GFR SERPL CREATININE-BSD FRML MDRD: 38 ML/MIN/1.73SQ M
GLUCOSE SERPL-MCNC: 133 MG/DL (ref 65–140)
GLUCOSE SERPL-MCNC: 139 MG/DL (ref 65–140)
GLUCOSE SERPL-MCNC: 143 MG/DL (ref 65–140)
GLUCOSE SERPL-MCNC: 148 MG/DL (ref 65–140)
GLUCOSE SERPL-MCNC: 150 MG/DL (ref 65–140)
GLUCOSE SERPL-MCNC: 157 MG/DL (ref 65–140)
GLUCOSE SERPL-MCNC: 157 MG/DL (ref 65–140)
GLUCOSE SERPL-MCNC: 168 MG/DL (ref 65–140)
GLUCOSE SERPL-MCNC: 184 MG/DL (ref 65–140)
GLUCOSE SERPL-MCNC: 185 MG/DL (ref 65–140)
GLUCOSE SERPL-MCNC: 191 MG/DL (ref 65–140)
GLUCOSE SERPL-MCNC: 193 MG/DL (ref 65–140)
GLUCOSE SERPL-MCNC: 211 MG/DL (ref 65–140)
GLUCOSE SERPL-MCNC: 225 MG/DL (ref 65–140)
HCO3 BLDA-SCNC: 19.1 MMOL/L (ref 22–28)
HCO3 BLDV-SCNC: 20.1 MMOL/L (ref 24–30)
HCO3 BLDV-SCNC: 22.1 MMOL/L (ref 24–30)
HCT VFR BLD AUTO: 33.5 % (ref 34.8–46.1)
HCT VFR BLD AUTO: 33.6 % (ref 34.8–46.1)
HGB BLD-MCNC: 11 G/DL (ref 11.5–15.4)
HGB BLD-MCNC: 11.1 G/DL (ref 11.5–15.4)
HOROWITZ INDEX BLDA+IHG-RTO: 50 MM[HG]
HOROWITZ INDEX BLDA+IHG-RTO: 50 MM[HG]
MAGNESIUM SERPL-MCNC: 2.6 MG/DL (ref 1.9–2.7)
MCH RBC QN AUTO: 30 PG (ref 26.8–34.3)
MCHC RBC AUTO-ENTMCNC: 33.1 G/DL (ref 31.4–37.4)
MCV RBC AUTO: 91 FL (ref 82–98)
O2 CT BLDA-SCNC: 16.8 ML/DL (ref 16–23)
O2 CT BLDV-SCNC: 11.3 ML/DL
O2 CT BLDV-SCNC: 12.5 ML/DL
OXYHGB MFR BLDA: 98.3 % (ref 94–97)
P AXIS: 56 DEGREES
P AXIS: 59 DEGREES
PCO2 BLD: 46.9 MM HG (ref 42–50)
PCO2 BLDA: 42.8 MM HG (ref 36–44)
PCO2 BLDV: 39.2 MM HG (ref 42–50)
PCO2 BLDV: 46.7 MM HG (ref 42–50)
PCO2 TEMP ADJ BLDA: 43.2 MM HG (ref 36–44)
PEEP RESPIRATORY: 8 CM[H2O]
PEEP RESPIRATORY: 8 CM[H2O]
PH BLD: 7.25 [PH] (ref 7.3–7.4)
PH BLD: 7.26 [PH] (ref 7.35–7.45)
PH BLDA: 7.27 [PH] (ref 7.35–7.45)
PH BLDV: 7.25 [PH] (ref 7.3–7.4)
PH BLDV: 7.37 [PH] (ref 7.3–7.4)
PLATELET # BLD AUTO: 180 THOUSANDS/UL (ref 149–390)
PMV BLD AUTO: 10.6 FL (ref 8.9–12.7)
PO2 BLD: 173.4 MM HG (ref 75–129)
PO2 BLDA: 172.3 MM HG (ref 75–129)
PO2 BLDV: 37 MM HG (ref 35–45)
PO2 BLDV: 42.4 MM HG (ref 35–45)
PO2 VENOUS TEMP CORRECTED: 42.7 MM HG (ref 35–45)
POTASSIUM SERPL-SCNC: 4.2 MMOL/L (ref 3.5–5.3)
POTASSIUM SERPL-SCNC: 4.3 MMOL/L (ref 3.5–5.3)
PR INTERVAL: 162 MS
PR INTERVAL: 162 MS
QRS AXIS: 62 DEGREES
QRS AXIS: 71 DEGREES
QRSD INTERVAL: 94 MS
QRSD INTERVAL: 96 MS
QT INTERVAL: 490 MS
QT INTERVAL: 496 MS
QTC INTERVAL: 539 MS
QTC INTERVAL: 565 MS
RBC # BLD AUTO: 3.7 MILLION/UL (ref 3.81–5.12)
SODIUM SERPL-SCNC: 142 MMOL/L (ref 135–147)
SPECIMEN SOURCE: ABNORMAL
T WAVE AXIS: 37 DEGREES
T WAVE AXIS: 47 DEGREES
UNIT DISPENSE STATUS: NORMAL
UNIT PRODUCT CODE: NORMAL
UNIT PRODUCT VOLUME: 350 ML
UNIT RH: NORMAL
VENT AC: 14
VENT AC: 14
VENT- AC: AC
VENT- AC: AC
VENTRICULAR RATE: 73 BPM
VENTRICULAR RATE: 78 BPM
VT SETTING VENT: 400 ML
VT SETTING VENT: 400 ML
WBC # BLD AUTO: 17.47 THOUSAND/UL (ref 4.31–10.16)

## 2025-07-09 PROCEDURE — 82805 BLOOD GASES W/O2 SATURATION: CPT

## 2025-07-09 PROCEDURE — 82805 BLOOD GASES W/O2 SATURATION: CPT | Performed by: PHYSICIAN ASSISTANT

## 2025-07-09 PROCEDURE — 99024 POSTOP FOLLOW-UP VISIT: CPT | Performed by: THORACIC SURGERY (CARDIOTHORACIC VASCULAR SURGERY)

## 2025-07-09 PROCEDURE — 71045 X-RAY EXAM CHEST 1 VIEW: CPT

## 2025-07-09 PROCEDURE — 93010 ELECTROCARDIOGRAM REPORT: CPT | Performed by: STUDENT IN AN ORGANIZED HEALTH CARE EDUCATION/TRAINING PROGRAM

## 2025-07-09 PROCEDURE — 80048 BASIC METABOLIC PNL TOTAL CA: CPT | Performed by: PHYSICIAN ASSISTANT

## 2025-07-09 PROCEDURE — 85014 HEMATOCRIT: CPT | Performed by: PHYSICIAN ASSISTANT

## 2025-07-09 PROCEDURE — 83735 ASSAY OF MAGNESIUM: CPT | Performed by: PHYSICIAN ASSISTANT

## 2025-07-09 PROCEDURE — 99222 1ST HOSP IP/OBS MODERATE 55: CPT | Performed by: INTERNAL MEDICINE

## 2025-07-09 PROCEDURE — 99222 1ST HOSP IP/OBS MODERATE 55: CPT | Performed by: STUDENT IN AN ORGANIZED HEALTH CARE EDUCATION/TRAINING PROGRAM

## 2025-07-09 PROCEDURE — 94760 N-INVAS EAR/PLS OXIMETRY 1: CPT

## 2025-07-09 PROCEDURE — 84132 ASSAY OF SERUM POTASSIUM: CPT | Performed by: PHYSICIAN ASSISTANT

## 2025-07-09 PROCEDURE — 99233 SBSQ HOSP IP/OBS HIGH 50: CPT | Performed by: STUDENT IN AN ORGANIZED HEALTH CARE EDUCATION/TRAINING PROGRAM

## 2025-07-09 PROCEDURE — 82948 REAGENT STRIP/BLOOD GLUCOSE: CPT

## 2025-07-09 PROCEDURE — 85027 COMPLETE CBC AUTOMATED: CPT | Performed by: PHYSICIAN ASSISTANT

## 2025-07-09 PROCEDURE — 94003 VENT MGMT INPAT SUBQ DAY: CPT

## 2025-07-09 PROCEDURE — 93005 ELECTROCARDIOGRAM TRACING: CPT

## 2025-07-09 PROCEDURE — 85018 HEMOGLOBIN: CPT | Performed by: PHYSICIAN ASSISTANT

## 2025-07-09 RX ORDER — LIDOCAINE HYDROCHLORIDE 10 MG/ML
INJECTION, SOLUTION EPIDURAL; INFILTRATION; INTRACAUDAL; PERINEURAL
Status: DISCONTINUED
Start: 2025-07-09 | End: 2025-07-09 | Stop reason: WASHOUT

## 2025-07-09 RX ORDER — ALBUMIN HUMAN 50 G/1000ML
12.5 SOLUTION INTRAVENOUS ONCE
Status: COMPLETED | OUTPATIENT
Start: 2025-07-09 | End: 2025-07-09

## 2025-07-09 RX ORDER — DOCUSATE SODIUM 100 MG/1
100 CAPSULE, LIQUID FILLED ORAL 2 TIMES DAILY
Status: DISCONTINUED | OUTPATIENT
Start: 2025-07-09 | End: 2025-07-14 | Stop reason: HOSPADM

## 2025-07-09 RX ORDER — FUROSEMIDE 10 MG/ML
40 INJECTION INTRAMUSCULAR; INTRAVENOUS ONCE
Status: COMPLETED | OUTPATIENT
Start: 2025-07-09 | End: 2025-07-09

## 2025-07-09 RX ORDER — TEMAZEPAM 15 MG/1
15 CAPSULE ORAL
Status: DISCONTINUED | OUTPATIENT
Start: 2025-07-09 | End: 2025-07-14 | Stop reason: HOSPADM

## 2025-07-09 RX ORDER — MILRINONE LACTATE 0.2 MG/ML
0.13 INJECTION, SOLUTION INTRAVENOUS CONTINUOUS
Status: DISCONTINUED | OUTPATIENT
Start: 2025-07-09 | End: 2025-07-11

## 2025-07-09 RX ORDER — FENTANYL CITRATE 50 UG/ML
50 INJECTION, SOLUTION INTRAMUSCULAR; INTRAVENOUS ONCE
Status: COMPLETED | OUTPATIENT
Start: 2025-07-09 | End: 2025-07-09

## 2025-07-09 RX ORDER — DEXAMETHASONE SODIUM PHOSPHATE 10 MG/ML
6 INJECTION, SOLUTION INTRAMUSCULAR; INTRAVENOUS EVERY 6 HOURS SCHEDULED
Status: COMPLETED | OUTPATIENT
Start: 2025-07-09 | End: 2025-07-09

## 2025-07-09 RX ADMIN — ACETAMINOPHEN 975 MG: 325 TABLET ORAL at 00:10

## 2025-07-09 RX ADMIN — HEPARIN SODIUM 5000 UNITS: 5000 INJECTION INTRAVENOUS; SUBCUTANEOUS at 05:10

## 2025-07-09 RX ADMIN — POTASSIUM CHLORIDE 40 MEQ: 29.8 INJECTION, SOLUTION INTRAVENOUS at 01:07

## 2025-07-09 RX ADMIN — FENTANYL CITRATE 50 MCG: 50 INJECTION INTRAMUSCULAR; INTRAVENOUS at 02:59

## 2025-07-09 RX ADMIN — CHLORHEXIDINE GLUCONATE 15 ML: 1.2 SOLUTION ORAL at 19:31

## 2025-07-09 RX ADMIN — AMIODARONE HYDROCHLORIDE 200 MG: 200 TABLET ORAL at 05:10

## 2025-07-09 RX ADMIN — ACETAMINOPHEN 975 MG: 325 TABLET ORAL at 13:01

## 2025-07-09 RX ADMIN — CHLORHEXIDINE GLUCONATE 15 ML: 1.2 SOLUTION ORAL at 00:10

## 2025-07-09 RX ADMIN — FENTANYL CITRATE 50 MCG: 50 INJECTION INTRAMUSCULAR; INTRAVENOUS at 04:32

## 2025-07-09 RX ADMIN — ACETAMINOPHEN 975 MG: 325 TABLET ORAL at 19:25

## 2025-07-09 RX ADMIN — OXYCODONE HYDROCHLORIDE 5 MG: 5 TABLET ORAL at 13:01

## 2025-07-09 RX ADMIN — NICARDIPINE HYDROCHLORIDE 15 MG/HR: 25 INJECTION, SOLUTION INTRAVENOUS at 20:41

## 2025-07-09 RX ADMIN — FENTANYL CITRATE 50 MCG: 50 INJECTION INTRAMUSCULAR; INTRAVENOUS at 06:40

## 2025-07-09 RX ADMIN — DEXMEDETOMIDINE HYDROCHLORIDE 0.7 MCG/KG/HR: 400 INJECTION INTRAVENOUS at 06:00

## 2025-07-09 RX ADMIN — NICARDIPINE HYDROCHLORIDE 15 MG/HR: 25 INJECTION, SOLUTION INTRAVENOUS at 18:59

## 2025-07-09 RX ADMIN — ALBUMIN (HUMAN) 12.5 G: 12.5 INJECTION, SOLUTION INTRAVENOUS at 03:20

## 2025-07-09 RX ADMIN — DEXAMETHASONE SODIUM PHOSPHATE 6 MG: 10 INJECTION, SOLUTION INTRAMUSCULAR; INTRAVENOUS at 16:37

## 2025-07-09 RX ADMIN — FUROSEMIDE 40 MG: 10 INJECTION, SOLUTION INTRAMUSCULAR; INTRAVENOUS at 08:23

## 2025-07-09 RX ADMIN — HEPARIN SODIUM 5000 UNITS: 5000 INJECTION INTRAVENOUS; SUBCUTANEOUS at 21:07

## 2025-07-09 RX ADMIN — Medication 250 MG: at 08:10

## 2025-07-09 RX ADMIN — DEXAMETHASONE SODIUM PHOSPHATE 6 MG: 10 INJECTION, SOLUTION INTRAMUSCULAR; INTRAVENOUS at 21:07

## 2025-07-09 RX ADMIN — Medication 250 MG: at 17:25

## 2025-07-09 RX ADMIN — FUROSEMIDE 40 MG: 10 INJECTION, SOLUTION INTRAMUSCULAR; INTRAVENOUS at 16:37

## 2025-07-09 RX ADMIN — ALBUMIN (HUMAN) 12.5 G: 12.5 INJECTION, SOLUTION INTRAVENOUS at 02:42

## 2025-07-09 RX ADMIN — FENTANYL CITRATE 50 MCG: 50 INJECTION INTRAMUSCULAR; INTRAVENOUS at 01:02

## 2025-07-09 RX ADMIN — CEFAZOLIN SODIUM 2000 MG: 2 SOLUTION INTRAVENOUS at 13:01

## 2025-07-09 RX ADMIN — FERROUS SULFATE TAB 325 MG (65 MG ELEMENTAL FE) 325 MG: 325 (65 FE) TAB at 08:10

## 2025-07-09 RX ADMIN — CEFAZOLIN SODIUM 2000 MG: 2 SOLUTION INTRAVENOUS at 20:49

## 2025-07-09 RX ADMIN — MUPIROCIN 1 APPLICATION: 20 OINTMENT TOPICAL at 08:12

## 2025-07-09 RX ADMIN — TEMAZEPAM 15 MG: 15 CAPSULE ORAL at 21:07

## 2025-07-09 RX ADMIN — CEFAZOLIN SODIUM 2000 MG: 2 SOLUTION INTRAVENOUS at 04:07

## 2025-07-09 RX ADMIN — OXYCODONE HYDROCHLORIDE 5 MG: 5 TABLET ORAL at 08:11

## 2025-07-09 RX ADMIN — HYDROMORPHONE HYDROCHLORIDE 0.5 MG: 1 INJECTION, SOLUTION INTRAMUSCULAR; INTRAVENOUS; SUBCUTANEOUS at 23:53

## 2025-07-09 RX ADMIN — MILRINONE LACTATE IN DEXTROSE 0.13 MCG/KG/MIN: 200 INJECTION, SOLUTION INTRAVENOUS at 17:23

## 2025-07-09 RX ADMIN — OXYCODONE HYDROCHLORIDE 5 MG: 5 TABLET ORAL at 17:25

## 2025-07-09 RX ADMIN — PANTOPRAZOLE SODIUM 40 MG: 40 TABLET, DELAYED RELEASE ORAL at 05:10

## 2025-07-09 RX ADMIN — FENTANYL CITRATE 50 MCG: 50 INJECTION INTRAMUSCULAR; INTRAVENOUS at 04:07

## 2025-07-09 RX ADMIN — MUPIROCIN 1 APPLICATION: 20 OINTMENT TOPICAL at 00:10

## 2025-07-09 RX ADMIN — FENTANYL CITRATE 50 MCG: 50 INJECTION INTRAMUSCULAR; INTRAVENOUS at 05:57

## 2025-07-09 RX ADMIN — HYDROMORPHONE HYDROCHLORIDE 0.5 MG: 1 INJECTION, SOLUTION INTRAMUSCULAR; INTRAVENOUS; SUBCUTANEOUS at 20:39

## 2025-07-09 RX ADMIN — HEPARIN SODIUM 5000 UNITS: 5000 INJECTION INTRAVENOUS; SUBCUTANEOUS at 13:01

## 2025-07-09 RX ADMIN — POLYETHYLENE GLYCOL 3350 17 G: 17 POWDER, FOR SOLUTION ORAL at 08:11

## 2025-07-09 RX ADMIN — ACETAMINOPHEN 975 MG: 325 TABLET ORAL at 08:10

## 2025-07-09 RX ADMIN — DOCUSATE SODIUM 100 MG: 100 CAPSULE, LIQUID FILLED ORAL at 17:25

## 2025-07-09 RX ADMIN — SODIUM CHLORIDE 2 UNITS/HR: 9 INJECTION, SOLUTION INTRAVENOUS at 11:24

## 2025-07-09 RX ADMIN — DEXAMETHASONE SODIUM PHOSPHATE 6 MG: 10 INJECTION, SOLUTION INTRAMUSCULAR; INTRAVENOUS at 09:15

## 2025-07-09 RX ADMIN — NICARDIPINE HYDROCHLORIDE 15 MG/HR: 25 INJECTION, SOLUTION INTRAVENOUS at 16:50

## 2025-07-09 RX ADMIN — DEXAMETHASONE SODIUM PHOSPHATE 6 MG: 10 INJECTION, SOLUTION INTRAMUSCULAR; INTRAVENOUS at 03:21

## 2025-07-09 RX ADMIN — MUPIROCIN 1 APPLICATION: 20 OINTMENT TOPICAL at 20:48

## 2025-07-09 RX ADMIN — ALBUMIN (HUMAN) 12.5 G: 12.5 INJECTION, SOLUTION INTRAVENOUS at 04:07

## 2025-07-09 RX ADMIN — CHLORHEXIDINE GLUCONATE 15 ML: 1.2 SOLUTION ORAL at 08:10

## 2025-07-09 RX ADMIN — ASPIRIN 325 MG ORAL TABLET 325 MG: 325 PILL ORAL at 08:12

## 2025-07-09 NOTE — ASSESSMENT & PLAN NOTE
Lab Results   Component Value Date    HGBA1C 7.4 (H) 03/18/2025     Recent Labs     07/09/25  0224 07/09/25  0435 07/09/25  0604 07/09/25  0805   POCGLU 211* 193* 185* 157*     Blood Sugar Average: Last 72 hrs:  (P) 165  2 diabetes mellitus with hyperglycemia as evidenced by A1c of 7.4%  Home regimen:  Current regimen: Insulin infusion  BG reviewed-slightly above target range while on the infusion.  Suspect this is likely because she is on epinephrine infusion.    Plan:  Continue insulin infusion at this time  Will reassess patient tomorrow with plan to hopefully transition to basal bolus regimen.  Continue Accu-Cheks per infusion protocol  Monitor for hypoglycemia, treat per protocol  Goal blood glucose while in the [id number] mg/dL  Discharge recommendations pending clinical course-the patient discharged on insulin.  Given HFrEF and microalbuminuria she will likely also benefit from addition of SGLT2 inhibitor.  Endocrinology will continue following

## 2025-07-09 NOTE — UTILIZATION REVIEW
Initial Clinical Review    Elective Inpatient surgical procedure  Age/Sex: 64 y.o. female  Surgery Date: 7/8/25  Procedure: Coronary artery bypass grafting x 3 with left internal mammary artery to left anterior descending artery, saphenous vein graft to ramus intermedius 1, and saphenous vein graft to ramus intermedius 2   Anesthesia: General endotracheal anesthesia with transesophageal echocardiogram  guidance  Operative Findings: 1. Intraoperative transesophageal echocardiogram revealed EF 40%, mild MR.  2. Epiaortic ultrasound showed less than 5 mm non-mobile plaque  3. Coronary anatomy as described in coronary angiography, the distal PDA was a small vessel non amenable for bypass  4. Final transesophageal echocardiogram demonstrated improved ventricular function.      POD#1 Progress Note:   Arrived to ICU supported on Epi 4 Levo 4. Levo weaned off. Received total of 500 LR and 500 albumin for low CI with high SVR with appropriate response.  Remained intubated secondary to difficult airway.   Plan:  DC [place].  Continue chest tubes, pacing wires.  Wean to extubate  Ambulate.  Diuresis.  PO ASA/B blocker.  Statin intolerance    Admission Orders: Date/Time/Statement:   Admission Orders (From admission, onward)       Ordered        07/08/25 0717  Inpatient Admission  Once                          Orders Placed This Encounter   Procedures    Inpatient Admission     Standing Status:   Standing     Number of Occurrences:   1     Level of Care:   Critical Care [15]     Estimated length of stay:   More than 2 Midnights     Certification:   I certify that inpatient services are medically necessary for this patient for a duration of greater than two midnights. See H&P and MD Progress Notes for additional information about the patient's course of treatment.     Diet: Cardiac; Fluid Restriction 1800 ML, Consistent Carbohydrate Diet Level 2   Mobility: OOB and ambulatory  DVT Prophylaxis: heparin, scd,  ambulation    Medications/Pain Control:   Scheduled Medications:  acetaminophen, 975 mg, Oral, Q6H While awake  [Held by provider] amiodarone, 200 mg, Oral, Q8H DANTE  aspirin, 325 mg, Oral, Daily  cefazolin, 2,000 mg, Intravenous, Q8H  chlorhexidine, 15 mL, Mouth/Throat, BID  dexamethasone, 6 mg, Intravenous, Q6H DANTE  docusate sodium, 100 mg, Oral, BID  ferrous sulfate, 325 mg, Oral, Daily  heparin (porcine), 5,000 Units, Subcutaneous, Q8H DANTE  mupirocin, 1 Application, Nasal, Q12H DANTE  pantoprazole, 40 mg, Oral, Daily  polyethylene glycol, 17 g, Oral, Daily  saccharomyces boulardii, 250 mg, Oral, BID      Continuous IV Infusions:  dexmedetomidine, 0.1-0.7 mcg/kg/hr, Intravenous, Titrated  epinephrine, 1-10 mcg/min, Intravenous, Titrated  insulin regular (HumuLIN R,NovoLIN R) 1 Units/mL in sodium chloride 0.9 % 100 mL infusion, 0.3-21 Units/hr, Intravenous, Titrated  niCARdipine, 2.5-15 mg/hr, Intravenous, Titrated  norepinephrine, 1-30 mcg/min, Intravenous, Titrated  sodium chloride, 20 mL/hr, Intravenous, Continuous      PRN Meds:  bisacodyl, 10 mg, Rectal, Daily PRN  fentaNYL, 50 mcg, Intravenous, Q1H PRN  HYDROmorphone, 0.5 mg, Intravenous, Q2H PRN  [Held by provider] ondansetron, 4 mg, Intravenous, Q6H PRN  oxyCODONE, 2.5 mg, Oral, Q4H PRN   Or  oxyCODONE, 5 mg, Oral, Q4H PRN  temazepam, 15 mg, Oral, HS PRN      Vital Signs (last 3 days)       Date/Time Temp Pulse Resp BP Arterial Line BP MAP SpO2 FiO2 (%) O2 Device CO CI CVP (mean) Cameron Coma Scale Score Pain    07/09/25 1000 98.8 °F (37.1 °C) 70 15 -- 126/52 72 mmHg 98 % -- Nasal cannula 4.6 L/min 2.4 L/min/m2 6 mmHg 15 --    07/09/25 0800 98.8 °F (37.1 °C) 71 14 -- 111/47 63 mmHg 99 % -- Ventilator 4 L/min 2.1 L/min/m2 8 mmHg 10 --    07/09/25 0600 99 °F (37.2 °C) 69 14 -- 110/48 65 mmHg 100 % 50 Ventilator 4.4 L/min 2.2 L/min/m2 10 mmHg -- --    07/09/25 0500 99 °F (37.2 °C) 73 16 -- 117/51 69 mmHg 99 % 50 Ventilator 4.2 L/min 2.1 L/min/m2 10 mmHg -- --     07/09/25 0400 99.1 °F (37.3 °C) 73 17 -- 112/52 68 mmHg 100 % 50 Ventilator 4.2 L/min 2.1 L/min/m2 9 mmHg 10 --    07/09/25 0300 99.1 °F (37.3 °C) 78 26 -- 122/60 78 mmHg 100 % 50 Ventilator 4.1 L/min 2.1 L/min/m2 11 mmHg -- --    07/09/25 0200 99.1 °F (37.3 °C) 78 18 -- 93/46 58 mmHg 99 % 60 Ventilator 3.5 L/min 1.8 L/min/m2 8 mmHg -- --    07/09/25 0100 98.2 °F (36.8 °C) 94 15 -- 119/55 73 mmHg 100 % 60 Ventilator 4.4 L/min 2.2 L/min/m2 8 mmHg 10 --    07/09/25 0045 -- -- -- -- -- -- -- -- -- -- -- -- 3 --    07/09/25 0030 -- -- -- -- -- -- -- -- -- -- -- -- 3 --    07/09/25 0015 -- -- -- -- -- -- -- -- -- -- -- -- 3 --    07/09/25 0010 -- -- -- -- -- -- -- -- -- -- -- -- -- Med Not Given for Pain - for MAR use only    07/09/25 0000 97.3 °F (36.3 °C) 82 18 -- 111/54 69 mmHg 100 % 60 Ventilator 3.7 L/min 1.9 L/min/m2 10 mmHg 3 --    07/08/25 2345 -- -- -- -- -- -- -- -- -- -- -- -- 3 --    07/08/25 2330 -- -- -- -- -- -- -- -- -- -- -- -- 3 --    07/08/25 2315 -- -- -- -- -- -- -- -- -- -- -- -- 3 --    07/08/25 2300 96.8 °F (36 °C) 86 15 -- 138/57 77 mmHg 100 % 60 Ventilator 3.8 L/min 1.9 L/min/m2 9 mmHg 3 --    07/08/25 2225 -- -- -- -- -- -- 100 % -- -- -- -- -- -- --    07/08/25 1700 98.4 °F (36.9 °C) 76 20 -- 140/55 77 mmHg 99 % -- -- -- -- 14 mmHg -- --    07/08/25 1605 -- 80 15 173/61 174/63 96 mmHg 100 % -- -- -- -- 14 mmHg -- --    07/08/25 1602 -- -- -- -- -- -- -- -- -- -- -- -- -- Med Not Given for Pain - for MAR use only    07/08/25 1500 98.4 °F (36.9 °C) 77 14 -- 167/66 96 mmHg 99 % -- -- -- -- 14 mmHg -- --    07/08/25 1400 -- 75 14 -- 177/70 105 mmHg 99 % -- -- -- -- 13 mmHg -- --    07/08/25 1325 -- -- -- -- -- -- -- -- -- -- -- -- -- Med Not Given for Pain - for MAR use only    07/08/25 1300 -- 61 19 -- 119/51 71 mmHg 99 % -- -- -- -- 14 mmHg -- --    07/08/25 1220 -- -- -- -- -- -- 100 % -- -- -- -- -- -- --    07/08/25 1215 -- -- -- -- -- -- -- -- -- -- -- -- 10 --    07/08/25 1200 -- 67 12 --  150/61 88 mmHg 100 % -- -- -- -- 14 mmHg 3 --    07/08/25 1152 -- -- -- 146/61 -- -- -- -- -- -- -- -- -- --    07/08/25 1145 -- 64 12 -- 144/60 86 mmHg 100 % -- -- -- -- 14 mmHg 3 --    07/08/25 1131 95.5 °F (35.3 °C) 63 12 -- 141/59 84 mmHg 100 % 50 Ventilator 5.4 L/min 2.8 L/min/m2 14 mmHg -- --    07/08/25 1130 -- 63 12 -- 143/60 84 mmHg 100 % -- -- -- -- 14 mmHg 3 --    07/08/25 1115 -- 63 12 -- 151/64 89 mmHg 100 % -- -- -- -- 14 mmHg 3 --    07/08/25 1100 -- 60 12 -- 171/77 109 mmHg 100 % -- -- -- -- 10 mmHg 3 --    07/08/25 1055 -- -- -- -- -- -- -- -- -- -- -- -- -- Med Not Given for Pain - for MAR use only    07/08/25 1025 -- -- -- -- -- -- 100 % -- -- -- -- -- -- --    07/08/25 0624 -- -- -- 198/106 -- -- -- -- -- -- -- -- -- --    07/08/25 0549 96.8 °F (36 °C) 64 18 214/112 -- -- 97 % -- -- -- -- -- -- No Pain          Weight (last 2 days)       Date/Time Weight    07/09/25 0549 94.8 (209)    07/08/25 0549 88.5 (195.2)            Pertinent Labs/Diagnostic Test Results:   Radiology:  XR chest portable   Final Interpretation by Lisette Delatorre MD (07/09 0920)      Expected appearance after cardiac surgery with no acute disease.            Workstation performed: [id number]         XR chest portable    (Results Pending)   XR chest portable    (Results Pending)     Cardiology:  RAFAEL intraop interventional w/realtime guidance of cardiac procedures   Final Result by SYSTEMGENERATED, DOCUMENTATION (07/08 1042)   This order contains the linked images for the RAFAEL that was performed by    the Anesthesiologist.  Please see the  CARDIAC RAFAEL ANESTHESIA procedure    for results.      ECG 12 lead   Final Result by Abdelrahman Monroy MD (07/08 5050)   Normal sinus rhythm   Nonspecific T wave abnormality   Prolonged QT   Abnormal ECG   When compared with ECG of 05-May-2025 07:11,   Nonspecific T wave abnormality now evident in Inferior leads   Nonspecific T wave abnormality, worse in Anterolateral leads   Confirmed by  Abdelrahman Monroy ([id number]) on 7/8/2025 10:13:32 PM        GI:  No orders to display           Results from last 7 days   Lab Units 07/09/25 0437 07/08/25 2232 07/08/25 2231 07/08/25 2127 07/08/25 2118 07/08/25 2106   WBC Thousand/uL 17.47*  --   --   --   --   --    HEMOGLOBIN g/dL 11.0*  11.1*  --  12.0  --   --   --    I STAT HEMOGLOBIN g/dl  --  11.2*  --  8.2*  --  8.8*   HEMATOCRIT % 33.6*  33.5*  --  35.6  --   --   --    HEMATOCRIT, ISTAT %  --  33*  --  24*  --  26*   PLATELETS Thousands/uL 180  --  180  --  164  --          Results from last 7 days   Lab Units 07/09/25 0437 07/09/25 0223 07/08/25 2232 07/08/25 2231 07/08/25 2127 07/08/25 2106 07/08/25 2045 07/08/25 2037   SODIUM mmol/L  --  142  --  143  --   --   --   --    POTASSIUM mmol/L 4.3 4.2  --  2.8*  --   --   --   --    CHLORIDE mmol/L  --  111*  --  112*  --   --   --   --    CO2 mmol/L  --  20*  --  20*  --   --   --   --    CO2, I-STAT mmol/L  --   --  20*  --  21 24 25 26   ANION GAP mmol/L  --  11  --  11  --   --   --   --    BUN mg/dL  --  26*  --  23  --   --   --   --    CREATININE mg/dL  --  1.45*  --  1.30  --   --   --   --    EGFR ml/min/1.73sq m  --  38  --  43  --   --   --   --    CALCIUM mg/dL  --  8.0*  --  7.8*  --   --   --   --    CALCIUM, IONIZED, ISTAT mmol/L  --   --  1.20  --  1.29 1.04* 1.03* 1.02*   MAGNESIUM mg/dL 2.6  --   --   --   --   --   --   --          Results from last 7 days   Lab Units 07/09/25  1007 07/09/25  0805 07/09/25  0604 07/09/25  0435 07/09/25  0224 07/09/25  0005 07/08/25  2230 07/08/25  1611 07/08/25  1156 07/08/25  0614   POC GLUCOSE mg/dl 139 157* 185* 193* 211* 184* 184* 162* 94 115     Results from last 7 days   Lab Units 07/09/25  0223 07/08/25  2231   GLUCOSE RANDOM mg/dL 225* 183*             BETA-HYDROXYBUTYRATE   Date Value Ref Range Status   01/08/2024 0.1 <0.6 mmol/L Final   01/02/2024 0.2 <0.6 mmol/L Final      Results from last 7 days   Lab Units 07/09/25  0436  07/08/25  1139   PH ART  7.267* 7.421   PCO2 ART mm Hg 42.8 37.5   PO2 ART mm Hg 172.3* 175.1*   HCO3 ART mmol/L 19.1* 23.8   BASE EXC ART mmol/L -7.5 -0.4   O2 CONTENT ART mL/dL 16.8 16.4   O2 HGB, ARTERIAL % 98.3* 98.5*   ABG SOURCE  Line, Arterial Line, Arterial     Results from last 7 days   Lab Units 07/09/25  0523   PH HUAN  7.252*   PCO2 HUAN mm Hg 46.7   PO2 HUAN mm Hg 42.4   HCO3 HUAN mmol/L 20.1*   BASE EXC UHAN mmol/L -7.0   O2 CONTENT HUAN ml/dL 12.5   O2 HGB, VENOUS % 74.0     Results from last 7 days   Lab Units 07/08/25  2232 07/08/25 2127 07/08/25  2106 07/08/25  2045 07/08/25  2037 07/08/25  1855 07/08/25  0813   PH, HUAN I-STAT  7.241*  --   --   --  7.391  --  7.335   PCO2, HUAN ISTAT mm HG 43.0  --   --   --  41.1*  --  51.3*   PO2, HUAN ISTAT mm .0*  --   --   --  47.0*  --  35.0   HCO3, HUAN ISTAT mmol/L 18.5*  --   --   --  25.0  --  27.3   I STAT BASE EXC mmol/L -9* -4* -1 0 0   < > 1   I STAT O2 SAT % 100* 99* 100* 100* 82   < > 62   ISTAT PH ART   --  7.378 7.404 7.419  --    < >  --    I STAT ART PCO2 mm HG  --  34.5* 37.3 37.0  --    < >  --    I STAT ART PO2 mm HG  --  150.0* 367.0* 321.0*  --    < >  --    I STAT ART HCO3 mmol/L  --  20.3* 23.3 24.0  --    < >  --     < > = values in this interval not displayed.     Results from last 7 days   Lab Units 07/08/25  2231   PROTIME seconds 16.4*   INR  1.29*   PTT seconds 32     Results from last 7 days   Lab Units 07/09/25  0555   UNIT PRODUCT CODE  [id number]  [id number]  [id number]  [id number]   UNIT NUMBER  [id number]  [id number]  [id number]  [id number]   UNITABO  B  B  B  B   UNITRH  NEG  NEG  NEG  NEG   CROSSMATCH  Compatible  Compatible  Compatible  Compatible   UNIT DISPENSE STATUS  Presumed Trans  Crossmatched  Crossmatched  Crossmatched   UNIT PRODUCT VOL mL 350  350  350  350     Network Utilization Review Department  ATTENTION: Please call with any questions or concerns to [phone] and carefully  listen to the prompts so that you are directed to the right person. All voicemails are confidential.   For Discharge needs, contact Care Management DC Support Team at [phone] opt. 2  Send all requests for admission clinical reviews, approved or denied determinations and any other requests to dedicated fax number below belonging to the campus where the patient is receiving treatment. List of dedicated fax numbers for the Facilities:  FACILITY NAME UR FAX NUMBER   ADMISSION DENIALS (Administrative/Medical Necessity) [phone]   DISCHARGE SUPPORT TEAM (NETWORK) [phone]   PARENT CHILD HEALTH (Maternity/NICU/Pediatrics) [phone]   [facility] [phone]   [facility] [phone]   [facility] [phone]   [facility] [phone]   [facility] [phone]   [facility] [phone]   [facility] [phone]   [facility] [phone]   [facility] [phone]   [facility] [phone]   [facility] [phone]   [facility] [phone]

## 2025-07-09 NOTE — CONSULTS
Consultation - Critical Care/ICU   Name: Nathalia Monge 64 y.o. female I MRN: [id number]  Unit/Bed#: PPHP-307-01 I Date of Admission: 7/8/2025   Date of Service: 7/8/2025 I Hospital Day: 0   Inpatient consult to Medical Critical Care  Consult performed by: Jacquelyn Pitt PA-C  Consult ordered by: Abigail Meyer PA-C        Physician Requesting Evaluation: Abhi Rosales MD   Reason for Evaluation / Principal Problem: s/p cabg    Please contact the SecureChat role for the Critical Care/ICU service with any questions/concerns.    Assessment & Plan   Impressions:  MVCAD s/p CABG x3  ICM EF 45%  HTN  HLD  DM2  Bipolar d/o  Epilepsy - not on AEDs  CKD3    Neuro:   Delirium precautions  Regulate sleep/wake cycle  CAM-ICU daily  Discontinue continuous sedation.   ATC tylenol, PRN oxycodone for pain  Trend neuro exam    CV:   Hemodynamic monitoring goals:  MAP > 65   SBP < 130  CI > 2.2  Continue to monitor and trend cardiac output and filling pressures   Continue pulmonary artery catheter for hemodynamic monitoring   Continue central line due to vasoactive medications  Continue arterial line for blood pressure monitoring and/or frequent ABG's  Current cardiac infusions:    Epinephrine 4 mcg/kg/min  Norepinephrine 4 mcg/min  Wean off as tolerated while meeting hemodynamic goals  Volume resuscitation as needed   Epicardial Pacing Wires:    Epicardial pacing wire plan : Epicardial pacing wires in place. Will pace as needed for bradycardia or cardiac output   Continue telemetry monitoring     Lung:   Check STAT post-op ABG and CXR  Wean vent with spontaneous breathing trial with goal to extubate <24 hours  Continue incentive spirometry  Encourage coughing and deep breathing     GI:   Continue PPI for stress ulcer prophylaxis  Bowel regimen:  Miralax QD  Dulcolax QD PRN  Monitor abdominal exam and bowel function  Zofran PRN for nausea.     FEN:   Nutrition plan: NPO  Replenish electrolytes with goals: Ca >7.0, K >4.0, Mag  >2.0    :   Strict I/O monitoring   Continue to trend renal function tests   Pitt in place.   Goal UOP >0.5cc/kg/hour.  Lasix versus volume resuscitation as needed for low UOP depending on hemodynamics      ID:   Monitor WBC and temps   Maintain normothermia  Prophylactic post-op ABX per primary team    Heme:   Check STAT post-op H/H and platelets.  Transfuse as needed   Monitor incision site, invasive lines, and chest tube outputs for bleeding.   Send coag panel if needed.    Endo:   Insulin gtt for blood sugar control.   Adjust insulin regimen as needed to maintain -180    MSK/Skin:  Early mobility and skin protection:   PT/OT consult as medically appropriate   Frequent turning and pressure off-loading  Local wound care as needed      Disposition: ICU Care     History of Present Illness   HPI: Nathalia Monge is a 64 y.o. female with past hx MVCAD, ICM, HTN, HLD, DM2, bipolar disorder, epilepsy, CKD3 who presents/p CABG. The patient was initially admitted in march for hypertensive urgency and was found to also have b/l pleural effusion treated with b/l thoracentesis. TTE during that admission demonstrated LVEF 30% and G2DD. She was referred to cardiology and cardiac cath completed 5/5 demonstrating MVCAD. She was seen in consultation by Dr. Rosales and recommended to undergo CABG. She presents the ICU s/p CABG x 3 supported on 4 of epi and 4 of levo.    Intra-op RAFAEL LVEF 45%  Post-op medications: Critical Care Infusions : Levophed 4 mcg/min and Epinephrine 4 mcg/min    ROS unable to be obtained due to patient being intubated and sedated.   History obtained from chart review due to patient being intubated and sedated.   Historical Information   Past Medical History:  03/06/2024: Bacteremia due to Klebsiella pneumoniae  No date: Bipolar depression ([facility])  No date: CAD (coronary artery disease)  No date: Chronic heart failure with reduced ejection fraction (HFrEF,   <= 40%) ([facility])      Comment:  EF 30%  03/19/2025:  Chronic heart failure with reduced ejection fraction   (HFrEF, <= 40%) ([facility])  No date: CKD (chronic kidney disease), stage III ([facility])  No date: DDD (degenerative disc disease), thoracic  No date: Depression  No date: Diabetes mellitus ([facility])      Comment:  type 2, insulin dependent  No date: Epilepsy ([facility])      Comment:  no maintaince medications, experiences petite seizures                to right eye multiple times throughout the day, does not                drive due to this  No date: Former tobacco use  No date: GERD (gastroesophageal reflux disease)  No date: History of Clostridioides difficile infection  No date: History of pyelonephritis      Comment:  w/ renal abscess  No date: Hyperlipidemia  No date: Hypertension  03/05/2018: Hypertensive urgency  No date: ERNESTO (iron deficiency anemia)  No date: Meningioma ([facility])      Comment:  s/p surgical resection  No date: Obesity  No date: Prolonged QT interval  No date: PTSD (post-traumatic stress disorder)  No date: Telangiectasias      Comment:  b/l LE  No date: Vitamin D deficiency Past Surgical History:  05/05/2025: CARDIAC CATHETERIZATION; Left      Comment:  Procedure: Cardiac Left Heart Cath;  Surgeon: Latoya Garner DO;  Location: [facility];  Service:                Cardiology  05/05/2025: CARDIAC CATHETERIZATION; N/A      Comment:  Procedure: Cardiac FFR/IFR;  Surgeon: Latoya Garner DO;  Location: [facility];  Service: Cardiology  2004: CHOLECYSTECTOMY  No date: EPIDURAL BLOCK INJECTION      Comment:  thoracic  No date: HYSTERECTOMY  No date: IMPLANTABLE CONTACT LENS IMPLANTATION; Bilateral  03/18/2025: IR THORACENTESIS  03/15/2018: TN CRNEC TREPH BONE FLAP CRNOT EXC BRAIN TUMOR STTL; Right      Comment:  Procedure: IMAGE-GUIDED RIGHT FRONTOPARIETAL CRANIOTOMY                FOR TUMOR;  Surgeon: Arsh Jeronimo MD;  Location: [facility]                MAIN OR;  Service: Neurosurgery   Current Outpatient Medications    Medication Instructions    aspirin 81 mg, Daily    BD Pen Needle Yolis 2nd Gen 32G X 4 MM MISC 3 times daily    Blood Pressure Monitoring (Adult Blood Pressure Cuff Lg) KIT Does not apply, Daily    carvedilol (COREG) 6.25 mg, Oral, 2 times daily with meals    cholecalciferol (VITAMIN D3) 1,000 Units, Daily    CHROMIUM PO 2 times daily    cyclobenzaprine (FLEXERIL) 10 mg, Oral, 3 times daily PRN    ferrous sulfate 325 mg, Daily    furosemide (LASIX) 20 mg, Oral, Daily    insulin aspart protamine-insulin aspart (NovoLOG Mix 70/30 FlexPen) 100 Units/mL injection pen 20 Units, Subcutaneous, 2 times daily before meals    Insulin Pen Needle (BD Pen Needle Yolis 2nd Gen) 32G X 4 MM MISC For use with insulin pen. Pharmacy may dispense brand covered by insurance.    L-LYSINE  mg, Daily    magnesium 30 mg, Daily    mupirocin (BACTROBAN) 2 % ointment 1 Application, Topical, 2 times daily, Apply to each nostril twice daily for five days before your operation.    Probiotic Product (PROBIOTIC DAILY PO) 1 capsule, Daily    zinc gluconate 50 mg, Daily    Allergies[1]   Social History[2] Family History[3]         Objective  :     Vitals: Invasive Monitoring      BP (!) 173/61   Pulse 76   Resp 20   O2 Sat 100 %   O2 Device Ventilator   Temp 98.4 °F (36.9 °C)    Arterial Line  Adriana /55  Arterial Line BP  Min: 119/51  Max: 177/70   MAP 77 mmHg  Arterial Line MAP (mmHg)  Min: 71 mmHg  Max: 109 mmHg     PA Catheter   Most Recent  Min/Max in 24hrs    PAP 38/21 PAP  Min: 27/14  Max: 42/22   CVP 14 mmHg CVP (mean)  Min: 10 mmHg  Max: 14 mmHg   CI 2.8 L/min/m2  CI (L/min/m2)  Min: 2.8 L/min/m2  Max: 2.8 L/min/m2   SVR 1037 (dyne*sec)/cm5 SVR (dyne*sec)/cm5  Min: 1037 (dyne*sec)/cm5  Max: 1037 (dyne*sec)/cm5          Physical Exam   Physical Exam  Vitals and nursing note reviewed.   Eyes:      General: No scleral icterus.     Conjunctiva/sclera: Conjunctivae normal.   Skin:     General: Skin is cool.      Capillary Refill:  Capillary refill takes 2 to 3 seconds.   HENT:      Head: Normocephalic and atraumatic.   Neck:      Vascular: Central line present.   Cardiovascular:      Rate and Rhythm: Normal rate and regular rhythm.      Comments: Chest tubes with minimal output  Midline incision c/d/i  Musculoskeletal:      Right lower leg: No edema.      Left lower leg: No edema.   Abdominal: General: There is no distension.      Palpations: Abdomen is soft.      Tenderness: There is no abdominal tenderness. There is no guarding.   Constitutional:       General: She is not in acute distress.     Appearance: She is well-developed and well-nourished. She is not ill-appearing.      Interventions: She is intubated.   Pulmonary:      Effort: No accessory muscle usage, respiratory distress or accessory muscle usage. She is intubated.      Breath sounds: Normal breath sounds.      Comments: Mechanical breath sounds b/l  Neurological:      Comments: GCS 3T   Genitourinary/Anorectal:  Pitt present.        Diagnostic Studies      07/08/25 CXR: lines and tubes in appropriate position, no large ptx or effusion seen.   This was personally reviewed by myself in PACS.  07/08/25 EKG: NSR.   This was personally reviewed by myself.     Medications:  Scheduled PRN   acetaminophen, 975 mg, Q6H While awake  amiodarone, 200 mg, Q8H DANTE  [START ON 7/9/2025] aspirin, 325 mg, Daily  [START ON 7/9/2025] cefazolin, 2,000 mg, Q8H  chlorhexidine, 15 mL, BID  fentaNYL, 50 mcg, Once  [START ON 7/9/2025] ferrous sulfate, 325 mg, Daily  [START ON 7/9/2025] heparin (porcine), 5,000 Units, Q8H DANTE  magnesium sulfate, 2 g, Once  mupirocin, 1 Application, Q12H DANTE  [START ON 7/9/2025] pantoprazole, 40 mg, Daily  [START ON 7/9/2025] polyethylene glycol, 17 g, Daily  saccharomyces boulardii, 250 mg, BID      acetaminophen, 650 mg, Q4H PRN  bisacodyl, 10 mg, Daily PRN  calcium gluconate, 2 g, Once PRN  fentaNYL, 50 mcg, Q1H PRN  HYDROmorphone, 0.5 mg, Q2H PRN  lactated ringers,  500 mL, Once PRN  ondansetron, 4 mg, Q6H PRN  oxyCODONE, 2.5 mg, Q4H PRN   Or  oxyCODONE, 5 mg, Q4H PRN  potassium chloride, 20 mEq, Once PRN  potassium chloride, 40 mEq, Once PRN  potassium chloride, 40 mEq, Once PRN       Continuous    dexmedetomidine, 0.1-0.7 mcg/kg/hr  epinephrine, 1-10 mcg/min, Last Rate: 4 mcg/min (07/08/25 2234)  insulin regular (HumuLIN R,NovoLIN R) 1 Units/mL in sodium chloride 0.9 % 100 mL infusion, 0.3-21 Units/hr, Last Rate: 4 Units/hr (07/08/25 2230)  niCARdipine, 2.5-15 mg/hr  norepinephrine, 1-30 mcg/min, Last Rate: 4 mcg/min (07/08/25 2233)  phenylephine,  mcg/min  sodium chloride, 20 mL/hr, Last Rate: 20 mL/hr (07/08/25 2232)         Labs:  CBC  Recent Labs     07/08/25 2118 07/08/25 2127 07/08/25 2231 07/08/25 2232   HGB  --    < > 12.0 11.2*   HCT  --    < > 35.6 33*     --  180  --     < > = values in this interval not displayed.     BMP  Recent Labs     07/08/25 2231 07/08/25 2232   SODIUM 143  --    K 2.8*  --    *  --    CO2 20* 20*   AGAP 11  --    BUN 23  --    CREATININE 1.30  --    CALCIUM 7.8*  --        Coags  Recent Labs     07/08/25 2231   INR 1.29*   PTT 32        Additional Electrolytes  Recent Labs     07/08/25 2127 07/08/25 2232   CAIONIZED 1.29 1.20          Blood Gas  Recent Labs     07/08/25  1139   PHART 7.421   CDH9NJD 37.5   PO2ART 175.1*   YFP3ZJY 23.8   BEART -0.4   SOURCE Line, Arterial     Recent Labs     07/08/25  1139   SOURCE Line, Arterial    LFTs  No recent results    Infectious  No recent results     No recent results Glucose  Recent Labs     07/08/25 2231   GLUC 183*        Invasive lines and devices:  Invasive Devices       Central Venous Catheter Line  Duration             CVC Central Lines 07/08/25 <1 day    Introducer 07/08/25 <1 day              Peripheral Intravenous Line  Duration             Peripheral IV 07/08/25 Left Forearm <1 day              Arterial Line  Duration             Arterial Line 07/08/25 Right  Radial <1 day              Line  Duration             Pacer Wires <1 day    Pacer Wires <1 day              Drain  Duration             Chest Tube 1 Anterior;Mediastinal 32 Fr. <1 day    Chest Tube 2 Left Pleural 32 Fr. <1 day    Chest Tube 3 Mediastinal;Posterior 32 Fr. <1 day    Urethral Catheter Straight-tip;Temperature probe;Non-latex 16 Fr. <1 day              Airway  Duration             ETT  Hi-Lo;Cuffed;Oral 7.5 mm <1 day                       [1]   Allergies  Allergen Reactions    Lidocaine Seizures     Tolerated bupivacaine w/ cardiac cath    Statins Diarrhea     Has tried all statins    Dilantin [Phenytoin]     Gabapentin Other (See Comments)     Anger/irritability/mood disturbance   [2]   Social History  Tobacco Use    Smoking status: Former     Current packs/day: 0.00     Types: Cigarettes     Quit date: 3/5/2000     Years since quittin.3    Smokeless tobacco: Never    Tobacco comments:     Social use up to    Vaping Use    Vaping status: Never Used   Substance Use Topics    Alcohol use: Never    Drug use: No   [3]   Family History  Adopted: Yes   Problem Relation Name Age of Onset    Atrial septal defect Daughter Spring         repaired at 4

## 2025-07-09 NOTE — ANESTHESIA POSTPROCEDURE EVALUATION
Post-Op Assessment Note    CV Status:  Stable  Pain scale: intubated, sedated.    Pain control: intubated, sedated.       Post-procedure mental status: intubated, sedated.   Hydration Status:  Stable   PONV status: intubated, sedated.   Airway patency: intubated, sedated.  Intubated: intubated, sedated.     Post Op Vitals Reviewed: Yes    No anethesia notable event occurred.    Staff: Anesthesiologist           Last Filed PACU Vitals:  Vitals Value Taken Time   Temp     Pulse 80 07/08/25 22:33   BP     Resp 16 07/08/25 22:33   SpO2 100 % 07/08/25 22:33   Vitals shown include unfiled device data.

## 2025-07-09 NOTE — ANESTHESIA PROCEDURE NOTES
Procedure Performed: RAFAEL Anesthesia  Start Time:  7/8/2025 6:49 PM        Preanesthesia Checklist    Patient identified, IV assessed, risks and benefits discussed, monitors and equipment assessed, procedure being performed at surgeon's request and anesthesia consent obtained.      Procedure    Diagnostic Indications for RAFAEL:  assessment of ascending aorta, assessment of surgical repair and hemodynamic monitoring. Type of RAFAEL: interventional RAFAEL with real time guidance of intracardiac procedure. Images Saved: ultrasound permanent image saved. Physician Requesting Echo: Abhi Rosales MD.  Location performed: OR. Intubated. Bite block not placed.   Heart visualized. RAFAEL probe insertion: difficult insertion b/c anterior airway, overbite, small mouth opening, stiff neck with reduced mobility. attempts w/ video laryngoscopy to guide RAFAEL unsuccessful. success with guidance of RAFAEL to back of oropharyx and flexion of neck. Probe Type:  Multiplane. Modalities:  3D, color flow mapping, continuous wave Doppler and pulse wave Doppler.      Echocardiographic and Doppler Measurements    PREPROCEDURE    LEFT VENTRICLE:  Systolic Function: mildly impaired. Ejection Fraction: 45%. Cavity size: dilated.   Regional Wall Motion Abnormalities: global hypokinesis of LV, mild hypokinesis of septal wall..    RIGHT VENTRICLE:  Systolic Function: normal.  Cavity size normal. No hypertrophy              AORTIC VALVE:  Leaflets: normal and trileaflet. Leaflet motions normal and normal. Stenosis: none.     Regurgitation: none.      MITRAL VALVE:  Leaflets: myxomatous. Leaflet Motions: normal. Regurgitation: mild and MAP in 80s, mild MR noted.   Stenosis: none. Mean Gradient: 1 mmHg.      TRICUSPID VALVE:  Leaflets: normal. Leaflet Motions: normal. Stenosis: none. Regurgitation: trace.      PULMONIC VALVE:  Leaflets: normal. Regurgitation: trace. Stenosis: none.        ASCENDING AORTA:  Size:  normal.  Dissection not present.      AORTIC  ARCH:  Size:  normal.  dissection not present. Grade 3: atheroma protruding < 0.5 cm into lumen.    DESCENDING AORTA:  Size: normal.   Grade 3: atheroma protruding < 0.5 cm into lumen.        RIGHT ATRIUM:  Size:  normal. No spontaneous echo contrast.    LEFT ATRIUM:  Size: normal. No spontaneous echo contrast.    LEFT ATRIAL APPENDAGE:  Size: normal. No spontaneous echo contrast         ATRIAL SEPTUM:  Intra-atrial septal morphology: normal.          VENTRICULAR SEPTUM:  Intra-ventricular septum morphology: normal.         EPIAORTIC:  Plaque Thickness: 0-5 mm.    OTHER FINDINGS:  Pericardium:  normal. Pleural Effusion:  none.        POSTPROCEDURE    LEFT VENTRICLE: Unchanged .  Systolic Function: mildly depressed. Ejection Fraction: 45 %. Cavity Size: dilated. Regional Wall Motion Abnormalities: global hypokinesis, septal wall HK.    RIGHT VENTRICLE: Unchanged .  Systolic Function: normal. Cavity Size: normal.        AORTIC VALVE: Unchanged .  Leaflets: native. Stenosis: none.  Regurgitation: none.      MITRAL VALVE: Unchanged .  Leaflets: native. Regurgitation: MAP in 70s and mild. Stenosis: none. Mean Gradient: 2.    TRICUSPID VALVE: Unchanged .  Leaflets: native. Regurgitation: trace.     PULMONIC VALVE: Unchanged   Leaflets: native. Regurgitation: trace.         ATRIA: Unchanged .  Left Atrial Appendage Ligate: No. Residual Flow in Left Atrial Appendage by Color Flow Doppler: No.       AORTA: Unchanged .  Dissection: Dissection not present.      REMOVAL:  Probe Removal: no heme noted upon removal of probe. and atraumatic.      ECHOCARDIOGRAM COMMENTS:  EF 45% postCPB, with epi and levo support, global hypokinesis persists (mild) septal wall appears dyskinetic with V pacing efforts.   .

## 2025-07-09 NOTE — ASSESSMENT & PLAN NOTE
Lipid profile June 2025: Total cholesterol 267, triglycerides 152, HDL 51,   Has documented allergy to all statins (diarrhea)  Will need Zetia and insurance authorization for PCSK9 inhibitor as an outpatient

## 2025-07-09 NOTE — ANESTHESIA PREPROCEDURE EVALUATION
Procedure:  CORONARY ARTERY BYPASS GRAFT (CABG) 3-4 VESSELS  LIMA to LAD, SVG to (Chest)    Relevant Problems   CARDIO   (+) CAD (coronary artery disease)   (+) Mixed hyperlipidemia      ENDO   (+) Type 2 diabetes mellitus with hyperglycemia, with long-term current use of insulin (HCC)      /RENAL   (+) Renal abscess, right      NEURO/PSYCH   (+) Partial idiopathic epilepsy with seizures of localized onset, intractable, without status epilepticus (HCC)      PULMONARY   (+) Bilateral pleural effusion      LHC: mLAD 75 (diffuse disease), RI intermediate lesion, mRPDA 85    LV: 30 (severe global HK), normal RV systolic function, mild AS, mild MR, mild TR      Prior Powell use (noted to have anterior airway)    R ICA< 50, L ICA normal    Hgb 13.2, plt 219  Cr 1.09  A1c 7.4  Physical Exam    Airway   Comment: Intubated in situ  Mallampati score: already intubated          Cardiovascular      Dental   Comment: Over bite  Missing teeth upper right and left areas     Pulmonary      Neurological      Other Findings  Small mouth opening; missing several, none loosepost-pubertal.Small mouth opening; missing several, none loose        Anesthesia Plan  ASA Score- 4     Anesthesia Type- general with ASA Monitors.         Additional Monitors: central veinous line, arterial line and pulmonary artery catheter.    Airway Plan: Oral ETT.    Comment: General anesthesia, endotracheal Intubation. Standard ASA monitors. Large bore peripheral IV. Pre-induction arterial line. CVP (Triple Lumen) and Cordis with PAC. RAFAEL for perioperative monitoring and diagnosis. Post-op ICU/vent. Risks and benefits discussed with patient; patient consented and agrees to proceed.  Powell - prior use.       Plan Factors-    Chart reviewed.   Existing labs reviewed.                   Induction- intravenous.    Postoperative Plan- Plan for postoperative opioid use.   Monitoring Plan - Monitoring plan - standard ASA monitoring, RAFAEL, pulmonary artery  catheter, arterial line kit and processed EEG monitor  Post Operative Pain Plan - plan for postoperative opioid use    Perioperative Resuscitation Plan - Level 1 - Full Code.       Informed Consent- Plan/risks discussed with: this was was intubated/sedated from CICU. a line, TLC, PAC, IVs in situ.        NPO Status:  Vitals Value Taken Time   Date of last liquid 07/07/25 07/08/25 05:59   Time of last liquid 2030 07/08/25 05:59   Date of last solid 07/07/25 07/08/25 05:59   Time of last solid 1030 07/08/25 05:59

## 2025-07-09 NOTE — CONSULTS
Consultation - Endocrinology   Name: Nathalia Monge 64 y.o. female I MRN: [id number]  Unit/Bed#: PPHP-307-01 I Date of Admission: 7/8/2025   Date of Service: 7/9/2025 I Hospital Day: 1   Inpatient consult to Endocrinology  Consult performed by: Armani Ngo MD  Consult ordered by: Ethan Nixon PA-C        Physician Requesting Evaluation: Abhi Rosales MD   Reason for Evaluation / Principal Problem:     Assessment & Plan  CAD (coronary artery disease)  Patient with MV CAD status post CABG on 7/8/2025  Management per primary team  Type 2 diabetes mellitus with hyperglycemia, with long-term current use of insulin ([facility])  Lab Results   Component Value Date    HGBA1C 7.4 (H) 03/18/2025     Recent Labs     07/09/25  0224 07/09/25  0435 07/09/25  0604 07/09/25  0805   POCGLU 211* 193* 185* 157*     Blood Sugar Average: Last 72 hrs:  (P) 165  2 diabetes mellitus with hyperglycemia as evidenced by A1c of 7.4%  Home regimen:  Current regimen: Insulin infusion  BG reviewed-slightly above target range while on the infusion.  Suspect this is likely because she is on epinephrine infusion.    Plan:  Continue insulin infusion at this time  Will reassess patient tomorrow with plan to hopefully transition to basal bolus regimen.  Continue Accu-Cheks per infusion protocol  Monitor for hypoglycemia, treat per protocol  Goal blood glucose while in the [id number]-[id number] mg/dL  Discharge recommendations pending clinical course-the patient discharged on insulin.  Given HFrEF and microalbuminuria she will likely also benefit from addition of SGLT2 inhibitor.  Endocrinology will continue following        History of Present Illness   Nathalia Monge is a 64 y.o. female with a past medical history of type 2 diabetes mellitus, hypertension, hyperlipidemia, CAD, epilepsy seen today in consultation for management of type 2 diabetes mellitus    Patient is in for elective CABG X4 on 7/8/2025.  Originally presented in March 2025 with elevated BP and  increased dyspnea. Admitted for bilateral pleural effusions in the setting of newly diagnosed HFrEF with TTE demonstrating EF of 30% and cardiac catheterization with MV CAD.  Patient does not follow-up with [facility] endocrinology.  Diabetes is managed by her primary care provider.  Home regimen: NovoLog Mix 70/30 20 units before breakfast and 20 units before dinner.  Patient is currently on insulin infusion.  She does not check blood sugars at home, however has been checking for the past week 1 hour before her scheduled insulin doses, noting the blood sugars ranging 113-144 mg/dL.  States that she was getting steroids in January for what they thought was her back pain for approximately 6 weeks.  Diabetes is complicated by microalbuminuria.  Additionally she is on Precedex, epinephrine, Levophed.    Review of Systems   Constitutional:  Negative for appetite change, fatigue and unexpected weight change.   HENT:  Negative for congestion and trouble swallowing.    Gastrointestinal:  Negative for abdominal pain, constipation, diarrhea, nausea and vomiting.   Endocrine: Negative for polydipsia and polyuria.   Genitourinary:  Negative for frequency.   Musculoskeletal:  Negative for myalgias.   Skin:  Negative for rash.   Neurological:  Negative for dizziness, tremors, weakness and light-headedness.   Psychiatric/Behavioral:  Negative for sleep disturbance.    All other systems reviewed and are negative.      Medical History Review: I have reviewed the patient's PMH, PSH, Social History, Family History, Meds, and Allergies   Historical Information   Past Medical History[1]  Past Surgical History[2]  Social History[3]  E-Cigarette/Vaping    E-Cigarette Use Never User      E-Cigarette/Vaping Substances    Nicotine No     THC No     CBD No     Flavoring No     Other No     Unknown No      Family History[4]  Social History[5]    Current Facility-Administered Medications:     acetaminophen (TYLENOL) tablet 975 mg, Q6H While  awake    [Held by provider] amiodarone tablet 200 mg, Q8H DANTE    aspirin tablet 325 mg, Daily    bisacodyl (DULCOLAX) rectal suppository 10 mg, Daily PRN    ceFAZolin (ANCEF) IVPB (premix in dextrose) 2,000 mg 50 mL, Q8H, Last Rate: 100 mL/hr at 07/09/25 0600    chlorhexidine (PERIDEX) 0.12 % oral rinse 15 mL, BID    dexamethasone (PF) (DECADRON) injection 6 mg, Q6H DANTE    dexmedeTOMIDine (Precedex) 400 mcg in sodium chloride 0.9% 100 mL, Titrated, Last Rate: 0.2 mcg/kg/hr (07/09/25 0914)    docusate sodium (COLACE) capsule 100 mg, BID    EPINEPHrine 5,000 mcg (STANDARD CONCENTRATION) IV in sodium chloride 0.9% 250 mL, Titrated, Last Rate: 2 mcg/min (07/09/25 0823)    fentaNYL injection 50 mcg, Q1H PRN    ferrous sulfate tablet 325 mg, Daily    heparin (porcine) subcutaneous injection 5,000 Units, Q8H DANTE **AND** [CANCELED] Platelet count, Once    HYDROmorphone (DILAUDID) injection 0.5 mg, Q2H PRN    insulin regular (HumuLIN R,NovoLIN R) 1 Units/mL in sodium chloride 0.9 % 100 mL infusion, Titrated, Last Rate: 4 Units/hr (07/09/25 0805)    mupirocin (BACTROBAN) 2 % nasal ointment 1 Application, Q12H DANTE    niCARdipine (CARDENE) 25 mg (STANDARD CONCENTRATION) in sodium chloride 0.9% 250 mL, Titrated, Last Rate: Stopped (07/09/25 0809)    norepinephrine (LEVOPHED) 4 mg (STANDARD CONCENTRATION) IV in sodium chloride 0.9% 250 mL, Titrated, Last Rate: 2 mcg/min (07/09/25 0856)    [Held by provider] ondansetron (ZOFRAN) injection 4 mg, Q6H PRN    oxyCODONE (ROXICODONE) split tablet 2.5 mg, Q4H PRN **OR** oxyCODONE (ROXICODONE) IR tablet 5 mg, Q4H PRN    pantoprazole (PROTONIX) EC tablet 40 mg, Daily    polyethylene glycol (MIRALAX) packet 17 g, Daily    saccharomyces boulardii (FLORASTOR) capsule 250 mg, BID    sodium chloride infusion 0.45 %, Continuous, Last Rate: 20 mL/hr (07/09/25 0600)    temazepam (RESTORIL) capsule 15 mg, HS PRN  Prior to Admission Medications   Prescriptions Last Dose Informant Patient Reported?  Taking?   BD Pen Needle Yolis 2nd Gen 32G X 4 MM MISC  Self No No   Sig: USE  THREE TIMES DAILY   Blood Pressure Monitoring (Adult Blood Pressure Cuff Lg) KIT  Self No No   Sig: Use daily   Patient not taking: Reported on 6/11/2025   CHROMIUM PO 7/7/2025 at  9:00 AM Self Yes Yes   Sig: Take by mouth in the morning and in the evening.   Insulin Pen Needle (BD Pen Needle Yolis 2nd Gen) 32G X 4 MM MISC  Self No No   Sig: For use with insulin pen. Pharmacy may dispense brand covered by insurance.   L-LYSINE PO More than a month Self Yes No   Sig: Take 500 mg by mouth in the morning.   Probiotic Product (PROBIOTIC DAILY PO) 7/7/2025 at  9:00 AM Self Yes Yes   Sig: Take 1 capsule by mouth in the morning.   aspirin 81 mg chewable tablet 7/7/2025 at  9:00 AM Self Yes Yes   Sig: Chew 81 mg daily   carvedilol (COREG) 6.25 mg tablet 7/7/2025 at  9:15 PM Self No Yes   Sig: Take 1 tablet (6.25 mg total) by mouth 2 (two) times a day with meals   cholecalciferol (VITAMIN D3) 1,000 units tablet 7/7/2025 at  9:00 AM Self Yes Yes   Sig: Take 1,000 Units by mouth in the morning.   cyclobenzaprine (FLEXERIL) 10 mg tablet 7/7/2025 at  9:15 PM Self No Yes   Sig: Take 1 tablet (10 mg total) by mouth 3 (three) times a day as needed for muscle spasms   ferrous sulfate 325 (65 Fe) mg tablet More than a month Self Yes No   Sig: Take 325 mg by mouth in the morning.   furosemide (LASIX) 20 mg tablet 7/7/2025 at  9:00 AM Self No Yes   Sig: Take 1 tablet (20 mg total) by mouth daily   insulin aspart protamine-insulin aspart (NovoLOG Mix 70/30 FlexPen) 100 Units/mL injection pen 7/7/2025 at 10:30 PM Self No Yes   Sig: Inject 20 Units under the skin 2 (two) times a day before meals   magnesium 30 MG tablet 7/7/2025 at  9:00 AM  Yes Yes   Sig: Take 30 mg by mouth daily   mupirocin (BACTROBAN) 2 % ointment   No No   Sig: Apply 1 Application topically 2 (two) times a day for 5 days Apply to each nostril twice daily for five days before your operation.    zinc gluconate 50 mg tablet 7/7/2025 at  9:00 AM  Yes Yes   Sig: Take 50 mg by mouth daily      Facility-Administered Medications: None     Lidocaine, Statins, Dilantin [phenytoin], and Gabapentin    Objective :  Temp:  [95.5 °F (35.3 °C)-99.1 °F (37.3 °C)] 99 °F (37.2 °C)  HR:  [60-94] 69  BP: (146-173)/(61) 173/61  Resp:  [12-26] 14  SpO2:  [99 %-100 %] 100 %  O2 Device: Ventilator  FiO2 (%):  [50-60] 50    Physical Exam  Vitals and nursing note reviewed.   Constitutional:       General: She is not in acute distress.     Appearance: Normal appearance. She is not ill-appearing, toxic-appearing or diaphoretic.   HENT:      Head: Normocephalic and atraumatic.      Nose: Nose normal.      Mouth/Throat:      Pharynx: Oropharynx is clear.     Eyes:      General: No scleral icterus.        Right eye: No discharge.         Left eye: No discharge.      Extraocular Movements: Extraocular movements intact.      Conjunctiva/sclera: Conjunctivae normal.       Cardiovascular:      Rate and Rhythm: Normal rate and regular rhythm.   Pulmonary:      Effort: Pulmonary effort is normal. No respiratory distress.   Abdominal:      General: Abdomen is flat. There is no distension.     Musculoskeletal:         General: Normal range of motion.      Cervical back: Normal range of motion and neck supple.     Skin:     General: Skin is warm and dry.      Coloration: Skin is not jaundiced or pale.     Neurological:      Mental Status: She is alert and oriented to person, place, and time. Mental status is at baseline.     Psychiatric:         Mood and Affect: Mood normal.         Behavior: Behavior normal.         Thought Content: Thought content normal.         Judgment: Judgment normal.         Lab Results: I have reviewed the following results:CBC/BMP:   .     07/08/25  2232 07/09/25  0223 07/09/25  0437   WBC  --   --  17.47*   HGB 11.2*  --  11.0*  11.1*   HCT 33*  --  33.6*  33.5*   PLT  --   --  180   SODIUM  --  142  --    K  --   4.2 4.3   CL  --  111*  --    CO2 20* 20*  --    BUN  --  26*  --    CREATININE  --  1.45*  --    GLUC  --  225*  --    CAIONIZED 1.20  --   --    MG  --   --  2.6    , Creatinine Clearance: Estimated Creatinine Clearance: 45 mL/min (A) (by C-G formula based on SCr of 1.45 mg/dL (H))., LFTs: No new results in last 24 hours.     Imaging Results Review: I reviewed radiology reports from this admission including: chest xray.  Other Study Results Review: No additional pertinent studies reviewed.             [1]   Past Medical History:  Diagnosis Date    Bacteremia due to Klebsiella pneumoniae 03/06/2024    Bipolar depression ([facility])     CAD (coronary artery disease)     Chronic heart failure with reduced ejection fraction (HFrEF, <= 40%) ([facility])     EF 30%    Chronic heart failure with reduced ejection fraction (HFrEF, <= 40%) ([facility]) 03/19/2025    CKD (chronic kidney disease), stage III ([facility])     DDD (degenerative disc disease), thoracic     Depression     Diabetes mellitus ([facility])     type 2, insulin dependent    Epilepsy ([facility])     no maintaince medications, experiences petite seizures to right eye multiple times throughout the day, does not drive due to this    Former tobacco use     GERD (gastroesophageal reflux disease)     History of Clostridioides difficile infection     History of pyelonephritis     w/ renal abscess    Hyperlipidemia     Hypertension     Hypertensive urgency 03/05/2018    ERNESTO (iron deficiency anemia)     Meningioma ([facility])     s/p surgical resection    Obesity     Prolonged QT interval     PTSD (post-traumatic stress disorder)     Telangiectasias     b/l LE    Vitamin D deficiency    [2]   Past Surgical History:  Procedure Laterality Date    CARDIAC CATHETERIZATION Left 05/05/2025    Procedure: Cardiac Left Heart Cath;  Surgeon: Latoya Garner DO;  Location: [facility];  Service: Cardiology    CARDIAC CATHETERIZATION N/A 05/05/2025    Procedure: Cardiac FFR/IFR;  Surgeon: Latoya Garner  DO;  Location: [facility];  Service: Cardiology    CHOLECYSTECTOMY  2004    EPIDURAL BLOCK INJECTION      thoracic    HYSTERECTOMY      IMPLANTABLE CONTACT LENS IMPLANTATION Bilateral     IR THORACENTESIS  2025    IA CRNEC TREPH BONE FLAP CRNOT EXC BRAIN TUMOR STTL Right 03/15/2018    Procedure: IMAGE-GUIDED RIGHT FRONTOPARIETAL CRANIOTOMY FOR TUMOR;  Surgeon: Arsh Jeronimo MD;  Location: [facility];  Service: Neurosurgery   [3]   Social History  Tobacco Use    Smoking status: Former     Current packs/day: 0.00     Types: Cigarettes     Quit date: 3/5/2000     Years since quittin.3    Smokeless tobacco: Never    Tobacco comments:     Social use up to    Vaping Use    Vaping status: Never Used   Substance and Sexual Activity    Alcohol use: Never    Drug use: No    Sexual activity: Not Currently     Partners: Male     Birth control/protection: Post-menopausal     Comment: Everything has been removed and haven't had sex since    [4]   Family History  Adopted: Yes   Problem Relation Name Age of Onset    Atrial septal defect Daughter Spring         repaired at 4   [5]   Social History  Tobacco Use    Smoking status: Former     Current packs/day: 0.00     Types: Cigarettes     Quit date: 3/5/2000     Years since quittin.3    Smokeless tobacco: Never    Tobacco comments:     Social use up to    Vaping Use    Vaping status: Never Used   Substance and Sexual Activity    Alcohol use: Never    Drug use: No    Sexual activity: Not Currently     Partners: Male     Birth control/protection: Post-menopausal     Comment: Everything has been removed and haven't had sex since

## 2025-07-09 NOTE — ASSESSMENT & PLAN NOTE
POD #1 s/p CABG x 3 with LIMA to LAD, SVG to RI 1, and SVG to RI 2  Final intra op RAFAEL with improved LVEF to 45%  ECG reviewed- NSR with prolonged QTc- Amiodarone held by primary team  Tele reviewed- NSR  Post op volume management- furosemide 40 mg IV x 1 dose  Follow strict I/Os, daily weights, am BMP  On ASA. Not on statin 2/2 allergy. Not yet on beta blocker  Continue to monitor on telemetry

## 2025-07-09 NOTE — PROGRESS NOTES
Progress Note - Cardiothoracic Surgery   Nathalia Monge 64 y.o. female MRN: [id number]  Unit/Bed#: PPHP-307-01 Encounter: [id number]      POD # 1 s/p CABG    Pt seen/examined.  Interval history and data reviewed with critical care team.  Pt doing well.        Medications:   Scheduled Meds:  Current Facility-Administered Medications   Medication Dose Route Frequency Provider Last Rate    acetaminophen  650 mg Rectal Q4H PRN Abigail Mitch, PA-C      acetaminophen  975 mg Oral Q6H While awake Abigail Mitch, PA-C      [Held by provider] amiodarone  200 mg Oral Q8H DANTE Abigail Mitch, PA-C      aspirin  325 mg Oral Daily Abigail Mitch, PA-C      bisacodyl  10 mg Rectal Daily PRN Abigail Mitch, PA-C      calcium gluconate  2 g Intravenous Once PRN Abigail Mitch, PA-C      cefazolin  2,000 mg Intravenous Q8H Abigail Mitch, PA-C 100 mL/hr at 07/09/25 0600    chlorhexidine  15 mL Mouth/Throat BID Abigail Mitch, PA-C      dexamethasone  6 mg Intravenous Q6H DANTE Jacquelyn Pitt, PA-C      dexmedetomidine  0.1-0.7 mcg/kg/hr Intravenous Titrated Jacquelyn Pitt PA-C 0.5 mcg/kg/hr (07/09/25 0805)    epinephrine  1-10 mcg/min Intravenous Titrated Jacquelyn Pitt PA-C 4 mcg/min (07/09/25 0600)    fentaNYL  50 mcg Intravenous Once Abigail Mitch, PA-C      fentaNYL  50 mcg Intravenous Q1H PRN Abigail Mitch, PA-C      ferrous sulfate  325 mg Oral Daily Abigail Mitch, PA-C      heparin (porcine)  5,000 Units Subcutaneous Q8H DANTE Abigail Mitch, PA-C      HYDROmorphone  0.5 mg Intravenous Q2H PRN Abigail Mitch, PA-C      insulin regular (HumuLIN R,NovoLIN R) 1 Units/mL in sodium chloride 0.9 % 100 mL infusion  0.3-21 Units/hr Intravenous Titrated Abigail Mitch, PA-C 4 Units/hr (07/09/25 0805)    lactated ringers  500 mL Intravenous Once PRN Abigail Mitch, PA-C      mupirocin  1 Application Nasal Q12H DANTE Abigail Mitch, PA-C      niCARdipine  2.5-15 mg/hr Intravenous Titrated Abigail Meyer PA-C Stopped (07/09/25 0809)    norepinephrine  1-30 mcg/min  "Intravenous Titrated Abigail Mitch, PA-C Stopped (07/09/25 0640)    [Held by provider] ondansetron  4 mg Intravenous Q6H PRN Abigail Mitch, PA-C      oxyCODONE  2.5 mg Oral Q4H PRN Abigail Mitch, PA-C      Or    oxyCODONE  5 mg Oral Q4H PRN Abigail Mitch, PA-C      pantoprazole  40 mg Oral Daily Abigail Mitch, PA-C      phenylephine   mcg/min Intravenous Titrated Abigail Mitch, PA-C      polyethylene glycol  17 g Oral Daily Abigail Mitch, PA-C      potassium chloride  20 mEq Intravenous Once PRN Abigail Mitch, PA-C      saccharomyces boulardii  250 mg Oral BID Abigail Mitch, PA-C      sodium chloride  20 mL/hr Intravenous Continuous Abigail Mitch, PA-C 20 mL/hr (07/09/25 0600)     Continuous Infusions:dexmedetomidine, 0.1-0.7 mcg/kg/hr, Last Rate: 0.5 mcg/kg/hr (07/09/25 0805)  epinephrine, 1-10 mcg/min, Last Rate: 4 mcg/min (07/09/25 0600)  insulin regular (HumuLIN R,NovoLIN R) 1 Units/mL in sodium chloride 0.9 % 100 mL infusion, 0.3-21 Units/hr, Last Rate: 4 Units/hr (07/09/25 0805)  niCARdipine, 2.5-15 mg/hr, Last Rate: Stopped (07/09/25 0809)  norepinephrine, 1-30 mcg/min, Last Rate: Stopped (07/09/25 0640)  phenylephine,  mcg/min  sodium chloride, 20 mL/hr, Last Rate: 20 mL/hr (07/09/25 0600)      PRN Meds:.  acetaminophen    bisacodyl    calcium gluconate    fentaNYL    HYDROmorphone    lactated ringers    [Held by provider] ondansetron    oxyCODONE **OR** oxyCODONE    potassium chloride    Vitals: Blood pressure (!) 173/61, pulse 69, temperature 99 °F (37.2 °C), temperature source Core, resp. rate 14, height 5' 5.5\" (1.664 m), weight 94.8 kg (208 lb 15.9 oz), SpO2 100%.,Body mass index is 34.25 kg/m².  I/O last 24 hours:  In: 9152.2 [I.V.:6262.2; Blood:350; NG/GT:120; IV Piggyback:1670]  Out: 1070 [Urine:650; Emesis/NG output:150; Chest Tube:270]  Invasive Devices       Central Venous Catheter Line  Duration             CVC Central Lines 07/08/25 1 day    Introducer 07/08/25 1 day              Arterial Line "  Duration             Arterial Line 07/08/25 Right Radial 1 day              Line  Duration             Pacer Wires <1 day    Pacer Wires <1 day              Drain  Duration             Chest Tube 1 Anterior;Mediastinal 32 Fr. 1 day    Chest Tube 2 Left Pleural 32 Fr. 1 day    Chest Tube 3 Mediastinal;Posterior 32 Fr. 1 day    NG/OG/Enteral Tube Orogastric 18 Fr Center mouth <1 day    Urethral Catheter Latex <1 day              Airway  Duration             ETT  Hi-Lo;Cuffed;Oral 7.5 mm 1 day                      Lab, Imaging and other studies:   Results from last 7 days   Lab Units 07/09/25 0437 07/08/25 2232 07/08/25 2231 07/08/25 2127 07/08/25  2118   WBC Thousand/uL 17.47*  --   --   --   --    HEMOGLOBIN g/dL 11.0*  11.1*  --  12.0  --   --    I STAT HEMOGLOBIN g/dl  --  11.2*  --    < >  --    HEMATOCRIT % 33.6*  33.5*  --  35.6  --   --    HEMATOCRIT, ISTAT %  --  33*  --    < >  --    PLATELETS Thousands/uL 180  --  180  --  164    < > = values in this interval not displayed.     Results from last 7 days   Lab Units 07/09/25 0437 07/09/25 0223 07/08/25 2232 07/08/25 2231   POTASSIUM mmol/L 4.3 4.2  --  2.8*   CHLORIDE mmol/L  --  111*  --  112*   CO2 mmol/L  --  20*  --  20*   CO2, I-STAT mmol/L  --   --  20*  --    BUN mg/dL  --  26*  --  23   CREATININE mg/dL  --  1.45*  --  1.30   GLUCOSE, ISTAT mg/dl  --   --  175*  --    CALCIUM mg/dL  --  8.0*  --  7.8*     Results from last 7 days   Lab Units 07/08/25 2231   INR  1.29*   PTT seconds 32     Recent Labs     07/09/25 0436   PHART 7.267*   PDK7WLI 19.1*   PO2ART 172.3*   TBF7TLS 42.8   BEART -7.5           Plan:    DC [place].  Continue chest tubes, pacing wires.  Wean to extubate  Ambulate.  Diuresis.  PO ASA/B blocker.  Statin intolerance      SIGNATURE: Abhi Rosales MD  DATE: July 9, 2025  TIME: 8:11 AM

## 2025-07-09 NOTE — RESPIRATORY THERAPY NOTE
RT Ventilator Management Note  Nathalia Monge 64 y.o. female MRN: [id number]  Unit/Bed#: PPHP-307-01 Encounter: [id number]       07/09/25 0731   Respiratory Assessment   Assessment Type Assess only   General Appearance Drowsy   Respiratory Pattern Assisted   Chest Assessment Chest expansion symmetrical   Bilateral Breath Sounds Diminished;Clear   Resp Comments Full vent safety check.  Pt at rest on her S CMV settings.  Vent alarms on, tested and functional.  Headed to a wean per request from OMAR Long.   O2 Device Pena G5   Vent Information   Vent ID [id number]   Vent type Pena G5   Pena Vent Mode (S)CMV   $ Pulse Oximetry Spot Check Charge Completed   (S)CMV Settings   Resp Rate (BPM) 14 BPM   VT (mL) 400 mL   FIO2 (%) 50 %   PEEP (cmH2O) 8 cmH2O   I:E Ratio 1:3.3   Insp Time (%) 1 %   Flow Trigger (LPM) 5   Humidification Heater   Heater Temperature (Set) 98.6 °F (37 °C)   (S)CMV Actuals   Resp Rate (BPM) 15 BPM   VT (mL) 416   MV 6   MAP (cmH2O) 9.1 cmH2O   Peak Pressure (cmH2O) 18 cmH2O   I:E Ratio (Obs) 1:3.1   Heater Temperature (Obs) 98.6 °F (37 °C)   Plateau Pressure (cm H2O) 18.3 cm H2O   (S)CMV Alarms   High Peak Pressure (cmH2O) 40   Low Pressure (cmH2O) 5 cm H2O   High Resp Rate (BPM) 40 BPM   Low Resp Rate (BPM) 8 BPM   High MV (L/min) 20 L/min   Low MV (L/min) 3 L/min   High VT (mL) 1000 mL   Low VT (mL) 200 mL   Apnea Time (s) 20 S         Daily Screen         7/9/2025  0235 7/9/2025  0741          Patient safety screen outcome:: Failed Passed      Not Ready for Weaning due to:: Underline problem not resolved Underline problem not resolved                Physical Exam:   Assessment Type: Assess only  General Appearance: Drowsy  Respiratory Pattern: Assisted  Chest Assessment: Chest expansion symmetrical  Bilateral Breath Sounds: Diminished, Clear  O2 Device: Pena G5      Resp Comments: Requested by OMAR Long to place pt now on SPONT mode and did so after full AM vent safety check. Pt initially  holding her RR around 15 bpm and Vt's of about 450 ml's. FVC now around 715 ml's. Still appears a bit groggy.

## 2025-07-09 NOTE — PHYSICAL THERAPY NOTE
Physical Therapy Cancellation Note           07/09/25 0825   Note Type   Note type Cancelled Session   Cancel Reasons Medical status   Additional Comments PT orders received and pt chart reviewed. pt currently intubated and sedated at this time and unable to particiapte in skilled PT session. Will hold PT eval at this time and follow up as appropriate.     CRISTINA CantrellT

## 2025-07-09 NOTE — OCCUPATIONAL THERAPY NOTE
Occupational Therapy         Patient Name: Nathalia Monge  Today's Date: 7/9/2025 07/09/25 0759   OT Last Visit   OT Visit Date 07/09/25   Note Type   Note type Cancelled Session   Cancel Reasons Other         OT orders received and pt chart reviewed. Pt intubated at this time, will continue to follow and assess as appropriate. Please reach out with any questions or concerns.     Babak Brewster MS, OTR/L

## 2025-07-09 NOTE — PROGRESS NOTES
Progress Note - Critical Care/ICU   Name: Nathalia Monge 64 y.o. female I MRN: [id number]  Unit/Bed#: [facility]-307-01 I Date of Admission: 7/8/2025   Date of Service: 7/9/2025 I Hospital Day: 1       Assessment & Plan   Impressions:  MVCAD s/p CABG x3  ICM EF 45%  HTN  HLD  DM2  Bipolar d/o  Epilepsy  CKD3    Neuro:   PRN medications: Fentanyl 50 Q1 PRN, VAP Bundle Ordered , Sedation Medications: Precedex 0.7, and RASS Goal: RASS Goal: 0 Alert and Calm  Cardiac Surgery Pain Control: ATC tylenol and PRN oxycodone 2.5/5mg  Delirium precautions  Regulate sleep/wake cycle  CAM-ICU daily  Trend neuro exam    CV:   Cardiac Surgery Hemodynamic Monitoring: MAP goal >65 CI >2.2 Continue pulmonary artery catheter for hemodynamic monitoring  Continue central line due to vasoactive medications Continue arterial line for blood pressure monitoring and/or frequent blood gas draws  Follow rhythm on telemetry  Critical Care Infusions : Epinephrine 4 mcg/min  Beta Blocker Plan: Hold beta blocker secondary to Hypotension/ Low CI  Epicardial pacing wire plan : Epicardial pacing wires in place. Will pace as needed for bradycardia or cardiac output   Post op cardiac surgery medications:    mg QD  Statin:None reported statin intolerant   Amiodarone 200 mg PO q8 hours     Lung:   Acute respiratory failure requiring Mechanical Vent:. Secondary to poor inspiratory effort secondary to pain, pulmonary vascular congestion, and difficult airway   Chest tube output remains persistently high; Continue chest tubes to suction today    GI:   Continue PPI for stress ulcer prophylaxis  Continue bowel regimen  Trend abdominal exam and bowel function    FEN:   Diuresis Plan: PRN  Nutrition plan: as tolerated  Replenish electrolytes with goals: K >4.0, Mag >2.0, and Phos >3.0    :   [place]itt Plan: Continue for accurate I/O monitoring for 48 hours  Trend UOP and BUN/creat  Strict I and O     ID:   Trend temps and WBC count  Maintain normothermia    Heme:    Trend hgb and plts    Endo:   Continue Insulin infusion and consult endocrine. Initiate home insulin teaching.      MSK/Skin:  Mobility goal:   PT/OT consult as medically appropriate   Frequent turning and pressure off-loading  Local wound care as needed    Disposition:  Critical care    ICU Core Measures     Vented Patient  VAP Bundle  VAP bundle ordered     A: Assess, Prevent, and Manage Pain Has pain been assessed? Yes  Need for changes to pain regimen? No   B: Both Spontaneous Awakening Trials (SATs) and Spontaneous Breathing Trials (SBTs) Plan to perform spontaneous awakening trial today? Yes   Plan to perform spontaneous breathing trial today? Yes   Obvious barriers to extubation? No   C: Choice of Sedation RASS Goal: 0 Alert and Calm  Need for changes to sedation or analgesia regimen? No   D: Delirium CAM-ICU: Negative   E: Early Mobility  Plan for early mobility? Yes   F: Family Engagement Plan for family engagement today? Yes       Antibiotic Review: Post op requirements     Review of Invasive Devices:    Sweetie Plan: Continue for accurate I/O monitoring for 48 hours  Central access plan: Patient has multiple central venous catheters.   Adriana Plan: Keep arterial line for hemodynamic monitoring    Prophylaxis:  VTE VTE covered by:  heparin (porcine), Subcutaneous, 5,000 Units at 07/09/25 0510       Stress Ulcer  covered bypantoprazole (PROTONIX) EC tablet 40 mg [[id number]]            24 Hour Events    24 Hour Events/HPI: POD # 1 s/p CABG x3. Arrived supported on Epi 4 Levo 4. Levo weaned off. Received total of 500 LR and 500 albumin for low CI with high SVR with appropriate response.  Remained intubated secondary to difficult airway.  Critical Care Infusions : Epinephrine 4 mcg/min  Subjective   Review of Systems: See HPI for Review of Systems  Objective :    Medications:  Scheduled Continuous   acetaminophen, 975 mg, Q6H While awake  amiodarone, 200 mg, Q8H DANTE  aspirin, 325 mg, Daily  cefazolin, 2,000 mg,  Q8H  chlorhexidine, 15 mL, BID  dexamethasone, 6 mg, Q6H DANTE  fentaNYL, 50 mcg, Once  ferrous sulfate, 325 mg, Daily  heparin (porcine), 5,000 Units, Q8H DANTE  mupirocin, 1 Application, Q12H DANTE  pantoprazole, 40 mg, Daily  polyethylene glycol, 17 g, Daily  saccharomyces boulardii, 250 mg, BID      dexmedetomidine, 0.1-0.7 mcg/kg/hr, Last Rate: 0.7 mcg/kg/hr (07/09/25 0600)  epinephrine, 1-10 mcg/min, Last Rate: 4 mcg/min (07/09/25 0600)  insulin regular (HumuLIN R,NovoLIN R) 1 Units/mL in sodium chloride 0.9 % 100 mL infusion, 0.3-21 Units/hr, Last Rate: 6 Units/hr (07/09/25 0600)  niCARdipine, 2.5-15 mg/hr, Last Rate: 2.5 mg/hr (07/09/25 0640)  norepinephrine, 1-30 mcg/min, Last Rate: Stopped (07/09/25 0640)  phenylephine,  mcg/min  sodium chloride, 20 mL/hr, Last Rate: 20 mL/hr (07/09/25 0600)         Vitals: Invasive Monitoring       Most Recent Min/Max in 24hrs   Temp 99 °F (37.2 °C) Temp  Min: 95.5 °F (35.3 °C)  Max: 99.1 °F (37.3 °C)   Pulse 69 Pulse  Min: 60  Max: 94   Resp 14 Resp  Min: 12  Max: 26   BP (!) 173/61 BP  Min: 146/61  Max: 173/61   O2 Sat 100 % SpO2  Min: 99 %  Max: 100 %   O2 Device: Ventilator Arterial Line  [place] /48  Arterial Line BP  Min: 93/46  Max: 177/70   MAP 65 mmHg  Arterial Line MAP (mmHg)  Min: 58 mmHg  Max: 109 mmHg     PA Catheter   Most Recent  Min/Max in 24hrs    PAP 26/12 PAP  Min: 24/13  Max: 42/22   CVP 10 mmHg CVP (mean)  Min: 8 mmHg  Max: 14 mmHg   CI 2.2 L/min/m2  CI (L/min/m2)  Min: 1.8 L/min/m2  Max: 2.8 L/min/m2    (dyne*sec)/cm5 SVR (dyne*sec)/cm5  Min: 985 (dyne*sec)/cm5  Max: 1421 (dyne*sec)/cm5        I/O Chest tube output (if present)     Intake/Output Summary (Last 24 hours) at 7/9/2025 0707  Last data filed at 7/9/2025 0600  Gross per 24 hour   Intake 9152.17 ml   Output 1070 ml   Net 8082.17 ml     UOP: 40/hour    BM: No BM in the last 24 hours  Weight change: 6.258 kg (13 lb 12.7 oz)     Mediastinal tubes:  160 mL/8hr  160 mL/24hr   Pleural  tubes: 110 mL/8hr  110 mL/24hr        Physical Exam   Physical Exam  Vitals and nursing note reviewed.   Eyes:      Pupils: Pupils are equal, round, and reactive to light.   Skin:     General: Skin is warm.   HENT:      Head: Normocephalic.      Mouth/Throat:      Mouth: Mucous membranes are moist.   Neck:      Vascular: Central line present.   Cardiovascular:      Rate and Rhythm: Normal rate and regular rhythm.      Pulses: Normal pulses.   Abdominal:      Palpations: Abdomen is soft.   Constitutional:       Appearance: She is well-developed.   Pulmonary:      Effort: Pulmonary effort is normal.      Breath sounds: Normal breath sounds.   Neurological:      General: No focal deficit present.      Motor: Strength full and intact in all extremities.          Diagnostic Studies      07/09/25 CXR: Lines and tubes in place, no pthx or effusion .   This was personally reviewed by myself in PACS.    07/09/25 EKG: NSR, No ST or T wave abnormalities, Prolonged QTC.   This was personally reviewed by myself.         Labs:  CBC  Recent Labs     07/08/25 2231 07/08/25 2232 07/09/25 0437   WBC  --   --  17.47*   HGB 12.0 11.2* 11.0*  11.1*   HCT 35.6 33* 33.6*  33.5*     --  180     BMP  Recent Labs     07/08/25 2231 07/08/25 2232 07/09/25 0223 07/09/25 0437   SODIUM 143  --  142  --    K 2.8*  --  4.2 4.3   *  --  111*  --    CO2 20* 20* 20*  --    AGAP 11  --  11  --    BUN 23  --  26*  --    CREATININE 1.30  --  1.45*  --    CALCIUM 7.8*  --  8.0*  --         Baseline Creat: 1.1   Coags  Recent Labs     07/08/25 2231   INR 1.29*   PTT 32              Additional Electrolytes  Recent Labs     07/08/25 2127 07/08/25 2232 07/09/25 0437   MG  --   --  2.6   CAIONIZED 1.29 1.20  --           Blood Gas  Recent Labs     07/09/25 0436   PHART 7.267*   [id number] 42.8   PO2ART 172.3*   [id number] 19.1*   BEART -7.5   SOURCE Line, Arterial     Recent Labs     07/09/25  0436 07/09/25  0523   PHVEN  --  7.252*    [id number]  --  46.7   PO2VEN  --  42.4   [id number]  --  20.1*   BEVEN  --  -7.0   [id number]  --  74.0   SOURCE Line, Arterial  --     LFTs  No recent results    Infectious  No recent results     No recent results Glucose  Recent Labs     07/08/25  2231 07/09/25  0223   GLUC 183* 225*      Administrative Statements     Collaborative bedside rounds performed with cardiac surgery attending, critical care attending and bedside RN.

## 2025-07-09 NOTE — CONSULTS
Consultation - Cardiology Team One  Nathalia Monge 64 y.o. female MRN: [id number]  Unit/Bed#: PPHP-307-01 Encounter: [id number]    Inpatient consult to Cardiology  Consult performed by: SHASHANK Finch  Consult ordered by: Ethan Nixon PA-C        Physician Requesting Consult: Abhi Rosales MD  Reason for Consult / Principal Problem: s/p CABG    Assessment & Plan  CAD (coronary artery disease)  S/P CABG (coronary artery bypass graft)  POD #1 s/p CABG x 3 with LIMA to LAD, SVG to RI 1, and SVG to RI 2  Final intra op RAFAEL with improved LVEF to 45%  ECG reviewed- NSR with prolonged QTc- Amiodarone held by primary team  Tele reviewed- NSR  Post op volume management- furosemide 40 mg IV x 1 dose  Follow strict I/Os, daily weights, am BMP  On ASA. Not on statin 2/2 allergy. Not yet on beta blocker  Continue to monitor on telemetry  Type 2 diabetes mellitus with hyperglycemia, with long-term current use of insulin ([facility])  Lab Results   Component Value Date    HGBA1C 7.4 (H) 03/18/2025   Per primary team/endocrinology  Obesity (BMI 30.0-34.9)  BMI 34.25  Mixed hyperlipidemia  Lipid profile June 2025: Total cholesterol 267, triglycerides 152, HDL 51,   Has documented allergy to all statins (diarrhea)  Will need Zetia and insurance authorization for PCSK9 inhibitor as an outpatient  Chronic heart failure with reduced ejection fraction (HFrEF, <= 40%) ([facility])  Wt Readings from Last 3 Encounters:   07/09/25 94.8 kg (208 lb 15.9 oz)   07/01/25 87.9 kg (193 lb 12.6 oz)   06/11/25 89.3 kg (196 lb 14.4 oz)   Volume up on exam today  Dry weight around 193 lb, currently 208 lb  Home diuretic: Lasix 20 mg p.o. daily  Current diuretic: Furosemide 40 mg IV x 1 dose    History of Present Illness   HPI: Nathalia Monge is a 64 y.o. year old female with MVCAD, ischemic cardiomyopathy LVEF 30%, chronic HFrEF, HTN, HLD, type 2 diabetes, CKD, bipolar disorder, PTSD, and obesity who plans to follow with Dr. Chavez.  He was initially seen by  our cardiology team at [facility] when she presented with shortness of breath and uncontrolled hypertension.  Echocardiogram at that time revealed reduced LV function with an EF of 30%.  She was put on medical therapy and discharged. She declined a LifeVest at follow up appointments.  She underwent cardiac CTA with FFR which demonstrated significant, flow-limiting atherosclerotic disease.  She underwent cardiac catheterization in May revealing diffusely diseased LAD with 70% mid LAD stenosis, immediate ramus lesion, and mild diffuse RCA disease with 85% mid RPDA lesion.  She was referred to CT surgery for CABG evaluation.      She presented to the hospital on 7/8/2025 to undergo planned CABG x 3 with LIMA to LAD, SVG to ramus intermedius 1, and SVG to ramus intermedius 2 with Dr. Rosales.  PDA was too small to bypass.  She is postoperative day 1 and recovering in the ICU.  She is on weaned off vasoactive infusions and on 2LNC.  Final intraoperative RAFAEL showed improvement of LVEF to 45%.  Postop hemoglobin 11.0 and creatinine 1.45.  ECG shows normal sinus rhythm with prolonged QTc.  Cardiology consulted as part of routine postoperative management.    EKG reviewed personally: NSR with prolonged QTc    Telemetry reviewed personally: NSR     Review of Systems   Constitutional: Positive for malaise/fatigue. Negative for chills and weight gain.   Cardiovascular:  Positive for chest pain. Negative for dyspnea on exertion, leg swelling, orthopnea, palpitations and syncope.   Respiratory:  Negative for cough, shortness of breath, sleep disturbances due to breathing and sputum production.    Endocrine: Negative.    Hematologic/Lymphatic: Negative.    Gastrointestinal:  Negative for bloating, nausea and vomiting.   Genitourinary: Negative.    Neurological:  Positive for weakness. Negative for dizziness and light-headedness.   Psychiatric/Behavioral:  Negative for altered mental status.    All other systems reviewed and are  negative.    Historical Information   Past Medical History[1]  Past Surgical History[2]  Social History     Substance and Sexual Activity   Alcohol Use Never     Social History     Substance and Sexual Activity   Drug Use No     Tobacco Use History[3]  Family History: Family history non-contributory    Meds/Allergies   all current active meds have been reviewed and current meds:   Current Facility-Administered Medications:     acetaminophen (TYLENOL) tablet 975 mg, Q6H While awake    [Held by provider] amiodarone tablet 200 mg, Q8H DANTE    aspirin tablet 325 mg, Daily    bisacodyl (DULCOLAX) rectal suppository 10 mg, Daily PRN    ceFAZolin (ANCEF) IVPB (premix in dextrose) 2,000 mg 50 mL, Q8H, Last Rate: 100 mL/hr at 07/09/25 1400    chlorhexidine (PERIDEX) 0.12 % oral rinse 15 mL, BID    dexamethasone (PF) (DECADRON) injection 6 mg, Q6H DANTE    docusate sodium (COLACE) capsule 100 mg, BID    EPINEPHrine 5,000 mcg (STANDARD CONCENTRATION) IV in sodium chloride 0.9% 250 mL, Titrated, Last Rate: Stopped (07/09/25 1204)    ferrous sulfate tablet 325 mg, Daily    heparin (porcine) subcutaneous injection 5,000 Units, Q8H DANTE **AND** [CANCELED] Platelet count, Once    HYDROmorphone (DILAUDID) injection 0.5 mg, Q2H PRN    insulin regular (HumuLIN R,NovoLIN R) 1 Units/mL in sodium chloride 0.9 % 100 mL infusion, Titrated, Last Rate: 4 Units/hr (07/09/25 1400)    mupirocin (BACTROBAN) 2 % nasal ointment 1 Application, Q12H DANTE    niCARdipine (CARDENE) 25 mg (STANDARD CONCENTRATION) in sodium chloride 0.9% 250 mL, Titrated, Last Rate: 5 mg/hr (07/09/25 1400)    norepinephrine (LEVOPHED) 4 mg (STANDARD CONCENTRATION) IV in sodium chloride 0.9% 250 mL, Titrated, Last Rate: Stopped (07/09/25 1203)    [Held by provider] ondansetron (ZOFRAN) injection 4 mg, Q6H PRN    oxyCODONE (ROXICODONE) split tablet 2.5 mg, Q4H PRN **OR** oxyCODONE (ROXICODONE) IR tablet 5 mg, Q4H PRN    pantoprazole (PROTONIX) EC tablet 40 mg, Daily     "polyethylene glycol (MIRALAX) packet 17 g, Daily    saccharomyces boulardii (FLORASTOR) capsule 250 mg, BID    sodium chloride infusion 0.45 %, Continuous, Last Rate: 20 mL/hr (25 1400)    temazepam (RESTORIL) capsule 15 mg, HS PRN  epinephrine, 1-10 mcg/min, Last Rate: Stopped (25 1204)  insulin regular (HumuLIN R,NovoLIN R) 1 Units/mL in sodium chloride 0.9 % 100 mL infusion, 0.3-21 Units/hr, Last Rate: 4 Units/hr (25 1203)  niCARdipine, 2.5-15 mg/hr, Last Rate: 5 mg/hr (25 1355)  norepinephrine, 1-30 mcg/min, Last Rate: Stopped (25 1203)  sodium chloride, 20 mL/hr, Last Rate: 20 mL/hr (25 1200)        Allergies[4]    Objective   Vitals: Blood pressure (!) 173/61, pulse 72, temperature 99.1 °F (37.3 °C), temperature source Core, resp. rate 17, height 5' 5.5\" (1.664 m), weight 94.8 kg (208 lb 15.9 oz), SpO2 100%., Body mass index is 34.25 kg/m².,     Systolic (24hrs), Av , Min:173 , Max:173     Diastolic (24hrs), Av, Min:61, Max:61        Intake/Output Summary (Last 24 hours) at 2025 1413  Last data filed at 2025 1200  Gross per 24 hour   Intake 8084.28 ml   Output 1330 ml   Net 6754.28 ml     Wt Readings from Last 3 Encounters:   25 94.8 kg (208 lb 15.9 oz)   25 87.9 kg (193 lb 12.6 oz)   25 89.3 kg (196 lb 14.4 oz)     Invasive Devices       Central Venous Catheter Line  Duration             CVC Central Lines 25 1 day    Introducer 25 1 day              Arterial Line  Duration             Arterial Line 25 Right Radial 1 day              Line  Duration             Pacer Wires <1 day    Pacer Wires <1 day              Drain  Duration             Chest Tube 1 Anterior;Mediastinal 32 Fr. 1 day    Chest Tube 2 Left Pleural 32 Fr. 1 day    Chest Tube 3 Mediastinal;Posterior 32 Fr. 1 day    Urethral Catheter Latex <1 day                    Physical Exam  Vitals reviewed.   Constitutional:       General: She is not in acute " distress.     Appearance: She is obese. She is ill-appearing.   Neck:      Vascular: No hepatojugular reflux or JVD.      Comments: R CVC  Cardiovascular:      Rate and Rhythm: Normal rate and regular rhythm.      Heart sounds: Normal heart sounds. No murmur heard.     No friction rub. No gallop.   Pulmonary:      Effort: Pulmonary effort is normal. No respiratory distress.      Breath sounds: No rales.      Comments: 2LNC  Chest tubes in place  Abdominal:      General: Bowel sounds are normal. There is no distension.      Palpations: Abdomen is soft.      Tenderness: There is no abdominal tenderness.   Genitourinary:     Comments: Pitt catheter draining pale yellow urine    Musculoskeletal:         General: Swelling (b/l UE) present. No tenderness. Normal range of motion.      Cervical back: Neck supple.      Right lower leg: Edema present.      Left lower leg: Edema present.     Skin:     General: Skin is warm and dry.      Capillary Refill: Capillary refill takes less than 2 seconds.      Findings: No erythema.      Comments: Mid sternal incision dressing C/D/I     Neurological:      Mental Status: She is alert and oriented to person, place, and time.     Psychiatric:         Mood and Affect: Mood normal.     LABORATORY RESULTS:      CBC with diff:   Results from last 7 days   Lab Units 07/09/25  0437 07/08/25  2232 07/08/25  2231 07/08/25  2127 07/08/25  2118 07/08/25  2106 07/08/25  2045 07/08/25  2037   WBC Thousand/uL 17.47*  --   --   --   --   --   --   --    HEMOGLOBIN g/dL 11.0*  11.1*  --  12.0  --   --   --   --   --    I STAT HEMOGLOBIN g/dl  --  11.2*  --  8.2*  --  8.8* 8.5* 8.8*   HEMATOCRIT % 33.6*  33.5*  --  35.6  --   --   --   --   --    HEMATOCRIT, ISTAT %  --  33*  --  24*  --  26* 25* 26*   MCV fL 91  --   --   --   --   --   --   --    PLATELETS Thousands/uL 180  --  180  --  164  --   --   --    RBC Million/uL 3.70*  --   --   --   --   --   --   --    MCH pg 30.0  --   --   --   --    "--   --   --    MCHC g/dL 33.1  --   --   --   --   --   --   --    RDW % 13.8  --   --   --   --   --   --   --    MPV fL 10.6  --  10.6  --  10.6  --   --   --        CMP:  Results from last 7 days   Lab Units 07/09/25 0437 07/09/25 0223 07/08/25 2232 07/08/25 2231 07/08/25 2127 07/08/25 2106 07/08/25 2045 07/08/25 2037 07/08/25 2018 07/08/25 1956   POTASSIUM mmol/L 4.3 4.2  --  2.8*  --   --   --   --   --   --    CHLORIDE mmol/L  --  111*  --  112*  --   --   --   --   --   --    CO2 mmol/L  --  20*  --  20*  --   --   --   --   --   --    CO2, I-STAT mmol/L  --   --  20*  --  21 24 25 26 23 23   BUN mg/dL  --  26*  --  23  --   --   --   --   --   --    CREATININE mg/dL  --  1.45*  --  1.30  --   --   --   --   --   --    GLUCOSE, ISTAT mg/dl  --   --  175*  --  229* 268* 265* 246* 224* 162*   CALCIUM mg/dL  --  8.0*  --  7.8*  --   --   --   --   --   --    EGFR ml/min/1.73sq m  --  38  --  43  --   --   --   --   --   --        BMP:  Results from last 7 days   Lab Units 07/09/25 0437 07/09/25 0223 07/08/25 2232 07/08/25 2231 07/08/25 2127 07/08/25 2106 07/08/25 2045 07/08/25 2037   POTASSIUM mmol/L 4.3 4.2  --  2.8*  --   --   --   --    CHLORIDE mmol/L  --  111*  --  112*  --   --   --   --    CO2 mmol/L  --  20*  --  20*  --   --   --   --    CO2, I-STAT mmol/L  --   --  20*  --  21 24 25 26   BUN mg/dL  --  26*  --  23  --   --   --   --    CREATININE mg/dL  --  1.45*  --  1.30  --   --   --   --    GLUCOSE, ISTAT mg/dl  --   --  175*  --  229* 268* 265* 246*   CALCIUM mg/dL  --  8.0*  --  7.8*  --   --   --   --        Lab Results   Component Value Date    CREATININE 1.45 (H) 07/09/2025    CREATININE 1.30 07/08/2025    CREATININE 1.09 06/19/2025       No results found for: \"NTBNP\"       Results from last 7 days   Lab Units 07/09/25  0437   MAGNESIUM mg/dL 2.6                     Results from last 7 days   Lab Units 07/08/25  2231   INR  1.29*     Lipid Profile:   No results found for: " "\"CHOL\"  Lab Results   Component Value Date    HDL 51 06/19/2025    HDL 42 (L) 03/19/2025    HDL 49 (L) 12/04/2024     Lab Results   Component Value Date    LDLCALC 186 (H) 06/19/2025    LDLCALC 120 (H) 03/19/2025    LDLCALC 166 (H) 12/04/2024     Lab Results   Component Value Date    TRIG 152 (H) 06/19/2025    TRIG 138 03/19/2025    TRIG 157 (H) 12/04/2024       Cardiac testing:   No results found for this or any previous visit.    No results found for this or any previous visit.    No valid procedures specified.  No results found for this or any previous visit.      Imaging: Results Review Statement: I reviewed radiology reports from this admission including: chest xray and Echocardiogram.  XR chest portable  Result Date: 7/9/2025  Narrative: XR CHEST PORTABLE INDICATION: CABG 7/8/2025 COMPARISON: CXR 3/18/2025, 1/8/2024, cardiac CT 4/22/2025, chest CT 3/18/2025. FINDINGS: ET tube 5 cm above the rony. Right jugular catheter at cavoatrial junction. Right jugular pulmonary artery catheter in right pulmonary artery. Clear lungs. No pneumothorax or pleural effusion. Mild cardiomegaly. Bones are unremarkable for age. Normal upper abdomen.     Impression: Expected appearance after cardiac surgery with no acute disease. Workstation performed: [id number]     RAFAEL Anesthesia  Result Date: 7/8/2025  Narrative: Ingrid Kinney MD     7/8/2025 10:45 PM Procedure Performed: RAFAEL Anesthesia Start Time:  7/8/2025 6:49 PM Preanesthesia Checklist Patient identified, IV assessed, risks and benefits discussed, monitors and equipment assessed, procedure being performed at surgeon's request and anesthesia consent obtained. Procedure Diagnostic Indications for RAFAEL:  assessment of ascending aorta, assessment of surgical repair and hemodynamic monitoring. Type of RAFAEL: interventional RAFAEL with real time guidance of intracardiac procedure. Images Saved: ultrasound permanent image saved. Physician Requesting Echo: Abhi Rosales MD.  " Location performed: OR. Intubated. Bite block not placed.  Heart visualized. RAFAEL probe insertion: difficult insertion b/c anterior airway, overbite, small mouth opening, stiff neck with reduced mobility. attempts w/ video laryngoscopy to guide RAFAEL unsuccessful. success with guidance of RAFAEL to back of oropharyx and flexion of neck. Probe Type:  Multiplane. Modalities:  3D, color flow mapping, continuous wave Doppler and pulse wave Doppler. Echocardiographic and Doppler Measurements PREPROCEDURE LEFT VENTRICLE: Systolic Function: mildly impaired. Ejection Fraction: 45%. Cavity size: dilated.   Regional Wall Motion Abnormalities: global hypokinesis of LV, mild hypokinesis of septal wall.. RIGHT VENTRICLE: Systolic Function: normal.  Cavity size normal. No hypertrophy  AORTIC VALVE: Leaflets: normal and trileaflet. Leaflet motions normal and normal. Stenosis: none.     Regurgitation: none.  MITRAL VALVE: Leaflets: myxomatous. Leaflet Motions: normal. Regurgitation: mild and MAP in 80s, mild MR noted.   Stenosis: none. Mean Gradient: 1 mmHg.  TRICUSPID VALVE: Leaflets: normal. Leaflet Motions: normal. Stenosis: none. Regurgitation: trace. PULMONIC VALVE: Leaflets: normal. Regurgitation: trace. Stenosis: none. ASCENDING AORTA: Size:  normal.  Dissection not present.  AORTIC ARCH: Size:  normal.  dissection not present. Grade 3: atheroma protruding < 0.5 cm into lumen. DESCENDING AORTA: Size: normal.   Grade 3: atheroma protruding < 0.5 cm into lumen. RIGHT ATRIUM: Size:  normal. No spontaneous echo contrast. LEFT ATRIUM: Size: normal. No spontaneous echo contrast. LEFT ATRIAL APPENDAGE: Size: normal. No spontaneous echo contrast ATRIAL SEPTUM: Intra-atrial septal morphology: normal.  VENTRICULAR SEPTUM: Intra-ventricular septum morphology: normal. EPIAORTIC: Plaque Thickness: 0-5 mm. OTHER FINDINGS: Pericardium:  normal. Pleural Effusion:  none. POSTPROCEDURE LEFT VENTRICLE: Unchanged . Systolic Function: mildly  depressed. Ejection Fraction: 45 %. Cavity Size: dilated. Regional Wall Motion Abnormalities: global hypokinesis, septal wall HK. RIGHT VENTRICLE: Unchanged . Systolic Function: normal. Cavity Size: normal. AORTIC VALVE: Unchanged . Leaflets: native. Stenosis: none.  Regurgitation: none.  MITRAL VALVE: Unchanged . Leaflets: native. Regurgitation: MAP in 70s and mild. Stenosis: none. Mean Gradient: 2. TRICUSPID VALVE: Unchanged . Leaflets: native. Regurgitation: trace. PULMONIC VALVE: Unchanged Leaflets: native. Regurgitation: trace. ATRIA: Unchanged . Left Atrial Appendage Ligate: No. Residual Flow in Left Atrial Appendage by Color Flow Doppler: No.  AORTA: Unchanged . Dissection: Dissection not present. REMOVAL: Probe Removal: no heme noted upon removal of probe. and atraumatic.  ECHOCARDIOGRAM COMMENTS: EF 45% postCPB, with epi and levo support, global hypokinesis persists (mild) septal wall appears dyskinetic with V pacing efforts. .     US bedside procedure  Result Date: 7/8/2025  Narrative: [id number]    RAFAEL intraop interventional w/realtime guidance of cardiac procedures  Result Date: 7/8/2025  Narrative: This order contains the linked images for the RAFAEL that was performed by the Anesthesiologist.  Please see the  CARDIAC RAFAEL ANESTHESIA procedure for results.    RAFAEL Anesthesia  Result Date: 7/8/2025  Narrative: Emeterio Medrano MD     7/9/2025  9:27 AM Procedure Performed: RAFAEL Anesthesia Start Time:  7/8/2025 8:15 AM Preanesthesia Checklist Patient identified, IV assessed, risks and benefits discussed, monitors and equipment assessed, procedure being performed at surgeon's request and anesthesia consent obtained. Procedure Diagnostic Indications for RAFAEL:  assessment of ascending aorta, assessment of surgical repair and hemodynamic monitoring. Type of RAFAEL: complete RAFAEL with interpretation. Images Saved: ultrasound permanent image saved. Physician Requesting Echo: Abhi Rosales MD.   Location performed: OR. Intubated. Bite block not placed.  Heart visualized. Insertion of RAFAEL Probe:  Atraumatic. Probe Type:  Epiaortic and multiplane. Modalities:  3D, color flow mapping, continuous wave Doppler and pulse wave Doppler. Echocardiographic and Doppler Measurements PREPROCEDURE LEFT VENTRICLE: Systolic Function: severely impaired. Ejection Fraction: 30%.    Regional Wall Motion Abnormalities: global HK, worse septal, anteroseptal, inferoseptal. RIGHT VENTRICLE: Systolic Function: normal.  Cavity size normal.  AORTIC VALVE: Leaflets: normal. Leaflet motions normal and normal. Stenosis: none.     Regurgitation: none.  MITRAL VALVE: Leaflets: normal. Leaflet Motions: normal. Regurgitation: mild-mod; closer to moderate with SPB ~180 mmHg, closer to mild with SBP ~ 40 mmHg.   Stenosis: none.   TRICUSPID VALVE: Leaflets: normal. Leaflet Motions: normal.  Regurgitation: mild. ASCENDING AORTA:   Dissection not present.  AORTIC ARCH:   dissection not present. Grade 3: atheroma protruding < 0.5 cm into lumen. DESCENDING AORTA:   Dissection not present. Grade 3: atheroma protruding < 0.5 cm into lumen. OTHER ATRIAL FINDINGS: No TAYLOR clot. ATRIAL SEPTUM: Intra-atrial septal morphology: no PFO by CFD.  POSTPROCEDURE REMOVAL: Probe Removal: atraumatic.  ECHOCARDIOGRAM COMMENTS: Insertion; initial attempt met resistance; I placed McGrath3 to visualize, unable to pass RAFAEL probe behind ETT given limited space; attempt with jaw thrust, RAFAEL passed; each time any resistance was met, I stopped and readjusted.     Counseling / Coordination of Care  Total floor / unit time spent today 45 minutes.  Greater than 50% of total time was spent with the patient and / or family counseling and / or coordination of care.  A description of the counseling / coordination of care: Review of history, current assessment, development of a plan.    Code Status: Level 1 - Full Code    ** Please Note: Dragon 360 Dictation voice to text software  may have been used in the creation of this document. **         [1]   Past Medical History:  Diagnosis Date    Bacteremia due to Klebsiella pneumoniae 03/06/2024    Bipolar depression (HCC)     CAD (coronary artery disease)     Chronic heart failure with reduced ejection fraction (HFrEF, <= 40%) (HCC)     EF 30%    Chronic heart failure with reduced ejection fraction (HFrEF, <= 40%) (HCC) 03/19/2025    CKD (chronic kidney disease), stage III (HCC)     DDD (degenerative disc disease), thoracic     Depression     Diabetes mellitus (HCC)     type 2, insulin dependent    Epilepsy (HCC)     no maintaince medications, experiences petite seizures to right eye multiple times throughout the day, does not drive due to this    Former tobacco use     GERD (gastroesophageal reflux disease)     History of Clostridioides difficile infection     History of pyelonephritis     w/ renal abscess    Hyperlipidemia     Hypertension     Hypertensive urgency 03/05/2018    ERNESTO (iron deficiency anemia)     Meningioma ([facility])     s/p surgical resection    Obesity     Prolonged QT interval     PTSD (post-traumatic stress disorder)     Telangiectasias     b/l LE    Vitamin D deficiency    [2]   Past Surgical History:  Procedure Laterality Date    CARDIAC CATHETERIZATION Left 05/05/2025    Procedure: Cardiac Left Heart Cath;  Surgeon: Latoya Garner DO;  Location: [facility];  Service: Cardiology    CARDIAC CATHETERIZATION N/A 05/05/2025    Procedure: Cardiac FFR/IFR;  Surgeon: Latoya Garner DO;  Location: [facility];  Service: Cardiology    CHOLECYSTECTOMY  2004    EPIDURAL BLOCK INJECTION      thoracic    HYSTERECTOMY      IMPLANTABLE CONTACT LENS IMPLANTATION Bilateral     IR THORACENTESIS  03/18/2025    OR CRNEC TREPH BONE FLAP CRNOT EXC BRAIN TUMOR STTL Right 03/15/2018    Procedure: IMAGE-GUIDED RIGHT FRONTOPARIETAL CRANIOTOMY FOR TUMOR;  Surgeon: Arsh Jeronimo MD;  Location: [facility] MAIN [facility];  Service: Neurosurgery    [3]   Social History  Tobacco Use   Smoking Status Former    Current packs/day: 0.00    Types: Cigarettes    Quit date: 3/5/2000    Years since quittin.3   Smokeless Tobacco Never   Tobacco Comments    Social use up to    [4]   Allergies  Allergen Reactions    Lidocaine Seizures     Tolerated bupivacaine w/ cardiac cath    Statins Diarrhea     Has tried all statins    Dilantin [Phenytoin]     Gabapentin Other (See Comments)     Anger/irritability/mood disturbance

## 2025-07-09 NOTE — OP NOTE
OPERATIVE REPORT  PATIENT NAME: Nathalia Monge    :  1960  MRN: [id number]  Pt Location: [facility] OR ROOM 10    SURGERY DATE: 2025    SURGEON: Abhi Rosales MD    ASSISTANT: Abigail Meyer PA-C    ADDITIONAL ASSISTANT: N/A    PREOPERATIVE DIAGNOSIS:  Multivessel coronary artery disease and Ischemic cardiomyopathy    POSTOPERATIVE DIAGNOSIS:  Multivessel coronary artery disease and Ischemic cardiomyopathy    NYHA: 3  CCS: 1    PROCEDURE:   Coronary artery bypass grafting x 3 with left internal mammary artery to left anterior descending artery, saphenous vein graft to ramus intermedius 1, and saphenous vein graft to ramus intermedius 2    CARDIOPULMONARY BYPASS TIME: 68 minutes.   CROSSCLAMP TIME: 59 minutes.    ANESTHESIA: General endotracheal anesthesia with transesophageal echocardiogram  guidance, Dr. Ingrid Kinney    INDICATIONS:  The patient is a 64 y.o. year-old female diagnosed with Multivessel coronary artery disease and ischemic cardiomyopathy. Coronary angiography showed LAD 75%, Ramus splits in 2 large branches, eacj with a 70% stenosis, Distal PDA 85%. I saw the patient in consultation and recommended the procedure described previously.    FINDINGS:  1. Intraoperative transesophageal echocardiogram revealed EF 40%, mild MR.  2. Epiaortic ultrasound showed less than 5 mm non-mobile plaque  3. Coronary anatomy as described in coronary angiography, the distal PDA was a small vessel non amenable for bypass  4. Final transesophageal echocardiogram demonstrated improved ventricular function.       OPERATIVE TECHNIQUE:    The patient was taken to the operating room, properly identified and placed supine on the operating table. Following the satisfactory induction of general anesthesia and placement of monitoring lines, the patient was prepped and draped in the usual sterile fashion. A time-out procedure was performed.    The patient underwent median sternotomy, pericardiotomy, left internal mammary  artery harvest with the standard technique and endoscopic left greater saphenous vein harvest, systemic heparinization and conduit preparation. Epiaortic ultrasound showed less than 5 mm non-mobile plaque. Cannulation for cardiopulmonary bypass was performed with an arterial dispersion cannula, double stage venous cannula and a vented antegrade cardioplegia cannula. Once the ACT was greater than 480 seconds we placed the patient on cardiopulmonary bypass, the ascending aorta was crossclamped and cardiac arrest was obtained without complications with a combination of warm and cold antegrade cardioplegia. Intermittent antegrade cardioplegia was delivered throughout the remainder of the crossclamp period.     The following grafts were completed, left internal mamamry artery to left anterior descending artery with excellent flow, saphenous vein graft to ramus intermedius 1 with flow of 90 ml/min, and saphenous vein graft to ramus intermedius 2 with flow of 120 ml/min. All the distal anastomoses were performed in end-to-side fashion with 7-0 Prolene. A total of 2 proximal anastomoses were completed on the ascending aorta in end-to-side fashion using running 6-0 Prolene suture. The patient was placed in the Trendelenburg position, the heart was de-aired, a hotshot was given and the crossclamp was removed. Atrial and ventricular pacing wires were placed. Following a period of reperfusion, the patient was weaned from cardiopulmonary bypass and decannulated. Protamine was administered with normalization of the ACT. Hemostasis was confirmed in all fields. Thoracostomy tubes were placed. The sternum was closed with fiber tapes. The fascia, subdermis and skin were closed with multiple layers of running absorbable suture.    COMPLICATIONS: None.    PACKS/TUBES/DRAINS: Chest tubes x 3.     EBL: 200 cc.    TRANSFUSION: None.     SPECIMENS: None.     As the attending surgeon, I was present and scrubbed for all critical portions of  this procedure. Sponge, needle and instrument counts were reported as correct by the nursing staff. There is no cardiac residency program at this facility and for complex procedures such as this, it is vital to have a physician assistant experienced in cardiac surgery.  The assistance of Abigail Meyer PA-C was necessary for endoscopic saphenous vein harvesting, retraction of the heart and coronary conduit with proper tissue handling techniques, and following of the suture with correct tension during construction of the proximal and distal coronary anastomoses.      SIGNATURE: Abhi Rosales MD  DATE: July 8, 2025  TIME: 10:00 PM

## 2025-07-09 NOTE — ASSESSMENT & PLAN NOTE
Wt Readings from Last 3 Encounters:   07/09/25 94.8 kg (208 lb 15.9 oz)   07/01/25 87.9 kg (193 lb 12.6 oz)   06/11/25 89.3 kg (196 lb 14.4 oz)   Volume up on exam today  Dry weight around 193 lb, currently 208 lb  Home diuretic: Lasix 20 mg p.o. daily  Current diuretic: Furosemide 40 mg IV x 1 dose

## 2025-07-09 NOTE — ADDENDUM NOTE
Addendum  created 07/09/25 0928 by Emeterio Medrano MD    Clinical Note Signed, Flowsheet accepted, Intraprocedure Blocks edited (Anesthesia), SmartForm saved

## 2025-07-10 ENCOUNTER — APPOINTMENT (INPATIENT)
Dept: NON INVASIVE DIAGNOSTICS | Facility: HOSPITAL | Age: 65
DRG: 235 | End: 2025-07-10
Payer: COMMERCIAL

## 2025-07-10 LAB
ANION GAP SERPL CALCULATED.3IONS-SCNC: 10 MMOL/L (ref 4–13)
AORTIC VALVE MEAN VELOCITY: 12.2 M/S
AV LVOT MEAN GRADIENT: 3 MMHG
AV LVOT PEAK GRADIENT: 6 MMHG
AV MEAN PRESS GRAD SYS DOP V1V2: 7 MMHG
AV PEAK GRADIENT: 14 MMHG
AV VELOCITY RATIO: 0.69
AV VMAX SYS DOP: 1.86 M/S
BSA FOR ECHO PROCEDURE: 2 M2
BUN SERPL-MCNC: 37 MG/DL (ref 5–25)
CALCIUM SERPL-MCNC: 8.3 MG/DL (ref 8.4–10.2)
CHLORIDE SERPL-SCNC: 107 MMOL/L (ref 96–108)
CO2 SERPL-SCNC: 23 MMOL/L (ref 21–32)
CREAT SERPL-MCNC: 1.76 MG/DL (ref 0.6–1.3)
DOP CALC AO VTI: 28.12 CM
DOP CALC LVOT PEAK VEL VTI: 19.48 CM
DOP CALC LVOT PEAK VEL: 1.18 M/S
E WAVE DECELERATION TIME: 186 MS
E/A RATIO: 1.22
ERYTHROCYTE [DISTWIDTH] IN BLOOD BY AUTOMATED COUNT: 14.3 % (ref 11.6–15.1)
GFR SERPL CREATININE-BSD FRML MDRD: 30 ML/MIN/1.73SQ M
GLUCOSE SERPL-MCNC: 113 MG/DL (ref 65–140)
GLUCOSE SERPL-MCNC: 117 MG/DL (ref 65–140)
GLUCOSE SERPL-MCNC: 134 MG/DL (ref 65–140)
GLUCOSE SERPL-MCNC: 135 MG/DL (ref 65–140)
GLUCOSE SERPL-MCNC: 143 MG/DL (ref 65–140)
GLUCOSE SERPL-MCNC: 145 MG/DL (ref 65–140)
GLUCOSE SERPL-MCNC: 149 MG/DL (ref 65–140)
GLUCOSE SERPL-MCNC: 149 MG/DL (ref 65–140)
GLUCOSE SERPL-MCNC: 150 MG/DL (ref 65–140)
GLUCOSE SERPL-MCNC: 167 MG/DL (ref 65–140)
GLUCOSE SERPL-MCNC: 170 MG/DL (ref 65–140)
GLUCOSE SERPL-MCNC: 173 MG/DL (ref 65–140)
GLUCOSE SERPL-MCNC: 191 MG/DL (ref 65–140)
HCT VFR BLD AUTO: 30.5 % (ref 34.8–46.1)
HGB BLD-MCNC: 10.1 G/DL (ref 11.5–15.4)
LEFT VENTRICLE DIASTOLIC VOLUME (MOD BIPLANE): 115 ML
LEFT VENTRICLE DIASTOLIC VOLUME INDEX (MOD BIPLANE): 57.5 ML/M2
LEFT VENTRICLE SYSTOLIC VOLUME (MOD BIPLANE): 56 ML
LEFT VENTRICLE SYSTOLIC VOLUME INDEX (MOD BIPLANE): 28 ML/M2
LV EF BIPLANE MOD: 51 %
LV EF US.2D.A4C+ESTIMATED: 46 %
MAGNESIUM SERPL-MCNC: 2.3 MG/DL (ref 1.9–2.7)
MCH RBC QN AUTO: 30.4 PG (ref 26.8–34.3)
MCHC RBC AUTO-ENTMCNC: 33.1 G/DL (ref 31.4–37.4)
MCV RBC AUTO: 92 FL (ref 82–98)
MV E'TISSUE VEL-LAT: 8 CM/S
MV E'TISSUE VEL-SEP: 6 CM/S
MV PEAK A VEL: 0.81 M/S
MV PEAK E VEL: 99 CM/S
PLATELET # BLD AUTO: 152 THOUSANDS/UL (ref 149–390)
PMV BLD AUTO: 11.2 FL (ref 8.9–12.7)
POTASSIUM SERPL-SCNC: 3.7 MMOL/L (ref 3.5–5.3)
RBC # BLD AUTO: 3.32 MILLION/UL (ref 3.81–5.12)
SL CV LV EF: 46
SODIUM SERPL-SCNC: 140 MMOL/L (ref 135–147)
WBC # BLD AUTO: 24.45 THOUSAND/UL (ref 4.31–10.16)

## 2025-07-10 PROCEDURE — NC001 PR NO CHARGE: Performed by: PHYSICIAN ASSISTANT

## 2025-07-10 PROCEDURE — 99232 SBSQ HOSP IP/OBS MODERATE 35: CPT | Performed by: STUDENT IN AN ORGANIZED HEALTH CARE EDUCATION/TRAINING PROGRAM

## 2025-07-10 PROCEDURE — 99232 SBSQ HOSP IP/OBS MODERATE 35: CPT | Performed by: INTERNAL MEDICINE

## 2025-07-10 PROCEDURE — 93308 TTE F-UP OR LMTD: CPT

## 2025-07-10 PROCEDURE — 93308 TTE F-UP OR LMTD: CPT | Performed by: INTERNAL MEDICINE

## 2025-07-10 PROCEDURE — 97530 THERAPEUTIC ACTIVITIES: CPT

## 2025-07-10 PROCEDURE — 80048 BASIC METABOLIC PNL TOTAL CA: CPT | Performed by: THORACIC SURGERY (CARDIOTHORACIC VASCULAR SURGERY)

## 2025-07-10 PROCEDURE — 97535 SELF CARE MNGMENT TRAINING: CPT

## 2025-07-10 PROCEDURE — 97163 PT EVAL HIGH COMPLEX 45 MIN: CPT

## 2025-07-10 PROCEDURE — 97167 OT EVAL HIGH COMPLEX 60 MIN: CPT

## 2025-07-10 PROCEDURE — 99233 SBSQ HOSP IP/OBS HIGH 50: CPT | Performed by: STUDENT IN AN ORGANIZED HEALTH CARE EDUCATION/TRAINING PROGRAM

## 2025-07-10 PROCEDURE — 93325 DOPPLER ECHO COLOR FLOW MAPG: CPT | Performed by: INTERNAL MEDICINE

## 2025-07-10 PROCEDURE — 83735 ASSAY OF MAGNESIUM: CPT | Performed by: THORACIC SURGERY (CARDIOTHORACIC VASCULAR SURGERY)

## 2025-07-10 PROCEDURE — 93321 DOPPLER ECHO F-UP/LMTD STD: CPT

## 2025-07-10 PROCEDURE — 94760 N-INVAS EAR/PLS OXIMETRY 1: CPT

## 2025-07-10 PROCEDURE — 93325 DOPPLER ECHO COLOR FLOW MAPG: CPT

## 2025-07-10 PROCEDURE — 82948 REAGENT STRIP/BLOOD GLUCOSE: CPT

## 2025-07-10 PROCEDURE — 93321 DOPPLER ECHO F-UP/LMTD STD: CPT | Performed by: INTERNAL MEDICINE

## 2025-07-10 PROCEDURE — 94664 DEMO&/EVAL PT USE INHALER: CPT

## 2025-07-10 PROCEDURE — 85027 COMPLETE CBC AUTOMATED: CPT | Performed by: THORACIC SURGERY (CARDIOTHORACIC VASCULAR SURGERY)

## 2025-07-10 RX ORDER — CARVEDILOL 6.25 MG/1
6.25 TABLET ORAL 2 TIMES DAILY WITH MEALS
Status: DISCONTINUED | OUTPATIENT
Start: 2025-07-10 | End: 2025-07-11

## 2025-07-10 RX ORDER — FUROSEMIDE 10 MG/ML
40 INJECTION INTRAMUSCULAR; INTRAVENOUS
Status: DISCONTINUED | OUTPATIENT
Start: 2025-07-10 | End: 2025-07-12

## 2025-07-10 RX ORDER — METOPROLOL TARTRATE 25 MG/1
25 TABLET, FILM COATED ORAL EVERY 12 HOURS SCHEDULED
Status: DISCONTINUED | OUTPATIENT
Start: 2025-07-10 | End: 2025-07-10

## 2025-07-10 RX ADMIN — ASPIRIN 325 MG ORAL TABLET 325 MG: 325 PILL ORAL at 08:30

## 2025-07-10 RX ADMIN — DOCUSATE SODIUM 100 MG: 100 CAPSULE, LIQUID FILLED ORAL at 08:30

## 2025-07-10 RX ADMIN — HEPARIN SODIUM 5000 UNITS: 5000 INJECTION INTRAVENOUS; SUBCUTANEOUS at 21:36

## 2025-07-10 RX ADMIN — OXYCODONE HYDROCHLORIDE 5 MG: 5 TABLET ORAL at 09:23

## 2025-07-10 RX ADMIN — FERROUS SULFATE TAB 325 MG (65 MG ELEMENTAL FE) 325 MG: 325 (65 FE) TAB at 08:31

## 2025-07-10 RX ADMIN — CARVEDILOL 6.25 MG: 6.25 TABLET, FILM COATED ORAL at 11:38

## 2025-07-10 RX ADMIN — NICARDIPINE HYDROCHLORIDE 10 MG/HR: 25 INJECTION, SOLUTION INTRAVENOUS at 10:15

## 2025-07-10 RX ADMIN — AMIODARONE HYDROCHLORIDE 200 MG: 200 TABLET ORAL at 21:36

## 2025-07-10 RX ADMIN — MUPIROCIN 1 APPLICATION: 20 OINTMENT TOPICAL at 08:31

## 2025-07-10 RX ADMIN — HEPARIN SODIUM 5000 UNITS: 5000 INJECTION INTRAVENOUS; SUBCUTANEOUS at 05:00

## 2025-07-10 RX ADMIN — CHLORHEXIDINE GLUCONATE 15 ML: 1.2 SOLUTION ORAL at 08:31

## 2025-07-10 RX ADMIN — ACETAMINOPHEN 975 MG: 325 TABLET ORAL at 13:58

## 2025-07-10 RX ADMIN — HEPARIN SODIUM 5000 UNITS: 5000 INJECTION INTRAVENOUS; SUBCUTANEOUS at 13:58

## 2025-07-10 RX ADMIN — Medication 12.5 MG: at 08:31

## 2025-07-10 RX ADMIN — POLYETHYLENE GLYCOL 3350 17 G: 17 POWDER, FOR SOLUTION ORAL at 08:30

## 2025-07-10 RX ADMIN — ACETAMINOPHEN 975 MG: 325 TABLET ORAL at 21:36

## 2025-07-10 RX ADMIN — NICARDIPINE HYDROCHLORIDE 10 MG/HR: 25 INJECTION, SOLUTION INTRAVENOUS at 05:01

## 2025-07-10 RX ADMIN — NICARDIPINE HYDROCHLORIDE 12.5 MG/HR: 25 INJECTION, SOLUTION INTRAVENOUS at 00:44

## 2025-07-10 RX ADMIN — OXYCODONE HYDROCHLORIDE 5 MG: 5 TABLET ORAL at 05:00

## 2025-07-10 RX ADMIN — HYDROMORPHONE HYDROCHLORIDE 0.5 MG: 1 INJECTION, SOLUTION INTRAMUSCULAR; INTRAVENOUS; SUBCUTANEOUS at 05:56

## 2025-07-10 RX ADMIN — OXYCODONE HYDROCHLORIDE 5 MG: 5 TABLET ORAL at 00:44

## 2025-07-10 RX ADMIN — FUROSEMIDE 40 MG: 10 INJECTION, SOLUTION INTRAMUSCULAR; INTRAVENOUS at 08:31

## 2025-07-10 RX ADMIN — AMIODARONE HYDROCHLORIDE 200 MG: 200 TABLET ORAL at 13:58

## 2025-07-10 RX ADMIN — ACETAMINOPHEN 975 MG: 325 TABLET ORAL at 08:30

## 2025-07-10 RX ADMIN — CARVEDILOL 6.25 MG: 6.25 TABLET, FILM COATED ORAL at 16:46

## 2025-07-10 RX ADMIN — FUROSEMIDE 40 MG: 10 INJECTION, SOLUTION INTRAMUSCULAR; INTRAVENOUS at 15:38

## 2025-07-10 RX ADMIN — CHLORHEXIDINE GLUCONATE 15 ML: 1.2 SOLUTION ORAL at 16:46

## 2025-07-10 RX ADMIN — DOCUSATE SODIUM 100 MG: 100 CAPSULE, LIQUID FILLED ORAL at 16:46

## 2025-07-10 RX ADMIN — PANTOPRAZOLE SODIUM 40 MG: 40 TABLET, DELAYED RELEASE ORAL at 05:00

## 2025-07-10 NOTE — OCCUPATIONAL THERAPY NOTE
Occupational Therapy Evaluation + Treatment     Patient Name: Nathalia Monge  Today's Date: 7/10/2025  Problem List  Principal Problem:    CAD (coronary artery disease)  Active Problems:    Partial idiopathic epilepsy with seizures of localized onset, intractable, without status epilepticus ([facility])    Type 2 diabetes mellitus with hyperglycemia, with long-term current use of insulin ([facility])    Obesity (BMI 30.0-34.9)    Mixed hyperlipidemia    Degenerative disc disease, thoracic    Chronic heart failure with reduced ejection fraction (HFrEF, <= 40%) ([facility])    S/P CABG (coronary artery bypass graft)    Past Medical History  Past Medical History[1]  Past Surgical History  Past Surgical History[2]      07/10/25 1018   OT Last Visit   OT Visit Date 07/10/25   Note Type   Note type Evaluation   Pain Assessment   Pain Assessment Tool 0-10   Pain Score 10 - Worst Possible Pain   Pain Location/Orientation Orientation: Mid;Location: Chest;Location: Incision   Pain Onset/Description Onset: Ongoing   Patient's Stated Pain Goal No pain   Hospital Pain Intervention(s) Repositioned;Ambulation/increased activity   Restrictions/Precautions   Weight Bearing Precautions Per Order No   Other Precautions Cardiac/sternal;Chair Alarm;Bed Alarm;Multiple lines;Telemetry;O2;Fall Risk;Pain;Cognitive  (A line, CTs)   Home Living   Type of Home Mobile home   Home Layout One level;Performs ADLs on one level;Stairs to enter with rails  (1 BRAYDEN w/ rail)   Bathroom Shower/Tub Walk-in shower   Bathroom Toilet Standard   Bathroom Equipment Built-in shower seat   Bathroom Accessibility Accessible   Home Equipment Walker;Cane  (although does not use pta)   Additional Comments Pt reports living w/ spouse in mobile home, 1 level, 1 BRAYDEN w/ rail, walk-in shower w/ built in seat, standard toilet.   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 Pt also w/ bipolar diagnosis. Pt w/ history of meningioma and craniotomy.   Family/Caregiver Present Yes  (spouse)   Subjective   Subjective \"I can't do this\"   ADL   Where Assessed Chair   Eating Assistance 5  Supervision/Setup   Grooming Assistance 4  Minimal Assistance   UB Bathing Assistance 3  Moderate Assistance   LB Bathing Assistance 2  Maximal Assistance   UB Dressing Assistance 3  Moderate Assistance   LB Dressing Assistance 2  Maximal Assistance   Toileting Assistance  2  Maximal Assistance   Functional Assistance 2  Maximal Assistance   Additional Comments Pt reports spouse will be home to assist as needed.   Bed Mobility   Sit to Supine 3  Moderate assistance   Additional items Assist x 2;Increased time required;Verbal cues;LE management   Additional Comments Pt greeted OOB in chair, required Mod Ax2 to get back into bed, left supine in bed with alarm on and all needs within reach.   Transfers   Sit to Stand 3  Moderate assistance   Additional items Assist x 1;Increased time required;Verbal cues   Stand to Sit 3  Moderate assistance   Additional items Assist x 1;Increased time required;Verbal cues   Additional Comments w/ RW   Functional Mobility   Functional Mobility 3  Moderate assistance   Additional Comments Pt performed short household distance functional mobility in room w/ Mod Ax1 w/ RW. "   Additional items Rolling walker   Balance   Static Sitting Fair   Dynamic Sitting Fair -   Static Standing Poor +   Dynamic Standing Poor   Ambulatory Poor   Activity Tolerance   Activity Tolerance Patient limited by pain;Patient limited by fatigue   Medical Staff Made Aware Co-eval with PT due to medical complexity and co-morbidities, OTR present.   Nurse Made Aware RN cleared for therapy.   RUE Assessment   RUE Assessment WFL   LUE Assessment   LUE Assessment X  (Pt w/ limited AROM and strength at baseline following previous craniotomy.)   Hand Function   Gross Motor Coordination Functional   Fine Motor Coordination Functional   Sensation   Light Touch No apparent deficits   Cognition   Arousal/Participation Alert;Cooperative   Attention Attends with cues to redirect   Orientation Level Oriented X4   Memory Decreased recall of precautions   Following Commands Follows one step commands without difficulty   Comments Pt alert and cooperative to therapy, although required consistent verbal cueing and encouragement, pt was anxious at times, will continue to assess.   Assessment   Limitation Decreased ADL status;Decreased Safe judgement during ADL;Decreased cognition;Decreased endurance;Decreased self-care trans;Decreased high-level ADLs   Prognosis Good   Assessment Pt is a 64 y.o. female who was admitted to [facility] on 7/8/2025 with CAD (coronary artery disease), s/p CABG x3. Pt seen for an OT evaluation per active OT orders. A line, CTs in place. Pt  has a past medical history of Bacteremia due to Klebsiella pneumoniae, Bipolar depression ([facility]), CAD (coronary artery disease), Chronic heart failure with reduced ejection fraction (HFrEF, <= 40%) ([facility]), Chronic heart failure with reduced ejection fraction (HFrEF, <= 40%) ([facility]), CKD (chronic kidney disease), stage III ([facility]), DDD (degenerative disc disease), thoracic, Depression, Diabetes mellitus ([facility]), Epilepsy ([facility]), Former tobacco use, GERD  (gastroesophageal reflux disease), History of Clostridioides difficile infection, History of pyelonephritis, Hyperlipidemia, Hypertension, Hypertensive urgency, ERNESTO (iron deficiency anemia), Meningioma (HCC), Obesity, Prolonged QT interval, PTSD (post-traumatic stress disorder), Telangiectasias, and Vitamin D deficiency.Pt reports living w/ spouse in mobile home, 1 level, 1 BRAYDEN w/ rail, walk-in shower w/ built in seat, standard toilet. Pta, pt was independent w/ ADL and functional mobility, receives assistance for IADLs, was not using any DME at baseline, and is (-) . Currently, pt is Mod Ax1 for UB ADL, Max Ax1 for LB ADL, and performed transfers/short household distance functional mobility w/ Mod Ax1 w/ RW. Pt currently presents impairments including -difficulty performing ADLS, difficulty performing IADLS , and limited insight into deficits activity tolerance, endurance, standing balance/tolerance, sitting balance/tolerance, insight, and safety . These impairments, as well as pt's fatigue, pain, and cardiac/sternal precautions limit pt's ability to safely engage in baseline areas of occupation such as eating, grooming, bathing, dressing, toileting, and functional mobility/transfers. Pt would benefit from continued acute OT services throughout hospital course and following D/C. Moving forward, OT interventions 2-3x per week to increase safety and participation in ADLs, transfers, and functional mobility. From OT standpoint, recommend pt to return home with increased social support and level III services upon D/C pending progress. Pt was left supine in bed with alarm on and all needs within reach.   Goals   Patient Goals to not be in pain   LTG Time Frame 10-14   Long Term Goal #1 see below   Plan   Treatment Interventions ADL retraining;Functional transfer training;Endurance training;Compensatory technique education;Continued evaluation;Energy conservation;Cardiac education   Goal Expiration Date 07/24/25    OT Frequency 2-3x/wk   Discharge Recommendation   Rehab Resource Intensity Level, OT III (Minimum Resource Intensity)  (pending progress)   AM-PAC Daily Activity Inpatient   Lower Body Dressing 2   Bathing 2   Toileting 2   Upper Body Dressing 2   Grooming 3   Eating 3   Daily Activity Raw Score 14   Daily Activity Standardized Score (Calc for Raw Score >=11) 33.39   AM-PAC Applied Cognition Inpatient   Following a Speech/Presentation 4   Understanding Ordinary Conversation 4   Taking Medications 4   Remembering Where Things Are Placed or Put Away 4   Remembering List of 4-5 Errands 3   Taking Care of Complicated Tasks 3   Applied Cognition Raw Score 22   Applied Cognition Standardized Score 47.83   Additional Treatment Session   Start Time 1323   End Time 1333   Treatment Assessment Pt and spouse provided s/p cardiac sx education packet, reviewed w/ OT, discussed cardiac/sternal precautions, lifestyle modifications, post hospitalization IADL management, environmental temperature control, emotional regulation and management, lifting/driving restrictions, energy conservation techniques, mobility schedule, incisional management, VNA, and cardiac rehabilitation initiation upon clearance per physician at follow up. Pt reviewed education packet and acknowledged reception of such.   Additional Treatment Day 1   End of Consult   Education Provided Yes;Family or social support of family present for education by provider   Patient Position at End of Consult Supine;Bed/Chair alarm activated;All needs within reach   Nurse Communication Nurse aware of consult   End of Consult Comments The patient's raw score on the AM-PAC Daily Activity Inpatient Short Form is 14. A raw score of less than 19 suggests the patient may benefit from discharge to post-acute rehabilitation services. Please refer to the recommendation of the Occupational Therapist for safe discharge planning. Although -PAC score is currently less than 19, pt is  expected to progress and has adequate support at home.       OT Goals:    Pt will be Ronald in UB ADLs by end of hospital stay.    Pt will be Ronald in LB ADLs by end of hospital stay    Pt will perform functional transfers with Supervision and increased safety awareness by end of hospital stay.    Pt will increase standing tolerance to 5 minutes to safely engage in self-care ADLs at the sink.    Pt will be Supervision in functional mobility with/without any required AD to promote participation in self care tasks by end of hospital stay.    Pt will attend to tasks for 10 minutes during therapy sessions with verbal cues.     Pt will undergo ongoing cognitive assessment during therapy session while in hospital.    Pt will receive and verbalize understanding of cardiac education packet by end of hospital stay.    Pt will demonstrate understanding and acceptance of cardiac education by recalling and adhering to safety precautions during ADL tasks.    EDWARD Cabrera                     [1]   Past Medical History:  Diagnosis Date    Bacteremia due to Klebsiella pneumoniae 03/06/2024    Bipolar depression ([facility])     CAD (coronary artery disease)     Chronic heart failure with reduced ejection fraction (HFrEF, <= 40%) ([facility])     EF 30%    Chronic heart failure with reduced ejection fraction (HFrEF, <= 40%) ([facility]) 03/19/2025    CKD (chronic kidney disease), stage III ([facility])     DDD (degenerative disc disease), thoracic     Depression     Diabetes mellitus ([facility])     type 2, insulin dependent    Epilepsy ([facility])     no maintaince medications, experiences petite seizures to right eye multiple times throughout the day, does not drive due to this    Former tobacco use     GERD (gastroesophageal reflux disease)     History of Clostridioides difficile infection     History of pyelonephritis     w/ renal abscess    Hyperlipidemia     Hypertension     Hypertensive urgency 03/05/2018    ERNESTO (iron deficiency anemia)     Meningioma ([facility])      s/p surgical resection    Obesity     Prolonged QT interval     PTSD (post-traumatic stress disorder)     Telangiectasias     b/l LE    Vitamin D deficiency    [2]   Past Surgical History:  Procedure Laterality Date    CARDIAC CATHETERIZATION Left 05/05/2025    Procedure: Cardiac Left Heart Cath;  Surgeon: Latoya Garner DO;  Location: [facility] CARDIAC [facility];  Service: Cardiology    CARDIAC CATHETERIZATION N/A 05/05/2025    Procedure: Cardiac FFR/IFR;  Surgeon: Latoya Garner DO;  Location: [facility] CARDIAC CATH [facility];  Service: Cardiology    CHOLECYSTECTOMY  2004    CORONARY ARTERY BYPASS GRAFT N/A 7/8/2025    Procedure: CORONARY ARTERY BYPASS GRAFT (CABG) 3 VESSELS  LIMA to LAD, SVG to RAMUS-2, RAMUS-1;  Surgeon: Abhi Rosales MD;  Location: [facility] MAIN OR;  Service: Cardiac Surgery    EPIDURAL BLOCK INJECTION      thoracic    HYSTERECTOMY      IMPLANTABLE CONTACT LENS IMPLANTATION Bilateral     IR THORACENTESIS  03/18/2025    NC CRNEC TREPH BONE FLAP CRNOT EXC BRAIN TUMOR STTL Right 03/15/2018    Procedure: IMAGE-GUIDED RIGHT FRONTOPARIETAL CRANIOTOMY FOR TUMOR;  Surgeon: Arsh Jeronimo MD;  Location: [facility] MAIN OR;  Service: Neurosurgery

## 2025-07-10 NOTE — ASSESSMENT & PLAN NOTE
Lab Results   Component Value Date    HGBA1C 7.4 (H) 03/18/2025     Recent Labs     07/10/25  0600 07/10/25  0829 07/10/25  1018 07/10/25  1142   POCGLU 149* 113 173* 167*     Blood Sugar Average: Last 72 hrs:  (P) [id number]  2 diabetes mellitus with hyperglycemia as evidenced by A1c of 7.4%  Home regimen:  Current regimen: Insulin infusion  BG reviewed-well-controlled on the insulin infusion.  She continues to be on Cardene infusion.  Additionally was only able to eat a little bit of pudding today.    Plan:  Continue insulin infusion at this time  Will reassess patient tomorrow with plan to hopefully transition to basal bolus regimen.  Continue Accu-Cheks per infusion protocol  Monitor for hypoglycemia, treat per protocol  Goal blood glucose while in the [id number] mg/dL  Discharge recommendations pending clinical course-the patient discharged on insulin.  Given HFrEF and microalbuminuria she will likely also benefit from addition of SGLT2 inhibitor.  Endocrinology will continue following

## 2025-07-10 NOTE — PHYSICAL THERAPY NOTE
Physical Therapy Evaluation     Patient's Name: Nathalia Monge    Admitting Diagnosis  Atherosclerosis of native coronary artery of native heart with angina pectoris (HCC) [I25.119]    Problem List  Problem List[1]    Past Medical History  Past Medical History[2]    Past Surgical History  Past Surgical History[3]     07/10/25 1005   PT Last Visit   PT Visit Date 07/10/25   Note Type   Note type Evaluation  (and Tx)   Pain Assessment   Pain Assessment Tool 0-10   Pain Score 10 - Worst Possible Pain   Pain Location/Orientation Orientation: Mid;Location: Chest;Location: Incision   Pain Onset/Description Onset: Ongoing;Descriptor: Sore;Descriptor: Aching;Descriptor: Discomfort   Effect of Pain on Daily Activities guarding   Patient's Stated Pain Goal No pain   Hospital Pain Intervention(s) Repositioned;Ambulation/increased activity;Emotional support   Pain Rating: FLACC (Rest) - Face 1   Pain Rating: FLACC (Rest) - Legs 0   Pain Rating: FLACC (Rest) - Activity 0   Pain Rating: FLACC (Rest) - Cry 1   Pain Rating: FLACC (Rest) - Consolability 1   Score: FLACC (Rest) 3   Restrictions/Precautions   Other Precautions Cardiac/sternal;Cognitive;Chair Alarm;Bed Alarm;Multiple lines;Telemetry;Fall Risk;Pain  (a-line, CT)   Home Living   Type of Home Mobile home   Home Layout   (1 BRAYDEN (per spouse))   Home Equipment Walker;Cane   Prior Function   Level of [place] Independent with functional mobility  (amb w/o AD)   Lives With Spouse   Receives Help From Family   General   Additional Pertinent History cleared for assessment by chanel   Family/Caregiver Present Yes   Cognition   Overall Cognitive Status WFL   Arousal/Participation Cooperative   Attention Attends with cues to redirect   Orientation Level Oriented to person;Oriented to place;Oriented to situation   Memory Decreased recall of recent events;Decreased recall of precautions   Following Commands Follows one step commands with increased time or repetition   Subjective    Subjective Alert; in the chair; somewhat apprehensive about mobilization but agreeable to try   RUE Assessment   RUE Assessment WFL  (AROM)   LUE Assessment   LUE Assessment X  (decreased AROM shld)   RLE Assessment   RLE Assessment WFL  (AROM)   Strength RLE   RLE Overall Strength   (fair/ fair +)   LLE Assessment   LLE Assessment WFL  (AROM)   Strength LLE   LLE Overall Strength   (fair/ fair +)   Transfers   Sit to Stand 3  Moderate assistance   Additional items Assist x 1;Increased time required;Verbal cues   Stand to Sit 3  Moderate assistance   Additional items Assist x 1;Increased time required;Verbal cues   Ambulation/Elevation   Gait pattern Excessively slow;Short stride;Foward flexed;Inconsistent joseph;Shuffling   Gait Assistance 3  Moderate assist   Additional items Assist x 1;Verbal cues;Tactile cues   Assistive Device Rolling walker   Distance 7 ft total distance; limited by pain and fatigue   Balance   Static Sitting Fair -   Dynamic Sitting Poor +   Static Standing Poor +   Dynamic Standing Poor   Ambulatory Poor   Activity Tolerance   Activity Tolerance Patient limited by fatigue;Patient limited by pain   Medical Staff Made Aware Co-eval performed w/ OTR due to complexity of medical status and multiple comorbidities   Nurse Made Aware spoke to VERONIQUE Navarro   Assessment   Prognosis Good   Problem List Decreased strength;Decreased endurance;Impaired balance;Decreased mobility;Decreased cognition;Decreased safety awareness;Obesity;Pain   Assessment Pt is 64 y.o. female admitted with Dx of CAD (coronary artery disease) and Ischemic cardiomyopathy and underwent Coronary artery bypass grafting x 3 with left internal mammary artery to left anterior descending artery, saphenous vein graft to ramus intermedius 1, and saphenous vein graft to ramus intermedius 2 on 7/8/2025. Pt 's comorbidities affecting POC include: Bipolar depression (HCC), Chronic heart failure with reduced ejection fraction (HFrEF, <=  40%) (HCC), CKD (chronic kidney disease), stage III (HCC), DDD (degenerative disc disease), thoracic, Depression, Diabetes mellitus (HCC), Epilepsy (HCC), Hypertension, ERNESTO (iron deficiency anemia), Meningioma (HCC), Obesity, Prolonged QT interval, and PTSD (post-traumatic stress disorder) and personal factors of: BRAYDEN. Pt's clinical presentation is currently unstable/unpredictable which is evident in ongoing telemetry monitoring while in a critical care unit w/ a-line in place, abnormal lab values being monitored/trending, CT in place and inability to progress further w/ mobilization at this time. Pt presents w/ postop guarding, generalized weakness, incl decreased LE strength, decreased functional endurance and activity tolerance, and impaired balance w/ associated gait deviations requiring use of rw at this time. Will cont to follow pt in PT for progressive mobilization to address above functional deficits and to max level of (I), endurance, and safety. Otherwise, anticipate pt will return home w/ available family support upon D/C provided she cont improving w/ mobility skills, safety, and endurance (incl on the steps) and when medically cleared; D/C recommendations are outlined below; will follow.   Goals   Patient Goals to feel better   STG Expiration Date 07/20/25   Short Term Goal #1 7-10 days. Pt will amb 150 ft w/ rw <--> SPC, mod (I) in order to facilitate safe return to premorbid environment and to initiate return to community amb status. Pt will negotiate 1 step w/ appropriate assistive device, (S)s1 in order to assure safe navigation in and out of the premorbid living environment. Pt will achieve mod (I) level w/ bed mob in order to facilitate safety with OOB and back to bed transitions in own living environment. Pt will perform transfers w/ mod (I) to assure (I) and safety w/ functional mobility/transitions w/ all aspects of mobility/locomotion. Pt will participate in LE therex and balance activities to  max progression w/ mobility skills.   PT Treatment Day 0   Plan   Treatment/Interventions Functional transfer training;LE strengthening/ROM;Elevations;Therapeutic exercise;Endurance training;Equipment eval/education;Bed mobility;Gait training;Spoke to nursing;Spoke to case management;OT;Family   PT Frequency 4-6x/wk   Discharge Recommendation   Rehab Resource Intensity Level, PT III (Minimum Resource Intensity)   Equipment Recommended Walker   Walker Package Recommended Wheeled walker   AM-PAC Basic Mobility Inpatient   Turning in Flat Bed Without Bedrails 2   Lying on Back to Sitting on Edge of Flat Bed Without Bedrails 2   Moving Bed to Chair 2   Standing Up From Chair Using Arms 2   Walk in Room 2   Climb 3-5 Stairs With Railing 1   Basic Mobility Inpatient Raw Score 11   Basic Mobility Standardized Score 30.25   [facility] Highest Level Of Mobility   -HLM Goal 4: Move to chair/commode   -HLM Achieved 5: Stand (1 or more minutes)   Modified Allen Scale   Modified Allen Scale 4   Additional Treatment Session   Start Time 1007   End Time 1017   Treatment Assessment Additional follow up consecutive session performed to facilitate transition back to bed as per nsg request (for EPW removal); sit <--> stand transfers w/ mod (A)x1; SPT transfer w/ rw for chair to bed transition to the (L) side w/ mod (A)x1; sit to supine bed mob w/ mod (A)x2; repositioned in bed w/ (A)x2   Equipment Use rw   Additional Treatment Day 1   End of Consult   Patient Position at End of Consult Supine;Bed/Chair alarm activated;All needs within reach       Giovanny Gonsalez, PT         [1]   Patient Active Problem List  Diagnosis    Partial idiopathic epilepsy with seizures of localized onset, intractable, without status epilepticus (HCC)    Type 2 diabetes mellitus with hyperglycemia, with long-term current use of insulin (HCC)    Urinary urgency    Meningioma (HCC)    S/P craniotomy    Hyponatremia    Seizure-like activity (HCC)    Renal  abscess, right    Obesity (BMI 30.0-34.9)    Insurance coverage problems    Imaging abnormality    Clostridium difficile carrier    Carpal tunnel syndrome on left    Mixed hyperlipidemia    Degenerative disc disease, thoracic    Thoracic radiculopathy    Bilateral pleural effusion    Prolonged QT interval    Chronic heart failure with reduced ejection fraction (HFrEF, <= 40%) ([facility])    CAD (coronary artery disease)    S/P CABG (coronary artery bypass graft)   [2]   Past Medical History:  Diagnosis Date    Bacteremia due to Klebsiella pneumoniae 03/06/2024    Bipolar depression ([facility])     CAD (coronary artery disease)     Chronic heart failure with reduced ejection fraction (HFrEF, <= 40%) ([facility])     EF 30%    Chronic heart failure with reduced ejection fraction (HFrEF, <= 40%) ([facility]) 03/19/2025    CKD (chronic kidney disease), stage III ([facility])     DDD (degenerative disc disease), thoracic     Depression     Diabetes mellitus ([facility])     type 2, insulin dependent    Epilepsy ([facility])     no maintaince medications, experiences petite seizures to right eye multiple times throughout the day, does not drive due to this    Former tobacco use     GERD (gastroesophageal reflux disease)     History of Clostridioides difficile infection     History of pyelonephritis     w/ renal abscess    Hyperlipidemia     Hypertension     Hypertensive urgency 03/05/2018    ERNESTO (iron deficiency anemia)     Meningioma ([facility])     s/p surgical resection    Obesity     Prolonged QT interval     PTSD (post-traumatic stress disorder)     Telangiectasias     b/l LE    Vitamin D deficiency    [3]   Past Surgical History:  Procedure Laterality Date    CARDIAC CATHETERIZATION Left 05/05/2025    Procedure: Cardiac Left Heart Cath;  Surgeon: Latoya Garner DO;  Location: [facility];  Service: Cardiology    CARDIAC CATHETERIZATION N/A 05/05/2025    Procedure: Cardiac FFR/IFR;  Surgeon: Latoya Garner DO;  Location: [facility];  Service:  Cardiology    CHOLECYSTECTOMY  2004    CORONARY ARTERY BYPASS GRAFT N/A 7/8/2025    Procedure: CORONARY ARTERY BYPASS GRAFT (CABG) 3 VESSELS  LIMA to LAD, SVG to RAMUS-2, RAMUS-1;  Surgeon: Abhi Rosales MD;  Location: [facility];  Service: Cardiac Surgery    EPIDURAL BLOCK INJECTION      thoracic    HYSTERECTOMY      IMPLANTABLE CONTACT LENS IMPLANTATION Bilateral     IR THORACENTESIS  03/18/2025    DC CRNEC TREPH BONE FLAP CRNOT EXC BRAIN TUMOR STTL Right 03/15/2018    Procedure: IMAGE-GUIDED RIGHT FRONTOPARIETAL CRANIOTOMY FOR TUMOR;  Surgeon: Arsh Jeronimo MD;  Location: [facility];  Service: Neurosurgery

## 2025-07-10 NOTE — PROGRESS NOTES
Progress Note - Cardiology   Name: Nathalia Monge 64 y.o. female I MRN: [id number]  Unit/Bed#: [facility]PHP-307-01 I Date of Admission: 7/8/2025   Date of Service: 7/10/2025 I Hospital Day: 2     Assessment & Plan  CAD (coronary artery disease)  S/P CABG (coronary artery bypass graft)  POD #2 s/p CABG x 3 with LIMA to LAD, SVG to RI 1, and SVG to RI 2  Final intra op RAFAEL with improved LVEF to 45%  Post op TTE performed today to eval for possible lifevest but patient does not want one even if indicated  CTs and EPW removed today  Post op rhythm management: amiodarone 200 mg TID and carvedilol 6.25 mg BID  Tele reviewed- NSR  Post op volume overload per CT surgery- furosemide 40 mg IV BID  Follow strict I/Os, daily weights, am BMP  On ASA and beta blocker. Not on statin 2/2 allergy.  Continue to monitor on telemetry  Type 2 diabetes mellitus with hyperglycemia, with long-term current use of insulin ([facility])  Lab Results   Component Value Date    HGBA1C 7.4 (H) 03/18/2025   Per primary team/endocrinology  Obesity (BMI 30.0-34.9)  BMI 33.27  Mixed hyperlipidemia  Lipid profile June 2025: Total cholesterol 267, triglycerides 152, HDL 51,   Has documented allergy to all statins (diarrhea)  Will benefit from Zetia and insurance authorization for PCSK9 inhibitor as an outpatient  Chronic heart failure with reduced ejection fraction (HFrEF, <= 40%) ([facility])  Wt Readings from Last 3 Encounters:   07/10/25 92.1 kg (203 lb)   07/01/25 87.9 kg (193 lb 12.6 oz)   06/11/25 89.3 kg (196 lb 14.4 oz)   Volume up on exam today  Dry weight around 193 lb, currently 203 lb  Home diuretic: Lasix 20 mg p.o. daily  Current diuretic: Furosemide 40 mg IV BID  Subjective  Review of Systems   Constitutional: Negative for chills, malaise/fatigue and weight gain.   Cardiovascular:  Positive for chest pain. Negative for dyspnea on exertion, leg swelling, orthopnea, palpitations and syncope.   Respiratory:  Negative for cough, shortness of breath, sleep  "disturbances due to breathing and sputum production.    Gastrointestinal:  Negative for bloating, nausea and vomiting.   Neurological:  Positive for weakness. Negative for dizziness and light-headedness.   Psychiatric/Behavioral:  Negative for altered mental status.    All other systems reviewed and are negative.      Objective:   Vitals: Blood pressure 160/70, pulse 71, temperature 98.2 °F (36.8 °C), temperature source Oral, resp. rate (!) 24, height 5' 5.5\" (1.664 m), weight 92.1 kg (203 lb), SpO2 (!) 89%., Body mass index is 33.27 kg/m².,     Systolic (24hrs), Av , Min:124 , Max:160     Diastolic (24hrs), Av, Min:57, Max:70        Intake/Output Summary (Last 24 hours) at 7/10/2025 1434  Last data filed at 7/10/2025 1400  Gross per 24 hour   Intake 2098.54 ml   Output 2090 ml   Net 8.54 ml     Wt Readings from Last 3 Encounters:   07/10/25 92.1 kg (203 lb)   25 87.9 kg (193 lb 12.6 oz)   25 89.3 kg (196 lb 14.4 oz)     Telemetry Review: NSR    Physical Exam  Vitals reviewed.   Constitutional:       General: She is not in acute distress.     Appearance: She is obese.   Neck:      Vascular: No hepatojugular reflux or JVD.      Comments: R CVC  Cardiovascular:      Rate and Rhythm: Normal rate and regular rhythm.      Heart sounds: Normal heart sounds. No murmur heard.     No friction rub. No gallop.   Pulmonary:      Effort: Pulmonary effort is normal. No respiratory distress.      Breath sounds: No rales.      Comments: Decreased at bases, 2LNC  Abdominal:      General: Bowel sounds are normal. There is no distension.      Palpations: Abdomen is soft.      Tenderness: There is no abdominal tenderness.   Genitourinary:     Comments: Pitt catheter draining clear yellow urine    Musculoskeletal:         General: Swelling (b/l UE) present. No tenderness. Normal range of motion.      Cervical back: Neck supple.      Right lower leg: Edema (trace) present.      Left lower leg: Edema (trace) present. "     Skin:     General: Skin is warm and dry.      Findings: No erythema.      Comments: Mid sternal incision dressing C/D/I     Neurological:      Mental Status: She is alert and oriented to person, place, and time.     Psychiatric:         Mood and Affect: Mood normal.         LABORATORY RESULTS      CBC with diff:   Results from last 7 days   Lab Units 07/10/25  0341 07/09/25  0437 07/08/25  2232 07/08/25  2231 07/08/25  2127 07/08/25  2118 07/08/25  2106 07/08/25  2045   WBC Thousand/uL 24.45* 17.47*  --   --   --   --   --   --    HEMOGLOBIN g/dL 10.1* 11.0*  11.1*  --  12.0  --   --   --   --    I STAT HEMOGLOBIN g/dl  --   --  11.2*  --  8.2*  --  8.8* 8.5*   HEMATOCRIT % 30.5* 33.6*  33.5*  --  35.6  --   --   --   --    HEMATOCRIT, ISTAT %  --   --  33*  --  24*  --  26* 25*   MCV fL 92 91  --   --   --   --   --   --    PLATELETS Thousands/uL 152 180  --  180  --  164  --   --    RBC Million/uL 3.32* 3.70*  --   --   --   --   --   --    MCH pg 30.4 30.0  --   --   --   --   --   --    MCHC g/dL 33.1 33.1  --   --   --   --   --   --    RDW % 14.3 13.8  --   --   --   --   --   --    MPV fL 11.2 10.6  --  10.6  --  10.6  --   --        CMP:  Results from last 7 days   Lab Units 07/10/25  0341 07/09/25  0437 07/09/25  0223 07/08/25  2232 07/08/25  2231 07/08/25  2127 07/08/25  2106 07/08/25  2045 07/08/25  2037 07/08/25  2018 07/08/25  1956   POTASSIUM mmol/L 3.7 4.3 4.2  --  2.8*  --   --   --   --   --   --    CHLORIDE mmol/L 107  --  111*  --  112*  --   --   --   --   --   --    CO2 mmol/L 23  --  20*  --  20*  --   --   --   --   --   --    CO2, I-STAT mmol/L  --   --   --  20*  --  21 24 25 26 23 23   BUN mg/dL 37*  --  26*  --  23  --   --   --   --   --   --    CREATININE mg/dL 1.76*  --  1.45*  --  1.30  --   --   --   --   --   --    GLUCOSE, ISTAT mg/dl  --   --   --  175*  --  229* 268* 265* 246* 224* 162*   CALCIUM mg/dL 8.3*  --  8.0*  --  7.8*  --   --   --   --   --   --    EGFR  "ml/min/1.73sq m 30  --  38  --  43  --   --   --   --   --   --        BMP:  Results from last 7 days   Lab Units 07/10/25  0341 07/09/25  0437 07/09/25  0223 07/08/25  2232 07/08/25  2231 07/08/25  2127 07/08/25  2106 07/08/25  2045   POTASSIUM mmol/L 3.7 4.3 4.2  --  2.8*  --   --   --    CHLORIDE mmol/L 107  --  111*  --  112*  --   --   --    CO2 mmol/L 23  --  20*  --  20*  --   --   --    CO2, I-STAT mmol/L  --   --   --  20*  --  21 24 25   BUN mg/dL 37*  --  26*  --  23  --   --   --    CREATININE mg/dL 1.76*  --  1.45*  --  1.30  --   --   --    GLUCOSE, ISTAT mg/dl  --   --   --  175*  --  229* 268* 265*   CALCIUM mg/dL 8.3*  --  8.0*  --  7.8*  --   --   --        Lab Results   Component Value Date    CREATININE 1.76 (H) 07/10/2025    CREATININE 1.45 (H) 07/09/2025    CREATININE 1.30 07/08/2025       No results found for: \"NTBNP\"        Results from last 7 days   Lab Units 07/10/25  0341 07/09/25 0437   MAGNESIUM mg/dL 2.3 2.6                     Results from last 7 days   Lab Units 07/08/25 2231   INR  1.29*       Lipid Profile:   No results found for: \"CHOL\"  Lab Results   Component Value Date    HDL 51 06/19/2025    HDL 42 (L) 03/19/2025    HDL 49 (L) 12/04/2024     Lab Results   Component Value Date    LDLCALC 186 (H) 06/19/2025    LDLCALC 120 (H) 03/19/2025    LDLCALC 166 (H) 12/04/2024     Lab Results   Component Value Date    TRIG 152 (H) 06/19/2025    TRIG 138 03/19/2025    TRIG 157 (H) 12/04/2024       Meds/Allergies   all current active meds have been reviewed and current meds:   Current Facility-Administered Medications:     acetaminophen (TYLENOL) tablet 975 mg, Q6H While awake    amiodarone tablet 200 mg, Q8H DANTE    aspirin tablet 325 mg, Daily    bisacodyl (DULCOLAX) rectal suppository 10 mg, Daily PRN    carvedilol (COREG) tablet 6.25 mg, BID With Meals    chlorhexidine (PERIDEX) 0.12 % oral rinse 15 mL, BID    docusate sodium (COLACE) capsule 100 mg, BID    ferrous sulfate tablet 325 mg, " Daily    furosemide (LASIX) injection 40 mg, BID (diuretic)    heparin (porcine) subcutaneous injection 5,000 Units, Q8H DANTE **AND** [CANCELED] Platelet count, Once    HYDROmorphone (DILAUDID) injection 0.5 mg, Q2H PRN    insulin regular (HumuLIN R,NovoLIN R) 1 Units/mL in sodium chloride 0.9 % 100 mL infusion, Titrated, Last Rate: 4 Units/hr (07/10/25 1018)    milrinone (PRIMACOR) 20 mg in 100 mL infusion (premix), Continuous, Last Rate: Stopped (07/10/25 0200)    mupirocin (BACTROBAN) 2 % nasal ointment 1 Application, Q12H DANTE    niCARdipine (CARDENE) 50 mg (DOUBLE CONCENTRATION) IV in sodium chloride 0.9% 250 mL, Titrated, Last Rate: 2 mg/hr (07/10/25 1328)    norepinephrine (LEVOPHED) 4 mg (STANDARD CONCENTRATION) IV in sodium chloride 0.9% 250 mL, Titrated, Last Rate: Stopped (07/09/25 1203)    [Held by provider] ondansetron (ZOFRAN) injection 4 mg, Q6H PRN    oxyCODONE (ROXICODONE) split tablet 2.5 mg, Q4H PRN **OR** oxyCODONE (ROXICODONE) IR tablet 5 mg, Q4H PRN    pantoprazole (PROTONIX) EC tablet 40 mg, Daily    polyethylene glycol (MIRALAX) packet 17 g, Daily    saccharomyces boulardii (FLORASTOR) capsule 250 mg, BID    sodium chloride infusion 0.45 %, Continuous, Last Rate: 20 mL/hr (07/09/25 1800)    temazepam (RESTORIL) capsule 15 mg, HS PRN    Medications Prior to Admission:     aspirin 81 mg chewable tablet    carvedilol (COREG) 6.25 mg tablet    cholecalciferol (VITAMIN D3) 1,000 units tablet    CHROMIUM PO    cyclobenzaprine (FLEXERIL) 10 mg tablet    furosemide (LASIX) 20 mg tablet    insulin aspart protamine-insulin aspart (NovoLOG Mix 70/30 FlexPen) 100 Units/mL injection pen    magnesium 30 MG tablet    Probiotic Product (PROBIOTIC DAILY PO)    zinc gluconate 50 mg tablet    BD Pen Needle Yolis 2nd Gen 32G X 4 MM MISC    Blood Pressure Monitoring (Adult Blood Pressure Cuff Lg) KIT    ferrous sulfate 325 (65 Fe) mg tablet    Insulin Pen Needle (BD Pen Needle Yolis 2nd Gen) 32G X 4 MM MISC     L-LYSINE PO    mupirocin (BACTROBAN) 2 % ointment    insulin regular (HumuLIN R,NovoLIN R) 1 Units/mL in sodium chloride 0.9 % 100 mL infusion, 0.3-21 Units/hr, Last Rate: 4 Units/hr (07/10/25 1018)  milrinone (Primacor) infusion, 0.13 mcg/kg/min, Last Rate: Stopped (07/10/25 0200)  niCARdipine, 2.5-15 mg/hr, Last Rate: 2 mg/hr (07/10/25 1328)  norepinephrine, 1-30 mcg/min, Last Rate: Stopped (07/09/25 1203)  sodium chloride, 20 mL/hr, Last Rate: 20 mL/hr (07/09/25 1800)        Counseling / Coordination of Care  Total floor / unit time spent today 20 minutes.  Greater than 50% of total time was spent with the patient and / or family counseling and / or coordination of care.      ** Please Note: Dragon 360 Dictation voice to text software may have been used in the creation of this document. **

## 2025-07-10 NOTE — ASSESSMENT & PLAN NOTE
Lipid profile June 2025: Total cholesterol 267, triglycerides 152, HDL 51,   Has documented allergy to all statins (diarrhea)  Will benefit from Zetia and insurance authorization for PCSK9 inhibitor as an outpatient

## 2025-07-10 NOTE — PLAN OF CARE
Problem: OCCUPATIONAL THERAPY ADULT  Goal: Performs self-care activities at highest level of function for planned discharge setting.  See evaluation for individualized goals.  Description: Treatment Interventions: ADL retraining, Functional transfer training, Endurance training, Compensatory technique education, Continued evaluation, Energy conservation, Cardiac education          See flowsheet documentation for full assessment, interventions and recommendations.   Outcome: Progressing  Note: Limitation: Decreased ADL status, Decreased Safe judgement during ADL, Decreased cognition, Decreased endurance, Decreased self-care trans, Decreased high-level ADLs  Prognosis: Good  Assessment: Pt is a 64 y.o. female who was admitted to [facility] on 7/8/2025 with CAD (coronary artery disease), s/p CABG x3. Pt seen for an OT evaluation per active OT orders. A line, CTs in place. Pt  has a past medical history of Bacteremia due to Klebsiella pneumoniae, Bipolar depression ([facility]), CAD (coronary artery disease), Chronic heart failure with reduced ejection fraction (HFrEF, <= 40%) ([facility]), Chronic heart failure with reduced ejection fraction (HFrEF, <= 40%) ([facility]), CKD (chronic kidney disease), stage III ([facility]), DDD (degenerative disc disease), thoracic, Depression, Diabetes mellitus ([facility]), Epilepsy ([facility]), Former tobacco use, GERD (gastroesophageal reflux disease), History of Clostridioides difficile infection, History of pyelonephritis, Hyperlipidemia, Hypertension, Hypertensive urgency, ERNESTO (iron deficiency anemia), Meningioma ([facility]), Obesity, Prolonged QT interval, PTSD (post-traumatic stress disorder), Telangiectasias, and Vitamin D deficiency.Pt reports living w/ spouse in mobile home, 1 level, 1 BRAYDEN w/ rail, walk-in shower w/ built in seat, standard toilet. Pta, pt was independent w/ ADL and functional mobility, receives assistance for IADLs, was not using any DME at baseline, and is (-) . Currently, pt is Mod  Ax1 for UB ADL, Max Ax1 for LB ADL, and performed transfers/short household distance functional mobility w/ Mod Ax1 w/ RW. Pt currently presents impairments including -difficulty performing ADLS, difficulty performing IADLS , and limited insight into deficits activity tolerance, endurance, standing balance/tolerance, sitting balance/tolerance, insight, and safety . These impairments, as well as pt's fatigue, pain, and cardiac/sternal precautions limit pt's ability to safely engage in baseline areas of occupation such as eating, grooming, bathing, dressing, toileting, and functional mobility/transfers. Pt would benefit from continued acute OT services throughout hospital course and following D/C. Moving forward, OT interventions 2-3x per week to increase safety and participation in ADLs, transfers, and functional mobility. From OT standpoint, recommend pt to return home with increased social support and level III services upon D/C pending progress. Pt was left supine in bed with alarm on and all needs within reach.     Rehab Resource Intensity Level, OT: III (Minimum Resource Intensity) (pending progress)

## 2025-07-10 NOTE — PROGRESS NOTES
Progress Note - Cardiac Surgery   Nathalia Monge 64 y.o. female MRN: [id number]  Unit/Bed#: PPHP-322-01 Encounter: [id number]    Coronary artery disease. S/P coronary artery bypass grafting x 3; POD # 3      24 Hour Events: Transferred to Stepdown yesterday. Hypertensive - /65 and up to 160's yesterday. Continues on Insulin gtt.  Feels well overall. Denies CP or SOB.    Medications:   Scheduled Meds:  Current Facility-Administered Medications   Medication Dose Route Frequency Provider Last Rate    acetaminophen  975 mg Oral Q6H While awake Abigail Meyer PA-C      amiodarone  200 mg Oral Q8H [facility] Nelson Haas PA-C      aspirin  325 mg Oral Daily Abigail Meyer PA-C      bisacodyl  10 mg Rectal Daily PRN Abigail Meyer PA-C      carvedilol  6.25 mg Oral BID With Meals Nelson Haas PA-C      chlorhexidine  15 mL Mouth/Throat BID Abigail Meyer PA-C      docusate sodium  100 mg Oral BID Ethan Nixon PA-C      ferrous sulfate  325 mg Oral Daily Abigail Meyer PA-C      furosemide  40 mg Intravenous BID (diuretic) Nelson Haas PA-C      heparin (porcine)  5,000 Units Subcutaneous Q8H DANTE Abigail Meyer PA-C      HYDROmorphone  0.5 mg Intravenous Q2H PRN Abigail Meyer PA-C      insulin regular (HumuLIN R,NovoLIN R) 1 Units/mL in sodium chloride 0.9 % 100 mL infusion  0.3-21 Units/hr Intravenous Titrated Abigail Meyer PA-C 4 Units/hr (07/11/25 0625)    milrinone (Primacor) infusion  0.13 mcg/kg/min Intravenous Continuous Ethan Nixon PA-C Stopped (07/10/25 0200)    niCARdipine  2.5-15 mg/hr Intravenous Titrated Jacquelyn Pitt PA-C Stopped (07/10/25 1437)    norepinephrine  1-30 mcg/min Intravenous Titrated Ethan Nixon PA-C Stopped (07/09/25 1203)    [Held by provider] ondansetron  4 mg Intravenous Q6H PRN Abigail Meyer PA-C      oxyCODONE  2.5 mg Oral Q4H PRN Abigail Mitch, PA-C      Or    oxyCODONE  5 mg Oral Q4H PRN Abigail Mitch, PA-C      pantoprazole  40 mg Oral Daily Abigail Mitch, PA-C      polyethylene  glycol  17 g Oral Daily Abigail Meyer PA-C      saccharomyces boulardii  250 mg Oral BID Abigail Meyer PA-C      sodium chloride  20 mL/hr Intravenous Continuous Abigail Meyer PA-C 20 mL/hr (07/11/25 0202)    temazepam  15 mg Oral HS PRN Ethan Nixon PA-C       Continuous Infusions:insulin regular (HumuLIN R,NovoLIN R) 1 Units/mL in sodium chloride 0.9 % 100 mL infusion, 0.3-21 Units/hr, Last Rate: 4 Units/hr (07/11/25 0625)  milrinone (Primacor) infusion, 0.13 mcg/kg/min, Last Rate: Stopped (07/10/25 0200)  niCARdipine, 2.5-15 mg/hr, Last Rate: Stopped (07/10/25 1437)  norepinephrine, 1-30 mcg/min, Last Rate: Stopped (07/09/25 1203)  sodium chloride, 20 mL/hr, Last Rate: 20 mL/hr (07/11/25 0202)      PRN Meds:.  bisacodyl    HYDROmorphone    [Held by provider] ondansetron    oxyCODONE **OR** oxyCODONE    temazepam    Vitals:   Vitals:    07/11/25 0543 07/11/25 0544 07/11/25 0725 07/11/25 0725   BP:   149/65 149/65   BP Location:       Pulse:   72 73   Resp:       Temp:   97.6 °F (36.4 °C) 97.6 °F (36.4 °C)   TempSrc:       SpO2:   93% 94%   Weight: 97.7 kg (215 lb 6.2 oz) 97.7 kg (215 lb 6.2 oz)     Height:           Telemetry: NSR; Heart Rate: 68    Respiratory:   SpO2: SpO2: 94 %; Room Air    Intake/Output: -1701 ml/24 hrs    Intake/Output Summary (Last 24 hours) at 7/11/2025 0916  Last data filed at 7/11/2025 0900  Gross per 24 hour   Intake 465.57 ml   Output 2075 ml   Net -1609.43 ml        Weights:   Weight (last 2 days)       Date/Time Weight    07/11/25 0544 97.7 (215.39)    07/11/25 0543 97.7 (215.39)    07/10/25 1030 92.1 (203)    07/10/25 0600 92.4 (203.71)    07/09/25 0549 94.8 (209)            Chest tube Output: removed      Results:   Results from last 7 days   Lab Units 07/11/25  0400 07/10/25  0341 07/09/25  0437   WBC Thousand/uL 19.08* 24.45* 17.47*   HEMOGLOBIN g/dL 8.9* 10.1* 11.0*  11.1*   HEMATOCRIT % 26.6* 30.5* 33.6*  33.5*   PLATELETS Thousands/uL 128* 152 180     Results from last 7  days   Lab Units 07/11/25  0400 07/10/25  0341 07/09/25  0437 07/09/25  0223 07/08/25  2232   SODIUM mmol/L 140 140  --  142  --    POTASSIUM mmol/L 4.3 3.7 4.3 4.2  --    CHLORIDE mmol/L 105 107  --  111*  --    CO2 mmol/L 25 23  --  20*  --    CO2, I-STAT mmol/L  --   --   --   --  20*   BUN mg/dL 53* 37*  --  26*  --    CREATININE mg/dL 1.98* 1.76*  --  1.45*  --    GLUCOSE, ISTAT mg/dl  --   --   --   --  175*   CALCIUM mg/dL 8.2* 8.3*  --  8.0*  --      Recent Labs     07/11/25  0400   MG 2.3     Results from last 7 days   Lab Units 07/08/25  2231   INR  1.29*   PTT seconds 32       Point of care glucose:  - 149  Endo following    Studies:  Echo 7/10:   Left Ventricle Left ventricular cavity size is normal. Wall thickness is mildly increased. There is mild concentric hypertrophy. The left ventricular ejection fraction is 46%. Systolic function is mildly reduced. Diastolic function is moderately abnormal, consistent with grade II (pseudonormal) relaxation.   Wall Scoring Baseline     The following segments are akinetic: basal inferoseptal and basal inferior.  All other segments are normal.            Interventricular Septum There is abnormal septal motion consistent with post-operative status.   Right Ventricle Right ventricular cavity size is normal. Systolic function is normal.   Left Atrium The atrium is mildly dilated.   Right Atrium The atrium is dilated.   Aortic Valve The aortic valve is trileaflet. The leaflets are mildly thickened. The leaflets are mildly calcified. The leaflets exhibit normal mobility. There is no evidence of regurgitation. The aortic valve has no significant stenosis.   Mitral Valve Mitral valve structure is normal.  There is trace regurgitation. There is no evidence of stenosis.   Tricuspid Valve Tricuspid valve structure is normal. There is mild regurgitation. There is no evidence of stenosis. Right ventricular systolic pressure could not be assessed.   Pericardium There is no  pericardial effusion. The pericardium is normal in appearance.     Left Ventricle Measurements    Function/Volumes   A4C EF 46 %         LV Diastolic Volume (BP) 115 mL         LV Systolic Volume (BP) 56 mL         EF 51 %         Diastolic Filling   MV E' Tissue Velocity Septal 6 cm/s         MV E' Tissue Velocity Lateral 8 cm/s         E/A ratio 1.22         E wave deceleration time 186 ms         MV Peak E Kameron 99 cm/s         MV Peak A Kameron 0.81 m/s          Report Measurements   AV LVOT peak gradient 6 mmHg              Interventricular Septum Measurements    Shunt Ratio   LVOT peak VTI 19.48 cm         LVOT peak kameron 1.18 m/s              Atrial Septum Measurements    Shunt Ratio   LVOT peak VTI 19.48 cm         LVOT peak kameron 1.18 m/s               Aortic Valve Measurements    Stenosis   Aortic valve peak velocity 1.86 m/s         LVOT peak kameron 1.18 m/s         Ao VTI 28.12 cm         LVOT peak VTI 19.48 cm         AV mean gradient 7 mmHg         LVOT mn grad 3 mmHg         AV peak gradient 14 mmHg         AV LVOT peak gradient 6 mmHg         Area/Dimensions   DVI 0.69                 Results Review Statement: I personally reviewed the following image studies in PACS and associated radiology reports: Echocardiogram. My interpretation of the radiology images/reports is: as noted above.    Invasive Lines/Tubes:  Invasive Devices       Central Venous Catheter Line  Duration             CVC Central Lines 07/08/25 3 days                    Physical Exam:    General: No acute distress, Alert, and Normal appearance  HEENT/NECK:  Normocephalic. Atraumatic.  No jugular venous distention.    Cardiac: Regular rate and rhythm and No murmurs/rubs/gallops  Pulmonary:  Breath sounds clear bilaterally and No rales/rhonchi/wheezes  Abdomen:  Non-tender, Non-distended, and Normal bowel sounds  Incisions: Sternum is stable.  Incision is clean, dry, and intact.  and Saphenectomy incison is clean, dry, and intact.   Extremities:  Extremities warm/dry and Trace edema B/L  Neuro: Alert and oriented X 3, Sensation is grossly intact, and No focal deficits  Skin: Warm/Dry, without rashes or lesions.    Assessment:  Principal Problem:    CAD (coronary artery disease)  Active Problems:    Partial idiopathic epilepsy with seizures of localized onset, intractable, without status epilepticus ([facility])    Type 2 diabetes mellitus with hyperglycemia, with long-term current use of insulin ([facility])    Obesity (BMI 30.0-34.9)    Mixed hyperlipidemia    Degenerative disc disease, thoracic    Chronic heart failure with reduced ejection fraction (HFrEF, <= 40%) ([facility])    S/P CABG (coronary artery bypass graft)       Coronary artery disease. S/P coronary artery bypass grafting x 3; POD # 3    Plan:    Cardiac:     Elective surgical admission  Ischemic cardiomyopathy, EF 30%    NSR; BP well-controlled  Hypertensive    Continue Coreg, 6.25mg PO BID  Start Norvasc 5mg daily  Hold ACE inhibitor/ARB secondary to renal dysfunction    AF prophylaxis  Continue Amiodarone, 200 mg PO TID    Continue ASA therapy    Pt has statin allergy (diarrhea as reaction) - Patient thus far refuses statins due to adverse reactions. Dr Rosales to discuss statin intolerance with patient to negotiate whether it can be resumed     Epicardial pacing wires have been removed    Maintain central IV access today for lack of peripheral access    Continue Arixtra for DVT prophylaxis    Pulmonary:     Good Room air oxygen saturation; Continue incentive spirometry/Coughing/Deep breathing exercises    Chest tubes have been discontinued    Renal:     Preoperative CKD, stage 3   Baseline creatinine 1.0 - 1.2 (as high as 1.83 preoperatively)  Postoperative YING, with creatinine 1.98, from 1.76    Intake/Output net: -1701 mL/24 hours    Wt today 97.7, from 92.1 kg   Pre-op Wt: 88.5 kg    Diuretic Regimen:  Continue IV Lasix, 40 mg BID  Continue Potassium Chloride 20 mEq PO BID    Neuro:    Neurologically  intact; No active issues     Incisional pain well controlled   Continue tylenol, 975 mg PO q 8, standing dose   Continue oxycodone, 2.5 to 5 mg PO q 4 hours prn pain    GI:    Controlled carbohydrate diet level two/Cardiac diet, with 1800 mL fluid restriction    Tolerating diet without complaint  + Flatus    Continue stool softeners and prn suppository    Continue GI prophylaxis    Endo:     Pre-Op Hgb A1C: 7.4    Remains on continuous insulin infusion  On injectable therapy, prior to surgical intervention  Recommendation for discharge diabetes regimen pending endocrinology follow up  Bedside nursing Injectable insulin therapy education has been ordered  Endocrinology following daily    7    Hematology:     Post-operative blood count acceptable; Trend prn  Leukocytosis; Afebrile with no signs of infection; Trend CBC prn  Thrombocytopenia - plt downtrending. Continue to monitor.    8.   Disposition:        Following daily PT/OT recommendations regarding home vs. rehab when medically cleared for discharge  Not yet medically cleared for discharge, pending medical optimization      VTE Pharmacologic Prophylaxis: Heparin  VTE Mechanical Prophylaxis: sequential compression device    Collaborative rounds completed with supervising physician  Plan of care discussed with bedside nurse    SIGNATURE: Audra Camacho PA-C  DATE: July 11, 2025  TIME: 9:16 AM

## 2025-07-10 NOTE — PROGRESS NOTES
Progress Note - Endocrinology   Name: Nathalia Monge 64 y.o. female I MRN: [id number]  Unit/Bed#: PPHP-307-01 I Date of Admission: 7/8/2025   Date of Service: 7/10/2025 I Hospital Day: 2    Assessment & Plan  CAD (coronary artery disease)  Patient with MV CAD status post CABG on 7/8/2025  Management per primary team  Type 2 diabetes mellitus with hyperglycemia, with long-term current use of insulin ([facility])  Lab Results   Component Value Date    HGBA1C 7.4 (H) 03/18/2025     Recent Labs     07/10/25  0600 07/10/25  0829 07/10/25  1018 07/10/25  1142   POCGLU 149* 113 173* 167*     Blood Sugar Average: Last 72 hrs:  (P) [id number]  2 diabetes mellitus with hyperglycemia as evidenced by A1c of 7.4%  Home regimen:  Current regimen: Insulin infusion  BG reviewed-well-controlled on the insulin infusion.  She continues to be on Cardene infusion.  Additionally was only able to eat a little bit of pudding today.    Plan:  Continue insulin infusion at this time  Will reassess patient tomorrow with plan to hopefully transition to basal bolus regimen.  Continue Accu-Cheks per infusion protocol  Monitor for hypoglycemia, treat per protocol  Goal blood glucose while in the [id number] mg/dL  Discharge recommendations pending clinical course-the patient discharged on insulin.  Given HFrEF and microalbuminuria she will likely also benefit from addition of SGLT2 inhibitor.  Endocrinology will continue following      24 Hour Events : No overnight events  Subjective : Patient seen and examined at the bedside, not in acute distress at the time of my evaluation.  States that she was able to eat 1 cup of pudding, but otherwise has not been able to tolerate food.  Denies nausea or vomiting, but appetite has been poor.  No signs or symptoms of hypoglycemia.    Objective :  Temp:  [97.8 °F (36.6 °C)-99.9 °F (37.7 °C)] 98.2 °F (36.8 °C)  HR:  [66-79] 71  BP: (124-160)/(57-70) 160/70  Resp:  [15-24] 24  SpO2:  [89 %-100 %] 89 %  O2  Device: None (Room air)  FiO2 (%):  [0-2] 0    Physical Exam  Vitals and nursing note reviewed.   Constitutional:       General: She is not in acute distress.     Appearance: Normal appearance. She is not ill-appearing, toxic-appearing or diaphoretic.   HENT:      Head: Normocephalic and atraumatic.      Nose: Nose normal.      Mouth/Throat:      Pharynx: Oropharynx is clear.     Eyes:      General: No scleral icterus.        Right eye: No discharge.         Left eye: No discharge.      Extraocular Movements: Extraocular movements intact.      Conjunctiva/sclera: Conjunctivae normal.     Pulmonary:      Effort: Pulmonary effort is normal. No respiratory distress.   Abdominal:      General: Abdomen is flat. There is no distension.     Musculoskeletal:         General: Normal range of motion.      Cervical back: Normal range of motion and neck supple.     Skin:     General: Skin is warm and dry.      Coloration: Skin is not jaundiced or pale.     Neurological:      Mental Status: She is alert and oriented to person, place, and time. Mental status is at baseline.     Psychiatric:         Mood and Affect: Mood normal.         Behavior: Behavior normal.         Thought Content: Thought content normal.         Judgment: Judgment normal.         Lab Results: I have reviewed the following results:CBC/BMP:   .     07/10/25  0341   WBC 24.45*   HGB 10.1*   HCT 30.5*      SODIUM 140   K 3.7      CO2 23   BUN 37*   CREATININE 1.76*   GLUC 145*   MG 2.3    , Creatinine Clearance: Estimated Creatinine Clearance: 36.6 mL/min (A) (by C-G formula based on SCr of 1.76 mg/dL (H))., LFTs: No new results in last 24 hours.     Imaging Results Review: No pertinent imaging studies reviewed.  Other Study Results Review: No additional pertinent studies reviewed.

## 2025-07-10 NOTE — PLAN OF CARE
Problem: PHYSICAL THERAPY ADULT  Goal: Performs mobility at highest level of function for planned discharge setting.  See evaluation for individualized goals.  Description: Treatment/Interventions: Functional transfer training, LE strengthening/ROM, Elevations, Therapeutic exercise, Endurance training, Equipment eval/education, Bed mobility, Gait training, Spoke to nursing, Spoke to case management, OT, Family  Equipment Recommended: Walker       See flowsheet documentation for full assessment, interventions and recommendations.  Note: Prognosis: Good  Problem List: Decreased strength, Decreased endurance, Impaired balance, Decreased mobility, Decreased cognition, Decreased safety awareness, Obesity, Pain  Assessment: Pt is 64 y.o. female admitted with Dx of CAD (coronary artery disease) and Ischemic cardiomyopathy and underwent Coronary artery bypass grafting x 3 with left internal mammary artery to left anterior descending artery, saphenous vein graft to ramus intermedius 1, and saphenous vein graft to ramus intermedius 2 on 7/8/2025. Pt 's comorbidities affecting POC include: Bipolar depression ([facility]), Chronic heart failure with reduced ejection fraction (HFrEF, <= 40%) ([facility]), CKD (chronic kidney disease), stage III ([facility]), DDD (degenerative disc disease), thoracic, Depression, Diabetes mellitus ([facility]), Epilepsy ([facility]), Hypertension, ERNESTO (iron deficiency anemia), Meningioma ([facility]), Obesity, Prolonged QT interval, and PTSD (post-traumatic stress disorder) and personal factors of: BRAYDEN. Pt's clinical presentation is currently unstable/unpredictable which is evident in ongoing telemetry monitoring while in a critical care unit w/ a-line in place, abnormal lab values being monitored/trending, CT in place and inability to progress further w/ mobilization at this time. Pt presents w/ postop guarding, generalized weakness, incl decreased LE strength, decreased functional endurance and activity tolerance, and impaired  balance w/ associated gait deviations requiring use of rw at this time. Will cont to follow pt in PT for progressive mobilization to address above functional deficits and to max level of (I), endurance, and safety. Otherwise, anticipate pt will return home w/ available family support upon D/C provided she cont improving w/ mobility skills, safety, and endurance (incl on the steps) and when medically cleared; D/C recommendations are outlined below; will follow.        Rehab Resource Intensity Level, PT: III (Minimum Resource Intensity)    See flowsheet documentation for full assessment.

## 2025-07-10 NOTE — ASSESSMENT & PLAN NOTE
Wt Readings from Last 3 Encounters:   07/10/25 92.1 kg (203 lb)   07/01/25 87.9 kg (193 lb 12.6 oz)   06/11/25 89.3 kg (196 lb 14.4 oz)   Volume up on exam today  Dry weight around 193 lb, currently 203 lb  Home diuretic: Lasix 20 mg p.o. daily  Current diuretic: Furosemide 40 mg IV BID

## 2025-07-10 NOTE — PROCEDURES
Progress Note - Critical Care/ICU   Name: Nathalia Monge 64 y.o. female I MRN: [id number]  Unit/Bed#: PPHP-307-01 I Date of Admission: 7/8/2025   Date of Service: 7/10/2025 I Hospital Day: 2      Procedure: Epicardial Wire Removal    07/10/25    Patient was returned to bed. EPW x 2 removed in typical fashion.  Patient tolerated well without immediate complications. Site dressed with Acticoat. Patient and nurse aware of mandatory 1 hour bedrest protocol. Vital signs ordered q15 minutes for one hour per protocol.      Procedure: Chest Tube Removal    07/10/25    Mediastinal chest tube x 2 and pleural chest tube x 1 removed in typical fashion. Patient tolerated procedure well. No immediate complications. Site dressed with Acticoat dressing. Patient's nurse was made aware of removal.     Nelson Haas PA-C

## 2025-07-10 NOTE — PROGRESS NOTES
Progress Note - Critical Care/ICU   Name: Nathalia Monge 64 y.o. female I MRN: [id number]  Unit/Bed#: [facility]-307-01 I Date of Admission: 7/8/2025   Date of Service: 7/10/2025 I Hospital Day: 2      Assessment & Plan   Impressions:  MVCAD s/p CABG x3  ICM EF 45%  HTN  HLD  DM2  Bipolar d/o  Epilepsy  CKD3    Neuro:   Cardiac Surgery Pain Control: ATC tylenol and PRN oxycodone 2.5/5mg  Delirium precautions  Regulate sleep/wake cycle  CAM-ICU daily  Trend neuro exam    CV:   Cardiac Surgery Hemodynamic Monitoring: MAP goal >65 CI >2.2 Continue central line due to vasoactive medications Continue arterial line for blood pressure monitoring and/or frequent blood gas draws Discontinue PA catheter  Follow rhythm on telemetry  Critical Care Infusions : Cardene 10 mg/hour  Beta Blocker Plan: Start Lopressor 25 mg BID  Epicardial pacing wire plan : Epicardial pacing wires in place. Will pace as needed for bradycardia or cardiac output   Post op cardiac surgery medications:    mg QD  Statin: Held s/s statin intolerance   Amiodarone 200 mg PO q8 hours     Lung:   Good room air saturation   Chest tube drainage diminished; D/C today    GI:   Continue PPI for stress ulcer prophylaxis  Continue bowel regimen  Trend abdominal exam and bowel function    FEN:   Diuresis Plan: Lasix 40mg IV BID  Nutrition plan: as tolerated  Replenish electrolytes with goals: K >4.0, Mag >2.0, and Phos >3.0    :   Pitt Plan: Continue for accurate I/O monitoring for 48 hours  Mild YING - continue to trend BMP's daily  Avoid nephrotoxic agents  Trend UOP and BUN/creat  Strict I and O     ID:   Trend temps and WBC count  Maintain normothermia    Heme:   Trend hgb and plts    Endo:   Endocrine has been consulted. Continue home insulin teaching.      MSK/Skin:  Mobility goal:   PT/OT consult as medically appropriate   Frequent turning and pressure off-loading  Local wound care as needed    Disposition:  Stepdown Level 1    ICU Core Measures     A: Assess,  Prevent, and Manage Pain Has pain been assessed? Yes  Need for changes to pain regimen? No   B: Both SAT/SAT  N/A   C: Choice of Sedation RASS Goal: 0 Alert and Calm  Need for changes to sedation or analgesia regimen? No   D: Delirium CAM-ICU: Negative   E: Early Mobility  Plan for early mobility? Yes   F: Family Engagement Plan for family engagement today? Yes       Review of Invasive Devices:    Pitt Plan: Continue for accurate I/O monitoring for 48 hours  Central access plan: Patient has multiple central venous catheters.  Medications requiring central line Hemodynamic monitoring  Adriana Plan: Keep arterial line for hemodynamic monitoring    Prophylaxis:  VTE VTE covered by:  heparin (porcine), Subcutaneous, 5,000 Units at 07/10/25 0500       Stress Ulcer  covered bypantoprazole (PROTONIX) EC tablet 40 mg [[id number]]            24 Hour Events    24 Hour Events/HPI: POD # 2 s/p CABG x3. Extubated yesterday morning without issue. Milrinone started at .13 yesterday evening s/s low C.I and high SVR. Able to be weaned off overnight with addition of cardene for high SVR. Otherwise no acute events. Pain well controlled, no N/V. Does have an appetite. No flatus/BM yet.     Critical Care Infusions : Cardene 10 mg/hour  Subjective   Review of Systems: See HPI for Review of Systems  Objective :    Medications:  Scheduled Continuous   acetaminophen, 975 mg, Q6H While awake  amiodarone, 200 mg, Q8H DANTE  aspirin, 325 mg, Daily  chlorhexidine, 15 mL, BID  docusate sodium, 100 mg, BID  ferrous sulfate, 325 mg, Daily  heparin (porcine), 5,000 Units, Q8H DANTE  mupirocin, 1 Application, Q12H DANTE  pantoprazole, 40 mg, Daily  polyethylene glycol, 17 g, Daily  saccharomyces boulardii, 250 mg, BID      insulin regular (HumuLIN R,NovoLIN R) 1 Units/mL in sodium chloride 0.9 % 100 mL infusion, 0.3-21 Units/hr, Last Rate: 4 Units/hr (07/09/25 1938)  milrinone (Primacor) infusion, 0.13 mcg/kg/min, Last Rate: Stopped (07/10/25  0200)  niCARdipine, 2.5-15 mg/hr, Last Rate: 10 mg/hr (07/10/25 0501)  norepinephrine, 1-30 mcg/min, Last Rate: Stopped (07/09/25 1203)  sodium chloride, 20 mL/hr, Last Rate: 20 mL/hr (07/09/25 1800)         Vitals: Invasive Monitoring       Most Recent Min/Max in 24hrs   Temp 97.8 °F (36.6 °C) Temp  Min: 97.8 °F (36.6 °C)  Max: 99.9 °F (37.7 °C)   Pulse 78 Pulse  Min: 66  Max: 79   Resp 17 Resp  Min: 14  Max: 20   /64 BP  Min: 124/57  Max: 141/64   O2 Sat 91 % SpO2  Min: 91 %  Max: 100 %   O2 Device: None (Room air) Arterial Line  [place] /56  Arterial Line BP  Min: 110/50  Max: 144/62   MAP 68 mmHg  Arterial Line MAP (mmHg)  Min: 63 mmHg  Max: 84 mmHg     PA Catheter   Most Recent  Min/Max in 24hrs    PAP 23/9 PAP  Min: 23/9  Max: 42/22   CVP 8 mmHg CVP (mean)  Min: 6 mmHg  Max: 16 mmHg   CI 2.2 L/min/m2  CI (L/min/m2)  Min: 1.8 L/min/m2  Max: 2.8 L/min/m2   SVR 1031 (dyne*sec)/cm5 SVR (dyne*sec)/cm5  Min: 729 (dyne*sec)/cm5  Max: 1530 (dyne*sec)/cm5        I/O Chest tube output (if present)     Intake/Output Summary (Last 24 hours) at 7/10/2025 0716  Last data filed at 7/10/2025 0600  Gross per 24 hour   Intake 2405.11 ml   Output 3025 ml   Net -619.89 ml     UOP: 30-50cc/hour - better with lasix   BM: 0 in the last 24 hours  Weight change: -2.4 kg (-5 lb 4.7 oz)     Mediastinal tubes:  30 mL/8hr  140 mL/24hr   Pleural tubes: 40 mL/8hr  125 mL/24hr        Physical Exam   Physical Exam  Vitals and nursing note reviewed.   Eyes:      Extraocular Movements: Extraocular movements intact.      Pupils: Pupils are equal, round, and reactive to light.   Skin:     General: Skin is warm and dry.   HENT:      Head: Normocephalic and atraumatic.   Neck:      Vascular: Central line present. No JVD.   Cardiovascular:      Rate and Rhythm: Normal rate and regular rhythm.      Pulses: Normal pulses.      Heart sounds: Normal heart sounds.   Musculoskeletal:      Right lower leg: No edema.      Left lower leg: No edema.    Abdominal:      Palpations: Abdomen is soft.      Tenderness: There is no abdominal tenderness. There is no guarding.   Constitutional:       Appearance: She is well-developed and well-nourished.   Pulmonary:      Effort: Pulmonary effort is normal. No accessory muscle usage, respiratory distress or accessory muscle usage.      Breath sounds: Normal breath sounds. No wheezing or rales.      Comments: Chest tube with serosang output.   Midline incision C/D/I  Neurological:      General: No focal deficit present.      Mental Status: She is alert and oriented to person, place and time.      Motor: Strength full and intact in all extremities.   Genitourinary/Anorectal:  Pitt present.        Diagnostic Studies      07/10/25 CXR: n/a.   This was personally reviewed by myself in PACS.  07/10/25 EKG: n/a.   This was personally reviewed by myself.       Labs:  CBC  Recent Labs     07/09/25  0437 07/10/25  0341   WBC 17.47* 24.45*   HGB 11.0*  11.1* 10.1*   HCT 33.6*  33.5* 30.5*    152     BMP  Recent Labs     07/09/25  0223 07/09/25  0437 07/10/25  0341   SODIUM 142  --  140   K 4.2 4.3 3.7   *  --  107   CO2 20*  --  23   AGAP 11  --  10   BUN 26*  --  37*   CREATININE 1.45*  --  1.76*   CALCIUM 8.0*  --  8.3*           Coags  Recent Labs     07/08/25 2231   INR 1.29*   PTT 32              Additional Electrolytes  Recent Labs     07/08/25 2127 07/08/25  2232 07/09/25  0437 07/10/25  0341   MG  --   --  2.6 2.3   CAIONIZED 1.29 1.20  --   --           Blood Gas  Recent Labs     07/09/25 0436   PHART 7.267*   FJA2DSQ 42.8   PO2ART 172.3*   ATL8VAS 19.1*   BEART -7.5   SOURCE Line, Arterial     Recent Labs     07/09/25 0436 07/09/25  0523 07/09/25  1641   PHVEN  --    < > 7.369   QJL1ARS  --    < > 39.2*   PO2VEN  --    < > 37.0   CAW3HRA  --    < > 22.1*   BEVEN  --    < > -2.9   Q5SULNY  --    < > 73.0   SOURCE Line, Arterial  --   --     < > = values in this interval not displayed.    LFTs  No recent  results    Infectious  No recent results     No recent results Glucose  Recent Labs     07/08/25  2231 07/09/25  0223 07/10/25  0341   GLUC 183* 225* 145*      Administrative Statements     Collaborative bedside rounds performed with cardiac surgery attending, critical care attending and bedside RN.

## 2025-07-10 NOTE — ASSESSMENT & PLAN NOTE
POD #2 s/p CABG x 3 with LIMA to LAD, SVG to RI 1, and SVG to RI 2  Final intra op RAFAEL with improved LVEF to 45%  Post op TTE performed today to eval for possible lifevest but patient does not want one even if indicated  CTs and EPW removed today  Post op rhythm management: amiodarone 200 mg TID and carvedilol 6.25 mg BID  Tele reviewed- NSR  Post op volume overload per CT surgery- furosemide 40 mg IV BID  Follow strict I/Os, daily weights, am BMP  On ASA and beta blocker. Not on statin 2/2 allergy.  Continue to monitor on telemetry

## 2025-07-11 PROBLEM — D69.6 THROMBOCYTOPENIA (HCC): Status: ACTIVE | Noted: 2025-07-11

## 2025-07-11 PROBLEM — N17.9 AKI (ACUTE KIDNEY INJURY) (HCC): Status: ACTIVE | Noted: 2025-07-11

## 2025-07-11 PROBLEM — D62 POSTOPERATIVE ANEMIA DUE TO ACUTE BLOOD LOSS: Status: ACTIVE | Noted: 2025-07-11

## 2025-07-11 PROBLEM — D72.829 LEUKOCYTOSIS: Status: ACTIVE | Noted: 2025-07-11

## 2025-07-11 LAB
ANION GAP SERPL CALCULATED.3IONS-SCNC: 10 MMOL/L (ref 4–13)
BUN SERPL-MCNC: 53 MG/DL (ref 5–25)
CALCIUM SERPL-MCNC: 8.2 MG/DL (ref 8.4–10.2)
CHLORIDE SERPL-SCNC: 105 MMOL/L (ref 96–108)
CO2 SERPL-SCNC: 25 MMOL/L (ref 21–32)
CREAT SERPL-MCNC: 1.98 MG/DL (ref 0.6–1.3)
ERYTHROCYTE [DISTWIDTH] IN BLOOD BY AUTOMATED COUNT: 14.2 % (ref 11.6–15.1)
EST. AVERAGE GLUCOSE BLD GHB EST-MCNC: 146 MG/DL
GFR SERPL CREATININE-BSD FRML MDRD: 26 ML/MIN/1.73SQ M
GLUCOSE SERPL-MCNC: 100 MG/DL (ref 65–140)
GLUCOSE SERPL-MCNC: 110 MG/DL (ref 65–140)
GLUCOSE SERPL-MCNC: 127 MG/DL (ref 65–140)
GLUCOSE SERPL-MCNC: 131 MG/DL (ref 65–140)
GLUCOSE SERPL-MCNC: 133 MG/DL (ref 65–140)
GLUCOSE SERPL-MCNC: 135 MG/DL (ref 65–140)
GLUCOSE SERPL-MCNC: 140 MG/DL (ref 65–140)
GLUCOSE SERPL-MCNC: 150 MG/DL (ref 65–140)
GLUCOSE SERPL-MCNC: 165 MG/DL (ref 65–140)
GLUCOSE SERPL-MCNC: 193 MG/DL (ref 65–140)
GLUCOSE SERPL-MCNC: 204 MG/DL (ref 65–140)
GLUCOSE SERPL-MCNC: 220 MG/DL (ref 65–140)
HBA1C MFR BLD: 6.7 %
HCT VFR BLD AUTO: 26.6 % (ref 34.8–46.1)
HGB BLD-MCNC: 8.9 G/DL (ref 11.5–15.4)
MAGNESIUM SERPL-MCNC: 2.3 MG/DL (ref 1.9–2.7)
MCH RBC QN AUTO: 30.3 PG (ref 26.8–34.3)
MCHC RBC AUTO-ENTMCNC: 33.5 G/DL (ref 31.4–37.4)
MCV RBC AUTO: 91 FL (ref 82–98)
PHOSPHATE SERPL-MCNC: 4.4 MG/DL (ref 2.3–4.1)
PLATELET # BLD AUTO: 128 THOUSANDS/UL (ref 149–390)
PMV BLD AUTO: 11.2 FL (ref 8.9–12.7)
POTASSIUM SERPL-SCNC: 4.3 MMOL/L (ref 3.5–5.3)
RBC # BLD AUTO: 2.94 MILLION/UL (ref 3.81–5.12)
SODIUM SERPL-SCNC: 140 MMOL/L (ref 135–147)
WBC # BLD AUTO: 19.08 THOUSAND/UL (ref 4.31–10.16)

## 2025-07-11 PROCEDURE — 97530 THERAPEUTIC ACTIVITIES: CPT

## 2025-07-11 PROCEDURE — 80048 BASIC METABOLIC PNL TOTAL CA: CPT | Performed by: STUDENT IN AN ORGANIZED HEALTH CARE EDUCATION/TRAINING PROGRAM

## 2025-07-11 PROCEDURE — 85027 COMPLETE CBC AUTOMATED: CPT | Performed by: STUDENT IN AN ORGANIZED HEALTH CARE EDUCATION/TRAINING PROGRAM

## 2025-07-11 PROCEDURE — 99024 POSTOP FOLLOW-UP VISIT: CPT | Performed by: PHYSICIAN ASSISTANT

## 2025-07-11 PROCEDURE — 83036 HEMOGLOBIN GLYCOSYLATED A1C: CPT

## 2025-07-11 PROCEDURE — 99232 SBSQ HOSP IP/OBS MODERATE 35: CPT | Performed by: INTERNAL MEDICINE

## 2025-07-11 PROCEDURE — 84100 ASSAY OF PHOSPHORUS: CPT | Performed by: STUDENT IN AN ORGANIZED HEALTH CARE EDUCATION/TRAINING PROGRAM

## 2025-07-11 PROCEDURE — 82948 REAGENT STRIP/BLOOD GLUCOSE: CPT

## 2025-07-11 PROCEDURE — 83735 ASSAY OF MAGNESIUM: CPT | Performed by: STUDENT IN AN ORGANIZED HEALTH CARE EDUCATION/TRAINING PROGRAM

## 2025-07-11 PROCEDURE — 97116 GAIT TRAINING THERAPY: CPT

## 2025-07-11 PROCEDURE — 97535 SELF CARE MNGMENT TRAINING: CPT

## 2025-07-11 RX ORDER — INSULIN LISPRO 100 [IU]/ML
1-5 INJECTION, SOLUTION INTRAVENOUS; SUBCUTANEOUS
Status: DISCONTINUED | OUTPATIENT
Start: 2025-07-12 | End: 2025-07-13

## 2025-07-11 RX ORDER — AMLODIPINE BESYLATE 10 MG/1
10 TABLET ORAL DAILY
Status: DISCONTINUED | OUTPATIENT
Start: 2025-07-12 | End: 2025-07-14 | Stop reason: HOSPADM

## 2025-07-11 RX ORDER — INSULIN LISPRO 100 [IU]/ML
1-6 INJECTION, SOLUTION INTRAVENOUS; SUBCUTANEOUS
Status: DISCONTINUED | OUTPATIENT
Start: 2025-07-12 | End: 2025-07-13

## 2025-07-11 RX ORDER — INSULIN GLARGINE 100 [IU]/ML
30 INJECTION, SOLUTION SUBCUTANEOUS
Status: DISCONTINUED | OUTPATIENT
Start: 2025-07-11 | End: 2025-07-13

## 2025-07-11 RX ORDER — CARVEDILOL 12.5 MG/1
12.5 TABLET ORAL 2 TIMES DAILY WITH MEALS
Status: DISCONTINUED | OUTPATIENT
Start: 2025-07-11 | End: 2025-07-12

## 2025-07-11 RX ORDER — AMLODIPINE BESYLATE 5 MG/1
5 TABLET ORAL DAILY
Status: DISCONTINUED | OUTPATIENT
Start: 2025-07-11 | End: 2025-07-11

## 2025-07-11 RX ORDER — INSULIN LISPRO 100 [IU]/ML
10 INJECTION, SOLUTION INTRAVENOUS; SUBCUTANEOUS
Status: DISCONTINUED | OUTPATIENT
Start: 2025-07-12 | End: 2025-07-13

## 2025-07-11 RX ORDER — AMLODIPINE BESYLATE 5 MG/1
5 TABLET ORAL ONCE
Status: COMPLETED | OUTPATIENT
Start: 2025-07-11 | End: 2025-07-11

## 2025-07-11 RX ADMIN — Medication 250 MG: at 17:23

## 2025-07-11 RX ADMIN — AMIODARONE HYDROCHLORIDE 200 MG: 200 TABLET ORAL at 06:26

## 2025-07-11 RX ADMIN — FUROSEMIDE 40 MG: 10 INJECTION, SOLUTION INTRAMUSCULAR; INTRAVENOUS at 17:29

## 2025-07-11 RX ADMIN — AMLODIPINE BESYLATE 5 MG: 5 TABLET ORAL at 09:55

## 2025-07-11 RX ADMIN — AMIODARONE HYDROCHLORIDE 200 MG: 200 TABLET ORAL at 21:36

## 2025-07-11 RX ADMIN — CHLORHEXIDINE GLUCONATE 15 ML: 1.2 SOLUTION ORAL at 17:23

## 2025-07-11 RX ADMIN — ACETAMINOPHEN 975 MG: 325 TABLET ORAL at 13:58

## 2025-07-11 RX ADMIN — FERROUS SULFATE TAB 325 MG (65 MG ELEMENTAL FE) 325 MG: 325 (65 FE) TAB at 09:17

## 2025-07-11 RX ADMIN — ACETAMINOPHEN 975 MG: 325 TABLET ORAL at 09:17

## 2025-07-11 RX ADMIN — SODIUM CHLORIDE 20 ML/HR: 0.45 INJECTION, SOLUTION INTRAVENOUS at 02:02

## 2025-07-11 RX ADMIN — FUROSEMIDE 40 MG: 10 INJECTION, SOLUTION INTRAMUSCULAR; INTRAVENOUS at 09:17

## 2025-07-11 RX ADMIN — HEPARIN SODIUM 5000 UNITS: 5000 INJECTION INTRAVENOUS; SUBCUTANEOUS at 21:37

## 2025-07-11 RX ADMIN — CARVEDILOL 12.5 MG: 12.5 TABLET, FILM COATED ORAL at 17:23

## 2025-07-11 RX ADMIN — ASPIRIN 325 MG ORAL TABLET 325 MG: 325 PILL ORAL at 09:17

## 2025-07-11 RX ADMIN — DOCUSATE SODIUM 100 MG: 100 CAPSULE, LIQUID FILLED ORAL at 09:17

## 2025-07-11 RX ADMIN — AMLODIPINE BESYLATE 5 MG: 5 TABLET ORAL at 18:04

## 2025-07-11 RX ADMIN — ACETAMINOPHEN 975 MG: 325 TABLET ORAL at 21:36

## 2025-07-11 RX ADMIN — ACETAMINOPHEN 975 MG: 325 TABLET ORAL at 04:01

## 2025-07-11 RX ADMIN — HEPARIN SODIUM 5000 UNITS: 5000 INJECTION INTRAVENOUS; SUBCUTANEOUS at 06:26

## 2025-07-11 RX ADMIN — CARVEDILOL 6.25 MG: 6.25 TABLET, FILM COATED ORAL at 09:17

## 2025-07-11 RX ADMIN — SODIUM CHLORIDE 9 UNITS/HR: 9 INJECTION, SOLUTION INTRAVENOUS at 17:22

## 2025-07-11 RX ADMIN — POLYETHYLENE GLYCOL 3350 17 G: 17 POWDER, FOR SOLUTION ORAL at 09:17

## 2025-07-11 RX ADMIN — INSULIN GLARGINE 30 UNITS: 100 INJECTION, SOLUTION SUBCUTANEOUS at 22:19

## 2025-07-11 RX ADMIN — HEPARIN SODIUM 5000 UNITS: 5000 INJECTION INTRAVENOUS; SUBCUTANEOUS at 13:58

## 2025-07-11 RX ADMIN — AMIODARONE HYDROCHLORIDE 200 MG: 200 TABLET ORAL at 13:59

## 2025-07-11 RX ADMIN — CHLORHEXIDINE GLUCONATE 15 ML: 1.2 SOLUTION ORAL at 09:17

## 2025-07-11 RX ADMIN — DOCUSATE SODIUM 100 MG: 100 CAPSULE, LIQUID FILLED ORAL at 17:23

## 2025-07-11 NOTE — PLAN OF CARE
Problem: PAIN - ADULT  Goal: Verbalizes/displays adequate comfort level or baseline comfort level  Description: Interventions:  - Encourage patient to monitor pain and request assistance  - Assess pain using appropriate pain scale  - Administer analgesics as ordered based on type and severity of pain and evaluate response  - Implement non-pharmacological measures as appropriate and evaluate response  - Consider cultural and social influences on pain and pain management  - Notify physician/advanced practitioner if interventions unsuccessful or patient reports new pain  - Educate patient/family on pain management process including their role and importance of  reporting pain   - Provide non-pharmacologic/complimentary pain relief interventions  Outcome: Progressing     Problem: INFECTION - ADULT  Goal: Absence or prevention of progression during hospitalization  Description: INTERVENTIONS:  - Assess and monitor for signs and symptoms of infection  - Monitor lab/diagnostic results  - Monitor all insertion sites, i.e. indwelling lines, tubes, and drains  - Monitor endotracheal if appropriate and nasal secretions for changes in amount and color  - [place] appropriate cooling/warming therapies per order  - Administer medications as ordered  - Instruct and encourage patient and family to use good hand hygiene technique  - Identify and instruct in appropriate isolation precautions for identified infection/condition  Outcome: Progressing  Goal: Absence of fever/infection during neutropenic period  Description: INTERVENTIONS:  - Monitor WBC  - Perform strict hand hygiene  - Limit to healthy visitors only  - No plants, dried, fresh or silk flowers with burciaga in patient room  Outcome: Progressing     Problem: SAFETY ADULT  Goal: Patient will remain free of falls  Description: INTERVENTIONS:  - Educate patient/family on patient safety including physical limitations  - Instruct patient to call for assistance with activity   -  Consider consulting OT/PT to assist with strengthening/mobility based on AM PAC & JH-HLM score  - Consult OT/PT to assist with strengthening/mobility   - Keep Call bell within reach  - Keep bed low and locked with side rails adjusted as appropriate  - Keep care items and personal belongings within reach  - Initiate and maintain comfort rounds  - Make Fall Risk Sign visible to staff  - Offer Toileting every 2 Hours, in advance of need  - Initiate/Maintain bed alarm  - Obtain necessary fall risk management equipment:   - Apply yellow socks and bracelet for high fall risk patients  - Consider moving patient to room near nurses station  Outcome: Progressing  Goal: Maintain or return to baseline ADL function  Description: INTERVENTIONS:  -  Assess patient's ability to carry out ADLs; assess patient's baseline for ADL function and identify physical deficits which impact ability to perform ADLs (bathing, care of mouth/teeth, toileting, grooming, dressing, etc.)  - Assess/evaluate cause of self-care deficits   - Assess range of motion  - Assess patient's mobility; develop plan if impaired  - Assess patient's need for assistive devices and provide as appropriate  - Encourage maximum independence but intervene and supervise when necessary  - Involve family in performance of ADLs  - Assess for home care needs following discharge   - Consider OT consult to assist with ADL evaluation and planning for discharge  - Provide patient education as appropriate  - Monitor functional capacity and physical performance, use of AM PAC & JH-HLM   - Monitor gait, balance and fatigue with ambulation    Outcome: Progressing  Goal: Maintains/Returns to pre admission functional level  Description: INTERVENTIONS:  - Perform AM-PAC 6 Click Basic Mobility/ Daily Activity assessment daily.  - Set and communicate daily mobility goal to care team and patient/family/caregiver.   - Collaborate with rehabilitation services on mobility goals if  consulted  - Perform Range of Motion 2 times a day.  - Reposition patient every 2 hours.  - Dangle patient 3 times a day  - Stand patient 2 times a day  - Ambulate patient 2 times a day  - Out of bed to chair 3 times a day   - Out of bed for meals 3 times a day  - Out of bed for toileting  - Record patient progress and toleration of activity level   Outcome: Progressing     Problem: DISCHARGE PLANNING  Goal: Discharge to home or other facility with appropriate resources  Description: INTERVENTIONS:  - Identify barriers to discharge w/patient and caregiver  - Arrange for needed discharge resources and transportation as appropriate  - Identify discharge learning needs (meds, wound care, etc.)  - Arrange for interpretive services to assist at discharge as needed  - Refer to Case Management Department for coordinating discharge planning if the patient needs post-hospital services based on physician/advanced practitioner order or complex needs related to functional status, cognitive ability, or social support system  Outcome: Progressing     Problem: Knowledge Deficit  Goal: Patient/family/caregiver demonstrates understanding of disease process, treatment plan, medications, and discharge instructions  Description: Complete learning assessment and assess knowledge base.  Interventions:  - Provide teaching at level of understanding  - Provide teaching via preferred learning methods  Outcome: Progressing     Problem: Prexisting or High Potential for Compromised Skin Integrity  Goal: Skin integrity is maintained or improved  Description: INTERVENTIONS:  - Identify patients at risk for skin breakdown  - Assess and monitor skin integrity including under and around medical devices   - Assess and monitor nutrition and hydration status  - Monitor labs  - Assess for incontinence   - Turn and reposition patient  - Assist with mobility/ambulation  - Relieve pressure over devorah prominences   - Avoid friction and shearing  - Provide  appropriate hygiene as needed including keeping skin clean and dry  - Evaluate need for skin moisturizer/barrier cream  - Collaborate with interdisciplinary team  - Patient/family teaching  - Consider wound care consult    Assess:  - Review Luis scale daily  - Clean and moisturize skin every day  - Inspect skin when repositioning, toileting, and assisting with ADLS  - Assess under medical devices such as allyvens every shift  - Assess extremities for adequate circulation and sensation     Bed Management:  - Have minimal linens on bed & keep smooth, unwrinkled  - Change linens as needed when moist or perspiring  - Avoid sitting or lying in one position for more than 2 hours while in bed?Keep HOB at 30 degrees   - Toileting:  - Offer bedside commode  - Assess for incontinence every shift  - Use incontinent care products after each incontinent episode such as wipes    Activity:  - Mobilize patient 2 times a day  - Encourage activity and walks on unit  - Encourage or provide ROM exercises   - Turn and reposition patient every 2 Hours  - Use appropriate equipment to lift or move patient in bed  - Instruct/ Assist with weight shifting every 30 mins when out of bed in chair  - Consider limitation of chair time 4 hour intervals    Skin Care:  - Avoid use of baby powder, tape, friction and shearing, hot water or constrictive clothing  - Relieve pressure over bony prominences using allyvens  - Do not massage red bony areas    Next Steps:  - Teach patient strategies to minimize risks such as weight shifts    Outcome: Progressing

## 2025-07-11 NOTE — DISCHARGE INSTR - AVS FIRST PAGE
Instructions Following Open Heart Surgery      Protect your sternum (breast bone). Do not lift anything heavier than 10 pounds for the first four weeks after surgery. (For example, a gallon of milk weighs 8 pounds) When you are seen for a routine postop check, you will be cleared to lift up to 25 lbs. Following three months from the time of surgery, you may lift without any restrictions.     Hug a pillow to your chest or cross your arms over your chest when you laugh, sneeze, or cough. You may resume sexual activity when you feel ready. Do not put any excessive pressure on your chest.    Be careful when you get into or out of a chair or bed.  Hug a pillow or cross your arms when you stand or sit. Do not twist as you move. Use only your legs to sit and stand. You may need a raised toilet seat if you have trouble standing up without using your arms.     No sleeping on side for the first 4 weeks. Limit daytime naps, to prevent difficulty sleeping at night.    Women: Wear a clean surgical bra every day.     Do not play sports that use your shoulder.  Examples include tennis and golf.    Do not drive until cleared by surgeon. Typically cleared to drive after one month, determination will be made at routine postop appointment. When a passenger in the car, wear a seat belt.    Continue you breathing exercises throughout the day with incentive spirometry    Activity: Your goal is to go on frequent walks, slowly increasing your activity level. Walking will help prevent leg swelling and prevent blood clots. When you start outpatient cardiac rehab in one month, you will be given additional instructions and a formal exercise plan. It is OK to go up steps, with help.     You may resume sexual activity when you are comfortable. Do not put excessive pressure on your chest.     Weigh yourself daily in the morning and keep of log of your weight. If your weight increases more than 2 lbs per day or more than 5 lbs in a week,  call your surgeon's office.    Keep your legs elevated when sitting. Pressure stockings may be worn to prevent significant leg swelling.     You may get routine vaccines any time after open heart surgery    Do not smoke:  Nicotine can damage blood vessels and make it more difficult to heal. Do not use e-cigarettes or smokeless tobacco in place of cigarettes or to help you quit. They still contain nicotine. Ask your primary care provider for information if you currently smoke and need help quitting.     Incision/Wound Care:    Shower daily. Gently wash your incision with warm water and mild soap. Do not scrub the area. Gently pat the area dry with a clean towel. If you have a bandage, dry the area and put on a new, clean bandage. Change your bandage at least daily, or if it gets wet or dirty. Always wash your hands before you care for your wound. Do not apply ointments, creams, lotions, powders, etc. If you develop signs of an infection (fever > 101, redness, warm skin, or drainage) please call your surgeon's office.     Do not pick at or touch puncture sites or incisions. Allow and scabs to come off on their own.     It is normal to have some drainage from the chest tube sites. Apply clean/dry dressings, until this resolves.    You may have discomfort or numbness along the incision for 3-4 weeks. It is normal to occasionally experience brief sharp shooing pains, tightness, or pulling sensations.    Do not take a bath or swim until cleared by surgeon.    As your incision heals, sometimes small tender lumps or pimple-like bumps develop which is due to your body attempting to “dissolve” the suture (stiches).     Call your local emergency number (911 in the [place]) or have someone call if:   You have squeezing, pressure, or pain in your chest or back, lightheadedness or sudden cold sweat  Short of breath that does not go away with rest  You cough up blood.  You have a fast heartbeat that flutters. Or palpitations  You  faint.  Signs of a stroke:  Numbness or drooping on one side of your face. Weakness in an arm or leg. Confusion or difficulty speaking. Dizziness, a severe headache, or vision loss    Call your surgeons office if:   You have bleeding that does not stop even after you apply pressure for 5 minutes.  You have redness, tenderness, or signs of infection at incision (pain, swelling, odor, or green/yellow discharge from incision site)  You have a severe headache.  You have a fever higher than 101°F (38.4°C).  You urinate less, or not at all.  You have persistent nausea, vomiting, or diarrhea  You hear persistent or worsening crunching or grinding in your sternum.  You have questions or concerns about your condition or care.      Diet:    Eat heart-healthy foods, low in unhealthy fats and sodium (salt). A heart healthy diet helps improve your cholesterol levels and lowers your risk for heart disease and stroke. You will receive formal education on healthy eating and label reading during your cardiac rehab in one month.   Choose foods that contain healthy fats, such as soybean, canola, olive, corn, and sunflower oils.  Limit unhealthy fats. Examples include:  Cholesterol  is found in animal foods, such as eggs and lobster, and in dairy products made from whole milk.    Saturated fat  is found in meats, such as snyder and hamburger. It is also found in chicken or turkey skin, whole milk, and butter.     Trans fat  is found in packaged foods, such as potato chips and cookies. It is also in some fried foods, and shortening. Do not eat foods that contain trans fats.  Get 20 to 30 grams of fiber each day.  Fruits, vegetables, whole-grain foods, and legumes (cooked beans) are good sources of fiber.  Low Sodium: Limit to 2,000 mg or less each day, unless otherwise directed. Choose low-sodium or no-salt-added foods. Add little or no salt to food you prepare. Use herbs and spices in place of salt.  Foods high in sodium include frozen  dinners, macaroni and cheese, instant potatoes, canned vegetables, cured or smoked meats, hot dogs, snyder and sausage, ketchup, barbecue sauce, salad dressing, pickles, olives, soy sauce, and miso.     Fluid Restriction: Fluid restriction after hospital discharge is advised. Limit your fluid intake to no more than 8 cups of liquid (8oz = 1cup) or 2000 mL per day.    Limit/Do not drink alcohol. Alcohol can damage heart and raise your blood pressure. The general recommended limit is 1 drink a day for women 21 or older and for men 65 or older. Do not have more than 3 drinks within 24 hours or 7 within a week. A drink of alcohol is 12 oz of beer, 5 oz of wine, or 1½ oz of liquor.        Narcotic Safety     What do I need to know about narcotic safety?    A narcotic is a type of medicine used to treat pain. When you are discharged from the hospital, you will be prescribed a narcotic called oxycodone. Pain control and management may help you rest, heal, and return to your daily activities. Do not take more than the recommended amount. Too much can cause a life-threatening overdose. Do not continue to take it after your pain stops.     How do I use narcotics safely?   Take prescribed narcotics exactly as directed.  You may develop tolerance. This means you keep needing higher doses to get the same effect. You may also develop narcotic use disorder. This means you are not able to control your narcotic use.  Do not give narcotics to others or take narcotics that belong to someone else.  Do not mix narcotics with other medicines or alcohol.  The combination can cause an overdose, or cause you to stop breathing.   Do not drive or operate heavy machinery after you use a narcotic.    Talk to your healthcare provider if you have any side effects.  Side effects include nausea, sleepiness, itching, and trouble thinking clearly.     What can I do to manage constipation?  Constipation is the most common side effect of narcotic  medicine. Constipation is when you have hard, dry bowel movements, or you go longer than usual between bowel movements. The following are ways you can prevent or relieve constipation:  Drink liquids as allowed.  Drinking extra liquids will help soften and move your bowels. You should drink up to your maximum fluid allotment of 2 liters.    Eat high-fiber foods.  This may help decrease constipation by adding bulk to your bowel movements. High-fiber foods include fruits, vegetables, whole-grain breads and cereals, and beans  Supplements. You have been prescribed a fiber supplement called polyethylene glycol (Stephani lax) and a stool softener called docusate sodium (Colace). You should take these for as long as you continue narcotic medications.  Exercise regularly.  Regular physical activity can help stimulate your intestines. Walking is a good exercise to prevent or relieve constipation. Ask which exercises are best for you.    How do I store narcotics safely?   Store narcotics where others cannot easily get them.  Keep them in a locked cabinet or secure area. Do not  keep them in a purse or other bag you carry with you. A person may be looking for something else and find the narcotics.    Make sure narcotics are stored out of the reach of children.  A child can easily overdose on narcotics. Narcotics may look like candy to a small child.

## 2025-07-11 NOTE — ASSESSMENT & PLAN NOTE
Lab Results   Component Value Date    HGBA1C 6.7 (H) 07/11/2025     Recent Labs     07/11/25  1012 07/11/25  1153 07/11/25  1413 07/11/25  1619   POCGLU 165* 110 204* 193*     Blood Sugar Average: Last 72 hrs:  (P) [id number]  2 diabetes mellitus with hyperglycemia as evidenced by A1c of 7.4%  Home regimen: NovoLog Mix 70/30 20 units before breakfast and 20 units before dinner.  Current regimen: Insulin infusion  BG reviewed-well-controlled on the insulin infusion.  Given improvement in appetite will transition patient to basal bolus insulin.      Plan:  Start Lantus 30 units nightly  Discontinue insulin infusion 1 hour after Lantus injection is given  Start Humalog 10 units 3 times daily before meals  Correctional scale insulin algorithm 3 before meals and 2 at bedtime  Continue Accu-Cheks before meals and at bedtime  Monitor for hypoglycemia, treat per protocol  Goal blood glucose while in the [id number] mg/dL  Discharge recommendations pending clinical course-the patient can be discharged on her home insulin.  Given HFrEF and microalbuminuria she will likely also benefit from addition of SGLT2 inhibitor.  Endocrinology will continue following

## 2025-07-11 NOTE — OCCUPATIONAL THERAPY NOTE
"  Occupational Therapy Progress Note     Patient Name: Nathalia Monge  Today's Date: 7/11/2025  Problem List  Principal Problem:    CAD (coronary artery disease)  Active Problems:    Partial idiopathic epilepsy with seizures of localized onset, intractable, without status epilepticus (Formerly [facility])    Type 2 diabetes mellitus with hyperglycemia, with long-term current use of insulin (Formerly [facility])    Obesity (BMI 30.0-34.9)    Mixed hyperlipidemia    Degenerative disc disease, thoracic    Chronic heart failure with reduced ejection fraction (HFrEF, <= 40%) (Formerly [facility])    S/P CABG (coronary artery bypass graft)    Leukocytosis    Thrombocytopenia (Formerly [facility])    Postoperative anemia due to acute blood loss    YING (acute kidney injury) (Formerly [facility])          07/11/25 1144   OT Last Visit   OT Visit Date 07/11/25   Pain Assessment   Pain Assessment Tool FLACC   Pain Rating: FLACC (Rest) - Face 0   Pain Rating: FLACC (Rest) - Legs 0   Pain Rating: FLACC (Rest) - Activity 0   Pain Rating: FLACC (Rest) - Cry 0   Pain Rating: FLACC (Rest) - Consolability 0   Score: FLACC (Rest) 0   Pain Rating: FLACC (Activity) - Face 0   Pain Rating: FLACC (Activity) - Legs 0   Pain Rating: FLACC (Activity) - Activity 0   Pain Rating: FLACC (Activity) - Cry 0   Pain Rating: FLACC (Activity) - Consolability 0   Score: FLACC (Activity) 0   Restrictions/Precautions   Weight Bearing Precautions Per Order No   Other Precautions Cardiac/sternal;Chair Alarm;Bed Alarm;Cognitive;Telemetry;Multiple lines;Fall Risk   Lifestyle   Autonomy Pta pt reports being independent in ADLs and functional mobility, receives assistance for IADLs, (-) .   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   Transfers   Sit to Stand 5  " Supervision   Additional items Increased time required;Verbal cues   Stand to Sit 5  Supervision   Additional items Increased time required;Verbal cues   Toilet transfer 5  Supervision   Additional items Increased time required;Verbal cues;Standard toilet   Additional Comments w/ RW   Functional Mobility   Functional Mobility 4  Minimal assistance  (CGA)   Additional Comments Pt performed household distance functional mobility w/ Min Ax1-CGA w/ instances of close Supervision, using RW.   Additional items Rolling walker   Cognition   Overall Cognitive Status WFL   Arousal/Participation Alert;Cooperative   Attention Attends with cues to redirect   Orientation Level Oriented X4   Memory Decreased recall of precautions   Following Commands Follows one step commands without difficulty   Comments Pt alert and cooperative to therapy, although some decreased recall of precautions, pt benefited from positive self talk.   Assessment   Assessment Pt was seen for OT treatment day 1 for ADL retraining, functional transfer training, functional mobility training, endurance training, patient education/safety awareness. Pt was greeted and left OOB in chair with chair alarm on, all needs within reach. Pt continues to be limited by medical status although demonstrates improvement requiring Ronald for UB dressing, ModA for LB dressing to don socks, Supervision for toileting tasks, Supervision for STS transfers w/ RW, and Min Ax1-CGA w/ instances of close Supervision for household distance functional mobility using RW.   The patient's raw score on the AM-PAC Daily Activity Inpatient Short Form is 17. A raw score of less than 19 suggests the patient may benefit from discharge to post-acute rehabilitation services. Please refer to the recommendation of the Occupational Therapist for safe discharge planning. Although AM-PAC score is less than 19, pt is expected to continue progressing and has adequate support at home. Pt would benefit from  continued acute OT services throughout hospital course and following D/C. Moving forward, OT interventions 2-3x per week to increase safety and participation in ADLs, transfers, and functional mobility. From OT standpoint, recommend pt to return home with increased social support and level III services upon D/C pending progress.   Plan   Treatment Interventions ADL retraining;Functional transfer training;Endurance training;Patient/family training;Compensatory technique education;Continued evaluation;Energy conservation   Goal Expiration Date 07/24/25   OT Treatment Day 1   OT Frequency 2-3x/wk   Discharge Recommendation   Rehab Resource Intensity Level, OT III (Minimum Resource Intensity)   AM-PAC Daily Activity Inpatient   Lower Body Dressing 2   Bathing 2   Toileting 3   Upper Body Dressing 3   Grooming 3   Eating 4   Daily Activity Raw Score 17   Daily Activity Standardized Score (Calc for Raw Score >=11) 37.26   AM-PAC Applied Cognition Inpatient   Following a Speech/Presentation 4   Understanding Ordinary Conversation 4   Taking Medications 4   Remembering Where Things Are Placed or Put Away 4   Remembering List of 4-5 Errands 3   Taking Care of Complicated Tasks 3   Applied Cognition Raw Score 22   Applied Cognition Standardized Score 47.83   End of Consult   Education Provided Yes   Patient Position at End of Consult Bedside chair;Bed/Chair alarm activated;All needs within reach   Nurse Communication Nurse aware of consult       EDWARD Cabrera

## 2025-07-11 NOTE — PLAN OF CARE
Problem: PAIN - ADULT  Goal: Verbalizes/displays adequate comfort level or baseline comfort level  Description: Interventions:  - Encourage patient to monitor pain and request assistance  - Assess pain using appropriate pain scale  - Administer analgesics as ordered based on type and severity of pain and evaluate response  - Implement non-pharmacological measures as appropriate and evaluate response  - Consider cultural and social influences on pain and pain management  - Notify physician/advanced practitioner if interventions unsuccessful or patient reports new pain  - Educate patient/family on pain management process including their role and importance of  reporting pain   - Provide non-pharmacologic/complimentary pain relief interventions  7/11/2025 0510 by Milagros Mcclain RN  Outcome: Progressing  7/10/2025 2340 by Milagros Mcclain RN  Outcome: Progressing     Problem: INFECTION - ADULT  Goal: Absence or prevention of progression during hospitalization  Description: INTERVENTIONS:  - Assess and monitor for signs and symptoms of infection  - Monitor lab/diagnostic results  - Monitor all insertion sites, i.e. indwelling lines, tubes, and drains  - Monitor endotracheal if appropriate and nasal secretions for changes in amount and color  - [place] appropriate cooling/warming therapies per order  - Administer medications as ordered  - Instruct and encourage patient and family to use good hand hygiene technique  - Identify and instruct in appropriate isolation precautions for identified infection/condition  7/11/2025 0510 by Milagros Mcclain RN  Outcome: Progressing  7/10/2025 2340 by Milagros Mcclain RN  Outcome: Progressing  Goal: Absence of fever/infection during neutropenic period  Description: INTERVENTIONS:  - Monitor WBC  - Perform strict hand hygiene  - Limit to healthy visitors only  - No plants, dried, fresh or silk flowers with burciaga in patient room  7/11/2025 0510 by Milagros Mcclain RN  Outcome:  Progressing  7/10/2025 2340 by Milagros Mcclain RN  Outcome: Progressing     Problem: SAFETY ADULT  Goal: Patient will remain free of falls  Description: INTERVENTIONS:  - Educate patient/family on patient safety including physical limitations  - Instruct patient to call for assistance with activity   - Consider consulting OT/PT to assist with strengthening/mobility based on AM PAC & JH-HLM score  - Consult OT/PT to assist with strengthening/mobility   - Keep Call bell within reach  - Keep bed low and locked with side rails adjusted as appropriate  - Keep care items and personal belongings within reach  - Initiate and maintain comfort rounds  - Make Fall Risk Sign visible to staff  - Offer Toileting every 2 Hours, in advance of need  - Initiate/Maintain bed alarm  - Obtain necessary fall risk management equipment:   - Apply yellow socks and bracelet for high fall risk patients  - Consider moving patient to room near nurses station  7/11/2025 0510 by Milagros Mcclain RN  Outcome: Progressing  7/10/2025 2340 by Milagros Mcclain RN  Outcome: Progressing  Goal: Maintain or return to baseline ADL function  Description: INTERVENTIONS:  -  Assess patient's ability to carry out ADLs; assess patient's baseline for ADL function and identify physical deficits which impact ability to perform ADLs (bathing, care of mouth/teeth, toileting, grooming, dressing, etc.)  - Assess/evaluate cause of self-care deficits   - Assess range of motion  - Assess patient's mobility; develop plan if impaired  - Assess patient's need for assistive devices and provide as appropriate  - Encourage maximum independence but intervene and supervise when necessary  - Involve family in performance of ADLs  - Assess for home care needs following discharge   - Consider OT consult to assist with ADL evaluation and planning for discharge  - Provide patient education as appropriate  - Monitor functional capacity and physical performance, use of AM PAC &  JH-HLM   - Monitor gait, balance and fatigue with ambulation    7/11/2025 0510 by Milagros Mcclain RN  Outcome: Progressing  7/10/2025 2340 by Milagros Mcclain RN  Outcome: Progressing  Goal: Maintains/Returns to pre admission functional level  Description: INTERVENTIONS:  - Perform AM-PAC 6 Click Basic Mobility/ Daily Activity assessment daily.  - Set and communicate daily mobility goal to care team and patient/family/caregiver.   - Collaborate with rehabilitation services on mobility goals if consulted  - Perform Range of Motion 3 times a day.  - Reposition patient every 2 hours.  - Dangle patient 3 times a day  - Stand patient 2 times a day  - Ambulate patient 2 times a day  - Out of bed to chair 3 times a day   - Out of bed for meals 3 times a day  - Out of bed for toileting  - Record patient progress and toleration of activity level   7/11/2025 0510 by Milagros Mcclain RN  Outcome: Progressing  7/10/2025 2340 by Milagros Mcclain RN  Outcome: Progressing     Problem: DISCHARGE PLANNING  Goal: Discharge to home or other facility with appropriate resources  Description: INTERVENTIONS:  - Identify barriers to discharge w/patient and caregiver  - Arrange for needed discharge resources and transportation as appropriate  - Identify discharge learning needs (meds, wound care, etc.)  - Arrange for interpretive services to assist at discharge as needed  - Refer to Case Management Department for coordinating discharge planning if the patient needs post-hospital services based on physician/advanced practitioner order or complex needs related to functional status, cognitive ability, or social support system  7/11/2025 0510 by Milagros Mcclain RN  Outcome: Progressing  7/10/2025 2340 by Milagros Mcclain RN  Outcome: Progressing     Problem: Knowledge Deficit  Goal: Patient/family/caregiver demonstrates understanding of disease process, treatment plan, medications, and discharge instructions  Description: Complete  learning assessment and assess knowledge base.  Interventions:  - Provide teaching at level of understanding  - Provide teaching via preferred learning methods  7/11/2025 0510 by Milagros Mcclain RN  Outcome: Progressing  7/10/2025 2340 by Milagros Mcclain RN  Outcome: Progressing     Problem: Prexisting or High Potential for Compromised Skin Integrity  Goal: Skin integrity is maintained or improved  Description: INTERVENTIONS:  - Identify patients at risk for skin breakdown  - Assess and monitor skin integrity including under and around medical devices   - Assess and monitor nutrition and hydration status  - Monitor labs  - Assess for incontinence   - Turn and reposition patient  - Assist with mobility/ambulation  - Relieve pressure over devorah prominences   - Avoid friction and shearing  - Provide appropriate hygiene as needed including keeping skin clean and dry  - Evaluate need for skin moisturizer/barrier cream  - Collaborate with interdisciplinary team  - Patient/family teaching  - Consider wound care consult    Assess:  - Review Luis scale daily  - Clean and moisturize skin every day  - Inspect skin when repositioning, toileting, and assisting with ADLS  - Assess under medical devices such as allyvens every shift  - Assess extremities for adequate circulation and sensation     Bed Management:  - Have minimal linens on bed & keep smooth, unwrinkled  - Change linens as needed when moist or perspiring  - Avoid sitting or lying in one position for more than 2 hours while in bed?Keep HOB at 30 degrees   - Toileting:  - Offer bedside commode  - Assess for incontinence every shift  - Use incontinent care products after each incontinent episode such as wipes    Activity:  - Mobilize patient 2 times a day  - Encourage activity and walks on unit  - Encourage or provide ROM exercises   - Turn and reposition patient every 2 Hours  - Use appropriate equipment to lift or move patient in bed  - Instruct/ Assist with  weight shifting every 30 mins when out of bed in chair  - Consider limitation of chair time 4 hour intervals    Skin Care:  - Avoid use of baby powder, tape, friction and shearing, hot water or constrictive clothing  - Relieve pressure over bony prominences using allyvens  - Do not massage red bony areas    Next Steps:  - Teach patient strategies to minimize risks such as weight shifts    7/11/2025 0510 by Milagros Mcclain, RN  Outcome: Progressing  7/10/2025 2340 by Milagros Mcclain, RN  Outcome: Progressing

## 2025-07-11 NOTE — PHYSICAL THERAPY NOTE
"                                                                                  PHYSICAL THERAPY NOTE          Patient Name: Nathalia Monge  Today's Date: 7/11/2025 07/11/25 1145   PT Last Visit   PT Visit Date 07/11/25   Note Type   Note Type Treatment   Pain Assessment   Pain Assessment Tool 0-10   Pain Score No Pain   Restrictions/Precautions   Weight Bearing Precautions Per Order No   Other Precautions Cardiac/sternal;Cognitive;Chair Alarm;Bed Alarm;Multiple lines;Telemetry;Fall Risk   General   Chart Reviewed Yes   Response to Previous Treatment Patient with no complaints from previous session.   Family/Caregiver Present No   Cognition   Overall Cognitive Status Impaired   Arousal/Participation Alert;Cooperative;Responsive   Attention Attends with cues to redirect   Orientation Level Oriented X4   Memory Decreased recall of precautions   Following Commands Follows multistep commands with increased time or repetition   Comments pt very pleasant and cooperative   Subjective   Subjective \"I feel better\"   Bed Mobility   Supine to Sit Unable to assess   Sit to Supine Unable to assess   Additional Comments pt greeted OOB in chair and left in chair at end of session   Transfers   Sit to Stand 5  Supervision   Additional items Assist x 1;Armrests;Increased time required;Verbal cues   Stand to Sit 5  Supervision   Additional items Assist x 1;Armrests;Increased time required;Verbal cues   Toilet transfer 5  Supervision   Additional items Assist x 1;Increased time required;Verbal cues;Standard toilet   Additional Comments c RW. x4 STS throughout session   Ambulation/Elevation   Gait pattern Inconsistent joseph;Foward flexed;Short stride;Excessively slow   Gait Assistance 4  Minimal assist  (chair follow)   Additional items Assist x 1;Verbal cues   Assistive Device Rolling walker   Distance 10'+10'+80'+40'+40'   Stair Management Assistance 4  Minimal assist   Additional items Assist x 1;Verbal cues;Increased time " required   Stair Management Technique One rail L;Step to pattern  (practice step stool)   Number of Stairs 1  (x3)   Ambulation/Elevation Additional Comments no overt LOB. seated rest breaks as needed   Balance   Static Sitting Fair +   Dynamic Sitting Fair   Static Standing Fair -   Dynamic Standing Poor +   Ambulatory Poor +   Endurance Deficit   Endurance Deficit Yes   Endurance Deficit Description pt limited by decreased activity tolerance   Activity Tolerance   Activity Tolerance Patient tolerated treatment well   Medical Staff Made Aware EDWARD Mcnamara   Nurse Made Aware yes-RN cleared   Assessment   Prognosis Good   Problem List Decreased strength;Decreased endurance;Impaired balance;Decreased mobility;Impaired judgement;Decreased safety awareness;Decreased cognition   Assessment Pt pleasant and agreeable to participate in PT session. Completes mobility and therapeutic activity as outlined above. Pt motivated to walk today, reports feeling better. Pt able to complete STS at S level and ambulate with min A and RW for support. Pt improve ambulation distance compared to previous session with 2 seated rest breaks. Pt also completed stair trial with practice step. Pt required min A to complete 1 step x3. Pt is progressing towards her goals and will continue to benefit from skilled acute PT services to address deficits and promote mobility. Pt left upright in chair with chair alarm donned, call bell and personal items within reach and all needs met.   Barriers to Discharge Inaccessible home environment   Goals   Patient Goals to walk   STG Expiration Date 07/20/25   PT Treatment Day 1   Plan   Treatment/Interventions Functional transfer training;LE strengthening/ROM;Elevations;Therapeutic exercise;Endurance training;Patient/family training;Equipment eval/education;Bed mobility;Gait training;Compensatory technique education;Continued evaluation;Spoke to nursing;OT   Progress Progressing toward goals   PT  Frequency 4-6x/wk   Discharge Recommendation   Rehab Resource Intensity Level, PT III (Minimum Resource Intensity)   Equipment Recommended Walker   Walker Package Recommended Wheeled walker   AM-PAC Basic Mobility Inpatient   Turning in Flat Bed Without Bedrails 3   Lying on Back to Sitting on Edge of Flat Bed Without Bedrails 3   Moving Bed to Chair 3   Standing Up From Chair Using Arms 3   Walk in Room 3   Climb 3-5 Stairs With Railing 2   Basic Mobility Inpatient Raw Score 17   Basic Mobility Standardized Score 39.67   [facility] Highest Level Of Mobility   -[facility] Goal 5: Stand one or more mins   -HLM Achieved 7: Walk 25 feet or more   Education   Education Provided Mobility training;Assistive device   Patient Demonstrates acceptance/verbal understanding   End of Consult   Patient Position at End of Consult Bedside chair;Bed/Chair alarm activated;All needs within reach   CRISTINA CantrellT

## 2025-07-11 NOTE — PLAN OF CARE
Problem: PHYSICAL THERAPY ADULT  Goal: Performs mobility at highest level of function for planned discharge setting.  See evaluation for individualized goals.  Description: Treatment/Interventions: Functional transfer training, LE strengthening/ROM, Elevations, Therapeutic exercise, Endurance training, Equipment eval/education, Bed mobility, Gait training, Spoke to nursing, Spoke to case management, OT, Family  Equipment Recommended: Walker       See flowsheet documentation for full assessment, interventions and recommendations.  Outcome: Progressing  Note: Prognosis: Good  Problem List: Decreased strength, Decreased endurance, Impaired balance, Decreased mobility, Impaired judgement, Decreased safety awareness, Decreased cognition  Assessment: Pt pleasant and agreeable to participate in PT session. Completes mobility and therapeutic activity as outlined above. Pt motivated to walk today, reports feeling better. Pt able to complete STS at S level and ambulate with min A and RW for support. Pt improve ambulation distance compared to previous session with 2 seated rest breaks. Pt also completed stair trial with practice step. Pt required min A to complete 1 step x3. Pt is progressing towards her goals and will continue to benefit from skilled acute PT services to address deficits and promote mobility. Pt left upright in chair with chair alarm donned, call bell and personal items within reach and all needs met.  Barriers to Discharge: Inaccessible home environment     Rehab Resource Intensity Level, PT: III (Minimum Resource Intensity)    See flowsheet documentation for full assessment.

## 2025-07-11 NOTE — PROGRESS NOTES
Progress Note - Endocrinology   Name: Nathalia Monge 64 y.o. female I MRN: [id number]  Unit/Bed#: [facility]-322-01 I Date of Admission: 7/8/2025   Date of Service: 7/11/2025 I Hospital Day: 3    Assessment & Plan  CAD (coronary artery disease)  Patient with MV CAD status post CABG on 7/8/2025  Management per primary team  Type 2 diabetes mellitus with hyperglycemia, with long-term current use of insulin ([facility])  Lab Results   Component Value Date    HGBA1C 6.7 (H) 07/11/2025     Recent Labs     07/11/25  1012 07/11/25  1153 07/11/25  1413 07/11/25  1619   POCGLU 165* 110 204* 193*     Blood Sugar Average: Last 72 hrs:  (P) [id number]  2 diabetes mellitus with hyperglycemia as evidenced by A1c of 7.4%  Home regimen: NovoLog Mix 70/30 20 units before breakfast and 20 units before dinner.  Current regimen: Insulin infusion  BG reviewed-well-controlled on the insulin infusion.  Given improvement in appetite will transition patient to basal bolus insulin.      Plan:  Start Lantus 30 units nightly  Discontinue insulin infusion 1 hour after Lantus injection is given  Start Humalog 10 units 3 times daily before meals  Correctional scale insulin algorithm 3 before meals and 2 at bedtime  Continue Accu-Cheks before meals and at bedtime  Monitor for hypoglycemia, treat per protocol  Goal blood glucose while in the [id number] mg/dL  Discharge recommendations pending clinical course-the patient can be discharged on her home insulin.  Given HFrEF and microalbuminuria she will likely also benefit from addition of SGLT2 inhibitor.  Endocrinology will continue following      24 Hour Events : No significant overnight events  Subjective : Patient seen and examined at the bedside, not in acute distress at the time of my evaluation.  Endorses good appetite, stating she was able to eat 100% of breakfast.  Denied nausea, vomiting, signs or symptoms of hypoglycemia.    Objective :  Temp:  [97.6 °F (36.4 °C)-98 °F (36.7 °C)] 97.7 °F  (36.5 °C)  HR:  [71-73] 72  BP: (125-159)/(65-80) 149/80  Resp:  [14-18] 18  SpO2:  [93 %-95 %] 95 %  O2 Device: None (Room air)    Physical Exam  Vitals and nursing note reviewed.   Constitutional:       General: She is not in acute distress.     Appearance: Normal appearance. She is not ill-appearing, toxic-appearing or diaphoretic.   HENT:      Head: Normocephalic and atraumatic.      Nose: Nose normal.      Mouth/Throat:      Pharynx: Oropharynx is clear.     Eyes:      General: No scleral icterus.        Right eye: No discharge.         Left eye: No discharge.      Extraocular Movements: Extraocular movements intact.      Conjunctiva/sclera: Conjunctivae normal.     Pulmonary:      Effort: Pulmonary effort is normal. No respiratory distress.   Abdominal:      General: Abdomen is flat. There is no distension.     Musculoskeletal:         General: Normal range of motion.      Cervical back: Normal range of motion and neck supple.     Skin:     General: Skin is warm and dry.      Coloration: Skin is not jaundiced or pale.     Neurological:      Mental Status: She is alert and oriented to person, place, and time. Mental status is at baseline.     Psychiatric:         Mood and Affect: Mood normal.         Behavior: Behavior normal.         Thought Content: Thought content normal.         Judgment: Judgment normal.         Lab Results: I have reviewed the following results:CBC/BMP:   .     07/11/25  0400   WBC 19.08*   HGB 8.9*   HCT 26.6*   *   SODIUM 140   K 4.3      CO2 25   BUN 53*   CREATININE 1.98*   GLUC 135   MG 2.3   PHOS 4.4*    , Creatinine Clearance: Estimated Creatinine Clearance: 33.5 mL/min (A) (by C-G formula based on SCr of 1.98 mg/dL (H))., LFTs: No new results in last 24 hours.     Imaging Results Review: No pertinent imaging studies reviewed.  Other Study Results Review: No additional pertinent studies reviewed.

## 2025-07-11 NOTE — CASE MANAGEMENT
Case Management Discharge Planning Note    Patient name Nathalia Monge  [facility] [id number]  : 1960 Date 2025       Current Admission Date: 2025  Current Admission Diagnosis:CAD (coronary artery disease)   Patient Active Problem List    Diagnosis Date Noted    Leukocytosis 2025    Thrombocytopenia (HCC) 2025    Postoperative anemia due to acute blood loss 2025    YING (acute kidney injury) (HCC) 2025    S/P CABG (coronary artery bypass graft) 2025    CAD (coronary artery disease) 2025    Chronic heart failure with reduced ejection fraction (HFrEF, <= 40%) ([facility]) 2025    Bilateral pleural effusion 2025    Prolonged QT interval 2025    Thoracic radiculopathy 2025    Degenerative disc disease, thoracic 02/10/2025    Mixed hyperlipidemia 2024    Carpal tunnel syndrome on left 2024    Imaging abnormality 2024    Clostridium difficile carrier 2024    Insurance coverage problems 2024    Obesity (BMI 30.0-34.9) 2024    Renal abscess, right 01/10/2024    Seizure-like activity (HCC) 2024    Hyponatremia 2024    S/P craniotomy 10/21/2020    Meningioma (HCC) 2018    Urinary urgency 2018    Partial idiopathic epilepsy with seizures of localized onset, intractable, without status epilepticus (HCC)     Type 2 diabetes mellitus with hyperglycemia, with long-term current use of insulin (HCC)       LOS (days): 3  Geometric Mean LOS (GMLOS) (days): 1.8  Days to GMLOS:-1.5     OBJECTIVE:  Risk of Unplanned Readmission Score: 23.41       Current admission status: Inpatient   Preferred Pharmacy:   [facility] - [place] - [address]  [address]  [place] [address]  Phone: [phone] Fax: [phone]    [facility]) - [place] - [address]  [address]  [place] [address]  Phone: [phone] Fax:  [phone]    Primary Care Provider: SHASHANK Miguel    Primary Insurance: [facility]  Secondary Insurance:     DISCHARGE DETAILS:  Requested Home Health Care         Is the patient interested in [facility] at discharge?: Yes  Home Health Discipline requested:: Nursing, Occupational Therapy, Physical Therapy  Home Health Agency Name:: [facility]  Home Health Follow-Up Provider:: PCP  Home Health Services Needed:: Gait/ADL Training, Evaluate Functional Status and Safety, Post-Op Care and Assessment, Strengthening/Theraputic Exercises to Improve Function  Homebound Criteria Met:: Requires the Assistance of Another Person for Safe Ambulation or to Leave the Home, Uses an Assist Device (i.e. cane, walker, etc)  Supporting Clincal Findings:: Fatigues Easliy in Short Distances, Limited Endurance

## 2025-07-11 NOTE — PROGRESS NOTES
Progress Note - Cardiac Surgery   Nathalia Monge 64 y.o. female MRN: [id number]  Unit/Bed#: PPHP-322-01 Encounter: [id number]    Coronary artery disease. S/P coronary artery bypass grafting x 3; POD # 4      24 Hour Events: Insulin gtt discontinued and transitioned to sub cu dosing yesterday. Remains hypertensive. /77. Feels better today, offers no complaints. Passing flatus, no BM yet.     Medications:   Scheduled Meds:  Current Facility-Administered Medications   Medication Dose Route Frequency Provider Last Rate    acetaminophen  975 mg Oral Q6H While awake Abigail Meyer PA-C      amiodarone  200 mg Oral Q8H [facility] Nelson Haas PA-C      amLODIPine  10 mg Oral Daily Leroy Cornell PA-C      aspirin  325 mg Oral Daily Abigail JOSÉ MIGUEL Meyer      bisacodyl  10 mg Rectal Daily PRN Abigail Meyer PA-C      carvedilol  12.5 mg Oral BID With Meals Audra Camacho PA-C      chlorhexidine  15 mL Mouth/Throat BID Abigail Meyer PA-C      docusate sodium  100 mg Oral BID Ethan Nixon PA-C      ferrous sulfate  325 mg Oral Daily Abigail JOSÉ MIGUEL Meyer      furosemide  40 mg Intravenous BID (diuretic) Nelson Haas PA-C      heparin (porcine)  5,000 Units Subcutaneous Q8H [facility] Abigail Meyer PA-C      insulin glargine  30 Units Subcutaneous HS Armani Ngo MD      insulin lispro  1-5 Units Subcutaneous HS Armani Ngo MD      insulin lispro  1-6 Units Subcutaneous TID AC Armani Ngo MD      insulin lispro  10 Units Subcutaneous TID With Meals Armani Ngo MD      mupirocin  1 Application Nasal Q12H [facility] Audra Camacho PA-C      ondansetron  4 mg Intravenous Q6H PRN Audra Camacho PA-C      oxyCODONE  2.5 mg Oral Q4H PRN Abigail Meyer PA-C      Or    oxyCODONE  5 mg Oral Q4H PRN Abigail JOSÉ MIGUEL Meyer      pantoprazole  40 mg Oral Daily Abigail JOSÉ MIGUEL Meyer      polyethylene glycol  17 g Oral Daily Abigail MitchJOSÉ MIGUEL saavedra      saccharomyces boulardii  250 mg Oral BID Abigail JOSÉ MIGUEL Meyer      sodium chloride  20 mL/hr  Intravenous Continuous Abigail Meyer PA-C Stopped (07/11/25 2321)    temazepam  15 mg Oral HS PRN Ethan Nixon PA-C       Continuous Infusions:sodium chloride, 20 mL/hr, Last Rate: Stopped (07/11/25 2321)      PRN Meds:.  bisacodyl    ondansetron    oxyCODONE **OR** oxyCODONE    temazepam    Vitals:   Vitals:    07/11/25 1819 07/11/25 2218 07/12/25 0213 07/12/25 0600   BP: 160/77 129/66 150/77    BP Location:       Pulse: 75 69 66    Resp: 18 18 18    Temp: (!) 97.2 °F (36.2 °C) 97.5 °F (36.4 °C) 97.5 °F (36.4 °C)    TempSrc:       SpO2: 96% 94% 96%    Weight:    94.9 kg (209 lb 3.5 oz)   Height:           Telemetry: NSR w/ PAC; Heart Rate: 71    Respiratory:   SpO2: SpO2: 96 %; Room Air    Intake/Output:     Intake/Output Summary (Last 24 hours) at 7/12/2025 0717  Last data filed at 7/12/2025 0659  Gross per 24 hour   Intake 1342.73 ml   Output 3300 ml   Net -1957.27 ml        Weights:   Weight (last 2 days)       Date/Time Weight    07/12/25 0600 94.9 (209.22)    07/11/25 0544 97.7 (215.39)    07/11/25 0543 97.7 (215.39)    07/10/25 1030 92.1 (203)    07/10/25 0600 92.4 (203.71)            Chest tube Output: removed      Results:   Results from last 7 days   Lab Units 07/12/25  0411 07/11/25  0400 07/10/25  0341   WBC Thousand/uL 17.53* 19.08* 24.45*   HEMOGLOBIN g/dL 9.0* 8.9* 10.1*   HEMATOCRIT % 27.1* 26.6* 30.5*   PLATELETS Thousands/uL 147* 128* 152     Results from last 7 days   Lab Units 07/12/25  0411 07/11/25  0400 07/10/25  0341 07/09/25  0223 07/08/25  2232   SODIUM mmol/L 139 140 140   < >  --    POTASSIUM mmol/L 3.7 4.3 3.7   < >  --    CHLORIDE mmol/L 104 105 107   < >  --    CO2 mmol/L 25 25 23   < >  --    CO2, I-STAT mmol/L  --   --   --   --  20*   BUN mg/dL 58* 53* 37*   < >  --    CREATININE mg/dL 1.79* 1.98* 1.76*   < >  --    GLUCOSE, ISTAT mg/dl  --   --   --   --  175*   CALCIUM mg/dL 8.3* 8.2* 8.3*   < >  --     < > = values in this interval not displayed.     Recent Labs      07/11/25  0400   MG 2.3     Results from last 7 days   Lab Units 07/08/25  2231   INR  1.29*   PTT seconds 32       Point of care glucose:  -  204  Endo following    Studies:  Echo 7/10:   Left Ventricle Left ventricular cavity size is normal. Wall thickness is mildly increased. There is mild concentric hypertrophy. The left ventricular ejection fraction is 46%. Systolic function is mildly reduced. Diastolic function is moderately abnormal, consistent with grade II (pseudonormal) relaxation.   Wall Scoring Baseline     The following segments are akinetic: basal inferoseptal and basal inferior.  All other segments are normal.            Interventricular Septum There is abnormal septal motion consistent with post-operative status.   Right Ventricle Right ventricular cavity size is normal. Systolic function is normal.   Left Atrium The atrium is mildly dilated.   Right Atrium The atrium is dilated.   Aortic Valve The aortic valve is trileaflet. The leaflets are mildly thickened. The leaflets are mildly calcified. The leaflets exhibit normal mobility. There is no evidence of regurgitation. The aortic valve has no significant stenosis.   Mitral Valve Mitral valve structure is normal.  There is trace regurgitation. There is no evidence of stenosis.   Tricuspid Valve Tricuspid valve structure is normal. There is mild regurgitation. There is no evidence of stenosis. Right ventricular systolic pressure could not be assessed.   Pericardium There is no pericardial effusion. The pericardium is normal in appearance.     Left Ventricle Measurements    Function/Volumes   A4C EF 46 %         LV Diastolic Volume (BP) 115 mL         LV Systolic Volume (BP) 56 mL         EF 51 %         Diastolic Filling   MV E' Tissue Velocity Septal 6 cm/s         MV E' Tissue Velocity Lateral 8 cm/s         E/A ratio 1.22         E wave deceleration time 186 ms         MV Peak E Kameron 99 cm/s         MV Peak A Kameron 0.81 m/s          Report  Measurements   AV LVOT peak gradient 6 mmHg              Interventricular Septum Measurements    Shunt Ratio   LVOT peak VTI 19.48 cm         LVOT peak emil 1.18 m/s              Atrial Septum Measurements    Shunt Ratio   LVOT peak VTI 19.48 cm         LVOT peak emil 1.18 m/s               Aortic Valve Measurements    Stenosis   Aortic valve peak velocity 1.86 m/s         LVOT peak emil 1.18 m/s         Ao VTI 28.12 cm         LVOT peak VTI 19.48 cm         AV mean gradient 7 mmHg         LVOT mn grad 3 mmHg         AV peak gradient 14 mmHg         AV LVOT peak gradient 6 mmHg         Area/Dimensions   DVI 0.69                 Results Review Statement: I personally reviewed the following image studies in PACS and associated radiology reports: Echocardiogram. My interpretation of the radiology images/reports is: as noted above.    Invasive Lines/Tubes:  Invasive Devices       Central Venous Catheter Line  Duration             CVC Central Lines 07/08/25 3 days                    Physical Exam:    General: No acute distress, Alert, and Normal appearance  HEENT/NECK:  Normocephalic. Atraumatic.  No jugular venous distention.    Cardiac: Regular rate and rhythm and No murmurs/rubs/gallops  Pulmonary:  Breath sounds clear bilaterally and No rales/rhonchi/wheezes  Abdomen:  Non-tender, Non-distended, and Normal bowel sounds  Incisions: Sternum is stable.  Incision is clean, dry, and intact.  and Saphenectomy incison is clean, dry, and intact.   Extremities: Extremities warm/dry and Trace edema B/L  Neuro: Alert and oriented X 3, Sensation is grossly intact, and No focal deficits  Skin: Warm/Dry, without rashes or lesions.       Assessment:  Principal Problem:    CAD (coronary artery disease)  Active Problems:    Partial idiopathic epilepsy with seizures of localized onset, intractable, without status epilepticus (HCC)    Type 2 diabetes mellitus with hyperglycemia, with long-term current use of insulin (HCC)    Obesity (BMI  30.0-34.9)    Mixed hyperlipidemia    Degenerative disc disease, thoracic    Chronic heart failure with reduced ejection fraction (HFrEF, <= 40%) (HCC)    S/P CABG (coronary artery bypass graft)    Leukocytosis    Thrombocytopenia (HCC)    Postoperative anemia due to acute blood loss    YING (acute kidney injury) (HCC)       Coronary artery disease. S/P coronary artery bypass grafting x 3; POD # 4    Plan:    Cardiac:     Elective surgical admission  Ischemic cardiomyopathy, EF 30%    NSR; BP well-controlled  BP hypertensive    Increase Coreg, 25 mg PO BID  Continue Norvasc 10mg daily    Hold ACE inhibitor/ARB secondary to renal dysfunction    AF prophylaxis  Continue Amiodarone, 200 mg PO TID    Continue ASA therapy    Pt has statin allergy (diarrhea as reaction) - Patient thus far refuses statins due to adverse reactions. Dr Rosales to discuss statin intolerance with patient to negotiate whether it can be resumed     Epicardial pacing wires have been removed    Remove central IV access today    Continue Arixtra for DVT prophylaxis    Pulmonary:     Good Room air oxygen saturation; Continue incentive spirometry/Coughing/Deep breathing exercises    Chest tubes have been discontinued    Renal:     Preoperative CKD, stage 3   Baseline creatinine 1.0 - 1.2 (as high as 1.83 preoperatively)  Postoperative YING, with creatinine 1.79 from 1.98, from 1.76    Intake/Output net: -1957 mL/24 hours    Wt today 94.9 from 97.7  kg   Pre-op Wt: 88.5 kg    Diuretic Regimen:  Transition to Demadex 20mg PO BID  Continue Potassium Chloride 20 mEq PO BID    Neuro:    Neurologically intact; No active issues     Incisional pain well controlled   Continue tylenol, 975 mg PO q 8, standing dose   Continue oxycodone, 2.5 to 5 mg PO q 4 hours prn pain    GI:    Controlled carbohydrate diet level two/Cardiac diet, with 1800 mL fluid restriction    Tolerating diet without complaint  + Flatus    Continue stool softeners and prn  suppository    Continue GI prophylaxis    Endo:     Pre-Op Hgb A1C: 7.4    Transitioned off insulin infusion to sub cu dosing  On injectable therapy, prior to surgical intervention  Recommendation for discharge diabetes regimen pending endocrinology follow up  Bedside nursing Injectable insulin therapy education has been ordered  Endocrinology following daily    7    Hematology:     Post-operative blood loss anemia. Hg 9.0. Trend prn. Continue Iron and Vit C    Leukocytosis; Afebrile with no signs of infection; Trend CBC prn. WBC downtrending     Thrombocytopenia - plt uptrending. Continue to monitor.    8.   Disposition:        Following daily PT/OT recommendations regarding home vs. rehab when medically cleared for discharge  Not yet medically cleared for discharge, pending medical optimization      VTE Pharmacologic Prophylaxis: Heparin  VTE Mechanical Prophylaxis: sequential compression device    Collaborative rounds completed with supervising physician  Plan of care discussed with bedside nurse    SIGNATURE: Audra Camacho PA-C  DATE: July 12, 2025  TIME: 7:17 AM

## 2025-07-11 NOTE — PLAN OF CARE
Problem: OCCUPATIONAL THERAPY ADULT  Goal: Performs self-care activities at highest level of function for planned discharge setting.  See evaluation for individualized goals.  Description: Treatment Interventions: ADL retraining, Functional transfer training, Endurance training, Compensatory technique education, Continued evaluation, Energy conservation, Cardiac education          See flowsheet documentation for full assessment, interventions and recommendations.   Outcome: Progressing  Note: Limitation: Decreased ADL status, Decreased Safe judgement during ADL, Decreased cognition, Decreased endurance, Decreased self-care trans, Decreased high-level ADLs  Prognosis: Good  Assessment: Pt was seen for OT treatment day 1 for ADL retraining, functional transfer training, functional mobility training, endurance training, patient education/safety awareness. Pt was greeted and left OOB in chair with chair alarm on, all needs within reach. Pt continues to be limited by medical status although demonstrates improvement requiring Ronald for UB dressing, ModA for LB dressing to don socks, Supervision for toileting tasks, Supervision for STS transfers w/ RW, and Min Ax1-CGA w/ instances of close Supervision for household distance functional mobility using RW.   The patient's raw score on the AM-PAC Daily Activity Inpatient Short Form is 17. A raw score of less than 19 suggests the patient may benefit from discharge to post-acute rehabilitation services. Please refer to the recommendation of the Occupational Therapist for safe discharge planning. Although AM-PAC score is less than 19, pt is expected to continue progressing and has adequate support at home. Pt would benefit from continued acute OT services throughout hospital course and following D/C. Moving forward, OT interventions 2-3x per week to increase safety and participation in ADLs, transfers, and functional mobility. From OT standpoint, recommend pt to return home with  increased social support and level III services upon D/C pending progress.     Rehab Resource Intensity Level, OT: III (Minimum Resource Intensity)

## 2025-07-12 LAB
ANION GAP SERPL CALCULATED.3IONS-SCNC: 10 MMOL/L (ref 4–13)
BUN SERPL-MCNC: 58 MG/DL (ref 5–25)
CALCIUM SERPL-MCNC: 8.3 MG/DL (ref 8.4–10.2)
CHLORIDE SERPL-SCNC: 104 MMOL/L (ref 96–108)
CO2 SERPL-SCNC: 25 MMOL/L (ref 21–32)
CREAT SERPL-MCNC: 1.79 MG/DL (ref 0.6–1.3)
ERYTHROCYTE [DISTWIDTH] IN BLOOD BY AUTOMATED COUNT: 13.8 % (ref 11.6–15.1)
GFR SERPL CREATININE-BSD FRML MDRD: 29 ML/MIN/1.73SQ M
GLUCOSE SERPL-MCNC: 103 MG/DL (ref 65–140)
GLUCOSE SERPL-MCNC: 122 MG/DL (ref 65–140)
GLUCOSE SERPL-MCNC: 132 MG/DL (ref 65–140)
GLUCOSE SERPL-MCNC: 137 MG/DL (ref 65–140)
GLUCOSE SERPL-MCNC: 141 MG/DL (ref 65–140)
GLUCOSE SERPL-MCNC: 151 MG/DL (ref 65–140)
GLUCOSE SERPL-MCNC: 156 MG/DL (ref 65–140)
GLUCOSE SERPL-MCNC: 71 MG/DL (ref 65–140)
GLUCOSE SERPL-MCNC: 91 MG/DL (ref 65–140)
HCT VFR BLD AUTO: 27.1 % (ref 34.8–46.1)
HGB BLD-MCNC: 9 G/DL (ref 11.5–15.4)
MCH RBC QN AUTO: 30.3 PG (ref 26.8–34.3)
MCHC RBC AUTO-ENTMCNC: 33.2 G/DL (ref 31.4–37.4)
MCV RBC AUTO: 91 FL (ref 82–98)
PLATELET # BLD AUTO: 147 THOUSANDS/UL (ref 149–390)
PMV BLD AUTO: 11.3 FL (ref 8.9–12.7)
POTASSIUM SERPL-SCNC: 3.7 MMOL/L (ref 3.5–5.3)
RBC # BLD AUTO: 2.97 MILLION/UL (ref 3.81–5.12)
SODIUM SERPL-SCNC: 139 MMOL/L (ref 135–147)
WBC # BLD AUTO: 17.53 THOUSAND/UL (ref 4.31–10.16)

## 2025-07-12 PROCEDURE — 82948 REAGENT STRIP/BLOOD GLUCOSE: CPT

## 2025-07-12 PROCEDURE — 99024 POSTOP FOLLOW-UP VISIT: CPT | Performed by: PHYSICIAN ASSISTANT

## 2025-07-12 PROCEDURE — 85027 COMPLETE CBC AUTOMATED: CPT | Performed by: PHYSICIAN ASSISTANT

## 2025-07-12 PROCEDURE — 80048 BASIC METABOLIC PNL TOTAL CA: CPT | Performed by: PHYSICIAN ASSISTANT

## 2025-07-12 RX ORDER — TORSEMIDE 20 MG/1
20 TABLET ORAL 2 TIMES DAILY
Status: DISCONTINUED | OUTPATIENT
Start: 2025-07-12 | End: 2025-07-14 | Stop reason: HOSPADM

## 2025-07-12 RX ORDER — CARVEDILOL 25 MG/1
25 TABLET ORAL 2 TIMES DAILY WITH MEALS
Status: DISCONTINUED | OUTPATIENT
Start: 2025-07-12 | End: 2025-07-14 | Stop reason: HOSPADM

## 2025-07-12 RX ORDER — CARVEDILOL 12.5 MG/1
12.5 TABLET ORAL 2 TIMES DAILY WITH MEALS
Status: DISCONTINUED | OUTPATIENT
Start: 2025-07-12 | End: 2025-07-12

## 2025-07-12 RX ADMIN — ACETAMINOPHEN 975 MG: 325 TABLET ORAL at 08:43

## 2025-07-12 RX ADMIN — MUPIROCIN 1 APPLICATION: 20 OINTMENT TOPICAL at 21:07

## 2025-07-12 RX ADMIN — TORSEMIDE 20 MG: 20 TABLET ORAL at 08:43

## 2025-07-12 RX ADMIN — POLYETHYLENE GLYCOL 3350 17 G: 17 POWDER, FOR SOLUTION ORAL at 08:43

## 2025-07-12 RX ADMIN — HEPARIN SODIUM 5000 UNITS: 5000 INJECTION INTRAVENOUS; SUBCUTANEOUS at 21:07

## 2025-07-12 RX ADMIN — AMIODARONE HYDROCHLORIDE 200 MG: 200 TABLET ORAL at 06:07

## 2025-07-12 RX ADMIN — HEPARIN SODIUM 5000 UNITS: 5000 INJECTION INTRAVENOUS; SUBCUTANEOUS at 06:07

## 2025-07-12 RX ADMIN — FERROUS SULFATE TAB 325 MG (65 MG ELEMENTAL FE) 325 MG: 325 (65 FE) TAB at 08:43

## 2025-07-12 RX ADMIN — CARVEDILOL 25 MG: 25 TABLET, FILM COATED ORAL at 17:10

## 2025-07-12 RX ADMIN — CHLORHEXIDINE GLUCONATE 15 ML: 1.2 SOLUTION ORAL at 17:10

## 2025-07-12 RX ADMIN — ACETAMINOPHEN 975 MG: 325 TABLET ORAL at 21:03

## 2025-07-12 RX ADMIN — AMLODIPINE BESYLATE 10 MG: 10 TABLET ORAL at 08:42

## 2025-07-12 RX ADMIN — TORSEMIDE 20 MG: 20 TABLET ORAL at 17:10

## 2025-07-12 RX ADMIN — Medication 250 MG: at 08:43

## 2025-07-12 RX ADMIN — INSULIN LISPRO 1 UNITS: 100 INJECTION, SOLUTION INTRAVENOUS; SUBCUTANEOUS at 06:08

## 2025-07-12 RX ADMIN — CHLORHEXIDINE GLUCONATE 15 ML: 1.2 SOLUTION ORAL at 08:42

## 2025-07-12 RX ADMIN — DOCUSATE SODIUM 100 MG: 100 CAPSULE, LIQUID FILLED ORAL at 08:43

## 2025-07-12 RX ADMIN — ASPIRIN 325 MG ORAL TABLET 325 MG: 325 PILL ORAL at 08:42

## 2025-07-12 RX ADMIN — CARVEDILOL 25 MG: 25 TABLET, FILM COATED ORAL at 08:43

## 2025-07-12 RX ADMIN — Medication 250 MG: at 17:10

## 2025-07-12 RX ADMIN — INSULIN LISPRO 10 UNITS: 100 INJECTION, SOLUTION INTRAVENOUS; SUBCUTANEOUS at 17:10

## 2025-07-12 RX ADMIN — AMIODARONE HYDROCHLORIDE 200 MG: 200 TABLET ORAL at 13:16

## 2025-07-12 RX ADMIN — HEPARIN SODIUM 5000 UNITS: 5000 INJECTION INTRAVENOUS; SUBCUTANEOUS at 13:16

## 2025-07-12 RX ADMIN — Medication 3 MG: at 21:03

## 2025-07-12 RX ADMIN — INSULIN LISPRO 10 UNITS: 100 INJECTION, SOLUTION INTRAVENOUS; SUBCUTANEOUS at 07:45

## 2025-07-12 RX ADMIN — AMIODARONE HYDROCHLORIDE 200 MG: 200 TABLET ORAL at 21:03

## 2025-07-12 RX ADMIN — INSULIN LISPRO 10 UNITS: 100 INJECTION, SOLUTION INTRAVENOUS; SUBCUTANEOUS at 11:39

## 2025-07-12 NOTE — RESTORATIVE TECHNICIAN NOTE
Restorative Technician Note      Patient Name: Nathalia Monge     Restorative Tech Visit Date: 07/12/25  Note Type: Mobility  Patient Position Upon Consult: Bedside chair  Activity Performed: Ambulated  Assistive Device: Roller walker  Patient Position at End of Consult: Bed/Chair alarm activated; All needs within reach; Bedside chair

## 2025-07-12 NOTE — PROGRESS NOTES
Progress Note - Cardiac Surgery   Nathalia Monge 64 y.o. female MRN: [id number]  Unit/Bed#: PPHP-322-01 Encounter: [id number]    Coronary artery disease. S/P coronary artery bypass grafting x 3; POD # 5      24 Hour Events: Rapid response this morning for sinus bradycardia into 30's while having a BM. Pt with decreased mental status, but no LOC during episode. Bradycardic for approx 5 minutes, with self-resolution.  during the episode.     BM x 3    Medications:   Scheduled Meds:  Current Facility-Administered Medications   Medication Dose Route Frequency Provider Last Rate    acetaminophen  975 mg Oral Q6H While awake Abigail Meyer PA-C      amiodarone  200 mg Oral Q8H [facility] Nelson Haas PA-C      amLODIPine  10 mg Oral Daily Leroy Cornell PA-C      aspirin  325 mg Oral Daily Abigail Meyer PA-C      bisacodyl  10 mg Rectal Daily PRN Abigail Meyer PA-C      carvedilol  25 mg Oral BID With Meals Audra Camacho PA-C      chlorhexidine  15 mL Mouth/Throat BID Abigail Meyer PA-C      docusate sodium  100 mg Oral BID Ethan Nixon PA-C      ferrous sulfate  325 mg Oral Daily Abigail Meyer PA-C      heparin (porcine)  5,000 Units Subcutaneous Q8H [facility] Abigail Meyer PA-C      insulin glargine  30 Units Subcutaneous HS Armani Ngo MD      insulin lispro  1-5 Units Subcutaneous HS Armani Ngo MD      insulin lispro  1-6 Units Subcutaneous TID AC Armani Ngo MD      insulin lispro  10 Units Subcutaneous TID With Meals Armani Ngo MD      melatonin  3 mg Oral HS Audra Camacho PA-C      mupirocin  1 Application Nasal Q12H [facility] Audra Camacho PA-C      ondansetron  4 mg Intravenous Q6H PRN Audra Camacho PA-C      oxyCODONE  2.5 mg Oral Q4H PRN Abigail Meyer PA-C      Or    oxyCODONE  5 mg Oral Q4H PRN Abigail JOSÉ MIGUEL Meyer      pantoprazole  40 mg Oral Daily Abigail JOSÉ MIGUEL Meyer      polyethylene glycol  17 g Oral Daily Abigail JOSÉ MIGUEL Meyer      saccharomyces boulardii  250 mg Oral BID Abigail  JOSÉ MIGUEL Meyer      temazepam  15 mg Oral HS PRN Ethan Nixon PA-C      torsemide  20 mg Oral BID Audra Camacho PA-C       Continuous Infusions:     PRN Meds:.  bisacodyl    ondansetron    oxyCODONE **OR** oxyCODONE    temazepam    Vitals:   Vitals:    07/13/25 0600 07/13/25 0635 07/13/25 0637 07/13/25 0753   BP:  (!) 172/74 (!) 172/74 124/63   Pulse:  68 65 58   Resp:   16    Temp:   97.5 °F (36.4 °C)    TempSrc:   Oral    SpO2:   97%    Weight: 92.9 kg (204 lb 12.9 oz)      Height:           Telemetry: NSR, Heart Rate: 66 (prior to rapid response)  Post rapid response SB, rate 57    Respiratory:   SpO2: SpO2: 97 %; Room Air    Intake/Output: - 924 ml/24 hrs    Intake/Output Summary (Last 24 hours) at 7/13/2025 0802  Last data filed at 7/13/2025 0745  Gross per 24 hour   Intake 1756 ml   Output 3100 ml   Net -1344 ml        Weights:   Weight (last 2 days)       Date/Time Weight    07/13/25 0600 92.9 (204.81)    07/12/25 0600 94.9 (209.22)    07/11/25 0544 97.7 (215.39)    07/11/25 0543 97.7 (215.39)            Chest tube Output: removed      Results:   Results from last 7 days   Lab Units 07/12/25  0411 07/11/25  0400 07/10/25  0341   WBC Thousand/uL 17.53* 19.08* 24.45*   HEMOGLOBIN g/dL 9.0* 8.9* 10.1*   HEMATOCRIT % 27.1* 26.6* 30.5*   PLATELETS Thousands/uL 147* 128* 152     Results from last 7 days   Lab Units 07/13/25  0309 07/12/25  0411 07/11/25  0400 07/09/25  0223 07/08/25  2232   SODIUM mmol/L 142 139 140   < >  --    POTASSIUM mmol/L 3.5 3.7 4.3   < >  --    CHLORIDE mmol/L 101 104 105   < >  --    CO2 mmol/L 29 25 25   < >  --    CO2, I-STAT mmol/L  --   --   --   --  20*   BUN mg/dL 56* 58* 53*   < >  --    CREATININE mg/dL 1.75* 1.79* 1.98*   < >  --    GLUCOSE, ISTAT mg/dl  --   --   --   --  175*   CALCIUM mg/dL 8.6 8.3* 8.2*   < >  --     < > = values in this interval not displayed.     Recent Labs     07/11/25  0400   MG 2.3     Results from last 7 days   Lab Units 07/08/25  2231   INR  1.29*    PTT seconds 32       Point of care glucose: BS 71 - 141  Endo following    Studies:  Echo 7/10:   Left Ventricle Left ventricular cavity size is normal. Wall thickness is mildly increased. There is mild concentric hypertrophy. The left ventricular ejection fraction is 46%. Systolic function is mildly reduced. Diastolic function is moderately abnormal, consistent with grade II (pseudonormal) relaxation.   Wall Scoring Baseline     The following segments are akinetic: basal inferoseptal and basal inferior.  All other segments are normal.            Interventricular Septum There is abnormal septal motion consistent with post-operative status.   Right Ventricle Right ventricular cavity size is normal. Systolic function is normal.   Left Atrium The atrium is mildly dilated.   Right Atrium The atrium is dilated.   Aortic Valve The aortic valve is trileaflet. The leaflets are mildly thickened. The leaflets are mildly calcified. The leaflets exhibit normal mobility. There is no evidence of regurgitation. The aortic valve has no significant stenosis.   Mitral Valve Mitral valve structure is normal.  There is trace regurgitation. There is no evidence of stenosis.   Tricuspid Valve Tricuspid valve structure is normal. There is mild regurgitation. There is no evidence of stenosis. Right ventricular systolic pressure could not be assessed.   Pericardium There is no pericardial effusion. The pericardium is normal in appearance.     Left Ventricle Measurements    Function/Volumes   A4C EF 46 %         LV Diastolic Volume (BP) 115 mL         LV Systolic Volume (BP) 56 mL         EF 51 %         Diastolic Filling   MV E' Tissue Velocity Septal 6 cm/s         MV E' Tissue Velocity Lateral 8 cm/s         E/A ratio 1.22         E wave deceleration time 186 ms         MV Peak E Kameron 99 cm/s         MV Peak A Kameron 0.81 m/s          Report Measurements   AV LVOT peak gradient 6 mmHg              Interventricular Septum  Measurements    Shunt Ratio   LVOT peak VTI 19.48 cm         LVOT peak emil 1.18 m/s              Atrial Septum Measurements    Shunt Ratio   LVOT peak VTI 19.48 cm         LVOT peak emil 1.18 m/s               Aortic Valve Measurements    Stenosis   Aortic valve peak velocity 1.86 m/s         LVOT peak emil 1.18 m/s         Ao VTI 28.12 cm         LVOT peak VTI 19.48 cm         AV mean gradient 7 mmHg         LVOT mn grad 3 mmHg         AV peak gradient 14 mmHg         AV LVOT peak gradient 6 mmHg         Area/Dimensions   DVI 0.69                 Results Review Statement: I personally reviewed the following image studies in PACS and associated radiology reports: Echocardiogram. My interpretation of the radiology images/reports is: as noted above.    Invasive Lines/Tubes:  Invasive Devices       Central Venous Catheter Line  Duration             CVC Central Lines 07/08/25 4 days                    Physical Exam:    General: decreased alertness, but arousable during rapid response. Improved alertness with improvement in HR  HEENT/NECK:  Normocephalic. Atraumatic.  No jugular venous distention.    Cardiac: Regular rate and rhythm and Bradycardic  Pulmonary:  Breath sounds clear bilaterally and No rales/rhonchi/wheezes  Abdomen:  Non-tender, Non-distended, and Normal bowel sounds  Incisions: Sternum is stable.  Incision is clean, dry, and intact.  and Saphenectomy incison is clean, dry, and intact.   Extremities: Extremities warm/dry and No edema B/L  Neuro: Sensation is grossly intact, No focal deficits, and A+O x 2 during rapid response, improved after increased HR  Skin: Warm/Dry, without rashes or lesions.        Assessment:  Principal Problem:    CAD (coronary artery disease)  Active Problems:    Partial idiopathic epilepsy with seizures of localized onset, intractable, without status epilepticus (HCC)    Type 2 diabetes mellitus with hyperglycemia, with long-term current use of insulin (HCC)    Obesity (BMI  30.0-34.9)    Mixed hyperlipidemia    Degenerative disc disease, thoracic    Chronic heart failure with reduced ejection fraction (HFrEF, <= 40%) (HCC)    S/P CABG (coronary artery bypass graft)    Leukocytosis    Thrombocytopenia (HCC)    Postoperative anemia due to acute blood loss    YING (acute kidney injury) (HCC)       Coronary artery disease. S/P coronary artery bypass grafting x 3; POD # 5    Plan:    Cardiac:     Elective surgical admission  Ischemic cardiomyopathy, EF 30%    NSR; BP well-controlled  Episode of bradycardia while attempting to have a BM. Suspect vasovagal episode    Hold Coreg, 25 mg PO BID   Continue Norvasc 10mg daily    Hold ACE inhibitor/ARB secondary to renal dysfunction    AF prophylaxis  Continue Amiodarone, 200 mg PO TID    Continue ASA therapy    Pt has statin allergy (diarrhea as reaction) - Patient thus far refuses statins due to adverse reactions. Dr Rosales to discuss statin intolerance with patient to negotiate whether it can be resumed     Epicardial pacing wires have been removed    Remove central IV access today    Continue Arixtra for DVT prophylaxis    Pulmonary:     Good Room air oxygen saturation; Continue incentive spirometry/Coughing/Deep breathing exercises    Chest tubes have been discontinued    Renal:     Preoperative CKD, stage 3   Baseline creatinine 1.0 - 1.2 (as high as 1.83 preoperatively)  Postoperative YING, with creatinine 1.75 from 1.79     Intake/Output net: -1957 mL/24 hours    Wt today 94.9 from 97.7  kg   Pre-op Wt: 88.5 kg    Diuretic Regimen:  Continue Demadex 20mg PO BID  Continue Potassium Chloride 20 mEq PO BID  Give Extra KCL 40meq IVPB today    Neuro:    Neurologically intact; No active issues     Incisional pain well controlled   Continue tylenol, 975 mg PO q 8, standing dose   Continue oxycodone, 2.5 to 5 mg PO q 4 hours prn pain    GI:    Controlled carbohydrate diet level two/Cardiac diet, with 1800 mL fluid restriction    Tolerating diet  without complaint  + Flatus    Continue stool softeners and prn suppository    Continue GI prophylaxis    Endo:     Pre-Op Hgb A1C: 7.4    Transitioned off insulin infusion to sub cu dosing  On injectable therapy, prior to surgical intervention  Recommendation for discharge diabetes regimen pending endocrinology follow up  Bedside nursing Injectable insulin therapy education has been ordered  Endocrinology following daily    7    Hematology:     Post-operative blood loss anemia. Hg 9.0. Trend prn. Continue Iron and Vit C    Leukocytosis; Afebrile with no signs of infection; Trend CBC prn. WBC downtrending     Thrombocytopenia - plt uptrending. Continue to monitor.    8.   Disposition:        Following daily PT/OT recommendations regarding home vs. rehab when medically cleared for discharge  Not yet medically cleared for discharge, pending medical optimization      VTE Pharmacologic Prophylaxis: Heparin  VTE Mechanical Prophylaxis: sequential compression device    Collaborative rounds completed with supervising physician  Plan of care discussed with bedside nurse    SIGNATURE: Audra Camacho PA-C  DATE: July 13, 2025  TIME: 8:02 AM

## 2025-07-13 PROBLEM — R55 VASOVAGAL EPISODE: Status: ACTIVE | Noted: 2025-07-13

## 2025-07-13 PROBLEM — R00.1 BRADYCARDIA: Status: ACTIVE | Noted: 2025-07-13

## 2025-07-13 LAB
ANION GAP SERPL CALCULATED.3IONS-SCNC: 12 MMOL/L (ref 4–13)
BUN SERPL-MCNC: 56 MG/DL (ref 5–25)
CALCIUM SERPL-MCNC: 8.6 MG/DL (ref 8.4–10.2)
CHLORIDE SERPL-SCNC: 101 MMOL/L (ref 96–108)
CO2 SERPL-SCNC: 29 MMOL/L (ref 21–32)
CREAT SERPL-MCNC: 1.75 MG/DL (ref 0.6–1.3)
GFR SERPL CREATININE-BSD FRML MDRD: 30 ML/MIN/1.73SQ M
GLUCOSE SERPL-MCNC: 155 MG/DL (ref 65–140)
GLUCOSE SERPL-MCNC: 157 MG/DL (ref 65–140)
GLUCOSE SERPL-MCNC: 159 MG/DL (ref 65–140)
GLUCOSE SERPL-MCNC: 265 MG/DL (ref 65–140)
GLUCOSE SERPL-MCNC: 79 MG/DL (ref 65–140)
GLUCOSE SERPL-MCNC: 90 MG/DL (ref 65–140)
GLUCOSE SERPL-MCNC: 99 MG/DL (ref 65–140)
POTASSIUM SERPL-SCNC: 3.5 MMOL/L (ref 3.5–5.3)
SODIUM SERPL-SCNC: 142 MMOL/L (ref 135–147)

## 2025-07-13 PROCEDURE — 99024 POSTOP FOLLOW-UP VISIT: CPT | Performed by: THORACIC SURGERY (CARDIOTHORACIC VASCULAR SURGERY)

## 2025-07-13 PROCEDURE — 82948 REAGENT STRIP/BLOOD GLUCOSE: CPT

## 2025-07-13 PROCEDURE — 80048 BASIC METABOLIC PNL TOTAL CA: CPT | Performed by: THORACIC SURGERY (CARDIOTHORACIC VASCULAR SURGERY)

## 2025-07-13 PROCEDURE — 99232 SBSQ HOSP IP/OBS MODERATE 35: CPT | Performed by: INTERNAL MEDICINE

## 2025-07-13 RX ORDER — INSULIN LISPRO 100 [IU]/ML
1-5 INJECTION, SOLUTION INTRAVENOUS; SUBCUTANEOUS
Status: DISCONTINUED | OUTPATIENT
Start: 2025-07-13 | End: 2025-07-14 | Stop reason: HOSPADM

## 2025-07-13 RX ORDER — INSULIN LISPRO 100 [IU]/ML
5 INJECTION, SOLUTION INTRAVENOUS; SUBCUTANEOUS
Status: DISCONTINUED | OUTPATIENT
Start: 2025-07-13 | End: 2025-07-14 | Stop reason: HOSPADM

## 2025-07-13 RX ORDER — INSULIN GLARGINE 100 [IU]/ML
12 INJECTION, SOLUTION SUBCUTANEOUS
Status: DISCONTINUED | OUTPATIENT
Start: 2025-07-13 | End: 2025-07-14 | Stop reason: HOSPADM

## 2025-07-13 RX ORDER — POTASSIUM CHLORIDE 29.8 MG/ML
40 INJECTION INTRAVENOUS ONCE
Status: COMPLETED | OUTPATIENT
Start: 2025-07-13 | End: 2025-07-13

## 2025-07-13 RX ADMIN — INSULIN LISPRO 1 UNITS: 100 INJECTION, SOLUTION INTRAVENOUS; SUBCUTANEOUS at 11:55

## 2025-07-13 RX ADMIN — CHLORHEXIDINE GLUCONATE 15 ML: 1.2 SOLUTION ORAL at 17:52

## 2025-07-13 RX ADMIN — AMIODARONE HYDROCHLORIDE 200 MG: 200 TABLET ORAL at 13:04

## 2025-07-13 RX ADMIN — AMLODIPINE BESYLATE 10 MG: 10 TABLET ORAL at 09:21

## 2025-07-13 RX ADMIN — Medication 250 MG: at 09:21

## 2025-07-13 RX ADMIN — Medication 3 MG: at 22:00

## 2025-07-13 RX ADMIN — ASPIRIN 325 MG ORAL TABLET 325 MG: 325 PILL ORAL at 09:22

## 2025-07-13 RX ADMIN — AMIODARONE HYDROCHLORIDE 200 MG: 200 TABLET ORAL at 21:58

## 2025-07-13 RX ADMIN — TORSEMIDE 20 MG: 20 TABLET ORAL at 09:22

## 2025-07-13 RX ADMIN — INSULIN LISPRO 2 UNITS: 100 INJECTION, SOLUTION INTRAVENOUS; SUBCUTANEOUS at 17:52

## 2025-07-13 RX ADMIN — HEPARIN SODIUM 5000 UNITS: 5000 INJECTION INTRAVENOUS; SUBCUTANEOUS at 06:35

## 2025-07-13 RX ADMIN — FERROUS SULFATE TAB 325 MG (65 MG ELEMENTAL FE) 325 MG: 325 (65 FE) TAB at 09:21

## 2025-07-13 RX ADMIN — Medication 250 MG: at 17:51

## 2025-07-13 RX ADMIN — TORSEMIDE 20 MG: 20 TABLET ORAL at 17:51

## 2025-07-13 RX ADMIN — INSULIN GLARGINE 12 UNITS: 100 INJECTION, SOLUTION SUBCUTANEOUS at 21:59

## 2025-07-13 RX ADMIN — CHLORHEXIDINE GLUCONATE 15 ML: 1.2 SOLUTION ORAL at 09:21

## 2025-07-13 RX ADMIN — AMIODARONE HYDROCHLORIDE 200 MG: 200 TABLET ORAL at 06:35

## 2025-07-13 RX ADMIN — INSULIN LISPRO 1 UNITS: 100 INJECTION, SOLUTION INTRAVENOUS; SUBCUTANEOUS at 21:58

## 2025-07-13 RX ADMIN — INSULIN LISPRO 5 UNITS: 100 INJECTION, SOLUTION INTRAVENOUS; SUBCUTANEOUS at 17:51

## 2025-07-13 RX ADMIN — TEMAZEPAM 15 MG: 15 CAPSULE ORAL at 21:59

## 2025-07-13 RX ADMIN — INSULIN LISPRO 5 UNITS: 100 INJECTION, SOLUTION INTRAVENOUS; SUBCUTANEOUS at 11:55

## 2025-07-13 RX ADMIN — MUPIROCIN 1 APPLICATION: 20 OINTMENT TOPICAL at 09:21

## 2025-07-13 RX ADMIN — POTASSIUM CHLORIDE 40 MEQ: 29.8 INJECTION, SOLUTION INTRAVENOUS at 09:22

## 2025-07-13 RX ADMIN — ACETAMINOPHEN 975 MG: 325 TABLET ORAL at 01:31

## 2025-07-13 RX ADMIN — HEPARIN SODIUM 5000 UNITS: 5000 INJECTION INTRAVENOUS; SUBCUTANEOUS at 13:04

## 2025-07-13 RX ADMIN — HEPARIN SODIUM 5000 UNITS: 5000 INJECTION INTRAVENOUS; SUBCUTANEOUS at 21:58

## 2025-07-13 RX ADMIN — CARVEDILOL 25 MG: 25 TABLET, FILM COATED ORAL at 06:35

## 2025-07-13 RX ADMIN — ACETAMINOPHEN 975 MG: 325 TABLET ORAL at 13:04

## 2025-07-13 NOTE — RESTORATIVE TECHNICIAN NOTE
Restorative Technician Note      Patient Name: Nathalia Monge     Restorative Tech Visit Date: 07/13/25  Note Type: Mobility  Patient Position Upon Consult: Supine  Activity Performed: Ambulated  Assistive Device: Roller walker  Patient Position at End of Consult: Bed/Chair alarm activated; All needs within reach; Bedside chair

## 2025-07-13 NOTE — ASSESSMENT & PLAN NOTE
Held bedtime Lantus last night due to blood glucose levels being below recommended range  Blood glucose log reviewed;  Patient's insulin requirements have come down    Plan is to reduce basal bolus regimen  Lantus to 12 units daily at bedtime  Humalog to 5 units with meals  Reduced correctional scale to A2/A1    Endocrinology will continue to follow and adjust as needed

## 2025-07-13 NOTE — RESTORATIVE TECHNICIAN NOTE
Restorative Technician Note      Patient Name: Nathalia Monge     Restorative Tech Visit Date: 07/13/25  Note Type: Mobility  Patient Position Upon Consult: Bedside chair  Activity Performed: Ambulated; Other (Comment) (in/out of the BR)  Assistive Device: Other (Comment) (none)  Patient Position at End of Consult: Bed/Chair alarm activated; All needs within reach; Bedside chair

## 2025-07-13 NOTE — QUICK NOTE
Responded to rapid response on the floor. The patient had a vagal episode on the toilet. Patient alert and responding. Airway intact. Critical care at bedside. No further interventions required of airway resident at this time.

## 2025-07-13 NOTE — PROGRESS NOTES
07/13/25 0700   Clinical Encounter Type   Visited With Patient   Crisis Visit Code  (Rapid Response)   Patient Spiritual Encounters   Spiritual Encounter Notes   (Spiritual care available as needed.)

## 2025-07-13 NOTE — PROGRESS NOTES
Progress Note - Endocrinology   Name: Nathalia Monge 64 y.o. female I MRN: [id number]  Unit/Bed#: PPHP-322-01 I Date of Admission: 7/8/2025   Date of Service: 7/13/2025 I Hospital Day: 5    Assessment & Plan  CAD (coronary artery disease)  Patient with MV CAD status post CABG on 7/8/2025  Management per primary team  Type 2 diabetes mellitus with hyperglycemia, with long-term current use of insulin (HCC)    Held bedtime Lantus last night due to blood glucose levels being below recommended range  Blood glucose log reviewed;  Patient's insulin requirements have come down    Plan is to reduce basal bolus regimen  Lantus to 12 units daily at bedtime  Humalog to 5 units with meals  Reduced correctional scale to A2/A1    Endocrinology will continue to follow and adjust as needed  Vasovagal episode  Management per cardio/primary team  Bradycardia  Management per cardio/primary team      Subjective : Ms. Monge reports feeling well overall, but does states that she had a little episode of dizziness this morning when she went to the bathroom.  She felt like a wave of dizziness passed over her and almost like she was going to pass out.     Objective :  Temp:  [97.3 °F (36.3 °C)-98 °F (36.7 °C)] 97.5 °F (36.4 °C)  HR:  [57-68] 58  BP: (115-172)/(60-77) 124/63  Resp:  [16] 16  SpO2:  [93 %-97 %] 93 %  O2 Device: None (Room air)    Physical Exam  Vitals reviewed.   Constitutional:       Appearance: Normal appearance.   HENT:      Nose: Nose normal.      Mouth/Throat:      Mouth: Mucous membranes are moist.      Pharynx: Oropharynx is clear.     Eyes:      Extraocular Movements: Extraocular movements intact.      Conjunctiva/sclera: Conjunctivae normal.       Cardiovascular:      Rate and Rhythm: Normal rate.      Pulses: Normal pulses.   Pulmonary:      Effort: Pulmonary effort is normal.   Abdominal:      General: Abdomen is flat.     Musculoskeletal:         General: Normal range of motion.      Cervical back: Normal range of motion  and neck supple.     Skin:     General: Skin is warm and dry.     Neurological:      Mental Status: She is alert and oriented to person, place, and time.     Psychiatric:         Mood and Affect: Mood normal.         Behavior: Behavior normal.         Thought Content: Thought content normal.         Judgment: Judgment normal.         Lab Results: I have reviewed the following results:CBC/BMP:   .     07/13/25  0309   SODIUM 142   K 3.5      CO2 29   BUN 56*   CREATININE 1.75*   GLUC 90    , Creatinine Clearance: Estimated Creatinine Clearance: 37 mL/min (A) (by C-G formula based on SCr of 1.75 mg/dL (H)).

## 2025-07-14 ENCOUNTER — HOME CARE VISIT (OUTPATIENT)
Dept: HOME HEALTH SERVICES | Facility: HOME HEALTHCARE | Age: 65
End: 2025-07-14

## 2025-07-14 ENCOUNTER — HOME CARE VISIT (OUTPATIENT)
Dept: HOME HEALTH SERVICES | Facility: HOME HEALTHCARE | Age: 65
End: 2025-07-14
Attending: PHYSICIAN ASSISTANT

## 2025-07-14 VITALS
DIASTOLIC BLOOD PRESSURE: 69 MMHG | TEMPERATURE: 97.4 F | SYSTOLIC BLOOD PRESSURE: 131 MMHG | WEIGHT: 193.34 LBS | OXYGEN SATURATION: 97 % | HEIGHT: 66 IN | HEART RATE: 63 BPM | BODY MASS INDEX: 31.07 KG/M2 | RESPIRATION RATE: 16 BRPM

## 2025-07-14 LAB
ANION GAP SERPL CALCULATED.3IONS-SCNC: 11 MMOL/L (ref 4–13)
BUN SERPL-MCNC: 50 MG/DL (ref 5–25)
CALCIUM SERPL-MCNC: 8.9 MG/DL (ref 8.4–10.2)
CHLORIDE SERPL-SCNC: 98 MMOL/L (ref 96–108)
CO2 SERPL-SCNC: 33 MMOL/L (ref 21–32)
CREAT SERPL-MCNC: 1.66 MG/DL (ref 0.6–1.3)
DME PARACHUTE DELIVERY DATE REQUESTED: NORMAL
DME PARACHUTE ITEM DESCRIPTION: NORMAL
DME PARACHUTE ORDER STATUS: NORMAL
DME PARACHUTE SUPPLIER NAME: NORMAL
DME PARACHUTE SUPPLIER PHONE: NORMAL
ERYTHROCYTE [DISTWIDTH] IN BLOOD BY AUTOMATED COUNT: 13.3 % (ref 11.6–15.1)
GFR SERPL CREATININE-BSD FRML MDRD: 32 ML/MIN/1.73SQ M
GLUCOSE SERPL-MCNC: 140 MG/DL (ref 65–140)
GLUCOSE SERPL-MCNC: 179 MG/DL (ref 65–140)
GLUCOSE SERPL-MCNC: 209 MG/DL (ref 65–140)
HCT VFR BLD AUTO: 31.5 % (ref 34.8–46.1)
HGB BLD-MCNC: 10.4 G/DL (ref 11.5–15.4)
MCH RBC QN AUTO: 29.7 PG (ref 26.8–34.3)
MCHC RBC AUTO-ENTMCNC: 33 G/DL (ref 31.4–37.4)
MCV RBC AUTO: 90 FL (ref 82–98)
PLATELET # BLD AUTO: 218 THOUSANDS/UL (ref 149–390)
PMV BLD AUTO: 11.3 FL (ref 8.9–12.7)
POTASSIUM SERPL-SCNC: 3.5 MMOL/L (ref 3.5–5.3)
RBC # BLD AUTO: 3.5 MILLION/UL (ref 3.81–5.12)
SODIUM SERPL-SCNC: 142 MMOL/L (ref 135–147)
WBC # BLD AUTO: 14.14 THOUSAND/UL (ref 4.31–10.16)

## 2025-07-14 PROCEDURE — 97530 THERAPEUTIC ACTIVITIES: CPT

## 2025-07-14 PROCEDURE — 80048 BASIC METABOLIC PNL TOTAL CA: CPT | Performed by: PHYSICIAN ASSISTANT

## 2025-07-14 PROCEDURE — 99024 POSTOP FOLLOW-UP VISIT: CPT | Performed by: PHYSICIAN ASSISTANT

## 2025-07-14 PROCEDURE — 85027 COMPLETE CBC AUTOMATED: CPT | Performed by: PHYSICIAN ASSISTANT

## 2025-07-14 PROCEDURE — 99232 SBSQ HOSP IP/OBS MODERATE 35: CPT | Performed by: INTERNAL MEDICINE

## 2025-07-14 PROCEDURE — 82948 REAGENT STRIP/BLOOD GLUCOSE: CPT

## 2025-07-14 PROCEDURE — 97116 GAIT TRAINING THERAPY: CPT

## 2025-07-14 RX ORDER — POTASSIUM CHLORIDE 1500 MG/1
20 TABLET, EXTENDED RELEASE ORAL DAILY
Qty: 30 TABLET | Refills: 3 | Status: SHIPPED | OUTPATIENT
Start: 2025-07-14

## 2025-07-14 RX ORDER — OXYCODONE HYDROCHLORIDE 5 MG/1
5 TABLET ORAL EVERY 6 HOURS PRN
Qty: 28 TABLET | Refills: 0 | Status: SHIPPED | OUTPATIENT
Start: 2025-07-14

## 2025-07-14 RX ORDER — POTASSIUM CHLORIDE 29.8 MG/ML
40 INJECTION INTRAVENOUS ONCE
Status: COMPLETED | OUTPATIENT
Start: 2025-07-14 | End: 2025-07-14

## 2025-07-14 RX ORDER — INSULIN ASPART 100 [IU]/ML
INJECTION, SUSPENSION SUBCUTANEOUS
Qty: 12 ML | Refills: 4 | Status: SHIPPED | OUTPATIENT
Start: 2025-07-14

## 2025-07-14 RX ORDER — OMEPRAZOLE 20 MG/1
20 TABLET, DELAYED RELEASE ORAL DAILY
Qty: 30 TABLET | Refills: 0 | Status: SHIPPED | OUTPATIENT
Start: 2025-07-14

## 2025-07-14 RX ORDER — ASPIRIN 325 MG
325 TABLET ORAL DAILY
Qty: 30 TABLET | Refills: 4 | Status: SHIPPED | OUTPATIENT
Start: 2025-07-15

## 2025-07-14 RX ORDER — ACETAMINOPHEN 325 MG/1
650 TABLET ORAL
Start: 2025-07-14

## 2025-07-14 RX ORDER — HYDRALAZINE HYDROCHLORIDE 20 MG/ML
10 INJECTION INTRAMUSCULAR; INTRAVENOUS EVERY 6 HOURS PRN
Status: DISCONTINUED | OUTPATIENT
Start: 2025-07-14 | End: 2025-07-14 | Stop reason: HOSPADM

## 2025-07-14 RX ORDER — CARVEDILOL 25 MG/1
25 TABLET ORAL 2 TIMES DAILY WITH MEALS
Qty: 60 TABLET | Refills: 4 | Status: SHIPPED | OUTPATIENT
Start: 2025-07-14

## 2025-07-14 RX ORDER — AMLODIPINE BESYLATE 10 MG/1
10 TABLET ORAL DAILY
Qty: 30 TABLET | Refills: 4 | Status: SHIPPED | OUTPATIENT
Start: 2025-07-15 | End: 2025-07-17

## 2025-07-14 RX ORDER — POLYETHYLENE GLYCOL 3350 17 G/17G
17 POWDER, FOR SOLUTION ORAL DAILY PRN
Qty: 500 G | Refills: 0 | Status: SHIPPED | OUTPATIENT
Start: 2025-07-14 | End: 2025-08-13

## 2025-07-14 RX ADMIN — FERROUS SULFATE TAB 325 MG (65 MG ELEMENTAL FE) 325 MG: 325 (65 FE) TAB at 08:27

## 2025-07-14 RX ADMIN — AMIODARONE HYDROCHLORIDE 200 MG: 200 TABLET ORAL at 05:54

## 2025-07-14 RX ADMIN — TORSEMIDE 20 MG: 20 TABLET ORAL at 08:27

## 2025-07-14 RX ADMIN — CARVEDILOL 25 MG: 25 TABLET, FILM COATED ORAL at 09:59

## 2025-07-14 RX ADMIN — INSULIN LISPRO 5 UNITS: 100 INJECTION, SOLUTION INTRAVENOUS; SUBCUTANEOUS at 12:10

## 2025-07-14 RX ADMIN — CHLORHEXIDINE GLUCONATE 15 ML: 1.2 SOLUTION ORAL at 08:29

## 2025-07-14 RX ADMIN — HEPARIN SODIUM 5000 UNITS: 5000 INJECTION INTRAVENOUS; SUBCUTANEOUS at 05:54

## 2025-07-14 RX ADMIN — INSULIN LISPRO 5 UNITS: 100 INJECTION, SOLUTION INTRAVENOUS; SUBCUTANEOUS at 08:29

## 2025-07-14 RX ADMIN — INSULIN LISPRO 1 UNITS: 100 INJECTION, SOLUTION INTRAVENOUS; SUBCUTANEOUS at 11:40

## 2025-07-14 RX ADMIN — INSULIN LISPRO 1 UNITS: 100 INJECTION, SOLUTION INTRAVENOUS; SUBCUTANEOUS at 06:08

## 2025-07-14 RX ADMIN — AMLODIPINE BESYLATE 10 MG: 10 TABLET ORAL at 08:28

## 2025-07-14 RX ADMIN — Medication 250 MG: at 08:27

## 2025-07-14 RX ADMIN — MUPIROCIN 1 APPLICATION: 20 OINTMENT TOPICAL at 08:27

## 2025-07-14 RX ADMIN — POTASSIUM CHLORIDE 40 MEQ: 29.8 INJECTION, SOLUTION INTRAVENOUS at 08:45

## 2025-07-14 RX ADMIN — ASPIRIN 325 MG ORAL TABLET 325 MG: 325 PILL ORAL at 08:28

## 2025-07-14 NOTE — PROGRESS NOTES
Patient given one package of disposable washcloths, two bottles of CHG soap, gauze, and paper tape.  Patient instructed to shower daily using CHG soap on body first and then use a clean, separate disposable washcloth on the chest and leg wounds lastly.  Patient aware to cover chest tube site on lower chest with gauze and paper tape after showering.  Change as needed if soiled.  Patient agreed to avoid pools, bath tubs, and hot tubs as well as swimming in lakes, oceans, or rivers.  Change clothes and bed sheets daily after showering.  Also, avoid pets to sit or jump on patient's leg and chest.  Patient and  agreed to this discussed hygiene regimen.

## 2025-07-14 NOTE — PHYSICAL THERAPY NOTE
PHYSICAL THERAPY NOTE          Patient Name: Nathalia Monge  Today's Date: 7/14/2025 07/14/25 1128   Note Type   Note Type Treatment   Pain Assessment   Pain Assessment Tool 0-10   Pain Score No Pain   Restrictions/Precautions   Other Precautions Cardiac/sternal;Cognitive;Chair Alarm;Multiple lines;Telemetry   General   Chart Reviewed Yes   Additional Pertinent History cleared for Tx session by chanel   Response to Previous Treatment Patient with no complaints from previous session.   Family/Caregiver Present Yes   Cognition   Arousal/Participation Alert;Cooperative   Attention Attends with cues to redirect   Orientation Level Oriented to person;Oriented to place;Oriented to situation   Memory Decreased short term memory;Decreased recall of precautions   Following Commands Follows one step commands without difficulty   Subjective   Subjective Alert; in the chair; reports she will be going home today; agreeable to mobilize   Transfers   Sit to Stand 6  Modified independent   Additional items Verbal cues   Stand to Sit 6  Modified independent   Additional items Verbal cues   Ambulation/Elevation   Gait pattern Excessively slow;Short stride;Foward flexed;Inconsistent joseph  (no overt LOB)   Gait Assistance 5  Supervision   Additional items Verbal cues   Assistive Device Rolling walker   Distance 70 ft + 2 x 30 ft + 70 ft w/ seated rest periods and steps negotiation in between   Stair Management Assistance 5  Supervision   Additional items Verbal cues  (reviewed sequencing and UE precaution on the hand rail)   Stair Management Technique One rail L;Step to pattern;Foreward;Backward;Nonreciprocal   Number of Stairs 2  (1 step x 2 trials)   Balance   Static Sitting Good   Dynamic Sitting Fair +   Static Standing Fair   Dynamic Standing Fair -   Ambulatory Fair -   Activity Tolerance   Activity Tolerance Patient limited by fatigue   Nurse  Made Aware spoke to VERONIQUE Milan   Assessment   Prognosis Good   Problem List Decreased strength;Decreased endurance;Impaired balance;Decreased mobility;Obesity   Assessment Pt cont to demonstrate gradual improvement in balance, endurance and mobility skills progressing to mod (I) level w/ transfers and (S) level w/ amb w/ rw and on the steps; no overt uncorrected LOB, gross knee buckling, or swaying observed; rest periods provided as needed and pt remained in NAD; D/C recommendations are listed below; will follow   Barriers to Discharge None   Goals   Patient Goals to go home   STG Expiration Date 07/20/25   PT Treatment Day 2   Plan   Treatment/Interventions LE strengthening/ROM;Elevations;Therapeutic exercise;Endurance training;Equipment eval/education;Bed mobility;Gait training;Spoke to nursing;Spoke to case management;Family   Progress Improving as expected   PT Frequency 4-6x/wk   Discharge Recommendation   Rehab Resource Intensity Level, PT III (Minimum Resource Intensity)   Equipment Recommended Walker   Walker Package Recommended Wheeled walker   Change/add to Walker Package? No   AM-PAC Basic Mobility Inpatient   Turning in Flat Bed Without Bedrails 4   Lying on Back to Sitting on Edge of Flat Bed Without Bedrails 3   Moving Bed to Chair 3   Standing Up From Chair Using Arms 4   Walk in Room 3   Climb 3-5 Stairs With Railing 3   Basic Mobility Inpatient Raw Score 20   Basic Mobility Standardized Score 43.99   [facility] Highest Level Of Mobility   -HLM Goal 6: Walk 10 steps or more   -HLM Achieved 7: Walk 25 feet or more   Education   Education Provided Mobility training;Assistive device   Patient Demonstrates verbal understanding   End of Consult   Patient Position at End of Consult Bedside chair;Bed/Chair alarm activated;All needs within reach     Giovanny Gonsalez

## 2025-07-14 NOTE — RESTORATIVE TECHNICIAN NOTE
Restorative Technician Note      Patient Name: Nathalia Monge     Restorative Tech Visit Date: 07/14/25  Note Type: Mobility  Patient Position Upon Consult: Supine  Activity Performed: Ambulated; Other (Comment) (in/out of the bathroom)  Assistive Device: Other (Comment) (none)  Patient Position at End of Consult: Bed/Chair alarm activated; All needs within reach; Bedside chair

## 2025-07-14 NOTE — PROGRESS NOTES
"Progress Note - Cardiology   Name: Nathalia Monge 64 y.o. female I MRN: [id number]  Unit/Bed#: [facility]-322-01 I Date of Admission: 7/8/2025   Date of Service: 7/14/2025 I Hospital Day: 6     Assessment & Plan  CAD (coronary artery disease)  S/P CABG (coronary artery bypass graft)   s/p CABG x 3 with LIMA to LAD, SVG to RI 1, and SVG to RI 2  Final intra op RAFAEL with improved LVEF to 45%, TTE 46%  CTs and EPW removed today  On ASA and beta blocker. Not on statin or Zetia due to intolerance  Outpatient consideration of PCSK9i or bempedoic acid    Type 2 diabetes mellitus with hyperglycemia, with long-term current use of insulin ([facility])  Lab Results   Component Value Date    HGBA1C 6.7 (H) 07/11/2025   Per primary team/endocrinology  Obesity (BMI 30.0-34.9)  BMI 33.27  Mixed hyperlipidemia  Lipid profile June 2025: Total cholesterol 267, triglycerides 152, HDL 51,   Has documented allergy to all statins (diarrhea)  Will benefit from PCSK9 inhibitor as an outpatient  Chronic heart failure with reduced ejection fraction (HFrEF, <= 40%) ([facility])  Wt Readings from Last 3 Encounters:   07/14/25 87.7 kg (193 lb 5.5 oz)   07/01/25 87.9 kg (193 lb 12.6 oz)   06/11/25 89.3 kg (196 lb 14.4 oz)   Euvolemic  EF improved to 45% with revascularization  Continue current therapy    Subjective:  Patient seen and examined. No acute events overnight.     Objective:     Vitals: Blood pressure 131/69, pulse 63, temperature (!) 97.4 °F (36.3 °C), temperature source Oral, resp. rate 16, height 5' 5.5\" (1.664 m), weight 87.7 kg (193 lb 5.5 oz), SpO2 97%., Body mass index is 31.68 kg/m².,   Orthostatic Blood Pressures      Flowsheet Row Most Recent Value   Blood Pressure 131/69 filed at 07/14/2025 1129   Patient Position - Orthostatic VS Lying filed at 07/13/2025 2203              Intake/Output Summary (Last 24 hours) at 7/14/2025 1402  Last data filed at 7/14/2025 1245  Gross per 24 hour   Intake 640 ml   Output 4875 ml   Net -4235 ml " "            Physical Exam:    General: Nathalia Monge is a well appearing female, in no acute distress, sitting comfortably  HEENT: moist mucous membranes, EOMI  Neck:  No JVD, supple, trachea midline  Cardiovascular: unremarkable S1/S2, regular rate and rhythm, no murmurs, rubs or gallops  Pulmonary: normal respiratory effort, CTAB  Abdomen: soft and nondistended  Extremities: No lower extremity edema. Warm and well perfused extremities.  Neuro: no focal motor deficits, AAOx3 (person, place, time)  Psych: Normal mood and affect, cooperative      Medications:    Current Medications[1]     Labs & Results:        Results from last 7 days   Lab Units 07/14/25  0403 07/12/25  0411 07/11/25  0400   WBC Thousand/uL 14.14* 17.53* 19.08*   HEMOGLOBIN g/dL 10.4* 9.0* 8.9*   HEMATOCRIT % 31.5* 27.1* 26.6*   PLATELETS Thousands/uL 218 147* 128*         Results from last 7 days   Lab Units 07/14/25  0403 07/13/25  0309 07/12/25  0411 07/09/25  0223 07/08/25  2232 07/08/25  2231 07/08/25  2127 07/08/25  2106   POTASSIUM mmol/L 3.5 3.5 3.7   < >  --    < >  --   --    CHLORIDE mmol/L 98 101 104   < >  --    < >  --   --    CO2 mmol/L 33* 29 25   < >  --    < >  --   --    CO2, I-STAT mmol/L  --   --   --   --  20*  --  21 24   BUN mg/dL 50* 56* 58*   < >  --    < >  --   --    CREATININE mg/dL 1.66* 1.75* 1.79*   < >  --    < >  --   --    CALCIUM mg/dL 8.9 8.6 8.3*   < >  --    < >  --   --    GLUCOSE, ISTAT mg/dl  --   --   --   --  175*  --  229* 268*    < > = values in this interval not displayed.     Results from last 7 days   Lab Units 07/08/25  2231   INR  1.29*   PTT seconds 32     Results from last 7 days   Lab Units 07/11/25  0400 07/10/25  0341 07/09/25  0437   MAGNESIUM mg/dL 2.3 2.3 2.6             This note was completed in part utilizing Dragon [employer] One voice recognition software. Grammatical errors, random word insertion, spelling mistakes, occasional wrong word or \"sound-alike\" substitutions and incomplete sentences " may be an occasional consequence of the system secondary to software limitations, ambient noise and hardware issues. At the time of dictation, efforts were made to edit, clarify and /or correct errors.  Please read the chart carefully and recognize, using context, where substitutions have occurred.  If you have any questions or concerns about the context, text or information contained within the body of this dictation, please contact myself, the provider, for further clarification.           [1]   Current Facility-Administered Medications:     acetaminophen (TYLENOL) tablet 975 mg, 975 mg, Oral, Q6H While awake, Abigail Meyer PA-C, 975 mg at 07/13/25 1304    amiodarone tablet 200 mg, 200 mg, Oral, Q8H DANTE, Nelson Haas PA-C, 200 mg at 07/14/25 0554    amLODIPine (NORVASC) tablet 10 mg, 10 mg, Oral, Daily, Leroy Cornell PA-C, 10 mg at 07/14/25 0828    aspirin tablet 325 mg, 325 mg, Oral, Daily, Abigail Meyer PA-C, 325 mg at 07/14/25 0828    bisacodyl (DULCOLAX) rectal suppository 10 mg, 10 mg, Rectal, Daily PRN, Abigail Meyer PA-C    carvedilol (COREG) tablet 25 mg, 25 mg, Oral, BID With Meals, Dia Franks PA-C, 25 mg at 07/14/25 0959    chlorhexidine (PERIDEX) 0.12 % oral rinse 15 mL, 15 mL, Mouth/Throat, BID, Abigail Meyer PA-C, 15 mL at 07/14/25 0829    docusate sodium (COLACE) capsule 100 mg, 100 mg, Oral, BID, Ethan Nixon PA-C, 100 mg at 07/12/25 0843    ferrous sulfate tablet 325 mg, 325 mg, Oral, Daily, AMAYA Smith-LAURENCE, 325 mg at 07/14/25 0827    heparin (porcine) subcutaneous injection 5,000 Units, 5,000 Units, Subcutaneous, Q8H DANTE, 5,000 Units at 07/14/25 0554 **AND** [CANCELED] Platelet count, , , Once, Abigail Meyer PA-C    hydrALAZINE (APRESOLINE) injection 10 mg, 10 mg, Intravenous, Q6H PRN, Jacquelyn Pitt PA-C    insulin glargine (LANTUS) subcutaneous injection 12 Units 0.12 mL, 12 Units, Subcutaneous, HS, Chioma New MD, 12 Units at 07/13/25 3101    insulin lispro  (HumALOG/ADMELOG) 100 units/mL subcutaneous injection 1-5 Units, 1-5 Units, Subcutaneous, TID AC, 1 Units at 07/14/25 1140 **AND** Fingerstick Glucose (POCT), , , 4x Daily AC and at bedtime, Chioma New MD    insulin lispro (HumALOG/ADMELOG) 100 units/mL subcutaneous injection 1-5 Units, 1-5 Units, Subcutaneous, HS, Chioma New MD, 1 Units at 07/13/25 2158    insulin lispro (HumALOG/ADMELOG) 100 units/mL subcutaneous injection 5 Units, 5 Units, Subcutaneous, TID With Meals, Chioma New MD, 5 Units at 07/14/25 1210    melatonin tablet 3 mg, 3 mg, Oral, HS, Audra Camacho PA-C, 3 mg at 07/13/25 2200    mupirocin (BACTROBAN) 2 % nasal ointment 1 Application, 1 Application, Nasal, Q12H DANTE, Audra Camacho PA-C, 1 Application at 07/14/25 0827    ondansetron (ZOFRAN) injection 4 mg, 4 mg, Intravenous, Q6H PRN, Audra Camacho PA-C    oxyCODONE (ROXICODONE) split tablet 2.5 mg, 2.5 mg, Oral, Q4H PRN **OR** oxyCODONE (ROXICODONE) IR tablet 5 mg, 5 mg, Oral, Q4H PRN, Abigail Meyer PA-C, 5 mg at 07/10/25 0923    pantoprazole (PROTONIX) EC tablet 40 mg, 40 mg, Oral, Daily, Abigail Mitch, PA-C, 40 mg at 07/10/25 0500    polyethylene glycol (MIRALAX) packet 17 g, 17 g, Oral, Daily, Abigail Mitch, PA-C, 17 g at 07/12/25 0843    saccharomyces boulardii (FLORASTOR) capsule 250 mg, 250 mg, Oral, BID, Abigail Mitch, PA-C, 250 mg at 07/14/25 0827    temazepam (RESTORIL) capsule 15 mg, 15 mg, Oral, HS PRN, AMAYA Loaiza-C, 15 mg at 07/13/25 0173    torsemide (DEMADEX) tablet 20 mg, 20 mg, Oral, BID, Audra Camacho PA-C, 20 mg at 07/14/25 0862

## 2025-07-14 NOTE — CASE MANAGEMENT
Case Management Discharge Planning Note    Patient name Nathalia Monge  [facility] [id number]  : 1960 Date 2025       Current Admission Date: 2025  Current Admission Diagnosis:CAD (coronary artery disease)   Patient Active Problem List    Diagnosis Date Noted    Vasovagal episode 2025    Bradycardia 2025    Leukocytosis 2025    Thrombocytopenia (HCC) 2025    Postoperative anemia due to acute blood loss 2025    YING (acute kidney injury) ([facility]) 2025    S/P CABG (coronary artery bypass graft) 2025    CAD (coronary artery disease) 2025    Chronic heart failure with reduced ejection fraction (HFrEF, <= 40%) ([facility]) 2025    Bilateral pleural effusion 2025    Prolonged QT interval 2025    Thoracic radiculopathy 2025    Degenerative disc disease, thoracic 02/10/2025    Mixed hyperlipidemia 2024    Carpal tunnel syndrome on left 2024    Imaging abnormality 2024    Clostridium difficile carrier 2024    Insurance coverage problems 2024    Obesity (BMI 30.0-34.9) 2024    Renal abscess, right 01/10/2024    Seizure-like activity (HCC) 2024    Hyponatremia 2024    S/P craniotomy 10/21/2020    Meningioma (HCC) 2018    Urinary urgency 2018    Partial 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  [address] [address]  Phone: [phone] Fax: [phone]    [facility]) - [place] - [address]  [address]  [place] [facility] [facility] [place] [address]  Phone: [phone] Fax: [phone]    Primary Care Provider: SHASHANK Miguel    Primary Insurance: [facility]  Secondary Insurance:     DISCHARGE DETAILS:    Discharge planning discussed with:: pt bedside, requests RW     Requested Home Health Care         Is the patient interested in HHC at discharge?: Yes  Home Health Discipline requested:: Nursing, Occupational Therapy, Physical Therapy  Home Health Agency Name:: [facility]    DME Referral Provided  Referral made for DME?: Yes  DME referral completed for the following items:: [place]  DME Supplier Name:: [employer]    Other Referral/Resources/Interventions Provided:  Interventions: HHC, DME    Treatment Team Recommendation: Home with Home Health Care  Expected Discharge Disposition: Home Health Services     Transport at Discharge : Family      IMM Given (Date):: 07/14/25 (900)  IMM Given to:: Patient

## 2025-07-14 NOTE — PLAN OF CARE
Problem: PAIN - ADULT  Goal: Verbalizes/displays adequate comfort level or baseline comfort level  Description: Interventions:  - Encourage patient to monitor pain and request assistance  - Assess pain using appropriate pain scale  - Administer analgesics as ordered based on type and severity of pain and evaluate response  - Implement non-pharmacological measures as appropriate and evaluate response  - Consider cultural and social influences on pain and pain management  - Notify physician/advanced practitioner if interventions unsuccessful or patient reports new pain  - Educate patient/family on pain management process including their role and importance of  reporting pain   - Provide non-pharmacologic/complimentary pain relief interventions  7/14/2025 1344 by Ellen Raza, RN  Outcome: Adequate for Discharge  7/14/2025 1236 by Ellen Raza, RN  Outcome: Progressing

## 2025-07-14 NOTE — ASSESSMENT & PLAN NOTE
Lipid profile June 2025: Total cholesterol 267, triglycerides 152, HDL 51,   Has documented allergy to all statins (diarrhea)  Will benefit from PCSK9 inhibitor as an outpatient

## 2025-07-14 NOTE — CASE COMMUNICATION
Notification of Assess not Admit    [facility] has assessed your patient for Home Health services and has determined the patient is not eligible for service due to the following  Refuses all Home Health Services    Patient reports having a friend that will assist. Patient also reports not having medications yet from pharmacy.

## 2025-07-14 NOTE — ASSESSMENT & PLAN NOTE
s/p CABG x 3 with LIMA to LAD, SVG to RI 1, and SVG to RI 2  Final intra op RAFAEL with improved LVEF to 45%, TTE 46%  CTs and EPW removed today  On ASA and beta blocker. Not on statin or Zetia due to intolerance  Outpatient consideration of PCSK9i or bempedoic acid

## 2025-07-14 NOTE — PROGRESS NOTES
Progress Note - Cardiac Surgery   Nathalia Monge 64 y.o. female MRN: [id number]  Unit/Bed#: PPHP-322-01 Encounter: [id number]    Coronary artery disease. S/P coronary artery bypass grafting x 3; POD # 6      24 Hour Events: Feeling good this morning. Replting K then d/c TLC. Eating well. + BM. Pain ok. Ambulating well. BP high this morning because Coreg held yesterday. Restart today.       Medications:   Scheduled Meds:  Current Facility-Administered Medications   Medication Dose Route Frequency Provider Last Rate    acetaminophen  975 mg Oral Q6H While awake Abigail Meyer PA-C      amiodarone  200 mg Oral Q8H [facility] Nelson Haas PA-C      amLODIPine  10 mg Oral Daily Leroy Cornell PA-C      aspirin  325 mg Oral Daily Abigail Meyer PA-C      bisacodyl  10 mg Rectal Daily PRN Abigail Meyer PA-C      [Held by provider] carvedilol  25 mg Oral BID With Meals Audra Camacho PA-C      chlorhexidine  15 mL Mouth/Throat BID Abigail Meyer PA-C      docusate sodium  100 mg Oral BID Ethan Nixon PA-C      ferrous sulfate  325 mg Oral Daily Abigail Meyer PA-C      heparin (porcine)  5,000 Units Subcutaneous Q8H DANTE Abigail Meyer PA-C      hydrALAZINE  10 mg Intravenous Q6H PRN Jacquelyn Pitt PA-C      insulin glargine  12 Units Subcutaneous HS Chioma New MD      insulin lispro  1-5 Units Subcutaneous TID AC Chioma Nwe MD      insulin lispro  1-5 Units Subcutaneous HS Chioma New MD      insulin lispro  5 Units Subcutaneous TID With Meals Chioma New MD      melatonin  3 mg Oral HS Audra Camacho PA-C      mupirocin  1 Application Nasal Q12H DANTE Audra Camacho PA-C      ondansetron  4 mg Intravenous Q6H PRN Audra Camacho PA-C      oxyCODONE  2.5 mg Oral Q4H PRN Abigail Meyer PA-C      Or    oxyCODONE  5 mg Oral Q4H PRN Abigail Meyer PA-C      pantoprazole  40 mg Oral Daily Abigail Meyer PA-C      polyethylene glycol  17 g Oral Daily Abigail Meyer PA-C      saccharomyces boulardii  250 mg Oral BID  "Abigail Meyer PA-C      temazepam  15 mg Oral HS PRN Ethan Nixon PA-C      torsemide  20 mg Oral BID Audra Camacho PA-C       Continuous Infusions:     PRN Meds:.  bisacodyl    hydrALAZINE    ondansetron    oxyCODONE **OR** oxyCODONE    temazepam    Vitals:   Vitals:    07/14/25 0319 07/14/25 0542 07/14/25 0556 07/14/25 0640   BP: 116/85   (!) 179/85   Pulse: 72   74   Resp:    18   Temp:    97.8 °F (36.6 °C)   TempSrc:       SpO2: 94%   94%   Weight:  87.7 kg (193 lb 5.5 oz) 87.7 kg (193 lb 5.5 oz)    Height:           Telemetry: NSR, Heart Rate: 70s    Respiratory:   SpO2: SpO2: 94 %; Room Air    Intake/Output:     Intake/Output Summary (Last 24 hours) at 7/14/2025 0747  Last data filed at 7/14/2025 0601  Gross per 24 hour   Intake 570 ml   Output 5075 ml   Net -4505 ml        Weights:   Weight (last 2 days)       Date/Time Weight    07/14/25 0556 87.7 (193.34)    07/14/25 0542 87.7 (193.34)    07/13/25 0600 92.9 (204.81)    07/12/25 0600 94.9 (209.22)            Chest tube Output: removed      Results:   Results from last 7 days   Lab Units 07/14/25  0403 07/12/25  0411 07/11/25  0400   WBC Thousand/uL 14.14* 17.53* 19.08*   HEMOGLOBIN g/dL 10.4* 9.0* 8.9*   HEMATOCRIT % 31.5* 27.1* 26.6*   PLATELETS Thousands/uL 218 147* 128*     Results from last 7 days   Lab Units 07/14/25  0403 07/13/25  0309 07/12/25  0411 07/09/25  0223 07/08/25  2232   SODIUM mmol/L 142 142 139   < >  --    POTASSIUM mmol/L 3.5 3.5 3.7   < >  --    CHLORIDE mmol/L 98 101 104   < >  --    CO2 mmol/L 33* 29 25   < >  --    CO2, I-STAT mmol/L  --   --   --   --  20*   BUN mg/dL 50* 56* 58*   < >  --    CREATININE mg/dL 1.66* 1.75* 1.79*   < >  --    GLUCOSE, ISTAT mg/dl  --   --   --   --  175*   CALCIUM mg/dL 8.9 8.6 8.3*   < >  --     < > = values in this interval not displayed.     No results for input(s): \"MG\" in the last 72 hours.    Results from last 7 days   Lab Units 07/08/25  2231   INR  1.29*   PTT seconds 32       Point of " care glucose:  - 265  Endo following    Studies:  Echo 7/10:   Left Ventricle Left ventricular cavity size is normal. Wall thickness is mildly increased. There is mild concentric hypertrophy. The left ventricular ejection fraction is 46%. Systolic function is mildly reduced. Diastolic function is moderately abnormal, consistent with grade II (pseudonormal) relaxation.   Wall Scoring Baseline     The following segments are akinetic: basal inferoseptal and basal inferior.  All other segments are normal.            Interventricular Septum There is abnormal septal motion consistent with post-operative status.   Right Ventricle Right ventricular cavity size is normal. Systolic function is normal.   Left Atrium The atrium is mildly dilated.   Right Atrium The atrium is dilated.   Aortic Valve The aortic valve is trileaflet. The leaflets are mildly thickened. The leaflets are mildly calcified. The leaflets exhibit normal mobility. There is no evidence of regurgitation. The aortic valve has no significant stenosis.   Mitral Valve Mitral valve structure is normal.  There is trace regurgitation. There is no evidence of stenosis.   Tricuspid Valve Tricuspid valve structure is normal. There is mild regurgitation. There is no evidence of stenosis. Right ventricular systolic pressure could not be assessed.   Pericardium There is no pericardial effusion. The pericardium is normal in appearance.     Left Ventricle Measurements    Function/Volumes   A4C EF 46 %         LV Diastolic Volume (BP) 115 mL         LV Systolic Volume (BP) 56 mL         EF 51 %         Diastolic Filling   MV E' Tissue Velocity Septal 6 cm/s         MV E' Tissue Velocity Lateral 8 cm/s         E/A ratio 1.22         E wave deceleration time 186 ms         MV Peak E Kameron 99 cm/s         MV Peak A Kameron 0.81 m/s          Report Measurements   AV LVOT peak gradient 6 mmHg              Interventricular Septum Measurements    Shunt Ratio   LVOT peak VTI 19.48  cm         LVOT peak emil 1.18 m/s              Atrial Septum Measurements    Shunt Ratio   LVOT peak VTI 19.48 cm         LVOT peak emil 1.18 m/s               Aortic Valve Measurements    Stenosis   Aortic valve peak velocity 1.86 m/s         LVOT peak emil 1.18 m/s         Ao VTI 28.12 cm         LVOT peak VTI 19.48 cm         AV mean gradient 7 mmHg         LVOT mn grad 3 mmHg         AV peak gradient 14 mmHg         AV LVOT peak gradient 6 mmHg         Area/Dimensions   DVI 0.69                 Results Review Statement: I personally reviewed the following image studies in PACS and associated radiology reports: Echocardiogram. My interpretation of the radiology images/reports is: as noted above.    Invasive Lines/Tubes:  Invasive Devices       Central Venous Catheter Line  Duration             CVC Central Lines 07/08/25 Right Internal jugular 5 days                    Physical Exam:    General: No acute distress, Alert, and Normal appearance  HEENT/NECK:  Normocephalic. Atraumatic.  no jugular venous distention.    Cardiac: Regular rate and rhythm and No murmurs/rubs/gallops  Pulmonary:  Breath sounds clear bilaterally and No rales/rhonchi/wheezes  Abdomen:  Non-tender, Non-distended, and Normal bowel sounds  Incisions: Sternum is stable.  Incision is clean, dry, and intact.  and Saphenectomy incison is clean, dry, and intact.   Extremities: Extremities warm/dry and No edema B/L  Neuro: Alert and oriented X 3, Sensation is grossly intact, and No focal deficits  Skin: Warm/Dry, without rashes or lesions.       Assessment:  Principal Problem:    CAD (coronary artery disease)  Active Problems:    Partial idiopathic epilepsy with seizures of localized onset, intractable, without status epilepticus (HCC)    Type 2 diabetes mellitus with hyperglycemia, with long-term current use of insulin ([facility])    Obesity (BMI 30.0-34.9)    Mixed hyperlipidemia    Chronic heart failure with reduced ejection fraction (HFrEF, <= 40%)  (HCC)    S/P CABG (coronary artery bypass graft)    Leukocytosis    Thrombocytopenia (HCC)    Postoperative anemia due to acute blood loss    YING (acute kidney injury) (HCC)    Vasovagal episode    Bradycardia       Coronary artery disease. S/P coronary artery bypass grafting x 3; POD # 6    Plan:    Cardiac:     Elective surgical admission  Ischemic cardiomyopathy, EF 30%    NSR; Hypertensive /85    Restart Coreg, 25 mg PO BID , on hold after vagal episode  Continue Norvasc 10mg daily    Hold ACE inhibitor/ARB secondary to renal dysfunction    AF prophylaxis  Continue Amiodarone, 200 mg PO TID    Continue ASA therapy    Pt has statin allergy (diarrhea as reaction) - Patient thus far refuses statins due to adverse reactions. Dr Rosales to discuss statin intolerance with patient to negotiate whether it can be resumed     Epicardial pacing wires have been removed    Remove central IV access today    Continue Arixtra for DVT prophylaxis    Pulmonary:     Good Room air oxygen saturation; Continue incentive spirometry/Coughing/Deep breathing exercises    Chest tubes have been discontinued    Renal:     Preoperative CKD, stage 3   Baseline creatinine 1.0 - 1.2 (as high as 1.83 preoperatively)  Postoperative YING, with creatinine 1.66 from 1.75    Intake/Output net: -4.9L/24 hours, UO 5.6L/24hr    Wt today 87.7 from 92.9  kg   Pre-op Wt: 88.5 kg    Diuretic Regimen:  Continue Demadex 20mg PO BID  Continue Potassium Chloride 20 mEq PO BID  Replete K    Neuro:    Neurologically intact; No active issues     Incisional pain well controlled   Continue tylenol, 975 mg PO q 8, standing dose   Continue oxycodone, 2.5 to 5 mg PO q 4 hours prn pain    GI:    Controlled carbohydrate diet level two/Cardiac diet, with 1800 mL fluid restriction    Tolerating diet without complaint  + Flatus  + BM postoperatively    Continue stool softeners and prn suppository    Continue GI prophylaxis    Endo:     Pre-Op Hgb A1C:  7.4    Transitioned off insulin infusion to sub cu dosing  On injectable therapy, prior to surgical intervention  Recommendation for discharge diabetes regimen pending endocrinology follow up  Bedside nursing Injectable insulin therapy education has been ordered  Endocrinology following daily    7    Hematology:     Post-operative blood loss anemia. Hg 10.4 from 9.0. Trend prn. Continue Iron and Vit C    Leukocytosis; Afebrile with no signs of infection; Trend CBC prn. WBC downtrending     Thrombocytopenia - resolved Continue to monitor.    8.   Disposition:        Following daily PT/OT recommendations regarding home vs. rehab when medically cleared for discharge - rec home with home PT/OT  Home likely today      VTE Pharmacologic Prophylaxis: Heparin  VTE Mechanical Prophylaxis: sequential compression device    Collaborative rounds completed with supervising physician  Plan of care discussed with bedside nurse    SIGNATURE: Dia Franks PA-C  DATE: July 14, 2025  TIME: 7:47 AM

## 2025-07-14 NOTE — DISCHARGE SUMMARY
Discharge Summary - Cardiac Surgery   Nathalia Monge 64 y.o. female MRN: [id number]  Unit/Bed#: PPHP-322-01 Encounter: [id number]    Admission Date: 7/8/2025     Discharge Date: 07/14/25    Admitting Diagnosis: Atherosclerosis of native coronary artery of native heart with angina pectoris (HCC) [I25.119]    Primary Discharge Diagnosis:   Coronary artery disease. S/P coronary artery bypass grafting;    Secondary Discharge Diagnosis:   HTN, HL, DM2, bipolar disorder, epilepsy (petit seizure >70 a day - right sided facial weakness and eye fluttering), CKD3    Attending: Abhi Rosales M.D.    Consulting Physician(s):   Cardiology  Endocrinology  Medical critical care  PT/OT    Procedures Performed:   Procedure(s):  CORONARY ARTERY BYPASS GRAFT (CABG) 3 VESSELS  LIMA to LAD, SVG to RAMUS-2, RAMUS-1 by Dr Rosales 7/8/25    Hospital Course:   The patient was seen in consultation prior to this admission for evaluation of Coronary artery disease.  Risks and benefits of coronary artery bypass grafting were discussed in detail, and patient was agreeable.  Routine preoperative evaluation was completed and informed consent was obtained prior to admission.    7/8: Elective admission for CABG. Following anesthesia induction/central line insertion. Pre-Op huddle was completed and conflict between the patient’s Lidocaine allergy and Cardioplegia Lidocaine component was identified. The decision was made to cancel the case. The patient was transferred to ICU intubated and in stable condition. Cardioplegia made without lidocaine, proceed with CABG x 3. Intraoperative blood products include: none.  No significant postoperative bleeding.  Transferred to ICU supported with levo at 3 and epi at 3.  Wean towards extubation.     7/9: Epi 4, Levo weaned off, on precedex.  CI 2.2. Wean Epi as able. Remains intubated due to RAFAEL placement with difficulty and airway protection, dexamethasone given for edema. NSR 60s, PEEP 6. Appropriate for  extubation this AM. PQTC 560.  Statin intolerant and held. Lasix 40 mg daily. Endo consult. Keep in ICU. PM: extubated     7/10: Milrinone on briefly for low CI overnight, weaned off, started on cardene. Cardene at 10 in AM. Pain controlled. SR 70, RA, CI 2.2, +600, Hgb 10.1, Cr up to 1.76 (1.4). Wean cardene, start Lopressor 25mg BID, lasix 40 IV BID. D/C CTs/PWs. Check echo for lifevest demonstrated EF 45%, no LifeVest needed.     7/11: Transferred to telemetry yesterday.  NSR, on RA. -1.7L/24 hrs. Hypertensive, start Norvasc 5mg daily and increase Coreg to 12.5mg q12. Cr 1.98. Continue BID Lasix dosing. PM: Patient remains hypertensive; Norvasc increased to 10 mg QD, with additional 5 mg X 1 now.      7/12: Remains hypertensive, increase Coreg to 25mg q12. Transitioned off insulin gtt to sub cu dosing. NSR with PACs. On RA. -1.9L/24 hrs. Transition to Demadex 20mg BID. Creatinine 1.79, WBC 17.5, Hg 9.0, Plt 148. D/C TLC.      7/13: Rapid response this morning - pt bradycardic with HR in 30’s for approx 5 minutes while attempting to have a BM. Suspect vasovagal episode. HR self-resolved to SB high 50’s. BP stable. On RA. -924 ml/24 hrs. Cr 1.75. K+ 3.5, replete. Hold Coreg today and monitor.      7/14: NSR. BP high this morning, better after receiving coreg and norvasc, . Ambulating well. Sats good on RA. Continue torsemide 20 QD. Endo recs noted. Cr 1.66, slightly up from baseline, check BMP in 1 week as an outpatient. Other labs stablee. -4.9L, good UOP. Ok for d/c home today     Condition at Discharge:   fair     Discharge Physical Exam:    Please see the documented physical exam from this morning's progress note for details.    Discharge Data:  Results from last 7 days   Lab Units 07/14/25  0403 07/12/25  0411 07/11/25  0400   WBC Thousand/uL 14.14* 17.53* 19.08*   HEMOGLOBIN g/dL 10.4* 9.0* 8.9*   HEMATOCRIT % 31.5* 27.1* 26.6*   PLATELETS Thousands/uL 218 147* 128*     Results from last 7 days   Lab  Units 07/14/25  0403 07/13/25  0309 07/12/25  0411 07/09/25  0223 07/08/25  2232   POTASSIUM mmol/L 3.5 3.5 3.7   < >  --    CHLORIDE mmol/L 98 101 104   < >  --    CO2 mmol/L 33* 29 25   < >  --    CO2, I-STAT mmol/L  --   --   --   --  20*   BUN mg/dL 50* 56* 58*   < >  --    CREATININE mg/dL 1.66* 1.75* 1.79*   < >  --    GLUCOSE, ISTAT mg/dl  --   --   --   --  175*   CALCIUM mg/dL 8.9 8.6 8.3*   < >  --     < > = values in this interval not displayed.     Results from last 7 days   Lab Units 07/08/25  2231   INR  1.29*   PTT seconds 32       Discharge instructions/Information to patient and family:   See after visit summary for information provided to patient and family.      Nathalia Monge was educated on restrictions regarding driving and lifting, and techniques of proper incisional care.  They were specifically counselled on signs and symptoms of an incisional infection, and advised to contact our service immediately should they develop fevers, sweats, chill, redness or drainage at the site of any incisions.    Provisions for Follow-Up Care:  See after visit summary for information related to follow-up care and any pertinent home health orders.      Disposition:  Home    Planned Readmission:   No    Discharge Medications:  See after visit summary for reconciled discharge medications provided to patient and family.      Nathalia Monge was provided contact information and scheduled a follow up appointment with Abhi Rosales M.D.  Additionally, follow up appointments have been scheduled for their primary care physician and primary cardiologist.  Contact information was provided.    Nathalia Monge was counseled on the importance of avoiding tobacco products.  As with all patients whom have undergone open heart surgery, tobacco cessation medication was contraindicated at the time of discharge.     ACE/ARB was Contraindicated secondary to renal dysfunction    Beta Blocker was Prescribed at discharge    Aspirin was Prescribed at  discharge    Statin was not prescribed, patient refuses to take    Continue Norvasc 10mg QD      The patient was discharged on ongoing diuretic therapy with Furosemide, 20 mg, PO QD and Potassium Chloride 20 mEq, PO QD.  They were advised to continue these medications as they did prior to admit, unless otherwise directed.     Narcotic pain medication was prescribed in the form of oxycodone 5mg prn.  Prior to prescribing, their prescription profile was reviewed on the [facility] of health prescription drug monitoring program.    The patient was informed that following their postoperative surgical evaluation, they will be referred to outpatient cardiac rehabilitation.  They were counseled that this program is run by specialists who will help them safely strengthen their heart and prevent more heart disease.  Cardiac rehabilitation will include exercise, relaxation, stress management, and heart-healthy nutrition.  Caregivers will also check to make sure their medication regimen is working.    During this admission, the patient was questioned on their use of tobacco, alcohol, and illicit/non-prescription drug use in the  previous 24 months. Nathalia Monge states that they have not used any of these substances in this time frame.    I spent 30 minutes discharging the patient. This time was spent on the day of discharge. I had direct contact with the patient on the day of discharge. Additional documentation is required if more than 30 minutes were spent on discharge.     SIGNATURE: Dia Franks PA-C  DATE: July 14, 2025  TIME: 12:28 PM

## 2025-07-14 NOTE — RESTORATIVE TECHNICIAN NOTE
Restorative Technician Note      Patient Name: Nathalia Monge     Restorative Tech Visit Date: 07/14/25  Note Type: Mobility  Patient Position Upon Consult: Bedside chair  Activity Performed: Ambulated  Assistive Device: Roller walker  Patient Position at End of Consult: Bed/Chair alarm activated; All needs within reach; Bedside chair

## 2025-07-14 NOTE — PLAN OF CARE
Problem: PHYSICAL THERAPY ADULT  Goal: Performs mobility at highest level of function for planned discharge setting.  See evaluation for individualized goals.  Description: Treatment/Interventions: Functional transfer training, LE strengthening/ROM, Elevations, Therapeutic exercise, Endurance training, Equipment eval/education, Bed mobility, Gait training, Spoke to nursing, Spoke to case management, OT, Family  Equipment Recommended: Walker       See flowsheet documentation for full assessment, interventions and recommendations.  Outcome: Progressing  Note: Prognosis: Good  Problem List: Decreased strength, Decreased endurance, Impaired balance, Decreased mobility, Obesity  Assessment: Pt cont to demonstrate gradual improvement in balance, endurance and mobility skills progressing to mod (I) level w/ transfers and (S) level w/ amb w/ rw and on the steps; no overt uncorrected LOB, gross knee buckling, or swaying observed; rest periods provided as needed and pt remained in NAD; D/C recommendations are listed below; will follow  Barriers to Discharge: None     Rehab Resource Intensity Level, PT: III (Minimum Resource Intensity)    See flowsheet documentation for full assessment.

## 2025-07-14 NOTE — ASSESSMENT & PLAN NOTE
Wt Readings from Last 3 Encounters:   07/14/25 87.7 kg (193 lb 5.5 oz)   07/01/25 87.9 kg (193 lb 12.6 oz)   06/11/25 89.3 kg (196 lb 14.4 oz)   Euvolemic  EF improved to 45% with revascularization  Continue current therapy

## 2025-07-15 ENCOUNTER — PATIENT OUTREACH (OUTPATIENT)
Dept: CASE MANAGEMENT | Facility: OTHER | Age: 65
End: 2025-07-15

## 2025-07-15 ENCOUNTER — TRANSITIONAL CARE MANAGEMENT (OUTPATIENT)
Dept: FAMILY MEDICINE CLINIC | Facility: CLINIC | Age: 65
End: 2025-07-15

## 2025-07-16 NOTE — PROGRESS NOTES
"General Cardiology Hospital Follow-up Visit  Admission Dx: \"CAD (coronary artery disease)\"    Nathalia Monge 64 y.o. female   MRN: [id number]  Encounter: [id number]    Assessment:  Patient Active Problem List    Diagnosis Date Noted    CAD (coronary artery disease) 05/14/2025    Bilateral pleural effusion 03/18/2025    Prolonged QT interval 03/18/2025    Thoracic radiculopathy 02/13/2025    Degenerative disc disease, thoracic 02/10/2025    Mixed hyperlipidemia 12/16/2024    Carpal tunnel syndrome on left 12/03/2024    Imaging abnormality 03/06/2024    Clostridium difficile carrier 03/06/2024    Insurance coverage problems 02/05/2024    Obesity (BMI 30.0-34.9) 01/11/2024    Renal abscess, right 01/10/2024    Seizure-like activity (HCC) 01/04/2024    Hyponatremia 01/03/2024    S/P craniotomy 10/21/2020    Meningioma (HCC) 03/13/2018    Urinary urgency 03/05/2018    Partial idiopathic epilepsy with seizures of localized onset, intractable, without status epilepticus ([facility])     Type 2 diabetes mellitus with hyperglycemia, with long-term current use of insulin ([facility])     Vasovagal episode 07/13/2025    Bradycardia 07/13/2025    Leukocytosis 07/11/2025    Thrombocytopenia ([facility]) 07/11/2025    Postoperative anemia due to acute blood loss 07/11/2025    YING (acute kidney injury) ([facility]) 07/11/2025    S/P CABG (coronary artery bypass graft) 07/08/2025    Heart failure with improved ejection fraction (HFimpEF) ([facility]) 07/2025       Today's Plan:  Hypotensive in office today with associated dizziness. Will stop amlodipine.  Will begin prior authorization process for Repatha.   RTC in 2-3 months.    Plan:  Coronary artery disease  S/p CABG x3 (LIMA-LAD; SVG-ramus1; SVG-ramus2) in 07/2025.  Postoperative management as per CT surgery.   Continues on ASA.    Chronic HFimpEF; LVEF 45-50%   Etiology: ischemic.    Weight of 193 lbs on 07/14 (day of discharge). Today, weighs 192 lbs.     Guideline-Directed Medical Therapy:  --Beta Blocker: " carvedilol 25 mg BID.   --ARNi / ACEi / ARB: No (recent history of GI issues with ARB and ACEi).    --Aldosterone Antagonist: No.   --SGLT2 Inhibitor: No.     Volume Management:  --Diuretic: Lasix 20 mg daily.   --K supplementation: potassium 20 mEq daily.     Sudden Cardiac Death Risk Reduction:  --LVEF >35%.    Advanced Therapies: Will continue to monitor.    Hypertension / history of hypertensive urgency  Hyperlipidemia  Diabetes mellitus, type II  Chronic kidney disease  Bipolar depression  PTSD  History of epilepsy  History of tobacco abuse    HPI:   Nathalia Monge is a 64-year-old woman with a PMH as above who presents to the office for hospital follow-up.    Admitted to [facility] from 07/08 to 07/14/2025 for elective CABG x3. During admission, carvedilol dose increased and amlodipine added to regimen.    07/17/2025: Patient presents with her partner for hospital follow-up. Feeling okay today. Reports feeling some mild dizziness after taking medications this morning but feels that this has been gradually improving over the last few hours. Does report some continued soreness surrounding sternal incision. Has not required oxycodone since discharge; prefers not to take.    Past Medical History[1]    Review of Systems   Constitutional:  Negative for activity change, appetite change, fatigue and unexpected weight change.   Respiratory:  Negative for cough, chest tightness and shortness of breath.    Cardiovascular:  Negative for palpitations and leg swelling.   Gastrointestinal:  Negative for abdominal distention and abdominal pain.   Neurological:  Positive for dizziness. Negative for syncope, weakness and light-headedness.   Psychiatric/Behavioral:  Negative for confusion and sleep disturbance. The patient is not nervous/anxious.        Allergies[2]    Current Medications[3]    Social History     Socioeconomic History    Marital status: Unknown     Spouse name: Marty    Number of children: 4    Years of education:  Some college    Highest education level: Not on file   Occupational History    Occupation: disabled     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   Tobacco Use    Smoking status: Former     Current packs/day: 0.00     Types: Cigarettes     Quit date: 3/5/2000     Years since quittin.3    Smokeless tobacco: Never    Tobacco comments:     Social use up to    Vaping Use    Vaping status: Never Used   Substance and Sexual Activity    Alcohol use: Never    Drug use: No    Sexual activity: Not Currently     Partners: Male     Birth control/protection: Post-menopausal     Comment: Everything has been removed and haven't had sex since    Other Topics Concern    Not on file   Social History Narrative    Lives at home with significant otherMarty     Social Drivers of Health     Financial Resource Strain: Low Risk  (1/15/2024)    Overall Financial Resource Strain (CARDIA)     Difficulty of Paying Living Expenses: Not hard at all   Food Insecurity: No Food Insecurity (2025)    Nursing - Inadequate Food Risk Classification     Worried About Running Out of Food in the Last Year: Never true     Ran Out of Food in the Last Year: Never true     Ran Out of Food in the Last Year: Never true   Transportation Needs: No Transportation Needs (2025)    Nursing - Transportation Risk Classification     Lack of Transportation: Not on file     Lack of Transportation: No   Physical Activity: Not on file   Stress: Not on file   Social Connections: Not on file   Intimate Partner Violence: Unknown (2025)    Nursing IPS     Feels Physically and Emotionally Safe: Not on file     Physically Hurt by Someone: Not on file     Humiliated or Emotionally Abused by Someone: Not on file     Physically Hurt by Someone: No     Hurt or Threatened by Someone: No   Housing Stability: Unknown (2025)    Nursing: Inadequate Housing Risk Classification     Has Housing: Not on file     Worried About Losing Housing: Not on file      "Unable to Get Utilities: Not on file     Unable to Pay for Housing in the Last Year: No     Has Housing: No     Family History[4]    Vitals:   Blood pressure 102/52, pulse 60, height 5' 5.5\" (1.664 m), weight 87.2 kg (192 lb 4.8 oz), SpO2 98%.    Wt Readings from Last 10 Encounters:   07/17/25 87.2 kg (192 lb 4.8 oz)   07/14/25 87.7 kg (193 lb 5.5 oz)   07/01/25 87.9 kg (193 lb 12.6 oz)   06/11/25 89.3 kg (196 lb 14.4 oz)   06/02/25 89.1 kg (196 lb 8 oz)   05/14/25 88.5 kg (195 lb)   05/05/25 87.1 kg (192 lb)   03/27/25 87.8 kg (193 lb 9.6 oz)   03/27/25 88 kg (194 lb)   03/24/25 88.9 kg (196 lb)     Vitals:    07/17/25 1007 07/17/25 1028   BP: (!) 84/50 102/52   BP Location: Left arm Right arm   Patient Position: Sitting Sitting   Cuff Size: Large Standard   Pulse: 60    SpO2: 98%    Weight: 87.2 kg (192 lb 4.8 oz)    Height: 5' 5.5\" (1.664 m)        Physical Exam  Vitals reviewed.   Constitutional:       General: She is awake. She is not in acute distress.     Appearance: Normal appearance. She is well-developed. She is not toxic-appearing or diaphoretic.      Comments: Ambulating with walker   HENT:      Head: Normocephalic.      Nose: Nose normal.   Neck:      Vascular: No JVD.     Cardiovascular:      Rate and Rhythm: Normal rate and regular rhythm.   Pulmonary:      Effort: Pulmonary effort is normal. No tachypnea, bradypnea, accessory muscle usage or respiratory distress.      Breath sounds: Normal air entry. No wheezing.   Abdominal:      General: There is no distension.     Musculoskeletal:      Cervical back: Neck supple.      Right lower leg: Edema (trace) present.      Left lower leg: Edema (trace) present.     Skin:     General: Skin is warm and dry.      Coloration: Skin is pale. Skin is not jaundiced.     Neurological:      Mental Status: She is alert and oriented to person, place, and time.     Psychiatric:         Mood and Affect: Mood and affect normal.         Speech: Speech normal.         " Behavior: Behavior normal. Behavior is cooperative.         Thought Content: Thought content normal.       Labs & Results:  Lab Results   Component Value Date    WBC 14.14 (H) 07/14/2025    HGB 10.4 (L) 07/14/2025    HCT 31.5 (L) 07/14/2025    MCV 90 07/14/2025     07/14/2025     Lab Results   Component Value Date    SODIUM 142 07/14/2025    K 3.5 07/14/2025    CL 98 07/14/2025    CO2 33 (H) 07/14/2025    BUN 50 (H) 07/14/2025    CREATININE 1.66 (H) 07/14/2025    GLUC 140 07/14/2025    CALCIUM 8.9 07/14/2025     Lab Results   Component Value Date    INR 1.29 (H) 07/08/2025    INR 0.88 06/19/2025    INR 0.94 03/18/2025    PROTIME 16.4 (H) 07/08/2025    PROTIME 12.6 06/19/2025    PROTIME 13.1 03/18/2025     Lab Results   Component Value Date     (H) 03/18/2025      Marcia Higgins PA-C       [1]   Past Medical History:  Diagnosis Date    Bacteremia due to Klebsiella pneumoniae 03/06/2024    Bipolar depression ([facility])     CAD (coronary artery disease)     Chronic heart failure with reduced ejection fraction (HFrEF, <= 40%) ([facility])     EF 30%    Chronic heart failure with reduced ejection fraction (HFrEF, <= 40%) ([facility]) 03/19/2025    CKD (chronic kidney disease), stage III ([facility])     DDD (degenerative disc disease), thoracic     Depression     Diabetes mellitus ([facility])     type 2, insulin dependent    Epilepsy ([facility])     no maintaince medications, experiences petite seizures to right eye multiple times throughout the day, does not drive due to this    Former tobacco use     GERD (gastroesophageal reflux disease)     Heart failure with improved ejection fraction (HFimpEF) ([facility]) 07/2025    History of Clostridioides difficile infection     History of pyelonephritis     w/ renal abscess    Hyperlipidemia     Hypertension     Hypertensive urgency 03/05/2018    ERNESTO (iron deficiency anemia)     Meningioma ([facility])     s/p surgical resection    Obesity     Prolonged QT interval     PTSD (post-traumatic stress disorder)      Telangiectasias     b/l LE    Vitamin D deficiency    [2]   Allergies  Allergen Reactions    Lidocaine Seizures     Tolerated bupivacaine w/ cardiac cath    Statins Diarrhea     Has tried all statins    Dilantin [Phenytoin]     Gabapentin Other (See Comments)     Anger/irritability/mood disturbance   [3]   Current Outpatient Medications:     acetaminophen (TYLENOL) 325 mg tablet, Take 2 tablets (650 mg total) by mouth every 6 (six) hours while awake, Disp: , Rfl:     aspirin 325 mg tablet, Take 1 tablet (325 mg total) by mouth daily, Disp: 30 tablet, Rfl: 4    BD Pen Needle Yolis 2nd Gen 32G X 4 MM MISC, USE  THREE TIMES DAILY, Disp: 100 each, Rfl: 1    carvedilol (COREG) 25 mg tablet, Take 1 tablet (25 mg total) by mouth 2 (two) times a day with meals, Disp: 60 tablet, Rfl: 4    cholecalciferol (VITAMIN D3) 1,000 units tablet, Take 1,000 Units by mouth in the morning., Disp: , Rfl:     cyclobenzaprine (FLEXERIL) 10 mg tablet, Take 1 tablet (10 mg total) by mouth 3 (three) times a day as needed for muscle spasms, Disp: 90 tablet, Rfl: 3    Evolocumab 140 MG/ML SOAJ, Inject 1 mL (140 mg total) under the skin every 14 (fourteen) days, Disp: 6 mL, Rfl: 0    ferrous sulfate 325 (65 Fe) mg tablet, Take 325 mg by mouth in the morning., Disp: , Rfl:     furosemide (LASIX) 20 mg tablet, Take 1 tablet (20 mg total) by mouth daily, Disp: 90 tablet, Rfl: 1    insulin aspart protamine-insulin aspart (NovoLOG Mix 70/30 FlexPen) 100 Units/mL injection pen, Take 20 units before breakfast and 10 units before dinner, Disp: 12 mL, Rfl: 4    Insulin Pen Needle (BD Pen Needle Yolis 2nd Gen) 32G X 4 MM MISC, For use with insulin pen. Pharmacy may dispense brand covered by insurance., Disp: 100 each, Rfl: 0    L-LYSINE PO, Take 500 mg by mouth in the morning., Disp: , Rfl:     magnesium 30 MG tablet, Take 30 mg by mouth daily, Disp: , Rfl:     omeprazole (PriLOSEC OTC) 20 MG tablet, Take 1 tablet (20 mg total) by mouth daily, Disp: 30  tablet, Rfl: 0    oxyCODONE (Roxicodone) 5 immediate release tablet, Take 1 tablet (5 mg total) by mouth every 6 (six) hours as needed for moderate pain (ongoing therapy s/p surgery) Max Daily Amount: 20 mg, Disp: 28 tablet, Rfl: 0    polyethylene glycol (GLYCOLAX) 17 GM/SCOOP powder, Take 17 g by mouth daily as needed (consitpation), Disp: 500 g, Rfl: 0    potassium chloride (Klor-Con M20) 20 mEq tablet, Take 1 tablet (20 mEq total) by mouth daily, Disp: 30 tablet, Rfl: 3    Probiotic Product (PROBIOTIC DAILY PO), Take 1 capsule by mouth in the morning., Disp: , Rfl:     zinc gluconate 50 mg tablet, Take 50 mg by mouth daily, Disp: , Rfl:     Blood Pressure Monitoring (Adult Blood Pressure Cuff Lg) KIT, Use daily (Patient not taking: Reported on 6/11/2025), Disp: 1 kit, Rfl: 0  [4]   Family History  Adopted: Yes   Problem Relation Name Age of Onset    Atrial septal defect Daughter Spring         repaired at 4

## 2025-07-17 ENCOUNTER — TELEPHONE (OUTPATIENT)
Age: 65
End: 2025-07-17

## 2025-07-17 ENCOUNTER — OFFICE VISIT (OUTPATIENT)
Dept: CARDIOLOGY CLINIC | Facility: CLINIC | Age: 65
End: 2025-07-17
Payer: COMMERCIAL

## 2025-07-17 VITALS
HEIGHT: 66 IN | OXYGEN SATURATION: 98 % | HEART RATE: 60 BPM | BODY MASS INDEX: 30.91 KG/M2 | DIASTOLIC BLOOD PRESSURE: 52 MMHG | WEIGHT: 192.3 LBS | SYSTOLIC BLOOD PRESSURE: 102 MMHG

## 2025-07-17 DIAGNOSIS — Z09 HOSPITAL DISCHARGE FOLLOW-UP: Primary | ICD-10-CM

## 2025-07-17 DIAGNOSIS — I50.32 HEART FAILURE WITH IMPROVED EJECTION FRACTION (HFIMPEF) (HCC): ICD-10-CM

## 2025-07-17 DIAGNOSIS — E78.2 MIXED HYPERLIPIDEMIA: ICD-10-CM

## 2025-07-17 DIAGNOSIS — I25.10 CORONARY ARTERY DISEASE INVOLVING NATIVE CORONARY ARTERY OF NATIVE HEART WITHOUT ANGINA PECTORIS: ICD-10-CM

## 2025-07-17 PROBLEM — I50.22 CHRONIC HEART FAILURE WITH REDUCED EJECTION FRACTION (HFREF, <= 40%) (HCC): Chronic | Status: RESOLVED | Noted: 2025-03-19 | Resolved: 2025-07-17

## 2025-07-17 PROCEDURE — 99214 OFFICE O/P EST MOD 30 MIN: CPT | Performed by: PHYSICIAN ASSISTANT

## 2025-07-17 NOTE — PATIENT INSTRUCTIONS
Stop amlodipine.   Will work getting new cholesterol med approved.     Please weigh yourself every day (after emptying your bladder) and keep a detailed log of weights.   Contact Cardiology at [phone] if you gain 3+ lbs overnight or 5+ lbs in 5-7 days.  Limit daily sodium/salt intake to 2000 mg daily to prevent fluid retention.  Avoid canned foods, fast food/Chinese food, and processed meats (hot dogs, lunch meat, and sausage etc.). Caution with condiments.  Limit fluid intake to 2000 mL or 2 liters (about 60-65 ounces) daily.  Avoid electrolyte replacement drinks (such as Gatorade, Pedialyte, Propel, Liquid IV, etc.).  Bring complete list of medications and log of daily weights to your follow-up appointment.

## 2025-07-17 NOTE — TELEPHONE ENCOUNTER
PA for repatha 140 mg/ml  APPROVED     Date(s) approved January 1, 2025 to December 31, 2025     Case #  [id number]         Patient advised by          [x]Artwardlyhart Message  []Phone call   [x]LMOM  []L/M to call office as no active Communication consent on file  []Unable to leave detailed message as VM not approved on Communication consent       Pharmacy advised by    [x]Fax  []Phone call  []Secure Chat    Specialty Pharmacy    []     Approval letter scanned into Media Yes

## 2025-07-17 NOTE — TELEPHONE ENCOUNTER
PA for repatha 140 mg/ml SUBMITTED to [facility] [facility]     via    []CMM-KEY:   [x]Surescripts-Case ID #     [id number]     []Availity-Auth ID # NDC #   []Faxed to plan   []Other website   []Phone call Case ID #     []PA sent as URGENT    All office notes, labs and other pertaining documents and studies sent. Clinical questions answered. Awaiting determination from insurance company.     Turnaround time for your insurance to make a decision on your Prior Authorization can take 7-21 business days.

## 2025-07-21 ENCOUNTER — APPOINTMENT (OUTPATIENT)
Dept: LAB | Facility: CLINIC | Age: 65
End: 2025-07-21
Attending: PHYSICIAN ASSISTANT
Payer: COMMERCIAL

## 2025-07-21 ENCOUNTER — OFFICE VISIT (OUTPATIENT)
Dept: FAMILY MEDICINE CLINIC | Facility: CLINIC | Age: 65
End: 2025-07-21
Payer: COMMERCIAL

## 2025-07-21 VITALS
SYSTOLIC BLOOD PRESSURE: 140 MMHG | DIASTOLIC BLOOD PRESSURE: 80 MMHG | OXYGEN SATURATION: 98 % | BODY MASS INDEX: 31.46 KG/M2 | HEART RATE: 71 BPM | WEIGHT: 192 LBS

## 2025-07-21 DIAGNOSIS — E11.65 TYPE 2 DIABETES MELLITUS WITH HYPERGLYCEMIA, WITH LONG-TERM CURRENT USE OF INSULIN (HCC): ICD-10-CM

## 2025-07-21 DIAGNOSIS — L03.317 CELLULITIS OF BUTTOCK: ICD-10-CM

## 2025-07-21 DIAGNOSIS — I25.119 CORONARY ARTERY DISEASE INVOLVING NATIVE CORONARY ARTERY OF NATIVE HEART WITH ANGINA PECTORIS (HCC): ICD-10-CM

## 2025-07-21 DIAGNOSIS — Z79.4 TYPE 2 DIABETES MELLITUS WITH HYPERGLYCEMIA, WITH LONG-TERM CURRENT USE OF INSULIN (HCC): ICD-10-CM

## 2025-07-21 DIAGNOSIS — Z12.11 SCREEN FOR COLON CANCER: ICD-10-CM

## 2025-07-21 DIAGNOSIS — E78.2 MIXED HYPERLIPIDEMIA: ICD-10-CM

## 2025-07-21 DIAGNOSIS — I50.22 CHRONIC HEART FAILURE WITH REDUCED EJECTION FRACTION (HFREF, <= 40%) (HCC): Chronic | ICD-10-CM

## 2025-07-21 DIAGNOSIS — D62 POSTOPERATIVE ANEMIA DUE TO ACUTE BLOOD LOSS: ICD-10-CM

## 2025-07-21 DIAGNOSIS — Z78.9 TRANSITION OF CARE: Primary | ICD-10-CM

## 2025-07-21 DIAGNOSIS — Z95.1 S/P CABG (CORONARY ARTERY BYPASS GRAFT): ICD-10-CM

## 2025-07-21 DIAGNOSIS — I50.32 HEART FAILURE WITH IMPROVED EJECTION FRACTION (HFIMPEF) (HCC): Chronic | ICD-10-CM

## 2025-07-21 DIAGNOSIS — N17.9 AKI (ACUTE KIDNEY INJURY) (HCC): ICD-10-CM

## 2025-07-21 DIAGNOSIS — Z23 ENCOUNTER FOR IMMUNIZATION: ICD-10-CM

## 2025-07-21 DIAGNOSIS — G40.019 PARTIAL IDIOPATHIC EPILEPSY WITH SEIZURES OF LOCALIZED ONSET, INTRACTABLE, WITHOUT STATUS EPILEPTICUS (HCC): ICD-10-CM

## 2025-07-21 DIAGNOSIS — I25.10 CAD IN NATIVE ARTERY: ICD-10-CM

## 2025-07-21 DIAGNOSIS — D72.829 LEUKOCYTOSIS, UNSPECIFIED TYPE: ICD-10-CM

## 2025-07-21 DIAGNOSIS — B00.9 HERPES: ICD-10-CM

## 2025-07-21 DIAGNOSIS — R56.9 SEIZURE-LIKE ACTIVITY (HCC): ICD-10-CM

## 2025-07-21 LAB
ANION GAP SERPL CALCULATED.3IONS-SCNC: 6 MMOL/L (ref 4–13)
BUN SERPL-MCNC: 35 MG/DL (ref 5–25)
CALCIUM SERPL-MCNC: 9.4 MG/DL (ref 8.4–10.2)
CHLORIDE SERPL-SCNC: 105 MMOL/L (ref 96–108)
CO2 SERPL-SCNC: 28 MMOL/L (ref 21–32)
CREAT SERPL-MCNC: 1.41 MG/DL (ref 0.6–1.3)
GFR SERPL CREATININE-BSD FRML MDRD: 39 ML/MIN/1.73SQ M
GLUCOSE P FAST SERPL-MCNC: 187 MG/DL (ref 65–99)
POTASSIUM SERPL-SCNC: 4.6 MMOL/L (ref 3.5–5.3)
SODIUM SERPL-SCNC: 139 MMOL/L (ref 135–147)

## 2025-07-21 PROCEDURE — 99495 TRANSJ CARE MGMT MOD F2F 14D: CPT

## 2025-07-21 PROCEDURE — 80048 BASIC METABOLIC PNL TOTAL CA: CPT

## 2025-07-21 PROCEDURE — 36415 COLL VENOUS BLD VENIPUNCTURE: CPT

## 2025-07-21 RX ORDER — SULFAMETHOXAZOLE AND TRIMETHOPRIM 800; 160 MG/1; MG/1
1 TABLET ORAL EVERY 12 HOURS SCHEDULED
Qty: 20 TABLET | Refills: 0 | Status: SHIPPED | OUTPATIENT
Start: 2025-07-21 | End: 2025-07-31

## 2025-07-21 RX ORDER — VALACYCLOVIR HYDROCHLORIDE 1 G/1
1000 TABLET, FILM COATED ORAL 3 TIMES DAILY
Qty: 21 TABLET | Refills: 0 | Status: SHIPPED | OUTPATIENT
Start: 2025-07-21 | End: 2025-07-28

## 2025-07-21 RX ORDER — MEDICAL SUPPLY, MISCELLANEOUS
EACH MISCELLANEOUS DAILY
Qty: 1 EACH | Refills: 0 | Status: SHIPPED | OUTPATIENT
Start: 2025-07-21

## 2025-07-21 NOTE — ASSESSMENT & PLAN NOTE
Trend CMP.  Component      Latest Ref Rng 7/21/2025   BUN      5 - 25 mg/dL 35 (H)    Creatinine      0.60 - 1.30 mg/dL 1.41 (H)      Component      Latest Ref Rng 7/21/2025   GFR, Calculated      ml/min/1.73sq m 39

## 2025-07-21 NOTE — ASSESSMENT & PLAN NOTE
Increased after surgery. Improving per hospital trends. Monitor CBC.  Component      Latest Ref Rng 7/14/2025   WBC      4.31 - 10.16 Thousand/uL 14.14 (H)         Orders:    CBC and differential

## 2025-07-21 NOTE — ASSESSMENT & PLAN NOTE
A1c well controlled. Continue current medication regimen. Unable to void for  microalbumin--ordered with outpatient labs. Unable to complete DM eye exam today but agrees to completion at next scheduled visit.   Lab Results   Component Value Date    HGBA1C 6.7 (H) 07/11/2025       Orders:    Comprehensive metabolic panel    Hemoglobin A1C    Albumin / creatinine urine ratio; Future

## 2025-07-21 NOTE — ASSESSMENT & PLAN NOTE
Wt Readings from Last 3 Encounters:   07/21/25 87.1 kg (192 lb)   07/17/25 87.2 kg (192 lb 4.8 oz)   07/14/25 87.7 kg (193 lb 5.5 oz)     Reports compliance with DWTs---trending 192 lb. Continue furosemide 20 mg QD and potassium 20 meq. Cardiology continue coreg 25 mg BID. Cannot tolerate ACEI/ARB due to GI side effects.   7/10/25 Echo:    Left Ventricle: Left ventricular cavity size is normal. Wall thickness is mildly increased. There is mild concentric hypertrophy. The left ventricular ejection fraction is 46%. Systolic function is mildly reduced. Diastolic function is moderately abnormal, consistent with grade II (pseudonormal) relaxation.    The following segments are akinetic: basal inferoseptal and basal inferior.    All other segments are normal.    IVS: There is abnormal septal motion consistent with post-operative status.    Left Atrium: The atrium is mildly dilated.    Right Atrium: The atrium is dilated.    Tricuspid Valve: There is mild regurgitation.          Orders:    Blood Pressure Monitoring (B-D ASSURE BPM/AUTO ARM CUFF) MISC; Use in the morning

## 2025-07-21 NOTE — ASSESSMENT & PLAN NOTE
Improving per hospital trends. Monitor CBC.  Component      Latest Ref Rng 7/14/2025   RBC      3.81 - 5.12 Million/uL 3.50 (L)    Hemoglobin      11.5 - 15.4 g/dL 10.4 (L)    Hematocrit      34.8 - 46.1 % 31.5 (L)    MCV      82 - 98 fL 90    MCH      26.8 - 34.3 pg 29.7    MCHC      31.4 - 37.4 g/dL 33.0    RDW      11.6 - 15.1 % 13.3

## 2025-07-21 NOTE — ASSESSMENT & PLAN NOTE
Unable to tolerate statins due to diarrhea. Was previously prescribed zetia. Cardiology is working on PA for repatha.   Orders:    Lipid Panel with Direct LDL reflex

## 2025-07-21 NOTE — PROGRESS NOTES
Transition of Care Visit:  Name: Nathalia Monge      : 1960      MRN: [id number]  Encounter Provider: HSASHANK Miguel  Encounter Date: 2025   Encounter department: [facility]    Assessment & Plan  Heart failure with improved ejection fraction (HFimpEF) ([facility])  Wt Readings from Last 3 Encounters:   25 87.1 kg (192 lb)   25 87.2 kg (192 lb 4.8 oz)   25 87.7 kg (193 lb 5.5 oz)     Reports compliance with DWTs---trending 192 lb. Continue furosemide 20 mg QD and potassium 20 meq. Cardiology continue coreg 25 mg BID. Cannot tolerate ACEI/ARB due to GI side effects.   7/10/25 Echo:    Left Ventricle: Left ventricular cavity size is normal. Wall thickness is mildly increased. There is mild concentric hypertrophy. The left ventricular ejection fraction is 46%. Systolic function is mildly reduced. Diastolic function is moderately abnormal, consistent with grade II (pseudonormal) relaxation.    The following segments are akinetic: basal inferoseptal and basal inferior.    All other segments are normal.    IVS: There is abnormal septal motion consistent with post-operative status.    Left Atrium: The atrium is mildly dilated.    Right Atrium: The atrium is dilated.    Tricuspid Valve: There is mild regurgitation.          Orders:    Blood Pressure Monitoring (B-D ASSURE BPM/AUTO ARM CUFF) MISC; Use in the morning    Coronary artery disease involving native coronary artery of native heart with angina pectoris (HCC)  Patient underwent elective CABG. Following with [facility]. Amlodipine was discontinued at [facility] hospital follow up appt due to hypotension and associated dizziness. Continues asa 325 mg. Following with cardiothoracic surg and [facility].   Orders:    Blood Pressure Monitoring (B-D ASSURE BPM/AUTO ARM CUFF) MISC; Use in the morning    Type 2 diabetes mellitus with hyperglycemia, with long-term current use of insulin ([facility])  A1c well controlled. Continue  current medication regimen. Unable to void for  microalbumin--ordered with outpatient labs. Unable to complete DM eye exam today but agrees to completion at next scheduled visit.   Lab Results   Component Value Date    HGBA1C 6.7 (H) 07/11/2025       Orders:    Comprehensive metabolic panel    Hemoglobin A1C    Albumin / creatinine urine ratio; Future    Partial idiopathic epilepsy with seizures of localized onset, intractable, without status epilepticus (HCC)  Follows with neuro.        Encounter for immunization  Declines all immunizations.        Screen for colon cancer  Declines colon cancer screening at this time.        Leukocytosis, unspecified type  Increased after surgery. Improving per hospital trends. Monitor CBC.  Component      Latest Ref Rng 7/14/2025   WBC      4.31 - 10.16 Thousand/uL 14.14 (H)         Orders:    CBC and differential    Mixed hyperlipidemia  Unable to tolerate statins due to diarrhea. Was previously prescribed zetia. Cardiology is working on PA for repatha.   Orders:    Lipid Panel with Direct LDL reflex    Postoperative anemia due to acute blood loss  Improving per hospital trends. Monitor CBC.  Component      Latest Ref Rng 7/14/2025   RBC      3.81 - 5.12 Million/uL 3.50 (L)    Hemoglobin      11.5 - 15.4 g/dL 10.4 (L)    Hematocrit      34.8 - 46.1 % 31.5 (L)    MCV      82 - 98 fL 90    MCH      26.8 - 34.3 pg 29.7    MCHC      31.4 - 37.4 g/dL 33.0    RDW      11.6 - 15.1 % 13.3                Transition of care         Herpes  Blisters noted to sacrum, left buttock, and labia. Valtrex ordered. Purulent head---bactrim ordered---patient will start antibiotics if rash does not improve with antiviral.     Stage 1 pressure injury noted to left buttock.  Orders:    valACYclovir (VALTREX) 1,000 mg tablet; Take 1 tablet (1,000 mg total) by mouth 3 (three) times a day for 7 days    Cellulitis of buttock  See herpes note.  Orders:    sulfamethoxazole-trimethoprim (BACTRIM DS) 800-160 mg  per tablet; Take 1 tablet by mouth every 12 (twelve) hours for 10 days    YING (acute kidney injury) (HCC)  Trend CMP.  Component      Latest Ref Rng 7/21/2025   BUN      5 - 25 mg/dL 35 (H)    Creatinine      0.60 - 1.30 mg/dL 1.41 (H)      Component      Latest Ref Rng 7/21/2025   GFR, Calculated      ml/min/1.73sq m 39                      History of Present Illness     Transitional Care Management Review:   Nathalia Monge is a 64 y.o. female here for TCM follow up.     During the TCM phone call patient stated:  TCM Call (since 7/7/2025)       Date and time call was made  7/15/2025  4:03 PM    Hospital care reviewed  Records reviewed    Patient was hospitialized at  [facility]    Date of Admission  07/08/25    Date of discharge  07/14/25    Diagnosis  CAD (coronary artery disease)    Disposition  Home    Were the patients medications reviewed and updated  Yes    Current Symptoms  None          TCM Call (since 7/7/2025)       Post hospital issues  None    Scheduled for follow up?  Yes    Patients specialists  Cardiology    Did you obtain your prescribed medications  Yes    Do you need help managing your prescriptions or medications  No    Is transportation to your appointment needed  Yes    Specify why  recent surgery    I have advised the patient to call PCP with any new or worsening symptoms  JOSE Colon    Living Arrangements  Spouse or Significiant other    Support System  Spouse    The type of support provided  Emotional; Financial; Physical    Do you have social support  Yes, as much as I need    Are you recieving home care services  Yes    Types of home care services  Nurse visit          HPI  Review of Systems   Constitutional:  Positive for fatigue. Negative for activity change.   HENT:  Negative for congestion, ear pain, rhinorrhea and sore throat.    Eyes:  Negative for pain.   Respiratory:  Negative for cough, shortness of breath and wheezing.    Cardiovascular:  Negative for chest pain and leg  swelling.   Gastrointestinal:  Negative for abdominal pain, diarrhea, nausea and vomiting.   Musculoskeletal:  Negative for arthralgias and myalgias.   Skin:  Positive for rash.   Neurological:  Negative for dizziness, weakness and numbness.   All other systems reviewed and are negative.    Objective   /80 (BP Location: Left arm, Patient Position: Sitting, Cuff Size: Standard)   Pulse 71   Wt 87.1 kg (192 lb)   SpO2 98%   BMI 31.46 kg/m²     Physical Exam  Vitals and nursing note reviewed.   Constitutional:       General: She is not in acute distress.     Appearance: Normal appearance. She is not ill-appearing.   HENT:      Head: Normocephalic.      Right Ear: Tympanic membrane, ear canal and external ear normal. There is no impacted cerumen.      Left Ear: Tympanic membrane, ear canal and external ear normal. There is no impacted cerumen.      Mouth/Throat:      Mouth: Mucous membranes are moist.      Pharynx: No oropharyngeal exudate or posterior oropharyngeal erythema.     Cardiovascular:      Rate and Rhythm: Normal rate and regular rhythm.      Heart sounds: Normal heart sounds.   Pulmonary:      Effort: Pulmonary effort is normal. No respiratory distress.      Breath sounds: Normal breath sounds. No wheezing.   Abdominal:      General: There is no distension.      Palpations: Abdomen is soft.      Tenderness: There is no abdominal tenderness.     Musculoskeletal:      Cervical back: Neck supple.      Right lower leg: No edema.      Left lower leg: No edema.     Skin:     General: Skin is warm and dry.      Findings: Rash and wound present. Rash is vesicular.     Neurological:      Mental Status: She is alert and oriented to person, place, and time. Mental status is at baseline.     Psychiatric:         Mood and Affect: Mood normal.         Behavior: Behavior normal.       Medications have been reviewed by provider in current encounter    Administrative Statements   I have spent a total time of 30  minutes in caring for this patient on the day of the visit/encounter including Risks and benefits of tx options, Instructions for management, Patient and family education, Importance of tx compliance, Risk factor reductions, Impressions, Documenting in the medical record, Reviewing/placing orders in the medical record (including tests, medications, and/or procedures), and Obtaining or reviewing history  .

## 2025-07-21 NOTE — ASSESSMENT & PLAN NOTE
Patient underwent elective CABG. Following with cards. Amlodipine was discontinued at [facility] follow up appt due to hypotension and associated dizziness. Continues asa 325 mg. Following with cardiothoracic surg and [facility].   Orders:    Blood Pressure Monitoring (B-D ASSURE BPM/AUTO ARM CUFF) MISC; Use in the morning

## 2025-07-22 ENCOUNTER — TELEPHONE (OUTPATIENT)
Dept: CARDIAC SURGERY | Facility: CLINIC | Age: 65
End: 2025-07-22

## 2025-07-22 ENCOUNTER — PATIENT OUTREACH (OUTPATIENT)
Dept: CASE MANAGEMENT | Facility: OTHER | Age: 65
End: 2025-07-22

## 2025-07-22 NOTE — TELEPHONE ENCOUNTER
Surgery: CABG x3 on 7/8/25  Discharged home: 7/14/25    Postop YING - Cr up to 1.76, down to 1.66 on discharge, though above baseline, so repeat BMP was checked yesterday, 7/21, and Cr is now back down to baseline at 1.41.     Saw cardiology on 7/17, who prescribed Repatha - patient received it in the mail yesterday, and started using it.     Patient states that she is feeling well overall. She does not have any pain, shortness of breath or LE edema.   Incisions are healing well, and she is ambulating several times a day. Appetite is good. One episode this morning of diarrhea, but otherwise no GI disturbances.  Taking all medications as prescribed.   Answered additional questions and reviewed upcoming postop appointments.

## 2025-07-22 NOTE — PROGRESS NOTES
"Follow up call with Nathalia. She reports she is doing \"wonderful\". Feeling less fatigued. Able to walk \"longer distances\".   Was seen by PCP yesterday. Has \"sores on buttocks\" Was advised to start Valcyclovir.  We discussed importance of keeping area clean and dry, avoid scratching and to keep pressure off area as often as possible.  . Denies episodes of hypoglycemia.  Weight today reported as 192lbs.  Denies symptoms of heart failure.  CABG incisions \"look great\".  Denies fever, chills, redness of purulent drainage.  To see CT surgeon 8/13/25.  Agrees to next follow up call in on week.  "

## 2025-07-29 ENCOUNTER — PATIENT OUTREACH (OUTPATIENT)
Dept: CASE MANAGEMENT | Facility: OTHER | Age: 65
End: 2025-07-29

## 2025-07-31 DIAGNOSIS — Z79.4 TYPE 2 DIABETES MELLITUS WITH HYPERGLYCEMIA, WITH LONG-TERM CURRENT USE OF INSULIN (HCC): ICD-10-CM

## 2025-07-31 DIAGNOSIS — E11.65 TYPE 2 DIABETES MELLITUS WITH HYPERGLYCEMIA, WITH LONG-TERM CURRENT USE OF INSULIN (HCC): ICD-10-CM

## 2025-07-31 LAB
DME PARACHUTE DELIVERY DATE ACTUAL: NORMAL
DME PARACHUTE DELIVERY DATE REQUESTED: NORMAL
DME PARACHUTE ITEM DESCRIPTION: NORMAL
DME PARACHUTE ORDER STATUS: NORMAL
DME PARACHUTE SUPPLIER NAME: NORMAL
DME PARACHUTE SUPPLIER PHONE: NORMAL

## 2025-08-01 RX ORDER — PEN NEEDLE, DIABETIC 32GX 5/32"
NEEDLE, DISPOSABLE MISCELLANEOUS
Qty: 100 EACH | Refills: 1 | Status: SHIPPED | OUTPATIENT
Start: 2025-08-01

## 2025-08-05 ENCOUNTER — PATIENT OUTREACH (OUTPATIENT)
Dept: CASE MANAGEMENT | Facility: OTHER | Age: 65
End: 2025-08-05

## 2025-08-12 ENCOUNTER — NURSE TRIAGE (OUTPATIENT)
Age: 65
End: 2025-08-12

## 2025-08-12 ENCOUNTER — PATIENT OUTREACH (OUTPATIENT)
Dept: CASE MANAGEMENT | Facility: OTHER | Age: 65
End: 2025-08-12

## 2025-08-13 ENCOUNTER — OFFICE VISIT (OUTPATIENT)
Dept: CARDIAC SURGERY | Facility: CLINIC | Age: 65
End: 2025-08-13

## 2025-08-14 ENCOUNTER — PATIENT OUTREACH (OUTPATIENT)
Dept: CASE MANAGEMENT | Facility: OTHER | Age: 65
End: 2025-08-14

## 2025-08-19 ENCOUNTER — CLINICAL SUPPORT (OUTPATIENT)
Dept: CARDIAC REHAB | Facility: HOSPITAL | Age: 65
End: 2025-08-19
Attending: PHYSICIAN ASSISTANT
Payer: COMMERCIAL

## 2025-08-19 DIAGNOSIS — E11.65 TYPE 2 DIABETES MELLITUS WITH HYPERGLYCEMIA, WITH LONG-TERM CURRENT USE OF INSULIN (HCC): ICD-10-CM

## 2025-08-19 DIAGNOSIS — I25.10 CAD IN NATIVE ARTERY: ICD-10-CM

## 2025-08-19 DIAGNOSIS — I50.22 CHRONIC HEART FAILURE WITH REDUCED EJECTION FRACTION (HFREF, <= 40%) (HCC): Chronic | ICD-10-CM

## 2025-08-19 DIAGNOSIS — Z95.1 S/P CABG (CORONARY ARTERY BYPASS GRAFT): Primary | ICD-10-CM

## 2025-08-19 DIAGNOSIS — R56.9 SEIZURE-LIKE ACTIVITY (HCC): ICD-10-CM

## 2025-08-19 DIAGNOSIS — N17.9 AKI (ACUTE KIDNEY INJURY) (HCC): ICD-10-CM

## 2025-08-19 DIAGNOSIS — Z79.4 TYPE 2 DIABETES MELLITUS WITH HYPERGLYCEMIA, WITH LONG-TERM CURRENT USE OF INSULIN (HCC): ICD-10-CM

## 2025-08-19 PROCEDURE — 93798 PHYS/QHP OP CAR RHAB W/ECG: CPT

## (undated) DEVICE — SUT MONOCRYL 2-0 SH 27 IN Y417H

## (undated) DEVICE — MAYFIELD® DISPOSABLE ADULT SKULL PIN (PLASTIC BASE): Brand: MAYFIELD®

## (undated) DEVICE — APPLIER SURGICLIP PREMIUM SMALL 9IN

## (undated) DEVICE — Device

## (undated) DEVICE — JP PERF DRN SIL FLT 7MM FULL: Brand: CARDINAL HEALTH

## (undated) DEVICE — DRAPE MICROSCOPE OPMI PENTERO

## (undated) DEVICE — PACK CUSTOM PERFUSION AV LOOP

## (undated) DEVICE — TUBING INSUFFLATION SET ISO CONNECTOR

## (undated) DEVICE — EXOFIN PRECISION PEN HIGH VISCOSITY TOPICAL SKIN ADHESIVE: Brand: EXOFIN PRECISION PEN, 1G

## (undated) DEVICE — GUIDEWIRE WHOLEY HI TORQUE INTERM MOD J .035 145CM

## (undated) DEVICE — TOOL 15BA50 LEGEND 15CM 5MM BA: Brand: MIDAS REX™

## (undated) DEVICE — PACK CRANIOTOMY PBDS RF

## (undated) DEVICE — GOWN,SLEEVE,STERILE,W/CSR WRAP,1/P: Brand: MEDLINE

## (undated) DEVICE — BLADE BEAVER MINI SZ 69

## (undated) DEVICE — RECIP.STERNUM SAW BLADE 34/7.5/0.7MM: Brand: AESCULAP

## (undated) DEVICE — STOCKINETTE REGULAR

## (undated) DEVICE — SPONGE PVP SCRUB WING STERILE

## (undated) DEVICE — GLOVE INDICATOR PI UNDERGLOVE SZ 8 BLUE

## (undated) DEVICE — CUSA® EXCEL 36 KHZ CEM™ NOSECONE: Brand: CUSA® EXCEL

## (undated) DEVICE — DURASEAL 5ML

## (undated) DEVICE — REM POLYHESIVE ADULT PATIENT RETURN ELECTRODE: Brand: VALLEYLAB

## (undated) DEVICE — PACK CUSTOM PERFUSION PLEG PK

## (undated) DEVICE — SAFEAIR ULPA FILTER: Brand: NEPTUNE

## (undated) DEVICE — SUT PROLENE 7-0 BV175-8/BV175-8 24 IN EPM8747

## (undated) DEVICE — RADIFOCUS OPTITORQUE ANGIOGRAPHIC CATHETER: Brand: OPTITORQUE

## (undated) DEVICE — 3M™ STERI-STRIP™ REINFORCED ADHESIVE SKIN CLOSURES, R1547, 1/2 IN X 4 IN (12 MM X 100 MM), 6 STRIPS/ENVELOPE: Brand: 3M™ STERI-STRIP™

## (undated) DEVICE — ADHESIVE SKN CLSR HISTOACRYL FLEX 0.5ML LF

## (undated) DEVICE — ANTIBACTERIAL UNDYED BRAIDED (POLYGLACTIN 910), SYNTHETIC ABSORBABLE SUTURE: Brand: COATED VICRYL

## (undated) DEVICE — CLIP APPLIER: Brand: PREMIUM SURGICLIP II

## (undated) DEVICE — MEDI-VAC YANK SUCT HNDL W/TPRD BULBOUS TIP: Brand: CARDINAL HEALTH

## (undated) DEVICE — PREP SURGICAL PURPREP 26ML

## (undated) DEVICE — NEPTUNE E-SEP SMOKE EVACUATION PENCIL, COATED, 70MM BLADE, PUSH BUTTON SWITCH: Brand: NEPTUNE E-SEP

## (undated) DEVICE — PRESSURE GUIDEWIRE: Brand: COMET™ II

## (undated) DEVICE — INTENDED FOR TISSUE SEPARATION, AND OTHER PROCEDURES THAT REQUIRE A SHARP SURGICAL BLADE TO PUNCTURE OR CUT.: Brand: BARD-PARKER ® CARBON RIB-BACK BLADES

## (undated) DEVICE — OASIS DRAIN, SINGLE, INLINE & ATS COMPATIBLE: Brand: OASIS

## (undated) DEVICE — 32 FR STRAIGHT – SOFT PVC CATHETER: Brand: PVC THORACIC CATHETERS

## (undated) DEVICE — 32 FR RIGHT ANGLE – SOFT PVC CATHETER: Brand: PVC THORACIC CATHETERS

## (undated) DEVICE — BLADE ELECTRODE: Brand: EDGE

## (undated) DEVICE — 3000CC GUARDIAN II: Brand: GUARDIAN

## (undated) DEVICE — PACK CABG PBDS

## (undated) DEVICE — GLOVE SRG BIOGEL ECLIPSE 8

## (undated) DEVICE — VASOVIEW HEMOPRO 2: Brand: VASOVIEW HEMOPRO 2

## (undated) DEVICE — CATH URET 16FR RED RUBBER

## (undated) DEVICE — SILVER-COATED ANTIBACTERIAL BARRIER DRESSING: Brand: ACTICOAT SURGIC 10X12CM 5PK US

## (undated) DEVICE — SUT MONOCRYL 3-0 PS-2 18 IN Y497G

## (undated) DEVICE — 40601 PROLONGED POSITIONING SYSTEM: Brand: 40601 PROLONGED POSITIONING SYSTEM

## (undated) DEVICE — TOOL F2/8TA23 LEGEND 8CM 2.3MM TAPER: Brand: MIDAS REX™

## (undated) DEVICE — ANTIBACTERIAL VIOLET BRAIDED (POLYGLACTIN 910), SYNTHETIC ABSORBABLE SUTURE: Brand: COATED VICRYL

## (undated) DEVICE — EVERGRIP INSERT SET 61MM: Brand: FOGARTY EVERGRIP

## (undated) DEVICE — UMBILICAL TAPE: Brand: DEROYAL

## (undated) DEVICE — RANEY SCALP CLIP STERILE: Brand: AESCULAP

## (undated) DEVICE — CUSA® EXCEL 36KHZ CUSA® MANIFOLD TUBING SET: Brand: CUSA® EXCEL

## (undated) DEVICE — TOOL 9MH30 LEGEND 9CM 3MM MH: Brand: MIDAS REX

## (undated) DEVICE — DRAPE EQUIPMENT RF WAND

## (undated) DEVICE — DRAPE INTESTINAL ISOLATION BAG

## (undated) DEVICE — 3M™ IOBAN™ 2 ANTIMICROBIAL INCISE DRAPE 6640EZ: Brand: IOBAN™ 2

## (undated) DEVICE — ACRA CLIP SINGLE CARTRIDGE

## (undated) DEVICE — SURGIFOAM 8.5 X 12.5

## (undated) DEVICE — INTENDED FOR TISSUE SEPARATION, AND OTHER PROCEDURES THAT REQUIRE A SHARP SURGICAL BLADE TO PUNCTURE OR CUT.: Brand: BARD-PARKER SAFETY BLADES SIZE 10, STERILE

## (undated) DEVICE — DGW .035 FC J3MM 260CM TEF: Brand: EMERALD

## (undated) DEVICE — THERMOFLECT BLANKET, L, 25EA                               TS THERMOFLECT BLANKET, 48" X 84", SILVER, 5/BG, 5 BG/CS NW: Brand: THERMOFLECT

## (undated) DEVICE — SKIN MARKER DUAL TIP WITH RULER CAP, FLEXIBLE RULER AND LABELS: Brand: DEVON

## (undated) DEVICE — SPECIMEN CONTAINER STERILE PEEL PACK

## (undated) DEVICE — SUT NUROLON 4-0 TF CR/8 18 IN C584D

## (undated) DEVICE — PROXIMATE PLUS MD MULTI-DIRECTIONAL RELEASE SKIN STAPLERS CONTAINS 35 STAINLESS STEEL STAPLES APPROXIMATE CLOSED DIMENSIONS: 6.9MM X 3.9MM WIDE: Brand: PROXIMATE

## (undated) DEVICE — Device: Brand: IQ SYSTEM

## (undated) DEVICE — MARKER REFLECTIVE RADIOPAQUE SPHERE

## (undated) DEVICE — INTENDED FOR TISSUE SEPARATION, AND OTHER PROCEDURES THAT REQUIRE A SHARP SURGICAL BLADE TO PUNCTURE OR CUT.: Brand: BARD-PARKER SAFETY BLADES SIZE 15, STERILE

## (undated) DEVICE — JACKSON-PRATT 100CC BULB RESERVOIR: Brand: CARDINAL HEALTH

## (undated) DEVICE — PETRI DISH STERILE

## (undated) DEVICE — BETADINE OINTMENT FOIL PACK

## (undated) DEVICE — SURGICEL 4 X 8

## (undated) DEVICE — GLIDESHEATH BASIC HYDROPHILIC COATED INTRODUCER SHEATH: Brand: GLIDESHEATH

## (undated) DEVICE — CUSA® EXCEL 36KHZ 1.57MM MICROTIP™ CURVED EXTENDED TIP: Brand: CUSA® EXCEL

## (undated) DEVICE — SUCTION CANISTER 3000ML

## (undated) DEVICE — DRAPE CAMERA/LASER

## (undated) DEVICE — LIGHT HANDLE COVER SLEEVE DISP BLUE STELLAR

## (undated) DEVICE — DRAPE SHEET THREE QUARTER

## (undated) DEVICE — NEURO PATTIES 1/2 X 1/2

## (undated) DEVICE — BONE WAX WHITE: Brand: BONE WAX WHITE

## (undated) DEVICE — CATH GUIDE LAUNCHER 5FR EBU 3.5

## (undated) DEVICE — IV SET 15 DROP STERILE 0/Y GRAVITY

## (undated) DEVICE — NEURO PATTIES 1/2 X 1 1/2

## (undated) DEVICE — FLOSEAL MATRIX IS INDICATED IN SURGICAL PROCEDURES (OTHER THAN IN OPHTHALMIC) AS AN ADJUNCT TO HEMOSTASIS WHEN CONTROL OF BLEEDING BY LIGATURE OR CONVENTIONAL PROCEDURES IS INEFFECTIVE OR IMPRACTICAL.: Brand: FLOSEAL HEMOSTATIC MATRIX